# Patient Record
Sex: FEMALE | Race: WHITE | Employment: OTHER | URBAN - METROPOLITAN AREA
[De-identification: names, ages, dates, MRNs, and addresses within clinical notes are randomized per-mention and may not be internally consistent; named-entity substitution may affect disease eponyms.]

---

## 2023-10-11 ENCOUNTER — APPOINTMENT (OUTPATIENT)
Dept: CT IMAGING | Age: 73
DRG: 022 | End: 2023-10-11
Payer: MEDICARE

## 2023-10-11 ENCOUNTER — ANESTHESIA EVENT (OUTPATIENT)
Dept: INTERVENTIONAL RADIOLOGY/VASCULAR | Age: 73
End: 2023-10-11
Payer: MEDICARE

## 2023-10-11 ENCOUNTER — HOSPITAL ENCOUNTER (EMERGENCY)
Age: 73
Discharge: ANOTHER HOSPITAL | DRG: 022 | End: 2023-10-11
Attending: EMERGENCY MEDICINE
Payer: MEDICARE

## 2023-10-11 ENCOUNTER — APPOINTMENT (OUTPATIENT)
Dept: GENERAL RADIOLOGY | Age: 73
DRG: 022 | End: 2023-10-11
Payer: MEDICARE

## 2023-10-11 ENCOUNTER — APPOINTMENT (OUTPATIENT)
Dept: INTERVENTIONAL RADIOLOGY/VASCULAR | Age: 73
DRG: 022 | End: 2023-10-11
Payer: MEDICARE

## 2023-10-11 ENCOUNTER — HOSPITAL ENCOUNTER (INPATIENT)
Age: 73
LOS: 15 days | Discharge: OTHER INSTITUTION | DRG: 022 | End: 2023-10-26
Attending: EMERGENCY MEDICINE | Admitting: STUDENT IN AN ORGANIZED HEALTH CARE EDUCATION/TRAINING PROGRAM
Payer: MEDICARE

## 2023-10-11 ENCOUNTER — ANESTHESIA (OUTPATIENT)
Dept: INTERVENTIONAL RADIOLOGY/VASCULAR | Age: 73
End: 2023-10-11
Payer: MEDICARE

## 2023-10-11 VITALS
DIASTOLIC BLOOD PRESSURE: 81 MMHG | OXYGEN SATURATION: 97 % | HEIGHT: 65 IN | RESPIRATION RATE: 18 BRPM | WEIGHT: 180 LBS | HEART RATE: 80 BPM | SYSTOLIC BLOOD PRESSURE: 120 MMHG | BODY MASS INDEX: 29.99 KG/M2 | TEMPERATURE: 97.8 F

## 2023-10-11 DIAGNOSIS — I60.4 SUBARACHNOID HEMORRHAGE FROM BASILAR ARTERY ANEURYSM (HCC): ICD-10-CM

## 2023-10-11 DIAGNOSIS — I60.9 SUBARACHNOID BLEED (HCC): Primary | ICD-10-CM

## 2023-10-11 DIAGNOSIS — I60.9 SAH (SUBARACHNOID HEMORRHAGE) (HCC): ICD-10-CM

## 2023-10-11 DIAGNOSIS — J96.00 ACUTE RESPIRATORY FAILURE, UNSPECIFIED WHETHER WITH HYPOXIA OR HYPERCAPNIA (HCC): Primary | ICD-10-CM

## 2023-10-11 DIAGNOSIS — I60.9 SUBARACHNOID HEMORRHAGE (HCC): ICD-10-CM

## 2023-10-11 LAB
ALBUMIN SERPL-MCNC: 3.8 G/DL (ref 3.5–5.2)
ALP SERPL-CCNC: 94 U/L (ref 35–104)
ALT SERPL-CCNC: 16 U/L (ref 5–33)
AMMONIA PLAS-SCNC: 26 UMOL/L (ref 11–51)
AMPHET UR QL SCN: NEGATIVE
AMPHET UR QL SCN: NEGATIVE
ANION GAP SERPL CALCULATED.3IONS-SCNC: 15 MMOL/L (ref 9–17)
APAP SERPL-MCNC: <10 UG/ML (ref 10–30)
AST SERPL-CCNC: 24 U/L
BARBITURATES UR QL SCN: NEGATIVE
BARBITURATES UR QL SCN: NEGATIVE
BASOPHILS # BLD: 0.15 K/UL (ref 0–0.2)
BASOPHILS NFR BLD: 1 % (ref 0–2)
BENZODIAZ UR QL: NEGATIVE
BENZODIAZ UR QL: POSITIVE
BILIRUB SERPL-MCNC: 0.4 MG/DL (ref 0.3–1.2)
BILIRUB UR QL STRIP: NEGATIVE
BUN BLD-MCNC: 8 MG/DL (ref 8–26)
BUN SERPL-MCNC: 9 MG/DL (ref 8–23)
BUN/CREAT SERPL: 15 (ref 9–20)
CA-I BLD-SCNC: 1.14 MMOL/L (ref 1.15–1.33)
CALCIUM SERPL-MCNC: 9.1 MG/DL (ref 8.6–10.4)
CANNABINOIDS UR QL SCN: NEGATIVE
CANNABINOIDS UR QL SCN: NEGATIVE
CASTS #/AREA URNS LPF: ABNORMAL /LPF (ref 0–2)
CASTS #/AREA URNS LPF: ABNORMAL /LPF (ref 0–2)
CHLORIDE BLD-SCNC: 101 MMOL/L (ref 98–107)
CHLORIDE SERPL-SCNC: 96 MMOL/L (ref 98–107)
CHP ED QC CHECK: YES
CLARITY UR: CLEAR
CO2 BLD CALC-SCNC: 29 MMOL/L (ref 22–30)
CO2 BLD CALC-SCNC: 30 MMOL/L (ref 22–30)
CO2 SERPL-SCNC: 27 MMOL/L (ref 20–31)
COCAINE UR QL SCN: NEGATIVE
COCAINE UR QL SCN: NEGATIVE
COLOR UR: YELLOW
CREAT SERPL-MCNC: 0.6 MG/DL (ref 0.5–0.9)
D DIMER PPP FEU-MCNC: 1.77 UG/ML FEU (ref 0–0.59)
EGFR, POC: ABNORMAL ML/MIN/1.73M2
EOSINOPHIL # BLD: 0.16 K/UL (ref 0–0.44)
EOSINOPHILS RELATIVE PERCENT: 1 % (ref 1–4)
EPI CELLS #/AREA URNS HPF: ABNORMAL /HPF (ref 0–5)
ERYTHROCYTE [DISTWIDTH] IN BLOOD BY AUTOMATED COUNT: 13.4 % (ref 11.8–14.4)
ETHANOL PERCENT: <0.01 %
ETHANOLAMINE SERPL-MCNC: <10 MG/DL
FENTANYL UR QL: NEGATIVE
FENTANYL UR QL: POSITIVE
FIO2: 100
FLUAV RNA RESP QL NAA+PROBE: NOT DETECTED
FLUBV RNA RESP QL NAA+PROBE: NOT DETECTED
GFR SERPL CREATININE-BSD FRML MDRD: >60 ML/MIN/1.73M2
GLUCOSE BLD-MCNC: 155 MG/DL (ref 74–100)
GLUCOSE BLD-MCNC: 236 MG/DL
GLUCOSE BLD-MCNC: 236 MG/DL (ref 65–105)
GLUCOSE SERPL-MCNC: 240 MG/DL (ref 70–99)
GLUCOSE UR STRIP-MCNC: NEGATIVE MG/DL
HCO3 VENOUS: 29.9 MMOL/L (ref 22–29)
HCT VFR BLD AUTO: 47.9 % (ref 36.3–47.1)
HCT VFR BLD AUTO: 50 % (ref 36–46)
HGB BLD-MCNC: 15.5 G/DL (ref 11.9–15.1)
HGB UR QL STRIP.AUTO: NEGATIVE
IMM GRANULOCYTES # BLD AUTO: 0.14 K/UL (ref 0–0.3)
IMM GRANULOCYTES NFR BLD: 1 %
INR PPP: 1
KETONES UR STRIP-MCNC: NEGATIVE MG/DL
LACTATE BLDV-SCNC: 4.4 MMOL/L (ref 0.5–1.9)
LACTATE BLDV-SCNC: 4.6 MMOL/L (ref 0.5–1.9)
LACTIC ACID, WHOLE BLOOD: 1.9 MMOL/L (ref 0.7–2.1)
LEUKOCYTE ESTERASE UR QL STRIP: NEGATIVE
LYMPHOCYTES NFR BLD: 3.19 K/UL (ref 1.1–3.7)
LYMPHOCYTES RELATIVE PERCENT: 18 % (ref 24–43)
MAGNESIUM SERPL-MCNC: 1.7 MG/DL (ref 1.6–2.6)
MCH RBC QN AUTO: 30.9 PG (ref 25.2–33.5)
MCHC RBC AUTO-ENTMCNC: 32.4 G/DL (ref 28.4–34.8)
MCV RBC AUTO: 95.6 FL (ref 82.6–102.9)
METHADONE UR QL: NEGATIVE
METHADONE UR QL: NEGATIVE
MONOCYTES NFR BLD: 0.76 K/UL (ref 0.1–1.2)
MONOCYTES NFR BLD: 4 % (ref 3–12)
MUCOUS THREADS URNS QL MICRO: ABNORMAL
NEUTROPHILS NFR BLD: 75 % (ref 36–65)
NEUTS SEG NFR BLD: 13.69 K/UL (ref 1.5–8.1)
NITRITE UR QL STRIP: NEGATIVE
NRBC BLD-RTO: 0 PER 100 WBC
O2 DELIVERY DEVICE: ABNORMAL
O2 SAT, VEN: 62.9 % (ref 60–85)
OPIATES UR QL SCN: NEGATIVE
OPIATES UR QL SCN: NEGATIVE
OXYCODONE UR QL SCN: NEGATIVE
OXYCODONE UR QL SCN: NEGATIVE
PARTIAL THROMBOPLASTIN TIME: 26.9 SEC (ref 23.9–33.8)
PCO2, VEN: 50.7 MM HG (ref 41–51)
PCP UR QL SCN: NEGATIVE
PCP UR QL SCN: NEGATIVE
PH UR STRIP: 7.5 [PH] (ref 5–8)
PH VENOUS: 7.38 (ref 7.32–7.43)
PLATELET # BLD AUTO: 244 K/UL (ref 138–453)
PMV BLD AUTO: 11.7 FL (ref 8.1–13.5)
PO2, VEN: 33.9 MM HG (ref 30–50)
POC ANION GAP: 11 MMOL/L (ref 7–16)
POC CREATININE: <0.3 MG/DL (ref 0.51–1.19)
POC HCO3: 29 MMOL/L (ref 21–28)
POC HEMOGLOBIN (CALC): 17 G/DL (ref 12–16)
POC LACTIC ACID: 3 MMOL/L (ref 0.56–1.39)
POC O2 SATURATION: 100 % (ref 94–98)
POC PCO2: 48.2 MM HG (ref 35–48)
POC PH: 7.39 (ref 7.35–7.45)
POC PO2: 404.5 MM HG (ref 83–108)
POSITIVE BASE EXCESS, ART: 2.8 MMOL/L (ref 0–3)
POSITIVE BASE EXCESS, VEN: 3.4 MMOL/L (ref 0–3)
POTASSIUM BLD-SCNC: 3.2 MMOL/L (ref 3.5–4.5)
POTASSIUM SERPL-SCNC: 3.1 MMOL/L (ref 3.7–5.3)
PROT SERPL-MCNC: 7.3 G/DL (ref 6.4–8.3)
PROT UR STRIP-MCNC: ABNORMAL MG/DL
PROTHROMBIN TIME: 13.3 SEC (ref 11.5–14.2)
RBC # BLD AUTO: 5.01 M/UL (ref 3.95–5.11)
RBC #/AREA URNS HPF: ABNORMAL /HPF (ref 0–2)
SALICYLATES SERPL-MCNC: <1 MG/DL (ref 3–10)
SAMPLE SITE: ABNORMAL
SARS-COV-2 RNA RESP QL NAA+PROBE: NOT DETECTED
SODIUM BLD-SCNC: 141 MMOL/L (ref 138–146)
SODIUM SERPL-SCNC: 138 MMOL/L (ref 135–144)
SOURCE: NORMAL
SP GR UR STRIP: 1.04 (ref 1–1.03)
SPECIMEN DESCRIPTION: NORMAL
TEST INFORMATION: ABNORMAL
TEST INFORMATION: NORMAL
TROPONIN I SERPL HS-MCNC: 20 NG/L (ref 0–14)
TROPONIN I SERPL HS-MCNC: 54 NG/L (ref 0–14)
TSH SERPL DL<=0.05 MIU/L-ACNC: 1.5 UIU/ML (ref 0.3–5)
UROBILINOGEN UR STRIP-ACNC: NORMAL EU/DL (ref 0–1)
WBC #/AREA URNS HPF: ABNORMAL /HPF (ref 0–5)
WBC OTHER # BLD: 18.1 K/UL (ref 3.5–11.3)

## 2023-10-11 PROCEDURE — 83605 ASSAY OF LACTIC ACID: CPT

## 2023-10-11 PROCEDURE — 71045 X-RAY EXAM CHEST 1 VIEW: CPT

## 2023-10-11 PROCEDURE — 82947 ASSAY GLUCOSE BLOOD QUANT: CPT

## 2023-10-11 PROCEDURE — 2700000000 HC OXYGEN THERAPY PER DAY

## 2023-10-11 PROCEDURE — 36415 COLL VENOUS BLD VENIPUNCTURE: CPT

## 2023-10-11 PROCEDURE — G0480 DRUG TEST DEF 1-7 CLASSES: HCPCS

## 2023-10-11 PROCEDURE — 82803 BLOOD GASES ANY COMBINATION: CPT

## 2023-10-11 PROCEDURE — 5A1935Z RESPIRATORY VENTILATION, LESS THAN 24 CONSECUTIVE HOURS: ICD-10-PCS | Performed by: STUDENT IN AN ORGANIZED HEALTH CARE EDUCATION/TRAINING PROGRAM

## 2023-10-11 PROCEDURE — 6360000002 HC RX W HCPCS: Performed by: EMERGENCY MEDICINE

## 2023-10-11 PROCEDURE — 96366 THER/PROPH/DIAG IV INF ADDON: CPT

## 2023-10-11 PROCEDURE — 36224 PLACE CATH CAROTD ART: CPT

## 2023-10-11 PROCEDURE — 84484 ASSAY OF TROPONIN QUANT: CPT

## 2023-10-11 PROCEDURE — 36226 PLACE CATH VERTEBRAL ART: CPT

## 2023-10-11 PROCEDURE — 94761 N-INVAS EAR/PLS OXIMETRY MLT: CPT

## 2023-10-11 PROCEDURE — 85379 FIBRIN DEGRADATION QUANT: CPT

## 2023-10-11 PROCEDURE — 70450 CT HEAD/BRAIN W/O DYE: CPT

## 2023-10-11 PROCEDURE — 84520 ASSAY OF UREA NITROGEN: CPT

## 2023-10-11 PROCEDURE — B31R1ZZ FLUOROSCOPY OF INTRACRANIAL ARTERIES USING LOW OSMOLAR CONTRAST: ICD-10-PCS | Performed by: PSYCHIATRY & NEUROLOGY

## 2023-10-11 PROCEDURE — 96365 THER/PROPH/DIAG IV INF INIT: CPT

## 2023-10-11 PROCEDURE — 2000000000 HC ICU R&B

## 2023-10-11 PROCEDURE — 2500000003 HC RX 250 WO HCPCS: Performed by: EMERGENCY MEDICINE

## 2023-10-11 PROCEDURE — A4216 STERILE WATER/SALINE, 10 ML: HCPCS | Performed by: EMERGENCY MEDICINE

## 2023-10-11 PROCEDURE — 4A03X5D MEASUREMENT OF ARTERIAL FLOW, INTRACRANIAL, EXTERNAL APPROACH: ICD-10-PCS | Performed by: RADIOLOGY

## 2023-10-11 PROCEDURE — 82140 ASSAY OF AMMONIA: CPT

## 2023-10-11 PROCEDURE — 75710 ARTERY X-RAYS ARM/LEG: CPT

## 2023-10-11 PROCEDURE — 85730 THROMBOPLASTIN TIME PARTIAL: CPT

## 2023-10-11 PROCEDURE — 80307 DRUG TEST PRSMV CHEM ANLYZR: CPT

## 2023-10-11 PROCEDURE — 99223 1ST HOSP IP/OBS HIGH 75: CPT | Performed by: NEUROLOGICAL SURGERY

## 2023-10-11 PROCEDURE — 93005 ELECTROCARDIOGRAM TRACING: CPT | Performed by: NURSE PRACTITIONER

## 2023-10-11 PROCEDURE — 70498 CT ANGIOGRAPHY NECK: CPT

## 2023-10-11 PROCEDURE — 87086 URINE CULTURE/COLONY COUNT: CPT

## 2023-10-11 PROCEDURE — 82374 ASSAY BLOOD CARBON DIOXIDE: CPT

## 2023-10-11 PROCEDURE — 85014 HEMATOCRIT: CPT

## 2023-10-11 PROCEDURE — 6360000004 HC RX CONTRAST MEDICATION: Performed by: STUDENT IN AN ORGANIZED HEALTH CARE EDUCATION/TRAINING PROGRAM

## 2023-10-11 PROCEDURE — 6360000004 HC RX CONTRAST MEDICATION: Performed by: NURSE PRACTITIONER

## 2023-10-11 PROCEDURE — 82330 ASSAY OF CALCIUM: CPT

## 2023-10-11 PROCEDURE — 80051 ELECTROLYTE PANEL: CPT

## 2023-10-11 PROCEDURE — 2580000003 HC RX 258

## 2023-10-11 PROCEDURE — 6360000002 HC RX W HCPCS: Performed by: NURSE PRACTITIONER

## 2023-10-11 PROCEDURE — 99285 EMERGENCY DEPT VISIT HI MDM: CPT

## 2023-10-11 PROCEDURE — 93005 ELECTROCARDIOGRAM TRACING: CPT | Performed by: EMERGENCY MEDICINE

## 2023-10-11 PROCEDURE — 61624 TCAT PERM OCCLS/EMBOLJ CNS: CPT

## 2023-10-11 PROCEDURE — 99223 1ST HOSP IP/OBS HIGH 75: CPT | Performed by: PSYCHIATRY & NEUROLOGY

## 2023-10-11 PROCEDURE — 80053 COMPREHEN METABOLIC PANEL: CPT

## 2023-10-11 PROCEDURE — 3700000001 HC ADD 15 MINUTES (ANESTHESIA)

## 2023-10-11 PROCEDURE — 6360000002 HC RX W HCPCS

## 2023-10-11 PROCEDURE — 75894 X-RAYS TRANSCATH THERAPY: CPT

## 2023-10-11 PROCEDURE — 82565 ASSAY OF CREATININE: CPT

## 2023-10-11 PROCEDURE — 94002 VENT MGMT INPAT INIT DAY: CPT

## 2023-10-11 PROCEDURE — B31F1ZZ FLUOROSCOPY OF LEFT VERTEBRAL ARTERY USING LOW OSMOLAR CONTRAST: ICD-10-PCS | Performed by: PSYCHIATRY & NEUROLOGY

## 2023-10-11 PROCEDURE — 3700000000 HC ANESTHESIA ATTENDED CARE

## 2023-10-11 PROCEDURE — 84443 ASSAY THYROID STIM HORMONE: CPT

## 2023-10-11 PROCEDURE — 80143 DRUG ASSAY ACETAMINOPHEN: CPT

## 2023-10-11 PROCEDURE — 80179 DRUG ASSAY SALICYLATE: CPT

## 2023-10-11 PROCEDURE — 75898 FOLLOW-UP ANGIOGRAPHY: CPT

## 2023-10-11 PROCEDURE — 2500000003 HC RX 250 WO HCPCS: Performed by: NURSE PRACTITIONER

## 2023-10-11 PROCEDURE — 2580000003 HC RX 258: Performed by: EMERGENCY MEDICINE

## 2023-10-11 PROCEDURE — 83735 ASSAY OF MAGNESIUM: CPT

## 2023-10-11 PROCEDURE — 81001 URINALYSIS AUTO W/SCOPE: CPT

## 2023-10-11 PROCEDURE — 87636 SARSCOV2 & INF A&B AMP PRB: CPT

## 2023-10-11 PROCEDURE — 2580000003 HC RX 258: Performed by: PSYCHIATRY & NEUROLOGY

## 2023-10-11 PROCEDURE — 96375 TX/PRO/DX INJ NEW DRUG ADDON: CPT

## 2023-10-11 PROCEDURE — C1889 IMPLANT/INSERT DEVICE, NOC: HCPCS

## 2023-10-11 PROCEDURE — 87040 BLOOD CULTURE FOR BACTERIA: CPT

## 2023-10-11 PROCEDURE — 2500000003 HC RX 250 WO HCPCS

## 2023-10-11 PROCEDURE — 96374 THER/PROPH/DIAG INJ IV PUSH: CPT

## 2023-10-11 PROCEDURE — 74018 RADEX ABDOMEN 1 VIEW: CPT

## 2023-10-11 PROCEDURE — 85025 COMPLETE CBC W/AUTO DIFF WBC: CPT

## 2023-10-11 PROCEDURE — 03VG3HZ RESTRICTION OF INTRACRANIAL ARTERY WITH INTRALUMINAL DEVICE, FLOW DIVERTER, PERCUTANEOUS APPROACH: ICD-10-PCS | Performed by: PSYCHIATRY & NEUROLOGY

## 2023-10-11 PROCEDURE — 85610 PROTHROMBIN TIME: CPT

## 2023-10-11 RX ORDER — SODIUM CHLORIDE, SODIUM LACTATE, POTASSIUM CHLORIDE, CALCIUM CHLORIDE 600; 310; 30; 20 MG/100ML; MG/100ML; MG/100ML; MG/100ML
INJECTION, SOLUTION INTRAVENOUS CONTINUOUS PRN
Status: DISCONTINUED | OUTPATIENT
Start: 2023-10-11 | End: 2023-10-11 | Stop reason: SDUPTHER

## 2023-10-11 RX ORDER — FENTANYL CITRATE 50 UG/ML
INJECTION, SOLUTION INTRAMUSCULAR; INTRAVENOUS
Status: COMPLETED
Start: 2023-10-11 | End: 2023-10-11

## 2023-10-11 RX ORDER — IODIXANOL 270 MG/ML
100 INJECTION, SOLUTION INTRAVASCULAR
Status: COMPLETED | OUTPATIENT
Start: 2023-10-11 | End: 2023-10-11

## 2023-10-11 RX ORDER — MIDAZOLAM HYDROCHLORIDE 1 MG/ML
2 INJECTION INTRAMUSCULAR; INTRAVENOUS ONCE
Status: COMPLETED | OUTPATIENT
Start: 2023-10-11 | End: 2023-10-11

## 2023-10-11 RX ORDER — SODIUM CHLORIDE 9 MG/ML
INJECTION, SOLUTION INTRAVENOUS CONTINUOUS
Status: DISCONTINUED | OUTPATIENT
Start: 2023-10-11 | End: 2023-10-24

## 2023-10-11 RX ORDER — MAGNESIUM SULFATE IN WATER 40 MG/ML
2000 INJECTION, SOLUTION INTRAVENOUS ONCE
Status: COMPLETED | OUTPATIENT
Start: 2023-10-11 | End: 2023-10-11

## 2023-10-11 RX ORDER — LABETALOL HYDROCHLORIDE 5 MG/ML
20 INJECTION, SOLUTION INTRAVENOUS EVERY 4 HOURS
Status: DISCONTINUED | OUTPATIENT
Start: 2023-10-11 | End: 2023-10-12

## 2023-10-11 RX ORDER — ONDANSETRON 2 MG/ML
4 INJECTION INTRAMUSCULAR; INTRAVENOUS ONCE
Status: COMPLETED | OUTPATIENT
Start: 2023-10-11 | End: 2023-10-11

## 2023-10-11 RX ORDER — LEVETIRACETAM 10 MG/ML
1000 INJECTION INTRAVASCULAR ONCE
Status: COMPLETED | OUTPATIENT
Start: 2023-10-11 | End: 2023-10-11

## 2023-10-11 RX ORDER — SODIUM CHLORIDE 9 MG/ML
INJECTION, SOLUTION INTRAVENOUS PRN
Status: DISCONTINUED | OUTPATIENT
Start: 2023-10-11 | End: 2023-10-26 | Stop reason: HOSPADM

## 2023-10-11 RX ORDER — PHENYLEPHRINE HCL IN 0.9% NACL 1 MG/10 ML
SYRINGE (ML) INTRAVENOUS PRN
Status: DISCONTINUED | OUTPATIENT
Start: 2023-10-11 | End: 2023-10-11 | Stop reason: SDUPTHER

## 2023-10-11 RX ORDER — ONDANSETRON 4 MG/1
4 TABLET, ORALLY DISINTEGRATING ORAL EVERY 8 HOURS PRN
Status: DISCONTINUED | OUTPATIENT
Start: 2023-10-11 | End: 2023-10-12 | Stop reason: SDUPTHER

## 2023-10-11 RX ORDER — SODIUM CHLORIDE 0.9 % (FLUSH) 0.9 %
5-40 SYRINGE (ML) INJECTION PRN
Status: DISCONTINUED | OUTPATIENT
Start: 2023-10-11 | End: 2023-10-16

## 2023-10-11 RX ORDER — PROPOFOL 10 MG/ML
5-50 INJECTION, EMULSION INTRAVENOUS CONTINUOUS
Status: DISCONTINUED | OUTPATIENT
Start: 2023-10-11 | End: 2023-10-11 | Stop reason: HOSPADM

## 2023-10-11 RX ORDER — FENTANYL CITRATE 50 UG/ML
50 INJECTION, SOLUTION INTRAMUSCULAR; INTRAVENOUS ONCE
Status: COMPLETED | OUTPATIENT
Start: 2023-10-12 | End: 2023-10-11

## 2023-10-11 RX ORDER — PROPOFOL 10 MG/ML
5-50 INJECTION, EMULSION INTRAVENOUS CONTINUOUS
Status: DISCONTINUED | OUTPATIENT
Start: 2023-10-11 | End: 2023-10-12

## 2023-10-11 RX ORDER — ROCURONIUM BROMIDE 10 MG/ML
INJECTION, SOLUTION INTRAVENOUS PRN
Status: DISCONTINUED | OUTPATIENT
Start: 2023-10-11 | End: 2023-10-11 | Stop reason: SDUPTHER

## 2023-10-11 RX ORDER — ACETAMINOPHEN 325 MG/1
650 TABLET ORAL EVERY 4 HOURS PRN
Status: DISCONTINUED | OUTPATIENT
Start: 2023-10-11 | End: 2023-10-12

## 2023-10-11 RX ORDER — FAMOTIDINE 10 MG/ML
20 INJECTION, SOLUTION INTRAVENOUS 2 TIMES DAILY
Status: DISCONTINUED | OUTPATIENT
Start: 2023-10-11 | End: 2023-10-12

## 2023-10-11 RX ORDER — SODIUM CHLORIDE 0.9 % (FLUSH) 0.9 %
5-40 SYRINGE (ML) INJECTION EVERY 12 HOURS SCHEDULED
Status: DISCONTINUED | OUTPATIENT
Start: 2023-10-11 | End: 2023-10-26

## 2023-10-11 RX ORDER — ONDANSETRON 2 MG/ML
4 INJECTION INTRAMUSCULAR; INTRAVENOUS EVERY 6 HOURS PRN
Status: DISCONTINUED | OUTPATIENT
Start: 2023-10-11 | End: 2023-10-12 | Stop reason: SDUPTHER

## 2023-10-11 RX ORDER — HYDRALAZINE HYDROCHLORIDE 20 MG/ML
10 INJECTION INTRAMUSCULAR; INTRAVENOUS EVERY 4 HOURS PRN
Status: DISCONTINUED | OUTPATIENT
Start: 2023-10-11 | End: 2023-10-14

## 2023-10-11 RX ORDER — POTASSIUM CHLORIDE 7.45 MG/ML
10 INJECTION INTRAVENOUS
Status: DISPENSED | OUTPATIENT
Start: 2023-10-11 | End: 2023-10-11

## 2023-10-11 RX ORDER — 0.9 % SODIUM CHLORIDE 0.9 %
1000 INTRAVENOUS SOLUTION INTRAVENOUS ONCE
Status: COMPLETED | OUTPATIENT
Start: 2023-10-11 | End: 2023-10-11

## 2023-10-11 RX ADMIN — Medication 100 MCG: at 18:58

## 2023-10-11 RX ADMIN — ROCURONIUM BROMIDE 30 MG: 10 INJECTION, SOLUTION INTRAVENOUS at 18:40

## 2023-10-11 RX ADMIN — ONDANSETRON 4 MG: 2 INJECTION INTRAMUSCULAR; INTRAVENOUS at 11:56

## 2023-10-11 RX ADMIN — PROPOFOL 50 MCG/KG/MIN: 10 INJECTION, EMULSION INTRAVENOUS at 20:24

## 2023-10-11 RX ADMIN — ROCURONIUM BROMIDE 20 MG: 10 INJECTION, SOLUTION INTRAVENOUS at 19:25

## 2023-10-11 RX ADMIN — IODIXANOL 82 ML: 270 INJECTION, SOLUTION INTRAVASCULAR at 20:01

## 2023-10-11 RX ADMIN — ROCURONIUM BROMIDE 50 MG: 10 INJECTION, SOLUTION INTRAVENOUS at 17:50

## 2023-10-11 RX ADMIN — Medication 2 G: at 18:17

## 2023-10-11 RX ADMIN — LABETALOL HYDROCHLORIDE 20 MG: 5 INJECTION, SOLUTION INTRAVENOUS at 21:03

## 2023-10-11 RX ADMIN — MIDAZOLAM 2 MG: 1 INJECTION INTRAMUSCULAR; INTRAVENOUS at 11:11

## 2023-10-11 RX ADMIN — HYDRALAZINE HYDROCHLORIDE 10 MG: 20 INJECTION, SOLUTION INTRAMUSCULAR; INTRAVENOUS at 22:48

## 2023-10-11 RX ADMIN — LEVETIRACETAM 1000 MG: 10 INJECTION, SOLUTION INTRAVENOUS at 14:01

## 2023-10-11 RX ADMIN — PROPOFOL 10 MCG/KG/MIN: 10 INJECTION, EMULSION INTRAVENOUS at 11:20

## 2023-10-11 RX ADMIN — FENTANYL CITRATE 50 MCG: 50 INJECTION, SOLUTION INTRAMUSCULAR; INTRAVENOUS at 23:49

## 2023-10-11 RX ADMIN — PROPOFOL 40 MG: 10 INJECTION, EMULSION INTRAVENOUS at 19:15

## 2023-10-11 RX ADMIN — FAMOTIDINE 20 MG: 10 INJECTION, SOLUTION INTRAVENOUS at 21:03

## 2023-10-11 RX ADMIN — SODIUM CHLORIDE 1000 ML: 9 INJECTION, SOLUTION INTRAVENOUS at 11:56

## 2023-10-11 RX ADMIN — POTASSIUM CHLORIDE 10 MEQ: 7.46 INJECTION, SOLUTION INTRAVENOUS at 16:55

## 2023-10-11 RX ADMIN — PROPOFOL 30 MG: 10 INJECTION, EMULSION INTRAVENOUS at 19:40

## 2023-10-11 RX ADMIN — FENTANYL CITRATE 50 MCG: 50 INJECTION INTRAMUSCULAR; INTRAVENOUS at 23:49

## 2023-10-11 RX ADMIN — PROPOFOL 30 MCG/KG/MIN: 10 INJECTION, EMULSION INTRAVENOUS at 13:45

## 2023-10-11 RX ADMIN — FAMOTIDINE 20 MG: 10 INJECTION, SOLUTION INTRAVENOUS at 12:43

## 2023-10-11 RX ADMIN — SODIUM CHLORIDE, PRESERVATIVE FREE 10 ML: 5 INJECTION INTRAVENOUS at 20:27

## 2023-10-11 RX ADMIN — LEVETIRACETAM 1000 MG: 10 INJECTION, SOLUTION INTRAVENOUS at 16:28

## 2023-10-11 RX ADMIN — SODIUM CHLORIDE, POTASSIUM CHLORIDE, SODIUM LACTATE AND CALCIUM CHLORIDE: 600; 310; 30; 20 INJECTION, SOLUTION INTRAVENOUS at 17:47

## 2023-10-11 RX ADMIN — MAGNESIUM SULFATE HEPTAHYDRATE 2000 MG: 40 INJECTION, SOLUTION INTRAVENOUS at 16:58

## 2023-10-11 RX ADMIN — IOPAMIDOL 90 ML: 755 INJECTION, SOLUTION INTRAVENOUS at 15:51

## 2023-10-11 ASSESSMENT — PULMONARY FUNCTION TESTS
PIF_VALUE: 26
PIF_VALUE: 23
PIF_VALUE: 21
PIF_VALUE: 24

## 2023-10-11 NOTE — H&P
Neuro ICU History & Physical    Patient Name: Sheri Knutson  Patient : 1950  Room/Bed:   Code Status: Full  Allergies: No Known Allergies    CHIEF COMPLAINT     Thunderclap headache    HPI    History Obtained From: Family - spouse, EMR    The patient is a 67 y.o. female with history of HTN, HLD, and smoking presented as a transfer from [facility] for Diffuse SAH. Patient was at home this morning when around 0930 she developed acute onset severe headache. EMS was called, patient was walking to the cot when she became unresponsive. She was intubated and taken to [facility] where CT head showed diffuse SAH. She was loaded with 1g Keppra and started on Propofol for sedation. On arrival to [facility] with propofol held, patient opens eyes to verbal stimulation. Pupils are 4mm reactive bilaterally. Cough intact. Moving all extremities spontaneously, localizing bilateral upper extremities. Wiggles toes bilaterally to command. CTA head/neck showed basilar tip aneurysm. Given additional 1g Keppra. SBP goal < 120. Irregular rhythm noted on monitor, follow up EKG and troponin. Endovascular and neurosurgery consulted. Patient's  does report she takes Aspirin 81mg daily for general wellness. For [facility] Hunt&Gates: 5, Modified Viramontes: IV  ICH score: 0    Admitted to ICU From: ED   Reason for ICU Admission: ruptured aneurysm       PATIENT HISTORY   Past Medical History:    No past medical history on file. Past Surgical History:    No past surgical history on file.     Social History:   Social History     Socioeconomic History    Marital status:      Spouse name: Not on file    Number of children: Not on file    Years of education: Not on file    Highest education level: Not on file   Occupational History    Not on file   Tobacco Use    Smoking status: Not on file    Smokeless tobacco: Not on file   Substance and Sexual Activity    Alcohol use: Not on file    Drug use: Not on file

## 2023-10-11 NOTE — ED NOTES
Writer took pt to CT via stretcher.      Erin Brizuela RN  10/11/23 9589 patient oriented to unit and procedure

## 2023-10-11 NOTE — BRIEF OP NOTE
[facility]    NEUROENDOVASCULAR SERVICE: POST-OP NOTE: 10/11/2023    Pt Name: Melissa Barrera  MRN: [id number]  YOB: 1950  Date of Procedure: 10/11/2023  Primary Care Physician: No primary care provider on file. Referring Marisol Willis MD      Pre-Procedural Diagnosis:Basilar tip aneurysm  Post-Procedural Diagnosis:as above      Procedure Performed: Cerebral angiogram with WEB device embolization of aneuryms    Surgeon:   Merary Britton MD    Fellow:  Mervat Lowery MD and Christal Hodgkins, MD     Assisting Tech:  Doe Query    PRE-PROCEDURAL EXAM:  Prestroke baseline mRS MODIFIED MOHIT SCORE: 0 - No symptoms at all. Neurological exam performed and unchanged from initial H&P or consult  MODIFIED MOHIT SCORE: 4 - Moderate severe disability:  unable to walk or attend to own bodily needs without assistance. Anesthesia: General Anesthesia  Complications: none    Intra-Operative EXAM:  Neurological exam performed and unchanged from initial H&P or consult    EBL: < less than 100       Cc            Specimens: Were not Obtained  Contrast:     Visipaque 270 low osmolar 82 Cc             Fluoro: 24.4 min    Findings:  Please see dictated Radiology note for further details  There is a bilobed anteriorly facing ruptured basilar tip aneurysm with moderate stenosis at the tip of the basilar artery. The basilar tip aneurysm measures neck 3.13mm, width 4.7mm, height 4.83mm and breath 4.71mm. The above was treated with 7Fr short radial sheath, 7Fr RIST, 5Fr Berenstein, VIA 17, Synchro Support, WEB 5x3mm, post deployment, the aneurysm is obliterated, achieving Chris 1, WEB Occlusion Score A.                 Chris score: class I    POST-PROCEDURAL EXAM :   Stable neurological Exam  Neurological exam performed and unchanged from initial H&P or consult    Closure:  left VascBand 17cc of air        POST-PROCEDURAL MONITORING : see orders  Disposition: Neuro

## 2023-10-11 NOTE — ED NOTES
Pt intubated at scene prior to arrival at [address] [place] [address]. Non-violent restraints in place.      Rosangela Peres RN  10/11/23 7356

## 2023-10-11 NOTE — PROGRESS NOTES
[address]     Emergency/Trauma Note    PATIENT NAME: Darren Berrios    Shift date: 10/11/2023   Shift day: Wednesday   Shift # 1    Room # 13/13   Name: Darren Berrios            Age: 67 y.o. Gender: female          Christianity: None, confirmed by family  Place of Mandaeism:     Trauma/Incident type: Stroke Alert  Admit Date & Time: 10/11/2023  2:52 PM      ADVANCE DIRECTIVES IN CHART? No    NAME OF DECISION MAKER:     RELATIONSHIP OF DECISION MAKER TO PATIENT:     PATIENT/EVENT DESCRIPTION:  Darren Berrios is a 67 y.o. female who was transferred from [address] [place] [address] and paged as a stroke alert. Pt to be admitted to 13/13. SPIRITUAL ASSESSMENT-INTERVENTION-OUTCOME:  Pt's , son, daughter and sister were present. They appeared concerned but calm.  escorted family to pt's room and provided presence as neuro resident spoke to family. Family was receptive to support and expressed appreciation. PATIENT BELONGINGS:  No belongings noted    ANY BELONGINGS OF SIGNIFICANT VALUE NOTED:      REGISTRATION STAFF NOTIFIED? No      WHAT IS YOUR SPIRITUAL CARE PLAN FOR THIS PATIENT?:   Chaplains will remain available to offer spiritual and emotional support as needed.      Electronically signed by Rayray Wong on 10/11/2023 at 3:58 PM.  [address] [place] [address]  [phone]

## 2023-10-11 NOTE — ED PROVIDER NOTES
[facility] ED  Emergency Department Encounter  Emergency Medicine Resident     Pt Name:Dahlia Clarke  MRN: [id number]  [address] [facility] [place] 1950  Date of evaluation: 10/11/23  PCP:  No primary care provider on file. Note Started: 3:06 PM EDT      CHIEF COMPLAINT       Chief Complaint   Patient presents with    Altered Mental Status       HISTORY OF PRESENT ILLNESS  (Location/Symptom, Timing/Onset, Context/Setting, Quality, Duration, Modifying Factors, Severity.)      Sarah Butler is a 67 y.o. female who presents as a transfer from [facility]. Patient was found to have a spontaneous subarachnoid hemorrhage with diffuse subarachnoid hemorrhage noted. Patient has a history of hypertension, unclear if the patient is compliant on medications. Not on any anticoagulation, no fall or trauma. On EMS arrival, patient initially had nausea vomiting and headache. Sudden onset of headache this morning. Patient then became unresponsive and was intubated prior to arrival at [facility]. Patient received 1 g of Keppra at scene and and was placed on propofol for sedation. Per EMS report, patient was moving on exam at [facility]. PAST MEDICAL / SURGICAL / SOCIAL / FAMILY HISTORY     HTN     has no past surgical history on file.       Social History     Socioeconomic History    Marital status:      Spouse name: Not on file    Number of children: Not on file    Years of education: Not on file    Highest education level: Not on file   Occupational History    Not on file   Tobacco Use    Smoking status: Not on file    Smokeless tobacco: Not on file   Substance and Sexual Activity    Alcohol use: Not on file    Drug use: Not on file    Sexual activity: Not on file   Other Topics Concern    Not on file   Social History Narrative    Not on file     Social Determinants of Health     Financial Resource Strain: Not on file   Food Insecurity: Not on file   Transportation Needs: Not on file   Physical

## 2023-10-11 NOTE — ED NOTES
Stroke alert called. Pt has known subarachnoid hemorrhage via scans from [facility] Neuro aware of pt.       Zoila Reza RN  10/11/23 4426

## 2023-10-11 NOTE — CONSULTS
Department of Neurosurgery                                              Consult Note    Reason for Consult:  [facility]    Neurosurgeon:   [] Dr. Rebecca Boyce  [] Dr. Jessica Hamm  [] Dr. Anny Nolasco  [] Dr. Noman Wong      History Obtained From:  [facility] record    CHIEF COMPLAINT:         Chief Complaint   Patient presents with    Altered Mental Status       HISTORY OF PRESENT ILLNESS:       The patient is a 67 y.o. female who developed acute severe headache around 930 AM. Patient had EMS called and went unresponsive prompting intubation on field. Patient was intiially taken to [facility] for eval and transferred here for further care. CTH demonstrated diffuse SAH. Bolused with keppra 1000 mg while at [address] [place] [address]. Limited history given from patient secondary to intubation and sedation. PAST MEDICAL HISTORY :       Past Medical History:    No past medical history on file. Past Surgical History:    No past surgical history on file. Social History:   Social History     Socioeconomic History    Marital status:      Spouse name: Not on file    Number of children: Not on file    Years of education: Not on file    Highest education level: Not on file   Occupational History    Not on file   Tobacco Use    Smoking status: Not on file    Smokeless tobacco: Not on file   Substance and Sexual Activity    Alcohol use: Not on file    Drug use: Not on file    Sexual activity: Not on file   Other Topics Concern    Not on file   Social History Narrative    Not on file     Social Determinants of Health     Financial Resource Strain: Not on file   Food Insecurity: Not on file   Transportation Needs: Not on file   Physical Activity: Not on file   Stress: Not on file   Social Connections: Not on file   Intimate Partner Violence: Not on file   Housing Stability: Not on file       Family History:   No family history on file. Allergies:  Patient has no known allergies.     Home Medications:  Prior to Admission medications and anticoagulants    - We recommend SBP < 140   - Determine the lower limit of SBP clinically based on mentation    Additional recommendations may follow    Please contact neurosurgery with any changes in patients neurologic status. Thank you for your consult.        Ashley Henderson DO  10/11/2023  3:50 PM

## 2023-10-11 NOTE — ED NOTES
72F with spontaneous SAH  EMS called for headache then found unresponsive  Intubated  Given [facility] aware  Stroke Alert    Accepted by Annamarie Mack, RN  10/11/23 2115

## 2023-10-11 NOTE — ED TRIAGE NOTES
Pt to ED via [address] [place] [address] Rd, stretcher to rm 24, bed per draw. Pt unresponsive and intubated. Per EMS pt responsive on arrival and able to walk to cot. Pt began vomiting this morning. Pt while on cot stated she had an extreme posterior headache and passed out. Pt intubated in squad. 7 ET, 16g L AC, palpable pulses. Dr @ bedside. EKG, monitor, labs taken and sent.

## 2023-10-11 NOTE — ED TRIAGE NOTES
Pt presents to the ED via EMS from [address] [place] [address] for sudden loss of consciousness. EMS reports pt called out at 0930 for headache and nausea and vomiting . EMS reports upon arrival pt went unresponsive and they intubated in the field then took pt to [address] [place] [address]. Ems reports giving 1000ML NS, 100mcg fentanyl, 1g Keppra, and propofol at 30mcg/kg. Pt placed in non-violent restraints. Upon arrival pt VSS. Pt placed on monitor. ED staff at bedside.

## 2023-10-11 NOTE — PROGRESS NOTES
The transport originated from 15. Pt. was transported to CT and back. Assisting with the transport was RN,RRT. Appropriate devices were applied to monitor the patient's condition during transport. Patient transported  via 100% O2 via ventilator. Patient tolerated well.         Ba Solares RCP  4:17 PM

## 2023-10-11 NOTE — CONSULTS
Value Date/Time    WBC 18.1 10/11/2023 11:43 AM    RBC 5.01 10/11/2023 11:43 AM    HGB 15.5 10/11/2023 11:43 AM    HCT 47.9 10/11/2023 11:43 AM     10/11/2023 11:43 AM    MCV 95.6 10/11/2023 11:43 AM    MCH 30.9 10/11/2023 11:43 AM    MCHC 32.4 10/11/2023 11:43 AM    RDW 13.4 10/11/2023 11:43 AM    LYMPHOPCT 18 10/11/2023 11:43 AM    MONOPCT 4 10/11/2023 11:43 AM    BASOPCT 1 10/11/2023 11:43 AM    MONOSABS 0.76 10/11/2023 11:43 AM    LYMPHSABS 3.19 10/11/2023 11:43 AM    EOSABS 0.16 10/11/2023 11:43 AM    BASOSABS 0.15 10/11/2023 11:43 AM     BMP:    Lab Results   Component Value Date/Time     10/11/2023 11:43 AM    K 3.1 10/11/2023 11:43 AM    CL 96 10/11/2023 11:43 AM    CO2 27 10/11/2023 11:43 AM    BUN 9 10/11/2023 11:43 AM    LABALBU 3.8 10/11/2023 11:43 AM    CREATININE <0.3 10/11/2023 02:59 PM    CREATININE 0.6 10/11/2023 11:43 AM    CALCIUM 9.1 10/11/2023 11:43 AM    LABGLOM >60 10/11/2023 11:43 AM    GLUCOSE 236 10/11/2023 11:45 AM     No results found for: \"LABA1C\"  No results found for: \"LDLCALC\", \"LDLCHOLESTEROL\", \"LDLDIRECT\"    Radiology Review:    XR CHEST PORTABLE    (Results Pending)   CTA HEAD NECK W CONTRAST    (Results Pending)   CT HEAD WO CONTRAST    (Results Pending)       Assessment:       Ming Brody is a 67 y.o. female with a history of HTN, HLD, who presents with acute onset severe headache. Was taken to [facility] where she was found to have diffuse SAH. Patient was intubated by EMS on route due to becoming unresponsive. Bower and Gates grade 4, modified Szymanski grade 4. Neuro critical care admission with neurosurgery and neuro endovascular team evaluation. SAH      Last Known Well (date and time)   9:30 AM     Candidate for IV Tenecteplase therapy    Yes []  Risks including 6% of sich/death, benefits of potential improved thrombolysis, and alternatives to IV thrombolytics discussed with patient and/or family.     No   [x] due to the following exclusion

## 2023-10-12 ENCOUNTER — APPOINTMENT (OUTPATIENT)
Dept: GENERAL RADIOLOGY | Age: 73
DRG: 022 | End: 2023-10-12
Payer: MEDICARE

## 2023-10-12 ENCOUNTER — APPOINTMENT (OUTPATIENT)
Dept: CT IMAGING | Age: 73
DRG: 022 | End: 2023-10-12
Payer: MEDICARE

## 2023-10-12 PROBLEM — I61.1 NONTRAUMATIC CORTICAL HEMORRHAGE OF LEFT CEREBRAL HEMISPHERE (HCC): Status: ACTIVE | Noted: 2023-10-12

## 2023-10-12 PROBLEM — R40.2430 GLASGOW COMA SCALE TOTAL SCORE 3-8 (HCC): Status: ACTIVE | Noted: 2023-10-12

## 2023-10-12 PROBLEM — G93.6 CEREBRAL EDEMA (HCC): Status: ACTIVE | Noted: 2023-10-12

## 2023-10-12 PROBLEM — G91.1 OBSTRUCTIVE HYDROCEPHALUS (HCC): Status: ACTIVE | Noted: 2023-10-12

## 2023-10-12 LAB
ANION GAP SERPL CALCULATED.3IONS-SCNC: 13 MMOL/L (ref 9–17)
APPEARANCE CSF: ABNORMAL
BUN SERPL-MCNC: 11 MG/DL (ref 8–23)
CALCIUM SERPL-MCNC: 8.1 MG/DL (ref 8.6–10.4)
CHLORIDE SERPL-SCNC: 100 MMOL/L (ref 98–107)
CHOLEST SERPL-MCNC: 130 MG/DL
CHOLESTEROL/HDL RATIO: 2.8
CO2 SERPL-SCNC: 24 MMOL/L (ref 20–31)
CREAT SERPL-MCNC: 0.6 MG/DL (ref 0.5–0.9)
EKG ATRIAL RATE: 80 BPM
EKG P AXIS: 33 DEGREES
EKG P-R INTERVAL: 200 MS
EKG Q-T INTERVAL: 440 MS
EKG QRS DURATION: 96 MS
EKG QTC CALCULATION (BAZETT): 507 MS
EKG R AXIS: -6 DEGREES
EKG T AXIS: 71 DEGREES
EKG VENTRICULAR RATE: 80 BPM
ERYTHROCYTE [DISTWIDTH] IN BLOOD BY AUTOMATED COUNT: 13.6 % (ref 11.8–14.4)
EST. AVERAGE GLUCOSE BLD GHB EST-MCNC: 143 MG/DL
FIO2: 50
GFR SERPL CREATININE-BSD FRML MDRD: >60 ML/MIN/1.73M2
GLUCOSE CSF-MCNC: 105 MG/DL (ref 40–70)
GLUCOSE SERPL-MCNC: 133 MG/DL (ref 70–99)
HBA1C MFR BLD: 6.6 % (ref 4–6)
HCT VFR BLD AUTO: 38.5 % (ref 36.3–47.1)
HDLC SERPL-MCNC: 46 MG/DL
HGB BLD-MCNC: 12.8 G/DL (ref 11.9–15.1)
LACTIC ACID, WHOLE BLOOD: 3.1 MMOL/L (ref 0.7–2.1)
LACTIC ACID, WHOLE BLOOD: 3.2 MMOL/L (ref 0.7–2.1)
LDLC SERPL CALC-MCNC: 49 MG/DL (ref 0–130)
LYMPHOCYTES NFR CSF: 17 %
MAGNESIUM SERPL-MCNC: 2 MG/DL (ref 1.6–2.6)
MCH RBC QN AUTO: 31.1 PG (ref 25.2–33.5)
MCHC RBC AUTO-ENTMCNC: 33.2 G/DL (ref 28.4–34.8)
MCV RBC AUTO: 93.4 FL (ref 82.6–102.9)
MICROORGANISM SPEC CULT: NO GROWTH
MICROORGANISM SPEC CULT: NO GROWTH
NEUTROPHILS NFR CSF: 78 %
NRBC BLD-RTO: 0 PER 100 WBC
NUC CELL # FLD MANUAL: 67 CELLS/UL
O2 DELIVERY DEVICE: NORMAL
PLATELET # BLD AUTO: 233 K/UL (ref 138–453)
PMV BLD AUTO: 11.8 FL (ref 8.1–13.5)
POC HCO3: 25.5 MMOL/L (ref 21–28)
POC O2 SATURATION: 97.3 % (ref 94–98)
POC PCO2: 40.3 MM HG (ref 35–48)
POC PH: 7.41 (ref 7.35–7.45)
POC PO2: 92.9 MM HG (ref 83–108)
POSITIVE BASE EXCESS, ART: 0.8 MMOL/L (ref 0–3)
POTASSIUM SERPL-SCNC: 3.2 MMOL/L (ref 3.7–5.3)
PROT CSF-MCNC: 156.2 MG/DL (ref 15–45)
RBC # BLD AUTO: 4.12 M/UL (ref 3.95–5.11)
RBC # FLD MANUAL: ABNORMAL CELLS/UL
REASON FOR REJECTION: NORMAL
SAMPLE SITE: NORMAL
SODIUM SERPL-SCNC: 137 MMOL/L (ref 135–144)
SPECIMEN DESCRIPTION: NORMAL
SPECIMEN DESCRIPTION: NORMAL
SPECIMEN SOURCE: NORMAL
SPECIMEN VOL CSF: ABNORMAL ML
TRIGL SERPL-MCNC: 176 MG/DL
TROPONIN I SERPL HS-MCNC: 42 NG/L (ref 0–14)
TROPONIN I SERPL HS-MCNC: 42 NG/L (ref 0–14)
TUBE # CSF: ABNORMAL
UNIDENT CELLS NFR FLD: NORMAL %
WBC OTHER # BLD: 18.7 K/UL (ref 3.5–11.3)
XANTHOCHROMIA CSF QL: ABNORMAL
ZZ NTE CLEAN UP: ORDERED TEST: NORMAL

## 2023-10-12 PROCEDURE — 83036 HEMOGLOBIN GLYCOSYLATED A1C: CPT

## 2023-10-12 PROCEDURE — 83735 ASSAY OF MAGNESIUM: CPT

## 2023-10-12 PROCEDURE — 6360000002 HC RX W HCPCS: Performed by: NURSE PRACTITIONER

## 2023-10-12 PROCEDURE — 6360000002 HC RX W HCPCS

## 2023-10-12 PROCEDURE — 71045 X-RAY EXAM CHEST 1 VIEW: CPT

## 2023-10-12 PROCEDURE — 87205 SMEAR GRAM STAIN: CPT

## 2023-10-12 PROCEDURE — 94003 VENT MGMT INPAT SUBQ DAY: CPT

## 2023-10-12 PROCEDURE — 80048 BASIC METABOLIC PNL TOTAL CA: CPT

## 2023-10-12 PROCEDURE — 82803 BLOOD GASES ANY COMBINATION: CPT

## 2023-10-12 PROCEDURE — 2700000000 HC OXYGEN THERAPY PER DAY

## 2023-10-12 PROCEDURE — 2500000003 HC RX 250 WO HCPCS: Performed by: NURSE PRACTITIONER

## 2023-10-12 PROCEDURE — 85027 COMPLETE CBC AUTOMATED: CPT

## 2023-10-12 PROCEDURE — 6370000000 HC RX 637 (ALT 250 FOR IP)

## 2023-10-12 PROCEDURE — 6370000000 HC RX 637 (ALT 250 FOR IP): Performed by: STUDENT IN AN ORGANIZED HEALTH CARE EDUCATION/TRAINING PROGRAM

## 2023-10-12 PROCEDURE — 84484 ASSAY OF TROPONIN QUANT: CPT

## 2023-10-12 PROCEDURE — 009630Z DRAINAGE OF CEREBRAL VENTRICLE WITH DRAINAGE DEVICE, PERCUTANEOUS APPROACH: ICD-10-PCS | Performed by: NEUROLOGICAL SURGERY

## 2023-10-12 PROCEDURE — 94761 N-INVAS EAR/PLS OXIMETRY MLT: CPT

## 2023-10-12 PROCEDURE — 89051 BODY FLUID CELL COUNT: CPT

## 2023-10-12 PROCEDURE — 37799 UNLISTED PX VASCULAR SURGERY: CPT

## 2023-10-12 PROCEDURE — 99233 SBSQ HOSP IP/OBS HIGH 50: CPT | Performed by: PSYCHIATRY & NEUROLOGY

## 2023-10-12 PROCEDURE — 82945 GLUCOSE OTHER FLUID: CPT

## 2023-10-12 PROCEDURE — 6370000000 HC RX 637 (ALT 250 FOR IP): Performed by: NURSE PRACTITIONER

## 2023-10-12 PROCEDURE — 84157 ASSAY OF PROTEIN OTHER: CPT

## 2023-10-12 PROCEDURE — 80061 LIPID PANEL: CPT

## 2023-10-12 PROCEDURE — 36415 COLL VENOUS BLD VENIPUNCTURE: CPT

## 2023-10-12 PROCEDURE — 2000000000 HC ICU R&B

## 2023-10-12 PROCEDURE — 70450 CT HEAD/BRAIN W/O DYE: CPT

## 2023-10-12 PROCEDURE — 61107 TDH PNXR IMPLT VENTR CATH: CPT | Performed by: NEUROLOGICAL SURGERY

## 2023-10-12 PROCEDURE — 83605 ASSAY OF LACTIC ACID: CPT

## 2023-10-12 PROCEDURE — 2580000003 HC RX 258: Performed by: NURSE PRACTITIONER

## 2023-10-12 PROCEDURE — 6360000002 HC RX W HCPCS: Performed by: EMERGENCY MEDICINE

## 2023-10-12 PROCEDURE — 87070 CULTURE OTHR SPECIMN AEROBIC: CPT

## 2023-10-12 RX ORDER — LEVETIRACETAM 500 MG/5ML
500 INJECTION, SOLUTION, CONCENTRATE INTRAVENOUS EVERY 12 HOURS
Status: DISCONTINUED | OUTPATIENT
Start: 2023-10-12 | End: 2023-10-16

## 2023-10-12 RX ORDER — LABETALOL HYDROCHLORIDE 5 MG/ML
10 INJECTION, SOLUTION INTRAVENOUS EVERY 4 HOURS PRN
Status: DISCONTINUED | OUTPATIENT
Start: 2023-10-12 | End: 2023-10-14

## 2023-10-12 RX ORDER — ASPIRIN 81 MG/1
81 TABLET, CHEWABLE ORAL ONCE
Status: COMPLETED | OUTPATIENT
Start: 2023-10-12 | End: 2023-10-12

## 2023-10-12 RX ORDER — ATORVASTATIN CALCIUM 80 MG/1
80 TABLET, FILM COATED ORAL NIGHTLY
Status: DISCONTINUED | OUTPATIENT
Start: 2023-10-12 | End: 2023-10-12

## 2023-10-12 RX ORDER — ONDANSETRON 4 MG/1
4 TABLET, ORALLY DISINTEGRATING ORAL EVERY 8 HOURS PRN
Status: DISCONTINUED | OUTPATIENT
Start: 2023-10-12 | End: 2023-10-24

## 2023-10-12 RX ORDER — POTASSIUM CHLORIDE 7.45 MG/ML
10 INJECTION INTRAVENOUS
Status: COMPLETED | OUTPATIENT
Start: 2023-10-12 | End: 2023-10-12

## 2023-10-12 RX ORDER — LEVETIRACETAM 5 MG/ML
500 INJECTION INTRAVASCULAR EVERY 12 HOURS
Status: DISCONTINUED | OUTPATIENT
Start: 2023-10-12 | End: 2023-10-12

## 2023-10-12 RX ORDER — ENOXAPARIN SODIUM 100 MG/ML
40 INJECTION SUBCUTANEOUS DAILY
Status: DISCONTINUED | OUTPATIENT
Start: 2023-10-13 | End: 2023-10-25

## 2023-10-12 RX ORDER — MAGNESIUM SULFATE 1 G/100ML
1000 INJECTION INTRAVENOUS PRN
Status: DISCONTINUED | OUTPATIENT
Start: 2023-10-12 | End: 2023-10-26 | Stop reason: HOSPADM

## 2023-10-12 RX ORDER — ONDANSETRON 2 MG/ML
4 INJECTION INTRAMUSCULAR; INTRAVENOUS EVERY 6 HOURS PRN
Status: DISCONTINUED | OUTPATIENT
Start: 2023-10-12 | End: 2023-10-24

## 2023-10-12 RX ORDER — NIMODIPINE 30 MG/1
60 CAPSULE, LIQUID FILLED ORAL
Status: DISCONTINUED | OUTPATIENT
Start: 2023-10-12 | End: 2023-10-12

## 2023-10-12 RX ORDER — ATORVASTATIN CALCIUM 80 MG/1
80 TABLET, FILM COATED ORAL NIGHTLY
Status: DISCONTINUED | OUTPATIENT
Start: 2023-10-12 | End: 2023-10-14

## 2023-10-12 RX ORDER — SODIUM CHLORIDE 9 MG/ML
INJECTION, SOLUTION INTRAVENOUS PRN
Status: DISCONTINUED | OUTPATIENT
Start: 2023-10-12 | End: 2023-10-16

## 2023-10-12 RX ORDER — ACETAMINOPHEN 325 MG/1
650 TABLET ORAL EVERY 4 HOURS PRN
Status: DISCONTINUED | OUTPATIENT
Start: 2023-10-12 | End: 2023-10-26 | Stop reason: HOSPADM

## 2023-10-12 RX ORDER — SODIUM CHLORIDE 0.9 % (FLUSH) 0.9 %
5-40 SYRINGE (ML) INJECTION PRN
Status: DISCONTINUED | OUTPATIENT
Start: 2023-10-12 | End: 2023-10-26 | Stop reason: HOSPADM

## 2023-10-12 RX ORDER — LANSOPRAZOLE
30 KIT
Status: DISCONTINUED | OUTPATIENT
Start: 2023-10-13 | End: 2023-10-12

## 2023-10-12 RX ORDER — ESCITALOPRAM OXALATE 10 MG/1
10 TABLET ORAL NIGHTLY
Status: DISCONTINUED | OUTPATIENT
Start: 2023-10-12 | End: 2023-10-14

## 2023-10-12 RX ORDER — LANSOPRAZOLE 30 MG/1
30 TABLET, ORALLY DISINTEGRATING, DELAYED RELEASE ORAL
Status: DISCONTINUED | OUTPATIENT
Start: 2023-10-13 | End: 2023-10-14

## 2023-10-12 RX ORDER — SODIUM CHLORIDE 0.9 % (FLUSH) 0.9 %
5-40 SYRINGE (ML) INJECTION EVERY 12 HOURS SCHEDULED
Status: DISCONTINUED | OUTPATIENT
Start: 2023-10-12 | End: 2023-10-26 | Stop reason: HOSPADM

## 2023-10-12 RX ADMIN — PROPOFOL 30 MCG/KG/MIN: 10 INJECTION, EMULSION INTRAVENOUS at 02:49

## 2023-10-12 RX ADMIN — Medication 60 MG: at 20:01

## 2023-10-12 RX ADMIN — LEVETIRACETAM 500 MG: 100 INJECTION, SOLUTION INTRAVENOUS at 16:06

## 2023-10-12 RX ADMIN — Medication 60 MG: at 16:08

## 2023-10-12 RX ADMIN — Medication 60 MG: at 08:19

## 2023-10-12 RX ADMIN — ASPIRIN 81 MG: 81 TABLET, CHEWABLE ORAL at 05:13

## 2023-10-12 RX ADMIN — SODIUM CHLORIDE, PRESERVATIVE FREE 10 ML: 5 INJECTION INTRAVENOUS at 11:33

## 2023-10-12 RX ADMIN — ESCITALOPRAM OXALATE 10 MG: 10 TABLET ORAL at 20:00

## 2023-10-12 RX ADMIN — Medication 10 MEQ: at 10:44

## 2023-10-12 RX ADMIN — PIPERACILLIN AND TAZOBACTAM 3375 MG: 3; .375 INJECTION, POWDER, LYOPHILIZED, FOR SOLUTION INTRAVENOUS at 02:02

## 2023-10-12 RX ADMIN — ACETAMINOPHEN 650 MG: 325 TABLET ORAL at 16:56

## 2023-10-12 RX ADMIN — LEVETIRACETAM 500 MG: 5 INJECTION, SOLUTION INTRAVENOUS at 04:42

## 2023-10-12 RX ADMIN — ATORVASTATIN CALCIUM 80 MG: 80 TABLET, FILM COATED ORAL at 20:00

## 2023-10-12 RX ADMIN — Medication 10 MEQ: at 11:50

## 2023-10-12 RX ADMIN — LABETALOL HYDROCHLORIDE 10 MG: 5 INJECTION, SOLUTION INTRAVENOUS at 10:07

## 2023-10-12 RX ADMIN — SODIUM CHLORIDE 3000 MG: 900 INJECTION INTRAVENOUS at 10:22

## 2023-10-12 RX ADMIN — POTASSIUM BICARBONATE 40 MEQ: 782 TABLET, EFFERVESCENT ORAL at 08:19

## 2023-10-12 RX ADMIN — SODIUM CHLORIDE 3000 MG: 900 INJECTION INTRAVENOUS at 13:59

## 2023-10-12 RX ADMIN — Medication 2000 MG: at 00:17

## 2023-10-12 RX ADMIN — SODIUM CHLORIDE, PRESERVATIVE FREE 10 ML: 5 INJECTION INTRAVENOUS at 20:10

## 2023-10-12 RX ADMIN — ACETAMINOPHEN 650 MG: 325 TABLET ORAL at 20:00

## 2023-10-12 RX ADMIN — FAMOTIDINE 20 MG: 10 INJECTION, SOLUTION INTRAVENOUS at 10:11

## 2023-10-12 RX ADMIN — LABETALOL HYDROCHLORIDE 10 MG: 5 INJECTION, SOLUTION INTRAVENOUS at 13:55

## 2023-10-12 RX ADMIN — Medication 60 MG: at 11:33

## 2023-10-12 RX ADMIN — SODIUM CHLORIDE 3000 MG: 900 INJECTION INTRAVENOUS at 20:06

## 2023-10-12 ASSESSMENT — PAIN DESCRIPTION - LOCATION
LOCATION: HEAD

## 2023-10-12 ASSESSMENT — PAIN DESCRIPTION - DESCRIPTORS: DESCRIPTORS: ACHING;DISCOMFORT

## 2023-10-12 ASSESSMENT — PULMONARY FUNCTION TESTS
PIF_VALUE: 15
PIF_VALUE: 17
PIF_VALUE: 16
PIF_VALUE: 17

## 2023-10-12 ASSESSMENT — PAIN SCALES - GENERAL
PAINLEVEL_OUTOF10: 10
PAINLEVEL_OUTOF10: 10

## 2023-10-12 NOTE — PROGRESS NOTES
Comprehensive Nutrition Assessment    Type and Reason for Visit:  Initial, Consult    Nutrition Recommendations/Plan:   Initiate Osmolite 1.2 @ 70ml/hr for a total of 2016kcals, 93g pro, 1377ml fluid  Water flush 80ml q 4 hrs  Monitor TF tolerance and adjust as necessary     Malnutrition Assessment:  Malnutrition Status:  Insufficient data (10/12/23 1527)    Context:  Acute Illness     Findings of the 6 clinical characteristics of malnutrition:  Energy Intake:  Unable to assess  Weight Loss:  Unable to assess     Body Fat Loss:  Unable to assess     Muscle Mass Loss:  Unable to assess    Fluid Accumulation:  Unable to assess     Strength:       Nutrition Assessment:    Consulted for TF order and management. Pt has no PMH, wounds, or weight hx. Pt extubated 10/12. TF ready to be initiated. Nutrition Related Findings:    Meds/labs reviewed Wound Type: None       Current Nutrition Intake & Therapies:    Average Meal Intake: NPO  Average Supplements Intake: NPO  Diet NPO    Anthropometric Measures:  Height: 5' 5\" (165.1 cm)  Ideal Body Weight (IBW): 125 lbs (57 kg)       Current Body Weight: 180 lb (81.6 kg),   IBW. Weight Source: Stated  Current BMI (kg/m2): 30        Weight Adjustment For: No Adjustment                 BMI Categories: Overweight (BMI 25.0-29. 9)    Estimated Daily Nutrient Needs:  Energy Requirements Based On: Kcal/kg     Energy (kcal/day): 2000-2100kcals/day  Weight Used for Protein Requirements: Ideal  Protein (g/day): 150g/day  Method Used for Fluid Requirements: 1 ml/kcal  Fluid (ml/day): 2000ml/day    Nutrition Diagnosis:   Increased nutrient needs related to acute injury/trauma as evidenced by nutrition support - enteral nutrition    Nutrition Interventions:   Food and/or Nutrient Delivery: Continue NPO, Start Tube Feeding  Nutrition Education/Counseling: No recommendation at this time  Coordination of Nutrition Care: Continue to monitor while inpatient       Goals:  Previous Goal Met:

## 2023-10-12 NOTE — PROCEDURES
Neurosurgery Procedure note: Emergency External Ventricular drain    Surgeon: Wilfrid Wesley DO    Assistant: none    Brief history and indications: This is a 27-year-old who presented with [facility] with increasing hydrocephalus with decreasd mental status. Drowsy, inconsistent follow commands off sedation. A consent was obtained from family. I explained to the family the indications, the expected outcomes, the alternatives which include medical management. The risk includes  bleeding, pain, infection, catheter malfunction, brain damage, brain bleed, need for surgery, anesthesia and medical complications, death. All their questions were answered and they asked me to proceed with the procedure. Details:   Pre-procedure antibiotic was given. The right-sided head was shaved, scrubbed with alcohol, and prepped with Betadine. This area was sterilely draped. A timeout was called and agreed by the team.  1% lidocaine with epinephrine was infiltrated along the Kocher's point, and a 1 inch parasagittal incision was made with a 15 blade down to the pericranium. A subperiosteal dissection was made and a Heiss self-retaining retractor was placed. Then a manual twist drill was used to perforate the skull at the Kocher's point. The dura was incised with 11 blade. And an antibiotic impregnated ventricular catheter was used to cannulate the right frontal horn on first pass at the depth of 7 cm at bone. Blood CSF was obtained squirting out of the catheter. The catheter was then tunneled 5 cm away after the skin and connected to a collection chamber. The wound was closed with running 3-0 nylon. Exit catheter was purse-stringed to the scalp and secured with multiple staples. Wound was dressed and the patient tolerated the procedure well.      Open pressure: high >[id number]

## 2023-10-12 NOTE — PROGRESS NOTES
Occupational [address]  Occupational Therapy Not Seen Note    DATE: 10/12/2023    NAME: Jose Boone  MRN: [id number]   : 1950      Patient not seen this date for Occupational Therapy due to: Other: Patient currently intubated/sedated.      Next Scheduled Treatment: Check 10/14    Electronically signed by Marisel Gibbons OT/S on 10/12/2023 at 8:05 AM

## 2023-10-12 NOTE — ANESTHESIA POSTPROCEDURE EVALUATION
Department of Anesthesiology  Postprocedure Note    Patient: Dianna Jean  MRN: [id number]  YOB: 1950  Date of evaluation: 10/12/2023      Procedure Summary     Date: 10/11/23 Room / Location: [address] [place] [address] Procedures    Anesthesia Start: 1747 Anesthesia Stop: 2010    Procedure: IR ANGIOGRAM CAROTID CEREBRAL BILATERAL Diagnosis: (sah)    Scheduled Providers:  Responsible Provider: Ankita Fallon MD    Anesthesia Type: general ASA Status: 3 - Emergent          Anesthesia Type: No value filed.     Htao Phase I:      Thao Phase II:        Anesthesia Post Evaluation    Patient location during evaluation: ICU  Patient participation: complete - patient cannot participate  Level of consciousness: sedated and ventilated  Airway patency: patent  Nausea & Vomiting: no nausea and no vomiting  Complications: no  Cardiovascular status: blood pressure returned to baseline  Respiratory status: intubated and ventilator  Hydration status: stable  Pain management: adequate

## 2023-10-12 NOTE — SEDATION DOCUMENTATION
Transferred over to bed, transport monitor applied  Left radial TR band intact, no bruising, hematoma or bleeding noted, hand is warm with good capillary refill

## 2023-10-12 NOTE — CARE COORDINATION
.Case Management Assessment  Initial Evaluation    Date/Time of Evaluation: 10/12/2023 1:03 PM  Assessment Completed by: Andrew Moore RN    If patient is discharged prior to next notation, then this note serves as note for discharge by case management. Patient Name: Gilberto Campos                   YOB: 1950  Diagnosis: Subarachnoid hemorrhage ([address]) [I60.9]  Subarachnoid bleed [facility]) [I60.9]                   Date / Time: 10/11/2023  2:52 PM    Patient Admission Status: Inpatient   Readmission Risk (Low < 19, Mod (19-27), High > 27): No data recorded  Current PCP: No primary care provider on file. PCP verified by CM? (P) Yes    Chart Reviewed: Yes      History Provided by: (P) Other (see comment) (sister in law)  Patient Orientation: (P) Unable to Assess    Patient Cognition: (P) Other (see comment) (nonresponsive)    Hospitalization in the last 30 days (Readmission):  No    If yes, Readmission Assessment in CM Navigator will be completed.     Advance Directives:      Code Status: Full Code   Patient's Primary Decision Maker is: (P) Named in Scanned ACP Document    Primary Decision Maker: felipa rick - Spouse - [phone]    Discharge Planning:    Patient lives with: (P) Spouse/Significant Other Type of Home: (P) Acute Rehab, Skilled Nursing Facility  Primary Care Giver: (P) Self  Patient Support Systems include: (P) Spouse/Significant Other, Family Members, /   Current Financial resources: (P) Medicare  Current community resources:    Current services prior to admission: (P) None            Current DME:              Type of Home Care services:  (P) None    ADLS  Prior functional level: (P) Independent in ADLs/IADLs  Current functional level: (P) Assistance with the following:, Mobility, Shopping, Bathing, Dressing, Toileting, Feeding, Cooking, Housework    PT AM-PAC:   /24  OT AM-PAC:   /24    Family can provide assistance at DC: (P) Yes  Would you like Case

## 2023-10-12 NOTE — PLAN OF CARE
Problem: Safety - Medical Restraint  Goal: Remains free of injury from restraints (Restraint for Interference with Medical Device)  Description: INTERVENTIONS:  1. Determine that other, less restrictive measures have been tried or would not be effective before applying the restraint  2. Evaluate the patient's condition at the time of restraint application  3. Inform patient/family regarding the reason for restraint  4.  Q2H: Monitor safety, psychosocial status, comfort, nutrition and hydration  Outcome: Progressing  Flowsheets (Taken 10/12/2023 0800)  Remains free of injury from restraints (restraint for interference with medical device): Every 2 hours: Monitor safety, psychosocial status, comfort, nutrition and hydration     Problem: Discharge Planning  Goal: Discharge to home or other facility with appropriate resources  Outcome: Progressing     Problem: Respiratory - Adult  Goal: Achieves optimal ventilation and oxygenation  Outcome: Progressing     Problem: Pain  Goal: Verbalizes/displays adequate comfort level or baseline comfort level  Outcome: Progressing     Problem: Safety - Adult  Goal: Free from fall injury  Outcome: Progressing     Problem: Nutrition Deficit:  Goal: Optimize nutritional status  Outcome: Progressing

## 2023-10-12 NOTE — PROGRESS NOTES
Order obtained for extubation. SpO2 of 99 on 30% FiO2. Patient extubated and placed on 2 liters/min via nasal cannula. Post extubation SpO2 is 94% with HR  89 bpm and RR 24 breaths/min. Patient had strong cough that was non-productive. Extubation Well tolerated by patient. Fabby Joel RCP   11:06 AM

## 2023-10-12 NOTE — PROGRESS NOTES
Daily Progress Note  Neuro Critical Care    Patient Name: Ayah Wilcox  Patient : 1950  Room/Bed: 0127/0127-01  Code Status: Full  Allergies: No Known Allergies    CHIEF COMPLAINT:      Thunderclap headache     INTERVAL HISTORY    Initial Presentation (Admitted 10/11/23): The patient is a 67 y.o. female with history of HTN, HLD, and smoking presented as a transfer from [facility] for Diffuse SAH. Patient was at home this morning when around 0930 she developed acute onset severe headache. EMS was called, patient was walking to the cot when she became unresponsive. She was intubated and taken to [facility] where CT head showed diffuse SAH. She was loaded with 1g Keppra and started on Propofol for sedation. On arrival to [facility] with propofol held, patient opens eyes to verbal stimulation. Pupils are 4mm reactive bilaterally. Cough intact. Moving all extremities spontaneously, localizing bilateral upper extremities. Wiggles toes bilaterally to command. CTA head/neck showed basilar tip aneurysm. Given additional 1g Keppra. SBP goal < 120. Irregular rhythm noted on monitor, follow up EKG and troponin. Endovascular and neurosurgery consulted. Patient's  does report she takes Aspirin 81mg daily for general wellness. 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 EVD was placed overnight and set at 10 cm H20. CT head showed stable SAH and well positioned EVD. Aspirin 81mg daily started after EVD placement. On exam this morning, patient is awake, opening eyes spontaneously, showing thumbs up/two fingers, and wiggling toes. Tolerated PS/CPAP and extubated to nasal cannula. Will plan to get a baseline TCD tomorrow. Unasyn for aspiration pneumonia.      CURRENT MEDICATIONS:  SCHEDULED MEDICATIONS:   sodium chloride flush  5-40 mL IntraVENous 2 times per day    atorvastatin  80 mg

## 2023-10-12 NOTE — FLOWSHEET NOTE
Arrive in neuro ICU, updates given to ICU RN    Post Procedure Transfer from IR Table  [x] Tubes and Lines intact     Post Procedure Neuro-Checks/NIHSS/Vitals  [x] Completed post procedure  [x] Completed bedside handoff  [x] Frequency ordered  [x] Verbal communication of frequency      Post Procedure Puncture Site Checks  [x] Completed post procedure  [x] Completed bedside handoff  [x] Frequency ordered  [x] Verbal communication of frequency    Post Procedure Pulse  Checks  [x] Completed post procedure  [x] Completed bedside handoff  [x] Frequency ordered  [x] Verbal communication of frequency    Order Set  [x] Post Neuro-Endo Procedure  [] Stroke  [] t-PA     B/P control  [x] Verbal Communication   [x] Order in Care Path    Medication Review  [x] Given during procedure  [x] Current drips/meds/fluids    [x] Physician Notified of All changes in Assessment    Family  [x] Location Post Procedure Dr. Silviano Mccollum updated family then they were escorted to ICU waiting room with RN

## 2023-10-12 NOTE — PROGRESS NOTES
Endovascular Neurosurgery Progress Note    SUBJECTIVE:     Extubated today, doing well, many family members at bedside. Review of Systems:  CONSTITUTIONAL:  negative for fevers, chills, fatigue and malaise    EYES:  negative for double vision, blurred vision and photophobia     HEENT:  negative for tinnitus, epistaxis and sore throat    RESPIRATORY:  negative for cough, shortness of breath, wheezing    CARDIOVASCULAR:  negative for chest pain, palpitations, syncope, edema    GASTROINTESTINAL:  negative for nausea, vomiting    GENITOURINARY:  negative for incontinence    MUSCULOSKELETAL:  negative for neck or back pain    NEUROLOGICAL:  Negative for weakness and tingling  negative for headaches and dizziness    PSYCHIATRIC:  negative for anxiety      Review of systems otherwise negative. OBJECTIVE:     Vitals:    10/12/23 1000   BP: 124/64   Pulse: 92   Resp: 21   Temp:    SpO2:         General:  Gen: normal habitus, NAD  HEENT: NCAT, mucosa moist  Cvs: RRR, S1 S2 normal  Resp: symmetric unlabored breathing  Abd: s/nd/nt  Ext: no edema  Skin: no lesions seen, warm and dry    Neuro:  Gen: awake and alert, oriented x3. Lang/speech: no aphasia or dysarthria. Follows commands. CN: PERRL, EOMI, VFF, V1-3 intact, face symmetric, hearing intact, shoulder shrug symmetric, tongue midline  Motor: grossly 5/5 UE and LE b/l  Sense: LT intact in all 4 ext. Coord: FTN and HTS intact b/l  DTR: deferred  Gait: deferred    NIH Stroke Scale:   1a  Level of consciousness: 0 - alert; keenly responsive   1b. LOC questions:  0 - answers both questions correctly   1c. LOC commands: 0 - performs both tasks correctly   2. Best Gaze: 0 - normal   3. Visual: 0 - no visual loss   4. Facial Palsy: 0 - normal symmetric movement   5a. Motor left arm: 0 - no drift, limb holds 90 (or 45) degrees for full 10 seconds   5b. Motor right arm: 0 - no drift, limb holds 90 (or 45) degrees for full 10 seconds   6a.  Motor left le - no

## 2023-10-13 ENCOUNTER — APPOINTMENT (OUTPATIENT)
Dept: VASCULAR LAB | Age: 73
DRG: 022 | End: 2023-10-13
Payer: MEDICARE

## 2023-10-13 PROBLEM — I60.4 SUBARACHNOID HEMORRHAGE FROM BASILAR ARTERY ANEURYSM (HCC): Status: ACTIVE | Noted: 2023-10-13

## 2023-10-13 PROBLEM — I48.91 ATRIAL FIBRILLATION (HCC): Status: ACTIVE | Noted: 2023-10-13

## 2023-10-13 LAB
ANION GAP SERPL CALCULATED.3IONS-SCNC: 10 MMOL/L (ref 9–17)
BUN SERPL-MCNC: 11 MG/DL (ref 8–23)
CALCIUM SERPL-MCNC: 8 MG/DL (ref 8.6–10.4)
CHLORIDE SERPL-SCNC: 104 MMOL/L (ref 98–107)
CO2 SERPL-SCNC: 23 MMOL/L (ref 20–31)
CREAT SERPL-MCNC: 0.4 MG/DL (ref 0.5–0.9)
EKG ATRIAL RATE: 107 BPM
EKG P-R INTERVAL: 208 MS
EKG Q-T INTERVAL: 344 MS
EKG QRS DURATION: 80 MS
EKG QTC CALCULATION (BAZETT): 446 MS
EKG R AXIS: -10 DEGREES
EKG T AXIS: 40 DEGREES
EKG VENTRICULAR RATE: 101 BPM
ERYTHROCYTE [DISTWIDTH] IN BLOOD BY AUTOMATED COUNT: 13.9 % (ref 11.8–14.4)
GFR SERPL CREATININE-BSD FRML MDRD: >60 ML/MIN/1.73M2
GLUCOSE SERPL-MCNC: 155 MG/DL (ref 70–99)
HCT VFR BLD AUTO: 34.3 % (ref 36.3–47.1)
HGB BLD-MCNC: 10.8 G/DL (ref 11.9–15.1)
MAGNESIUM SERPL-MCNC: 2.3 MG/DL (ref 1.6–2.6)
MCH RBC QN AUTO: 31.2 PG (ref 25.2–33.5)
MCHC RBC AUTO-ENTMCNC: 31.5 G/DL (ref 28.4–34.8)
MCV RBC AUTO: 99.1 FL (ref 82.6–102.9)
NRBC BLD-RTO: 0 PER 100 WBC
PLATELET # BLD AUTO: 204 K/UL (ref 138–453)
PMV BLD AUTO: 11.9 FL (ref 8.1–13.5)
POTASSIUM SERPL-SCNC: 3.4 MMOL/L (ref 3.7–5.3)
RBC # BLD AUTO: 3.46 M/UL (ref 3.95–5.11)
SODIUM SERPL-SCNC: 137 MMOL/L (ref 135–144)
WBC OTHER # BLD: 15.4 K/UL (ref 3.5–11.3)

## 2023-10-13 PROCEDURE — 6360000002 HC RX W HCPCS

## 2023-10-13 PROCEDURE — 6370000000 HC RX 637 (ALT 250 FOR IP): Performed by: NURSE PRACTITIONER

## 2023-10-13 PROCEDURE — 6360000002 HC RX W HCPCS: Performed by: NURSE PRACTITIONER

## 2023-10-13 PROCEDURE — 6370000000 HC RX 637 (ALT 250 FOR IP)

## 2023-10-13 PROCEDURE — 99233 SBSQ HOSP IP/OBS HIGH 50: CPT | Performed by: PSYCHIATRY & NEUROLOGY

## 2023-10-13 PROCEDURE — 83735 ASSAY OF MAGNESIUM: CPT

## 2023-10-13 PROCEDURE — 97163 PT EVAL HIGH COMPLEX 45 MIN: CPT

## 2023-10-13 PROCEDURE — 36415 COLL VENOUS BLD VENIPUNCTURE: CPT

## 2023-10-13 PROCEDURE — 2000000000 HC ICU R&B

## 2023-10-13 PROCEDURE — 94640 AIRWAY INHALATION TREATMENT: CPT

## 2023-10-13 PROCEDURE — 97167 OT EVAL HIGH COMPLEX 60 MIN: CPT

## 2023-10-13 PROCEDURE — 6370000000 HC RX 637 (ALT 250 FOR IP): Performed by: STUDENT IN AN ORGANIZED HEALTH CARE EDUCATION/TRAINING PROGRAM

## 2023-10-13 PROCEDURE — 2580000003 HC RX 258: Performed by: PSYCHIATRY & NEUROLOGY

## 2023-10-13 PROCEDURE — 80048 BASIC METABOLIC PNL TOTAL CA: CPT

## 2023-10-13 PROCEDURE — 93886 INTRACRANIAL COMPLETE STUDY: CPT

## 2023-10-13 PROCEDURE — 2580000003 HC RX 258: Performed by: NURSE PRACTITIONER

## 2023-10-13 PROCEDURE — 97535 SELF CARE MNGMENT TRAINING: CPT

## 2023-10-13 PROCEDURE — 93886 INTRACRANIAL COMPLETE STUDY: CPT | Performed by: PSYCHIATRY & NEUROLOGY

## 2023-10-13 PROCEDURE — 2700000000 HC OXYGEN THERAPY PER DAY

## 2023-10-13 PROCEDURE — 97530 THERAPEUTIC ACTIVITIES: CPT

## 2023-10-13 PROCEDURE — 85027 COMPLETE CBC AUTOMATED: CPT

## 2023-10-13 PROCEDURE — 94761 N-INVAS EAR/PLS OXIMETRY MLT: CPT

## 2023-10-13 RX ORDER — ALBUTEROL SULFATE 2.5 MG/3ML
2.5 SOLUTION RESPIRATORY (INHALATION) EVERY 6 HOURS PRN
Status: DISCONTINUED | OUTPATIENT
Start: 2023-10-13 | End: 2023-10-26 | Stop reason: HOSPADM

## 2023-10-13 RX ADMIN — LABETALOL HYDROCHLORIDE 10 MG: 5 INJECTION, SOLUTION INTRAVENOUS at 08:34

## 2023-10-13 RX ADMIN — ACETAMINOPHEN 650 MG: 325 TABLET ORAL at 02:18

## 2023-10-13 RX ADMIN — Medication 60 MG: at 08:20

## 2023-10-13 RX ADMIN — SODIUM CHLORIDE: 9 INJECTION, SOLUTION INTRAVENOUS at 23:56

## 2023-10-13 RX ADMIN — Medication 60 MG: at 21:05

## 2023-10-13 RX ADMIN — Medication 60 MG: at 16:18

## 2023-10-13 RX ADMIN — LEVETIRACETAM 500 MG: 100 INJECTION, SOLUTION INTRAVENOUS at 16:20

## 2023-10-13 RX ADMIN — SODIUM CHLORIDE, PRESERVATIVE FREE 10 ML: 5 INJECTION INTRAVENOUS at 21:01

## 2023-10-13 RX ADMIN — SODIUM CHLORIDE, PRESERVATIVE FREE 10 ML: 5 INJECTION INTRAVENOUS at 08:20

## 2023-10-13 RX ADMIN — ACETAMINOPHEN 650 MG: 325 TABLET ORAL at 14:57

## 2023-10-13 RX ADMIN — ACETAMINOPHEN 650 MG: 325 TABLET ORAL at 08:19

## 2023-10-13 RX ADMIN — ATORVASTATIN CALCIUM 80 MG: 80 TABLET, FILM COATED ORAL at 21:07

## 2023-10-13 RX ADMIN — ONDANSETRON 4 MG: 2 INJECTION INTRAMUSCULAR; INTRAVENOUS at 11:23

## 2023-10-13 RX ADMIN — LEVETIRACETAM 500 MG: 100 INJECTION, SOLUTION INTRAVENOUS at 04:18

## 2023-10-13 RX ADMIN — ALBUTEROL SULFATE 2.5 MG: 2.5 SOLUTION RESPIRATORY (INHALATION) at 16:35

## 2023-10-13 RX ADMIN — Medication 60 MG: at 23:51

## 2023-10-13 RX ADMIN — Medication 60 MG: at 12:39

## 2023-10-13 RX ADMIN — SODIUM CHLORIDE 3000 MG: 900 INJECTION INTRAVENOUS at 21:05

## 2023-10-13 RX ADMIN — LANSOPRAZOLE 30 MG: 30 TABLET, ORALLY DISINTEGRATING, DELAYED RELEASE ORAL at 08:20

## 2023-10-13 RX ADMIN — ACETAMINOPHEN 650 MG: 325 TABLET ORAL at 21:06

## 2023-10-13 RX ADMIN — ENOXAPARIN SODIUM 40 MG: 100 INJECTION SUBCUTANEOUS at 08:19

## 2023-10-13 RX ADMIN — SODIUM CHLORIDE 3000 MG: 900 INJECTION INTRAVENOUS at 15:00

## 2023-10-13 RX ADMIN — SODIUM CHLORIDE 3000 MG: 900 INJECTION INTRAVENOUS at 02:19

## 2023-10-13 RX ADMIN — SODIUM CHLORIDE, PRESERVATIVE FREE 10 ML: 5 INJECTION INTRAVENOUS at 21:00

## 2023-10-13 RX ADMIN — Medication 60 MG: at 04:14

## 2023-10-13 RX ADMIN — SODIUM CHLORIDE 3000 MG: 900 INJECTION INTRAVENOUS at 08:22

## 2023-10-13 RX ADMIN — POTASSIUM BICARBONATE 40 MEQ: 782 TABLET, EFFERVESCENT ORAL at 08:19

## 2023-10-13 RX ADMIN — ESCITALOPRAM OXALATE 10 MG: 10 TABLET ORAL at 21:07

## 2023-10-13 RX ADMIN — Medication 60 MG: at 00:50

## 2023-10-13 ASSESSMENT — PAIN DESCRIPTION - DESCRIPTORS
DESCRIPTORS: ACHING;DISCOMFORT
DESCRIPTORS: ACHING;DISCOMFORT
DESCRIPTORS: ACHING
DESCRIPTORS: ACHING

## 2023-10-13 ASSESSMENT — PAIN SCALES - GENERAL
PAINLEVEL_OUTOF10: 4
PAINLEVEL_OUTOF10: 4
PAINLEVEL_OUTOF10: 6
PAINLEVEL_OUTOF10: 4
PAINLEVEL_OUTOF10: 8
PAINLEVEL_OUTOF10: 5
PAINLEVEL_OUTOF10: 8

## 2023-10-13 ASSESSMENT — PAIN DESCRIPTION - LOCATION
LOCATION: HEAD
LOCATION: HEAD
LOCATION: HAND
LOCATION: HEAD

## 2023-10-13 ASSESSMENT — PAIN DESCRIPTION - ORIENTATION
ORIENTATION: ANTERIOR

## 2023-10-13 NOTE — PLAN OF CARE
Problem: Safety - Medical Restraint  Goal: Remains free of injury from restraints (Restraint for Interference with Medical Device)  Description: INTERVENTIONS:  1. Determine that other, less restrictive measures have been tried or would not be effective before applying the restraint  2. Evaluate the patient's condition at the time of restraint application  3. Inform patient/family regarding the reason for restraint  4.  Q2H: Monitor safety, psychosocial status, comfort, nutrition and hydration  10/13/2023 0841 by Andressa Fuller RN  Outcome: Completed

## 2023-10-13 NOTE — PLAN OF CARE
Problem: Safety - Medical Restraint  Goal: Remains free of injury from restraints (Restraint for Interference with Medical Device)  Description: INTERVENTIONS:  1. Determine that other, less restrictive measures have been tried or would not be effective before applying the restraint  2. Evaluate the patient's condition at the time of restraint application  3. Inform patient/family regarding the reason for restraint  4.  Q2H: Monitor safety, psychosocial status, comfort, nutrition and hydration  Outcome: Completed     Problem: Discharge Planning  Goal: Discharge to home or other facility with appropriate resources  Outcome: Progressing     Problem: Respiratory - Adult  Goal: Achieves optimal ventilation and oxygenation  10/13/2023 1936 by Archie Barajas RN  Outcome: Progressing     Problem: Pain  Goal: Verbalizes/displays adequate comfort level or baseline comfort level  Outcome: Progressing     Problem: Safety - Adult  Goal: Free from fall injury  Outcome: Progressing     Problem: Nutrition Deficit:  Goal: Optimize nutritional status  Outcome: Progressing

## 2023-10-13 NOTE — PLAN OF CARE
Problem: Safety - Medical Restraint  Goal: Remains free of injury from restraints (Restraint for Interference with Medical Device)  Description: INTERVENTIONS:  1. Determine that other, less restrictive measures have been tried or would not be effective before applying the restraint  2. Evaluate the patient's condition at the time of restraint application  3. Inform patient/family regarding the reason for restraint  4.  Q2H: Monitor safety, psychosocial status, comfort, nutrition and hydration  10/13/2023 0431 by Cassandria Riedel, RN  Outcome: Progressing  10/12/2023 1857 by Jhoan Sutherland RN  Outcome: Progressing  Flowsheets (Taken 10/12/2023 0800)  Remains free of injury from restraints (restraint for interference with medical device): Every 2 hours: Monitor safety, psychosocial status, comfort, nutrition and hydration     Problem: Discharge Planning  Goal: Discharge to home or other facility with appropriate resources  10/13/2023 0431 by Cassandria Riedel, RN  Outcome: Progressing  10/12/2023 1857 by Jhoan Sutherland RN  Outcome: Progressing     Problem: Respiratory - Adult  Goal: Achieves optimal ventilation and oxygenation  10/13/2023 0431 by Cassandria Riedel, RN  Outcome: Progressing  10/12/2023 1857 by Jhoan Sutherland RN  Outcome: Progressing     Problem: Pain  Goal: Verbalizes/displays adequate comfort level or baseline comfort level  10/13/2023 0431 by Cassandria Riedel, RN  Outcome: Progressing  10/12/2023 1857 by Jhoan Sutherland RN  Outcome: Progressing     Problem: Safety - Adult  Goal: Free from fall injury  10/13/2023 0431 by Cassandria Riedel, RN  Outcome: Progressing  10/12/2023 1857 by Jhoan Sutherland RN  Outcome: Progressing     Problem: Nutrition Deficit:  Goal: Optimize nutritional status  10/13/2023 0431 by Cassandria Riedel, RN  Outcome: Progressing  10/12/2023 1857 by Jhoan Sutherland RN  Outcome: Progressing

## 2023-10-13 NOTE — PROGRESS NOTES
Nutrition Note    Spoke with nurse who reports Pt is now on Regular diet and is no longer on TF. Will discontinue order.     Electronically signed by Marlon Campbell RD on 10/13/23 at 12:34 PM EDT    Contact: [phone]

## 2023-10-13 NOTE — CARE COORDINATION
PHQ-9  Met with pt his a.m to assess for support /needs, pt was alert and oriented. Pt lives with her . Has 1 son, 1 dtr and 1 grandchild. Pt also states she and her  both have other family in the area that are very supportive. Pt denies having any SI or recent feelings of depression. Patient Health Questionnaire-9 (PHQ-9)    Over the last 2 weeks, how often have you been bothered by any of the following problems? 1. Little Interest or pleasure in doing things? [x] Not at all  [] Several Days  [] More than half the day  []  Nearly every day    2. Feeling down, depressed or hopeless? [x] Not at all  [] Several Days  [] More than half the day  []  Nearly every day    3. Trouble falling or staying asleep, or sleeping too much? [x] Not at all  [] Several Days  [] More than half the day  []  Nearly every day    4. Feeling tired or having little energy? [x] Not at all  [] Several Days  [] More than half the day  []  Nearly every day    5. Poor apettite or overeating? [x] Not at all  [] Several Days  [] More than half the day  []  Nearly every day    6. Feeling bad about yourself-or that you are a failure or have let yourself or your family down? [x] Not at all  [] Several Days  [] More than half the day  []  Nearly every day    7. Trouble concentrating on things, such as reading the newspaper or watching television? [x] Not at all  [] Several Days  [] More than half the day  []  Nearly every day    8. Moving or speaking so slowly that other people could have noticed? Or the opposite-being so fidgety or restless that you have been moving around a lot more than usual?   [x] Not at all  [] Several Days  [] More than half the day  []  Nearly every day    9. Thoughts that you would be better off dead or of hurting yourself in some way?    [x] Not at all  [] Several Days  [] More than half the day  []  Nearly every day    Total Score: 0    If you checked off any problems, how difficult have these problems made it for you to do your work, take care of things at home, or get along with other people?    [x] Not difficult at all  [] Somewhat Difficult  [] Very Difficult  []  Extremely Difficult

## 2023-10-13 NOTE — PROGRESS NOTES
Physical Therapy  Facility/Department: [address] NEURO ICU  Physical Therapy Initial Assessment    Name: Rosa Maria Quan  : 1950  MRN: [id number]  Date of Service: 10/13/2023  Chief Complaint   Patient presents with    Altered Mental Status       Discharge Recommendations: Further therapy recommended at discharge. The patient should be able to tolerate at least 3 hours of therapy per day over 5 days or 15 hours over 7 days. This patient may benefit from a Physical Medicine and Rehab consult. PT Equipment Recommendations  Equipment Needed: No      Patient Diagnosis(es): The primary encounter diagnosis was Subarachnoid bleed ([address]). A diagnosis of Subarachnoid hemorrhage (HCC) was also pertinent to this visit. Past Medical History:  has no past medical history on file. Past Surgical History:  has no past surgical history on file. Assessment   Body Structures, Functions, Activity Limitations Requiring Skilled Therapeutic Intervention: Decreased functional mobility ; Decreased safe awareness;Decreased fine motor control;Decreased coordination; Increased pain;Decreased posture;Decreased balance;Decreased strength  Assessment: Pt ambulated 5 ft Mayra from bed to chair. Pt CGA with sit to stand transfer and SBA for bed mobility. Pt is currently unable to safely return to prior living arrangmenets due to pt's poor endurance and ambulation deficits. Pt would benefit from continued intense skilled PT to address deficits in ambulation, endurance, and balance. Therapy Prognosis: Good  Decision Making: High Complexity  Requires PT Follow-Up: Yes  Activity Tolerance  Activity Tolerance: Patient limited by endurance; Patient limited by pain; Patient limited by fatigue     Plan   Physcial Therapy Plan  General Plan: 6-7 times per week  Current Treatment Recommendations: Strengthening, ROM, Balance training, Functional mobility training, Endurance training, Therapeutic activities, Gait training, Stair training, Safety

## 2023-10-13 NOTE — PROGRESS NOTES
Daily Progress Note  Neuro Critical Care    Patient Name: Darren Berrios  Patient : 1950  Room/Bed: 0127/0127-01  Code Status: Full  Allergies: No Known Allergies    CHIEF COMPLAINT:      Thunderclap headache     INTERVAL HISTORY    Initial Presentation (Admitted 10/11/23): The patient is a 67 y.o. female with history of HTN, HLD, and smoking presented as a transfer from [facility] for Diffuse SAH. Patient was at home this morning when around 0930 she developed acute onset severe headache. EMS was called, patient was walking to the cot when she became unresponsive. She was intubated and taken to [facility] where CT head showed diffuse SAH. She was loaded with 1g Keppra and started on Propofol for sedation. On arrival to [facility] with propofol held, patient opens eyes to verbal stimulation. Pupils are 4mm reactive bilaterally. Cough intact. Moving all extremities spontaneously, localizing bilateral upper extremities. Wiggles toes bilaterally to command. CTA head/neck showed basilar tip aneurysm. Given additional 1g Keppra. SBP goal < 120. Irregular rhythm noted on monitor, follow up EKG and troponin. Endovascular and neurosurgery consulted. Patient's  does report she takes Aspirin 81mg daily for general wellness. 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 EVD was placed overnight and set at 10 cm H20. CT head showed stable SAH and well positioned EVD. Aspirin 81mg daily started after EVD placement. 10/12: On exam, patient is awake, opening eyes spontaneously, showing thumbs up/two fingers, and wiggling toes. Extubated. Unasyn for aspiration pneumonia. NG in place. Last 24h: No acute events overnight. Saturating well on 3LNC post extubation.      CURRENT MEDICATIONS:  SCHEDULED MEDICATIONS:   sodium chloride flush  5-40 mL IntraVENous 2 times per day    ampicillin-sulbactam

## 2023-10-13 NOTE — PROGRESS NOTES
Occupational Therapy  Facility/Department: [address] NEURO ICU  Occupational Therapy Initial Assessment      Name: Manpreet Schwarz  : 1950  MRN: [id number]  Date of Service: 10/13/2023    Chief Complaint   Patient presents with    Altered Mental Status     Discharge Recommendations:  Further therapy recommended at discharge. The patient should be able to tolerate at least 3 hours of therapy per day over 5 days or 15 hours over 7 days. This patient may benefit from a Physical Medicine and Rehab consult. 24 hour supervision or assist, Patient would benefit from continued therapy after discharge. OT Equipment Recommendations  Other: AE // DME recommendations for DC are TBD. Patient Diagnosis(es): The primary encounter diagnosis was Subarachnoid bleed ([address] A diagnosis of Subarachnoid hemorrhage (HCC) was also pertinent to this visit. Past Medical History:  has no past medical history on file. Past Surgical History:  has no past surgical history on file. Assessment   Performance deficits / Impairments: Decreased functional mobility ; Decreased endurance;Decreased coordination;Decreased posture;Decreased ADL status; Decreased balance;Decreased strength;Decreased cognition;Decreased safe awareness  Assessment: Pt currently requires increased assistance for all ADLs, ADL Transfers. She is demonsrating decreased overall activity tolerance, generalized weakness and impaired balance, which currently requires Min Assist progressing to CGA (with hand-held assist for all Sit<>Stand, Standing Balance, and Transfers & Short distance Mobility (4-5 steps). Currently, Pt requires Max Cues progressing to Mod Cues for task initiation & sequencing, safety & accurate with tasks, redirection to task d/t high impulsivity and poor overall cognition.   Secondary to these deficits, it would be unsafe for Pt to return home to prior (independent) living setting; it is highly recommended that she have 24 Hour Supervision

## 2023-10-14 ENCOUNTER — APPOINTMENT (OUTPATIENT)
Age: 73
DRG: 022 | End: 2023-10-14
Payer: MEDICARE

## 2023-10-14 LAB
ANION GAP SERPL CALCULATED.3IONS-SCNC: 8 MMOL/L (ref 9–17)
BUN SERPL-MCNC: 12 MG/DL (ref 8–23)
CALCIUM SERPL-MCNC: 8.1 MG/DL (ref 8.6–10.4)
CHLORIDE SERPL-SCNC: 104 MMOL/L (ref 98–107)
CO2 SERPL-SCNC: 29 MMOL/L (ref 20–31)
CREAT SERPL-MCNC: 0.4 MG/DL (ref 0.5–0.9)
ECHO AO ROOT DIAM: 3.3 CM
ECHO AO ROOT INDEX: 1.75 CM/M2
ECHO AV AREA PEAK VELOCITY: 2.6 CM2
ECHO AV AREA VTI: 2.8 CM2
ECHO AV AREA/BSA PEAK VELOCITY: 1.4 CM2/M2
ECHO AV AREA/BSA VTI: 1.5 CM2/M2
ECHO AV MEAN GRADIENT: 5 MMHG
ECHO AV MEAN VELOCITY: 1 M/S
ECHO AV PEAK GRADIENT: 11 MMHG
ECHO AV PEAK VELOCITY: 1.7 M/S
ECHO AV VELOCITY RATIO: 0.71
ECHO AV VTI: 31.3 CM
ECHO BSA: 1.93 M2
ECHO EST RA PRESSURE: 8 MMHG
ECHO LA AREA 2C: 24.1 CM2
ECHO LA AREA 4C: 21.7 CM2
ECHO LA DIAMETER INDEX: 2.65 CM/M2
ECHO LA DIAMETER: 5 CM
ECHO LA MAJOR AXIS: 6.1 CM
ECHO LA MINOR AXIS: 7.1 CM
ECHO LA TO AORTIC ROOT RATIO: 1.52
ECHO LA VOL 2C: 68 ML (ref 22–52)
ECHO LA VOL 4C: 62 ML (ref 22–52)
ECHO LA VOL BP: 70 ML (ref 22–52)
ECHO LA VOL/BSA BIPLANE: 37 ML/M2 (ref 16–34)
ECHO LA VOLUME INDEX A2C: 36 ML/M2 (ref 16–34)
ECHO LA VOLUME INDEX A4C: 33 ML/M2 (ref 16–34)
ECHO LV EDV A2C: 60 ML
ECHO LV EDV A4C: 69 ML
ECHO LV EDV INDEX A4C: 37 ML/M2
ECHO LV EDV NDEX A2C: 32 ML/M2
ECHO LV EJECTION FRACTION A2C: 48 %
ECHO LV EJECTION FRACTION A4C: 63 %
ECHO LV EJECTION FRACTION BIPLANE: 55 % (ref 55–100)
ECHO LV ESV A2C: 31 ML
ECHO LV ESV A4C: 26 ML
ECHO LV ESV INDEX A2C: 16 ML/M2
ECHO LV ESV INDEX A4C: 14 ML/M2
ECHO LV FRACTIONAL SHORTENING: 29 % (ref 28–44)
ECHO LV INTERNAL DIMENSION DIASTOLE INDEX: 3.33 CM/M2
ECHO LV INTERNAL DIMENSION DIASTOLIC: 6.3 CM (ref 3.9–5.3)
ECHO LV INTERNAL DIMENSION SYSTOLIC INDEX: 2.38 CM/M2
ECHO LV INTERNAL DIMENSION SYSTOLIC: 4.5 CM
ECHO LV IVSD: 1.2 CM (ref 0.6–0.9)
ECHO LV MASS 2D: 359.5 G (ref 67–162)
ECHO LV MASS INDEX 2D: 190.2 G/M2 (ref 43–95)
ECHO LV POSTERIOR WALL DIASTOLIC: 1.3 CM (ref 0.6–0.9)
ECHO LV RELATIVE WALL THICKNESS RATIO: 0.41
ECHO LVOT AREA: 3.8 CM2
ECHO LVOT AV VTI INDEX: 0.74
ECHO LVOT DIAM: 2.2 CM
ECHO LVOT MEAN GRADIENT: 2 MMHG
ECHO LVOT PEAK GRADIENT: 5 MMHG
ECHO LVOT PEAK VELOCITY: 1.2 M/S
ECHO LVOT STROKE VOLUME INDEX: 46.8 ML/M2
ECHO LVOT SV: 88.5 ML
ECHO LVOT VTI: 23.3 CM
ECHO MV E DECELERATION TIME (DT): 195 MS
ECHO MV E VELOCITY: 1.24 M/S
ECHO PV MAX VELOCITY: 1.1 M/S
ECHO PV PEAK GRADIENT: 4 MMHG
ECHO RA AREA 4C: 21.5 CM2
ECHO RIGHT VENTRICULAR SYSTOLIC PRESSURE (RVSP): 36 MMHG
ECHO RV BASAL DIMENSION: 3.6 CM
ECHO RV FREE WALL PEAK S': 13 CM/S
ECHO RV TAPSE: 1.7 CM (ref 1.7–?)
ECHO TV REGURGITANT MAX VELOCITY: 2.65 M/S
ECHO TV REGURGITANT PEAK GRADIENT: 28 MMHG
ERYTHROCYTE [DISTWIDTH] IN BLOOD BY AUTOMATED COUNT: 13.9 % (ref 11.8–14.4)
GFR SERPL CREATININE-BSD FRML MDRD: >60 ML/MIN/1.73M2
GLUCOSE SERPL-MCNC: 131 MG/DL (ref 70–99)
HCT VFR BLD AUTO: 35.1 % (ref 36.3–47.1)
HGB BLD-MCNC: 11.5 G/DL (ref 11.9–15.1)
MAGNESIUM SERPL-MCNC: 2.2 MG/DL (ref 1.6–2.6)
MCH RBC QN AUTO: 31.3 PG (ref 25.2–33.5)
MCHC RBC AUTO-ENTMCNC: 32.8 G/DL (ref 28.4–34.8)
MCV RBC AUTO: 95.4 FL (ref 82.6–102.9)
NRBC BLD-RTO: 0 PER 100 WBC
PLATELET # BLD AUTO: 155 K/UL (ref 138–453)
PMV BLD AUTO: 11.7 FL (ref 8.1–13.5)
POTASSIUM SERPL-SCNC: 3.7 MMOL/L (ref 3.7–5.3)
RBC # BLD AUTO: 3.68 M/UL (ref 3.95–5.11)
SODIUM SERPL-SCNC: 141 MMOL/L (ref 135–144)
TROPONIN I SERPL HS-MCNC: 27 NG/L (ref 0–14)
WBC OTHER # BLD: 15.3 K/UL (ref 3.5–11.3)

## 2023-10-14 PROCEDURE — 83735 ASSAY OF MAGNESIUM: CPT

## 2023-10-14 PROCEDURE — 93005 ELECTROCARDIOGRAM TRACING: CPT | Performed by: STUDENT IN AN ORGANIZED HEALTH CARE EDUCATION/TRAINING PROGRAM

## 2023-10-14 PROCEDURE — 6370000000 HC RX 637 (ALT 250 FOR IP): Performed by: NURSE PRACTITIONER

## 2023-10-14 PROCEDURE — 85027 COMPLETE CBC AUTOMATED: CPT

## 2023-10-14 PROCEDURE — 2000000000 HC ICU R&B

## 2023-10-14 PROCEDURE — 2580000003 HC RX 258: Performed by: PSYCHIATRY & NEUROLOGY

## 2023-10-14 PROCEDURE — 93306 TTE W/DOPPLER COMPLETE: CPT

## 2023-10-14 PROCEDURE — C1751 CATH, INF, PER/CENT/MIDLINE: HCPCS

## 2023-10-14 PROCEDURE — 2580000003 HC RX 258: Performed by: NURSE PRACTITIONER

## 2023-10-14 PROCEDURE — 93306 TTE W/DOPPLER COMPLETE: CPT | Performed by: INTERNAL MEDICINE

## 2023-10-14 PROCEDURE — 99233 SBSQ HOSP IP/OBS HIGH 50: CPT | Performed by: PSYCHIATRY & NEUROLOGY

## 2023-10-14 PROCEDURE — 36415 COLL VENOUS BLD VENIPUNCTURE: CPT

## 2023-10-14 PROCEDURE — 05HY33Z INSERTION OF INFUSION DEVICE INTO UPPER VEIN, PERCUTANEOUS APPROACH: ICD-10-PCS | Performed by: STUDENT IN AN ORGANIZED HEALTH CARE EDUCATION/TRAINING PROGRAM

## 2023-10-14 PROCEDURE — 6360000002 HC RX W HCPCS: Performed by: NURSE PRACTITIONER

## 2023-10-14 PROCEDURE — 93005 ELECTROCARDIOGRAM TRACING: CPT | Performed by: NURSE PRACTITIONER

## 2023-10-14 PROCEDURE — 76937 US GUIDE VASCULAR ACCESS: CPT

## 2023-10-14 PROCEDURE — 80048 BASIC METABOLIC PNL TOTAL CA: CPT

## 2023-10-14 PROCEDURE — 84484 ASSAY OF TROPONIN QUANT: CPT

## 2023-10-14 RX ORDER — SODIUM CHLORIDE 9 MG/ML
25 INJECTION, SOLUTION INTRAVENOUS PRN
Status: DISCONTINUED | OUTPATIENT
Start: 2023-10-14 | End: 2023-10-16

## 2023-10-14 RX ORDER — POTASSIUM CHLORIDE 20 MEQ/1
20 TABLET, EXTENDED RELEASE ORAL ONCE
Status: COMPLETED | OUTPATIENT
Start: 2023-10-14 | End: 2023-10-14

## 2023-10-14 RX ORDER — LIDOCAINE HYDROCHLORIDE 10 MG/ML
5 INJECTION, SOLUTION INFILTRATION; PERINEURAL ONCE
Status: DISCONTINUED | OUTPATIENT
Start: 2023-10-14 | End: 2023-10-16

## 2023-10-14 RX ORDER — HYDRALAZINE HYDROCHLORIDE 20 MG/ML
10 INJECTION INTRAMUSCULAR; INTRAVENOUS
Status: DISCONTINUED | OUTPATIENT
Start: 2023-10-14 | End: 2023-10-26 | Stop reason: HOSPADM

## 2023-10-14 RX ORDER — ATORVASTATIN CALCIUM 80 MG/1
80 TABLET, FILM COATED ORAL NIGHTLY
Status: DISCONTINUED | OUTPATIENT
Start: 2023-10-14 | End: 2023-10-14

## 2023-10-14 RX ORDER — MAGNESIUM SULFATE IN WATER 40 MG/ML
2000 INJECTION, SOLUTION INTRAVENOUS ONCE
Status: DISCONTINUED | OUTPATIENT
Start: 2023-10-14 | End: 2023-10-14

## 2023-10-14 RX ORDER — ATORVASTATIN CALCIUM 10 MG/1
10 TABLET, FILM COATED ORAL NIGHTLY
Status: DISCONTINUED | OUTPATIENT
Start: 2023-10-14 | End: 2023-10-26 | Stop reason: HOSPADM

## 2023-10-14 RX ORDER — GABAPENTIN 300 MG/1
300 CAPSULE ORAL ONCE
Status: COMPLETED | OUTPATIENT
Start: 2023-10-14 | End: 2023-10-14

## 2023-10-14 RX ORDER — 0.9 % SODIUM CHLORIDE 0.9 %
250 INTRAVENOUS SOLUTION INTRAVENOUS ONCE
Status: DISCONTINUED | OUTPATIENT
Start: 2023-10-14 | End: 2023-10-14

## 2023-10-14 RX ORDER — ESCITALOPRAM OXALATE 10 MG/1
10 TABLET ORAL NIGHTLY
Status: DISCONTINUED | OUTPATIENT
Start: 2023-10-14 | End: 2023-10-26 | Stop reason: HOSPADM

## 2023-10-14 RX ORDER — SODIUM CHLORIDE 0.9 % (FLUSH) 0.9 %
5-40 SYRINGE (ML) INJECTION EVERY 12 HOURS SCHEDULED
Status: DISCONTINUED | OUTPATIENT
Start: 2023-10-14 | End: 2023-10-26

## 2023-10-14 RX ORDER — LABETALOL HYDROCHLORIDE 5 MG/ML
10 INJECTION, SOLUTION INTRAVENOUS
Status: DISCONTINUED | OUTPATIENT
Start: 2023-10-14 | End: 2023-10-26 | Stop reason: HOSPADM

## 2023-10-14 RX ORDER — GABAPENTIN 100 MG/1
200 CAPSULE ORAL NIGHTLY
Status: DISCONTINUED | OUTPATIENT
Start: 2023-10-14 | End: 2023-10-14

## 2023-10-14 RX ORDER — SODIUM CHLORIDE 0.9 % (FLUSH) 0.9 %
5-40 SYRINGE (ML) INJECTION PRN
Status: DISCONTINUED | OUTPATIENT
Start: 2023-10-14 | End: 2023-10-16

## 2023-10-14 RX ORDER — ASPIRIN 81 MG/1
81 TABLET, CHEWABLE ORAL DAILY
Status: DISCONTINUED | OUTPATIENT
Start: 2023-10-14 | End: 2023-10-25

## 2023-10-14 RX ORDER — PANTOPRAZOLE SODIUM 40 MG/1
40 TABLET, DELAYED RELEASE ORAL
Status: DISCONTINUED | OUTPATIENT
Start: 2023-10-15 | End: 2023-10-26 | Stop reason: HOSPADM

## 2023-10-14 RX ORDER — GABAPENTIN 300 MG/1
300 CAPSULE ORAL NIGHTLY
Status: DISCONTINUED | OUTPATIENT
Start: 2023-10-15 | End: 2023-10-15

## 2023-10-14 RX ADMIN — SODIUM CHLORIDE 3000 MG: 900 INJECTION INTRAVENOUS at 19:55

## 2023-10-14 RX ADMIN — ONDANSETRON 4 MG: 4 TABLET, ORALLY DISINTEGRATING ORAL at 21:38

## 2023-10-14 RX ADMIN — LEVETIRACETAM 500 MG: 100 INJECTION, SOLUTION INTRAVENOUS at 04:54

## 2023-10-14 RX ADMIN — SODIUM CHLORIDE 3000 MG: 900 INJECTION INTRAVENOUS at 15:23

## 2023-10-14 RX ADMIN — SODIUM CHLORIDE, PRESERVATIVE FREE 10 ML: 5 INJECTION INTRAVENOUS at 20:16

## 2023-10-14 RX ADMIN — SODIUM CHLORIDE, PRESERVATIVE FREE 10 ML: 5 INJECTION INTRAVENOUS at 20:14

## 2023-10-14 RX ADMIN — SODIUM CHLORIDE 3000 MG: 900 INJECTION INTRAVENOUS at 08:52

## 2023-10-14 RX ADMIN — Medication 60 MG: at 12:02

## 2023-10-14 RX ADMIN — SODIUM CHLORIDE, PRESERVATIVE FREE 10 ML: 5 INJECTION INTRAVENOUS at 08:53

## 2023-10-14 RX ADMIN — ASPIRIN 81 MG 81 MG: 81 TABLET ORAL at 15:19

## 2023-10-14 RX ADMIN — Medication 60 MG: at 20:02

## 2023-10-14 RX ADMIN — ATORVASTATIN CALCIUM 10 MG: 10 TABLET, FILM COATED ORAL at 19:59

## 2023-10-14 RX ADMIN — ENOXAPARIN SODIUM 40 MG: 100 INJECTION SUBCUTANEOUS at 08:48

## 2023-10-14 RX ADMIN — LEVETIRACETAM 500 MG: 100 INJECTION, SOLUTION INTRAVENOUS at 16:48

## 2023-10-14 RX ADMIN — ACETAMINOPHEN 650 MG: 325 TABLET ORAL at 12:01

## 2023-10-14 RX ADMIN — ACETAMINOPHEN 650 MG: 325 TABLET ORAL at 01:00

## 2023-10-14 RX ADMIN — Medication 60 MG: at 08:46

## 2023-10-14 RX ADMIN — ESCITALOPRAM 10 MG: 10 TABLET, FILM COATED ORAL at 19:59

## 2023-10-14 RX ADMIN — SODIUM CHLORIDE, PRESERVATIVE FREE 10 ML: 5 INJECTION INTRAVENOUS at 20:17

## 2023-10-14 RX ADMIN — Medication 60 MG: at 03:58

## 2023-10-14 RX ADMIN — POTASSIUM CHLORIDE 20 MEQ: 1500 TABLET, EXTENDED RELEASE ORAL at 15:19

## 2023-10-14 RX ADMIN — ACETAMINOPHEN 650 MG: 325 TABLET ORAL at 07:23

## 2023-10-14 RX ADMIN — Medication 60 MG: at 16:48

## 2023-10-14 RX ADMIN — SODIUM CHLORIDE 3000 MG: 900 INJECTION INTRAVENOUS at 02:06

## 2023-10-14 RX ADMIN — GABAPENTIN 300 MG: 300 CAPSULE ORAL at 12:02

## 2023-10-14 RX ADMIN — ACETAMINOPHEN 650 MG: 325 TABLET ORAL at 19:59

## 2023-10-14 RX ADMIN — ONDANSETRON 4 MG: 4 TABLET, ORALLY DISINTEGRATING ORAL at 11:20

## 2023-10-14 ASSESSMENT — PAIN SCALES - GENERAL
PAINLEVEL_OUTOF10: 8
PAINLEVEL_OUTOF10: 6
PAINLEVEL_OUTOF10: 7
PAINLEVEL_OUTOF10: 6
PAINLEVEL_OUTOF10: 6
PAINLEVEL_OUTOF10: 7
PAINLEVEL_OUTOF10: 8
PAINLEVEL_OUTOF10: 6

## 2023-10-14 ASSESSMENT — PAIN DESCRIPTION - LOCATION
LOCATION: HEAD;NECK
LOCATION: HEAD
LOCATION: HEAD;NECK
LOCATION: HEAD
LOCATION: HEAD

## 2023-10-14 ASSESSMENT — PAIN DESCRIPTION - DESCRIPTORS
DESCRIPTORS: ACHING

## 2023-10-14 ASSESSMENT — PAIN DESCRIPTION - ORIENTATION
ORIENTATION: ANTERIOR

## 2023-10-14 NOTE — CONSULTS
Extended Dwell peripheral catheter insertion note:    Reason for placement: difficult access, several IV therapy medications ordered   Device inserted -  20g , 10 cm extended dwell Catheter  Ultrasound preassessment done. Target vessel is  left  basilic vein. Vessel depth = 2 cm. Vein measures . 55 cm. CVR is 3.9 %. (Preffered area-based CVR is less than 20%). Placed using ANTT fashion, US prepared with sterile probe cover. Visualization of needle entry into vessel, 1 attempt using AST technique. Total 10 cm inserted, 0cm external.   Lot # = [id number]  Expires = 08/31/2024  Easy aspiration of dark non-pulsating blood. Flushes easily with 10ml of 0.9 NS. Catheter secured with adhesive securement device. Catheter securement adhesive   Dressing placed - [facility]. Injection cap and swab cap applied. No bleeding or hematoma noted. Estimated blood loss = 0ml. Device should have blood return, if no blood return assess for infiltration and/or call VAT for consult. Instructions given on insertion and care. RN aware no lab draws without a physician order, line is power injectable, keep clamped when not in use, pulsatile 10ml NS flush every 8 hours when not in use or after intermittent use with medication. Line ready for use.

## 2023-10-14 NOTE — PROGRESS NOTES
Endovascular Neurosurgery Progress Note    SUBJECTIVE:     C/o of headache, ate breakfast    No acute event    Review of Systems:  CONSTITUTIONAL:  negative for fevers, chills, fatigue and malaise    EYES:  negative for double vision, blurred vision and photophobia     HEENT:  negative for tinnitus, epistaxis and sore throat    RESPIRATORY:  negative for cough, shortness of breath, wheezing    CARDIOVASCULAR:  negative for chest pain, palpitations, syncope, edema    GASTROINTESTINAL:  negative for nausea, vomiting    GENITOURINARY:  negative for incontinence    MUSCULOSKELETAL:  negative for neck or back pain    NEUROLOGICAL:  Negative for weakness and tingling  negative for headaches and dizziness    PSYCHIATRIC:  negative for anxiety      Review of systems otherwise negative. OBJECTIVE:     Vitals:    10/14/23 1016   BP: 118/86   Pulse:    Resp:    Temp:    SpO2:       General:  Gen: normal habitus, NAD  HEENT: NCAT, mucosa moist  Cvs: RRR, S1 S2 normal  Resp: symmetric unlabored breathing  Abd: s/nd/nt  Ext: no edema; R radial puncture site has some mild bruising, no tenderness, and appropriate capillary refill  Skin: no lesions seen, warm and dry    Neuro:  Gen: awake and alert, oriented x3. Lang/speech: no aphasia or dysarthria. Follows commands. CN: PERRL, EOMI, VFF, V1-3 intact, face symmetric, hearing intact, shoulder shrug symmetric, tongue midline, uvula midline with symmetric palate raise  Motor: grossly 5/5 UE and LE b/l  Sense: LT intact in all 4 ext. Coord: FTN and HTS intact b/l  DTR: 2+ in all 4 ext. Gait: deferred    NIH Stroke Scale:   1a  Level of consciousness: 0 - alert; keenly responsive   1b. LOC questions:  0 - answers both questions correctly   1c. LOC commands: 0 - performs both tasks correctly   2. Best Gaze: 0 - normal   3. Visual: 0 - no visual loss   4. Facial Palsy: 0 - normal symmetric movement   5a.  Motor left arm: 0 - no drift, limb holds 90 (or 45) degrees for full

## 2023-10-14 NOTE — PROGRESS NOTES
[facility]    Neurosurgery Service  Resident Daily Progress Note   10/14/2023 1:06 PM     Subjective    No acute events overnight. No new complaints. EVD open at [id number], ICP 15 overnight     Objective    Vitals:    10/14/23 1000 10/14/23 1016 10/14/23 1100 10/14/23 1200   BP: (!) 140/74 118/86 135/82 (!) 154/84   Pulse: 80  76 70   Resp: (!) 35  21 27   Temp:       TempSrc:       SpO2: 92%  95% 91%   Weight:  180 lb (81.6 kg)     Height:  5' 5\" (1.651 m)         Physical Exam:      AOx3   CNII-XII intact   PERRL, EOMI   Motor   L deltoid 5/5; R deltoid 5/5  L biceps 5/5; R biceps 5/5  L triceps 5/5; R triceps 5/5  L wrist extension 5/5; R wrist extension 5/5  L intrinsics 5/5; R intrinsics 5/5      L iliopsoas 5/5 , R iliopsoas 5/5  L quadriceps 5/5; R quadriceps 5/5  L Dorsiflexion 5/5; R dorsiflexion 5/5  L Plantarflexion 5/5; R plantarflexion 5/5  L EHL 5/5; R EHL 5/5     Sensation intact      Drain output 84ml/12hr  EVD site c/d/i      Labs:  Lab Results   Component Value Date    WBC 15.3 (H) 10/14/2023    HGB 11.5 (L) 10/14/2023    HCT 35.1 (L) 10/14/2023     10/14/2023    CHOL 130 10/12/2023    TRIG 176 (H) 10/12/2023    HDL 46 10/12/2023    ALT 16 10/11/2023    AST 24 10/11/2023     10/14/2023    K 3.7 10/14/2023     10/14/2023    CREATININE 0.4 (L) 10/14/2023    BUN 12 10/14/2023    CO2 29 10/14/2023    TSH 1.50 10/11/2023    INR 1.0 10/11/2023    LABA1C 6.6 (H) 10/12/2023       Radiology (last 24 hours): No new studies    ASSESSMENT & PLAN:    Hussain Courtney is a 75 y.o. female who presents with with aneurysmal subarachnoid hemorrhage, hydrocephalus   10/12 EVD placement      - continue EVD open at 10 mmHg, monitor ICP and output  - continue supportive care per [facility]         Please contact neurosurgery with any changes in patient's neurologic status.       Oneil Noonan MD  PGY-3 Neurology resident   Neurosurgery/Neuro Critical Care Team  Pager

## 2023-10-14 NOTE — CARE COORDINATION
Transitional Planning  Spoke with patient, dtr in law and spouse at bedside. Patient declining need for ARU and HHC. States that she has supportive family and they are able to assist.  If therapy is needed spouse will take her to outpatient.

## 2023-10-14 NOTE — PROGRESS NOTES
Daily Progress Note  Neuro Critical Care    Patient Name: Vish Bernard  Patient : 1950  Room/Bed: 0127/0127-01  Code Status: Full  Allergies: No Known Allergies    CHIEF COMPLAINT:      Thunderclap headache     INTERVAL HISTORY    Initial Presentation (Admitted 10/11/23): The patient is a 67 y.o. female with history of HTN, HLD, and smoking presented as a transfer from [facility] for Diffuse SAH. Patient was at home this morning when around 0930 she developed acute onset severe headache. EMS was called, patient was walking to the cot when she became unresponsive. She was intubated and taken to [facility] where CT head showed diffuse SAH. She was loaded with 1g Keppra and started on Propofol for sedation. On arrival to [facility] with propofol held, patient opens eyes to verbal stimulation. Pupils are 4mm reactive bilaterally. Cough intact. Moving all extremities spontaneously, localizing bilateral upper extremities. Wiggles toes bilaterally to command. CTA head/neck showed basilar tip aneurysm. Given additional 1g Keppra. SBP goal < 120. Irregular rhythm noted on monitor, follow up EKG and troponin. Endovascular and neurosurgery consulted. Of note, patient's  does report she takes Aspirin 81mg daily for general wellness. Bower&Gates: 5, Modified Viramontes: IV    Taken urgently to cerebral angiogram showing the basilar tip aneurysm treated with WEB device. EVD was placed overnight and set at 10 cmH20. CT head showed stable SAH and well positioned EVD. Aspirin 81mg daily started after EVD placement. Hospital Course:   10/12: Opens eyes, following commands. Extubated without difficulty. Unasyn started for aspiration pneumonia. 10/13: Started on Regular diet. Baseline TCD normal.  ?New onset atrial fibrillation. Interval Events:  EVD set at 10mmHg this morning, ICP 4-12, 255cc out/24h. EVD transiently increased to 15mmHg per Neurosurgery.   ICP never significantly elevated but UA 10/11 unremarkable  - Blood cultures 10/11 negative to date  - Flu/COVID 10/11 negative  - CSF culture 10/12 negative to date   - Some concern for developing RLL infiltrate on CXR   - Continue Unasyn for aspiration pneumonia through 10/19  - Daily CBC     HEME:   Mild anemia, likely iatrogenic   - H&H 11.5/35.1  - Platelets 321  - Daily CBC     ENDOCRINE:  Type 2 diabetes   - Hemoglobin A1c 6.6  - Continue to monitor blood glucose, goal <180  - Not on any diabetic medications PTA according to PCP note 05/2023  - Glucose well controlled; consider insulin sliding scale if concern for hyperglycemia  - TSH 1.50     OTHER:  - Continue home Lexapro for depression  - PT/OT/ST   - Consult PM&R    PROPHYLAXIS:  Stress ulcer: PPI as above      DVT PROPHYLAXIS:  - SCD sleeves - Thigh High   - Lovenox 40mg QD    DISPOSITION:  [x] To remain ICU for EVD management, close neurological monitoring. We will continue to follow along. For any changes in exam or patient status please contact Neuro Critical Care.       VIKTORIA Llamas - [place]  Neuro Critical Care  Pager [phone]  10/14/2023     8:27 AM

## 2023-10-15 ENCOUNTER — APPOINTMENT (OUTPATIENT)
Dept: CT IMAGING | Age: 73
DRG: 022 | End: 2023-10-15
Payer: MEDICARE

## 2023-10-15 ENCOUNTER — APPOINTMENT (OUTPATIENT)
Dept: GENERAL RADIOLOGY | Age: 73
DRG: 022 | End: 2023-10-15
Payer: MEDICARE

## 2023-10-15 LAB
ANION GAP SERPL CALCULATED.3IONS-SCNC: 9 MMOL/L (ref 9–17)
BASOPHILS # BLD: 0.06 K/UL (ref 0–0.2)
BASOPHILS NFR BLD: 0 % (ref 0–2)
BILIRUB UR QL STRIP: NEGATIVE
BUN SERPL-MCNC: 8 MG/DL (ref 8–23)
CALCIUM SERPL-MCNC: 7.9 MG/DL (ref 8.6–10.4)
CASTS #/AREA URNS LPF: ABNORMAL /LPF (ref 0–8)
CHLORIDE SERPL-SCNC: 100 MMOL/L (ref 98–107)
CLARITY UR: CLEAR
CO2 SERPL-SCNC: 30 MMOL/L (ref 20–31)
COLOR UR: YELLOW
CREAT SERPL-MCNC: 0.3 MG/DL (ref 0.5–0.9)
EOSINOPHIL # BLD: 0.03 K/UL (ref 0–0.44)
EOSINOPHILS RELATIVE PERCENT: 0 % (ref 1–4)
EPI CELLS #/AREA URNS HPF: ABNORMAL /HPF (ref 0–5)
ERYTHROCYTE [DISTWIDTH] IN BLOOD BY AUTOMATED COUNT: 13.2 % (ref 11.8–14.4)
GFR SERPL CREATININE-BSD FRML MDRD: >60 ML/MIN/1.73M2
GLUCOSE SERPL-MCNC: 123 MG/DL (ref 70–99)
GLUCOSE UR STRIP-MCNC: NEGATIVE MG/DL
HCT VFR BLD AUTO: 36.3 % (ref 36.3–47.1)
HGB BLD-MCNC: 11.7 G/DL (ref 11.9–15.1)
HGB UR QL STRIP.AUTO: ABNORMAL
IMM GRANULOCYTES # BLD AUTO: 0.15 K/UL (ref 0–0.3)
IMM GRANULOCYTES NFR BLD: 1 %
KETONES UR STRIP-MCNC: ABNORMAL MG/DL
LEUKOCYTE ESTERASE UR QL STRIP: NEGATIVE
LYMPHOCYTES NFR BLD: 2.06 K/UL (ref 1.1–3.7)
LYMPHOCYTES RELATIVE PERCENT: 11 % (ref 24–43)
MAGNESIUM SERPL-MCNC: 2.1 MG/DL (ref 1.6–2.6)
MCH RBC QN AUTO: 31 PG (ref 25.2–33.5)
MCHC RBC AUTO-ENTMCNC: 32.2 G/DL (ref 28.4–34.8)
MCV RBC AUTO: 96 FL (ref 82.6–102.9)
MICROORGANISM SPEC CULT: ABNORMAL
MICROORGANISM SPEC CULT: NORMAL
MICROORGANISM SPEC CULT: NORMAL
MICROORGANISM/AGENT SPEC: ABNORMAL
MICROORGANISM/AGENT SPEC: ABNORMAL
MONOCYTES NFR BLD: 1.2 K/UL (ref 0.1–1.2)
MONOCYTES NFR BLD: 6 % (ref 3–12)
NEUTROPHILS NFR BLD: 82 % (ref 36–65)
NEUTS SEG NFR BLD: 15.68 K/UL (ref 1.5–8.1)
NITRITE UR QL STRIP: NEGATIVE
NRBC BLD-RTO: 0 PER 100 WBC
PH UR STRIP: 7 [PH] (ref 5–8)
PLATELET # BLD AUTO: 192 K/UL (ref 138–453)
PMV BLD AUTO: 11.4 FL (ref 8.1–13.5)
POTASSIUM SERPL-SCNC: 3.1 MMOL/L (ref 3.7–5.3)
PROCALCITONIN SERPL-MCNC: 0.04 NG/ML
PROT UR STRIP-MCNC: NEGATIVE MG/DL
RBC # BLD AUTO: 3.78 M/UL (ref 3.95–5.11)
RBC #/AREA URNS HPF: ABNORMAL /HPF (ref 0–4)
SERVICE CMNT-IMP: NORMAL
SERVICE CMNT-IMP: NORMAL
SODIUM SERPL-SCNC: 139 MMOL/L (ref 135–144)
SP GR UR STRIP: 1.01 (ref 1–1.03)
SPECIMEN DESCRIPTION: ABNORMAL
SPECIMEN DESCRIPTION: NORMAL
SPECIMEN DESCRIPTION: NORMAL
UROBILINOGEN UR STRIP-ACNC: NORMAL EU/DL (ref 0–1)
WBC #/AREA URNS HPF: ABNORMAL /HPF (ref 0–5)
WBC OTHER # BLD: 19.2 K/UL (ref 3.5–11.3)

## 2023-10-15 PROCEDURE — 2500000003 HC RX 250 WO HCPCS: Performed by: NURSE PRACTITIONER

## 2023-10-15 PROCEDURE — 2580000003 HC RX 258: Performed by: NURSE PRACTITIONER

## 2023-10-15 PROCEDURE — 6370000000 HC RX 637 (ALT 250 FOR IP): Performed by: NURSE PRACTITIONER

## 2023-10-15 PROCEDURE — 83735 ASSAY OF MAGNESIUM: CPT

## 2023-10-15 PROCEDURE — 99233 SBSQ HOSP IP/OBS HIGH 50: CPT | Performed by: PSYCHIATRY & NEUROLOGY

## 2023-10-15 PROCEDURE — 94640 AIRWAY INHALATION TREATMENT: CPT

## 2023-10-15 PROCEDURE — 97530 THERAPEUTIC ACTIVITIES: CPT

## 2023-10-15 PROCEDURE — 2700000000 HC OXYGEN THERAPY PER DAY

## 2023-10-15 PROCEDURE — 6360000002 HC RX W HCPCS: Performed by: NURSE PRACTITIONER

## 2023-10-15 PROCEDURE — 2000000000 HC ICU R&B

## 2023-10-15 PROCEDURE — 70450 CT HEAD/BRAIN W/O DYE: CPT

## 2023-10-15 PROCEDURE — 2580000003 HC RX 258: Performed by: STUDENT IN AN ORGANIZED HEALTH CARE EDUCATION/TRAINING PROGRAM

## 2023-10-15 PROCEDURE — 2580000003 HC RX 258: Performed by: PSYCHIATRY & NEUROLOGY

## 2023-10-15 PROCEDURE — 36415 COLL VENOUS BLD VENIPUNCTURE: CPT

## 2023-10-15 PROCEDURE — 6360000002 HC RX W HCPCS

## 2023-10-15 PROCEDURE — 81001 URINALYSIS AUTO W/SCOPE: CPT

## 2023-10-15 PROCEDURE — 84145 PROCALCITONIN (PCT): CPT

## 2023-10-15 PROCEDURE — 80048 BASIC METABOLIC PNL TOTAL CA: CPT

## 2023-10-15 PROCEDURE — 2500000003 HC RX 250 WO HCPCS

## 2023-10-15 PROCEDURE — 71045 X-RAY EXAM CHEST 1 VIEW: CPT

## 2023-10-15 PROCEDURE — 97116 GAIT TRAINING THERAPY: CPT

## 2023-10-15 PROCEDURE — 6360000002 HC RX W HCPCS: Performed by: STUDENT IN AN ORGANIZED HEALTH CARE EDUCATION/TRAINING PROGRAM

## 2023-10-15 PROCEDURE — 94761 N-INVAS EAR/PLS OXIMETRY MLT: CPT

## 2023-10-15 PROCEDURE — 85025 COMPLETE CBC W/AUTO DIFF WBC: CPT

## 2023-10-15 PROCEDURE — 97535 SELF CARE MNGMENT TRAINING: CPT

## 2023-10-15 PROCEDURE — 99223 1ST HOSP IP/OBS HIGH 75: CPT | Performed by: INTERNAL MEDICINE

## 2023-10-15 RX ORDER — LIDOCAINE HYDROCHLORIDE 10 MG/ML
5 INJECTION, SOLUTION INFILTRATION; PERINEURAL ONCE
Status: DISCONTINUED | OUTPATIENT
Start: 2023-10-15 | End: 2023-10-24

## 2023-10-15 RX ORDER — SODIUM CHLORIDE 0.9 % (FLUSH) 0.9 %
5-40 SYRINGE (ML) INJECTION EVERY 12 HOURS SCHEDULED
Status: DISCONTINUED | OUTPATIENT
Start: 2023-10-15 | End: 2023-10-26

## 2023-10-15 RX ORDER — 0.9 % SODIUM CHLORIDE 0.9 %
500 INTRAVENOUS SOLUTION INTRAVENOUS ONCE
Status: DISCONTINUED | OUTPATIENT
Start: 2023-10-15 | End: 2023-10-15

## 2023-10-15 RX ORDER — METOPROLOL TARTRATE 5 MG/5ML
2.5 INJECTION INTRAVENOUS ONCE
Status: COMPLETED | OUTPATIENT
Start: 2023-10-15 | End: 2023-10-15

## 2023-10-15 RX ORDER — IPRATROPIUM BROMIDE AND ALBUTEROL SULFATE 2.5; .5 MG/3ML; MG/3ML
1 SOLUTION RESPIRATORY (INHALATION)
Status: DISCONTINUED | OUTPATIENT
Start: 2023-10-15 | End: 2023-10-18

## 2023-10-15 RX ORDER — SODIUM CHLORIDE 9 MG/ML
25 INJECTION, SOLUTION INTRAVENOUS PRN
Status: DISCONTINUED | OUTPATIENT
Start: 2023-10-15 | End: 2023-10-26 | Stop reason: HOSPADM

## 2023-10-15 RX ORDER — KETOROLAC TROMETHAMINE 15 MG/ML
15 INJECTION, SOLUTION INTRAMUSCULAR; INTRAVENOUS EVERY 6 HOURS PRN
Status: DISPENSED | OUTPATIENT
Start: 2023-10-15 | End: 2023-10-17

## 2023-10-15 RX ORDER — POTASSIUM CHLORIDE 20 MEQ/1
40 TABLET, EXTENDED RELEASE ORAL 2 TIMES DAILY WITH MEALS
Status: COMPLETED | OUTPATIENT
Start: 2023-10-15 | End: 2023-10-15

## 2023-10-15 RX ORDER — GABAPENTIN 300 MG/1
300 CAPSULE ORAL 2 TIMES DAILY
Status: DISCONTINUED | OUTPATIENT
Start: 2023-10-15 | End: 2023-10-20

## 2023-10-15 RX ORDER — KETOROLAC TROMETHAMINE 30 MG/ML
15 INJECTION, SOLUTION INTRAMUSCULAR; INTRAVENOUS ONCE
Status: COMPLETED | OUTPATIENT
Start: 2023-10-15 | End: 2023-10-15

## 2023-10-15 RX ORDER — SODIUM CHLORIDE 0.9 % (FLUSH) 0.9 %
5-40 SYRINGE (ML) INJECTION PRN
Status: DISCONTINUED | OUTPATIENT
Start: 2023-10-15 | End: 2023-10-16

## 2023-10-15 RX ADMIN — ESCITALOPRAM 10 MG: 10 TABLET, FILM COATED ORAL at 20:12

## 2023-10-15 RX ADMIN — IPRATROPIUM BROMIDE AND ALBUTEROL SULFATE 1 DOSE: 2.5; .5 SOLUTION RESPIRATORY (INHALATION) at 22:01

## 2023-10-15 RX ADMIN — ACETAMINOPHEN 650 MG: 325 TABLET ORAL at 00:09

## 2023-10-15 RX ADMIN — GABAPENTIN 300 MG: 300 CAPSULE ORAL at 11:33

## 2023-10-15 RX ADMIN — ACETAMINOPHEN 650 MG: 325 TABLET ORAL at 18:38

## 2023-10-15 RX ADMIN — Medication 60 MG: at 16:08

## 2023-10-15 RX ADMIN — SODIUM CHLORIDE, PRESERVATIVE FREE 10 ML: 5 INJECTION INTRAVENOUS at 20:16

## 2023-10-15 RX ADMIN — SODIUM CHLORIDE 3000 MG: 900 INJECTION INTRAVENOUS at 01:51

## 2023-10-15 RX ADMIN — SODIUM CHLORIDE, PRESERVATIVE FREE 10 ML: 5 INJECTION INTRAVENOUS at 20:17

## 2023-10-15 RX ADMIN — PANTOPRAZOLE SODIUM 40 MG: 40 TABLET, DELAYED RELEASE ORAL at 06:28

## 2023-10-15 RX ADMIN — ENOXAPARIN SODIUM 40 MG: 100 INJECTION SUBCUTANEOUS at 08:21

## 2023-10-15 RX ADMIN — AMIODARONE HYDROCHLORIDE 150 MG: 50 INJECTION, SOLUTION INTRAVENOUS at 11:46

## 2023-10-15 RX ADMIN — Medication 60 MG: at 00:08

## 2023-10-15 RX ADMIN — POTASSIUM CHLORIDE 40 MEQ: 1500 TABLET, EXTENDED RELEASE ORAL at 16:08

## 2023-10-15 RX ADMIN — SODIUM CHLORIDE 3000 MG: 900 INJECTION INTRAVENOUS at 14:04

## 2023-10-15 RX ADMIN — Medication 60 MG: at 20:14

## 2023-10-15 RX ADMIN — Medication 60 MG: at 11:33

## 2023-10-15 RX ADMIN — POTASSIUM CHLORIDE 40 MEQ: 1500 TABLET, EXTENDED RELEASE ORAL at 08:21

## 2023-10-15 RX ADMIN — Medication 60 MG: at 03:40

## 2023-10-15 RX ADMIN — ACETAMINOPHEN 650 MG: 325 TABLET ORAL at 13:57

## 2023-10-15 RX ADMIN — KETOROLAC TROMETHAMINE 15 MG: 15 INJECTION, SOLUTION INTRAMUSCULAR; INTRAVENOUS at 13:58

## 2023-10-15 RX ADMIN — IPRATROPIUM BROMIDE AND ALBUTEROL SULFATE 1 DOSE: 2.5; .5 SOLUTION RESPIRATORY (INHALATION) at 15:40

## 2023-10-15 RX ADMIN — Medication 60 MG: at 08:20

## 2023-10-15 RX ADMIN — ACETAMINOPHEN 650 MG: 325 TABLET ORAL at 04:41

## 2023-10-15 RX ADMIN — GABAPENTIN 300 MG: 300 CAPSULE ORAL at 20:12

## 2023-10-15 RX ADMIN — LEVETIRACETAM 500 MG: 100 INJECTION, SOLUTION INTRAVENOUS at 16:08

## 2023-10-15 RX ADMIN — KETOROLAC TROMETHAMINE 15 MG: 15 INJECTION, SOLUTION INTRAMUSCULAR; INTRAVENOUS at 20:08

## 2023-10-15 RX ADMIN — ACETAMINOPHEN 650 MG: 325 TABLET ORAL at 08:41

## 2023-10-15 RX ADMIN — ONDANSETRON 4 MG: 2 INJECTION INTRAMUSCULAR; INTRAVENOUS at 08:31

## 2023-10-15 RX ADMIN — ACETAMINOPHEN 650 MG: 325 TABLET ORAL at 23:57

## 2023-10-15 RX ADMIN — SODIUM CHLORIDE 3000 MG: 900 INJECTION INTRAVENOUS at 08:41

## 2023-10-15 RX ADMIN — METOPROLOL TARTRATE 2.5 MG: 5 INJECTION INTRAVENOUS at 17:27

## 2023-10-15 RX ADMIN — ASPIRIN 81 MG 81 MG: 81 TABLET ORAL at 08:21

## 2023-10-15 RX ADMIN — SODIUM CHLORIDE, PRESERVATIVE FREE 10 ML: 5 INJECTION INTRAVENOUS at 08:28

## 2023-10-15 RX ADMIN — Medication 60 MG: at 23:57

## 2023-10-15 RX ADMIN — AMIODARONE HYDROCHLORIDE 1 MG/MIN: 50 INJECTION, SOLUTION INTRAVENOUS at 12:00

## 2023-10-15 RX ADMIN — METOPROLOL TARTRATE 2.5 MG: 5 INJECTION INTRAVENOUS at 01:45

## 2023-10-15 RX ADMIN — ATORVASTATIN CALCIUM 10 MG: 10 TABLET, FILM COATED ORAL at 20:12

## 2023-10-15 RX ADMIN — SODIUM CHLORIDE 3000 MG: 900 INJECTION INTRAVENOUS at 20:08

## 2023-10-15 RX ADMIN — SODIUM CHLORIDE: 9 INJECTION, SOLUTION INTRAVENOUS at 04:49

## 2023-10-15 RX ADMIN — AMIODARONE HYDROCHLORIDE 1 MG/MIN: 50 INJECTION, SOLUTION INTRAVENOUS at 20:05

## 2023-10-15 RX ADMIN — KETOROLAC TROMETHAMINE 15 MG: 30 INJECTION INTRAMUSCULAR; INTRAVENOUS at 03:36

## 2023-10-15 RX ADMIN — LEVETIRACETAM 500 MG: 100 INJECTION, SOLUTION INTRAVENOUS at 04:34

## 2023-10-15 ASSESSMENT — PAIN SCALES - GENERAL
PAINLEVEL_OUTOF10: 9
PAINLEVEL_OUTOF10: 7
PAINLEVEL_OUTOF10: 6
PAINLEVEL_OUTOF10: 9
PAINLEVEL_OUTOF10: 9
PAINLEVEL_OUTOF10: 7
PAINLEVEL_OUTOF10: 8
PAINLEVEL_OUTOF10: 7

## 2023-10-15 NOTE — PROGRESS NOTES
Daily Progress Note  Neuro Critical Care    Patient Name: Aleyda Bhatti  Patient : 1950  Room/Bed: 0127/0127-01  Code Status: Full  Allergies: No Known Allergies    CHIEF COMPLAINT:      Thunderclap headache     INTERVAL HISTORY    Initial Presentation (Admitted 10/11/23): The patient is a 67 y.o. female with history of HTN, HLD, and smoking presented as a transfer from [facility] for Diffuse SAH. Patient was at home this morning when around 0930 she developed acute onset severe headache. EMS was called, patient was walking to the cot when she became unresponsive. She was intubated and taken to [facility] where CT head showed diffuse SAH. She was loaded with 1g Keppra and started on Propofol for sedation. On arrival to [address] [place] [address] Road with propofol held, patient opens eyes to verbal stimulation. Pupils are 4mm reactive bilaterally. Cough intact. Moving all extremities spontaneously, localizing bilateral upper extremities. Wiggles toes bilaterally to command. CTA head/neck showed basilar tip aneurysm. Given additional 1g Keppra. SBP goal < 120. Irregular rhythm noted on monitor, follow up EKG and troponin. Endovascular and neurosurgery consulted. Of note, patient's  does report she takes Aspirin 81mg daily for general wellness. Bower&Gates: 5, Modified Viramontes: IV    Taken urgently to cerebral angiogram showing the basilar tip aneurysm treated with WEB device. EVD was placed overnight and set at 10 cmH20. CT head showed stable SAH and well positioned EVD. Aspirin 81mg daily started after EVD placement. Hospital Course:   10/12: Opens eyes, following commands. Extubated without difficulty. Unasyn started for aspiration pneumonia. 10/13: Started on Regular diet. Baseline TCD normal.  ?New onset atrial fibrillation. 10/14: EVD set at 10mmHg this morning, ICP 4-12, 255cc out/24h. EVD transiently increased to 15mmHg per Neurosurgery. ICP never significantly elevated but increased to ~16.

## 2023-10-15 NOTE — PROGRESS NOTES
Physical Therapy  Facility/Department: [address] NEURO ICU  Physical Therapy Treatment Note    Name: Radha Diaz  : 1950  MRN: [id number]  Date of Service: 10/15/2023    Discharge Recommendations:  Further therapy recommended at discharge. The patient should be able to tolerate at least 3 hours of therapy per day over 5 days or 15 hours over 7 days. This patient may benefit from a Physical Medicine and Rehab consult. PT Equipment Recommendations  Equipment Needed: No      Patient Diagnosis(es): The primary encounter diagnosis was Subarachnoid bleed ([address]). 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 Past Medical History:  has no past medical history on file. Past Surgical History:  has no past surgical history on file. Assessment   Body Structures, Functions, Activity Limitations Requiring Skilled Therapeutic Intervention: Decreased functional mobility ; Decreased safe awareness;Decreased fine motor control;Decreased coordination; Increased pain;Decreased posture;Decreased balance;Decreased strength  Assessment: Pt completed bed mobility with SBA, ambulated 5 ft CGA x2 (HHA).   Pt would require 24 hour assistance with mobility at this time, would benefit from continued intense skilled PT to progress toward prior independent level of function  Therapy Prognosis: Good  Requires PT Follow-Up: Yes  Activity Tolerance  Activity Tolerance: Patient limited by endurance;Treatment limited secondary to medical complications  Activity Tolerance Comments: nausea     Plan   Physcial Therapy Plan  General Plan: 6-7 times per week  Current Treatment Recommendations: Strengthening, ROM, Balance training, Functional mobility training, Endurance training, Therapeutic activities, Gait training, Stair training, Safety education & training  Safety Devices  Type of Devices: Nurse notified, Gait belt, Call light within reach, Patient at risk for falls, Left in bed, Bed alarm in place  Restraints  Restraints Initially in Place: No     Restrictions  Restrictions/Precautions  Restrictions/Precautions: Seizure, General Precautions  Required Braces or Orthoses?: No  Position Activity Restriction  Other position/activity restrictions: Activity as tolerated. SBP <160. Subjective   Pain: pt reports HA pain, does not rate. Pt positioned in bed for comfort after mobility  General  Chart Reviewed: Yes  Patient assessed for rehabilitation services?: Yes  Response To Previous Treatment: Patient with no complaints from previous session. Family / Caregiver Present: No  Follows Commands: Within Functional Limits  General Comment  Comments: Pt left in bed with call light within reach, alarm activated  Subjective  Subjective: Pt and RN agreeable to PT. Pt seated EOB upon arrival with CASH present in room. Pt with c/o nausea, also c/o HA pain. Pleasant and coopertive with treatment despite feeling unwell. Cognition   Orientation  Overall Orientation Status: Within Functional Limits  Orientation Level: Oriented X4  Cognition  Overall Cognitive Status: WFL  Cognition Comment: no instances of impulsivity noted this date     Objective   Pulse: 92  Heart Rate Source: Monitor  SpO2: 97 %  O2 Device: Nasal cannula     Observation/Palpation  Posture: Good  Observation: EVD      Bed mobility  Supine to Sit: Stand by assistance  Sit to Supine: Stand by assistance  Scooting: Stand by assistance  Bed Mobility Comments: HOB elevated ~30 degrees.   Transfers  Sit to Stand: Contact guard assistance  Stand to Sit: Contact guard assistance  Comment: Transfers assessed with no AD, pt reaching for therapist's hand for balance once standing  Ambulation  Surface: Level tile  Device: Hand-Held Assist  Assistance: Contact guard assistance;2 Person assistance  Quality of Gait: no LOB, grossly steady with HHA  Gait Deviations: Slow Callie;Decreased step length;Decreased

## 2023-10-15 NOTE — PROGRESS NOTES
Patient's heart rate is irregular and converting from atrial fib to sinus tach ranging 's. Patient denies symptoms at this time.  RN notified [phone] Vu Cristobal NP.

## 2023-10-15 NOTE — PROGRESS NOTES
Endovascular Neurosurgery Progress Note    SUBJECTIVE:     C/o of headache  No acute event, found to be in afib RVR, on amio    Review of Systems:  CONSTITUTIONAL:  negative for fevers, chills, fatigue and malaise    EYES:  negative for double vision, blurred vision and photophobia     HEENT:  negative for tinnitus, epistaxis and sore throat    RESPIRATORY:  negative for cough, shortness of breath, wheezing    CARDIOVASCULAR:  negative for chest pain, palpitations, syncope, edema    GASTROINTESTINAL:  negative for nausea, vomiting    GENITOURINARY:  negative for incontinence    MUSCULOSKELETAL:  negative for neck or back pain    NEUROLOGICAL:  Negative for weakness and tingling  negative for headaches and dizziness    PSYCHIATRIC:  negative for anxiety      Review of systems otherwise negative. OBJECTIVE:     Vitals:    10/15/23 1000   BP: (!) 141/101   Pulse: 92   Resp: 18   Temp:    SpO2: 97%      General:  Gen: normal habitus, NAD  HEENT: NCAT, mucosa moist  Cvs: RRR, S1 S2 normal  Resp: symmetric unlabored breathing  Abd: s/nd/nt  Ext: no edema; R radial puncture site has some mild bruising, no tenderness, and appropriate capillary refill  Skin: no lesions seen, warm and dry    Neuro:  Gen: awake and alert, oriented x3. Lang/speech: no aphasia or dysarthria. Follows commands. CN: PERRL, EOMI, VFF, V1-3 intact, face symmetric, hearing intact, shoulder shrug symmetric, tongue midline, uvula midline with symmetric palate raise  Motor: grossly 5/5 UE and LE b/l  Sense: LT intact in all 4 ext. Coord: FTN and HTS intact b/l  DTR: 2+ in all 4 ext. Gait: deferred    NIH Stroke Scale:   1a  Level of consciousness: 0 - alert; keenly responsive   1b. LOC questions:  0 - answers both questions correctly   1c. LOC commands: 0 - performs both tasks correctly   2. Best Gaze: 0 - normal   3. Visual: 0 - no visual loss   4. Facial Palsy: 0 - normal symmetric movement   5a.  Motor left arm: 0 - no drift, limb holds

## 2023-10-15 NOTE — PLAN OF CARE
Problem: Pain  Goal: Verbalizes/displays adequate comfort level or baseline comfort level  10/14/2023 2252 by Surjit Lock RN  Outcome: Progressing     Problem: Safety - Adult  Goal: Free from fall injury  10/14/2023 2252 by Surjit Lock RN  Outcome: Progressing     Problem: Skin/Tissue Integrity  Goal: Absence of new skin breakdown  Description: 1. Monitor for areas of redness and/or skin breakdown  2. Assess vascular access sites hourly  3. Every 4-6 hours minimum:  Change oxygen saturation probe site  4. Every 4-6 hours:  If on nasal continuous positive airway pressure, respiratory therapy assess nares and determine need for appliance change or resting period.   10/14/2023 2252 by Surjit Lock RN  Outcome: Progressing

## 2023-10-15 NOTE — PROGRESS NOTES
Occupational Therapy  Facility/Department: [address] NEURO ICU  Occupational Therapy Daily Treatment Note    Name: Elana Hawkins  : 1950  MRN: [id number]  Date of Service: 10/15/2023    Discharge Recommendations:  24 hour supervision or assist, Patient would benefit from continued therapy after discharge          Patient Diagnosis(es): The primary encounter diagnosis was Subarachnoid bleed ([address] 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 Past Medical History:  has no past medical history on file. Past Surgical History:  has no past surgical history on file. Assessment   Performance deficits / Impairments: Decreased functional mobility ; Decreased endurance;Decreased coordination;Decreased posture;Decreased ADL status; Decreased balance;Decreased strength;Decreased cognition;Decreased safe awareness  Assessment: Pt engaged in bed mobility, sitting balance, transfers, functional mobility and ADLs this date. Pt limited in activity tolerance d/t increasing nausea and heachache throughout session. Pt sat EOB to complete grooming, bathing and dressing tasks. Pt engaged in functional mobility around bed before requesting seated rest break d/t nausea. 2 person assist used for sit-->stand transfer and functional mobility d/t IV pole and drain pole mgmt. Pt would benefit from continued OT to address above deficits.   Prognosis: Good  Activity Tolerance  Activity Tolerance: Patient limited by fatigue;Patient limited by pain        Plan   Occupational Therapy Plan  Times Per Week: 5-6x/week  (NEURO)  Current Treatment Recommendations: Strengthening, Balance training, Functional mobility training, Endurance training, Neuromuscular re-education, Equipment evaluation, education, & procurement, Patient/Caregiver education & training, Safety education & training, Self-Care / ADL, Coordination training Restrictions  Restrictions/Precautions  Restrictions/Precautions: Seizure, General Precautions  Required Braces or Orthoses?: No  Position Activity Restriction  Other position/activity restrictions: Activity as tolerated. SBP <160. Subjective   General  Patient assessed for rehabilitation services?: Yes  Response to previous treatment: Patient with no complaints from previous session  Family / Caregiver Present: Yes ( and other family members present at beginning of session then stepped out)  Diagnosis: Per EMR:  Patient is a 67 y.o. female with history of HTN, HLD, and smoking who presents from [facility] ED with [facility]. Patient was at home this morning around 0930 when she developed acute onset severe headache with nausea/vomiting. Upon EMS arrival, patient was walking to the cot when she collapsed and was unresponsive. Intubated and taken to [facility]. CT head showed diffuse SAH. Given 1g Keppra and transported to [facility]. Tanesha naidas. CTA showed basilar tip aneurysm, taken emergently to cerebral angiogram for web device. General Comment  Comments: RN ok'd pt for OT this morning. Pt supine in bed upon arrival. Pt reported nausea and headache but did not rate. Stated Tylenol given for headache earlier this morning. Pt agreeable to session. RN aware. Pt repositioned in bed for comfort w/ all needs met. pt reports HA pain, does not rate. Pt positioned in bed for comfort after mobility      Objective   SpO2: 97 %  O2 Device: Nasal cannula     Safety Devices  Type of Devices: Nurse notified;Gait belt;Call light within reach; Patient at risk for falls; Left in bed;Bed alarm in place  Restraints  Restraints Initially in Place: No  Balance  Sitting: Without support (Pt sat unsupported EOB SBA to complete static/dynamic sitting tasks. Pt used B hands on EOB for support. Pt demo'd good postural control throughout. Pt sat ~15 mins total.)  Standing: With support (Pt stood from EOB CGA w/o AD.  2 person assist required for 2

## 2023-10-15 NOTE — PROGRESS NOTES
Patient's right AC IV found to be infiltrated with Amiodarone running. RN notified [facility]  NP. New orders received for PICC placement 10/16/23 due to no IV team here 10/15/23. RN removed IV. RN asked house supervisor for an additional IV ultrasound placed today due to Amiodarone not being compatible with other medications.

## 2023-10-15 NOTE — PLAN OF CARE
Problem: Safety - Adult  Goal: Free from fall injury  Outcome: Progressing     Problem: Nutrition Deficit:  Goal: Optimize nutritional status  Outcome: Progressing     Problem: Skin/Tissue Integrity  Goal: Absence of new skin breakdown  Description: 1. Monitor for areas of redness and/or skin breakdown  2. Assess vascular access sites hourly  3. Every 4-6 hours minimum:  Change oxygen saturation probe site  4. Every 4-6 hours:  If on nasal continuous positive airway pressure, respiratory therapy assess nares and determine need for appliance change or resting period.   Outcome: Progressing

## 2023-10-16 ENCOUNTER — APPOINTMENT (OUTPATIENT)
Dept: GENERAL RADIOLOGY | Age: 73
DRG: 022 | End: 2023-10-16
Payer: MEDICARE

## 2023-10-16 ENCOUNTER — APPOINTMENT (OUTPATIENT)
Dept: VASCULAR LAB | Age: 73
DRG: 022 | End: 2023-10-16
Payer: MEDICARE

## 2023-10-16 LAB
ANION GAP SERPL CALCULATED.3IONS-SCNC: 10 MMOL/L (ref 9–17)
BASOPHILS # BLD: 0.06 K/UL (ref 0–0.2)
BASOPHILS NFR BLD: 0 % (ref 0–2)
BUN SERPL-MCNC: 7 MG/DL (ref 8–23)
CALCIUM SERPL-MCNC: 8.1 MG/DL (ref 8.6–10.4)
CHLORIDE SERPL-SCNC: 95 MMOL/L (ref 98–107)
CO2 SERPL-SCNC: 31 MMOL/L (ref 20–31)
CREAT SERPL-MCNC: 0.3 MG/DL (ref 0.5–0.9)
ECHO BSA: 1.93 M2
EKG ATRIAL RATE: 100 BPM
EKG ATRIAL RATE: 63 BPM
EKG ATRIAL RATE: 84 BPM
EKG P AXIS: -13 DEGREES
EKG P AXIS: 77 DEGREES
EKG P-R INTERVAL: 166 MS
EKG P-R INTERVAL: 170 MS
EKG Q-T INTERVAL: 412 MS
EKG Q-T INTERVAL: 418 MS
EKG Q-T INTERVAL: 452 MS
EKG QRS DURATION: 88 MS
EKG QRS DURATION: 90 MS
EKG QRS DURATION: 92 MS
EKG QTC CALCULATION (BAZETT): 462 MS
EKG QTC CALCULATION (BAZETT): 469 MS
EKG QTC CALCULATION (BAZETT): 493 MS
EKG R AXIS: 23 DEGREES
EKG R AXIS: 27 DEGREES
EKG R AXIS: 30 DEGREES
EKG T AXIS: 31 DEGREES
EKG T AXIS: 45 DEGREES
EKG T AXIS: 78 DEGREES
EKG VENTRICULAR RATE: 63 BPM
EKG VENTRICULAR RATE: 78 BPM
EKG VENTRICULAR RATE: 84 BPM
EOSINOPHIL # BLD: 0.1 K/UL (ref 0–0.44)
EOSINOPHILS RELATIVE PERCENT: 1 % (ref 1–4)
ERYTHROCYTE [DISTWIDTH] IN BLOOD BY AUTOMATED COUNT: 12.8 % (ref 11.8–14.4)
GFR SERPL CREATININE-BSD FRML MDRD: >60 ML/MIN/1.73M2
GLUCOSE SERPL-MCNC: 130 MG/DL (ref 70–99)
HCT VFR BLD AUTO: 35.3 % (ref 36.3–47.1)
HGB BLD-MCNC: 11.6 G/DL (ref 11.9–15.1)
IMM GRANULOCYTES # BLD AUTO: 0.1 K/UL (ref 0–0.3)
IMM GRANULOCYTES NFR BLD: 1 %
LYMPHOCYTES NFR BLD: 1.98 K/UL (ref 1.1–3.7)
LYMPHOCYTES RELATIVE PERCENT: 13 % (ref 24–43)
MAGNESIUM SERPL-MCNC: 2.1 MG/DL (ref 1.6–2.6)
MCH RBC QN AUTO: 31.3 PG (ref 25.2–33.5)
MCHC RBC AUTO-ENTMCNC: 32.9 G/DL (ref 28.4–34.8)
MCV RBC AUTO: 95.1 FL (ref 82.6–102.9)
MICROORGANISM SPEC CULT: NORMAL
MICROORGANISM SPEC CULT: NORMAL
MONOCYTES NFR BLD: 0.99 K/UL (ref 0.1–1.2)
MONOCYTES NFR BLD: 6 % (ref 3–12)
NEUTROPHILS NFR BLD: 79 % (ref 36–65)
NEUTS SEG NFR BLD: 12.59 K/UL (ref 1.5–8.1)
NRBC BLD-RTO: 0 PER 100 WBC
PLATELET # BLD AUTO: 219 K/UL (ref 138–453)
PMV BLD AUTO: 12.2 FL (ref 8.1–13.5)
POTASSIUM SERPL-SCNC: 3.4 MMOL/L (ref 3.7–5.3)
RBC # BLD AUTO: 3.71 M/UL (ref 3.95–5.11)
SERVICE CMNT-IMP: NORMAL
SERVICE CMNT-IMP: NORMAL
SODIUM SERPL-SCNC: 136 MMOL/L (ref 135–144)
SPECIMEN DESCRIPTION: NORMAL
SPECIMEN DESCRIPTION: NORMAL
VAS BASILAR MEAN VEL MANUAL: 75.1 CM/S
VAS LEFT DIST MCA MEAN VEL MANUAL: 68.1 CM/S
VAS LEFT DISTAL ICA MEAN VEL MANUAL: 33.5 CM/S
VAS LEFT LINDEGAARD RATIO MANUAL: 2.03
VAS LEFT MID ACA MEAN VEL MANUAL: 77.8 CM/S
VAS LEFT MID MCA MEAN VEL MANUAL: 60.4 CM/S
VAS LEFT PCA MEAN VEL MANUAL: 45.8 CM/S
VAS LEFT PROX ACA MEAN VEL MANUAL: 72.8 CM/S
VAS LEFT PROX MCA MEAN VEL MANUAL: 67 CM/S
VAS LEFT SIPHON MEAN VEL MANUAL: 24.3 CM/S
VAS LEFT TERMINAL ICA MEAN VEL MANUAL: 38.1 CM/S
VAS LEFT VERTEBRAL MEAN VEL MANUAL: 36.2 CM/S
VAS RIGHT DIST MCA MEAN VEL MANUAL: 58.9 CM/S
VAS RIGHT DISTAL ICA MEAN VEL MANUAL: 36.6 CM/S
VAS RIGHT LINDEGAARD RATIO MANUAL: 2
VAS RIGHT MID ACA MEAN VEL MANUAL: 60.1 CM/S
VAS RIGHT MID MCA MEAN VEL MANUAL: 60.4 CM/S
VAS RIGHT PCA MEAN VEL MANUAL: 27.3 CM/S
VAS RIGHT PROX ACA MEAN VEL MANUAL: 54.7 CM/S
VAS RIGHT PROX MCA MEAN VEL MANUAL: 73.9 CM/S
VAS RIGHT SIPHON MEAN VEL MANUAL: 15.8 CM/S
VAS RIGHT TERMINAL ICA MEAN VEL MANUAL: 41.2 CM/S
VAS RIGHT VERTEBRAL MEAN VEL MANUAL: 46.6 CM/S
WBC OTHER # BLD: 15.8 K/UL (ref 3.5–11.3)

## 2023-10-16 PROCEDURE — 6370000000 HC RX 637 (ALT 250 FOR IP): Performed by: NURSE PRACTITIONER

## 2023-10-16 PROCEDURE — 71045 X-RAY EXAM CHEST 1 VIEW: CPT

## 2023-10-16 PROCEDURE — 99232 SBSQ HOSP IP/OBS MODERATE 35: CPT | Performed by: PSYCHIATRY & NEUROLOGY

## 2023-10-16 PROCEDURE — 2580000003 HC RX 258: Performed by: NURSE PRACTITIONER

## 2023-10-16 PROCEDURE — 94640 AIRWAY INHALATION TREATMENT: CPT

## 2023-10-16 PROCEDURE — 2700000000 HC OXYGEN THERAPY PER DAY

## 2023-10-16 PROCEDURE — 80048 BASIC METABOLIC PNL TOTAL CA: CPT

## 2023-10-16 PROCEDURE — 99233 SBSQ HOSP IP/OBS HIGH 50: CPT | Performed by: SURGERY

## 2023-10-16 PROCEDURE — APPSS15 APP SPLIT SHARED TIME 0-15 MINUTES: Performed by: NURSE PRACTITIONER

## 2023-10-16 PROCEDURE — 6360000002 HC RX W HCPCS: Performed by: STUDENT IN AN ORGANIZED HEALTH CARE EDUCATION/TRAINING PROGRAM

## 2023-10-16 PROCEDURE — 2000000000 HC ICU R&B

## 2023-10-16 PROCEDURE — 6370000000 HC RX 637 (ALT 250 FOR IP): Performed by: SURGERY

## 2023-10-16 PROCEDURE — 2580000003 HC RX 258: Performed by: STUDENT IN AN ORGANIZED HEALTH CARE EDUCATION/TRAINING PROGRAM

## 2023-10-16 PROCEDURE — 99222 1ST HOSP IP/OBS MODERATE 55: CPT | Performed by: PHYSICAL MEDICINE & REHABILITATION

## 2023-10-16 PROCEDURE — 97110 THERAPEUTIC EXERCISES: CPT

## 2023-10-16 PROCEDURE — 6370000000 HC RX 637 (ALT 250 FOR IP)

## 2023-10-16 PROCEDURE — 94761 N-INVAS EAR/PLS OXIMETRY MLT: CPT

## 2023-10-16 PROCEDURE — 97530 THERAPEUTIC ACTIVITIES: CPT

## 2023-10-16 PROCEDURE — 6360000002 HC RX W HCPCS: Performed by: NURSE PRACTITIONER

## 2023-10-16 PROCEDURE — 93886 INTRACRANIAL COMPLETE STUDY: CPT

## 2023-10-16 PROCEDURE — 36415 COLL VENOUS BLD VENIPUNCTURE: CPT

## 2023-10-16 PROCEDURE — 93886 INTRACRANIAL COMPLETE STUDY: CPT | Performed by: PSYCHIATRY & NEUROLOGY

## 2023-10-16 PROCEDURE — 99291 CRITICAL CARE FIRST HOUR: CPT | Performed by: PSYCHIATRY & NEUROLOGY

## 2023-10-16 PROCEDURE — 83735 ASSAY OF MAGNESIUM: CPT

## 2023-10-16 PROCEDURE — 85025 COMPLETE CBC W/AUTO DIFF WBC: CPT

## 2023-10-16 RX ORDER — LEVETIRACETAM 500 MG/1
500 TABLET ORAL 2 TIMES DAILY
Status: COMPLETED | OUTPATIENT
Start: 2023-10-16 | End: 2023-10-18

## 2023-10-16 RX ORDER — METOPROLOL TARTRATE 5 MG/5ML
2.5 INJECTION INTRAVENOUS EVERY 6 HOURS PRN
Status: DISCONTINUED | OUTPATIENT
Start: 2023-10-16 | End: 2023-10-17

## 2023-10-16 RX ORDER — AMIODARONE HYDROCHLORIDE 200 MG/1
200 TABLET ORAL DAILY
Status: DISCONTINUED | OUTPATIENT
Start: 2023-10-16 | End: 2023-10-19

## 2023-10-16 RX ADMIN — ENOXAPARIN SODIUM 40 MG: 100 INJECTION SUBCUTANEOUS at 07:57

## 2023-10-16 RX ADMIN — SODIUM CHLORIDE, PRESERVATIVE FREE 10 ML: 5 INJECTION INTRAVENOUS at 20:07

## 2023-10-16 RX ADMIN — KETOROLAC TROMETHAMINE 15 MG: 15 INJECTION, SOLUTION INTRAMUSCULAR; INTRAVENOUS at 01:56

## 2023-10-16 RX ADMIN — POTASSIUM BICARBONATE 40 MEQ: 782 TABLET, EFFERVESCENT ORAL at 08:52

## 2023-10-16 RX ADMIN — HYDRALAZINE HYDROCHLORIDE 10 MG: 20 INJECTION, SOLUTION INTRAMUSCULAR; INTRAVENOUS at 23:05

## 2023-10-16 RX ADMIN — ACETAMINOPHEN 650 MG: 325 TABLET ORAL at 10:43

## 2023-10-16 RX ADMIN — ASPIRIN 81 MG 81 MG: 81 TABLET ORAL at 07:57

## 2023-10-16 RX ADMIN — Medication 60 MG: at 12:03

## 2023-10-16 RX ADMIN — IPRATROPIUM BROMIDE AND ALBUTEROL SULFATE 1 DOSE: 2.5; .5 SOLUTION RESPIRATORY (INHALATION) at 07:44

## 2023-10-16 RX ADMIN — AMIODARONE HYDROCHLORIDE 200 MG: 200 TABLET ORAL at 16:48

## 2023-10-16 RX ADMIN — ACETAMINOPHEN 650 MG: 325 TABLET ORAL at 04:48

## 2023-10-16 RX ADMIN — ESCITALOPRAM 10 MG: 10 TABLET, FILM COATED ORAL at 20:08

## 2023-10-16 RX ADMIN — LEVETIRACETAM 500 MG: 500 TABLET, FILM COATED ORAL at 17:41

## 2023-10-16 RX ADMIN — LEVETIRACETAM 500 MG: 100 INJECTION, SOLUTION INTRAVENOUS at 04:36

## 2023-10-16 RX ADMIN — KETOROLAC TROMETHAMINE 15 MG: 15 INJECTION, SOLUTION INTRAMUSCULAR; INTRAVENOUS at 14:07

## 2023-10-16 RX ADMIN — SODIUM CHLORIDE 3000 MG: 900 INJECTION INTRAVENOUS at 15:18

## 2023-10-16 RX ADMIN — IPRATROPIUM BROMIDE AND ALBUTEROL SULFATE 1 DOSE: 2.5; .5 SOLUTION RESPIRATORY (INHALATION) at 19:43

## 2023-10-16 RX ADMIN — IPRATROPIUM BROMIDE AND ALBUTEROL SULFATE 1 DOSE: 2.5; .5 SOLUTION RESPIRATORY (INHALATION) at 15:24

## 2023-10-16 RX ADMIN — KETOROLAC TROMETHAMINE 15 MG: 15 INJECTION, SOLUTION INTRAMUSCULAR; INTRAVENOUS at 20:07

## 2023-10-16 RX ADMIN — ONDANSETRON 4 MG: 2 INJECTION INTRAMUSCULAR; INTRAVENOUS at 08:02

## 2023-10-16 RX ADMIN — ATORVASTATIN CALCIUM 10 MG: 10 TABLET, FILM COATED ORAL at 20:08

## 2023-10-16 RX ADMIN — METOPROLOL TARTRATE 25 MG: 25 TABLET, FILM COATED ORAL at 20:08

## 2023-10-16 RX ADMIN — GABAPENTIN 300 MG: 300 CAPSULE ORAL at 20:08

## 2023-10-16 RX ADMIN — Medication 60 MG: at 04:48

## 2023-10-16 RX ADMIN — SODIUM CHLORIDE 3000 MG: 900 INJECTION INTRAVENOUS at 01:59

## 2023-10-16 RX ADMIN — Medication 60 MG: at 20:08

## 2023-10-16 RX ADMIN — PANTOPRAZOLE SODIUM 40 MG: 40 TABLET, DELAYED RELEASE ORAL at 06:07

## 2023-10-16 RX ADMIN — SODIUM CHLORIDE 3000 MG: 900 INJECTION INTRAVENOUS at 20:14

## 2023-10-16 RX ADMIN — Medication 60 MG: at 07:57

## 2023-10-16 RX ADMIN — SODIUM CHLORIDE 3000 MG: 900 INJECTION INTRAVENOUS at 08:53

## 2023-10-16 RX ADMIN — AMIODARONE HYDROCHLORIDE 1 MG/MIN: 50 INJECTION, SOLUTION INTRAVENOUS at 05:00

## 2023-10-16 RX ADMIN — Medication 60 MG: at 16:48

## 2023-10-16 RX ADMIN — KETOROLAC TROMETHAMINE 15 MG: 15 INJECTION, SOLUTION INTRAMUSCULAR; INTRAVENOUS at 07:57

## 2023-10-16 RX ADMIN — ACETAMINOPHEN 650 MG: 325 TABLET ORAL at 16:48

## 2023-10-16 RX ADMIN — GABAPENTIN 300 MG: 300 CAPSULE ORAL at 07:57

## 2023-10-16 ASSESSMENT — PAIN SCALES - GENERAL: PAINLEVEL_OUTOF10: 8

## 2023-10-16 ASSESSMENT — PAIN DESCRIPTION - LOCATION: LOCATION: HEAD

## 2023-10-16 NOTE — PROGRESS NOTES
worsening obstructive hydrocephalus  - EVD set at 10mmHg, ICP 8-16, 267cc out/24h  - Repeat CT head 10/15 stable  - Neurosurgery following; appreciate recs      CARDIOVASCULAR:  Essential HTN  - Goal SBP<160  - PRN Hydralazine/Labetalol  - Hold home Norvasc 5mg QD and Losartan-HCTZ 100-25mg QD  New onset paroxysmal atrial fibrillation ? provoked in setting of afib  Asymptomatic bradyarrhythmia (HR 40-60's); resolved   Mild troponin elevation likely secondary to [facility]  - EKG 10/14 afib with PVC, nonspecific T wave abnormality  - Continue Amiodarone infusion per cardiology recs   - Consider beta blocker down prior to discharge   - Troponin peaked at 54, decreased to 27   - Not a candidate for anticoagulation at this time due to [facility] secondary to ruptured aneurysm, EVD in place   - Neuro Endovascular recommends Holter monitor at discharge to monitor for recurrence of afib prior to initiation of AC  - Echo EF 55-60%  - Continue telemetry  Mixed hyperlipidemia  - LDL 49, Triglycerides 176; decrease back to home Lipitor 10mg QD      PULMONARY:  Intubated 10/11 in field for low GCS, airway protection  Tobacco abuse (smokes 1 PPD for many years)  Likely undiagnosed COPD  Possible aspiration pneumonia   - Extubated 10/12  - Required oxygen via simple mask overnight, borderline hypoxic on 3L nasal cannula  - Repeat CXR 10/15 showing progressed bilateral basilar and perihilar opacities  - follow up repeat CXR this am   - Possible HANNAH component; recommend outpatient sleep study, CPAP overnight   - Wean O2 as tolerated    - Schedule Duonebs for 48h due to audible wheezing   - Continue on Unasyn through 10/19  - Incentive spirometry      RENAL/FLUID/ELECTROLYTE:  Normal renal functioning  - BUN 7/ Creatinine 0.3  - Strict I&Os; 2516/2170  - Keep ward in place for strict I&O (hourly output)  - Maintain euvolemia  - Continue [email or web address]   - Goal Mag> 2.0, K >4.0  - Give 40meq Kcl for hypokalemia (K 3.4)  - Replace electrolytes PRN  - Daily BMP    GI/NUTRITION:  - Passed nursing bedside swallow 10/13  NUTRITION:   Regular diet   - Poor intake today due to headache, nausea  - Start high calorie nutritional supplements   - Continue Zofran PRN  - Last BM 10/13  - Bowel regimen: Glycolax PRN  - GI prophylaxis: Protonix (sub for home Omeprazole)     ID:  Aspiration pneumonia   - Afebrile, Tmax 37.6  - leukocytosis, WBC 15.8 (18.7--15.4--15.3--19.2- 15.8)   - 10/11 UA, Flu& COVID negative, blood cultures negative   - 10/12 CSF culture negative   - Repeat UA 10/15 negative  - Repeat CXR 10/15 progressed bilateral basilar and perihilar opacities  - Procal 0.04  - Continue on Unasyn for aspiration pneumonia through 10/19  - Daily CBC     HEME:   Mild anemia, likely iatrogenic   - H&H 11.6/35.3, stable   - Platelets 558  - Daily CBC     ENDOCRINE:  Type 2 diabetes   - Hemoglobin A1c 6.6  - Continue to monitor blood glucose, goal <180  - Not on any diabetic medications PTA according to PCP note 05/2023  - Glucose well controlled; consider insulin sliding scale if concern for hyperglycemia  - TSH 1.50     OTHER:  - Continue home Lexapro for depression  - PT/OT/ST   - PM&R consulted     PROPHYLAXIS:  Stress ulcer: PPI as above      DVT PROPHYLAXIS:  - SCD sleeves - Thigh High   - Lovenox 40mg QD    DISPOSITION:  [x] To remain ICU for EVD management, close neurological monitoring. We will continue to follow along. For any changes in exam or patient status please contact Neuro Critical Care.       VIKTORIA Osei - [facility]  Neuro Critical Care  Pager [phone]  10/16/2023     7:13 AM

## 2023-10-16 NOTE — PLAN OF CARE
Problem: Discharge Planning  Goal: Discharge to home or other facility with appropriate resources  Outcome: Progressing     Problem: Respiratory - Adult  Goal: Achieves optimal ventilation and oxygenation  Outcome: Progressing     Problem: Pain  Goal: Verbalizes/displays adequate comfort level or baseline comfort level  Outcome: Progressing     Problem: Safety - Adult  Goal: Free from fall injury  Outcome: Progressing     Problem: Nutrition Deficit:  Goal: Optimize nutritional status  10/16/2023 1624 by Mary Ann Freed RN  Outcome: Progressing  10/16/2023 1222 by Anabela Bills RD  Outcome: Not Progressing  Flowsheets (Taken 10/16/2023 1159)  Nutrient intake appropriate for improving, restoring, or maintaining nutritional needs:   Monitor oral intake, labs, and treatment plans   Assess nutritional status and recommend course of action     Problem: Skin/Tissue Integrity  Goal: Absence of new skin breakdown  Description: 1. Monitor for areas of redness and/or skin breakdown  2. Assess vascular access sites hourly  3. Every 4-6 hours minimum:  Change oxygen saturation probe site  4. Every 4-6 hours:  If on nasal continuous positive airway pressure, respiratory therapy assess nares and determine need for appliance change or resting period.   Outcome: Progressing     Problem: ABCDS Injury Assessment  Goal: Absence of physical injury  Outcome: Progressing     Problem: Nutrition Deficit:  Goal: Optimize nutritional status  10/16/2023 1624 by Mary Ann Freed RN  Outcome: Progressing  10/16/2023 1222 by Anabela Bills RD  Outcome: Not Progressing  Flowsheets (Taken 10/16/2023 1159)  Nutrient intake appropriate for improving, restoring, or maintaining nutritional needs:   Monitor oral intake, labs, and treatment plans   Assess nutritional status and recommend course of action

## 2023-10-16 NOTE — PROGRESS NOTES
Physical Therapy  Facility/Department: [address] NEURO ICU  Physical Therapy Daily Treatment Note    Name: Jayson Jon  : 1950  MRN: [id number]  Date of Service: 10/16/2023    Discharge Recommendations:  Patient would benefit from continued therapy after discharge Further therapy recommended at discharge. The patient should be able to tolerate at least 3 hours of therapy per day over 5 days or 15 hours over 7 days. This patient may benefit from a Physical Medicine and Rehab consult. PT Equipment Recommendations  Equipment Needed: No      Patient Diagnosis(es): The primary encounter diagnosis was Subarachnoid bleed ([address]). 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 Past Medical History:  has no past medical history on file. Past Surgical History:  has no past surgical history on file. Assessment   Assessment: Pt completed bed mobility with Mayra x1 progressing to SBA; sit<>stand CGA, HHA. Ambulated 5ft CGA (HHA) at side of bed, limited d/t severe HA, nausea, and EVD. Pt would require 24 hour assistance with mobility at this time, would benefit from continued intense skilled PT to progress toward prior independent level of function  Therapy Prognosis: Good  Activity Tolerance  Activity Tolerance: Patient limited by fatigue; Other (comment) (Pt limited by HA pain and nausea)  Activity Tolerance Comments: HA and nausea     Plan   Physcial Therapy Plan  General Plan: 6-7 times per week  Current Treatment Recommendations: Strengthening, ROM, Balance training, Functional mobility training, Endurance training, Therapeutic activities, Gait training, Stair training, Safety education & training  Safety Devices  Type of Devices: Nurse notified, Gait belt, Call light within reach, Patient at risk for falls, Left in bed, Bed alarm in place (Family in room)  Restraints  Restraints Initially in Place: No

## 2023-10-16 NOTE — CARE COORDINATION
CM spoke with patient at bedside to discuss transitional planning. IPR options discussed and hospital list provided. Patient states that she would prefer to return home with her  at discharge but she is willing to review list and consider IPR. Await patient's choice.

## 2023-10-16 NOTE — PROGRESS NOTES
Comprehensive Nutrition Assessment    Type and Reason for Visit:  Reassess    Nutrition Recommendations/Plan:   D/C ONS d/t refusal.  Encourage meal intake. Monitor and follow. Malnutrition Assessment:  Malnutrition Status: At risk for malnutrition (Comment) (poor appetite) (10/16/23 1217)    Context:  Acute Illness     Findings of the 6 clinical characteristics of malnutrition:  Energy Intake:  Mild decrease in energy intake (Comment)  Weight Loss:  Unable to assess     Body Fat Loss:  Unable to assess     Muscle Mass Loss:  Unable to assess    Fluid Accumulation:  No significant fluid accumulation     Strength:  Not Performed    Nutrition Assessment:    Reassess. Pt no longer on TF. Currently on regular diet and ONS TID. Refuses ONS; \"hates\" them and does not want another type of supplement. Appetite and intakes are poor. Pt reports eating grapes for breakfast. Family is very involved, supplying some snack foods and \"whatever she asks for. \" Not currently meeting nutrition needs. IV fluids running at 75ml/hr. Nutrition Related Findings:    Meds/labs reviewed. K 3.4. Wound Type: Surgical Incision       Current Nutrition Intake & Therapies:    Average Meal Intake: 1-25%  Average Supplements Intake: Refusing to take  ADULT DIET; Regular  ADULT ORAL NUTRITION SUPPLEMENT; Breakfast, Lunch, Dinner; Standard High Calorie/High Protein Oral Supplement    Anthropometric Measures:  Height: 5' 5\" (165.1 cm)  Ideal Body Weight (IBW): 125 lbs (57 kg)       Current Body Weight: 180 lb (81.6 kg),   IBW. Weight Source: Stated  Current BMI (kg/m2): 30        Weight Adjustment For: No Adjustment                 BMI Categories: Overweight (BMI 25.0-29. 9)    Estimated Daily Nutrient Needs:  Energy Requirements Based On: Kcal/kg  Weight Used for Energy Requirements: Current  Energy (kcal/day): 7174-7572  Weight Used for Protein Requirements: Current  Protein (g/day):   Method Used for Fluid Requirements: 1 ml/kcal  Fluid (ml/day): 2000ml/day    Nutrition Diagnosis:   Inadequate protein-energy intake related to increase demand for energy/nutrients as evidenced by intake 0-25%    Nutrition Interventions:   Food and/or Nutrient Delivery: Discontinue Oral Nutrition Supplement  Nutrition Education/Counseling: No recommendation at this time  Coordination of Nutrition Care: Continue to monitor while inpatient  Plan of Care discussed with: Patient and Family    Goals:  Previous Goal Met: No Progress toward Goal(s)  Goals: Meet at least 75% of estimated needs, prior to discharge       Nutrition Monitoring and Evaluation:   Behavioral-Environmental Outcomes: None Identified  Food/Nutrient Intake Outcomes: Food and Nutrient Intake  Physical Signs/Symptoms Outcomes: Biochemical Data, GI Status, Fluid Status or Edema, Skin, Weight    Discharge Planning:     Too soon to determine     Harper Espinoza RD  Contact: [phone]

## 2023-10-16 NOTE — PLAN OF CARE
Problem: Safety - Adult  Goal: Free from fall injury  10/15/2023 2153 by Lyndsey Akers RN  Outcome: Progressing     Problem: Skin/Tissue Integrity  Goal: Absence of new skin breakdown  Description: 1. Monitor for areas of redness and/or skin breakdown  2. Assess vascular access sites hourly  3. Every 4-6 hours minimum:  Change oxygen saturation probe site  4. Every 4-6 hours:  If on nasal continuous positive airway pressure, respiratory therapy assess nares and determine need for appliance change or resting period.   10/15/2023 2153 by Lyndsey Akers RN  Outcome: Progressing     Problem: ABCDS Injury Assessment  Goal: Absence of physical injury  Outcome: Progressing

## 2023-10-16 NOTE — PROGRESS NOTES
Occupational [address]  Occupational Therapy Not Seen Note    DATE: 10/16/2023    NAME: Randall Rider  MRN: [id number]   : 1950      Patient not seen this date for Occupational Therapy due to:        SHREYA attempted treatment this afternoon with patient and family declining session due to fatigue from morning PT, headache and just receiving medication despite encouragement provided for session.     Electronically signed by NEGRO Leong on 10/16/2023 at 2:13 PM

## 2023-10-17 ENCOUNTER — APPOINTMENT (OUTPATIENT)
Dept: VASCULAR LAB | Age: 73
DRG: 022 | End: 2023-10-17
Payer: MEDICARE

## 2023-10-17 LAB
ANION GAP SERPL CALCULATED.3IONS-SCNC: 10 MMOL/L (ref 9–17)
ANION GAP SERPL CALCULATED.3IONS-SCNC: 13 MMOL/L (ref 9–17)
APPEARANCE CSF: ABNORMAL
BASOPHILS # BLD: 0.06 K/UL (ref 0–0.2)
BASOPHILS NFR BLD: 0 % (ref 0–2)
BUN SERPL-MCNC: 11 MG/DL (ref 8–23)
BUN SERPL-MCNC: 6 MG/DL (ref 8–23)
CALCIUM SERPL-MCNC: 7.9 MG/DL (ref 8.6–10.4)
CALCIUM SERPL-MCNC: 8.2 MG/DL (ref 8.6–10.4)
CHLORIDE SERPL-SCNC: 88 MMOL/L (ref 98–107)
CHLORIDE SERPL-SCNC: 91 MMOL/L (ref 98–107)
CO2 SERPL-SCNC: 30 MMOL/L (ref 20–31)
CO2 SERPL-SCNC: 31 MMOL/L (ref 20–31)
CREAT SERPL-MCNC: 0.3 MG/DL (ref 0.5–0.9)
CREAT SERPL-MCNC: 0.5 MG/DL (ref 0.5–0.9)
ECHO BSA: 1.93 M2
ECHO BSA: 1.93 M2
EOSINOPHIL # BLD: 0.03 K/UL (ref 0–0.44)
EOSINOPHILS RELATIVE PERCENT: 0 % (ref 1–4)
ERYTHROCYTE [DISTWIDTH] IN BLOOD BY AUTOMATED COUNT: 12.9 % (ref 11.8–14.4)
GFR SERPL CREATININE-BSD FRML MDRD: >60 ML/MIN/1.73M2
GFR SERPL CREATININE-BSD FRML MDRD: >60 ML/MIN/1.73M2
GLUCOSE CSF-MCNC: 106 MG/DL (ref 40–70)
GLUCOSE SERPL-MCNC: 157 MG/DL (ref 70–99)
GLUCOSE SERPL-MCNC: 247 MG/DL (ref 70–99)
HCT VFR BLD AUTO: 36.8 % (ref 36.3–47.1)
HGB BLD-MCNC: 12.2 G/DL (ref 11.9–15.1)
IMM GRANULOCYTES # BLD AUTO: 0.13 K/UL (ref 0–0.3)
IMM GRANULOCYTES NFR BLD: 1 %
LYMPHOCYTES NFR BLD: 1.24 K/UL (ref 1.1–3.7)
LYMPHOCYTES RELATIVE PERCENT: 7 % (ref 24–43)
MAGNESIUM SERPL-MCNC: 2 MG/DL (ref 1.6–2.6)
MCH RBC QN AUTO: 31.1 PG (ref 25.2–33.5)
MCHC RBC AUTO-ENTMCNC: 33.2 G/DL (ref 28.4–34.8)
MCV RBC AUTO: 93.9 FL (ref 82.6–102.9)
MONOCYTES NFR BLD: 0.96 K/UL (ref 0.1–1.2)
MONOCYTES NFR BLD: 5 % (ref 3–12)
NEUTROPHILS NFR BLD: 87 % (ref 36–65)
NEUTS SEG NFR BLD: 15.23 K/UL (ref 1.5–8.1)
NRBC BLD-RTO: 0 PER 100 WBC
NUC CELL # FLD MANUAL: 448 CELLS/UL
PLATELET # BLD AUTO: 250 K/UL (ref 138–453)
PMV BLD AUTO: 11.9 FL (ref 8.1–13.5)
POTASSIUM SERPL-SCNC: 2.9 MMOL/L (ref 3.7–5.3)
POTASSIUM SERPL-SCNC: 3.2 MMOL/L (ref 3.7–5.3)
PROT CSF-MCNC: 78.8 MG/DL (ref 15–45)
RBC # BLD AUTO: 3.92 M/UL (ref 3.95–5.11)
RBC # FLD MANUAL: ABNORMAL CELLS/UL
SODIUM SERPL-SCNC: 131 MMOL/L (ref 135–144)
SODIUM SERPL-SCNC: 132 MMOL/L (ref 135–144)
SPECIMEN VOL CSF: 4 ML
TUBE # CSF: 3
VAS BASILAR MEAN VEL MANUAL: 34.6 CM/S
VAS BASILAR MEAN VEL MANUAL: 57.8 CM/S
VAS LEFT ACA MEAN VEL MANUAL: 50 CM/S
VAS LEFT DIST MCA MEAN VEL MANUAL: 71.2 CM/S
VAS LEFT DIST MCA MEAN VEL MANUAL: 74.7 CM/S
VAS LEFT DISTAL ICA MEAN VEL MANUAL: 28.1 CM/S
VAS LEFT DISTAL ICA MEAN VEL MANUAL: 33.5 CM/S
VAS LEFT LINDEGAARD RATIO MANUAL: 2.53
VAS LEFT LINDEGAARD RATIO MANUAL: 2.75
VAS LEFT MCA MEAN VEL MANUAL: 85.9 CM/S
VAS LEFT MID ACA MEAN VEL MANUAL: 38.5 CM/S
VAS LEFT MID ACA MEAN VEL MANUAL: 63.5 CM/S
VAS LEFT MID MCA MEAN VEL MANUAL: 67.4 CM/S
VAS LEFT MID MCA MEAN VEL MANUAL: 83.5 CM/S
VAS LEFT PCA MEAN VEL MANUAL: 18.5 CM/S
VAS LEFT PCA MEAN VEL MANUAL: 38.9 CM/S
VAS LEFT PROX ACA MEAN VEL MANUAL: 45 CM/S
VAS LEFT PROX ACA MEAN VEL MANUAL: 90.9 CM/S
VAS LEFT PROX MCA MEAN VEL MANUAL: 82.4 CM/S
VAS LEFT PROX MCA MEAN VEL MANUAL: 92 CM/S
VAS LEFT SIPHON MEAN VEL MANUAL: 22.7 CM/S
VAS LEFT SIPHON MEAN VEL MANUAL: 26.9 CM/S
VAS LEFT TERMINAL ICA MEAN VEL MANUAL: 37.3 CM/S
VAS LEFT TERMINAL ICA MEAN VEL MANUAL: 52 CM/S
VAS LEFT VERTEBRAL MEAN VEL MANUAL: 27.3 CM/S
VAS LEFT VERTEBRAL MEAN VEL MANUAL: 33.5 CM/S
VAS RIGHT ACA MEAN VEL MANUAL: 54.7 CM/S
VAS RIGHT DIST MCA MEAN VEL MANUAL: 76.2 CM/S
VAS RIGHT DIST MCA MEAN VEL MANUAL: 77 CM/S
VAS RIGHT DISTAL ICA MEAN VEL MANUAL: 30 CM/S
VAS RIGHT DISTAL ICA MEAN VEL MANUAL: 32.3 CM/S
VAS RIGHT LINDEGAARD RATIO MANUAL: 2.5
VAS RIGHT LINDEGAARD RATIO MANUAL: 2.5
VAS RIGHT MCA MEAN VEL MANUAL: 77.8 CM/S
VAS RIGHT MID ACA MEAN VEL MANUAL: 47.4 CM/S
VAS RIGHT MID ACA MEAN VEL MANUAL: 63.5 CM/S
VAS RIGHT MID MCA MEAN VEL MANUAL: 73.5 CM/S
VAS RIGHT MID MCA MEAN VEL MANUAL: 79.7 CM/S
VAS RIGHT PCA MEAN VEL MANUAL: 33.9 CM/S
VAS RIGHT PCA MEAN VEL MANUAL: 38.1 CM/S
VAS RIGHT PROX ACA MEAN VEL MANUAL: 55.4 CM/S
VAS RIGHT PROX ACA MEAN VEL MANUAL: 74.7 CM/S
VAS RIGHT PROX MCA MEAN VEL MANUAL: 69.3 CM/S
VAS RIGHT PROX MCA MEAN VEL MANUAL: 77.8 CM/S
VAS RIGHT SIPHON MEAN VEL MANUAL: 21.6 CM/S
VAS RIGHT SIPHON MEAN VEL MANUAL: 25 CM/S
VAS RIGHT TERMINAL ICA MEAN VEL MANUAL: 44.3 CM/S
VAS RIGHT TERMINAL ICA MEAN VEL MANUAL: 65.1 CM/S
VAS RIGHT VERTEBRAL MEAN VEL MANUAL: 36.6 CM/S
VAS RIGHT VERTEBRAL MEAN VEL MANUAL: 60.8 CM/S
WBC OTHER # BLD: 17.7 K/UL (ref 3.5–11.3)
XANTHOCHROMIA CSF QL: PRESENT

## 2023-10-17 PROCEDURE — 80048 BASIC METABOLIC PNL TOTAL CA: CPT

## 2023-10-17 PROCEDURE — 2580000003 HC RX 258: Performed by: NURSE PRACTITIONER

## 2023-10-17 PROCEDURE — 99291 CRITICAL CARE FIRST HOUR: CPT | Performed by: PSYCHIATRY & NEUROLOGY

## 2023-10-17 PROCEDURE — APPSS15 APP SPLIT SHARED TIME 0-15 MINUTES: Performed by: NURSE PRACTITIONER

## 2023-10-17 PROCEDURE — 93886 INTRACRANIAL COMPLETE STUDY: CPT | Performed by: PSYCHIATRY & NEUROLOGY

## 2023-10-17 PROCEDURE — 6360000002 HC RX W HCPCS

## 2023-10-17 PROCEDURE — 94761 N-INVAS EAR/PLS OXIMETRY MLT: CPT

## 2023-10-17 PROCEDURE — 2000000000 HC ICU R&B

## 2023-10-17 PROCEDURE — 99232 SBSQ HOSP IP/OBS MODERATE 35: CPT | Performed by: PSYCHIATRY & NEUROLOGY

## 2023-10-17 PROCEDURE — 83735 ASSAY OF MAGNESIUM: CPT

## 2023-10-17 PROCEDURE — 2700000000 HC OXYGEN THERAPY PER DAY

## 2023-10-17 PROCEDURE — 36415 COLL VENOUS BLD VENIPUNCTURE: CPT

## 2023-10-17 PROCEDURE — 6370000000 HC RX 637 (ALT 250 FOR IP): Performed by: SURGERY

## 2023-10-17 PROCEDURE — 85025 COMPLETE CBC W/AUTO DIFF WBC: CPT

## 2023-10-17 PROCEDURE — 89051 BODY FLUID CELL COUNT: CPT

## 2023-10-17 PROCEDURE — 6370000000 HC RX 637 (ALT 250 FOR IP)

## 2023-10-17 PROCEDURE — 6360000002 HC RX W HCPCS: Performed by: NURSE PRACTITIONER

## 2023-10-17 PROCEDURE — 84157 ASSAY OF PROTEIN OTHER: CPT

## 2023-10-17 PROCEDURE — 6370000000 HC RX 637 (ALT 250 FOR IP): Performed by: NURSE PRACTITIONER

## 2023-10-17 PROCEDURE — 82945 GLUCOSE OTHER FLUID: CPT

## 2023-10-17 PROCEDURE — 93886 INTRACRANIAL COMPLETE STUDY: CPT

## 2023-10-17 PROCEDURE — 87070 CULTURE OTHR SPECIMN AEROBIC: CPT

## 2023-10-17 PROCEDURE — 99233 SBSQ HOSP IP/OBS HIGH 50: CPT | Performed by: SURGERY

## 2023-10-17 PROCEDURE — 94640 AIRWAY INHALATION TREATMENT: CPT

## 2023-10-17 PROCEDURE — 2500000003 HC RX 250 WO HCPCS: Performed by: SURGERY

## 2023-10-17 PROCEDURE — 93005 ELECTROCARDIOGRAM TRACING: CPT | Performed by: STUDENT IN AN ORGANIZED HEALTH CARE EDUCATION/TRAINING PROGRAM

## 2023-10-17 PROCEDURE — 87205 SMEAR GRAM STAIN: CPT

## 2023-10-17 RX ORDER — POTASSIUM CHLORIDE 20 MEQ/1
40 TABLET, EXTENDED RELEASE ORAL ONCE
Status: COMPLETED | OUTPATIENT
Start: 2023-10-17 | End: 2023-10-17

## 2023-10-17 RX ORDER — POTASSIUM CHLORIDE 7.45 MG/ML
10 INJECTION INTRAVENOUS
Status: COMPLETED | OUTPATIENT
Start: 2023-10-17 | End: 2023-10-17

## 2023-10-17 RX ORDER — MORPHINE SULFATE 2 MG/ML
2 INJECTION, SOLUTION INTRAMUSCULAR; INTRAVENOUS ONCE
Status: COMPLETED | OUTPATIENT
Start: 2023-10-17 | End: 2023-10-17

## 2023-10-17 RX ORDER — LANOLIN ALCOHOL/MO/W.PET/CERES
3 CREAM (GRAM) TOPICAL NIGHTLY PRN
Status: DISCONTINUED | OUTPATIENT
Start: 2023-10-17 | End: 2023-10-19

## 2023-10-17 RX ORDER — MAGNESIUM SULFATE IN WATER 40 MG/ML
2000 INJECTION, SOLUTION INTRAVENOUS ONCE
Status: COMPLETED | OUTPATIENT
Start: 2023-10-17 | End: 2023-10-17

## 2023-10-17 RX ORDER — DILTIAZEM HYDROCHLORIDE 120 MG/1
120 CAPSULE, COATED, EXTENDED RELEASE ORAL DAILY
Status: DISCONTINUED | OUTPATIENT
Start: 2023-10-17 | End: 2023-10-26 | Stop reason: HOSPADM

## 2023-10-17 RX ORDER — METOPROLOL TARTRATE 5 MG/5ML
5 INJECTION INTRAVENOUS EVERY 6 HOURS PRN
Status: DISCONTINUED | OUTPATIENT
Start: 2023-10-17 | End: 2023-10-26 | Stop reason: HOSPADM

## 2023-10-17 RX ORDER — PROCHLORPERAZINE EDISYLATE 5 MG/ML
10 INJECTION INTRAMUSCULAR; INTRAVENOUS ONCE
Status: COMPLETED | OUTPATIENT
Start: 2023-10-17 | End: 2023-10-17

## 2023-10-17 RX ORDER — DEXAMETHASONE SODIUM PHOSPHATE 4 MG/ML
8 INJECTION, SOLUTION INTRA-ARTICULAR; INTRALESIONAL; INTRAMUSCULAR; INTRAVENOUS; SOFT TISSUE EVERY 8 HOURS
Status: COMPLETED | OUTPATIENT
Start: 2023-10-17 | End: 2023-10-20

## 2023-10-17 RX ADMIN — SODIUM CHLORIDE, PRESERVATIVE FREE 10 ML: 5 INJECTION INTRAVENOUS at 20:09

## 2023-10-17 RX ADMIN — Medication 10 MEQ: at 08:06

## 2023-10-17 RX ADMIN — Medication 10 MEQ: at 09:11

## 2023-10-17 RX ADMIN — SODIUM CHLORIDE 3000 MG: 900 INJECTION INTRAVENOUS at 03:18

## 2023-10-17 RX ADMIN — SODIUM CHLORIDE, PRESERVATIVE FREE 10 ML: 5 INJECTION INTRAVENOUS at 20:49

## 2023-10-17 RX ADMIN — Medication 60 MG: at 20:08

## 2023-10-17 RX ADMIN — ESCITALOPRAM 10 MG: 10 TABLET, FILM COATED ORAL at 20:07

## 2023-10-17 RX ADMIN — KETOROLAC TROMETHAMINE 15 MG: 15 INJECTION, SOLUTION INTRAMUSCULAR; INTRAVENOUS at 08:46

## 2023-10-17 RX ADMIN — POTASSIUM CHLORIDE 40 MEQ: 1500 TABLET, EXTENDED RELEASE ORAL at 18:14

## 2023-10-17 RX ADMIN — ONDANSETRON 4 MG: 2 INJECTION INTRAMUSCULAR; INTRAVENOUS at 00:10

## 2023-10-17 RX ADMIN — ACETAMINOPHEN 650 MG: 325 TABLET ORAL at 20:08

## 2023-10-17 RX ADMIN — Medication 60 MG: at 16:31

## 2023-10-17 RX ADMIN — ACETAMINOPHEN 650 MG: 325 TABLET ORAL at 08:13

## 2023-10-17 RX ADMIN — METOPROLOL TARTRATE 37.5 MG: 25 TABLET, FILM COATED ORAL at 20:08

## 2023-10-17 RX ADMIN — DEXAMETHASONE SODIUM PHOSPHATE 8 MG: 4 INJECTION INTRA-ARTICULAR; INTRALESIONAL; INTRAMUSCULAR; INTRAVENOUS; SOFT TISSUE at 11:10

## 2023-10-17 RX ADMIN — LABETALOL HYDROCHLORIDE 10 MG: 5 INJECTION, SOLUTION INTRAVENOUS at 03:07

## 2023-10-17 RX ADMIN — METOPROLOL TARTRATE 5 MG: 5 INJECTION INTRAVENOUS at 09:07

## 2023-10-17 RX ADMIN — GABAPENTIN 300 MG: 300 CAPSULE ORAL at 08:08

## 2023-10-17 RX ADMIN — GABAPENTIN 300 MG: 300 CAPSULE ORAL at 20:08

## 2023-10-17 RX ADMIN — SODIUM CHLORIDE 3000 MG: 900 INJECTION INTRAVENOUS at 20:30

## 2023-10-17 RX ADMIN — IPRATROPIUM BROMIDE AND ALBUTEROL SULFATE 1 DOSE: 2.5; .5 SOLUTION RESPIRATORY (INHALATION) at 19:39

## 2023-10-17 RX ADMIN — IPRATROPIUM BROMIDE AND ALBUTEROL SULFATE 1 DOSE: 2.5; .5 SOLUTION RESPIRATORY (INHALATION) at 07:53

## 2023-10-17 RX ADMIN — Medication 60 MG: at 00:01

## 2023-10-17 RX ADMIN — Medication 60 MG: at 23:48

## 2023-10-17 RX ADMIN — Medication 60 MG: at 12:16

## 2023-10-17 RX ADMIN — MAGNESIUM SULFATE HEPTAHYDRATE 2000 MG: 40 INJECTION, SOLUTION INTRAVENOUS at 12:17

## 2023-10-17 RX ADMIN — Medication 10 MEQ: at 13:32

## 2023-10-17 RX ADMIN — MORPHINE SULFATE 2 MG: 2 INJECTION, SOLUTION INTRAMUSCULAR; INTRAVENOUS at 21:35

## 2023-10-17 RX ADMIN — METOPROLOL TARTRATE 2.5 MG: 5 INJECTION INTRAVENOUS at 01:28

## 2023-10-17 RX ADMIN — HYDRALAZINE HYDROCHLORIDE 10 MG: 20 INJECTION, SOLUTION INTRAMUSCULAR; INTRAVENOUS at 05:04

## 2023-10-17 RX ADMIN — ATORVASTATIN CALCIUM 10 MG: 10 TABLET, FILM COATED ORAL at 20:08

## 2023-10-17 RX ADMIN — DILTIAZEM HYDROCHLORIDE 120 MG: 120 CAPSULE, COATED, EXTENDED RELEASE ORAL at 10:03

## 2023-10-17 RX ADMIN — IPRATROPIUM BROMIDE AND ALBUTEROL SULFATE 1 DOSE: 2.5; .5 SOLUTION RESPIRATORY (INHALATION) at 17:01

## 2023-10-17 RX ADMIN — SODIUM CHLORIDE: 9 INJECTION, SOLUTION INTRAVENOUS at 10:13

## 2023-10-17 RX ADMIN — ONDANSETRON 4 MG: 2 INJECTION INTRAMUSCULAR; INTRAVENOUS at 06:42

## 2023-10-17 RX ADMIN — ASPIRIN 81 MG 81 MG: 81 TABLET ORAL at 08:08

## 2023-10-17 RX ADMIN — SODIUM CHLORIDE 3000 MG: 900 INJECTION INTRAVENOUS at 14:48

## 2023-10-17 RX ADMIN — Medication 3 MG: at 23:48

## 2023-10-17 RX ADMIN — ENOXAPARIN SODIUM 40 MG: 100 INJECTION SUBCUTANEOUS at 08:09

## 2023-10-17 RX ADMIN — DEXAMETHASONE SODIUM PHOSPHATE 8 MG: 4 INJECTION INTRA-ARTICULAR; INTRALESIONAL; INTRAMUSCULAR; INTRAVENOUS; SOFT TISSUE at 18:15

## 2023-10-17 RX ADMIN — ACETAMINOPHEN 650 MG: 325 TABLET ORAL at 14:51

## 2023-10-17 RX ADMIN — PROCHLORPERAZINE EDISYLATE 10 MG: 5 INJECTION INTRAMUSCULAR; INTRAVENOUS at 10:01

## 2023-10-17 RX ADMIN — LEVETIRACETAM 500 MG: 500 TABLET, FILM COATED ORAL at 20:08

## 2023-10-17 RX ADMIN — LEVETIRACETAM 500 MG: 500 TABLET, FILM COATED ORAL at 08:13

## 2023-10-17 RX ADMIN — Medication 60 MG: at 03:17

## 2023-10-17 RX ADMIN — SODIUM CHLORIDE 3000 MG: 900 INJECTION INTRAVENOUS at 08:18

## 2023-10-17 RX ADMIN — Medication 10 MEQ: at 12:16

## 2023-10-17 RX ADMIN — ONDANSETRON 4 MG: 2 INJECTION INTRAMUSCULAR; INTRAVENOUS at 20:47

## 2023-10-17 RX ADMIN — SODIUM CHLORIDE, PRESERVATIVE FREE 10 ML: 5 INJECTION INTRAVENOUS at 08:37

## 2023-10-17 RX ADMIN — Medication 60 MG: at 08:08

## 2023-10-17 RX ADMIN — ONDANSETRON 4 MG: 2 INJECTION INTRAMUSCULAR; INTRAVENOUS at 14:43

## 2023-10-17 RX ADMIN — METOPROLOL TARTRATE 25 MG: 25 TABLET, FILM COATED ORAL at 08:08

## 2023-10-17 RX ADMIN — Medication 3 MG: at 03:17

## 2023-10-17 RX ADMIN — MORPHINE SULFATE 2 MG: 2 INJECTION, SOLUTION INTRAMUSCULAR; INTRAVENOUS at 00:12

## 2023-10-17 RX ADMIN — AMIODARONE HYDROCHLORIDE 200 MG: 200 TABLET ORAL at 08:09

## 2023-10-17 RX ADMIN — LABETALOL HYDROCHLORIDE 10 MG: 5 INJECTION, SOLUTION INTRAVENOUS at 08:14

## 2023-10-17 RX ADMIN — PANTOPRAZOLE SODIUM 40 MG: 40 TABLET, DELAYED RELEASE ORAL at 08:09

## 2023-10-17 RX ADMIN — METOPROLOL TARTRATE 5 MG: 5 INJECTION INTRAVENOUS at 17:38

## 2023-10-17 RX ADMIN — METOPROLOL TARTRATE 2.5 MG: 5 INJECTION INTRAVENOUS at 07:26

## 2023-10-17 ASSESSMENT — PAIN DESCRIPTION - LOCATION
LOCATION: HEAD
LOCATION: HEAD

## 2023-10-17 ASSESSMENT — PAIN SCALES - GENERAL
PAINLEVEL_OUTOF10: 5
PAINLEVEL_OUTOF10: 9
PAINLEVEL_OUTOF10: 7
PAINLEVEL_OUTOF10: 8

## 2023-10-17 ASSESSMENT — PAIN DESCRIPTION - DESCRIPTORS: DESCRIPTORS: ACHING

## 2023-10-17 ASSESSMENT — PAIN - FUNCTIONAL ASSESSMENT: PAIN_FUNCTIONAL_ASSESSMENT: ACTIVITIES ARE NOT PREVENTED

## 2023-10-17 ASSESSMENT — PAIN DESCRIPTION - ORIENTATION: ORIENTATION: MID

## 2023-10-17 NOTE — CASE COMMUNICATION
Transition Planning/Quality flow rounds. EVD 15 mmhg, pt still having headache and nausea. 1120 CM in to obtain ARU choices from family. Family does not have ARU choice yet. All ARU questions answered.

## 2023-10-17 NOTE — PROGRESS NOTES
Physical Therapy        Physical Therapy Cancel Note      DATE: 10/17/2023    NAME: Aleyda Bhatti  MRN: [id number]   : 1950      Patient not seen this date for Physical Therapy due to:    Surgery/Procedure: Transcrainal doppler. Will attempt to check back later, time allowing.       Electronically signed by Jessica Rosario PTA on 10/17/2023 at 11:48 AM

## 2023-10-17 NOTE — PROGRESS NOTES
Physical Therapy        Physical Therapy Cancel Note      DATE: 10/17/2023    NAME: Niru Mohr  MRN: [id number]   : 1950      Patient not seen this date for Physical Therapy due to:    Patient Declined: Per RN, pt ambulated bed<>toilet, stood to wash face, was \"exhausted\" by time writer arrived for check back. Will attempt to see tomorrow.       Electronically signed by Lloyd Mak PTA on 10/17/2023 at 4:34 PM  \

## 2023-10-17 NOTE — PLAN OF CARE
Problem: Respiratory - Adult  Goal: Achieves optimal ventilation and oxygenation  10/17/2023 1214 by Thiago Yepez, RCJD  Outcome: Progressing  Flowsheets (Taken 10/11/2023 2015 by Frederick Arriaza RN)  Achieves optimal ventilation and oxygenation:   Assess for changes in respiratory status   Assess for changes in mentation and behavior   Position to facilitate oxygenation and minimize respiratory effort

## 2023-10-17 NOTE — PLAN OF CARE
Problem: Safety - Adult  Goal: Free from fall injury  10/17/2023 1807 by Sadie Horner RN  Outcome: Progressing     Problem: Nutrition Deficit:  Goal: Optimize nutritional status  10/17/2023 1807 by Sadie Horner RN  Outcome: Progressing     Problem: Skin/Tissue Integrity  Goal: Absence of new skin breakdown  Description: 1. Monitor for areas of redness and/or skin breakdown  2. Assess vascular access sites hourly  3. Every 4-6 hours minimum:  Change oxygen saturation probe site  4. Every 4-6 hours:  If on nasal continuous positive airway pressure, respiratory therapy assess nares and determine need for appliance change or resting period.   Outcome: Progressing     Problem: ABCDS Injury Assessment  Goal: Absence of physical injury  Outcome: Progressing

## 2023-10-17 NOTE — PROGRESS NOTES
[facility]  Progress Note                   Date:   10/17/2023  Patient name: Harvey Eisenmenger  Date of admission:  10/11/2023  2:52 PM  MRN:   [id number]  YOB: 1950  PCP: No primary care provider on file. Reason for Admission: Subarachnoid hemorrhage ([address]) [I60.9]  Subarachnoid bleed ([address]) [I60.9]    Subjective:       Clinical Changes /Abnormalities:Patient seen and examined. Denies chest pain or shortness of breath. Tele/vitals/labs reviewed .  Still Complain of headache HR afib between  this am       Review of Systems    Medications:   Scheduled Meds:   potassium bicarb-citric acid  40 mEq Oral Once    dilTIAZem  120 mg Oral Daily    metoprolol tartrate  37.5 mg Oral BID    levETIRAcetam  500 mg Oral BID    amiodarone  200 mg Oral Daily    gabapentin  300 mg Oral BID    ipratropium 0.5 mg-albuterol 2.5 mg  1 Dose Inhalation Q4H WA RT    lidocaine 1 % injection  5 mL IntraDERmal Once    sodium chloride flush  5-40 mL IntraVENous 2 times per day    sodium chloride flush  5-40 mL IntraVENous 2 times per day    escitalopram  10 mg Oral Nightly    niMODipine  60 mg Oral 6 times per day    aspirin  81 mg Oral Daily    pantoprazole  40 mg Oral QAM AC    atorvastatin  10 mg Oral Nightly    sodium chloride flush  5-40 mL IntraVENous 2 times per day    ampicillin-sulbactam  3,000 mg IntraVENous Q6H    enoxaparin  40 mg SubCUTAneous Daily    sodium chloride flush  5-40 mL IntraVENous 2 times per day     Continuous Infusions:   sodium chloride      sodium chloride 75 mL/hr at 10/16/23 0503    sodium chloride       CBC:   Recent Labs     10/15/23  0348 10/16/23  0331 10/17/23  0516   WBC 19.2* 15.8* 17.7*   HGB 11.7* 11.6* 12.2    219 250       BMP:    Recent Labs     10/15/23  0348 10/16/23  0331 10/17/23  0516    136 132*   K 3.1* 3.4* 2.9*    95* 91*   CO2 30 31 31   BUN 8 7* 6*   CREATININE 0.3* 0.3* 0.3*   GLUCOSE 123* 130* 157*       Hepatic:No results for Normocephalic,   Neck:no JVD, trachea midline, no adenopathy  Lungs: Clear to auscultation  Heart: Regular rate and rhythm, s1/s2 auscultated, no murmurs  Abdomen: soft, non-tender, bowel sounds active  Extremities: no edema  Neurologic: not done        Assessment / Acute Cardiac Problems:   New onset atrial flutter, [id number]-[id number] score 4  Subarachnoid hemorrhage with intraventricular extension  Obstructive hydrocephalus  Hypertension  Type II diabetes mellitus  Hyperlipidemia  Tobacco abuse    Patient Active Problem List:     Subarachnoid bleed (HCC)     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  Agressive  replacement of  potassium     Electronically signed by VIKTORIA Lala NP on 10/17/2023 at 9:19 AM  [facility].  [phone]

## 2023-10-17 NOTE — PROGRESS NOTES
[place] [id number] [place] [address]  PROGRESS NOTE    Shift date: 10/17/2023   Shift day: Tuesday   Shift # 1    Room # 0127/0127-01   Name: Aleyda Bhatti                Yarsani: None   Place of Yarsani:     Referral: Routine Visit    Admit Date & Time: 10/11/2023  2:52 PM    Assessment:  Aleyda Bhatti is a 75 y.o. female. Upon entering the room writer observes pt resting on her side, pt's sister present. Sister engaged in conversation and then patient rolled over to talk. Both appeared to be coping well. Intervention:  Writer introduced self and title as  Writer offered space for pt and sister  to express feelings, needs, and concerns and provided a ministry presence. Outcome:  Pt and sister were receptive to visit and expressed appreciation for support. Plan:  Chaplains will remain available to offer spiritual and emotional support as needed. Electronically signed by Miguel Weems on 10/17/2023 at [address] [place] [phone]      10/17/23 1011   Encounter Summary   Encounter Overview/Reason  Follow-up   Service Provided For: Patient; Family   Referral/Consult From: Maryann   Last Encounter  10/17/23   Complexity of Encounter Moderate   Begin Time 0940   End Time  0950   Total Time Calculated 10 min   Crisis   Type Follow up   Assessment/Intervention/Outcome   Assessment Coping   Intervention Active listening;Explored/Affirmed feelings, thoughts, concerns   Outcome Expressed feelings, needs, and concerns;Expressed Gratitude;Receptive

## 2023-10-17 NOTE — PROGRESS NOTES
QD    DISPOSITION:  [x] To remain ICU for EVD management, close neurological monitoring. We will continue to follow along. For any changes in exam or patient status please contact Neuro Critical Care.       VIKTORIA Garcia - CNP  Neuro Critical Care  Pager [phone]  10/17/2023     6:56 AM

## 2023-10-17 NOTE — PLAN OF CARE
Problem: Discharge Planning  Goal: Discharge to home or other facility with appropriate resources  10/17/2023 0410 by Baljinder Keller RN  Outcome: Progressing     Problem: Respiratory - Adult  Goal: Achieves optimal ventilation and oxygenation  10/17/2023 0410 by Baljinder Keller RN  Outcome: Progressing     Problem: Pain  Goal: Verbalizes/displays adequate comfort level or baseline comfort level  10/17/2023 0410 by Baljinder Keller RN  Outcome: Progressing     Problem: Safety - Adult  Goal: Free from fall injury  10/17/2023 0410 by Baljinder Keller RN  Outcome: Progressing     Problem: Nutrition Deficit:  Goal: Optimize nutritional status  10/17/2023 0410 by Baljinder Keller RN  Outcome: Progressing

## 2023-10-18 ENCOUNTER — APPOINTMENT (OUTPATIENT)
Dept: VASCULAR LAB | Age: 73
DRG: 022 | End: 2023-10-18
Payer: MEDICARE

## 2023-10-18 LAB
ANION GAP SERPL CALCULATED.3IONS-SCNC: 8 MMOL/L (ref 9–16)
BASOPHILS # BLD: <0.03 K/UL (ref 0–0.2)
BASOPHILS NFR BLD: 0 % (ref 0–2)
BUN SERPL-MCNC: 11 MG/DL (ref 8–23)
CALCIUM SERPL-MCNC: 8.2 MG/DL (ref 8.6–10.4)
CHLORIDE SERPL-SCNC: 93 MMOL/L (ref 98–107)
CO2 SERPL-SCNC: 32 MMOL/L (ref 20–31)
CREAT SERPL-MCNC: 0.5 MG/DL (ref 0.5–0.9)
EKG ATRIAL RATE: 144 BPM
EKG P-R INTERVAL: 132 MS
EKG Q-T INTERVAL: 262 MS
EKG QRS DURATION: 124 MS
EKG QTC CALCULATION (BAZETT): 405 MS
EKG R AXIS: -13 DEGREES
EKG T AXIS: 180 DEGREES
EKG VENTRICULAR RATE: 144 BPM
EOSINOPHIL # BLD: <0.03 K/UL (ref 0–0.44)
EOSINOPHILS RELATIVE PERCENT: 0 % (ref 1–4)
ERYTHROCYTE [DISTWIDTH] IN BLOOD BY AUTOMATED COUNT: 13.1 % (ref 11.8–14.4)
GFR SERPL CREATININE-BSD FRML MDRD: >60 ML/MIN/1.73M2
GLUCOSE SERPL-MCNC: 161 MG/DL (ref 74–99)
HCT VFR BLD AUTO: 36.6 % (ref 36.3–47.1)
HGB BLD-MCNC: 11.9 G/DL (ref 11.9–15.1)
IMM GRANULOCYTES # BLD AUTO: 0.17 K/UL (ref 0–0.3)
IMM GRANULOCYTES NFR BLD: 1 %
LYMPHOCYTES NFR BLD: 1.22 K/UL (ref 1.1–3.7)
LYMPHOCYTES NFR CSF: 10 %
LYMPHOCYTES RELATIVE PERCENT: 7 % (ref 24–43)
MAGNESIUM SERPL-MCNC: 2.5 MG/DL (ref 1.6–2.4)
MCH RBC QN AUTO: 30.5 PG (ref 25.2–33.5)
MCHC RBC AUTO-ENTMCNC: 32.5 G/DL (ref 28.4–34.8)
MCV RBC AUTO: 93.8 FL (ref 82.6–102.9)
MONOCYTES NFR BLD: 0.73 K/UL (ref 0.1–1.2)
MONOCYTES NFR BLD: 4 % (ref 3–12)
NEUTROPHILS NFR BLD: 88 % (ref 36–65)
NEUTROPHILS NFR CSF: 70 %
NEUTS SEG NFR BLD: 16.15 K/UL (ref 1.5–8.1)
NRBC BLD-RTO: 0 PER 100 WBC
OSMOLALITY UR: 519 MOSM/KG (ref 80–1300)
PLATELET # BLD AUTO: 273 K/UL (ref 138–453)
PMV BLD AUTO: 11.7 FL (ref 8.1–13.5)
POTASSIUM SERPL-SCNC: 3.8 MMOL/L (ref 3.7–5.3)
RBC # BLD AUTO: 3.9 M/UL (ref 3.95–5.11)
SODIUM SERPL-SCNC: 133 MMOL/L (ref 136–145)
SODIUM UR-SCNC: 119 MMOL/L
UNIDENT CELLS NFR FLD: NORMAL %
WBC OTHER # BLD: 18.3 K/UL (ref 3.5–11.3)

## 2023-10-18 PROCEDURE — 83935 ASSAY OF URINE OSMOLALITY: CPT

## 2023-10-18 PROCEDURE — 6360000002 HC RX W HCPCS

## 2023-10-18 PROCEDURE — 2500000003 HC RX 250 WO HCPCS: Performed by: SURGERY

## 2023-10-18 PROCEDURE — 84300 ASSAY OF URINE SODIUM: CPT

## 2023-10-18 PROCEDURE — 85025 COMPLETE CBC W/AUTO DIFF WBC: CPT

## 2023-10-18 PROCEDURE — 6370000000 HC RX 637 (ALT 250 FOR IP): Performed by: NURSE PRACTITIONER

## 2023-10-18 PROCEDURE — 6370000000 HC RX 637 (ALT 250 FOR IP)

## 2023-10-18 PROCEDURE — 2580000003 HC RX 258: Performed by: PSYCHIATRY & NEUROLOGY

## 2023-10-18 PROCEDURE — 99233 SBSQ HOSP IP/OBS HIGH 50: CPT | Performed by: NURSE PRACTITIONER

## 2023-10-18 PROCEDURE — 99232 SBSQ HOSP IP/OBS MODERATE 35: CPT | Performed by: PSYCHIATRY & NEUROLOGY

## 2023-10-18 PROCEDURE — 97116 GAIT TRAINING THERAPY: CPT

## 2023-10-18 PROCEDURE — 93886 INTRACRANIAL COMPLETE STUDY: CPT

## 2023-10-18 PROCEDURE — 36415 COLL VENOUS BLD VENIPUNCTURE: CPT

## 2023-10-18 PROCEDURE — 93010 ELECTROCARDIOGRAM REPORT: CPT | Performed by: INTERNAL MEDICINE

## 2023-10-18 PROCEDURE — 80048 BASIC METABOLIC PNL TOTAL CA: CPT

## 2023-10-18 PROCEDURE — 6360000002 HC RX W HCPCS: Performed by: NURSE PRACTITIONER

## 2023-10-18 PROCEDURE — 99291 CRITICAL CARE FIRST HOUR: CPT | Performed by: PSYCHIATRY & NEUROLOGY

## 2023-10-18 PROCEDURE — 97110 THERAPEUTIC EXERCISES: CPT

## 2023-10-18 PROCEDURE — 93886 INTRACRANIAL COMPLETE STUDY: CPT | Performed by: PSYCHIATRY & NEUROLOGY

## 2023-10-18 PROCEDURE — APPSS30 APP SPLIT SHARED TIME 16-30 MINUTES: Performed by: PHYSICIAN ASSISTANT

## 2023-10-18 PROCEDURE — 94640 AIRWAY INHALATION TREATMENT: CPT

## 2023-10-18 PROCEDURE — 97535 SELF CARE MNGMENT TRAINING: CPT

## 2023-10-18 PROCEDURE — 2580000003 HC RX 258: Performed by: NURSE PRACTITIONER

## 2023-10-18 PROCEDURE — 94761 N-INVAS EAR/PLS OXIMETRY MLT: CPT

## 2023-10-18 PROCEDURE — 97530 THERAPEUTIC ACTIVITIES: CPT

## 2023-10-18 PROCEDURE — 6370000000 HC RX 637 (ALT 250 FOR IP): Performed by: SURGERY

## 2023-10-18 PROCEDURE — 2000000000 HC ICU R&B

## 2023-10-18 PROCEDURE — 83735 ASSAY OF MAGNESIUM: CPT

## 2023-10-18 RX ORDER — POTASSIUM CHLORIDE 20 MEQ/1
20 TABLET, EXTENDED RELEASE ORAL ONCE
Status: COMPLETED | OUTPATIENT
Start: 2023-10-18 | End: 2023-10-18

## 2023-10-18 RX ORDER — MORPHINE SULFATE 2 MG/ML
2 INJECTION, SOLUTION INTRAMUSCULAR; INTRAVENOUS ONCE
Status: COMPLETED | OUTPATIENT
Start: 2023-10-18 | End: 2023-10-18

## 2023-10-18 RX ORDER — DIPHENHYDRAMINE HCL 25 MG
25 TABLET ORAL ONCE
Status: COMPLETED | OUTPATIENT
Start: 2023-10-18 | End: 2023-10-18

## 2023-10-18 RX ORDER — METOPROLOL TARTRATE 50 MG/1
50 TABLET, FILM COATED ORAL 2 TIMES DAILY
Status: DISCONTINUED | OUTPATIENT
Start: 2023-10-18 | End: 2023-10-26 | Stop reason: HOSPADM

## 2023-10-18 RX ORDER — IPRATROPIUM BROMIDE AND ALBUTEROL SULFATE 2.5; .5 MG/3ML; MG/3ML
1 SOLUTION RESPIRATORY (INHALATION) 2 TIMES DAILY
Status: DISCONTINUED | OUTPATIENT
Start: 2023-10-18 | End: 2023-10-19

## 2023-10-18 RX ADMIN — SODIUM CHLORIDE, PRESERVATIVE FREE 10 ML: 5 INJECTION INTRAVENOUS at 20:17

## 2023-10-18 RX ADMIN — Medication 3 MG: at 20:16

## 2023-10-18 RX ADMIN — Medication 60 MG: at 20:16

## 2023-10-18 RX ADMIN — PANTOPRAZOLE SODIUM 40 MG: 40 TABLET, DELAYED RELEASE ORAL at 05:15

## 2023-10-18 RX ADMIN — ACETAMINOPHEN 650 MG: 325 TABLET ORAL at 23:57

## 2023-10-18 RX ADMIN — AMIODARONE HYDROCHLORIDE 200 MG: 200 TABLET ORAL at 08:26

## 2023-10-18 RX ADMIN — ASPIRIN 81 MG 81 MG: 81 TABLET ORAL at 08:25

## 2023-10-18 RX ADMIN — IPRATROPIUM BROMIDE AND ALBUTEROL SULFATE 1 DOSE: 2.5; .5 SOLUTION RESPIRATORY (INHALATION) at 18:11

## 2023-10-18 RX ADMIN — IPRATROPIUM BROMIDE AND ALBUTEROL SULFATE 1 DOSE: 2.5; .5 SOLUTION RESPIRATORY (INHALATION) at 12:05

## 2023-10-18 RX ADMIN — Medication 60 MG: at 17:02

## 2023-10-18 RX ADMIN — SODIUM CHLORIDE, PRESERVATIVE FREE 10 ML: 5 INJECTION INTRAVENOUS at 20:20

## 2023-10-18 RX ADMIN — DEXAMETHASONE SODIUM PHOSPHATE 8 MG: 4 INJECTION INTRA-ARTICULAR; INTRALESIONAL; INTRAMUSCULAR; INTRAVENOUS; SOFT TISSUE at 11:14

## 2023-10-18 RX ADMIN — METOPROLOL TARTRATE 50 MG: 50 TABLET, FILM COATED ORAL at 20:16

## 2023-10-18 RX ADMIN — METOPROLOL TARTRATE 5 MG: 5 INJECTION INTRAVENOUS at 01:53

## 2023-10-18 RX ADMIN — ACETAMINOPHEN 650 MG: 325 TABLET ORAL at 12:01

## 2023-10-18 RX ADMIN — DILTIAZEM HYDROCHLORIDE 120 MG: 120 CAPSULE, COATED, EXTENDED RELEASE ORAL at 08:25

## 2023-10-18 RX ADMIN — SODIUM CHLORIDE, PRESERVATIVE FREE 10 ML: 5 INJECTION INTRAVENOUS at 08:42

## 2023-10-18 RX ADMIN — ENOXAPARIN SODIUM 40 MG: 100 INJECTION SUBCUTANEOUS at 08:25

## 2023-10-18 RX ADMIN — METOPROLOL TARTRATE 5 MG: 5 INJECTION INTRAVENOUS at 23:57

## 2023-10-18 RX ADMIN — GABAPENTIN 300 MG: 300 CAPSULE ORAL at 20:17

## 2023-10-18 RX ADMIN — MORPHINE SULFATE 2 MG: 2 INJECTION, SOLUTION INTRAMUSCULAR; INTRAVENOUS at 05:13

## 2023-10-18 RX ADMIN — SODIUM CHLORIDE 3000 MG: 900 INJECTION INTRAVENOUS at 20:26

## 2023-10-18 RX ADMIN — Medication 60 MG: at 23:57

## 2023-10-18 RX ADMIN — LEVETIRACETAM 500 MG: 500 TABLET, FILM COATED ORAL at 08:26

## 2023-10-18 RX ADMIN — Medication 60 MG: at 03:29

## 2023-10-18 RX ADMIN — POTASSIUM CHLORIDE 20 MEQ: 1500 TABLET, EXTENDED RELEASE ORAL at 08:26

## 2023-10-18 RX ADMIN — SODIUM CHLORIDE 3000 MG: 900 INJECTION INTRAVENOUS at 14:51

## 2023-10-18 RX ADMIN — METOPROLOL TARTRATE 37.5 MG: 25 TABLET, FILM COATED ORAL at 08:26

## 2023-10-18 RX ADMIN — Medication 60 MG: at 08:25

## 2023-10-18 RX ADMIN — SODIUM CHLORIDE 3000 MG: 900 INJECTION INTRAVENOUS at 02:12

## 2023-10-18 RX ADMIN — DEXAMETHASONE SODIUM PHOSPHATE 8 MG: 4 INJECTION INTRA-ARTICULAR; INTRALESIONAL; INTRAMUSCULAR; INTRAVENOUS; SOFT TISSUE at 03:28

## 2023-10-18 RX ADMIN — Medication 60 MG: at 11:29

## 2023-10-18 RX ADMIN — SODIUM CHLORIDE, PRESERVATIVE FREE 10 ML: 5 INJECTION INTRAVENOUS at 20:19

## 2023-10-18 RX ADMIN — LEVETIRACETAM 500 MG: 500 TABLET, FILM COATED ORAL at 20:17

## 2023-10-18 RX ADMIN — IPRATROPIUM BROMIDE AND ALBUTEROL SULFATE 1 DOSE: 2.5; .5 SOLUTION RESPIRATORY (INHALATION) at 07:51

## 2023-10-18 RX ADMIN — ACETAMINOPHEN 650 MG: 325 TABLET ORAL at 03:28

## 2023-10-18 RX ADMIN — DIPHENHYDRAMINE HYDROCHLORIDE 25 MG: 25 TABLET ORAL at 22:19

## 2023-10-18 RX ADMIN — ESCITALOPRAM 10 MG: 10 TABLET, FILM COATED ORAL at 20:16

## 2023-10-18 RX ADMIN — GABAPENTIN 300 MG: 300 CAPSULE ORAL at 08:26

## 2023-10-18 RX ADMIN — ATORVASTATIN CALCIUM 10 MG: 10 TABLET, FILM COATED ORAL at 20:16

## 2023-10-18 RX ADMIN — DEXAMETHASONE SODIUM PHOSPHATE 8 MG: 4 INJECTION INTRA-ARTICULAR; INTRALESIONAL; INTRAMUSCULAR; INTRAVENOUS; SOFT TISSUE at 20:16

## 2023-10-18 RX ADMIN — METOPROLOL TARTRATE 5 MG: 5 INJECTION INTRAVENOUS at 17:10

## 2023-10-18 RX ADMIN — SODIUM CHLORIDE 3000 MG: 900 INJECTION INTRAVENOUS at 08:11

## 2023-10-18 RX ADMIN — ACETAMINOPHEN 650 MG: 325 TABLET ORAL at 17:15

## 2023-10-18 ASSESSMENT — PAIN DESCRIPTION - DESCRIPTORS
DESCRIPTORS: ACHING
DESCRIPTORS: ACHING;DISCOMFORT
DESCRIPTORS: ACHING
DESCRIPTORS: THROBBING

## 2023-10-18 ASSESSMENT — PAIN SCALES - GENERAL
PAINLEVEL_OUTOF10: 6
PAINLEVEL_OUTOF10: 10
PAINLEVEL_OUTOF10: 8
PAINLEVEL_OUTOF10: 6
PAINLEVEL_OUTOF10: 6

## 2023-10-18 ASSESSMENT — PAIN DESCRIPTION - ORIENTATION
ORIENTATION: ANTERIOR
ORIENTATION: MID

## 2023-10-18 ASSESSMENT — PAIN DESCRIPTION - LOCATION
LOCATION: HEAD

## 2023-10-18 ASSESSMENT — PAIN - FUNCTIONAL ASSESSMENT: PAIN_FUNCTIONAL_ASSESSMENT: ACTIVITIES ARE NOT PREVENTED

## 2023-10-18 NOTE — PROGRESS NOTES
Physical Therapy  Facility/Department: [address] NEURO ICU  Daily Treatment Note    Name: Cisco Mejia  : 1950  MRN: [id number]  Date of Service: 10/18/2023    Discharge Recommendations:  Patient would benefit from continued therapy after discharge Further therapy recommended at discharge. The patient should be able to tolerate at least 3 hours of therapy per day over 5 days or 15 hours over 7 days. This patient may benefit from a Physical Medicine and Rehab consult. PT Equipment Recommendations  Equipment Needed: No (continue to assess)      Patient Diagnosis(es): The primary encounter diagnosis was Subarachnoid bleed ([address]). 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 Past Medical History:  has a past medical history of DM II (diabetes mellitus, type II), controlled ([address]) and HTN (hypertension). Past Surgical History:  has a past surgical history that includes Thyroid surgery; Breast reduction surgery (Bilateral); Hysterectomy; hernia repair; and Bladder surgery. Assessment   Body Structures, Functions, Activity Limitations Requiring Skilled Therapeutic Intervention: Decreased functional mobility ; Decreased safe awareness;Decreased fine motor control;Decreased coordination; Increased pain;Decreased posture;Decreased balance;Decreased strength  Assessment: Pt ambulated 20 ft total (5 ft x 2, 10 ft x 1) holding onto IV pole with CGA/Mayra. Pt required sitting rest periods d/t fatigue, decreased endurance, dizziness and increasing HR.  Pt would require 24 hour assistance with mobility at this time, would benefit from continued intense skilled PT to progress toward prior independent level of function  Therapy Prognosis: Good  Activity Tolerance  Activity Tolerance: Patient limited by fatigue;Patient limited by endurance;Treatment limited secondary to medical complications  Activity Tolerance Comments: intact  Safety Judgement: Decreased awareness of need for assistance  Problem Solving: Assistance required to generate solutions;Assistance required to implement solutions;Assistance required to correct errors made;Assistance required to identify errors made  Initiation: Requires cues for some  Sequencing: Requires cues for some  Cognition Comment: Pt unable to keep eyes open during session when sitting d/t fatigue, but kept them open during ambulation. Pt stating, \"I am just tired. \"     Objective   Bed mobility  Supine to Sit: Stand by assistance  Sit to Supine: Stand by assistance  Scooting: Stand by assistance  Bed Mobility Comments: HOB elevated ~35 degrees. Pt maintained sitting at EOB ~8 minutes with SBA with eyes closed most of the time. Monitoring vitals throughout with HR increasing from 90s to 140s intermittently; RN notified. Transfers  Sit to Stand: Contact guard assistance  Stand to Sit: Contact guard assistance  Comment: Assessed without AD x2 from EOB and x2 from recliner. Verbal cues to keep eyes open during mobility with good understanding. Pt having some dizziness with static standing with CGA to maintain and HR increasing from 90s [id number] intermittently; RN aware. Ambulation  Surface: Level tile  Device:  (holding onto IV pole)  Other Apparatus: O2 (4L NC)  Assistance: Contact guard assistance;Minimal assistance  Quality of Gait: Unsteady with 1 LOB to the R side requiring Mayra to recover  Gait Deviations: Slow Callie;Decreased step length;Decreased step height; Increased GRABIEL; Deviated path  Distance: 5 ft, seated rest period, 10 ft, seated rest period, 5 ft  Comments: Pt ambulated from EOB->recliner->recliner->EOB with seated rest periods to recover from dizziness and fatigue. Pt had difficulty keeping eyes open this afternoon.      Balance  Posture: Good  Sitting - Static: Fair;+  Sitting - Dynamic: Fair;+  Standing - Static: Fair  Standing - Dynamic: Fair  Comments: Assessed standing

## 2023-10-18 NOTE — PLAN OF CARE
Problem: Discharge Planning  Goal: Discharge to home or other facility with appropriate resources  Outcome: Progressing     Problem: Respiratory - Adult  Goal: Achieves optimal ventilation and oxygenation  Outcome: Progressing     Problem: Pain  Goal: Verbalizes/displays adequate comfort level or baseline comfort level  Outcome: Progressing     Problem: Safety - Adult  Goal: Free from fall injury  Outcome: Progressing     Problem: Nutrition Deficit:  Goal: Optimize nutritional status  Outcome: Progressing     Problem: Skin/Tissue Integrity  Goal: Absence of new skin breakdown  Description: 1. Monitor for areas of redness and/or skin breakdown  2. Assess vascular access sites hourly  3. Every 4-6 hours minimum:  Change oxygen saturation probe site  4. Every 4-6 hours:  If on nasal continuous positive airway pressure, respiratory therapy assess nares and determine need for appliance change or resting period.   Outcome: Progressing

## 2023-10-18 NOTE — PROGRESS NOTES
[facility]  Progress Note                   Date:   10/18/2023  Patient name: Casey Reddy  Date of admission:  10/11/2023  2:52 PM  MRN:   [id number]  YOB: 1950  PCP: No primary care provider on file. Reason for Admission: Subarachnoid hemorrhage ([address]) [I60.9]  Subarachnoid bleed ([address]) [I60.9]    Subjective:       Clinical Changes /Abnormalities: No acute CV issues/concerns overnight. Tele/vitals/labs reviewed . Remains Aflutter on tele with modest rate control, has tachycardia at times but does improve spontaneously.  Other consult notes reviewed       Review of Systems    Medications:   Scheduled Meds:   dilTIAZem  120 mg Oral Daily    metoprolol tartrate  37.5 mg Oral BID    dexamethasone  8 mg IntraVENous Q8H    levETIRAcetam  500 mg Oral BID    amiodarone  200 mg Oral Daily    gabapentin  300 mg Oral BID    ipratropium 0.5 mg-albuterol 2.5 mg  1 Dose Inhalation Q4H WA RT    lidocaine 1 % injection  5 mL IntraDERmal Once    sodium chloride flush  5-40 mL IntraVENous 2 times per day    sodium chloride flush  5-40 mL IntraVENous 2 times per day    escitalopram  10 mg Oral Nightly    niMODipine  60 mg Oral 6 times per day    aspirin  81 mg Oral Daily    pantoprazole  40 mg Oral QAM AC    atorvastatin  10 mg Oral Nightly    sodium chloride flush  5-40 mL IntraVENous 2 times per day    ampicillin-sulbactam  3,000 mg IntraVENous Q6H    enoxaparin  40 mg SubCUTAneous Daily    sodium chloride flush  5-40 mL IntraVENous 2 times per day     Continuous Infusions:   sodium chloride      sodium chloride 75 mL/hr at 10/18/23 0657    sodium chloride       CBC:   Recent Labs     10/16/23  0331 10/17/23  0516 10/18/23  0336   WBC 15.8* 17.7* 18.3*   HGB 11.6* 12.2 11.9    250 273       BMP:    Recent Labs     10/17/23  0516 10/17/23  1544 10/18/23  0336   * 131* 133*   K 2.9* 3.2* 3.8   CL 91* 88* 93*   CO2 31 30 32*   BUN 6* 11 11   CREATININE 0.3* 0.5 0.5   GLUCOSE 157* and cooperative with exam  HEENT: Head: Normocephalic,   Neck:no JVD, trachea midline, no adenopathy  Lungs: Clear to auscultation  Heart: Irregular rate and rhythm, s1/s2 auscultated, no murmurs, Afllutter   Abdomen: soft, non-tender, bowel sounds active  Extremities: no edema  Neurologic: not done        Assessment / Acute Cardiac Problems:   New onset atrial flutter, [id number] score 4  Subarachnoid hemorrhage with intraventricular extension  Obstructive hydrocephalus  Hypertension  Type II diabetes mellitus  Hyperlipidemia  Tobacco abuse    Patient Active Problem List:     Subarachnoid bleed (HCC)     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)     Cerebral edema (HCC)     Troy coma scale total score 3-8 (HCC)     Obstructive hydrocephalus (HCC)     Atrial fibrillation (HCC)     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 Plan of Treatment:   Remains Aflutter with intermittent RVR on tele. Continue PO Amio, Cardizem, & BB. Given SA bleed will continue to defer [facility] to neurology. Currently on DVT proph Lovenox & ASA. Recommend NOAC if/when able. Continue statin   Keep K>4, Mg>2 .    Echo stable as above    Electronically signed by VIKTORIA Jones CNP on 10/18/2023 at 1:29 PM  [facility].  [phone]

## 2023-10-18 NOTE — PROGRESS NOTES
Patient's toilet backed up, causing overflow. Maintenance called  Patient and belongings transferred over to room 130.  at bedside.

## 2023-10-18 NOTE — PROGRESS NOTES
Endovascular Neurosurgery Progress Note    SUBJECTIVE:       No acute event overnight. Review of Systems:  CONSTITUTIONAL:  negative for fevers, chills, fatigue and malaise    EYES:  negative for double vision, blurred vision and photophobia     HEENT:  negative for tinnitus, epistaxis and sore throat    RESPIRATORY:  negative for cough, shortness of breath, wheezing    CARDIOVASCULAR:  negative for chest pain, palpitations, syncope, edema    GASTROINTESTINAL:  negative for nausea, vomiting    GENITOURINARY:  negative for incontinence    MUSCULOSKELETAL:  negative for neck or back pain    NEUROLOGICAL:  Negative for weakness and tingling  negative for headaches and dizziness    PSYCHIATRIC:  negative for anxiety      Review of systems otherwise negative. OBJECTIVE:     Vitals:    10/18/23 0513   BP:    Pulse:    Resp:    Temp:    SpO2: 98%      General:  Gen: normal habitus, NAD  HEENT: NCAT, mucosa moist  Cvs: RRR, S1 S2 normal  Resp: symmetric unlabored breathing  Abd: s/nd/nt  Ext: no edema; R radial puncture site has some mild bruising, no tenderness, and appropriate capillary refill  Skin: no lesions seen, warm and dry    Neuro:  Gen: awake and alert, oriented x3. Lang/speech: no aphasia or dysarthria. Follows commands. CN: PERRL, EOMI, VFF, V1-3 intact, face symmetric, hearing intact, shoulder shrug symmetric, tongue midline, uvula midline with symmetric palate raise  Motor: grossly 5/5 UE and LE b/l  Sense: LT intact in all 4 ext. Coord: FTN and HTS intact b/l  DTR: 2+ in all 4 ext. Gait: deferred    NIH Stroke Scale:   1a  Level of consciousness: 0 - alert; keenly responsive   1b. LOC questions:  0 - answers both questions correctly   1c. LOC commands: 0 - performs both tasks correctly   2. Best Gaze: 0 - normal   3. Visual: 0 - no visual loss   4. Facial Palsy: 0 - normal symmetric movement   5a. Motor left arm: 0 - no drift, limb holds 90 (or 45) degrees for full 10 seconds   5b.   Motor RVR, if neurosurgery is also ok with it given the EVD, ok to anticoagulate from endovascular standpoint, consider using heparin gtt. - if anticoagulant of any type is started, please d/c aspirin.    - f/u with Dr. Ottoniel Muñoz in the clinic in 2-4 weeks and f/u with Dr Chris Souza in 3 months. Case discussed with Dr. Chris Souza attending.       Wesley Stephenson MD    Stroke, [id number] [place] [address] [facility]  [address] [place] [address]  Electronically signed 10/18/2023 at 6:23 AM

## 2023-10-18 NOTE — PROGRESS NOTES
Occupational Therapy  Facility/Department: [address] NEURO ICU  Occupational Therapy Daily Treatment Note      Name: Yulia Gibson  : 1950  MRN: [id number]  Date of Service: 10/18/2023    Discharge Recommendations:  Patient would benefit from continued therapy after discharge          Patient Diagnosis(es): The primary encounter diagnosis was Subarachnoid bleed ([address]). 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) were also pertinent to this visit. Past Medical History:  has a past medical history of DM II (diabetes mellitus, type II), controlled ([address]) and HTN (hypertension). Past Surgical History:  has a past surgical history that includes Thyroid surgery; Breast reduction surgery (Bilateral); Hysterectomy; hernia repair; and Bladder surgery. Assessment   Performance deficits / Impairments: Decreased functional mobility ; Decreased endurance;Decreased coordination;Decreased posture;Decreased ADL status; Decreased balance;Decreased strength;Decreased cognition;Decreased safe awareness  Assessment: Pt engaged in bed mobility, sitting balance, transfers, functional mobility and ADLs this date. Pt limited in activity tolerance d/t increasing nausea and heachache throughout session. Pt sat in recliner to complete grooming and dressing tasks. Pt engaged in functional mobility from EOB to recliner w/ RW CGA. Pt would benefit from continued OT to address above deficits.   Prognosis: Good  Activity Tolerance  Activity Tolerance: Patient limited by fatigue (Limited by nausea)        Plan   Occupational Therapy Plan  Times Per Week: 5-6x/week  (NEURO)  Current Treatment Recommendations: Strengthening, Balance training, Functional mobility training, Endurance training, Neuromuscular re-education, Equipment evaluation, education, & procurement, Patient/Caregiver education & training, Safety education & training, Self-Care / ADL, Coordination training Provided: Role of Therapy; ADL Adaptive Strategies;Transfer Training;Energy Conservation;Equipment  Education Provided Comments: Role of OT, transfer training, ADLs, safety awareness, pursed lip breathing tech, EC tech  Education Method: Demonstration;Verbal  Barriers to Learning: None  Education Outcome: Verbalized understanding;Continued education needed             AM-PAC Score  AM-PAC Inpatient Daily Activity Raw Score: 17 (10/18/23 1153)  AM-PAC Inpatient ADL T-Scale Score : 37.26 (10/18/23 1153)  ADL Inpatient CMS 0-100% Score: 50.11 (10/18/23 1153)  ADL Inpatient CMS G-Code Modifier : CK (10/18/23 1153)        Goals  Short Term Goals  Time Frame for Short Term Goals: Within 14 tx sessions, Pt will -  Short Term Goal 1: Participate in UB ADLs with Mod I with Min (10%) Cues for task initiation & sequencing. Short Term Goal 2: Demo SBA and Fair Integration of AE // DME // Adapted techniques during LB ADLs & Toileting. Short Term Goal 3: Participate in ADL, Bathroom, & Functional ADL Transfers with Supervision Assist with Fair+ Balance. Short Term Goal 4: Maintain Dynamic Standing Tolerance (13-15 mins) while participating in Functional & Leisure tasks. Short Term Goal 5: Complete Functional Mobility, in-room and in-home negotiatiion, with SBA with Fair Integration of EC & Actitvity Pacing.        Therapy Time   Individual Concurrent Group Co-treatment   Time In 0908         Time Out 0278         Minutes 36         Timed Code Treatment Minutes: 5855 SHREYA Ross/L

## 2023-10-19 ENCOUNTER — APPOINTMENT (OUTPATIENT)
Dept: VASCULAR LAB | Age: 73
DRG: 022 | End: 2023-10-19
Payer: MEDICARE

## 2023-10-19 LAB
ANION GAP SERPL CALCULATED.3IONS-SCNC: 7 MMOL/L (ref 9–17)
BASOPHILS # BLD: 0.03 K/UL (ref 0–0.2)
BASOPHILS NFR BLD: 0 % (ref 0–2)
BUN SERPL-MCNC: 11 MG/DL (ref 8–23)
CALCIUM SERPL-MCNC: 8.1 MG/DL (ref 8.6–10.4)
CHLORIDE SERPL-SCNC: 94 MMOL/L (ref 98–107)
CO2 SERPL-SCNC: 32 MMOL/L (ref 20–31)
CREAT SERPL-MCNC: 0.4 MG/DL (ref 0.5–0.9)
EOSINOPHIL # BLD: <0.03 K/UL (ref 0–0.44)
EOSINOPHILS RELATIVE PERCENT: 0 % (ref 1–4)
ERYTHROCYTE [DISTWIDTH] IN BLOOD BY AUTOMATED COUNT: 13.2 % (ref 11.8–14.4)
GFR SERPL CREATININE-BSD FRML MDRD: >60 ML/MIN/1.73M2
GLUCOSE BLD-MCNC: 174 MG/DL (ref 65–105)
GLUCOSE BLD-MCNC: 183 MG/DL (ref 65–105)
GLUCOSE BLD-MCNC: 185 MG/DL (ref 65–105)
GLUCOSE BLD-MCNC: 197 MG/DL (ref 65–105)
GLUCOSE SERPL-MCNC: 193 MG/DL (ref 70–99)
HCT VFR BLD AUTO: 35.4 % (ref 36.3–47.1)
HGB BLD-MCNC: 11.8 G/DL (ref 11.9–15.1)
IMM GRANULOCYTES # BLD AUTO: 0.28 K/UL (ref 0–0.3)
IMM GRANULOCYTES NFR BLD: 1 %
LYMPHOCYTES NFR BLD: 1.07 K/UL (ref 1.1–3.7)
LYMPHOCYTES RELATIVE PERCENT: 5 % (ref 24–43)
MAGNESIUM SERPL-MCNC: 2 MG/DL (ref 1.6–2.6)
MCH RBC QN AUTO: 31.4 PG (ref 25.2–33.5)
MCHC RBC AUTO-ENTMCNC: 33.3 G/DL (ref 28.4–34.8)
MCV RBC AUTO: 94.1 FL (ref 82.6–102.9)
MONOCYTES NFR BLD: 0.75 K/UL (ref 0.1–1.2)
MONOCYTES NFR BLD: 4 % (ref 3–12)
NEUTROPHILS NFR BLD: 90 % (ref 36–65)
NEUTS SEG NFR BLD: 18.1 K/UL (ref 1.5–8.1)
NRBC BLD-RTO: 0 PER 100 WBC
PLATELET # BLD AUTO: 314 K/UL (ref 138–453)
PMV BLD AUTO: 11.3 FL (ref 8.1–13.5)
POTASSIUM SERPL-SCNC: 4.2 MMOL/L (ref 3.7–5.3)
RBC # BLD AUTO: 3.76 M/UL (ref 3.95–5.11)
SODIUM SERPL-SCNC: 133 MMOL/L (ref 135–144)
WBC OTHER # BLD: 20.2 K/UL (ref 3.5–11.3)

## 2023-10-19 PROCEDURE — 6360000002 HC RX W HCPCS

## 2023-10-19 PROCEDURE — 2500000003 HC RX 250 WO HCPCS: Performed by: SURGERY

## 2023-10-19 PROCEDURE — 2000000000 HC ICU R&B

## 2023-10-19 PROCEDURE — 97116 GAIT TRAINING THERAPY: CPT

## 2023-10-19 PROCEDURE — 6360000002 HC RX W HCPCS: Performed by: NURSE PRACTITIONER

## 2023-10-19 PROCEDURE — 2580000003 HC RX 258: Performed by: PSYCHIATRY & NEUROLOGY

## 2023-10-19 PROCEDURE — 99232 SBSQ HOSP IP/OBS MODERATE 35: CPT | Performed by: PHYSICAL MEDICINE & REHABILITATION

## 2023-10-19 PROCEDURE — 6370000000 HC RX 637 (ALT 250 FOR IP): Performed by: NURSE PRACTITIONER

## 2023-10-19 PROCEDURE — 99233 SBSQ HOSP IP/OBS HIGH 50: CPT | Performed by: NURSE PRACTITIONER

## 2023-10-19 PROCEDURE — 93886 INTRACRANIAL COMPLETE STUDY: CPT | Performed by: PSYCHIATRY & NEUROLOGY

## 2023-10-19 PROCEDURE — 6370000000 HC RX 637 (ALT 250 FOR IP): Performed by: SURGERY

## 2023-10-19 PROCEDURE — APPSS30 APP SPLIT SHARED TIME 16-30 MINUTES: Performed by: PHYSICIAN ASSISTANT

## 2023-10-19 PROCEDURE — 85025 COMPLETE CBC W/AUTO DIFF WBC: CPT

## 2023-10-19 PROCEDURE — 99232 SBSQ HOSP IP/OBS MODERATE 35: CPT | Performed by: PSYCHIATRY & NEUROLOGY

## 2023-10-19 PROCEDURE — 97530 THERAPEUTIC ACTIVITIES: CPT

## 2023-10-19 PROCEDURE — 2580000003 HC RX 258: Performed by: NURSE PRACTITIONER

## 2023-10-19 PROCEDURE — 99233 SBSQ HOSP IP/OBS HIGH 50: CPT | Performed by: PSYCHIATRY & NEUROLOGY

## 2023-10-19 PROCEDURE — 93886 INTRACRANIAL COMPLETE STUDY: CPT

## 2023-10-19 PROCEDURE — 83735 ASSAY OF MAGNESIUM: CPT

## 2023-10-19 PROCEDURE — 36415 COLL VENOUS BLD VENIPUNCTURE: CPT

## 2023-10-19 PROCEDURE — 80048 BASIC METABOLIC PNL TOTAL CA: CPT

## 2023-10-19 PROCEDURE — 82947 ASSAY GLUCOSE BLOOD QUANT: CPT

## 2023-10-19 RX ORDER — INSULIN LISPRO 100 [IU]/ML
0-4 INJECTION, SOLUTION INTRAVENOUS; SUBCUTANEOUS NIGHTLY
Status: DISCONTINUED | OUTPATIENT
Start: 2023-10-19 | End: 2023-10-25

## 2023-10-19 RX ORDER — MORPHINE SULFATE 2 MG/ML
2 INJECTION, SOLUTION INTRAMUSCULAR; INTRAVENOUS ONCE
Status: COMPLETED | OUTPATIENT
Start: 2023-10-19 | End: 2023-10-19

## 2023-10-19 RX ORDER — IPRATROPIUM BROMIDE AND ALBUTEROL SULFATE 2.5; .5 MG/3ML; MG/3ML
1 SOLUTION RESPIRATORY (INHALATION) EVERY 4 HOURS PRN
Status: DISCONTINUED | OUTPATIENT
Start: 2023-10-19 | End: 2023-10-26 | Stop reason: HOSPADM

## 2023-10-19 RX ORDER — DEXTROSE MONOHYDRATE 100 MG/ML
INJECTION, SOLUTION INTRAVENOUS CONTINUOUS PRN
Status: DISCONTINUED | OUTPATIENT
Start: 2023-10-19 | End: 2023-10-26 | Stop reason: HOSPADM

## 2023-10-19 RX ORDER — AMIODARONE HYDROCHLORIDE 200 MG/1
200 TABLET ORAL 2 TIMES DAILY
Status: DISCONTINUED | OUTPATIENT
Start: 2023-10-19 | End: 2023-10-26 | Stop reason: HOSPADM

## 2023-10-19 RX ORDER — LANOLIN ALCOHOL/MO/W.PET/CERES
6 CREAM (GRAM) TOPICAL NIGHTLY PRN
Status: DISCONTINUED | OUTPATIENT
Start: 2023-10-19 | End: 2023-10-26 | Stop reason: HOSPADM

## 2023-10-19 RX ORDER — INSULIN LISPRO 100 [IU]/ML
0-4 INJECTION, SOLUTION INTRAVENOUS; SUBCUTANEOUS
Status: DISCONTINUED | OUTPATIENT
Start: 2023-10-19 | End: 2023-10-25

## 2023-10-19 RX ORDER — MORPHINE SULFATE 2 MG/ML
INJECTION, SOLUTION INTRAMUSCULAR; INTRAVENOUS
Status: COMPLETED
Start: 2023-10-19 | End: 2023-10-19

## 2023-10-19 RX ADMIN — Medication 6 MG: at 20:55

## 2023-10-19 RX ADMIN — MORPHINE SULFATE 2 MG: 2 INJECTION, SOLUTION INTRAMUSCULAR; INTRAVENOUS at 17:09

## 2023-10-19 RX ADMIN — ONDANSETRON 4 MG: 2 INJECTION INTRAMUSCULAR; INTRAVENOUS at 13:25

## 2023-10-19 RX ADMIN — SODIUM CHLORIDE, PRESERVATIVE FREE 10 ML: 5 INJECTION INTRAVENOUS at 20:27

## 2023-10-19 RX ADMIN — DEXAMETHASONE SODIUM PHOSPHATE 8 MG: 4 INJECTION INTRA-ARTICULAR; INTRALESIONAL; INTRAMUSCULAR; INTRAVENOUS; SOFT TISSUE at 10:52

## 2023-10-19 RX ADMIN — GABAPENTIN 300 MG: 300 CAPSULE ORAL at 07:44

## 2023-10-19 RX ADMIN — SODIUM CHLORIDE, PRESERVATIVE FREE 10 ML: 5 INJECTION INTRAVENOUS at 10:21

## 2023-10-19 RX ADMIN — DILTIAZEM HYDROCHLORIDE 120 MG: 120 CAPSULE, COATED, EXTENDED RELEASE ORAL at 07:44

## 2023-10-19 RX ADMIN — Medication 60 MG: at 12:08

## 2023-10-19 RX ADMIN — ACETAMINOPHEN 650 MG: 325 TABLET ORAL at 17:04

## 2023-10-19 RX ADMIN — METOPROLOL TARTRATE 50 MG: 50 TABLET, FILM COATED ORAL at 07:44

## 2023-10-19 RX ADMIN — METOPROLOL TARTRATE 5 MG: 5 INJECTION INTRAVENOUS at 17:15

## 2023-10-19 RX ADMIN — AMIODARONE HYDROCHLORIDE 200 MG: 200 TABLET ORAL at 07:44

## 2023-10-19 RX ADMIN — Medication 60 MG: at 20:55

## 2023-10-19 RX ADMIN — SODIUM CHLORIDE, PRESERVATIVE FREE 10 ML: 5 INJECTION INTRAVENOUS at 10:22

## 2023-10-19 RX ADMIN — ESCITALOPRAM 10 MG: 10 TABLET, FILM COATED ORAL at 20:29

## 2023-10-19 RX ADMIN — Medication 60 MG: at 03:30

## 2023-10-19 RX ADMIN — SODIUM CHLORIDE, PRESERVATIVE FREE 10 ML: 5 INJECTION INTRAVENOUS at 20:57

## 2023-10-19 RX ADMIN — GABAPENTIN 300 MG: 300 CAPSULE ORAL at 20:28

## 2023-10-19 RX ADMIN — SODIUM CHLORIDE: 9 INJECTION, SOLUTION INTRAVENOUS at 13:24

## 2023-10-19 RX ADMIN — Medication 60 MG: at 07:44

## 2023-10-19 RX ADMIN — PANTOPRAZOLE SODIUM 40 MG: 40 TABLET, DELAYED RELEASE ORAL at 06:03

## 2023-10-19 RX ADMIN — METOPROLOL TARTRATE 5 MG: 5 INJECTION INTRAVENOUS at 06:03

## 2023-10-19 RX ADMIN — METOPROLOL TARTRATE 50 MG: 50 TABLET, FILM COATED ORAL at 20:28

## 2023-10-19 RX ADMIN — ONDANSETRON 4 MG: 2 INJECTION INTRAMUSCULAR; INTRAVENOUS at 20:45

## 2023-10-19 RX ADMIN — SODIUM CHLORIDE, PRESERVATIVE FREE 10 ML: 5 INJECTION INTRAVENOUS at 20:58

## 2023-10-19 RX ADMIN — ACETAMINOPHEN 650 MG: 325 TABLET ORAL at 13:21

## 2023-10-19 RX ADMIN — ATORVASTATIN CALCIUM 10 MG: 10 TABLET, FILM COATED ORAL at 20:34

## 2023-10-19 RX ADMIN — SODIUM CHLORIDE 3000 MG: 900 INJECTION INTRAVENOUS at 03:38

## 2023-10-19 RX ADMIN — MORPHINE SULFATE 2 MG: 2 INJECTION, SOLUTION INTRAMUSCULAR; INTRAVENOUS at 20:55

## 2023-10-19 RX ADMIN — ENOXAPARIN SODIUM 40 MG: 100 INJECTION SUBCUTANEOUS at 07:45

## 2023-10-19 RX ADMIN — DEXAMETHASONE SODIUM PHOSPHATE 8 MG: 4 INJECTION INTRA-ARTICULAR; INTRALESIONAL; INTRAMUSCULAR; INTRAVENOUS; SOFT TISSUE at 18:40

## 2023-10-19 RX ADMIN — DEXAMETHASONE SODIUM PHOSPHATE 8 MG: 4 INJECTION INTRA-ARTICULAR; INTRALESIONAL; INTRAMUSCULAR; INTRAVENOUS; SOFT TISSUE at 03:30

## 2023-10-19 RX ADMIN — ASPIRIN 81 MG 81 MG: 81 TABLET ORAL at 07:44

## 2023-10-19 RX ADMIN — ACETAMINOPHEN 650 MG: 325 TABLET ORAL at 07:45

## 2023-10-19 RX ADMIN — AMIODARONE HYDROCHLORIDE 200 MG: 200 TABLET ORAL at 20:28

## 2023-10-19 RX ADMIN — Medication 60 MG: at 16:08

## 2023-10-19 ASSESSMENT — PAIN SCALES - GENERAL
PAINLEVEL_OUTOF10: 9
PAINLEVEL_OUTOF10: 8
PAINLEVEL_OUTOF10: 6
PAINLEVEL_OUTOF10: 8
PAINLEVEL_OUTOF10: 8
PAINLEVEL_OUTOF10: 4

## 2023-10-19 ASSESSMENT — PAIN DESCRIPTION - ORIENTATION: ORIENTATION: MID;ANTERIOR

## 2023-10-19 ASSESSMENT — PAIN DESCRIPTION - FREQUENCY: FREQUENCY: CONTINUOUS

## 2023-10-19 ASSESSMENT — PAIN DESCRIPTION - PAIN TYPE: TYPE: ACUTE PAIN

## 2023-10-19 ASSESSMENT — PAIN DESCRIPTION - LOCATION: LOCATION: HEAD

## 2023-10-19 ASSESSMENT — PAIN DESCRIPTION - ONSET: ONSET: AWAKENED FROM SLEEP

## 2023-10-19 ASSESSMENT — PAIN - FUNCTIONAL ASSESSMENT: PAIN_FUNCTIONAL_ASSESSMENT: PREVENTS OR INTERFERES SOME ACTIVE ACTIVITIES AND ADLS

## 2023-10-19 NOTE — PROGRESS NOTES
Patient's EVD clamped at 0900. Pt found to be more drowsy today and complaining of a 9/10 headache and nausea unrelieved with prn medications. RN notified neurosurgery NP and neuro critical team. RN was given a verbal order to continue to monitor symptoms per neurosurgery. RN notified neurosurgery NP that patient's ICP's have been ranging 20-30's but not sustaining and constantly changing. Decision was made to open drain 20 mmhg per neurosurgery.

## 2023-10-19 NOTE — PLAN OF CARE
Problem: Discharge Planning  Goal: Discharge to home or other facility with appropriate resources  10/19/2023 0139 by Jazmine Richards RN  Outcome: Progressing  10/18/2023 1847 by Jose Bullard RN  Outcome: Progressing     Problem: Respiratory - Adult  Goal: Achieves optimal ventilation and oxygenation  10/19/2023 0139 by Jazmine Richards RN  Outcome: Progressing  10/18/2023 1847 by Jose Bullard RN  Outcome: Progressing     Problem: Safety - Adult  Goal: Free from fall injury  10/19/2023 0139 by Jazmine Richards RN  Outcome: Progressing  10/18/2023 1847 by Jose Bullard RN  Outcome: Progressing     Problem: Nutrition Deficit:  Goal: Optimize nutritional status  10/19/2023 0139 by Jazmine Richards RN  Outcome: Progressing  10/18/2023 1847 by Jose Bullard RN  Outcome: Progressing     Problem: Skin/Tissue Integrity  Goal: Absence of new skin breakdown  Description: 1. Monitor for areas of redness and/or skin breakdown  2. Assess vascular access sites hourly  3. Every 4-6 hours minimum:  Change oxygen saturation probe site  4. Every 4-6 hours:  If on nasal continuous positive airway pressure, respiratory therapy assess nares and determine need for appliance change or resting period.   10/19/2023 0139 by Jazmine Richards RN  Outcome: Progressing  10/18/2023 1847 by Jose Bullard RN  Outcome: Progressing     Problem: ABCDS Injury Assessment  Goal: Absence of physical injury  Outcome: Progressing     Problem: Chronic Conditions and Co-morbidities  Goal: Patient's chronic conditions and co-morbidity symptoms are monitored and maintained or improved  Outcome: Progressing     Problem: Pain  Goal: Verbalizes/displays adequate comfort level or baseline comfort level  10/19/2023 0139 by Jazmine Richards RN  Outcome: Not Progressing  10/18/2023 1847 by Jose Bullard RN  Outcome: Progressing

## 2023-10-19 NOTE — PLAN OF CARE
Problem: Pain  Goal: Verbalizes/displays adequate comfort level or baseline comfort level  Outcome: Progressing     Problem: Safety - Adult  Goal: Free from fall injury  Outcome: Progressing     Problem: Nutrition Deficit:  Goal: Optimize nutritional status  Outcome: Progressing  Flowsheets (Taken 10/19/2023 0977 by Jeffy Dunbar RD)  Nutrient intake appropriate for improving, restoring, or maintaining nutritional needs:   Assess nutritional status and recommend course of action   Monitor oral intake, labs, and treatment plans   Recommend appropriate diets, oral nutritional supplements, and vitamin/mineral supplements     Problem: Skin/Tissue Integrity  Goal: Absence of new skin breakdown  Description: 1. Monitor for areas of redness and/or skin breakdown  2. Assess vascular access sites hourly  3. Every 4-6 hours minimum:  Change oxygen saturation probe site  4. Every 4-6 hours:  If on nasal continuous positive airway pressure, respiratory therapy assess nares and determine need for appliance change or resting period.   Outcome: Progressing     Problem: ABCDS Injury Assessment  Goal: Absence of physical injury  Outcome: Progressing

## 2023-10-19 NOTE — PROGRESS NOTES
Neurosurgery MARÍA/Resident    Daily Progress Note   Chief Complaint   Patient presents with    Altered Mental Status     10/19/2023  8:05 AM    Chart reviewed. No acute events overnight. Continues to complain of headache. EVD open at [facility], ICP 4-13 overnight. Vitals:    10/19/23 0500 10/19/23 0511 10/19/23 0600 10/19/23 0700   BP:  (!) 157/101 (!) 164/111 (!) 162/124   Pulse: (!) 108 (!) 121 (!) 149 (!) 127   Resp: 19 22 18 16   Temp:       TempSrc:       SpO2: 94%  94% 96%   Weight:       Height:             PE: AOx3   CNII-XII intact   PERRL, EOMI   Motor   L deltoid 5/5; R deltoid 5/5  L biceps 5/5; R biceps 5/5  L triceps 5/5; R triceps 5/5  L wrist extension 5/5; R wrist extension 5/5  L intrinsics 5/5; R intrinsics 5/5      L iliopsoas 5/5 , R iliopsoas 5/5  L quadriceps 5/5; R quadriceps 5/5  L Dorsiflexion 5/5; R dorsiflexion 5/5  L Plantarflexion 5/5; R plantarflexion 5/5  L EHL 5/5; R EHL 5/5    Sensation intact     Drain output 56ml/12hr  Incision c/d/i      Lab Results   Component Value Date    WBC 20.2 (H) 10/19/2023    HGB 11.8 (L) 10/19/2023    HCT 35.4 (L) 10/19/2023     10/19/2023    CHOL 130 10/12/2023    TRIG 176 (H) 10/12/2023    HDL 46 10/12/2023    ALT 16 10/11/2023    AST 24 10/11/2023     (L) 10/19/2023    K 4.2 10/19/2023    CL 94 (L) 10/19/2023    CREATININE 0.4 (L) 10/19/2023    BUN 11 10/19/2023    CO2 32 (H) 10/19/2023    TSH 1.50 10/11/2023    INR 1.0 10/11/2023    LABA1C 6.6 (H) 10/12/2023         A/P  75 y.o. female who presents with aneurysmal subarachnoid hemorrhage, hydrocephalus   10/11 EARL basilar tip aneurysm with WEB device   10/12 EVD placement      - clamp EVD at 20mmhg monitor ICP continuously and notify NSG if sustained >20              - will need to hold all ac/ap prior to EVD removal          Please contact neurosurgery with any changes in patients neurologic status.        Crystal Ford PA-C  10/19/23  8:05 AM

## 2023-10-19 NOTE — PROGRESS NOTES
Physical Therapy  Facility/Department: [address] NEURO ICU  Physical Therapy Daily Treatment Note    Name: Patsy Cooks  : 1950  MRN: [id number]  Date of Service: 10/19/2023    Discharge Recommendations:  Patient would benefit from continued therapy after discharge Further therapy recommended at discharge. The patient should be able to tolerate at least 3 hours of therapy per day over 5 days or 15 hours over 7 days. This patient may benefit from a Physical Medicine and Rehab consult. PT Equipment Recommendations  Equipment Needed: No (continue to assess)      Patient Diagnosis(es): The primary encounter diagnosis was Subarachnoid bleed ([address] 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 Past Medical History:  has a past medical history of DM II (diabetes mellitus, type II), controlled ([address]) and HTN (hypertension). Past Surgical History:  has a past surgical history that includes Thyroid surgery; Breast reduction surgery (Bilateral); Hysterectomy; hernia repair; and Bladder surgery. Assessment   Assessment: Pt ambulated 30ft x2 CGA, holding onto IV pole. Performed sit<>stand x3 with CGA and no AD. Severe headache limiting quantity and quality of therapeutic interventions. Pt would require 24-hr assistance with mobility and is not safe to return to prior living arrangements at this time, would benefit from continued, intense skilled therapeutic interventions to progress toward independence with safety in all mobility.   Therapy Prognosis: Good  Activity Tolerance  Activity Tolerance: Patient limited by fatigue;Patient limited by endurance     Plan   Physcial Therapy Plan  General Plan: 6-7 times per week  Current Treatment Recommendations: Strengthening, ROM, Balance training, Functional mobility training, Endurance training, Therapeutic activities, Gait training, Stair training, Safety education & assistance  Bed Mobility Comments: HOB elevated ~20deg. Pt performed all bed mobilty with bedrail assist and SBA. Transfers  Sit to Stand: Contact guard assistance  Stand to Sit: Contact guard assistance  Comment: Upon mobilizing to EOB, pt requested to use commode. Maintained sitting at EOB, commode, and bedside chair with SBA and eyes closed ~75% of the time. Pt maintained eyes open during ambulatory activities. Assessed without AD x2 from EOB and x2 from recliner. Verbal cues to keep eyes open during mobility with good understanding. Pt denies any lightheadedness/dizziness while upright, c/o only of increasing headache. Ambulation  Surface: Level tile  Device: No Device (IV pole)  Other Apparatus: O2 (4L O2 NC)  Assistance: Contact guard assistance  Quality of Gait: Pt ambulated with slow, cautious, and antalgic gait, no LOB  Gait Deviations: Slow Callie;Decreased step length;Decreased step height; Increased GRABIEL; Deviated path  Distance: 30ft x2 with 1 static standing rest break ~2 minutes in RW, CGA d/t headache only  Comments: Pt ambulated from commode>nurses' station>bedside chair>bed with rest breaks at each location without LOB and minimal cues for technique. More Ambulation?: No  Stairs/Curb  Stairs?: No     Balance  Posture: Good  Sitting - Static: Fair;+  Sitting - Dynamic: Fair;+  Standing - Static: Fair  Standing - Dynamic: Fair;-  Comments: Sitting balance assessed at EOB and in bedside chair; standing balance assessed at EOB without AD. Exercise Treatment: Seated LE exercise program: Long Arc Quads, hip abduction/adduction, heel/toe raises, and marches at [facility].  Reps: x 10 ea      OutComes Score  AM-PAC Score  AM-PAC Inpatient Mobility Raw Score : 19 (10/19/23 1520)  AM-PAC Inpatient T-Scale Score : 45.44 (10/19/23 1520)  Mobility Inpatient CMS 0-100% Score: 41.77 (10/19/23 1520)  Mobility Inpatient CMS G-Code Modifier : CK (10/19/23 1520)    Goals  Short Term Goals  Time Frame for Short Term

## 2023-10-19 NOTE — CARE COORDINATION
Transitional Planning  Spoke with patient and spouse, plan is ARU, leaning toward [facility], but would like to review with other members of family. List provided. PM&R following. Post Acute Facility/Agency List     Provided spouse with the following list, the list includes the overall star ratings obtained from CMS per the Medicare Web site (www.Medicare.gov):     [] [facility]  [x] Acute Inpatient Rehabilitation Facilities  [] Skilled Nursing Facilities  [] Home Care    Provided verbal instructions on how to utilize the QR Code to obtain additional detailed star ratings from www. Medicare. gov     offered to print and provide the detailed list:    []Accepted   [x]Declined

## 2023-10-19 NOTE — PROGRESS NOTES
[facility]  Progress Note                   Date:   10/19/2023  Patient name: Brianna Stephen  Date of admission:  10/11/2023  2:52 PM  MRN:   [id number]  YOB: 1950  PCP: No primary care provider on file. Reason for Admission: Subarachnoid hemorrhage ([address]) [I60.9]  Subarachnoid bleed ([address]) [I60.9]    Subjective:       Clinical Changes /Abnormalities: No acute CV issues/concerns overnight. Tele/vitals/labs reviewed . HR stable currently. Family in room. RVR overnight      Medications:   Scheduled Meds:   insulin lispro  0-4 Units SubCUTAneous TID WC    insulin lispro  0-4 Units SubCUTAneous Nightly    metoprolol tartrate  50 mg Oral BID    dilTIAZem  120 mg Oral Daily    dexamethasone  8 mg IntraVENous Q8H    amiodarone  200 mg Oral Daily    gabapentin  300 mg Oral BID    lidocaine 1 % injection  5 mL IntraDERmal Once    sodium chloride flush  5-40 mL IntraVENous 2 times per day    sodium chloride flush  5-40 mL IntraVENous 2 times per day    escitalopram  10 mg Oral Nightly    niMODipine  60 mg Oral 6 times per day    aspirin  81 mg Oral Daily    pantoprazole  40 mg Oral QAM AC    atorvastatin  10 mg Oral Nightly    sodium chloride flush  5-40 mL IntraVENous 2 times per day    enoxaparin  40 mg SubCUTAneous Daily    sodium chloride flush  5-40 mL IntraVENous 2 times per day     Continuous Infusions:   dextrose      sodium chloride      sodium chloride 75 mL/hr at 10/19/23 0454    sodium chloride       CBC:   Recent Labs     10/17/23  0516 10/18/23  0336 10/19/23  0221   WBC 17.7* 18.3* 20.2*   HGB 12.2 11.9 11.8*    273 314       BMP:    Recent Labs     10/17/23  1544 10/18/23  0336 10/19/23  0221   * 133* 133*   K 3.2* 3.8 4.2   CL 88* 93* 94*   CO2 30 32* 32*   BUN 11 11 11   CREATININE 0.5 0.5 0.4*   GLUCOSE 247* 161* 193*       Hepatic:No results for input(s): \"AST\", \"ALT\", \"ALB\", \"BILITOT\", \"ALKPHOS\" in the last 72 hours.   Troponin:   No results for input(s):

## 2023-10-19 NOTE — PROGRESS NOTES
Daily Progress Note  Neuro Critical Care    Patient Name: Jose Boone  Patient : 1950  Room/Bed: 0130/0130-01  Code Status: Full  Allergies: No Known Allergies    CHIEF COMPLAINT:     Headache, neck pain, nausea     INTERVAL HISTORY    Initial Presentation (Admitted 10/11/23): The patient is a 67 y.o. female with history of HTN, HLD, and smoking presented as a transfer from [facility] for Diffuse SAH. Patient was at home this morning when around 0930 she developed acute onset severe headache. EMS was called, patient was walking to the cot when she became unresponsive. She was intubated and taken to [facility] where CT head showed diffuse SAH. She was loaded with 1g Keppra and started on Propofol for sedation. On arrival to [address] [place] [address] with propofol held, patient opens eyes to verbal stimulation. Pupils are 4mm reactive bilaterally. Cough intact. Moving all extremities spontaneously, localizing bilateral upper extremities. Wiggles toes bilaterally to command. CTA head/neck showed basilar tip aneurysm. Given additional 1g Keppra. SBP goal < 120. Irregular rhythm noted on monitor, follow up EKG and troponin. Endovascular and neurosurgery consulted. Of note, patient's  does report she takes Aspirin 81mg daily for general wellness. Bower&Gates: 5, Modified Viramontes: IV    Taken urgently to cerebral angiogram showing the basilar tip aneurysm treated with WEB device. EVD was placed overnight and set at 10 cmH20. CT head showed stable SAH and well positioned EVD. Aspirin 81mg daily started after EVD placement. Hospital Course:   10/12: Opens eyes, following commands. Extubated without difficulty. Unasyn started for aspiration pneumonia. 10/13: Started on Regular diet. Baseline TCD normal.  ?New onset atrial fibrillation. 10/14: EVD set at 10mmHg this morning, ICP 4-12, 255cc out/24h. EVD transiently increased to 15mmHg per Neurosurgery.   ICP never significantly elevated but increased to

## 2023-10-19 NOTE — PROGRESS NOTES
Nutrition Assessment     Type and Reason for Visit: Reassess    Nutrition Recommendations/Plan:   Continue Regular diet     Malnutrition Assessment:  Malnutrition Status: At risk for malnutrition (Comment) (poor appetite)    Nutrition Assessment:  Pt admitted for [facility], now on Regular diet with frozen ONS TID with meals. Spoke with sister as Pt was sleeping, who reports Pt continues to eat poorly, only bites of most meals. However, sister believes Pt is improving and eating a little more. Pt continues to have outside food brought in, and nursing notes show 26-75% of meals consumed, still below estimated needs. No new weight available. Pt previously had refused ONS as she dislikes it. If poor oral intake continues and she continues to refuse or not consume ONS, Pt would benefit from nutrition support. Estimated Daily Nutrient Needs:  Energy (kcal):  7215-3149 Weight Used for Energy Requirements: Current     Protein (g):   Weight Used for Protein Requirements: Current        Fluid (ml/day):  2000ml/day Method Used for Fluid Requirements: 1 ml/kcal    Nutrition Related Findings:   Meds/Labs reviewed. Na 133 Wound Type: Surgical Incision    Current Nutrition Therapies:    ADULT DIET;  Regular  ADULT ORAL NUTRITION SUPPLEMENT; Breakfast, Lunch, Dinner; Frozen Oral Supplement    Anthropometric Measures:  Height: 165.1 cm (5' 5\")  Current Body Wt: 81.6 kg (180 lb)   BMI: 30    Nutrition Diagnosis:   Inadequate protein-energy intake related to increase demand for energy/nutrients as evidenced by intake 26-50%, intake 51-75%    Nutrition Interventions:   Food and/or Nutrient Delivery: Continue Current Diet, Continue Oral Nutrition Supplement  Nutrition Education/Counseling: No recommendation at this time  Coordination of Nutrition Care: Continue to monitor while inpatient  Plan of Care discussed with: Family    Goals:  Previous Goal Met: No Progress toward Goal(s)  Goals: Meet at least 75% of estimated

## 2023-10-20 ENCOUNTER — APPOINTMENT (OUTPATIENT)
Dept: VASCULAR LAB | Age: 73
DRG: 022 | End: 2023-10-20
Payer: MEDICARE

## 2023-10-20 LAB
ANION GAP SERPL CALCULATED.3IONS-SCNC: 10 MMOL/L (ref 9–17)
ANION GAP SERPL CALCULATED.3IONS-SCNC: 11 MMOL/L (ref 9–17)
BASOPHILS # BLD: 0.03 K/UL (ref 0–0.2)
BASOPHILS NFR BLD: 0 % (ref 0–2)
BUN SERPL-MCNC: 11 MG/DL (ref 8–23)
BUN SERPL-MCNC: 11 MG/DL (ref 8–23)
CALCIUM SERPL-MCNC: 8.2 MG/DL (ref 8.6–10.4)
CALCIUM SERPL-MCNC: 8.6 MG/DL (ref 8.6–10.4)
CHLORIDE SERPL-SCNC: 90 MMOL/L (ref 98–107)
CHLORIDE SERPL-SCNC: 90 MMOL/L (ref 98–107)
CO2 SERPL-SCNC: 27 MMOL/L (ref 20–31)
CO2 SERPL-SCNC: 29 MMOL/L (ref 20–31)
CREAT SERPL-MCNC: 0.3 MG/DL (ref 0.5–0.9)
CREAT SERPL-MCNC: 0.4 MG/DL (ref 0.5–0.9)
EKG ATRIAL RATE: 147 BPM
EKG P-R INTERVAL: 40 MS
EKG Q-T INTERVAL: 290 MS
EKG QRS DURATION: 108 MS
EKG QTC CALCULATION (BAZETT): 449 MS
EKG R AXIS: 9 DEGREES
EKG T AXIS: 90 DEGREES
EKG VENTRICULAR RATE: 144 BPM
EOSINOPHIL # BLD: <0.03 K/UL (ref 0–0.44)
EOSINOPHILS RELATIVE PERCENT: 0 % (ref 1–4)
ERYTHROCYTE [DISTWIDTH] IN BLOOD BY AUTOMATED COUNT: 13.3 % (ref 11.8–14.4)
GFR SERPL CREATININE-BSD FRML MDRD: >60 ML/MIN/1.73M2
GFR SERPL CREATININE-BSD FRML MDRD: >60 ML/MIN/1.73M2
GLUCOSE BLD-MCNC: 171 MG/DL (ref 65–105)
GLUCOSE BLD-MCNC: 172 MG/DL (ref 65–105)
GLUCOSE BLD-MCNC: 191 MG/DL (ref 65–105)
GLUCOSE BLD-MCNC: 241 MG/DL (ref 65–105)
GLUCOSE SERPL-MCNC: 178 MG/DL (ref 70–99)
GLUCOSE SERPL-MCNC: 193 MG/DL (ref 70–99)
HCT VFR BLD AUTO: 36.7 % (ref 36.3–47.1)
HGB BLD-MCNC: 12.6 G/DL (ref 11.9–15.1)
IMM GRANULOCYTES # BLD AUTO: 0.31 K/UL (ref 0–0.3)
IMM GRANULOCYTES NFR BLD: 2 %
LYMPHOCYTES NFR BLD: 1.11 K/UL (ref 1.1–3.7)
LYMPHOCYTES RELATIVE PERCENT: 6 % (ref 24–43)
MAGNESIUM SERPL-MCNC: 2.1 MG/DL (ref 1.6–2.6)
MCH RBC QN AUTO: 32.1 PG (ref 25.2–33.5)
MCHC RBC AUTO-ENTMCNC: 34.3 G/DL (ref 28.4–34.8)
MCV RBC AUTO: 93.6 FL (ref 82.6–102.9)
MICROORGANISM SPEC CULT: ABNORMAL
MICROORGANISM/AGENT SPEC: ABNORMAL
MONOCYTES NFR BLD: 0.91 K/UL (ref 0.1–1.2)
MONOCYTES NFR BLD: 5 % (ref 3–12)
NEUTROPHILS NFR BLD: 87 % (ref 36–65)
NEUTS SEG NFR BLD: 15.46 K/UL (ref 1.5–8.1)
NRBC BLD-RTO: 0 PER 100 WBC
PLATELET # BLD AUTO: 438 K/UL (ref 138–453)
PMV BLD AUTO: 11.2 FL (ref 8.1–13.5)
POTASSIUM SERPL-SCNC: 3.8 MMOL/L (ref 3.7–5.3)
POTASSIUM SERPL-SCNC: 4 MMOL/L (ref 3.7–5.3)
RBC # BLD AUTO: 3.92 M/UL (ref 3.95–5.11)
SERVICE CMNT-IMP: ABNORMAL
SODIUM SERPL-SCNC: 128 MMOL/L (ref 135–144)
SODIUM SERPL-SCNC: 129 MMOL/L (ref 135–144)
SPECIMEN DESCRIPTION: ABNORMAL
TROPONIN I SERPL HS-MCNC: 14 NG/L (ref 0–14)
WBC OTHER # BLD: 17.8 K/UL (ref 3.5–11.3)

## 2023-10-20 PROCEDURE — 2700000000 HC OXYGEN THERAPY PER DAY

## 2023-10-20 PROCEDURE — 97116 GAIT TRAINING THERAPY: CPT

## 2023-10-20 PROCEDURE — 83930 ASSAY OF BLOOD OSMOLALITY: CPT

## 2023-10-20 PROCEDURE — 6360000002 HC RX W HCPCS

## 2023-10-20 PROCEDURE — 6370000000 HC RX 637 (ALT 250 FOR IP): Performed by: NURSE PRACTITIONER

## 2023-10-20 PROCEDURE — 99233 SBSQ HOSP IP/OBS HIGH 50: CPT | Performed by: NURSE PRACTITIONER

## 2023-10-20 PROCEDURE — 2580000003 HC RX 258: Performed by: NURSE PRACTITIONER

## 2023-10-20 PROCEDURE — 93886 INTRACRANIAL COMPLETE STUDY: CPT | Performed by: PSYCHIATRY & NEUROLOGY

## 2023-10-20 PROCEDURE — 85025 COMPLETE CBC W/AUTO DIFF WBC: CPT

## 2023-10-20 PROCEDURE — APPSS30 APP SPLIT SHARED TIME 16-30 MINUTES: Performed by: PHYSICIAN ASSISTANT

## 2023-10-20 PROCEDURE — 2580000003 HC RX 258: Performed by: PSYCHIATRY & NEUROLOGY

## 2023-10-20 PROCEDURE — 93886 INTRACRANIAL COMPLETE STUDY: CPT

## 2023-10-20 PROCEDURE — 93010 ELECTROCARDIOGRAM REPORT: CPT | Performed by: INTERNAL MEDICINE

## 2023-10-20 PROCEDURE — 2000000000 HC ICU R&B

## 2023-10-20 PROCEDURE — 84484 ASSAY OF TROPONIN QUANT: CPT

## 2023-10-20 PROCEDURE — 6370000000 HC RX 637 (ALT 250 FOR IP): Performed by: SURGERY

## 2023-10-20 PROCEDURE — 83735 ASSAY OF MAGNESIUM: CPT

## 2023-10-20 PROCEDURE — 93005 ELECTROCARDIOGRAM TRACING: CPT

## 2023-10-20 PROCEDURE — 99233 SBSQ HOSP IP/OBS HIGH 50: CPT | Performed by: PSYCHIATRY & NEUROLOGY

## 2023-10-20 PROCEDURE — 36415 COLL VENOUS BLD VENIPUNCTURE: CPT

## 2023-10-20 PROCEDURE — 6360000002 HC RX W HCPCS: Performed by: NURSE PRACTITIONER

## 2023-10-20 PROCEDURE — 6370000000 HC RX 637 (ALT 250 FOR IP): Performed by: PSYCHIATRY & NEUROLOGY

## 2023-10-20 PROCEDURE — 99232 SBSQ HOSP IP/OBS MODERATE 35: CPT | Performed by: PSYCHIATRY & NEUROLOGY

## 2023-10-20 PROCEDURE — 94761 N-INVAS EAR/PLS OXIMETRY MLT: CPT

## 2023-10-20 PROCEDURE — 80048 BASIC METABOLIC PNL TOTAL CA: CPT

## 2023-10-20 PROCEDURE — 82947 ASSAY GLUCOSE BLOOD QUANT: CPT

## 2023-10-20 PROCEDURE — 97530 THERAPEUTIC ACTIVITIES: CPT

## 2023-10-20 RX ORDER — GABAPENTIN 300 MG/1
300 CAPSULE ORAL 3 TIMES DAILY
Status: DISCONTINUED | OUTPATIENT
Start: 2023-10-20 | End: 2023-10-26 | Stop reason: HOSPADM

## 2023-10-20 RX ORDER — PROMETHAZINE HYDROCHLORIDE 25 MG/ML
6.25 INJECTION, SOLUTION INTRAMUSCULAR; INTRAVENOUS ONCE
Status: COMPLETED | OUTPATIENT
Start: 2023-10-20 | End: 2023-10-20

## 2023-10-20 RX ORDER — MORPHINE SULFATE 2 MG/ML
2 INJECTION, SOLUTION INTRAMUSCULAR; INTRAVENOUS ONCE
Status: COMPLETED | OUTPATIENT
Start: 2023-10-20 | End: 2023-10-20

## 2023-10-20 RX ADMIN — INSULIN LISPRO 1 UNITS: 100 INJECTION, SOLUTION INTRAVENOUS; SUBCUTANEOUS at 12:05

## 2023-10-20 RX ADMIN — GABAPENTIN 300 MG: 300 CAPSULE ORAL at 20:30

## 2023-10-20 RX ADMIN — DEXAMETHASONE SODIUM PHOSPHATE 8 MG: 4 INJECTION INTRA-ARTICULAR; INTRALESIONAL; INTRAMUSCULAR; INTRAVENOUS; SOFT TISSUE at 03:06

## 2023-10-20 RX ADMIN — ATORVASTATIN CALCIUM 10 MG: 10 TABLET, FILM COATED ORAL at 20:30

## 2023-10-20 RX ADMIN — ACETAMINOPHEN 650 MG: 325 TABLET ORAL at 15:49

## 2023-10-20 RX ADMIN — ACETAMINOPHEN 650 MG: 325 TABLET ORAL at 08:32

## 2023-10-20 RX ADMIN — ASPIRIN 81 MG 81 MG: 81 TABLET ORAL at 08:31

## 2023-10-20 RX ADMIN — METOPROLOL TARTRATE 50 MG: 50 TABLET, FILM COATED ORAL at 08:32

## 2023-10-20 RX ADMIN — Medication 6 MG: at 21:25

## 2023-10-20 RX ADMIN — DILTIAZEM HYDROCHLORIDE 120 MG: 120 CAPSULE, COATED, EXTENDED RELEASE ORAL at 08:31

## 2023-10-20 RX ADMIN — PROMETHAZINE HYDROCHLORIDE 6.25 MG: 25 INJECTION INTRAMUSCULAR; INTRAVENOUS at 18:26

## 2023-10-20 RX ADMIN — SODIUM CHLORIDE, PRESERVATIVE FREE 10 ML: 5 INJECTION INTRAVENOUS at 08:32

## 2023-10-20 RX ADMIN — SODIUM CHLORIDE, PRESERVATIVE FREE 10 ML: 5 INJECTION INTRAVENOUS at 20:37

## 2023-10-20 RX ADMIN — PANTOPRAZOLE SODIUM 40 MG: 40 TABLET, DELAYED RELEASE ORAL at 08:32

## 2023-10-20 RX ADMIN — GABAPENTIN 300 MG: 300 CAPSULE ORAL at 15:49

## 2023-10-20 RX ADMIN — Medication 60 MG: at 00:27

## 2023-10-20 RX ADMIN — ACETAMINOPHEN 650 MG: 325 TABLET ORAL at 20:40

## 2023-10-20 RX ADMIN — AMIODARONE HYDROCHLORIDE 200 MG: 200 TABLET ORAL at 20:30

## 2023-10-20 RX ADMIN — ESCITALOPRAM 10 MG: 10 TABLET, FILM COATED ORAL at 20:40

## 2023-10-20 RX ADMIN — ACETAMINOPHEN 650 MG: 325 TABLET ORAL at 12:05

## 2023-10-20 RX ADMIN — Medication 60 MG: at 15:49

## 2023-10-20 RX ADMIN — SODIUM CHLORIDE, PRESERVATIVE FREE 10 ML: 5 INJECTION INTRAVENOUS at 08:39

## 2023-10-20 RX ADMIN — Medication 60 MG: at 12:05

## 2023-10-20 RX ADMIN — Medication 60 MG: at 23:41

## 2023-10-20 RX ADMIN — Medication 60 MG: at 08:31

## 2023-10-20 RX ADMIN — ENOXAPARIN SODIUM 40 MG: 100 INJECTION SUBCUTANEOUS at 08:32

## 2023-10-20 RX ADMIN — MORPHINE SULFATE 2 MG: 2 INJECTION, SOLUTION INTRAMUSCULAR; INTRAVENOUS at 21:26

## 2023-10-20 RX ADMIN — ONDANSETRON 4 MG: 2 INJECTION INTRAMUSCULAR; INTRAVENOUS at 13:15

## 2023-10-20 RX ADMIN — GABAPENTIN 300 MG: 300 CAPSULE ORAL at 08:32

## 2023-10-20 RX ADMIN — AMIODARONE HYDROCHLORIDE 200 MG: 200 TABLET ORAL at 08:32

## 2023-10-20 RX ADMIN — Medication 60 MG: at 20:01

## 2023-10-20 RX ADMIN — METOPROLOL TARTRATE 50 MG: 50 TABLET, FILM COATED ORAL at 20:30

## 2023-10-20 RX ADMIN — SODIUM CHLORIDE, PRESERVATIVE FREE 10 ML: 5 INJECTION INTRAVENOUS at 20:38

## 2023-10-20 RX ADMIN — Medication 60 MG: at 04:24

## 2023-10-20 ASSESSMENT — PAIN DESCRIPTION - LOCATION
LOCATION: HEAD

## 2023-10-20 ASSESSMENT — PAIN - FUNCTIONAL ASSESSMENT
PAIN_FUNCTIONAL_ASSESSMENT: ACTIVITIES ARE NOT PREVENTED
PAIN_FUNCTIONAL_ASSESSMENT: ACTIVITIES ARE NOT PREVENTED

## 2023-10-20 ASSESSMENT — PAIN DESCRIPTION - DESCRIPTORS
DESCRIPTORS: THROBBING
DESCRIPTORS: THROBBING

## 2023-10-20 ASSESSMENT — PAIN SCALES - GENERAL
PAINLEVEL_OUTOF10: 7
PAINLEVEL_OUTOF10: 10
PAINLEVEL_OUTOF10: 7
PAINLEVEL_OUTOF10: 7

## 2023-10-20 ASSESSMENT — PAIN DESCRIPTION - ORIENTATION
ORIENTATION: ANTERIOR
ORIENTATION: MID;ANTERIOR

## 2023-10-20 NOTE — PLAN OF CARE
Problem: Discharge Planning  Goal: Discharge to home or other facility with appropriate resources  10/20/2023 1732 by Ary Sanchez RN  Outcome: Progressing  10/20/2023 1731 by Ary Sanchez RN  Outcome: Progressing  10/20/2023 0436 by Jessica Reyes RN  Outcome: Progressing  Flowsheets (Taken 10/19/2023 2000)  Discharge to home or other facility with appropriate resources: Arrange for needed discharge resources and transportation as appropriate     Problem: Respiratory - Adult  Goal: Achieves optimal ventilation and oxygenation  10/20/2023 1732 by Ary Sanchez RN  Outcome: Progressing  10/20/2023 1731 by Ary Sanchez RN  Outcome: Progressing  10/20/2023 0436 by Jessica Reyes RN  Outcome: Progressing  Flowsheets (Taken 10/19/2023 2000)  Achieves optimal ventilation and oxygenation: Assess for changes in respiratory status     Problem: Pain  Goal: Verbalizes/displays adequate comfort level or baseline comfort level  10/20/2023 1732 by Ary Sanchez RN  Outcome: Progressing  10/20/2023 1731 by Ary Sanchez RN  Outcome: Progressing  10/20/2023 0436 by Jessica Reyes RN  Outcome: Progressing  Flowsheets (Taken 10/19/2023 2125)  Verbalizes/displays adequate comfort level or baseline comfort level: Administer analgesics based on type and severity of pain and evaluate response     Problem: Safety - Adult  Goal: Free from fall injury  10/20/2023 1732 by Ary Sanchez RN  Outcome: Progressing  10/20/2023 1731 by Ary Sanchez RN  Outcome: Progressing  10/20/2023 0436 by Jessica Reyes RN  Outcome: Progressing  Flowsheets (Taken 10/19/2023 2000)  Free From Fall Injury: Based on caregiver fall risk screen, instruct family/caregiver to ask for assistance with transferring infant if caregiver noted to have fall risk factors     Problem: Nutrition Deficit:  Goal: Optimize nutritional status  10/20/2023 1732 by Ary Sanchez RN  Outcome: Progressing  10/20/2023 1731

## 2023-10-20 NOTE — CARE COORDINATION
Transitional Planning  PM&R 10/19 note feels pt would benefit from ARU once medically stable after EVD removal and gtta d/c  Called pt spouse Jhon Slipper [phone] asked if he had made some decisions on ARU he made the following choices:  #1 [facility]  #2 [facility]  Referrals setn      15:15  Blanquita from [facility] rehab called they are unable to accept no beds and do not anticipate any in the near future.

## 2023-10-20 NOTE — PROGRESS NOTES
CREATININE 0.3 (L) 10/20/2023    BUN 11 10/20/2023    CO2 27 10/20/2023    TSH 1.50 10/11/2023    INR 1.0 10/11/2023    LABA1C 6.6 (H) 10/12/2023     24 HOUR INTAKE/OUTPUT:  Intake/Output Summary (Last 24 hours) at 10/20/2023 0655  Last data filed at 10/20/2023 0502  Gross per 24 hour   Intake 2582.87 ml   Output 2678.6 ml   Net -95.73 ml         IMAGING:   Vascular transcranial Doppler (TCD) complete         Vascular transcranial Doppler (TCD) complete         Vascular transcranial Doppler (TCD) complete   Final Result   1) No evidence of cerebral arterial vasospasm, occlusion or stenosis    2) Clinical correlation is recommended. Follow up studies as per the    primary team          XR CHEST PORTABLE   Final Result   Interstitial pulmonary edema suspected. Multifocal bilateral perihilar and bibasilar airspace disease appears   slightly improved. Vascular transcranial Doppler (TCD) complete   Final Result   1) No evidence of cerebral arterial vasospasm, occlusion or stenosis. Normal Complete TCD   2) Clinical correlation is recommended. Follow up studies as per the    primary team          CT HEAD WO CONTRAST   Final Result   1. Stable right frontal approach ventricular drainage catheter. Ventricles   are decreased in size from the prior study. 2.  Mildly improved but persistent bilateral acute subarachnoid hemorrhage,   centered in the basal cisterns. No new intracranial hemorrhage. 3.  No large acute territorial infarct. XR CHEST PORTABLE   Final Result   1. Interval removal of the endotracheal and enteric tubes. 2.  Progressed bilateral basilar and perihilar predominant heterogeneous   opacities. Differential considerations include atelectasis, mild pulmonary   edema and a developing infectious/inflammatory process. Continued attention   on follow-up recommended.          Vascular transcranial Doppler (TCD) complete   Final Result   1) Complete TCD with normal MCBFVs CARDIOVASCULAR:  Normal S1, S2, irregular rhythm, intermittently tachycardic, distal pulses intact    ABDOMEN:  Soft, no rigidity, normal bowel sounds    NEUROLOGIC:  Mental Status:  Alert and oriented x3. Following commands. GCS 15. Cranial Nerves:    II: Visual fields:  normal  III: Pupils:  equal, round, reactive to light  III,IV,VI: Extra Ocular Movements: intact  V: Facial sensation:  intact  VII: Facial strength: intact    Motor Exam:    Drift:  absent  Tone:  normal    Motor exam is symmetrical 5 out of 5 all extremities bilaterally    Sensory:    Touch:    Right Upper Extremity:  normal  Left Upper Extremity:  normal  Right Lower Extremity:  normal  Left Lower Extremity:  normal     DRAINS:  [x] EVD management per Neurosurgery. ASSESSMENT AND PLAN:        This patient is a 67 y.o. female with history of DM 2, HTN, HLD, tobacco abuse (smokes 1 PPD) and depression who presented to [facility] ED on 10/11/23 as a transfer from [address] [place] [address] for acute subarachnoid hemorrhage. Initially presented to [facility] ED via EMS after developing thunderclap headache with nausea/vomiting. Upon EMS arrival, patient was walking to the cot when she collapsed and was unresponsive. Intubated in the field and taken to [facility]. CT head showed diffuse SAH. Given 1g Keppra and transported to [facility] ED. CTA Head/Neck showed basilar tip aneurysm. Taken emergently to cerebral angiogram which showed the basilar tip aneurysm treated with WEB device. Admitted to the Neuro ICU post procedure. EVD placed by Neurosurgery 10/12 ~0030AM.  Found to have new onset atrial fibrillation, Cardiology consulted.      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

## 2023-10-20 NOTE — PROGRESS NOTES
Physical Therapy  Facility/Department: [address] NEURO ICU  Physical Therapy Daily Treatment Note    Name: Gilberto Campos  : 1950  MRN: [id number]  Date of Service: 10/20/2023    Discharge Recommendations:  Patient would benefit from continued therapy after discharge Further therapy recommended at discharge. The patient should be able to tolerate at least 3 hours of therapy per day over 5 days or 15 hours over 7 days. This patient may benefit from a Physical Medicine and Rehab consult. Patient Diagnosis(es): The primary encounter diagnosis was Subarachnoid bleed ([address]). 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) were also pertinent to this visit. Past Medical History:  has a past medical history of DM II (diabetes mellitus, type II), controlled ([address]) and HTN (hypertension). Past Surgical History:  has a past surgical history that includes Thyroid surgery; Breast reduction surgery (Bilateral); Hysterectomy; hernia repair; and Bladder surgery. Assessment   Body Structures, Functions, Activity Limitations Requiring Skilled Therapeutic Intervention: Decreased functional mobility ; Decreased safe awareness;Decreased fine motor control;Decreased coordination; Increased pain;Decreased posture;Decreased balance;Decreased strength  Assessment: Pt ambulated 75ft x2, using IV pole as AD and CGA, 1 static rest break. Sit<>stand x 4 trials. Pt would require 24-hr assistance with mobility and is not safe to return to prior living arrangements at this time, would benefit from continued, intense skilled therapeutic interventions to progress toward independence with safety in all mobility.   Therapy Prognosis: Good  Activity Tolerance  Activity Tolerance: Patient limited by fatigue;Patient limited by endurance     Plan   Physcial Therapy Plan  General Plan: 6-7 times per week  Current Treatment Recommendations: Strengthening, ROM, Balance training, Functional mobility training, Endurance training, Therapeutic activities, Gait training, Stair training, Safety education & training  Safety Devices  Type of Devices: Nurse notified, Gait belt, Call light within reach, Patient at risk for falls, Left in bed, Bed alarm in place  Restraints  Restraints Initially in Place: No     Restrictions  Restrictions/Precautions  Restrictions/Precautions: Seizure, General Precautions  Required Braces or Orthoses?: No  Position Activity Restriction  Other position/activity restrictions: Activity as tolerated. SBP <160. EVD. HOB elevated >30deg     Subjective   General  Patient assessed for rehabilitation services?: Yes  Response To Previous Treatment: Patient reporting fatigue but able to participate. Family / Caregiver Present: No  Follows Commands: Within Functional Limits  General Comment  Comments: NC O2 4L  Subjective  Subjective: Pt, RN agreeable to treatment this date. Pt in bed upon arrival, fatigued and reporting 7/10 headache pain upon arrival (same as it always is), but denies any exacerbation of pain by end of session; RN notified. Pt retired to bed, Macey Luis A declining to rest in chair, citing \"it hurts more. \" Pt polite and cooperative throughout encounter. Cognition   Orientation  Overall Orientation Status: Within Functional Limits  Orientation Level: Oriented X4  Cognition  Overall Cognitive Status: Exceptions  Following Commands:  Follows all commands without difficulty  Safety Judgement: Decreased awareness of need for assistance;Decreased awareness of need for safety  Problem Solving: Assistance required to generate solutions;Assistance required to implement solutions;Assistance required to correct errors made;Assistance required to identify errors made  Initiation: Requires cues for some  Sequencing: Requires cues for some     Objective   Bed mobility  Rolling to Left: Stand by assistance  Rolling to Right: Stand by assistance  Supine to Sit: Stand by

## 2023-10-20 NOTE — PROGRESS NOTES
Pt educated on importance of maintaining a ICP below 20. Pt educated on ways to decrease icp like neck in neutral position and HOB elevated. Pt refuses to keep hob elevated and to maintain a neutral neck position.

## 2023-10-20 NOTE — PROGRESS NOTES
[facility]  Progress Note                   Date:   10/20/2023  Patient name: Harvey Eisenmenger  Date of admission:  10/11/2023  2:52 PM  MRN:   [id number]  YOB: 1950  PCP: No primary care provider on file. Reason for Admission: Subarachnoid hemorrhage ([address]) [I60.9]  Subarachnoid bleed ([address]) [I60.9]    Subjective:       Clinical Changes /Abnormalities: No acute CV issues/concerns overnight. Tele/vitals/labs reviewed . HR stable overnight, but RVR currently. Pt sleeping. Medications:   Scheduled Meds:   insulin lispro  0-4 Units SubCUTAneous TID WC    insulin lispro  0-4 Units SubCUTAneous Nightly    amiodarone  200 mg Oral BID    metoprolol tartrate  50 mg Oral BID    dilTIAZem  120 mg Oral Daily    gabapentin  300 mg Oral BID    lidocaine 1 % injection  5 mL IntraDERmal Once    sodium chloride flush  5-40 mL IntraVENous 2 times per day    sodium chloride flush  5-40 mL IntraVENous 2 times per day    escitalopram  10 mg Oral Nightly    niMODipine  60 mg Oral 6 times per day    aspirin  81 mg Oral Daily    pantoprazole  40 mg Oral QAM AC    atorvastatin  10 mg Oral Nightly    sodium chloride flush  5-40 mL IntraVENous 2 times per day    enoxaparin  40 mg SubCUTAneous Daily    sodium chloride flush  5-40 mL IntraVENous 2 times per day     Continuous Infusions:   dextrose      sodium chloride      sodium chloride 75 mL/hr at 10/20/23 0723    sodium chloride       CBC:   Recent Labs     10/18/23  0336 10/19/23  0221 10/20/23  0515   WBC 18.3* 20.2* 17.8*   HGB 11.9 11.8* 12.6    314 438       BMP:    Recent Labs     10/18/23  0336 10/19/23  0221 10/20/23  0515   * 133* 128*   K 3.8 4.2 3.8   CL 93* 94* 90*   CO2 32* 32* 27   BUN 11 11 11   CREATININE 0.5 0.4* 0.3*   GLUCOSE 161* 193* 178*       Hepatic:No results for input(s): \"AST\", \"ALT\", \"ALB\", \"BILITOT\", \"ALKPHOS\" in the last 72 hours.   Troponin:   Recent Labs     10/20/23  0851   TROPHS 14       BNP: No

## 2023-10-20 NOTE — PLAN OF CARE
Problem: Discharge Planning  Goal: Discharge to home or other facility with appropriate resources  Outcome: Progressing  Flowsheets (Taken 10/19/2023 2000)  Discharge to home or other facility with appropriate resources: Arrange for needed discharge resources and transportation as appropriate     Problem: Respiratory - Adult  Goal: Achieves optimal ventilation and oxygenation  Outcome: Progressing  Flowsheets (Taken 10/19/2023 2000)  Achieves optimal ventilation and oxygenation: Assess for changes in respiratory status     Problem: Pain  Goal: Verbalizes/displays adequate comfort level or baseline comfort level  10/20/2023 0436 by Tessa Michaud RN  Outcome: Progressing  Flowsheets (Taken 10/19/2023 2125)  Verbalizes/displays adequate comfort level or baseline comfort level: Administer analgesics based on type and severity of pain and evaluate response  10/19/2023 1816 by Marianna Grace RN  Outcome: Progressing     Problem: Safety - Adult  Goal: Free from fall injury  10/20/2023 0436 by Tessa Michaud RN  Outcome: Progressing  Flowsheets (Taken 10/19/2023 2000)  Free From Fall Injury: Based on caregiver fall risk screen, instruct family/caregiver to ask for assistance with transferring infant if caregiver noted to have fall risk factors  10/19/2023 1816 by Marianna Grace RN  Outcome: Progressing     Problem: Nutrition Deficit:  Goal: Optimize nutritional status  10/20/2023 0436 by Tessa Michaud RN  Outcome: Progressing  10/19/2023 1816 by Marianna Grace RN  Outcome: Progressing  Flowsheets (Taken 10/19/2023 0928 by Mary Lou Solomon RD)  Nutrient intake appropriate for improving, restoring, or maintaining nutritional needs:   Assess nutritional status and recommend course of action   Monitor oral intake, labs, and treatment plans   Recommend appropriate diets, oral nutritional supplements, and vitamin/mineral supplements     Problem: Skin/Tissue Integrity  Goal: Absence of new skin

## 2023-10-21 ENCOUNTER — APPOINTMENT (OUTPATIENT)
Dept: VASCULAR LAB | Age: 73
DRG: 022 | End: 2023-10-21
Payer: MEDICARE

## 2023-10-21 LAB
ANION GAP SERPL CALCULATED.3IONS-SCNC: 10 MMOL/L (ref 9–17)
ANION GAP SERPL CALCULATED.3IONS-SCNC: 7 MMOL/L (ref 9–17)
BASOPHILS # BLD: 0 K/UL (ref 0–0.2)
BASOPHILS NFR BLD: 0 % (ref 0–2)
BUN SERPL-MCNC: 8 MG/DL (ref 8–23)
BUN SERPL-MCNC: 9 MG/DL (ref 8–23)
CALCIUM SERPL-MCNC: 7.9 MG/DL (ref 8.6–10.4)
CALCIUM SERPL-MCNC: 8 MG/DL (ref 8.6–10.4)
CHLORIDE SERPL-SCNC: 88 MMOL/L (ref 98–107)
CHLORIDE SERPL-SCNC: 89 MMOL/L (ref 98–107)
CO2 SERPL-SCNC: 28 MMOL/L (ref 20–31)
CO2 SERPL-SCNC: 30 MMOL/L (ref 20–31)
CREAT SERPL-MCNC: 0.3 MG/DL (ref 0.5–0.9)
CREAT SERPL-MCNC: 0.3 MG/DL (ref 0.5–0.9)
EOSINOPHIL # BLD: 0 K/UL (ref 0–0.4)
EOSINOPHILS RELATIVE PERCENT: 0 % (ref 1–4)
ERYTHROCYTE [DISTWIDTH] IN BLOOD BY AUTOMATED COUNT: 13.1 % (ref 11.8–14.4)
GFR SERPL CREATININE-BSD FRML MDRD: >60 ML/MIN/1.73M2
GFR SERPL CREATININE-BSD FRML MDRD: >60 ML/MIN/1.73M2
GLUCOSE BLD-MCNC: 149 MG/DL (ref 65–105)
GLUCOSE BLD-MCNC: 182 MG/DL (ref 65–105)
GLUCOSE BLD-MCNC: 186 MG/DL (ref 65–105)
GLUCOSE BLD-MCNC: 213 MG/DL (ref 65–105)
GLUCOSE SERPL-MCNC: 147 MG/DL (ref 70–99)
GLUCOSE SERPL-MCNC: 171 MG/DL (ref 70–99)
HCT VFR BLD AUTO: 41.3 % (ref 36.3–47.1)
HGB BLD-MCNC: 13.7 G/DL (ref 11.9–15.1)
IMM GRANULOCYTES # BLD AUTO: 0 K/UL (ref 0–0.3)
IMM GRANULOCYTES NFR BLD: 0 %
LYMPHOCYTES NFR BLD: 0.74 K/UL (ref 1–4.8)
LYMPHOCYTES RELATIVE PERCENT: 4 % (ref 24–44)
MAGNESIUM SERPL-MCNC: 1.9 MG/DL (ref 1.6–2.6)
MAGNESIUM SERPL-MCNC: 2 MG/DL (ref 1.6–2.6)
MCH RBC QN AUTO: 30.7 PG (ref 25.2–33.5)
MCHC RBC AUTO-ENTMCNC: 33.2 G/DL (ref 28.4–34.8)
MCV RBC AUTO: 92.6 FL (ref 82.6–102.9)
MONOCYTES NFR BLD: 1.29 K/UL (ref 0.1–0.8)
MONOCYTES NFR BLD: 7 % (ref 1–7)
MORPHOLOGY: NORMAL
NEUTROPHILS NFR BLD: 89 % (ref 36–66)
NEUTS SEG NFR BLD: 16.37 K/UL (ref 1.8–7.7)
NRBC BLD-RTO: 0 PER 100 WBC
OSMOLALITY SERPL: 285 MOSM/KG (ref 275–295)
OSMOLALITY UR: 448 MOSM/KG (ref 80–1300)
PLATELET # BLD AUTO: 358 K/UL (ref 138–453)
PMV BLD AUTO: 11.4 FL (ref 8.1–13.5)
POTASSIUM SERPL-SCNC: 3.2 MMOL/L (ref 3.7–5.3)
POTASSIUM SERPL-SCNC: 3.4 MMOL/L (ref 3.7–5.3)
RBC # BLD AUTO: 4.46 M/UL (ref 3.95–5.11)
SODIUM SERPL-SCNC: 125 MMOL/L (ref 135–144)
SODIUM SERPL-SCNC: 127 MMOL/L (ref 135–144)
SODIUM UR-SCNC: 87 MMOL/L
WBC OTHER # BLD: 18.4 K/UL (ref 3.5–11.3)

## 2023-10-21 PROCEDURE — 2500000003 HC RX 250 WO HCPCS: Performed by: SURGERY

## 2023-10-21 PROCEDURE — 2000000000 HC ICU R&B

## 2023-10-21 PROCEDURE — 2580000003 HC RX 258: Performed by: NURSE PRACTITIONER

## 2023-10-21 PROCEDURE — 93886 INTRACRANIAL COMPLETE STUDY: CPT | Performed by: PSYCHIATRY & NEUROLOGY

## 2023-10-21 PROCEDURE — 6370000000 HC RX 637 (ALT 250 FOR IP): Performed by: NURSE PRACTITIONER

## 2023-10-21 PROCEDURE — 6360000002 HC RX W HCPCS: Performed by: STUDENT IN AN ORGANIZED HEALTH CARE EDUCATION/TRAINING PROGRAM

## 2023-10-21 PROCEDURE — 99233 SBSQ HOSP IP/OBS HIGH 50: CPT | Performed by: PSYCHIATRY & NEUROLOGY

## 2023-10-21 PROCEDURE — 84300 ASSAY OF URINE SODIUM: CPT

## 2023-10-21 PROCEDURE — 2580000003 HC RX 258: Performed by: STUDENT IN AN ORGANIZED HEALTH CARE EDUCATION/TRAINING PROGRAM

## 2023-10-21 PROCEDURE — 36415 COLL VENOUS BLD VENIPUNCTURE: CPT

## 2023-10-21 PROCEDURE — 6370000000 HC RX 637 (ALT 250 FOR IP): Performed by: PSYCHIATRY & NEUROLOGY

## 2023-10-21 PROCEDURE — 99232 SBSQ HOSP IP/OBS MODERATE 35: CPT | Performed by: PSYCHIATRY & NEUROLOGY

## 2023-10-21 PROCEDURE — 6370000000 HC RX 637 (ALT 250 FOR IP): Performed by: STUDENT IN AN ORGANIZED HEALTH CARE EDUCATION/TRAINING PROGRAM

## 2023-10-21 PROCEDURE — 80048 BASIC METABOLIC PNL TOTAL CA: CPT

## 2023-10-21 PROCEDURE — 6360000002 HC RX W HCPCS: Performed by: NURSE PRACTITIONER

## 2023-10-21 PROCEDURE — 93886 INTRACRANIAL COMPLETE STUDY: CPT

## 2023-10-21 PROCEDURE — 82947 ASSAY GLUCOSE BLOOD QUANT: CPT

## 2023-10-21 PROCEDURE — 6370000000 HC RX 637 (ALT 250 FOR IP): Performed by: SURGERY

## 2023-10-21 PROCEDURE — 99233 SBSQ HOSP IP/OBS HIGH 50: CPT | Performed by: NURSE PRACTITIONER

## 2023-10-21 PROCEDURE — 83935 ASSAY OF URINE OSMOLALITY: CPT

## 2023-10-21 PROCEDURE — 85025 COMPLETE CBC W/AUTO DIFF WBC: CPT

## 2023-10-21 PROCEDURE — 83735 ASSAY OF MAGNESIUM: CPT

## 2023-10-21 RX ORDER — POTASSIUM CHLORIDE 7.45 MG/ML
10 INJECTION INTRAVENOUS
Status: COMPLETED | OUTPATIENT
Start: 2023-10-21 | End: 2023-10-21

## 2023-10-21 RX ORDER — FLUDROCORTISONE ACETATE 0.1 MG/1
0.1 TABLET ORAL 2 TIMES DAILY
Status: DISCONTINUED | OUTPATIENT
Start: 2023-10-21 | End: 2023-10-23

## 2023-10-21 RX ORDER — MAGNESIUM SULFATE 1 G/100ML
1000 INJECTION INTRAVENOUS ONCE
Status: COMPLETED | OUTPATIENT
Start: 2023-10-21 | End: 2023-10-21

## 2023-10-21 RX ADMIN — ASPIRIN 81 MG 81 MG: 81 TABLET ORAL at 08:06

## 2023-10-21 RX ADMIN — POTASSIUM CHLORIDE 10 MEQ: 10 INJECTION, SOLUTION INTRAVENOUS at 11:48

## 2023-10-21 RX ADMIN — FLUDROCORTISONE ACETATE 0.1 MG: 0.1 TABLET ORAL at 08:08

## 2023-10-21 RX ADMIN — SODIUM CHLORIDE, PRESERVATIVE FREE 10 ML: 5 INJECTION INTRAVENOUS at 08:38

## 2023-10-21 RX ADMIN — Medication 60 MG: at 04:19

## 2023-10-21 RX ADMIN — ONDANSETRON 4 MG: 2 INJECTION INTRAMUSCULAR; INTRAVENOUS at 08:06

## 2023-10-21 RX ADMIN — INSULIN LISPRO 1 UNITS: 100 INJECTION, SOLUTION INTRAVENOUS; SUBCUTANEOUS at 11:58

## 2023-10-21 RX ADMIN — SODIUM CHLORIDE, PRESERVATIVE FREE 10 ML: 5 INJECTION INTRAVENOUS at 21:17

## 2023-10-21 RX ADMIN — POTASSIUM BICARBONATE 40 MEQ: 782 TABLET, EFFERVESCENT ORAL at 17:38

## 2023-10-21 RX ADMIN — ESCITALOPRAM 10 MG: 10 TABLET, FILM COATED ORAL at 21:16

## 2023-10-21 RX ADMIN — MAGNESIUM SULFATE HEPTAHYDRATE 1000 MG: 1 INJECTION, SOLUTION INTRAVENOUS at 08:23

## 2023-10-21 RX ADMIN — POTASSIUM CHLORIDE 10 MEQ: 10 INJECTION, SOLUTION INTRAVENOUS at 06:54

## 2023-10-21 RX ADMIN — METOPROLOL TARTRATE 5 MG: 5 INJECTION INTRAVENOUS at 17:32

## 2023-10-21 RX ADMIN — GABAPENTIN 300 MG: 300 CAPSULE ORAL at 08:06

## 2023-10-21 RX ADMIN — METOPROLOL TARTRATE 5 MG: 5 INJECTION INTRAVENOUS at 04:31

## 2023-10-21 RX ADMIN — Medication 60 MG: at 11:49

## 2023-10-21 RX ADMIN — POTASSIUM CHLORIDE 10 MEQ: 10 INJECTION, SOLUTION INTRAVENOUS at 08:16

## 2023-10-21 RX ADMIN — Medication 60 MG: at 16:26

## 2023-10-21 RX ADMIN — Medication 60 MG: at 08:06

## 2023-10-21 RX ADMIN — ACETAMINOPHEN 650 MG: 325 TABLET ORAL at 14:14

## 2023-10-21 RX ADMIN — ENOXAPARIN SODIUM 40 MG: 100 INJECTION SUBCUTANEOUS at 08:06

## 2023-10-21 RX ADMIN — METOPROLOL TARTRATE 50 MG: 50 TABLET, FILM COATED ORAL at 08:06

## 2023-10-21 RX ADMIN — FLUDROCORTISONE ACETATE 0.1 MG: 0.1 TABLET ORAL at 21:16

## 2023-10-21 RX ADMIN — ACETAMINOPHEN 650 MG: 325 TABLET ORAL at 21:15

## 2023-10-21 RX ADMIN — ATORVASTATIN CALCIUM 10 MG: 10 TABLET, FILM COATED ORAL at 21:15

## 2023-10-21 RX ADMIN — AMIODARONE HYDROCHLORIDE 200 MG: 200 TABLET ORAL at 21:15

## 2023-10-21 RX ADMIN — SODIUM CHLORIDE 50 ML/HR: 234 INJECTION, SOLUTION INTRAVENOUS at 18:39

## 2023-10-21 RX ADMIN — METOPROLOL TARTRATE 50 MG: 50 TABLET, FILM COATED ORAL at 21:15

## 2023-10-21 RX ADMIN — PANTOPRAZOLE SODIUM 40 MG: 40 TABLET, DELAYED RELEASE ORAL at 06:44

## 2023-10-21 RX ADMIN — ACETAMINOPHEN 650 MG: 325 TABLET ORAL at 06:44

## 2023-10-21 RX ADMIN — Medication 60 MG: at 21:18

## 2023-10-21 RX ADMIN — GABAPENTIN 300 MG: 300 CAPSULE ORAL at 14:14

## 2023-10-21 RX ADMIN — POTASSIUM CHLORIDE 10 MEQ: 10 INJECTION, SOLUTION INTRAVENOUS at 10:34

## 2023-10-21 RX ADMIN — AMIODARONE HYDROCHLORIDE 200 MG: 200 TABLET ORAL at 08:06

## 2023-10-21 RX ADMIN — DILTIAZEM HYDROCHLORIDE 120 MG: 120 CAPSULE, COATED, EXTENDED RELEASE ORAL at 08:06

## 2023-10-21 RX ADMIN — GABAPENTIN 300 MG: 300 CAPSULE ORAL at 21:15

## 2023-10-21 RX ADMIN — POTASSIUM BICARBONATE 40 MEQ: 782 TABLET, EFFERVESCENT ORAL at 08:06

## 2023-10-21 ASSESSMENT — PAIN DESCRIPTION - DESCRIPTORS
DESCRIPTORS: THROBBING

## 2023-10-21 ASSESSMENT — PAIN DESCRIPTION - LOCATION
LOCATION: HEAD

## 2023-10-21 ASSESSMENT — PAIN DESCRIPTION - ORIENTATION
ORIENTATION: OTHER (COMMENT)
ORIENTATION: RIGHT;LEFT;ANTERIOR
ORIENTATION: MID;ANTERIOR

## 2023-10-21 ASSESSMENT — PAIN SCALES - GENERAL
PAINLEVEL_OUTOF10: 7
PAINLEVEL_OUTOF10: 7
PAINLEVEL_OUTOF10: 8
PAINLEVEL_OUTOF10: 4

## 2023-10-21 NOTE — PROGRESS NOTES
Daily Progress Note  Neuro Critical Care    Patient Name: Sarah Butler  Patient : 1950  Room/Bed: 0130/0130-01  Code Status: Full  Allergies: No Known Allergies    CHIEF COMPLAINT:     Headache, neck pain, nausea     INTERVAL HISTORY    Initial Presentation (Admitted 10/11/23): The patient is a 67 y.o. female with history of HTN, HLD, and smoking presented as a transfer from [facility] for Diffuse SAH. Patient was at home this morning when around 0930 she developed acute onset severe headache. EMS was called, patient was walking to the cot when she became unresponsive. She was intubated and taken to [facility] where CT head showed diffuse SAH. She was loaded with 1g Keppra and started on Propofol for sedation. On arrival to [facility] with propofol held, patient opens eyes to verbal stimulation. Pupils are 4mm reactive bilaterally. Cough intact. Moving all extremities spontaneously, localizing bilateral upper extremities. Wiggles toes bilaterally to command. CTA head/neck showed basilar tip aneurysm. Given additional 1g Keppra. SBP goal < 120. Irregular rhythm noted on monitor, follow up EKG and troponin. Endovascular and neurosurgery consulted. Of note, patient's  does report she takes Aspirin 81mg daily for general wellness. Bower&Gates: 5, Modified Viramontes: IV    Taken urgently to cerebral angiogram showing the basilar tip aneurysm treated with WEB device. EVD was placed overnight and set at 10 cmH20. CT head showed stable SAH and well positioned EVD. Aspirin 81mg daily started after EVD placement. Hospital Course:   10/12: Opens eyes, following commands. Extubated without difficulty. Unasyn started for aspiration pneumonia. 10/13: Started on Regular diet. Baseline TCD normal.  ?New onset atrial fibrillation. 10/14: EVD set at 10mmHg this morning, ICP 4-12, 255cc out/24h. EVD transiently increased to 15mmHg per Neurosurgery.   ICP never significantly elevated but increased to

## 2023-10-21 NOTE — PROGRESS NOTES
Endovascular Neurosurgery Progress Note    SUBJECTIVE:     NAEO. EVD weaning process. Review of Systems:  CONSTITUTIONAL:  negative for fevers, chills, fatigue and malaise    EYES:  negative for double vision, blurred vision and photophobia     HEENT:  negative for tinnitus, epistaxis and sore throat    RESPIRATORY:  negative for cough, shortness of breath, wheezing    CARDIOVASCULAR:  negative for chest pain, palpitations, syncope, edema    GASTROINTESTINAL:  negative for nausea, vomiting    GENITOURINARY:  negative for incontinence    MUSCULOSKELETAL:  negative for neck or back pain    NEUROLOGICAL:  Negative for weakness and tingling  negative for headaches and dizziness    PSYCHIATRIC:  negative for anxiety      Review of systems otherwise negative. OBJECTIVE:     Vitals:    10/21/23 0400   BP: (!) 135/108   Pulse: 99   Resp: 14   Temp:    SpO2: 97%      General:  Gen: normal habitus, NAD  HEENT: NCAT, mucosa moist  Cvs: RRR, S1 S2 normal  Resp: symmetric unlabored breathing  Abd: s/nd/nt  Ext: no edema; R radial puncture site has some mild bruising, no tenderness, and appropriate capillary refill  Skin: no lesions seen, warm and dry    Neuro:  Gen: awake and alert, oriented x3. Lang/speech: no aphasia or dysarthria. Follows commands. CN: PERRL, EOMI, VFF, V1-3 intact, face symmetric, hearing intact, shoulder shrug symmetric, tongue midline, uvula midline with symmetric palate raise  Motor: grossly 5/5 UE and LE b/l  Sense: LT intact in all 4 ext. Coord: FTN and HTS intact b/l  DTR: 2+ in all 4 ext. Gait: deferred    NIH Stroke Scale:   1a  Level of consciousness: 0 - alert; keenly responsive   1b. LOC questions:  0 - answers both questions correctly   1c. LOC commands: 0 - performs both tasks correctly   2. Best Gaze: 0 - normal   3. Visual: 0 - no visual loss   4. Facial Palsy: 0 - normal symmetric movement   5a.  Motor left arm: 0 - no drift, limb holds 90 (or 45) degrees for full 10 seconds

## 2023-10-21 NOTE — CARE COORDINATION
Transition Planning    Pt's spouse at bedside. Updated no beds at [facility]. Agreeable with [facility] at discharge. He has questions about the facility. Gave him the phone number for [facility] ans encouraged him to call and possible arrange a tour of facility. Pt's spouse grateful for information.

## 2023-10-21 NOTE — PLAN OF CARE
Problem: Discharge Planning  Goal: Discharge to home or other facility with appropriate resources  Outcome: Progressing     Problem: Respiratory - Adult  Goal: Achieves optimal ventilation and oxygenation  Outcome: Progressing     Problem: Pain  Goal: Verbalizes/displays adequate comfort level or baseline comfort level  Outcome: Progressing     Problem: Safety - Adult  Goal: Free from fall injury  Outcome: Progressing     Problem: Nutrition Deficit:  Goal: Optimize nutritional status  Outcome: Progressing     Problem: Skin/Tissue Integrity  Goal: Absence of new skin breakdown  Description: 1. Monitor for areas of redness and/or skin breakdown  2. Assess vascular access sites hourly  3. Every 4-6 hours minimum:  Change oxygen saturation probe site  4. Every 4-6 hours:  If on nasal continuous positive airway pressure, respiratory therapy assess nares and determine need for appliance change or resting period.   Outcome: Progressing     Problem: ABCDS Injury Assessment  Goal: Absence of physical injury  Outcome: Progressing     Problem: Chronic Conditions and Co-morbidities  Goal: Patient's chronic conditions and co-morbidity symptoms are monitored and maintained or improved  Outcome: Progressing

## 2023-10-21 NOTE — PLAN OF CARE
Problem: Discharge Planning  Goal: Discharge to home or other facility with appropriate resources  10/21/2023 0350 by Arlene Oneal RN  Outcome: Progressing  Flowsheets  Taken 10/21/2023 0000  Discharge to home or other facility with appropriate resources:   Identify barriers to discharge with patient and caregiver   Arrange for needed discharge resources and transportation as appropriate   Identify discharge learning needs (meds, wound care, etc)   Refer to discharge planning if patient needs post-hospital services based on physician order or complex needs related to functional status, cognitive ability or social support system  Taken 10/20/2023 2000  Discharge to home or other facility with appropriate resources:   Identify barriers to discharge with patient and caregiver   Arrange for needed discharge resources and transportation as appropriate   Identify discharge learning needs (meds, wound care, etc)   Refer to discharge planning if patient needs post-hospital services based on physician order or complex needs related to functional status, cognitive ability or social support system  10/20/2023 1732 by Karan Polo RN  Outcome: Progressing  10/20/2023 1731 by Karan Polo RN  Outcome: Progressing     Problem: Respiratory - Adult  Goal: Achieves optimal ventilation and oxygenation  10/21/2023 0350 by Arlene Oneal RN  Outcome: Progressing  Flowsheets  Taken 10/21/2023 0000  Achieves optimal ventilation and oxygenation:   Assess for changes in respiratory status   Assess for changes in mentation and behavior   Position to facilitate oxygenation and minimize respiratory effort   Oxygen supplementation based on oxygen saturation or arterial blood gases   Encourage broncho-pulmonary hygiene including cough, deep breathe, incentive spirometry   Assess and instruct to report shortness of breath or any respiratory difficulty   Respiratory therapy support as indicated  Taken 10/20/2023 2000  Achieves Shanice Clements RN  Outcome: Progressing  10/20/2023 1731 by Franklin Newberry RN  Outcome: Progressing     Problem: Skin/Tissue Integrity  Goal: Absence of new skin breakdown  Description: 1. Monitor for areas of redness and/or skin breakdown  2. Assess vascular access sites hourly  3. Every 4-6 hours minimum:  Change oxygen saturation probe site  4. Every 4-6 hours:  If on nasal continuous positive airway pressure, respiratory therapy assess nares and determine need for appliance change or resting period.   10/21/2023 0350 by Yanci Anna RN  Outcome: Progressing  10/20/2023 1732 by Franklin Newberry RN  Outcome: Progressing  10/20/2023 1731 by Franklin Newberry RN  Outcome: Progressing     Problem: ABCDS Injury Assessment  Goal: Absence of physical injury  10/21/2023 0350 by Yanci Anna RN  Outcome: Progressing  Flowsheets (Taken 10/20/2023 2000)  Absence of Physical Injury: Implement safety measures based on patient assessment  10/20/2023 1732 by Franklin Newberry RN  Outcome: Progressing  10/20/2023 1731 by Franklin Newberry RN  Outcome: Progressing     Problem: Chronic Conditions and Co-morbidities  Goal: Patient's chronic conditions and co-morbidity symptoms are monitored and maintained or improved  10/21/2023 0350 by Yanci Anna RN  Outcome: Progressing  Flowsheets  Taken 10/21/2023 0000  Care Plan - Patient's Chronic Conditions and Co-Morbidity Symptoms are Monitored and Maintained or Improved:   Monitor and assess patient's chronic conditions and comorbid symptoms for stability, deterioration, or improvement   Collaborate with multidisciplinary team to address chronic and comorbid conditions and prevent exacerbation or deterioration   Update acute care plan with appropriate goals if chronic or comorbid symptoms are exacerbated and prevent overall improvement and discharge  Taken 10/20/2023 [address] - Patient's Chronic Conditions and Co-Morbidity Symptoms are Monitored and Maintained or

## 2023-10-21 NOTE — PROGRESS NOTES
[facility]  Progress Note                   Date:   10/21/2023  Patient name: Joao Boswell  Date of admission:  10/11/2023  2:52 PM  MRN:   [id number]  YOB: 1950  PCP: No primary care provider on file. Reason for Admission: Subarachnoid hemorrhage ([address]) [I60.9]  Subarachnoid bleed ([address]) [I60.9]    Subjective:       Clinical Changes /Abnormalities: No acute CV issues/concerns overnight. Tele/vitals/labs reviewed . HR stable overnight, but continues to have episodes of RVR. While NSR pt is bradycardic in the 40-50's.         Medications:   Scheduled Meds:   potassium chloride  10 mEq IntraVENous Q1H    magnesium sulfate  1,000 mg IntraVENous Once    potassium bicarb-citric acid  40 mEq Oral Once    gabapentin  300 mg Oral TID    insulin lispro  0-4 Units SubCUTAneous TID WC    insulin lispro  0-4 Units SubCUTAneous Nightly    amiodarone  200 mg Oral BID    metoprolol tartrate  50 mg Oral BID    dilTIAZem  120 mg Oral Daily    lidocaine 1 % injection  5 mL IntraDERmal Once    sodium chloride flush  5-40 mL IntraVENous 2 times per day    sodium chloride flush  5-40 mL IntraVENous 2 times per day    escitalopram  10 mg Oral Nightly    niMODipine  60 mg Oral 6 times per day    aspirin  81 mg Oral Daily    pantoprazole  40 mg Oral QAM AC    atorvastatin  10 mg Oral Nightly    sodium chloride flush  5-40 mL IntraVENous 2 times per day    enoxaparin  40 mg SubCUTAneous Daily    sodium chloride flush  5-40 mL IntraVENous 2 times per day     Continuous Infusions:   dextrose      sodium chloride      sodium chloride 75 mL/hr at 10/21/23 0400    sodium chloride       CBC:   Recent Labs     10/19/23  0221 10/20/23  0515 10/21/23  0414   WBC 20.2* 17.8* 18.4*   HGB 11.8* 12.6 13.7    438 358       BMP:    Recent Labs     10/20/23  0515 10/20/23  1436 10/21/23  0414   * 129* 127*   K 3.8 4.0 3.2*   CL 90* 90* 89*   CO2 27 29 28   BUN 11 11 8   CREATININE 0.3* 0.4* 0.3*

## 2023-10-22 ENCOUNTER — APPOINTMENT (OUTPATIENT)
Dept: VASCULAR LAB | Age: 73
DRG: 022 | End: 2023-10-22
Payer: MEDICARE

## 2023-10-22 LAB
ANION GAP SERPL CALCULATED.3IONS-SCNC: 11 MMOL/L (ref 9–17)
BUN SERPL-MCNC: 7 MG/DL (ref 8–23)
CALCIUM SERPL-MCNC: 7.9 MG/DL (ref 8.6–10.4)
CHLORIDE SERPL-SCNC: 93 MMOL/L (ref 98–107)
CO2 SERPL-SCNC: 27 MMOL/L (ref 20–31)
CREAT SERPL-MCNC: 0.3 MG/DL (ref 0.5–0.9)
ERYTHROCYTE [DISTWIDTH] IN BLOOD BY AUTOMATED COUNT: 12.9 % (ref 11.8–14.4)
GFR SERPL CREATININE-BSD FRML MDRD: >60 ML/MIN/1.73M2
GLUCOSE BLD-MCNC: 114 MG/DL (ref 65–105)
GLUCOSE BLD-MCNC: 164 MG/DL (ref 65–105)
GLUCOSE BLD-MCNC: 218 MG/DL (ref 65–105)
GLUCOSE SERPL-MCNC: 152 MG/DL (ref 70–99)
HCT VFR BLD AUTO: 42.7 % (ref 36.3–47.1)
HGB BLD-MCNC: 14.6 G/DL (ref 11.9–15.1)
MAGNESIUM SERPL-MCNC: 2.1 MG/DL (ref 1.6–2.6)
MCH RBC QN AUTO: 31.1 PG (ref 25.2–33.5)
MCHC RBC AUTO-ENTMCNC: 34.2 G/DL (ref 28.4–34.8)
MCV RBC AUTO: 90.9 FL (ref 82.6–102.9)
NRBC BLD-RTO: 0 PER 100 WBC
PLATELET # BLD AUTO: 346 K/UL (ref 138–453)
PMV BLD AUTO: 11.5 FL (ref 8.1–13.5)
POTASSIUM SERPL-SCNC: 3.5 MMOL/L (ref 3.7–5.3)
RBC # BLD AUTO: 4.7 M/UL (ref 3.95–5.11)
SODIUM SERPL-SCNC: 128 MMOL/L (ref 135–144)
SODIUM SERPL-SCNC: 131 MMOL/L (ref 135–144)
SODIUM SERPL-SCNC: 132 MMOL/L (ref 135–144)
WBC OTHER # BLD: 18.9 K/UL (ref 3.5–11.3)

## 2023-10-22 PROCEDURE — 83735 ASSAY OF MAGNESIUM: CPT

## 2023-10-22 PROCEDURE — 6370000000 HC RX 637 (ALT 250 FOR IP): Performed by: NURSE PRACTITIONER

## 2023-10-22 PROCEDURE — 82947 ASSAY GLUCOSE BLOOD QUANT: CPT

## 2023-10-22 PROCEDURE — 2580000003 HC RX 258: Performed by: NURSE PRACTITIONER

## 2023-10-22 PROCEDURE — 2500000003 HC RX 250 WO HCPCS: Performed by: SURGERY

## 2023-10-22 PROCEDURE — 36415 COLL VENOUS BLD VENIPUNCTURE: CPT

## 2023-10-22 PROCEDURE — 2580000003 HC RX 258

## 2023-10-22 PROCEDURE — 99233 SBSQ HOSP IP/OBS HIGH 50: CPT | Performed by: PSYCHIATRY & NEUROLOGY

## 2023-10-22 PROCEDURE — 84295 ASSAY OF SERUM SODIUM: CPT

## 2023-10-22 PROCEDURE — 6370000000 HC RX 637 (ALT 250 FOR IP): Performed by: PSYCHIATRY & NEUROLOGY

## 2023-10-22 PROCEDURE — 6370000000 HC RX 637 (ALT 250 FOR IP): Performed by: STUDENT IN AN ORGANIZED HEALTH CARE EDUCATION/TRAINING PROGRAM

## 2023-10-22 PROCEDURE — 85027 COMPLETE CBC AUTOMATED: CPT

## 2023-10-22 PROCEDURE — 2000000000 HC ICU R&B

## 2023-10-22 PROCEDURE — 93886 INTRACRANIAL COMPLETE STUDY: CPT

## 2023-10-22 PROCEDURE — 6360000002 HC RX W HCPCS: Performed by: STUDENT IN AN ORGANIZED HEALTH CARE EDUCATION/TRAINING PROGRAM

## 2023-10-22 PROCEDURE — 6370000000 HC RX 637 (ALT 250 FOR IP): Performed by: SURGERY

## 2023-10-22 PROCEDURE — 99233 SBSQ HOSP IP/OBS HIGH 50: CPT | Performed by: NURSE PRACTITIONER

## 2023-10-22 PROCEDURE — 80048 BASIC METABOLIC PNL TOTAL CA: CPT

## 2023-10-22 PROCEDURE — 2580000003 HC RX 258: Performed by: STUDENT IN AN ORGANIZED HEALTH CARE EDUCATION/TRAINING PROGRAM

## 2023-10-22 PROCEDURE — 6360000002 HC RX W HCPCS: Performed by: NURSE PRACTITIONER

## 2023-10-22 PROCEDURE — 99232 SBSQ HOSP IP/OBS MODERATE 35: CPT | Performed by: PSYCHIATRY & NEUROLOGY

## 2023-10-22 PROCEDURE — 93886 INTRACRANIAL COMPLETE STUDY: CPT | Performed by: PSYCHIATRY & NEUROLOGY

## 2023-10-22 RX ORDER — FENTANYL CITRATE 50 UG/ML
INJECTION, SOLUTION INTRAMUSCULAR; INTRAVENOUS
Status: DISPENSED
Start: 2023-10-22 | End: 2023-10-23

## 2023-10-22 RX ORDER — POTASSIUM CHLORIDE 7.45 MG/ML
10 INJECTION INTRAVENOUS
Status: COMPLETED | OUTPATIENT
Start: 2023-10-22 | End: 2023-10-22

## 2023-10-22 RX ADMIN — ACETAMINOPHEN 650 MG: 325 TABLET ORAL at 03:33

## 2023-10-22 RX ADMIN — INSULIN LISPRO 1 UNITS: 100 INJECTION, SOLUTION INTRAVENOUS; SUBCUTANEOUS at 12:34

## 2023-10-22 RX ADMIN — ASPIRIN 81 MG 81 MG: 81 TABLET ORAL at 08:20

## 2023-10-22 RX ADMIN — SODIUM CHLORIDE 50 ML/HR: 234 INJECTION, SOLUTION INTRAVENOUS at 08:50

## 2023-10-22 RX ADMIN — FLUDROCORTISONE ACETATE 0.1 MG: 0.1 TABLET ORAL at 20:54

## 2023-10-22 RX ADMIN — PANTOPRAZOLE SODIUM 40 MG: 40 TABLET, DELAYED RELEASE ORAL at 08:20

## 2023-10-22 RX ADMIN — POTASSIUM CHLORIDE 10 MEQ: 10 INJECTION, SOLUTION INTRAVENOUS at 13:09

## 2023-10-22 RX ADMIN — METOPROLOL TARTRATE 50 MG: 50 TABLET, FILM COATED ORAL at 20:54

## 2023-10-22 RX ADMIN — Medication 60 MG: at 05:46

## 2023-10-22 RX ADMIN — DILTIAZEM HYDROCHLORIDE 120 MG: 120 CAPSULE, COATED, EXTENDED RELEASE ORAL at 08:20

## 2023-10-22 RX ADMIN — Medication 60 MG: at 08:20

## 2023-10-22 RX ADMIN — SODIUM CHLORIDE 50 ML/HR: 234 INJECTION, SOLUTION INTRAVENOUS at 04:45

## 2023-10-22 RX ADMIN — METOPROLOL TARTRATE 5 MG: 5 INJECTION INTRAVENOUS at 06:08

## 2023-10-22 RX ADMIN — GABAPENTIN 300 MG: 300 CAPSULE ORAL at 20:54

## 2023-10-22 RX ADMIN — ENOXAPARIN SODIUM 40 MG: 100 INJECTION SUBCUTANEOUS at 08:21

## 2023-10-22 RX ADMIN — SODIUM CHLORIDE, PRESERVATIVE FREE 10 ML: 5 INJECTION INTRAVENOUS at 20:57

## 2023-10-22 RX ADMIN — ACETAMINOPHEN 650 MG: 325 TABLET ORAL at 13:33

## 2023-10-22 RX ADMIN — ACETAMINOPHEN 650 MG: 325 TABLET ORAL at 20:55

## 2023-10-22 RX ADMIN — POTASSIUM BICARBONATE 40 MEQ: 782 TABLET, EFFERVESCENT ORAL at 08:20

## 2023-10-22 RX ADMIN — AMIODARONE HYDROCHLORIDE 200 MG: 200 TABLET ORAL at 08:20

## 2023-10-22 RX ADMIN — Medication 60 MG: at 20:55

## 2023-10-22 RX ADMIN — SODIUM CHLORIDE, PRESERVATIVE FREE 10 ML: 5 INJECTION INTRAVENOUS at 08:21

## 2023-10-22 RX ADMIN — ATORVASTATIN CALCIUM 10 MG: 10 TABLET, FILM COATED ORAL at 20:56

## 2023-10-22 RX ADMIN — POTASSIUM CHLORIDE 10 MEQ: 10 INJECTION, SOLUTION INTRAVENOUS at 08:35

## 2023-10-22 RX ADMIN — POTASSIUM CHLORIDE 10 MEQ: 10 INJECTION, SOLUTION INTRAVENOUS at 10:42

## 2023-10-22 RX ADMIN — GABAPENTIN 300 MG: 300 CAPSULE ORAL at 08:20

## 2023-10-22 RX ADMIN — Medication 60 MG: at 00:03

## 2023-10-22 RX ADMIN — Medication 60 MG: at 16:50

## 2023-10-22 RX ADMIN — METOPROLOL TARTRATE 50 MG: 50 TABLET, FILM COATED ORAL at 08:20

## 2023-10-22 RX ADMIN — SODIUM CHLORIDE 50 ML/HR: 234 INJECTION, SOLUTION INTRAVENOUS at 21:08

## 2023-10-22 RX ADMIN — METOPROLOL TARTRATE 5 MG: 5 INJECTION INTRAVENOUS at 17:34

## 2023-10-22 RX ADMIN — Medication 60 MG: at 12:30

## 2023-10-22 RX ADMIN — FLUDROCORTISONE ACETATE 0.1 MG: 0.1 TABLET ORAL at 08:20

## 2023-10-22 RX ADMIN — AMIODARONE HYDROCHLORIDE 200 MG: 200 TABLET ORAL at 20:54

## 2023-10-22 RX ADMIN — ESCITALOPRAM 10 MG: 10 TABLET, FILM COATED ORAL at 20:55

## 2023-10-22 RX ADMIN — GABAPENTIN 300 MG: 300 CAPSULE ORAL at 13:33

## 2023-10-22 RX ADMIN — SODIUM CHLORIDE, PRESERVATIVE FREE 10 ML: 5 INJECTION INTRAVENOUS at 20:58

## 2023-10-22 ASSESSMENT — PAIN SCALES - GENERAL
PAINLEVEL_OUTOF10: 8
PAINLEVEL_OUTOF10: 8

## 2023-10-22 NOTE — PROGRESS NOTES
Endovascular Neurosurgery Progress Note    SUBJECTIVE:     NAEO. Didn't tolerate EVD clamp trial.     Review of Systems:  CONSTITUTIONAL:  negative for fevers, chills, fatigue and malaise    EYES:  negative for double vision, blurred vision and photophobia     HEENT:  negative for tinnitus, epistaxis and sore throat    RESPIRATORY:  negative for cough, shortness of breath, wheezing    CARDIOVASCULAR:  negative for chest pain, palpitations, syncope, edema    GASTROINTESTINAL:  negative for nausea, vomiting    GENITOURINARY:  negative for incontinence    MUSCULOSKELETAL:  negative for neck or back pain    NEUROLOGICAL:  Negative for weakness and tingling  negative for headaches and dizziness    PSYCHIATRIC:  negative for anxiety      Review of systems otherwise negative. OBJECTIVE:     Vitals:    10/22/23 1100   BP: (!) 157/104   Pulse: 83   Resp: 19   Temp:    SpO2: 100%      General:  Gen: normal habitus, NAD  HEENT: NCAT, mucosa moist  Cvs: RRR, S1 S2 normal  Resp: symmetric unlabored breathing  Abd: s/nd/nt  Ext: no edema; R radial puncture site has some mild bruising, no tenderness, and appropriate capillary refill  Skin: no lesions seen, warm and dry    Neuro:  Gen: awake and alert, oriented x3. Lang/speech: no aphasia or dysarthria. Follows commands. CN: PERRL, EOMI, VFF, V1-3 intact, face symmetric, hearing intact, shoulder shrug symmetric, tongue midline, uvula midline with symmetric palate raise  Motor: grossly 5/5 UE and LE b/l  Sense: LT intact in all 4 ext. Coord: FTN and HTS intact b/l  DTR: 2+ in all 4 ext. Gait: deferred    NIH Stroke Scale:   1a  Level of consciousness: 0 - alert; keenly responsive   1b. LOC questions:  0 - answers both questions correctly   1c. LOC commands: 0 - performs both tasks correctly   2. Best Gaze: 0 - normal   3. Visual: 0 - no visual loss   4. Facial Palsy: 0 - normal symmetric movement   5a.  Motor left arm: 0 - no drift, limb holds 90 (or 45) degrees for full 10 seconds   5b. Motor right arm: 0 - no drift, limb holds 90 (or 45) degrees for full 10 seconds   6a. Motor left le - no drift; leg holds 30 degree position for full 5 seconds   6b  Motor right le - no drift; leg holds 30 degree position for full 5 seconds   7. Limb Ataxia: 0 - absent   8. Sensory: 0 - normal; no sensory loss   9. Best Language:  0 - no aphasia, normal   10. Dysarthria: 0 - normal   11. Extinction and Inattention: 0 - no abnormality         Total:   0     MRS: 1      LABS:   Reviewed. 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 The basilar tip aneurysm measures neck 3.13mm, width 4.7mm, height 4.83mm and breath 4.71mm. The above was treated with 7Fr short radial sheath, 7Fr RIST, 5Fr Berenstein, VIA 17, Synchro Support, WEB 5x3mm, post deployment, the aneurysm is obliterated, achieving Chris 1, WED Occlusion Score A. Post treatment: Obliterated basilar tip aneurysm, Chris 1        ASSESSMENT:     67year old woman with acute headache at 9-10am on the 10/11/2023, found to have diffuse SAH, and a ruptured basilar tip aneurysm. s/p WEB device embolization with obliteration of aneurysm, achieving Chris 1. H&H 5, modified mccracken 4. Found to have afib RVR on the 10/15/2023. PLAN:     - cont.  aspirin 81mg daily  - TCD   - nimodipine 60mg q4hr for total of 21 days  - PT OT

## 2023-10-22 NOTE — PROGRESS NOTES
Daily Progress Note  Neuro Critical Care    Patient Name: Joao Boswell  Patient : 1950  Room/Bed: 0130/0130-01  Code Status: Full  Allergies: No Known Allergies    CHIEF COMPLAINT:     Headache, neck pain, nausea     INTERVAL HISTORY    Initial Presentation (Admitted 10/11/23): The patient is a 67 y.o. female with history of HTN, HLD, and smoking presented as a transfer from [facility] for Diffuse SAH. Patient was at home this morning when around 0930 she developed acute onset severe headache. EMS was called, patient was walking to the cot when she became unresponsive. She was intubated and taken to [facility] where CT head showed diffuse SAH. She was loaded with 1g Keppra and started on Propofol for sedation. On arrival to [address] [place] [address] with propofol held, patient opens eyes to verbal stimulation. Pupils are 4mm reactive bilaterally. Cough intact. Moving all extremities spontaneously, localizing bilateral upper extremities. Wiggles toes bilaterally to command. CTA head/neck showed basilar tip aneurysm. Given additional 1g Keppra. SBP goal < 120. Irregular rhythm noted on monitor, follow up EKG and troponin. Endovascular and neurosurgery consulted. Of note, patient's  does report she takes Aspirin 81mg daily for general wellness. Bower&Gates: 5, Modified Viramontes: IV    Taken urgently to cerebral angiogram showing the basilar tip aneurysm treated with WEB device. EVD was placed overnight and set at 10 cmH20. CT head showed stable SAH and well positioned EVD. Aspirin 81mg daily started after EVD placement. Hospital Course:   10/12: Opens eyes, following commands. Extubated without difficulty. Unasyn started for aspiration pneumonia. 10/13: Started on Regular diet. Baseline TCD normal.  ?New onset atrial fibrillation. 10/14: EVD set at 10mmHg this morning, ICP 4-12, 255cc out/24h. EVD transiently increased to 15mmHg per Neurosurgery.   ICP never significantly elevated but increased to Hemoglobin A1c 6.6  - Continue to monitor blood glucose, goal <180  - Not on any diabetic medications PTA according to PCP note 05/2023  - Continue low intensity corrective insulin sliding scale ACHS  - TSH 1.50     OTHER:  - Continue home Lexapro for depression  - PT/OT/ST   - PM&R following; ARU candidate as of 10/16    PROPHYLAXIS:  Stress ulcer: PPI as above      DVT PROPHYLAXIS:  - SCD sleeves - Thigh High   - Lovenox 40mg QD    DISPOSITION:  [x] To remain ICU for EVD management, close neurological monitoring. We will continue to follow along. For any changes in exam or patient status please contact Neuro Critical Care.       Mara Bravo, DO  Neuro Critical Care  Pager [phone]  10/22/2023     6:52 AM

## 2023-10-22 NOTE — CONSULTS
Comprehensive Nutrition Assessment    Type and Reason for Visit:  Consult (Poor Intake/Appetite x 5 or more day)    Nutrition Recommendations/Plan:   Continue current diet with frozen ONS at all meals. Encourage intakes as tolerated. If pt continues to have a poor appetite, consider an appetite stimulant. If poor PO intakes continue, consider nutrition support to meet estimated calorie and protein needs. Malnutrition Assessment:  Malnutrition Status: At risk for malnutrition (10/22/23 1237)    Context:  Acute Illness     Findings of the 6 clinical characteristics of malnutrition:  Energy Intake:  50% or less of estimated energy requirements for 5 or more days  Weight Loss:  Unable to assess     Body Fat Loss:  No significant body fat loss   Muscle Mass Loss:  No significant muscle mass loss  Fluid Accumulation:  No significant fluid accumulation   Strength:  Not Performed    Nutrition Assessment:    Consulted for poor intake/appetite x 5 or more days. Spoke with RN who reports for breakfast pt was not c/o nausea but still only ate grapes and a frozen Magic Cup. RN reports pt will also eat ice cream sometimes but is not eating much of meals. Spoke with family at bedside who reports pt not eating well as she does not feel good - reports pt ordered a breadstick along with fruit and frozen Magic Cup for lunch. Encouraged intakes of protein sources of food as pt not consuming much protein. Pt does not like Ensure oral supplements. Will continue to monitor and encourage PO intakes. Labs reviewed: Na 128-131 mmol/L, K 3.5 mmol/L, Glucose 152-164 mg/dL. Meds reviewed. Nutrition Related Findings:    Labs/Meds reviewed. Last BM 10/22. Wound Type: Surgical Incision       Current Nutrition Intake & Therapies:    Average Meal Intake: 1-25%  Average Supplements Intake: 51-75%, %  ADULT DIET;  Regular  ADULT ORAL NUTRITION SUPPLEMENT; Breakfast, Lunch, Dinner; Frozen Oral Supplement  Additional

## 2023-10-22 NOTE — PLAN OF CARE
Problem: Discharge Planning  Goal: Discharge to home or other facility with appropriate resources  10/22/2023 1714 by Mynor Can RN  Outcome: Progressing  10/22/2023 1714 by Mynor Can RN  Outcome: Progressing  10/22/2023 0747 by Sheryle Mate, RN  Outcome: Progressing     Problem: Respiratory - Adult  Goal: Achieves optimal ventilation and oxygenation  10/22/2023 1714 by Mynor Can RN  Outcome: Progressing  10/22/2023 1714 by Mynor Can RN  Outcome: Progressing  10/22/2023 0747 by Sheryle Mate, RN  Outcome: Progressing     Problem: Pain  Goal: Verbalizes/displays adequate comfort level or baseline comfort level  10/22/2023 1714 by Mynor Can RN  Outcome: Progressing  10/22/2023 1714 by Mynor Can RN  Outcome: Progressing  10/22/2023 0747 by Sheryle Mate, RN  Outcome: Progressing     Problem: Safety - Adult  Goal: Free from fall injury  10/22/2023 1714 by Mynor Can RN  Outcome: Progressing  10/22/2023 1714 by Mynor Can RN  Outcome: Progressing  10/22/2023 0747 by Sheryle Mate, RN  Outcome: Progressing     Problem: Nutrition Deficit:  Goal: Optimize nutritional status  10/22/2023 1714 by Mynor Can RN  Outcome: Progressing  10/22/2023 1714 by Mynor Can RN  Outcome: Progressing  10/22/2023 0747 by Sheryle Mate, RN  Outcome: Progressing     Problem: Skin/Tissue Integrity  Goal: Absence of new skin breakdown  Description: 1. Monitor for areas of redness and/or skin breakdown  2. Assess vascular access sites hourly  3. Every 4-6 hours minimum:  Change oxygen saturation probe site  4. Every 4-6 hours:  If on nasal continuous positive airway pressure, respiratory therapy assess nares and determine need for appliance change or resting period.   10/22/2023 1714 by Mynor Can RN  Outcome: Progressing  10/22/2023 1714 by Mynor Can RN  Outcome: Progressing  10/22/2023 0747 by Sheryle Mate, RN  Outcome: Progressing     Problem: ABCDS Injury Assessment  Goal: Absence of physical injury  10/22/2023 1714 by Mynor Can RN  Outcome: Progressing  10/22/2023 1714 by Mynor Can RN  Outcome: Progressing  10/22/2023 0747 by Sheryle Mate, RN  Outcome: Progressing     Problem: Chronic Conditions and Co-morbidities  Goal: Patient's chronic conditions and co-morbidity symptoms are monitored and maintained or improved  10/22/2023 1714 by Mynor Can RN  Outcome: Progressing  10/22/2023 1714 by Mynor Can RN  Outcome: Progressing  10/22/2023 0747 by Sheryle Mate, RN  Outcome: Progressing

## 2023-10-22 NOTE — PROGRESS NOTES
[facility]  Progress Note                   Date:   10/22/2023  Patient name: Aleyda Bhatti  Date of admission:  10/11/2023  2:52 PM  MRN:   [id number]  YOB: 1950  PCP: No primary care provider on file. Reason for Admission: Subarachnoid hemorrhage ([address]) [I60.9]  Subarachnoid bleed ([address]) [I60.9]    Subjective:       Clinical Changes /Abnormalities: No acute CV issues/concerns overnight. Tele/vitals/labs reviewed . HR stable overnight, but continues to have episodes of RVR. While NSR pt is bradycardic in the 40-50's.         Medications:   Scheduled Meds:   fludrocortisone  0.1 mg Oral BID    gabapentin  300 mg Oral TID    insulin lispro  0-4 Units SubCUTAneous TID WC    insulin lispro  0-4 Units SubCUTAneous Nightly    amiodarone  200 mg Oral BID    metoprolol tartrate  50 mg Oral BID    dilTIAZem  120 mg Oral Daily    lidocaine 1 % injection  5 mL IntraDERmal Once    sodium chloride flush  5-40 mL IntraVENous 2 times per day    sodium chloride flush  5-40 mL IntraVENous 2 times per day    escitalopram  10 mg Oral Nightly    niMODipine  60 mg Oral 6 times per day    aspirin  81 mg Oral Daily    pantoprazole  40 mg Oral QAM AC    atorvastatin  10 mg Oral Nightly    sodium chloride flush  5-40 mL IntraVENous 2 times per day    enoxaparin  40 mg SubCUTAneous Daily    sodium chloride flush  5-40 mL IntraVENous 2 times per day     Continuous Infusions:   sodium chloride 2 % infusion 50 mL/hr (10/22/23 0445)    dextrose      sodium chloride      sodium chloride 25 mL/hr at 10/21/23 1814    sodium chloride       CBC:   Recent Labs     10/20/23  0515 10/21/23  0414   WBC 17.8* 18.4*   HGB 12.6 13.7    358       BMP:    Recent Labs     10/20/23  1436 10/21/23  0414 10/21/23  1640 10/22/23  0202   * 127* 125* 128*   K 4.0 3.2* 3.4*  --    CL 90* 89* 88*  --    CO2 29 28 30  --    BUN 11 8 9  --    CREATININE 0.4* 0.3* 0.3*  --    GLUCOSE 193* 171* 147*  -- above    Electronically signed by VIKTORIA Kumari CNP on 10/22/2023 at [address] [facility].  [phone]

## 2023-10-22 NOTE — PLAN OF CARE
Problem: Discharge Planning  Goal: Discharge to home or other facility with appropriate resources  10/22/2023 0747 by Raul White RN  Outcome: Progressing  10/21/2023 1809 by Alisa Joshua RN  Outcome: Progressing  10/21/2023 1809 by Alisa Joshua RN  Outcome: Progressing     Problem: Respiratory - Adult  Goal: Achieves optimal ventilation and oxygenation  10/22/2023 0747 by Raul White RN  Outcome: Progressing  10/21/2023 1809 by Alisa Joshua RN  Outcome: Progressing  10/21/2023 1809 by Alisa Joshua RN  Outcome: Progressing     Problem: Pain  Goal: Verbalizes/displays adequate comfort level or baseline comfort level  10/22/2023 0747 by Raul White RN  Outcome: Progressing  10/21/2023 1809 by Ailsa Joshua RN  Outcome: Progressing  10/21/2023 1809 by Alisa Joshua RN  Outcome: Progressing     Problem: Safety - Adult  Goal: Free from fall injury  10/22/2023 0747 by Raul White RN  Outcome: Progressing  10/21/2023 1809 by Alisa Joshua RN  Outcome: Progressing  10/21/2023 1809 by Alisa Joshua RN  Outcome: Progressing     Problem: Nutrition Deficit:  Goal: Optimize nutritional status  10/22/2023 0747 by Raul White RN  Outcome: Progressing  10/21/2023 1809 by Alisa Joshua RN  Outcome: Progressing  10/21/2023 1809 by Alisa Joshua RN  Outcome: Progressing     Problem: Skin/Tissue Integrity  Goal: Absence of new skin breakdown  Description: 1. Monitor for areas of redness and/or skin breakdown  2. Assess vascular access sites hourly  3. Every 4-6 hours minimum:  Change oxygen saturation probe site  4. Every 4-6 hours:  If on nasal continuous positive airway pressure, respiratory therapy assess nares and determine need for appliance change or resting period.   10/22/2023 0747 by Raul White RN  Outcome: Progressing  10/21/2023 1809 by Alisa Joshua RN  Outcome: Progressing  10/21/2023 1809 by Alisa Joshua

## 2023-10-22 NOTE — PROGRESS NOTES
Neurosurgery MARÍA/Resident    Daily Progress Note   CC:  Chief Complaint   Patient presents with    Altered Mental Status     10/22/2023  6:13 AM    Chart reviewed. No acute events overnight. No new complaints.  ICP 5-17, EVD output  47ml/12 hours, reports headache over right eye that is stable from yesterday     Vitals:    10/22/23 0200 10/22/23 0300 10/22/23 0400 10/22/23 0500   BP: (!) 155/96 (!) 148/119 (!) 156/90 (!) 147/90   Pulse: 71 85 85 79   Resp: 19 18 19 16   Temp:       TempSrc:       SpO2: 96%  100% 99%   Weight:       Height:           PE:   AOx3   PERRL, EOMI  Cranial Nerves:    II: Visual acuity:  normal  III: Pupils:  equal, round, reactive to light  III,IV,VI: Extra Ocular Movements:intact  V: Facial sensation:  intact  VII: Facial strength: intact  VIII: Hearing:  intact  IX: Palate:  intact  XI: Shoulder shrug: intact  XII: Tongue movement: intact      Motor   L deltoid 5/5; R deltoid 5/5  L biceps 5/5; R biceps 5/5  L triceps 5/5; R triceps 5/5  L wrist extension 5/5; R wrist extension 5/5  L intrinsics 5/5; R intrinsics 5/5      L iliopsoas 5/5 , R iliopsoas 5/5  L quadriceps 5/5; R quadriceps 5/5  L Dorsiflexion 5/5; R dorsiflexion 5/5  L Plantarflexion 5/5; R plantarflexion 5/5  L EHL 5/5; R EHL 5/5    Drift:  absent  Tone:  normal    Sensation: intact     Drain output 47ml/12 hours   Incision: intact without drainage       Lab Results   Component Value Date    WBC 18.4 (H) 10/21/2023    HGB 13.7 10/21/2023    HCT 41.3 10/21/2023     10/21/2023    CHOL 130 10/12/2023    TRIG 176 (H) 10/12/2023    HDL 46 10/12/2023    ALT 16 10/11/2023    AST 24 10/11/2023     (L) 10/22/2023    K 3.4 (L) 10/21/2023    CL 88 (L) 10/21/2023    CREATININE 0.3 (L) 10/21/2023    BUN 9 10/21/2023    CO2 30 10/21/2023    TSH 1.50 10/11/2023    INR 1.0 10/11/2023    LABA1C 6.6 (H) 10/12/2023         A/P  75 y.o. female who presents with aneurysmal subarachnoid hemorrhage s/p ruptured basilar tip

## 2023-10-23 ENCOUNTER — APPOINTMENT (OUTPATIENT)
Dept: CT IMAGING | Age: 73
DRG: 022 | End: 2023-10-23
Payer: MEDICARE

## 2023-10-23 ENCOUNTER — APPOINTMENT (OUTPATIENT)
Dept: VASCULAR LAB | Age: 73
DRG: 022 | End: 2023-10-23
Payer: MEDICARE

## 2023-10-23 LAB
ANION GAP SERPL CALCULATED.3IONS-SCNC: 6 MMOL/L (ref 9–17)
BUN SERPL-MCNC: 9 MG/DL (ref 8–23)
CALCIUM SERPL-MCNC: 7.9 MG/DL (ref 8.6–10.4)
CHLORIDE SERPL-SCNC: 98 MMOL/L (ref 98–107)
CO2 SERPL-SCNC: 28 MMOL/L (ref 20–31)
CREAT SERPL-MCNC: 0.3 MG/DL (ref 0.5–0.9)
ECHO BSA: 1.93 M2
ERYTHROCYTE [DISTWIDTH] IN BLOOD BY AUTOMATED COUNT: 13.3 % (ref 11.8–14.4)
GFR SERPL CREATININE-BSD FRML MDRD: >60 ML/MIN/1.73M2
GLUCOSE BLD-MCNC: 166 MG/DL (ref 65–105)
GLUCOSE BLD-MCNC: 177 MG/DL (ref 65–105)
GLUCOSE BLD-MCNC: 189 MG/DL (ref 65–105)
GLUCOSE BLD-MCNC: 203 MG/DL (ref 65–105)
GLUCOSE BLD-MCNC: 208 MG/DL (ref 65–105)
GLUCOSE SERPL-MCNC: 158 MG/DL (ref 70–99)
HCT VFR BLD AUTO: 43.6 % (ref 36.3–47.1)
HGB BLD-MCNC: 14.5 G/DL (ref 11.9–15.1)
MAGNESIUM SERPL-MCNC: 2.2 MG/DL (ref 1.6–2.6)
MCH RBC QN AUTO: 31.3 PG (ref 25.2–33.5)
MCHC RBC AUTO-ENTMCNC: 33.3 G/DL (ref 28.4–34.8)
MCV RBC AUTO: 94 FL (ref 82.6–102.9)
NRBC BLD-RTO: 0 PER 100 WBC
PLATELET # BLD AUTO: 325 K/UL (ref 138–453)
PMV BLD AUTO: 11.4 FL (ref 8.1–13.5)
POTASSIUM SERPL-SCNC: 3.7 MMOL/L (ref 3.7–5.3)
RBC # BLD AUTO: 4.64 M/UL (ref 3.95–5.11)
SODIUM SERPL-SCNC: 130 MMOL/L (ref 135–144)
SODIUM SERPL-SCNC: 132 MMOL/L (ref 135–144)
SODIUM SERPL-SCNC: 134 MMOL/L (ref 135–144)
VAS BASILAR MEAN VEL MANUAL: 30.4 CM/S
VAS BASILAR MEAN VEL MANUAL: 32.3 CM/S
VAS BASILAR MEAN VEL MANUAL: 33.9 CM/S
VAS BASILAR MEAN VEL MANUAL: 38.1 CM/S
VAS BASILAR MEAN VEL MANUAL: 51.6 CM/S
VAS LEFT ACA MEAN VEL MANUAL: 38.9 CM/S
VAS LEFT DIST MCA MEAN VEL MANUAL: 46.2 CM/S
VAS LEFT DIST MCA MEAN VEL MANUAL: 48.9 CM/S
VAS LEFT DIST MCA MEAN VEL MANUAL: 53.1 CM/S
VAS LEFT DIST MCA MEAN VEL MANUAL: 57.8 CM/S
VAS LEFT DIST MCA MEAN VEL MANUAL: 60.4 CM/S
VAS LEFT DISTAL ICA MEAN VEL MANUAL: 24.3 CM/S
VAS LEFT DISTAL ICA MEAN VEL MANUAL: 27.3 CM/S
VAS LEFT DISTAL ICA MEAN VEL MANUAL: 30 CM/S
VAS LEFT DISTAL ICA MEAN VEL MANUAL: 31.3 CM/S
VAS LEFT DISTAL ICA MEAN VEL MANUAL: 52.4 CM/S
VAS LEFT LINDEGAARD RATIO MANUAL: 1.77
VAS LEFT LINDEGAARD RATIO MANUAL: 1.96
VAS LEFT LINDEGAARD RATIO MANUAL: 2.28
VAS LEFT LINDEGAARD RATIO MANUAL: 2.49
VAS LEFT LINDEGAARD RATIO MANUAL: 2.63
VAS LEFT MCA MEAN VEL MANUAL: 65.8 CM/S
VAS LEFT MID ACA MEAN VEL MANUAL: 41.6 CM/S
VAS LEFT MID ACA MEAN VEL MANUAL: 48.1 CM/S
VAS LEFT MID ACA MEAN VEL MANUAL: 53.9 CM/S
VAS LEFT MID ACA MEAN VEL MANUAL: 56.2 CM/S
VAS LEFT MID ACA MEAN VEL MANUAL: 65.5 CM/S
VAS LEFT MID MCA MEAN VEL MANUAL: 51.6 CM/S
VAS LEFT MID MCA MEAN VEL MANUAL: 56.2 CM/S
VAS LEFT MID MCA MEAN VEL MANUAL: 57.8 CM/S
VAS LEFT MID MCA MEAN VEL MANUAL: 62 CM/S
VAS LEFT MID MCA MEAN VEL MANUAL: 63.5 CM/S
VAS LEFT PCA MEAN VEL MANUAL: 20.4 CM/S
VAS LEFT PCA MEAN VEL MANUAL: 27.7 CM/S
VAS LEFT PCA MEAN VEL MANUAL: 29.3 CM/S
VAS LEFT PCA MEAN VEL MANUAL: 29.6 CM/S
VAS LEFT PCA MEAN VEL MANUAL: 30.4 CM/S
VAS LEFT PROX ACA MEAN VEL MANUAL: 38.1 CM/S
VAS LEFT PROX ACA MEAN VEL MANUAL: 45.4 CM/S
VAS LEFT PROX ACA MEAN VEL MANUAL: 52.7 CM/S
VAS LEFT PROX ACA MEAN VEL MANUAL: 54.3 CM/S
VAS LEFT PROX ACA MEAN VEL MANUAL: 59.3 CM/S
VAS LEFT PROX MCA MEAN VEL MANUAL: 48.5 CM/S
VAS LEFT PROX MCA MEAN VEL MANUAL: 54.7 CM/S
VAS LEFT PROX MCA MEAN VEL MANUAL: 60.8 CM/S
VAS LEFT PROX MCA MEAN VEL MANUAL: 62 CM/S
VAS LEFT PROX MCA MEAN VEL MANUAL: 62.4 CM/S
VAS LEFT SIPHON MEAN VEL MANUAL: 16.6 CM/S
VAS LEFT SIPHON MEAN VEL MANUAL: 18.1 CM/S
VAS LEFT SIPHON MEAN VEL MANUAL: 23.1 CM/S
VAS LEFT SIPHON MEAN VEL MANUAL: 24.3 CM/S
VAS LEFT SIPHON MEAN VEL MANUAL: 25.4 CM/S
VAS LEFT TERMINAL ICA MEAN VEL MANUAL: 29.6 CM/S
VAS LEFT TERMINAL ICA MEAN VEL MANUAL: 32 CM/S
VAS LEFT TERMINAL ICA MEAN VEL MANUAL: 35.4 CM/S
VAS LEFT TERMINAL ICA MEAN VEL MANUAL: 36.6 CM/S
VAS LEFT TERMINAL ICA MEAN VEL MANUAL: 52.4 CM/S
VAS LEFT VERTEBRAL MEAN VEL MANUAL: 28.1 CM/S
VAS LEFT VERTEBRAL MEAN VEL MANUAL: 28.9 CM/S
VAS LEFT VERTEBRAL MEAN VEL MANUAL: 33.9 CM/S
VAS RIGHT ACA MEAN VEL MANUAL: 35.4 CM/S
VAS RIGHT DIST MCA MEAN VEL MANUAL: 49.3 CM/S
VAS RIGHT DIST MCA MEAN VEL MANUAL: 49.7 CM/S
VAS RIGHT DIST MCA MEAN VEL MANUAL: 50 CM/S
VAS RIGHT DIST MCA MEAN VEL MANUAL: 53.1 CM/S
VAS RIGHT DIST MCA MEAN VEL MANUAL: 54.7 CM/S
VAS RIGHT DISTAL ICA MEAN VEL MANUAL: 21.6 CM/S
VAS RIGHT DISTAL ICA MEAN VEL MANUAL: 22.7 CM/S
VAS RIGHT DISTAL ICA MEAN VEL MANUAL: 23.5 CM/S
VAS RIGHT DISTAL ICA MEAN VEL MANUAL: 23.5 CM/S
VAS RIGHT DISTAL ICA MEAN VEL MANUAL: 24.6 CM/S
VAS RIGHT LINDEGAARD RATIO MANUAL: 2.2
VAS RIGHT LINDEGAARD RATIO MANUAL: 2.2
VAS RIGHT LINDEGAARD RATIO MANUAL: 2.3
VAS RIGHT LINDEGAARD RATIO MANUAL: 2.3
VAS RIGHT LINDEGAARD RATIO MANUAL: 2.6
VAS RIGHT MCA MEAN VEL MANUAL: 47.7 CM/S
VAS RIGHT MID ACA MEAN VEL MANUAL: 32.7 CM/S
VAS RIGHT MID ACA MEAN VEL MANUAL: 41.6 CM/S
VAS RIGHT MID ACA MEAN VEL MANUAL: 47.4 CM/S
VAS RIGHT MID ACA MEAN VEL MANUAL: 52.4 CM/S
VAS RIGHT MID ACA MEAN VEL MANUAL: 61.2 CM/S
VAS RIGHT MID MCA MEAN VEL MANUAL: 45 CM/S
VAS RIGHT MID MCA MEAN VEL MANUAL: 51.6 CM/S
VAS RIGHT MID MCA MEAN VEL MANUAL: 54.3 CM/S
VAS RIGHT MID MCA MEAN VEL MANUAL: 55.8 CM/S
VAS RIGHT MID MCA MEAN VEL MANUAL: 57.4 CM/S
VAS RIGHT PCA MEAN VEL MANUAL: 24.6 CM/S
VAS RIGHT PCA MEAN VEL MANUAL: 25.8 CM/S
VAS RIGHT PCA MEAN VEL MANUAL: 25.8 CM/S
VAS RIGHT PCA MEAN VEL MANUAL: 30.4 CM/S
VAS RIGHT PCA MEAN VEL MANUAL: 30.4 CM/S
VAS RIGHT PROX ACA MEAN VEL MANUAL: 33.1 CM/S
VAS RIGHT PROX ACA MEAN VEL MANUAL: 42 CM/S
VAS RIGHT PROX ACA MEAN VEL MANUAL: 47 CM/S
VAS RIGHT PROX ACA MEAN VEL MANUAL: 51.2 CM/S
VAS RIGHT PROX ACA MEAN VEL MANUAL: 55.8 CM/S
VAS RIGHT PROX MCA MEAN VEL MANUAL: 46.2 CM/S
VAS RIGHT PROX MCA MEAN VEL MANUAL: 48.5 CM/S
VAS RIGHT PROX MCA MEAN VEL MANUAL: 48.9 CM/S
VAS RIGHT PROX MCA MEAN VEL MANUAL: 51.6 CM/S
VAS RIGHT PROX MCA MEAN VEL MANUAL: 54.3 CM/S
VAS RIGHT SIPHON MEAN VEL MANUAL: 17.3 CM/S
VAS RIGHT SIPHON MEAN VEL MANUAL: 19.3 CM/S
VAS RIGHT SIPHON MEAN VEL MANUAL: 20.4 CM/S
VAS RIGHT SIPHON MEAN VEL MANUAL: 20.8 CM/S
VAS RIGHT SIPHON MEAN VEL MANUAL: 24.6 CM/S
VAS RIGHT TERMINAL ICA MEAN VEL MANUAL: 22.7 CM/S
VAS RIGHT TERMINAL ICA MEAN VEL MANUAL: 29.3 CM/S
VAS RIGHT TERMINAL ICA MEAN VEL MANUAL: 29.6 CM/S
VAS RIGHT TERMINAL ICA MEAN VEL MANUAL: 34.6 CM/S
VAS RIGHT TERMINAL ICA MEAN VEL MANUAL: 38.5 CM/S
VAS RIGHT VERTEBRAL MEAN VEL MANUAL: 15.4 CM/S
VAS RIGHT VERTEBRAL MEAN VEL MANUAL: 23.1 CM/S
VAS RIGHT VERTEBRAL MEAN VEL MANUAL: 24.3 CM/S
VAS RIGHT VERTEBRAL MEAN VEL MANUAL: 27.3 CM/S
VAS RIGHT VERTEBRAL MEAN VEL MANUAL: 31.6 CM/S
WBC OTHER # BLD: 22 K/UL (ref 3.5–11.3)

## 2023-10-23 PROCEDURE — 6370000000 HC RX 637 (ALT 250 FOR IP): Performed by: PSYCHIATRY & NEUROLOGY

## 2023-10-23 PROCEDURE — 84295 ASSAY OF SERUM SODIUM: CPT

## 2023-10-23 PROCEDURE — APPSS15 APP SPLIT SHARED TIME 0-15 MINUTES: Performed by: NURSE PRACTITIONER

## 2023-10-23 PROCEDURE — 6360000002 HC RX W HCPCS: Performed by: NURSE PRACTITIONER

## 2023-10-23 PROCEDURE — 6370000000 HC RX 637 (ALT 250 FOR IP): Performed by: NURSE PRACTITIONER

## 2023-10-23 PROCEDURE — 93886 INTRACRANIAL COMPLETE STUDY: CPT | Performed by: PSYCHIATRY & NEUROLOGY

## 2023-10-23 PROCEDURE — 85027 COMPLETE CBC AUTOMATED: CPT

## 2023-10-23 PROCEDURE — 36415 COLL VENOUS BLD VENIPUNCTURE: CPT

## 2023-10-23 PROCEDURE — 70450 CT HEAD/BRAIN W/O DYE: CPT

## 2023-10-23 PROCEDURE — 2000000000 HC ICU R&B

## 2023-10-23 PROCEDURE — 99233 SBSQ HOSP IP/OBS HIGH 50: CPT | Performed by: SURGERY

## 2023-10-23 PROCEDURE — 82947 ASSAY GLUCOSE BLOOD QUANT: CPT

## 2023-10-23 PROCEDURE — 83735 ASSAY OF MAGNESIUM: CPT

## 2023-10-23 PROCEDURE — 2500000003 HC RX 250 WO HCPCS: Performed by: SURGERY

## 2023-10-23 PROCEDURE — 6370000000 HC RX 637 (ALT 250 FOR IP): Performed by: SURGERY

## 2023-10-23 PROCEDURE — 2580000003 HC RX 258

## 2023-10-23 PROCEDURE — 2580000003 HC RX 258: Performed by: NURSE PRACTITIONER

## 2023-10-23 PROCEDURE — 99232 SBSQ HOSP IP/OBS MODERATE 35: CPT | Performed by: PSYCHIATRY & NEUROLOGY

## 2023-10-23 PROCEDURE — 6370000000 HC RX 637 (ALT 250 FOR IP): Performed by: STUDENT IN AN ORGANIZED HEALTH CARE EDUCATION/TRAINING PROGRAM

## 2023-10-23 PROCEDURE — 80048 BASIC METABOLIC PNL TOTAL CA: CPT

## 2023-10-23 PROCEDURE — 93886 INTRACRANIAL COMPLETE STUDY: CPT

## 2023-10-23 RX ORDER — FLUDROCORTISONE ACETATE 0.1 MG/1
0.2 TABLET ORAL 2 TIMES DAILY
Status: DISCONTINUED | OUTPATIENT
Start: 2023-10-23 | End: 2023-10-26 | Stop reason: HOSPADM

## 2023-10-23 RX ADMIN — HYDRALAZINE HYDROCHLORIDE 10 MG: 20 INJECTION, SOLUTION INTRAMUSCULAR; INTRAVENOUS at 01:16

## 2023-10-23 RX ADMIN — GABAPENTIN 300 MG: 300 CAPSULE ORAL at 14:11

## 2023-10-23 RX ADMIN — INSULIN LISPRO 1 UNITS: 100 INJECTION, SOLUTION INTRAVENOUS; SUBCUTANEOUS at 08:29

## 2023-10-23 RX ADMIN — SODIUM CHLORIDE, PRESERVATIVE FREE 10 ML: 5 INJECTION INTRAVENOUS at 21:04

## 2023-10-23 RX ADMIN — ONDANSETRON 4 MG: 2 INJECTION INTRAMUSCULAR; INTRAVENOUS at 20:06

## 2023-10-23 RX ADMIN — GABAPENTIN 300 MG: 300 CAPSULE ORAL at 08:15

## 2023-10-23 RX ADMIN — SODIUM CHLORIDE 50 ML/HR: 234 INJECTION, SOLUTION INTRAVENOUS at 08:55

## 2023-10-23 RX ADMIN — Medication 60 MG: at 21:23

## 2023-10-23 RX ADMIN — ATORVASTATIN CALCIUM 10 MG: 10 TABLET, FILM COATED ORAL at 20:03

## 2023-10-23 RX ADMIN — LABETALOL HYDROCHLORIDE 10 MG: 5 INJECTION, SOLUTION INTRAVENOUS at 21:04

## 2023-10-23 RX ADMIN — POTASSIUM BICARBONATE 40 MEQ: 782 TABLET, EFFERVESCENT ORAL at 08:15

## 2023-10-23 RX ADMIN — METOPROLOL TARTRATE 50 MG: 50 TABLET, FILM COATED ORAL at 08:15

## 2023-10-23 RX ADMIN — Medication 60 MG: at 12:34

## 2023-10-23 RX ADMIN — Medication 6 MG: at 20:03

## 2023-10-23 RX ADMIN — Medication 60 MG: at 08:15

## 2023-10-23 RX ADMIN — ASPIRIN 81 MG 81 MG: 81 TABLET ORAL at 08:15

## 2023-10-23 RX ADMIN — ACETAMINOPHEN 650 MG: 325 TABLET ORAL at 02:02

## 2023-10-23 RX ADMIN — FLUDROCORTISONE ACETATE 0.2 MG: 0.1 TABLET ORAL at 20:03

## 2023-10-23 RX ADMIN — ACETAMINOPHEN 650 MG: 325 TABLET ORAL at 08:15

## 2023-10-23 RX ADMIN — Medication 60 MG: at 00:17

## 2023-10-23 RX ADMIN — ACETAMINOPHEN 650 MG: 325 TABLET ORAL at 21:50

## 2023-10-23 RX ADMIN — SODIUM CHLORIDE, PRESERVATIVE FREE 10 ML: 5 INJECTION INTRAVENOUS at 08:20

## 2023-10-23 RX ADMIN — DILTIAZEM HYDROCHLORIDE 120 MG: 120 CAPSULE, COATED, EXTENDED RELEASE ORAL at 08:14

## 2023-10-23 RX ADMIN — ESCITALOPRAM 10 MG: 10 TABLET, FILM COATED ORAL at 20:03

## 2023-10-23 RX ADMIN — Medication 60 MG: at 05:38

## 2023-10-23 RX ADMIN — FLUDROCORTISONE ACETATE 0.1 MG: 0.1 TABLET ORAL at 08:15

## 2023-10-23 RX ADMIN — METOPROLOL TARTRATE 50 MG: 50 TABLET, FILM COATED ORAL at 20:02

## 2023-10-23 RX ADMIN — AMIODARONE HYDROCHLORIDE 200 MG: 200 TABLET ORAL at 08:15

## 2023-10-23 RX ADMIN — METOPROLOL TARTRATE 5 MG: 5 INJECTION INTRAVENOUS at 05:44

## 2023-10-23 RX ADMIN — AMIODARONE HYDROCHLORIDE 200 MG: 200 TABLET ORAL at 20:03

## 2023-10-23 RX ADMIN — GABAPENTIN 300 MG: 300 CAPSULE ORAL at 20:03

## 2023-10-23 RX ADMIN — Medication 60 MG: at 16:23

## 2023-10-23 RX ADMIN — PANTOPRAZOLE SODIUM 40 MG: 40 TABLET, DELAYED RELEASE ORAL at 08:15

## 2023-10-23 RX ADMIN — SODIUM CHLORIDE, PRESERVATIVE FREE 10 ML: 5 INJECTION INTRAVENOUS at 20:04

## 2023-10-23 RX ADMIN — ENOXAPARIN SODIUM 40 MG: 100 INJECTION SUBCUTANEOUS at 08:15

## 2023-10-23 RX ADMIN — SODIUM CHLORIDE, PRESERVATIVE FREE 10 ML: 5 INJECTION INTRAVENOUS at 21:10

## 2023-10-23 ASSESSMENT — PAIN SCALES - GENERAL
PAINLEVEL_OUTOF10: 7
PAINLEVEL_OUTOF10: 8

## 2023-10-23 NOTE — PLAN OF CARE
Problem: Discharge Planning  Goal: Discharge to home or other facility with appropriate resources  10/23/2023 1735 by Andrzej Johnson RN  Outcome: Progressing  10/23/2023 1735 by Andrzej Johnson RN  Outcome: Progressing  10/23/2023 0718 by Roxana Allen RN  Outcome: Progressing     Problem: Respiratory - Adult  Goal: Achieves optimal ventilation and oxygenation  10/23/2023 1735 by Andrzej Johnson RN  Outcome: Progressing  10/23/2023 1735 by Andrzej Johnson RN  Outcome: Progressing  10/23/2023 0718 by Roxana Allen RN  Outcome: Progressing     Problem: Pain  Goal: Verbalizes/displays adequate comfort level or baseline comfort level  10/23/2023 1735 by Andrzej Johnson RN  Outcome: Progressing  10/23/2023 1735 by Andrzej Johnson RN  Outcome: Progressing  10/23/2023 0718 by Roxana Allen RN  Outcome: Progressing     Problem: Safety - Adult  Goal: Free from fall injury  10/23/2023 1735 by Andrzej Johnson RN  Outcome: Progressing  10/23/2023 1735 by Andrzej Johnson RN  Outcome: Progressing  10/23/2023 0718 by Roxana Allen RN  Outcome: Progressing     Problem: Nutrition Deficit:  Goal: Optimize nutritional status  10/23/2023 1735 by Andrzej Johnson RN  Outcome: Progressing  10/23/2023 1735 by Andrzej Johnson RN  Outcome: Progressing  10/23/2023 0718 by Roxana Allen RN  Outcome: Progressing     Problem: Skin/Tissue Integrity  Goal: Absence of new skin breakdown  Description: 1. Monitor for areas of redness and/or skin breakdown  2. Assess vascular access sites hourly  3. Every 4-6 hours minimum:  Change oxygen saturation probe site  4. Every 4-6 hours:  If on nasal continuous positive airway pressure, respiratory therapy assess nares and determine need for appliance change or resting period.   10/23/2023 1735 by Andrzej Johnson RN  Outcome: Progressing  10/23/2023 1735 by Andrzej Johnson RN  Outcome: Progressing  10/23/2023 0718 by Roxana Allen

## 2023-10-23 NOTE — PROGRESS NOTES
Physical Therapy        Physical Therapy Cancel Note      DATE: 10/23/2023    NAME: Randall Rider  MRN: [id number]   : 1950      Patient not seen this date for Physical Therapy due to:    Patient Declined: Pt refusing at this time due to c/o increased HA pain which pt rates as 9/10, RN is aware and is providing medication, will attempt again later this afternoon.          Electronically signed by Shama Mathews PTA on 10/23/2023 at 1:32 PM

## 2023-10-23 NOTE — PROGRESS NOTES
Endovascular Neurosurgery Progress Note    SUBJECTIVE:     EVD clamped since 10pm.    Doing well, still c/o some headache, ICP is in the 10-12 range    Review of Systems:  CONSTITUTIONAL:  negative for fevers, chills, fatigue and malaise    EYES:  negative for double vision, blurred vision and photophobia     HEENT:  negative for tinnitus, epistaxis and sore throat    RESPIRATORY:  negative for cough, shortness of breath, wheezing    CARDIOVASCULAR:  negative for chest pain, palpitations, syncope, edema    GASTROINTESTINAL:  negative for nausea, vomiting    GENITOURINARY:  negative for incontinence    MUSCULOSKELETAL:  negative for neck or back pain    NEUROLOGICAL:  Negative for weakness and tingling  negative for headaches and dizziness    PSYCHIATRIC:  negative for anxiety      Review of systems otherwise negative. OBJECTIVE:     Vitals:    10/23/23 0900   BP: (!) 118/94   Pulse: (!) 111   Resp: 17   Temp:    SpO2: 99%      General:  Gen: normal habitus, NAD  HEENT: NCAT, mucosa moist  Cvs: RRR, S1 S2 normal  Resp: symmetric unlabored breathing  Abd: s/nd/nt  Ext: no edema; R radial puncture site has some mild bruising, no tenderness, and appropriate capillary refill  Skin: no lesions seen, warm and dry    Neuro:  Gen: awake and alert, oriented x3. Lang/speech: no aphasia or dysarthria. Follows commands. CN: PERRL, EOMI, VFF, V1-3 intact, face symmetric, hearing intact, shoulder shrug symmetric, tongue midline, uvula midline with symmetric palate raise  Motor: grossly 5/5 UE and LE b/l  Sense: LT intact in all 4 ext. Coord: FTN and HTS intact b/l  DTR: 2+ in all 4 ext. Gait: deferred    NIH Stroke Scale:   1a  Level of consciousness: 0 - alert; keenly responsive   1b. LOC questions:  0 - answers both questions correctly   1c. LOC commands: 0 - performs both tasks correctly   2. Best Gaze: 0 - normal   3. Visual: 0 - no visual loss   4. Facial Palsy: 0 - normal symmetric movement   5a.  Motor left 60mg q4hr for total of 21 days  - PT OT rehab      - given afib RVR, if neurosurgery is also ok with it given the EVD, ok to anticoagulate from endovascular standpoint, consider using heparin gtt. - if anticoagulant of any type is started, please d/c aspirin.    - f/u with Dr. Soo Villafuerte in the clinic in 2-4 weeks and f/u with Dr Lisa Wang in 3 months. Case discussed with Dr. Lisa Wang attending.       Ericka Canales MD PGY 8    Stroke, [id number] [facility]  [address] [place] [address]  Electronically signed 10/23/2023 at 9:29 AM

## 2023-10-23 NOTE — PROGRESS NOTES
Neurosurgery MARÍA/Resident    Daily Progress Note   CC:  Chief Complaint   Patient presents with    Altered Mental Status     10/23/2023  7:58 AM    Chart reviewed. No acute events overnight. No new complaints. EVD clamped since yesterday, getting up to bedside commode, tachycardic with movement, reports headache is stable compared to yesterday , ICP 8-19    Vitals:    10/23/23 0400 10/23/23 0500 10/23/23 0600 10/23/23 0700   BP: (!) 153/56 (!) 163/78 111/69 (!) 142/76   Pulse: 73 95 93 100   Resp: 11 18 17 21   Temp:       TempSrc:       SpO2: 96% 100% 96% 97%   Weight:       Height:           PE:   Awake alert oriented  Ambulating to bathroom  Following all commands  Moving extremities equally   E4 V5 M6    Drain output: EVD clamped no drainage from site   Incision; CDI       Lab Results   Component Value Date    WBC 22.0 (H) 10/23/2023    HGB 14.5 10/23/2023    HCT 43.6 10/23/2023     10/23/2023    CHOL 130 10/12/2023    TRIG 176 (H) 10/12/2023    HDL 46 10/12/2023    ALT 16 10/11/2023    AST 24 10/11/2023     (L) 10/23/2023    K 3.7 10/23/2023    CL 98 10/23/2023    CREATININE 0.3 (L) 10/23/2023    BUN 9 10/23/2023    CO2 28 10/23/2023    TSH 1.50 10/11/2023    INR 1.0 10/11/2023    LABA1C 6.6 (H) 10/12/2023       A/P  75 y.o. female who presents with aneurysmal subarachnoid hemorrhage s/p ruptured basilar tip aneurysm, hydrocephalus and EVD placement     10/11 WEB device  10/12 EVD placement      Continue EVD clamped at this time, monitor ICP hourly  and record      Please contact neurosurgery with any changes in patients neurologic status.        Jeffrey Cisse CNP  10/23/23  7:58 AM

## 2023-10-23 NOTE — CARE COORDINATION
Transitional planning;    EVD Clamped, plan is CT head today along with Daily labs, goal is [facility] at discharge, will continue to follow for needs

## 2023-10-23 NOTE — PROGRESS NOTES
pain and intermittent nausea. Continues on scheduled Neurontin and Decadron. EVD set at 15mmHg, ICP 6-18, 199cc out/24h. EVD raised to 20mmHg. TCD with no evidence of vasospasm. 10/19: Received two dose of PRN Lopressor 5mg IV for HR sustaining in 130-140's. Unable to tolerate EVD clamp trail, increased headaches and elevated ICP. EVD remains at 20 mmHg. 10/20: EVD remains open at 20 mmHg. Morphine given overnight for headache  10/21: Florinef 0.1 mg bid and 2% sodium chloride infusion started for hyponatremia  10/22: sodium improving 125 --> 128    Overnight Events:  No acute events overnight. EVD pressures rising greater than 20 mmHg after EVD was clamped yesterday. Patient continues to have mild/moderate headache which is unchanged. No vasospasm per TCD criteria. Sodium improving, 132 this morning. Increase Florinef dose.  Goal sodium 135    CURRENT MEDICATIONS:  SCHEDULED MEDICATIONS:   fludrocortisone  0.2 mg Oral BID    gabapentin  300 mg Oral TID    insulin lispro  0-4 Units SubCUTAneous TID WC    insulin lispro  0-4 Units SubCUTAneous Nightly    amiodarone  200 mg Oral BID    metoprolol tartrate  50 mg Oral BID    dilTIAZem  120 mg Oral Daily    lidocaine 1 % injection  5 mL IntraDERmal Once    sodium chloride flush  5-40 mL IntraVENous 2 times per day    sodium chloride flush  5-40 mL IntraVENous 2 times per day    escitalopram  10 mg Oral Nightly    niMODipine  60 mg Oral 6 times per day    aspirin  81 mg Oral Daily    pantoprazole  40 mg Oral QAM AC    atorvastatin  10 mg Oral Nightly    sodium chloride flush  5-40 mL IntraVENous 2 times per day    enoxaparin  40 mg SubCUTAneous Daily    sodium chloride flush  5-40 mL IntraVENous 2 times per day     CONTINUOUS INFUSIONS:   dextrose      sodium chloride      sodium chloride 25 mL/hr at 10/22/23 1904    sodium chloride       PRN MEDICATIONS:   ipratropium 0.5 mg-albuterol 2.5 mg, glucose, dextrose bolus **OR** dextrose bolus, glucagon (rDNA), Continue to monitor blood glucose, goal <180  - Not on any diabetic medications PTA according to PCP note 05/2023  - Continue low intensity corrective insulin sliding scale ACHS  - TSH 1.50     OTHER:  - Continue home Lexapro for depression  - PT/OT/ST   - PM&R following; ARU candidate as of 10/16    PROPHYLAXIS:  Stress ulcer: PPI as above      DVT PROPHYLAXIS:  - SCD sleeves - Thigh High   - Lovenox 40mg QD    DISPOSITION:  [x] To remain ICU for EVD management, close neurological monitoring. We will continue to follow along. For any changes in exam or patient status please contact Neuro Critical Care.       Thelda Siemens, DO  Neuro Critical Care  Pager [phone]  10/23/2023     1:30 PM

## 2023-10-24 LAB
ANION GAP SERPL CALCULATED.3IONS-SCNC: 11 MMOL/L (ref 9–17)
APPEARANCE CSF: ABNORMAL
BACTERIA URNS QL MICRO: ABNORMAL
BILIRUB UR QL STRIP: NEGATIVE
BUN SERPL-MCNC: 10 MG/DL (ref 8–23)
CALCIUM SERPL-MCNC: 7.9 MG/DL (ref 8.6–10.4)
CASTS #/AREA URNS LPF: ABNORMAL /LPF (ref 0–8)
CHLORIDE SERPL-SCNC: 92 MMOL/L (ref 98–107)
CLARITY UR: CLEAR
CO2 SERPL-SCNC: 27 MMOL/L (ref 20–31)
COLOR UR: YELLOW
CREAT SERPL-MCNC: 0.4 MG/DL (ref 0.5–0.9)
ECHO BSA: 1.93 M2
EPI CELLS #/AREA URNS HPF: ABNORMAL /HPF (ref 0–5)
ERYTHROCYTE [DISTWIDTH] IN BLOOD BY AUTOMATED COUNT: 13.6 % (ref 11.8–14.4)
GFR SERPL CREATININE-BSD FRML MDRD: >60 ML/MIN/1.73M2
GLUCOSE BLD-MCNC: 157 MG/DL (ref 65–105)
GLUCOSE BLD-MCNC: 181 MG/DL (ref 65–105)
GLUCOSE BLD-MCNC: 256 MG/DL (ref 65–105)
GLUCOSE BLD-MCNC: 272 MG/DL (ref 65–105)
GLUCOSE CSF-MCNC: 85 MG/DL (ref 40–70)
GLUCOSE SERPL-MCNC: 225 MG/DL (ref 70–99)
GLUCOSE UR STRIP-MCNC: ABNORMAL MG/DL
HCT VFR BLD AUTO: 40.6 % (ref 36.3–47.1)
HGB BLD-MCNC: 13.4 G/DL (ref 11.9–15.1)
HGB UR QL STRIP.AUTO: NEGATIVE
KETONES UR STRIP-MCNC: NEGATIVE MG/DL
LEUKOCYTE ESTERASE UR QL STRIP: NEGATIVE
MAGNESIUM SERPL-MCNC: 2 MG/DL (ref 1.6–2.6)
MCH RBC QN AUTO: 31.1 PG (ref 25.2–33.5)
MCHC RBC AUTO-ENTMCNC: 33 G/DL (ref 28.4–34.8)
MCV RBC AUTO: 94.2 FL (ref 82.6–102.9)
NITRITE UR QL STRIP: NEGATIVE
NRBC BLD-RTO: 0 PER 100 WBC
NUC CELL # FLD MANUAL: 10 CELLS/UL
PH UR STRIP: 7 [PH] (ref 5–8)
PLATELET # BLD AUTO: 362 K/UL (ref 138–453)
PMV BLD AUTO: 11.1 FL (ref 8.1–13.5)
POTASSIUM SERPL-SCNC: 3 MMOL/L (ref 3.7–5.3)
PROCALCITONIN SERPL-MCNC: 0.05 NG/ML
PROT CSF-MCNC: 57.1 MG/DL (ref 15–45)
PROT UR STRIP-MCNC: ABNORMAL MG/DL
RBC # BLD AUTO: 4.31 M/UL (ref 3.95–5.11)
RBC # FLD MANUAL: 80 CELLS/UL
RBC #/AREA URNS HPF: ABNORMAL /HPF (ref 0–4)
SODIUM SERPL-SCNC: 130 MMOL/L (ref 135–144)
SP GR UR STRIP: 1.02 (ref 1–1.03)
SPECIMEN VOL CSF: ABNORMAL ML
TUBE # CSF: ABNORMAL
UROBILINOGEN UR STRIP-ACNC: NORMAL EU/DL (ref 0–1)
VAS BASILAR MEAN VEL MANUAL: 40.4 CM/S
VAS LEFT ACA MEAN VEL MANUAL: 44.3 CM/S
VAS LEFT DIST MCA MEAN VEL MANUAL: 52.4 CM/S
VAS LEFT DISTAL ICA MEAN VEL MANUAL: 22.7 CM/S
VAS LEFT LINDEGAARD RATIO MANUAL: 2.61
VAS LEFT MCA MEAN VEL MANUAL: 58.9 CM/S
VAS LEFT MID ACA MEAN VEL MANUAL: 45.4 CM/S
VAS LEFT MID MCA MEAN VEL MANUAL: 59.3 CM/S
VAS LEFT PCA MEAN VEL MANUAL: 22.7 CM/S
VAS LEFT PROX ACA MEAN VEL MANUAL: 47.7 CM/S
VAS LEFT PROX MCA MEAN VEL MANUAL: 56.6 CM/S
VAS LEFT SIPHON MEAN VEL MANUAL: 23.1 CM/S
VAS LEFT TERMINAL ICA MEAN VEL MANUAL: 36.6 CM/S
VAS LEFT VERTEBRAL MEAN VEL MANUAL: 27.3 CM/S
VAS RIGHT ACA MEAN VEL MANUAL: 27.7 CM/S
VAS RIGHT DIST MCA MEAN VEL MANUAL: 41.2 CM/S
VAS RIGHT DISTAL ICA MEAN VEL MANUAL: 26.6 CM/S
VAS RIGHT LINDEGAARD RATIO MANUAL: 1.8
VAS RIGHT MCA MEAN VEL MANUAL: 45.4 CM/S
VAS RIGHT MID ACA MEAN VEL MANUAL: 30 CM/S
VAS RIGHT MID MCA MEAN VEL MANUAL: 45.4 CM/S
VAS RIGHT PCA MEAN VEL MANUAL: 23.5 CM/S
VAS RIGHT PROX ACA MEAN VEL MANUAL: 28.5 CM/S
VAS RIGHT PROX MCA MEAN VEL MANUAL: 43.9 CM/S
VAS RIGHT SIPHON MEAN VEL MANUAL: 21.2 CM/S
VAS RIGHT TERMINAL ICA MEAN VEL MANUAL: 43.9 CM/S
VAS RIGHT VERTEBRAL MEAN VEL MANUAL: 21.2 CM/S
WBC #/AREA URNS HPF: ABNORMAL /HPF (ref 0–5)
WBC OTHER # BLD: 25.4 K/UL (ref 3.5–11.3)
XANTHOCHROMIA CSF QL: PRESENT

## 2023-10-24 PROCEDURE — 82947 ASSAY GLUCOSE BLOOD QUANT: CPT

## 2023-10-24 PROCEDURE — 84157 ASSAY OF PROTEIN OTHER: CPT

## 2023-10-24 PROCEDURE — 6370000000 HC RX 637 (ALT 250 FOR IP): Performed by: NURSE PRACTITIONER

## 2023-10-24 PROCEDURE — 83735 ASSAY OF MAGNESIUM: CPT

## 2023-10-24 PROCEDURE — 2580000003 HC RX 258: Performed by: NURSE PRACTITIONER

## 2023-10-24 PROCEDURE — 93005 ELECTROCARDIOGRAM TRACING: CPT | Performed by: STUDENT IN AN ORGANIZED HEALTH CARE EDUCATION/TRAINING PROGRAM

## 2023-10-24 PROCEDURE — 36415 COLL VENOUS BLD VENIPUNCTURE: CPT

## 2023-10-24 PROCEDURE — 87205 SMEAR GRAM STAIN: CPT

## 2023-10-24 PROCEDURE — 2000000000 HC ICU R&B

## 2023-10-24 PROCEDURE — 82945 GLUCOSE OTHER FLUID: CPT

## 2023-10-24 PROCEDURE — 97535 SELF CARE MNGMENT TRAINING: CPT

## 2023-10-24 PROCEDURE — APPSS15 APP SPLIT SHARED TIME 0-15 MINUTES: Performed by: NURSE PRACTITIONER

## 2023-10-24 PROCEDURE — 6370000000 HC RX 637 (ALT 250 FOR IP): Performed by: SURGERY

## 2023-10-24 PROCEDURE — 6370000000 HC RX 637 (ALT 250 FOR IP): Performed by: PSYCHIATRY & NEUROLOGY

## 2023-10-24 PROCEDURE — 81001 URINALYSIS AUTO W/SCOPE: CPT

## 2023-10-24 PROCEDURE — 87070 CULTURE OTHR SPECIMN AEROBIC: CPT

## 2023-10-24 PROCEDURE — 85027 COMPLETE CBC AUTOMATED: CPT

## 2023-10-24 PROCEDURE — 89051 BODY FLUID CELL COUNT: CPT

## 2023-10-24 PROCEDURE — 84145 PROCALCITONIN (PCT): CPT

## 2023-10-24 PROCEDURE — 00P6X0Z REMOVAL OF DRAINAGE DEVICE FROM CEREBRAL VENTRICLE, EXTERNAL APPROACH: ICD-10-PCS | Performed by: STUDENT IN AN ORGANIZED HEALTH CARE EDUCATION/TRAINING PROGRAM

## 2023-10-24 PROCEDURE — 80048 BASIC METABOLIC PNL TOTAL CA: CPT

## 2023-10-24 PROCEDURE — 6360000002 HC RX W HCPCS: Performed by: NURSE PRACTITIONER

## 2023-10-24 PROCEDURE — 2580000003 HC RX 258: Performed by: PSYCHIATRY & NEUROLOGY

## 2023-10-24 RX ORDER — SODIUM CHLORIDE 1 G/1
2 TABLET ORAL
Status: DISCONTINUED | OUTPATIENT
Start: 2023-10-24 | End: 2023-10-26 | Stop reason: HOSPADM

## 2023-10-24 RX ORDER — CALCIUM CARBONATE 500 MG/1
500 TABLET, CHEWABLE ORAL 3 TIMES DAILY PRN
Status: DISCONTINUED | OUTPATIENT
Start: 2023-10-24 | End: 2023-10-26 | Stop reason: HOSPADM

## 2023-10-24 RX ORDER — POTASSIUM CHLORIDE 20 MEQ/1
40 TABLET, EXTENDED RELEASE ORAL EVERY 4 HOURS
Status: COMPLETED | OUTPATIENT
Start: 2023-10-24 | End: 2023-10-24

## 2023-10-24 RX ORDER — KETOROLAC TROMETHAMINE 15 MG/ML
15 INJECTION, SOLUTION INTRAMUSCULAR; INTRAVENOUS ONCE
Status: COMPLETED | OUTPATIENT
Start: 2023-10-24 | End: 2023-10-24

## 2023-10-24 RX ADMIN — SODIUM CHLORIDE, PRESERVATIVE FREE 10 ML: 5 INJECTION INTRAVENOUS at 20:53

## 2023-10-24 RX ADMIN — ACETAMINOPHEN 650 MG: 325 TABLET ORAL at 20:54

## 2023-10-24 RX ADMIN — ESCITALOPRAM 10 MG: 10 TABLET, FILM COATED ORAL at 20:52

## 2023-10-24 RX ADMIN — KETOROLAC TROMETHAMINE 15 MG: 15 INJECTION, SOLUTION INTRAMUSCULAR; INTRAVENOUS at 09:37

## 2023-10-24 RX ADMIN — Medication 60 MG: at 09:39

## 2023-10-24 RX ADMIN — POTASSIUM CHLORIDE 40 MEQ: 1500 TABLET, EXTENDED RELEASE ORAL at 18:42

## 2023-10-24 RX ADMIN — FLUDROCORTISONE ACETATE 0.2 MG: 0.1 TABLET ORAL at 20:51

## 2023-10-24 RX ADMIN — INSULIN LISPRO 2 UNITS: 100 INJECTION, SOLUTION INTRAVENOUS; SUBCUTANEOUS at 17:43

## 2023-10-24 RX ADMIN — GABAPENTIN 300 MG: 300 CAPSULE ORAL at 20:52

## 2023-10-24 RX ADMIN — Medication 2 G: at 15:04

## 2023-10-24 RX ADMIN — GABAPENTIN 300 MG: 300 CAPSULE ORAL at 14:21

## 2023-10-24 RX ADMIN — AMIODARONE HYDROCHLORIDE 200 MG: 200 TABLET ORAL at 20:52

## 2023-10-24 RX ADMIN — PANTOPRAZOLE SODIUM 40 MG: 40 TABLET, DELAYED RELEASE ORAL at 07:48

## 2023-10-24 RX ADMIN — ATORVASTATIN CALCIUM 10 MG: 10 TABLET, FILM COATED ORAL at 20:52

## 2023-10-24 RX ADMIN — SODIUM CHLORIDE, PRESERVATIVE FREE 10 ML: 5 INJECTION INTRAVENOUS at 13:45

## 2023-10-24 RX ADMIN — METOPROLOL TARTRATE 50 MG: 50 TABLET, FILM COATED ORAL at 07:48

## 2023-10-24 RX ADMIN — DILTIAZEM HYDROCHLORIDE 120 MG: 120 CAPSULE, COATED, EXTENDED RELEASE ORAL at 07:47

## 2023-10-24 RX ADMIN — AMIODARONE HYDROCHLORIDE 200 MG: 200 TABLET ORAL at 07:48

## 2023-10-24 RX ADMIN — ACETAMINOPHEN 650 MG: 325 TABLET ORAL at 14:21

## 2023-10-24 RX ADMIN — ACETAMINOPHEN 650 MG: 325 TABLET ORAL at 07:48

## 2023-10-24 RX ADMIN — ANTACID TABLETS 500 MG: 500 TABLET, CHEWABLE ORAL at 15:57

## 2023-10-24 RX ADMIN — GABAPENTIN 300 MG: 300 CAPSULE ORAL at 07:47

## 2023-10-24 RX ADMIN — METOPROLOL TARTRATE 50 MG: 50 TABLET, FILM COATED ORAL at 21:04

## 2023-10-24 RX ADMIN — Medication 60 MG: at 17:43

## 2023-10-24 RX ADMIN — Medication 60 MG: at 02:06

## 2023-10-24 RX ADMIN — Medication 6 MG: at 20:54

## 2023-10-24 RX ADMIN — POTASSIUM CHLORIDE 40 MEQ: 1500 TABLET, EXTENDED RELEASE ORAL at 15:04

## 2023-10-24 RX ADMIN — INSULIN LISPRO 2 UNITS: 100 INJECTION, SOLUTION INTRAVENOUS; SUBCUTANEOUS at 12:03

## 2023-10-24 RX ADMIN — Medication 60 MG: at 06:08

## 2023-10-24 RX ADMIN — Medication 60 MG: at 20:52

## 2023-10-24 RX ADMIN — FLUDROCORTISONE ACETATE 0.2 MG: 0.1 TABLET ORAL at 07:47

## 2023-10-24 ASSESSMENT — PAIN SCALES - GENERAL
PAINLEVEL_OUTOF10: 6
PAINLEVEL_OUTOF10: 0
PAINLEVEL_OUTOF10: 6
PAINLEVEL_OUTOF10: 8
PAINLEVEL_OUTOF10: 6
PAINLEVEL_OUTOF10: 3
PAINLEVEL_OUTOF10: 8

## 2023-10-24 ASSESSMENT — PAIN DESCRIPTION - DESCRIPTORS: DESCRIPTORS: PATIENT UNABLE TO DESCRIBE

## 2023-10-24 ASSESSMENT — PAIN DESCRIPTION - LOCATION: LOCATION: HEAD

## 2023-10-24 NOTE — PLAN OF CARE
Output by Drain (mL) 10/22/23 0701 - 10/22/23 1900 10/22/23 1901 - 10/23/23 0700 10/23/23 0701 - 10/23/23 1900 10/23/23 1901 - 10/24/23 0700 10/24/23 0701 - 10/24/23 1534   Ventriculostomy Right Parietal region 5.3 0 0 0        Drain Removal Note    []Subdural Drain   []Subgaleal Drain  [x]Ventriculostomy Drain    Drain removed from suction, prepped with betadine and sterile towels placed to create sterile field. Drain suture cut and drain removed. 2 staples placed over drain hole with hemostasis. No complications, patient tolerated procedure well.     --  Adali Thomas CNP  3:34 PM EDT

## 2023-10-24 NOTE — PLAN OF CARE
Problem: Pain  Goal: Verbalizes/displays adequate comfort level or baseline comfort level  Outcome: Progressing     Problem: Safety - Adult  Goal: Free from fall injury  Outcome: Progressing     Problem: Nutrition Deficit:  Goal: Optimize nutritional status  Outcome: Progressing     Problem: Skin/Tissue Integrity  Goal: Absence of new skin breakdown  Description: 1. Monitor for areas of redness and/or skin breakdown  2. Assess vascular access sites hourly  3. Every 4-6 hours minimum:  Change oxygen saturation probe site  4. Every 4-6 hours:  If on nasal continuous positive airway pressure, respiratory therapy assess nares and determine need for appliance change or resting period.   Outcome: Progressing     Problem: ABCDS Injury Assessment  Goal: Absence of physical injury  Outcome: Progressing

## 2023-10-24 NOTE — PROGRESS NOTES
RN found that patient is more confused/restless trying to get out of bed. Patient requiring soft bilateral wrist restraints. Patient's right arm and leg are more weak. RN notified neuro critical resident. Resident at bedside to assess. RN gave report to [employer]. All questions answered at this time.

## 2023-10-24 NOTE — PROGRESS NOTES
Daily Progress Note  Neuro Critical Care    Patient Name: Jayson Jon  Patient : 1950  Room/Bed: 0130/0130-01  Code Status: Full  Allergies: No Known Allergies    CHIEF COMPLAINT:     Headache     INTERVAL HISTORY    Initial Presentation (Admitted 10/11/23): The patient is a 67 y.o. female with history of HTN, HLD, and smoking presented as a transfer from [address] [place] [address] for Diffuse SAH. Patient was at home this morning when around 0930 she developed acute onset severe headache. EMS was called, patient was walking to the cot when she became unresponsive. She was intubated and taken to [address] [place] [address] where CT head showed diffuse SAH. She was loaded with 1g Keppra and started on Propofol for sedation. On arrival to [address] [place] [address] with propofol held, patient opens eyes to verbal stimulation. Pupils are 4mm reactive bilaterally. Cough intact. Moving all extremities spontaneously, localizing bilateral upper extremities. Wiggles toes bilaterally to command. CTA head/neck showed basilar tip aneurysm. Given additional 1g Keppra. SBP goal < 120. Irregular rhythm noted on monitor, follow up EKG and troponin. Endovascular and neurosurgery consulted. Of note, patient's  does report she takes Aspirin 81mg daily for general wellness. Bower&Gates: 5, Modified Viramontes: IV    Taken urgently to cerebral angiogram showing the basilar tip aneurysm treated with WEB device. EVD was placed overnight and set at 10 cmH20. CT head showed stable SAH and well positioned EVD. Aspirin 81mg daily started after EVD placement. Hospital Course:   10/12: Opens eyes, following commands. Extubated without difficulty. Unasyn started for aspiration pneumonia. 10/13: Started on Regular diet. Baseline TCD normal.  ?New onset atrial fibrillation. 10/14: EVD set at 10mmHg this morning, ICP 4-12, 255cc out/24h. EVD transiently increased to 15mmHg per Neurosurgery. ICP never significantly elevated but increased to ~16.   Decision made QD post WEB device  - Completed 7 day course of empiric Keppra 500mg BID  - Nimodipine 60mg Q4h x21 days  - Last TCD 10/23 with no evidence of vasospasm, stop daily TCDs  - Headache management;  Tylenol 650mg Q4h PRN, Neurontin 300mg BID  - Goal SBP<160  Obstructive hydrocephalus  - EVD placed 10/12 ~0030AM for worsening obstructive hydrocephalus  - EVD clamped 10/22  - Repeat CT Head 10/23 showed mild increase in ventricular size  - Clinical exam remains stable, plan to remove EVD 10/24     CARDIOVASCULAR:  Essential HTN  - Goal SBP<160  - PRN Hydralazine/Labetalol  - Home Norvasc 5mg QD and Losartan-HCTZ 100-25mg QD on hold  New onset paroxysmal atrial fibrillation   Asymptomatic bradyarrhythmia (HR 40-60's); resolved   Mild troponin elevation likely secondary to [facility]   - Continue on Amiodarone 200mg BID, Lopressor 50mg BID and Cardizem 120mg QD  - Consider starting AC post EVD removal if OK with Neurosurgery   - Troponin peaked at 54  - Echo EF 55-60%  - Cardiology following; appreciated recs  - Continue telemetry  Mixed hyperlipidemia  - Continue home Lipitor 10mg QD      PULMONARY:  Intubated 10/11 in field for low GCS, airway protection  Tobacco abuse (smokes 1 PPD for many years)  Likely undiagnosed COPD  Possible aspiration pneumonia   - Extubated 10/12  - Possible HANNAH component; recommend outpatient sleep study, CPAP overnight as tolerates   - Maintaining O2 sats on 2L nasal canula   - Unable to wean O2 today, desaturated to high 80's  - Duonebs PRN  - Incentive spirometry   - Mobilize patient as tolerates     RENAL/FLUID/ELECTROLYTE:  Normal renal functioning  Mild hyponatremia likely secondary to cerebral salt wasting  - BUN 10/ Creatinine 0.4  - Monitor I&O; 1409/950  - No maintenance IVF, tolerating PO  - Florinef 0.2mg BID  - Start salt tabs 2g TID   - Give total 80meq Kcl for hypokalemia (K3.0)  - Monitor for persistent hypokalemia in setting of Florinef   - Goal Mag> 2.0, K >4.0   - Replace

## 2023-10-24 NOTE — PROGRESS NOTES
[facility]  Progress Note                   Date:   10/24/2023  Patient name: Hussain Courtney  Date of admission:  10/11/2023  2:52 PM  MRN:   [id number]  YOB: 1950  PCP: No primary care provider on file. Reason for Admission: Subarachnoid hemorrhage ([address]) [I60.9]  Subarachnoid bleed ([address]) [I60.9]    Subjective:       Clinical Changes /Abnormalities: No acute CV issues/concerns overnight. Tele/vitals/labs reviewed . SR      Medications:   Scheduled Meds:   fludrocortisone  0.2 mg Oral BID    gabapentin  300 mg Oral TID    insulin lispro  0-4 Units SubCUTAneous TID WC    insulin lispro  0-4 Units SubCUTAneous Nightly    amiodarone  200 mg Oral BID    metoprolol tartrate  50 mg Oral BID    dilTIAZem  120 mg Oral Daily    sodium chloride flush  5-40 mL IntraVENous 2 times per day    sodium chloride flush  5-40 mL IntraVENous 2 times per day    escitalopram  10 mg Oral Nightly    niMODipine  60 mg Oral 6 times per day    aspirin  81 mg Oral Daily    pantoprazole  40 mg Oral QAM AC    atorvastatin  10 mg Oral Nightly    sodium chloride flush  5-40 mL IntraVENous 2 times per day    enoxaparin  40 mg SubCUTAneous Daily    sodium chloride flush  5-40 mL IntraVENous 2 times per day     Continuous Infusions:   dextrose      sodium chloride      sodium chloride       CBC:   Recent Labs     10/22/23  0707 10/23/23  0202 10/24/23  0631   WBC 18.9* 22.0* 25.4*   HGB 14.6 14.5 13.4    325 362       BMP:    Recent Labs     10/22/23  0707 10/22/23  1849 10/23/23  0202 10/23/23  0924 10/23/23  1756 10/24/23  0631   *   < > 132* 130* 134* 130*   K 3.5*  --  3.7  --   --  3.0*   CL 93*  --  98  --   --  92*   CO2 27  --  28  --   --  27   BUN 7*  --  9  --   --  10   CREATININE 0.3*  --  0.3*  --   --  0.4*   GLUCOSE 152*  --  158*  --   --  225*    < > = values in this interval not displayed.        Hepatic:No results for input(s): \"AST\", \"ALT\", \"ALB\", \"BILITOT\", \"ALKPHOS\" in the

## 2023-10-24 NOTE — PROGRESS NOTES
Occupational Therapy  Facility/Department: [address] NEURO ICU  Occupational Therapy Daily Treatment Note      Name: Jayson Jon  : 1950  MRN: [id number]  Date of Service: 10/24/2023    Discharge Recommendations:Further therapy recommended at discharge. The patient should be able to tolerate at least 3 hours of therapy per day over 5 days or 15 hours over 7 days. This patient may benefit from a Physical Medicine and Rehab consult. Patient would benefit from continued therapy after discharge  OT Equipment Recommendations  Other: AE // DME recommendations for DC are TBD. Patient Diagnosis(es): The primary encounter diagnosis was Subarachnoid bleed ([address]). 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 Past Medical History:  has a past medical history of DM II (diabetes mellitus, type II), controlled ([address]) and HTN (hypertension). Past Surgical History:  has a past surgical history that includes Thyroid surgery; Breast reduction surgery (Bilateral); Hysterectomy; hernia repair; and Bladder surgery. Assessment   Performance deficits / Impairments: Decreased functional mobility ; Decreased endurance;Decreased coordination;Decreased posture;Decreased ADL status; Decreased balance;Decreased strength;Decreased cognition;Decreased safe awareness  Assessment: Pt would benefit from continued acute and post acute care OT to address above listed deficits. Prognosis: Good  Activity Tolerance  Activity Tolerance: Patient limited by fatigue;Patient limited by pain; Patient Tolerated treatment well        Plan   Occupational Therapy Plan  Times Per Week: 5-6x/week  (NEURO)  Current Treatment Recommendations: Strengthening, Balance training, Functional mobility training, Endurance training, Neuromuscular re-education, Equipment evaluation, education, & procurement, Patient/Caregiver education & training, Safety time  Interventions: Safety awareness training;Verbal cues  Sit to Stand: Contact-guard assistance; Additional time; Adaptive equipment  Stand to Sit: Contact-guard assistance; Additional time; Adaptive equipment  Bed to Chair: Contact-guard assistance; Adaptive equipment; Additional time  Overall Level of Assistance: Additional time;Contact-guard assistance; Adaptive equipment  Interventions: Safety awareness training;Verbal cues        ADL  Feeding: Independent; Increased time to complete  Grooming: Increased time to complete;Verbal cueing;Setup;Stand by assistance;Maximum assistance (SBA-wash face, brush teeth, Max A-wash/dry/brush hair)  UE Bathing: Stand by assistance;Verbal cueing; Increased time to complete;Setup (assist to wash back)  LE Bathing: Stand by assistance;Setup;Verbal cueing; Increased time to complete (seated to wash BLE)  UE Dressing: Stand by assistance;Setup;Verbal cueing; Increased time to complete (able to don pull over shirt)  LE Dressing: Verbal cueing; Increased time to complete;Setup;Stand by assistance (able to don footies)  Toileting: Contact guard assistance; Increased time to complete; Adaptive equipment;Setup (able to complete julius care in standing)  Additional Comments: Pt up in chair upon arrival. Setup at tray table for self care (see above for LOF). STS for transfers and func mob from chair to EOB w/CGA. Pt retired to supine in bed w/SBA. Pt needed increased time and assist d/t fatigue, pain, rest breaks and overall weakness. Cognition  Overall Cognitive Status: Exceptions  Arousal/Alertness: Appropriate responses to stimuli  Following Commands:  Follows all commands without difficulty  Attention Span: Appears intact  Memory: Appears intact  Safety Judgement: Decreased awareness of need for assistance;Decreased awareness of need for safety  Problem Solving: Assistance required to generate solutions;Assistance required to implement solutions;Assistance required to correct errors

## 2023-10-24 NOTE — PROGRESS NOTES
- nimodipine 60mg q4hr for total of 21 days  - PT OT rehab      - given afib RVR, if neurosurgery is also ok with it given the EVD, ok to anticoagulate from endovascular standpoint, consider using heparin gtt. - if anticoagulant of any type is started, please d/c aspirin.    - f/u with Dr. Ramonita Carrel in the clinic in 2-4 weeks and f/u with Dr Placido Barney in 3 months. Case discussed with Dr. Placido Barney attending.       Russell Hinton MD PGY 8    Stroke, [id number] [facility]  [address] [place] [address]  Electronically signed 10/24/2023 at 7:10 AM

## 2023-10-25 LAB
ANION GAP SERPL CALCULATED.3IONS-SCNC: 8 MMOL/L (ref 9–17)
BASOPHILS # BLD: 0.05 K/UL (ref 0–0.2)
BASOPHILS NFR BLD: 0 % (ref 0–2)
BUN SERPL-MCNC: 9 MG/DL (ref 8–23)
CALCIUM SERPL-MCNC: 8.1 MG/DL (ref 8.6–10.4)
CHLORIDE SERPL-SCNC: 93 MMOL/L (ref 98–107)
CO2 SERPL-SCNC: 31 MMOL/L (ref 20–31)
CREAT SERPL-MCNC: 0.4 MG/DL (ref 0.5–0.9)
EKG ATRIAL RATE: 66 BPM
EKG P AXIS: -44 DEGREES
EKG P-R INTERVAL: 160 MS
EKG Q-T INTERVAL: 500 MS
EKG QRS DURATION: 92 MS
EKG QTC CALCULATION (BAZETT): 524 MS
EKG R AXIS: -27 DEGREES
EKG T AXIS: 18 DEGREES
EKG VENTRICULAR RATE: 66 BPM
EOSINOPHIL # BLD: 0.56 K/UL (ref 0–0.44)
EOSINOPHILS RELATIVE PERCENT: 3 % (ref 1–4)
ERYTHROCYTE [DISTWIDTH] IN BLOOD BY AUTOMATED COUNT: 13.8 % (ref 11.8–14.4)
GFR SERPL CREATININE-BSD FRML MDRD: >60 ML/MIN/1.73M2
GLUCOSE BLD-MCNC: 165 MG/DL (ref 65–105)
GLUCOSE BLD-MCNC: 190 MG/DL (ref 65–105)
GLUCOSE BLD-MCNC: 214 MG/DL (ref 65–105)
GLUCOSE BLD-MCNC: 222 MG/DL (ref 65–105)
GLUCOSE SERPL-MCNC: 130 MG/DL (ref 70–99)
HCT VFR BLD AUTO: 39.5 % (ref 36.3–47.1)
HGB BLD-MCNC: 13 G/DL (ref 11.9–15.1)
IMM GRANULOCYTES # BLD AUTO: 0.11 K/UL (ref 0–0.3)
IMM GRANULOCYTES NFR BLD: 1 %
LYMPHOCYTES NFR BLD: 1.85 K/UL (ref 1.1–3.7)
LYMPHOCYTES RELATIVE PERCENT: 10 % (ref 24–43)
MAGNESIUM SERPL-MCNC: 1.9 MG/DL (ref 1.6–2.6)
MCH RBC QN AUTO: 31 PG (ref 25.2–33.5)
MCHC RBC AUTO-ENTMCNC: 32.9 G/DL (ref 28.4–34.8)
MCV RBC AUTO: 94.3 FL (ref 82.6–102.9)
MONOCYTES NFR BLD: 1.04 K/UL (ref 0.1–1.2)
MONOCYTES NFR BLD: 5 % (ref 3–12)
NEUTROPHILS NFR BLD: 81 % (ref 36–65)
NEUTS SEG NFR BLD: 15.79 K/UL (ref 1.5–8.1)
NRBC BLD-RTO: 0 PER 100 WBC
PLATELET # BLD AUTO: 340 K/UL (ref 138–453)
PMV BLD AUTO: 11.6 FL (ref 8.1–13.5)
POTASSIUM SERPL-SCNC: 3.9 MMOL/L (ref 3.7–5.3)
RBC # BLD AUTO: 4.19 M/UL (ref 3.95–5.11)
SODIUM SERPL-SCNC: 132 MMOL/L (ref 135–144)
UNIDENT CELLS NFR FLD: NORMAL %
WBC OTHER # BLD: 19.4 K/UL (ref 3.5–11.3)

## 2023-10-25 PROCEDURE — 6370000000 HC RX 637 (ALT 250 FOR IP): Performed by: NURSE PRACTITIONER

## 2023-10-25 PROCEDURE — 6370000000 HC RX 637 (ALT 250 FOR IP): Performed by: PSYCHIATRY & NEUROLOGY

## 2023-10-25 PROCEDURE — 2060000000 HC ICU INTERMEDIATE R&B

## 2023-10-25 PROCEDURE — 85025 COMPLETE CBC W/AUTO DIFF WBC: CPT

## 2023-10-25 PROCEDURE — 93010 ELECTROCARDIOGRAM REPORT: CPT | Performed by: INTERNAL MEDICINE

## 2023-10-25 PROCEDURE — 83735 ASSAY OF MAGNESIUM: CPT

## 2023-10-25 PROCEDURE — 36415 COLL VENOUS BLD VENIPUNCTURE: CPT

## 2023-10-25 PROCEDURE — 82947 ASSAY GLUCOSE BLOOD QUANT: CPT

## 2023-10-25 PROCEDURE — 2500000003 HC RX 250 WO HCPCS: Performed by: SURGERY

## 2023-10-25 PROCEDURE — 6360000002 HC RX W HCPCS: Performed by: STUDENT IN AN ORGANIZED HEALTH CARE EDUCATION/TRAINING PROGRAM

## 2023-10-25 PROCEDURE — 2580000003 HC RX 258: Performed by: NURSE PRACTITIONER

## 2023-10-25 PROCEDURE — 6370000000 HC RX 637 (ALT 250 FOR IP): Performed by: SURGERY

## 2023-10-25 PROCEDURE — 6360000002 HC RX W HCPCS: Performed by: NURSE PRACTITIONER

## 2023-10-25 PROCEDURE — 80048 BASIC METABOLIC PNL TOTAL CA: CPT

## 2023-10-25 PROCEDURE — 6370000000 HC RX 637 (ALT 250 FOR IP): Performed by: STUDENT IN AN ORGANIZED HEALTH CARE EDUCATION/TRAINING PROGRAM

## 2023-10-25 PROCEDURE — APPSS30 APP SPLIT SHARED TIME 16-30 MINUTES: Performed by: PHYSICIAN ASSISTANT

## 2023-10-25 RX ORDER — INSULIN LISPRO 100 [IU]/ML
0-4 INJECTION, SOLUTION INTRAVENOUS; SUBCUTANEOUS NIGHTLY
Status: DISCONTINUED | OUTPATIENT
Start: 2023-10-25 | End: 2023-10-26 | Stop reason: HOSPADM

## 2023-10-25 RX ORDER — INSULIN LISPRO 100 [IU]/ML
0-8 INJECTION, SOLUTION INTRAVENOUS; SUBCUTANEOUS
Status: DISCONTINUED | OUTPATIENT
Start: 2023-10-25 | End: 2023-10-26 | Stop reason: HOSPADM

## 2023-10-25 RX ORDER — MORPHINE SULFATE 4 MG/ML
4 INJECTION, SOLUTION INTRAMUSCULAR; INTRAVENOUS ONCE
Status: COMPLETED | OUTPATIENT
Start: 2023-10-25 | End: 2023-10-25

## 2023-10-25 RX ADMIN — INSULIN LISPRO 1 UNITS: 100 INJECTION, SOLUTION INTRAVENOUS; SUBCUTANEOUS at 09:58

## 2023-10-25 RX ADMIN — Medication 60 MG: at 09:11

## 2023-10-25 RX ADMIN — FLUDROCORTISONE ACETATE 0.2 MG: 0.1 TABLET ORAL at 09:10

## 2023-10-25 RX ADMIN — Medication 6 MG: at 21:58

## 2023-10-25 RX ADMIN — SODIUM CHLORIDE, PRESERVATIVE FREE 10 ML: 5 INJECTION INTRAVENOUS at 21:58

## 2023-10-25 RX ADMIN — METOPROLOL TARTRATE 50 MG: 50 TABLET, FILM COATED ORAL at 09:08

## 2023-10-25 RX ADMIN — DILTIAZEM HYDROCHLORIDE 120 MG: 120 CAPSULE, COATED, EXTENDED RELEASE ORAL at 09:10

## 2023-10-25 RX ADMIN — GABAPENTIN 300 MG: 300 CAPSULE ORAL at 21:53

## 2023-10-25 RX ADMIN — AMIODARONE HYDROCHLORIDE 200 MG: 200 TABLET ORAL at 21:52

## 2023-10-25 RX ADMIN — Medication 60 MG: at 12:13

## 2023-10-25 RX ADMIN — ATORVASTATIN CALCIUM 10 MG: 10 TABLET, FILM COATED ORAL at 21:52

## 2023-10-25 RX ADMIN — Medication 2 G: at 12:18

## 2023-10-25 RX ADMIN — Medication 60 MG: at 20:32

## 2023-10-25 RX ADMIN — METOPROLOL TARTRATE 5 MG: 5 INJECTION INTRAVENOUS at 09:12

## 2023-10-25 RX ADMIN — Medication 2 G: at 09:08

## 2023-10-25 RX ADMIN — ACETAMINOPHEN 650 MG: 325 TABLET ORAL at 13:30

## 2023-10-25 RX ADMIN — AMIODARONE HYDROCHLORIDE 200 MG: 200 TABLET ORAL at 09:08

## 2023-10-25 RX ADMIN — ESCITALOPRAM 10 MG: 10 TABLET, FILM COATED ORAL at 21:52

## 2023-10-25 RX ADMIN — Medication 60 MG: at 04:43

## 2023-10-25 RX ADMIN — ASPIRIN 81 MG 81 MG: 81 TABLET ORAL at 09:08

## 2023-10-25 RX ADMIN — ENOXAPARIN SODIUM 40 MG: 100 INJECTION SUBCUTANEOUS at 09:09

## 2023-10-25 RX ADMIN — Medication 60 MG: at 00:45

## 2023-10-25 RX ADMIN — FLUDROCORTISONE ACETATE 0.2 MG: 0.1 TABLET ORAL at 21:53

## 2023-10-25 RX ADMIN — GABAPENTIN 300 MG: 300 CAPSULE ORAL at 13:30

## 2023-10-25 RX ADMIN — METOPROLOL TARTRATE 50 MG: 50 TABLET, FILM COATED ORAL at 21:53

## 2023-10-25 RX ADMIN — SODIUM CHLORIDE, PRESERVATIVE FREE 10 ML: 5 INJECTION INTRAVENOUS at 09:00

## 2023-10-25 RX ADMIN — Medication 2 G: at 15:51

## 2023-10-25 RX ADMIN — Medication 60 MG: at 15:52

## 2023-10-25 RX ADMIN — GABAPENTIN 300 MG: 300 CAPSULE ORAL at 09:08

## 2023-10-25 RX ADMIN — ACETAMINOPHEN 650 MG: 325 TABLET ORAL at 09:21

## 2023-10-25 RX ADMIN — PANTOPRAZOLE SODIUM 40 MG: 40 TABLET, DELAYED RELEASE ORAL at 06:39

## 2023-10-25 RX ADMIN — SODIUM CHLORIDE, PRESERVATIVE FREE 10 ML: 5 INJECTION INTRAVENOUS at 12:49

## 2023-10-25 RX ADMIN — APIXABAN 5 MG: 5 TABLET, FILM COATED ORAL at 21:52

## 2023-10-25 RX ADMIN — MORPHINE SULFATE 4 MG: 4 INJECTION INTRAVENOUS at 22:02

## 2023-10-25 ASSESSMENT — PAIN SCALES - GENERAL
PAINLEVEL_OUTOF10: 5
PAINLEVEL_OUTOF10: 4
PAINLEVEL_OUTOF10: 5
PAINLEVEL_OUTOF10: 8

## 2023-10-25 ASSESSMENT — PAIN DESCRIPTION - LOCATION
LOCATION: HEAD
LOCATION: HEAD

## 2023-10-25 NOTE — PLAN OF CARE
Problem: Discharge Planning  Goal: Discharge to home or other facility with appropriate resources  Outcome: Progressing     Problem: Respiratory - Adult  Goal: Achieves optimal ventilation and oxygenation  Outcome: Progressing     Problem: Pain  Goal: Verbalizes/displays adequate comfort level or baseline comfort level  10/24/2023 2155 by Anya White RN  Outcome: Progressing  10/24/2023 1954 by Paulo Amaya RN  Outcome: Progressing     Problem: Safety - Adult  Goal: Free from fall injury  10/24/2023 2155 by Anya hWite RN  Outcome: Progressing  10/24/2023 1954 by Paulo Amaya RN  Outcome: Progressing     Problem: Nutrition Deficit:  Goal: Optimize nutritional status  10/24/2023 2155 by Anya White RN  Outcome: Progressing  10/24/2023 1954 by Paulo Amaya RN  Outcome: Progressing     Problem: Skin/Tissue Integrity  Goal: Absence of new skin breakdown  Description: 1. Monitor for areas of redness and/or skin breakdown  2. Assess vascular access sites hourly  3. Every 4-6 hours minimum:  Change oxygen saturation probe site  4. Every 4-6 hours:  If on nasal continuous positive airway pressure, respiratory therapy assess nares and determine need for appliance change or resting period.   10/24/2023 2155 by Anya White RN  Outcome: Progressing  10/24/2023 1954 by Paulo Amaya RN  Outcome: Progressing     Problem: ABCDS Injury Assessment  Goal: Absence of physical injury  10/24/2023 2155 by Anya White RN  Outcome: Progressing  10/24/2023 1954 by Paulo Amaya RN  Outcome: Progressing     Problem: Chronic Conditions and Co-morbidities  Goal: Patient's chronic conditions and co-morbidity symptoms are monitored and maintained or improved  Outcome: Progressing

## 2023-10-25 NOTE — PROGRESS NOTES
Physical Therapy        Physical Therapy Cancel Note      DATE: 10/25/2023    NAME: Dianna Jean  MRN: [id number]   : 1950      Patient not seen this date for Physical Therapy due to:    Patient Declined: Pt c/o increased HA pain which she rates as /10, will check back tomorrow.       Electronically signed by Earley Babinski, PTA on 10/25/2023 at 4:19 PM

## 2023-10-25 NOTE — PROGRESS NOTES
basilar tip aneurysm. Taken emergently to cerebral angiogram, which showed the basilar tip aneurysm treated with WEB device embolization, 10/11/2023. Admitted to the Neuro ICU post procedure. EVD placed by Neurosurgery 10/12 ~0030AM.  Found to have new onset atrial fibrillation, Cardiology consulted. Florinef and 2% started for worsening hyponatremia, which is improving sodium. Cleared for anticoagulation by neurosurgery. Started on Eliquis. Awaiting placement to ARU. 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  -On Eliquis 5 mg twice daily.  - Aspirin discontinued  - Completed 7 day course of empiric Keppra 500mg BID  - Nimodipine 60mg Q4h x21 days  - Last TCD 10/23 with no evidence of vasospasm, stop daily TCDs  - Headache management; Tylenol 650mg Q4h PRN, Neurontin 300mg BID  - Goal SBP<160    Obstructive hydrocephalus  - EVD placed 10/12 ~0030AM for worsening obstructive hydrocephalus  - EVD clamped 10/22  - Repeat CT Head 10/23 showed mild increase in ventricular size  - EVD removed by neurosurgery on 10/24     CARDIOVASCULAR:  Essential HTN  - Goal SBP<160  - PRN Hydralazine/Labetalol  - Home Norvasc 5mg QD and Losartan-HCTZ 100-25mg QD on hold  New onset paroxysmal atrial fibrillation   Asymptomatic bradyarrhythmia (HR 40-60's); resolved   Mild troponin elevation likely secondary to [place]   - Continue on Amiodarone 200mg BID, Lopressor 50mg BID and Cardizem 120mg QD  - Started on AC.  Cleared by NSG.  - Troponin peaked at 47  - Echo EF 55-60%  - Cardiology following; Recommend AC if cleared  - Continue telemetry  Mixed hyperlipidemia  - Continue home Lipitor 10mg QD      PULMONARY:  Intubated 10/11 in field for low GCS, airway protection  Tobacco abuse (smokes 1 PPD for many years)  Likely undiagnosed COPD  Possible aspiration pneumonia   - bed at [facility]. We will continue to follow along. For any changes in exam or patient status please contact Neuro Critical Care.       Pearl Wilcox MD  Neuro Critical Care  Pager [phone]  10/25/2023     2:13 PM

## 2023-10-25 NOTE — PROGRESS NOTES
RN transferred patient to  with all belongings and hooked up to telemetry. RN gave report to [facility] RN and all questions answered at this time.

## 2023-10-25 NOTE — CARE COORDINATION
ISRA[place] called and spoke with Doreen Lynn from [facility] and she states that they will not have bed available to admit until tomorrow.

## 2023-10-26 VITALS
RESPIRATION RATE: 26 BRPM | DIASTOLIC BLOOD PRESSURE: 57 MMHG | BODY MASS INDEX: 29.99 KG/M2 | OXYGEN SATURATION: 93 % | SYSTOLIC BLOOD PRESSURE: 118 MMHG | HEIGHT: 65 IN | TEMPERATURE: 98.6 F | HEART RATE: 65 BPM | WEIGHT: 180 LBS

## 2023-10-26 PROBLEM — G93.6 CEREBRAL EDEMA (HCC): Status: RESOLVED | Noted: 2023-10-12 | Resolved: 2023-10-26

## 2023-10-26 PROBLEM — R40.2430 GLASGOW COMA SCALE TOTAL SCORE 3-8 (HCC): Status: RESOLVED | Noted: 2023-10-12 | Resolved: 2023-10-26

## 2023-10-26 PROBLEM — I61.1 NONTRAUMATIC CORTICAL HEMORRHAGE OF LEFT CEREBRAL HEMISPHERE (HCC): Status: RESOLVED | Noted: 2023-10-12 | Resolved: 2023-10-26

## 2023-10-26 PROBLEM — G91.1 OBSTRUCTIVE HYDROCEPHALUS (HCC): Status: RESOLVED | Noted: 2023-10-12 | Resolved: 2023-10-26

## 2023-10-26 PROBLEM — I60.9 SUBARACHNOID BLEED (HCC): Status: RESOLVED | Noted: 2023-10-11 | Resolved: 2023-10-26

## 2023-10-26 LAB
ANION GAP SERPL CALCULATED.3IONS-SCNC: 5 MMOL/L (ref 9–17)
BASOPHILS # BLD: 0.06 K/UL (ref 0–0.2)
BASOPHILS NFR BLD: 0 % (ref 0–2)
BUN SERPL-MCNC: 8 MG/DL (ref 8–23)
CA-I BLD-SCNC: 1.05 MMOL/L (ref 1.13–1.33)
CALCIUM SERPL-MCNC: 7.9 MG/DL (ref 8.6–10.4)
CHLORIDE SERPL-SCNC: 94 MMOL/L (ref 98–107)
CO2 SERPL-SCNC: 32 MMOL/L (ref 20–31)
CREAT SERPL-MCNC: 0.3 MG/DL (ref 0.5–0.9)
EOSINOPHIL # BLD: 0.43 K/UL (ref 0–0.44)
EOSINOPHILS RELATIVE PERCENT: 3 % (ref 1–4)
ERYTHROCYTE [DISTWIDTH] IN BLOOD BY AUTOMATED COUNT: 13.8 % (ref 11.8–14.4)
GFR SERPL CREATININE-BSD FRML MDRD: >60 ML/MIN/1.73M2
GLUCOSE BLD-MCNC: 125 MG/DL (ref 65–105)
GLUCOSE BLD-MCNC: 149 MG/DL (ref 65–105)
GLUCOSE BLD-MCNC: 325 MG/DL (ref 65–105)
GLUCOSE SERPL-MCNC: 125 MG/DL (ref 70–99)
HCT VFR BLD AUTO: 38.3 % (ref 36.3–47.1)
HGB BLD-MCNC: 12.5 G/DL (ref 11.9–15.1)
IMM GRANULOCYTES # BLD AUTO: 0.11 K/UL (ref 0–0.3)
IMM GRANULOCYTES NFR BLD: 1 %
LYMPHOCYTES NFR BLD: 2.65 K/UL (ref 1.1–3.7)
LYMPHOCYTES RELATIVE PERCENT: 16 % (ref 24–43)
MAGNESIUM SERPL-MCNC: 1.8 MG/DL (ref 1.6–2.6)
MCH RBC QN AUTO: 31.2 PG (ref 25.2–33.5)
MCHC RBC AUTO-ENTMCNC: 32.6 G/DL (ref 28.4–34.8)
MCV RBC AUTO: 95.5 FL (ref 82.6–102.9)
MONOCYTES NFR BLD: 1.19 K/UL (ref 0.1–1.2)
MONOCYTES NFR BLD: 7 % (ref 3–12)
NEUTROPHILS NFR BLD: 73 % (ref 36–65)
NEUTS SEG NFR BLD: 12.58 K/UL (ref 1.5–8.1)
NRBC BLD-RTO: 0 PER 100 WBC
PLATELET # BLD AUTO: 337 K/UL (ref 138–453)
PMV BLD AUTO: 10.8 FL (ref 8.1–13.5)
POTASSIUM SERPL-SCNC: 3.4 MMOL/L (ref 3.7–5.3)
RBC # BLD AUTO: 4.01 M/UL (ref 3.95–5.11)
SODIUM SERPL-SCNC: 131 MMOL/L (ref 135–144)
WBC OTHER # BLD: 17 K/UL (ref 3.5–11.3)

## 2023-10-26 PROCEDURE — 6370000000 HC RX 637 (ALT 250 FOR IP): Performed by: SURGERY

## 2023-10-26 PROCEDURE — 94761 N-INVAS EAR/PLS OXIMETRY MLT: CPT

## 2023-10-26 PROCEDURE — 6370000000 HC RX 637 (ALT 250 FOR IP): Performed by: NURSE PRACTITIONER

## 2023-10-26 PROCEDURE — 2580000003 HC RX 258: Performed by: NURSE PRACTITIONER

## 2023-10-26 PROCEDURE — 93005 ELECTROCARDIOGRAM TRACING: CPT | Performed by: PSYCHIATRY & NEUROLOGY

## 2023-10-26 PROCEDURE — 6360000002 HC RX W HCPCS: Performed by: NURSE PRACTITIONER

## 2023-10-26 PROCEDURE — 6370000000 HC RX 637 (ALT 250 FOR IP): Performed by: PSYCHIATRY & NEUROLOGY

## 2023-10-26 PROCEDURE — 83735 ASSAY OF MAGNESIUM: CPT

## 2023-10-26 PROCEDURE — 82947 ASSAY GLUCOSE BLOOD QUANT: CPT

## 2023-10-26 PROCEDURE — 36415 COLL VENOUS BLD VENIPUNCTURE: CPT

## 2023-10-26 PROCEDURE — 82330 ASSAY OF CALCIUM: CPT

## 2023-10-26 PROCEDURE — 85025 COMPLETE CBC W/AUTO DIFF WBC: CPT

## 2023-10-26 PROCEDURE — 80048 BASIC METABOLIC PNL TOTAL CA: CPT

## 2023-10-26 PROCEDURE — 6370000000 HC RX 637 (ALT 250 FOR IP): Performed by: STUDENT IN AN ORGANIZED HEALTH CARE EDUCATION/TRAINING PROGRAM

## 2023-10-26 RX ORDER — DILTIAZEM HYDROCHLORIDE 120 MG/1
120 CAPSULE, COATED, EXTENDED RELEASE ORAL DAILY
Qty: 30 CAPSULE | Refills: 3
Start: 2023-10-27

## 2023-10-26 RX ORDER — PANTOPRAZOLE SODIUM 40 MG/1
40 TABLET, DELAYED RELEASE ORAL
Qty: 30 TABLET | Refills: 3
Start: 2023-10-27

## 2023-10-26 RX ORDER — POTASSIUM CHLORIDE 20 MEQ/1
40 TABLET, EXTENDED RELEASE ORAL ONCE
Status: DISCONTINUED | OUTPATIENT
Start: 2023-10-26 | End: 2023-10-26

## 2023-10-26 RX ORDER — NIMODIPINE 30 MG/1
60 CAPSULE, LIQUID FILLED ORAL DAILY
Qty: 14 CAPSULE | Refills: 0
Start: 2023-10-26 | End: 2023-11-02

## 2023-10-26 RX ORDER — GABAPENTIN 300 MG/1
300 CAPSULE ORAL 3 TIMES DAILY
Qty: 90 CAPSULE | Refills: 3
Start: 2023-10-26 | End: 2024-02-23

## 2023-10-26 RX ORDER — POTASSIUM CHLORIDE 20 MEQ/1
20 TABLET, EXTENDED RELEASE ORAL DAILY
Qty: 60 TABLET | Refills: 0
Start: 2023-10-26

## 2023-10-26 RX ORDER — ATORVASTATIN CALCIUM 10 MG/1
10 TABLET, FILM COATED ORAL NIGHTLY
Qty: 30 TABLET | Refills: 3
Start: 2023-10-26

## 2023-10-26 RX ORDER — AMIODARONE HYDROCHLORIDE 200 MG/1
200 TABLET ORAL 2 TIMES DAILY
Qty: 60 TABLET | Refills: 1
Start: 2023-10-26

## 2023-10-26 RX ORDER — CALCIUM CARBONATE 500 MG/1
500 TABLET, CHEWABLE ORAL 3 TIMES DAILY PRN
Qty: 30 TABLET | Refills: 0
Start: 2023-10-26 | End: 2023-11-25

## 2023-10-26 RX ORDER — POTASSIUM CHLORIDE 20 MEQ/1
20 TABLET, EXTENDED RELEASE ORAL ONCE
Status: DISCONTINUED | OUTPATIENT
Start: 2023-10-26 | End: 2023-10-26 | Stop reason: HOSPADM

## 2023-10-26 RX ORDER — SODIUM CHLORIDE 1 G/1
2 TABLET ORAL
Qty: 90 TABLET | Refills: 3
Start: 2023-10-26

## 2023-10-26 RX ORDER — MAGNESIUM SULFATE IN WATER 40 MG/ML
2000 INJECTION, SOLUTION INTRAVENOUS ONCE
Status: COMPLETED | OUTPATIENT
Start: 2023-10-26 | End: 2023-10-26

## 2023-10-26 RX ORDER — FLUDROCORTISONE ACETATE 0.1 MG/1
0.2 TABLET ORAL 2 TIMES DAILY
Qty: 120 TABLET | Refills: 0
Start: 2023-10-26 | End: 2023-11-25

## 2023-10-26 RX ORDER — METOPROLOL TARTRATE 50 MG/1
50 TABLET, FILM COATED ORAL 2 TIMES DAILY
Qty: 60 TABLET | Refills: 3
Start: 2023-10-26

## 2023-10-26 RX ORDER — ESCITALOPRAM OXALATE 10 MG/1
10 TABLET ORAL NIGHTLY
Qty: 30 TABLET | Refills: 3
Start: 2023-10-26

## 2023-10-26 RX ADMIN — SODIUM CHLORIDE, PRESERVATIVE FREE 10 ML: 5 INJECTION INTRAVENOUS at 08:58

## 2023-10-26 RX ADMIN — APIXABAN 5 MG: 5 TABLET, FILM COATED ORAL at 08:58

## 2023-10-26 RX ADMIN — MAGNESIUM SULFATE HEPTAHYDRATE 2000 MG: 40 INJECTION, SOLUTION INTRAVENOUS at 10:15

## 2023-10-26 RX ADMIN — Medication 60 MG: at 05:38

## 2023-10-26 RX ADMIN — PANTOPRAZOLE SODIUM 40 MG: 40 TABLET, DELAYED RELEASE ORAL at 08:57

## 2023-10-26 RX ADMIN — Medication 2 G: at 08:57

## 2023-10-26 RX ADMIN — Medication 2 G: at 11:52

## 2023-10-26 RX ADMIN — ACETAMINOPHEN 650 MG: 325 TABLET ORAL at 14:15

## 2023-10-26 RX ADMIN — GABAPENTIN 300 MG: 300 CAPSULE ORAL at 08:57

## 2023-10-26 RX ADMIN — AMIODARONE HYDROCHLORIDE 200 MG: 200 TABLET ORAL at 08:58

## 2023-10-26 RX ADMIN — GABAPENTIN 300 MG: 300 CAPSULE ORAL at 14:14

## 2023-10-26 RX ADMIN — METOPROLOL TARTRATE 50 MG: 50 TABLET, FILM COATED ORAL at 08:58

## 2023-10-26 RX ADMIN — DILTIAZEM HYDROCHLORIDE 120 MG: 120 CAPSULE, COATED, EXTENDED RELEASE ORAL at 08:57

## 2023-10-26 RX ADMIN — Medication 60 MG: at 00:35

## 2023-10-26 RX ADMIN — FLUDROCORTISONE ACETATE 0.2 MG: 0.1 TABLET ORAL at 08:57

## 2023-10-26 RX ADMIN — SODIUM CHLORIDE 25 ML: 9 INJECTION, SOLUTION INTRAVENOUS at 10:14

## 2023-10-26 RX ADMIN — POTASSIUM BICARBONATE 40 MEQ: 782 TABLET, EFFERVESCENT ORAL at 10:01

## 2023-10-26 RX ADMIN — INSULIN LISPRO 6 UNITS: 100 INJECTION, SOLUTION INTRAVENOUS; SUBCUTANEOUS at 11:56

## 2023-10-26 RX ADMIN — Medication 60 MG: at 11:52

## 2023-10-26 RX ADMIN — Medication 60 MG: at 08:56

## 2023-10-26 ASSESSMENT — PAIN DESCRIPTION - ORIENTATION: ORIENTATION: RIGHT

## 2023-10-26 NOTE — PLAN OF CARE
Problem: Respiratory - Adult  Goal: Achieves optimal ventilation and oxygenation  10/26/2023 0930 by Mary Beth Gomes RCP  Outcome: Progressing

## 2023-10-26 NOTE — PROGRESS NOTES
Report called to [facility]. All belongings sent with patient. All questions answered.  Summer Lang called at patient's request to notify of patient transfer.

## 2023-10-26 NOTE — PROGRESS NOTES
Nutrition Assessment     Type and Reason for Visit: Reassess    Nutrition Recommendations/Plan:   Send clear liquid ONS and fortified pudding with meals (in addition to frozen ONS)  Consider appetite stimulant if medically appropriate       Nutrition Assessment:  Pt remains on Regular diet with frozen ONS TID with meals. Pt was sleeping but family was in room and report oral intake remains poor, though is improving. Family reports Pt ate an entire pancake and some kenny for breakfast this morning, which is improved from previous visits. However, this remains below estimated needs. This is Day #15 of admission, and oral intake has been poor and below estimated needs this entire admission. Family reitterates that Pt dislikes most ONS, including Ensure, though does like the eat the Magic Cup at times, just does not like the vanilla flavor. Will modify order to reflect this. Family also agrees to try Ensure Clear and fortified pudding for increased kcal and protein intake. Family is also hopeful that oral intake will now improve significantly as Pt is no longer having N/V or headaches, which they feel was very much negatively impacting her appetite. If oral intake does not improve significantly, however, she may benefit fron an appetite stimulant. No new weight available. Estimated Daily Nutrient Needs:  Energy (kcal):  2073-8817 kcals/day Weight Used for Energy Requirements: Current     Protein (g):   Weight Used for Protein Requirements: Current        Fluid (ml/day):  2000ml/day Method Used for Fluid Requirements: 1 ml/kcal    Nutrition Related Findings:   Meds/Labs reviewed. Na 131, K+ 3.4 Wound Type: Surgical Incision    Current Nutrition Therapies:    ADULT DIET;  Regular  ADULT ORAL NUTRITION SUPPLEMENT; Breakfast, Lunch, Dinner; Frozen Oral Supplement    Anthropometric Measures:  Height: 165.1 cm (5' 5\")  Current Body Wt: 81.6 kg (179 lb 14.3 oz)   BMI: 29.9    Nutrition Diagnosis: Inadequate protein-energy intake related to increase demand for energy/nutrients as evidenced by intake 0-25% (need for oral supplements)    Nutrition Interventions:   Food and/or Nutrient Delivery: Continue Current Diet, Modify Oral Nutrition Supplement  Nutrition Education/Counseling: No recommendation at this time  Coordination of Nutrition Care: Continue to monitor while inpatient  Plan of Care discussed with: Family    Goals:  Previous Goal Met: Progressing toward Goal(s)  Goals: PO intake 50% or greater, prior to discharge       Nutrition Monitoring and Evaluation:   Behavioral-Environmental Outcomes: None Identified  Food/Nutrient Intake Outcomes: Food and Nutrient Intake, Supplement Intake  Physical Signs/Symptoms Outcomes: Biochemical Data, GI Status, Nausea or Vomiting, Fluid Status or Edema, Skin, Weight    Discharge Planning:     Too soon to determine     Mariela Garcialy, [address] [facility]  Contact: [phone]

## 2023-10-26 NOTE — DISCHARGE SUMMARY
THE HEAD AND NECK WITH CONTRAST 10/11/2023 3:42 pm: TECHNIQUE: CTA of the head and neck was performed with the administration of intravenous contrast. Multiplanar reformatted images are provided for review. MIP images are provided for review. Stenosis of the internal carotid arteries measured using NASCET criteria. Automated exposure control, iterative reconstruction, and/or weight based adjustment of the mA/kV was utilized to reduce the radiation dose to as low as reasonably achievable. This scan was analyzed using Viz. ai contact LVO. Identification of suspected findings is not for diagnostic use beyond notification. Viz LVO is limited to analysis of imaging data and should not be used in-lieu of full patient evaluation or relied upon to make or confirm diagnosis. COMPARISON: None. HISTORY: ORDERING SYSTEM PROVIDED HISTORY: [facility] TECHNOLOGIST PROVIDED HISTORY: [facility] Decision Support Exception - unselect if not a suspected or confirmed emergency medical condition->Emergency Medical Condition (MA) Reason for Exam: SAH FINDINGS: CTA NECK: AORTIC ARCH/ARCH VESSELS: No dissection or arterial injury. No significant stenosis of the brachiocephalic or subclavian arteries. CAROTID ARTERIES: There is calcification of the right carotid bulb and proximal right internal carotid artery without significant stenosis. There is calcification of the left carotid bulb and proximal left internal carotid artery with approximately 50% stenosis of the proximal left internal carotid artery by NASCET criteria VERTEBRAL ARTERIES: No dissection, arterial injury, or significant stenosis. SOFT TISSUES: There is atelectasis. No cervical or superior mediastinal lymphadenopathy. The larynx and pharynx are unremarkable. No acute abnormality of the salivary and thyroid glands. BONES: No acute osseous abnormality.  CTA HEAD: ANTERIOR CIRCULATION: No significant stenosis of the intracranial internal carotid, anterior cerebral, or middle cerebral capsule  Commonly known as: NEURONTIN  Take 1 capsule by mouth 3 times daily for 120 days. metoprolol tartrate 50 MG tablet  Commonly known as: LOPRESSOR  Take 1 tablet by mouth 2 times daily     niMODipine 30 MG capsule  Commonly known as: NIMOTOP  Take 2 capsules by mouth daily for 7 days     pantoprazole 40 MG tablet  Commonly known as: PROTONIX  Take 1 tablet by mouth every morning (before breakfast)  Start taking on: October 27, 2023     potassium chloride 20 MEQ extended release tablet  Commonly known as: KLOR-CON M  Take 1 tablet by mouth daily     sodium chloride 1 g tablet  Take 2 tablets by mouth 3 times daily (with meals)               Where to Get Your Medications        Information about where to get these medications is not yet available    Ask your nurse or doctor about these medications  amiodarone 200 MG tablet  apixaban 5 MG Tabs tablet  atorvastatin 10 MG tablet  calcium carbonate 500 MG chewable tablet  dilTIAZem 120 MG extended release capsule  escitalopram 10 MG tablet  fludrocortisone 0.1 MG tablet  gabapentin 300 MG capsule  metoprolol tartrate 50 MG tablet  niMODipine 30 MG capsule  pantoprazole 40 MG tablet  potassium chloride 20 MEQ extended release tablet  sodium chloride 1 g tablet       Diet: ADULT DIET;  Regular  ADULT ORAL NUTRITION SUPPLEMENT; Breakfast; Clear Liquid Oral Supplement  ADULT ORAL NUTRITION SUPPLEMENT; Lunch, Dinner; Fortified Pudding Oral Supplement  ADULT ORAL NUTRITION SUPPLEMENT; Breakfast, Lunch, Dinner; Frozen Oral Supplement diet as tolerated  Activity: As tolerated  Follow-up:  PCP as needed; endovascular in 3-4 weeks; neurosurgery in 4 weeks  Time Spent for discharge: > 30 minutes    VIKTORIA Contreras CNP  Neuro Critical Care  10/26/2023, 4:57 PM

## 2023-10-26 NOTE — CARE COORDINATION
Transitional Planning  Jonatan from [facility], [phone]. States there was supposed to be a dc, although no dc's on calendar. They have a 9 am meeting and will return call with bed availability. 1130 am Call from [facility] at [facility], states that facility continues to assess bed situation. Notified [facility], dc more likely tomorrow due to lab results and treatments. She will keep CM updated. 1200 pm Notified per Karen Santoro RN, neuro NP rounded and patient is okay for dc. Called Gaby from [facility], notified patient is ready for dc. She states that her Manager is meeting with [facility] Vipin Pinzon regarding bed availability and will return call. 1245 pm Call from [facility], [facility], bed is available, requests transportation arranged for after 3 pm.  Update given to Stephanie Souza Dr, patient , spouse and Eladia Flannery NP. [facility], requests to have paper work faxed. Transportation paperwork faxed. 140 pm [facility],  [facility] is 530 pm with [facility]. Called [facility] from [facility] update given, report number [phone] fax [phone]. No room number assigned to patient. Update given to Stephanie Souza Dr, patient and friend at bedside. 329 pm LANDY faxed to number provided.        Discharge [address] Case Management Department  Written by: Frank Gayle RN    Patient Name: Elana Hawkins  Attending Provider: Ulysses Conteh MD  Admit Date: 10/11/2023  2:52 PM  MRN: [id number]  Account: [de-identified]                     : 1950  Discharge Date:       Disposition: Jennifer Reed RN

## 2023-10-26 NOTE — PROGRESS NOTES
anticoagulate from endovascular standpoint if NSGY is ok with it  - if anticoagulant of any type is started, please d/c aspirin.    - f/u with Dr. Juan Ramon Marsh in the clinic in 2-4 weeks and f/u with Dr Jeffy Tran in 3 months. Case discussed with Dr. Jeffy Tran attending.       Tony Galan MD PGY 8    Stroke, [address] [facility]  [facility]  Electronically signed 10/26/2023 at 9:09 AM

## 2023-10-27 ENCOUNTER — HOSPITAL ENCOUNTER (OUTPATIENT)
Age: 73
Setting detail: SPECIMEN
Discharge: HOME | End: 2023-10-27

## 2023-10-27 LAB
ALBUMIN SERPL-MCNC: 3.3 G/DL (ref 3.5–5.2)
ALP SERPL-CCNC: 60 U/L (ref 35–104)
ALT SERPL-CCNC: 17 U/L (ref 5–33)
ANION GAP SERPL CALCULATED.3IONS-SCNC: 7 MMOL/L (ref 9–17)
AST SERPL-CCNC: 11 U/L
BASOPHILS # BLD: 0.07 K/UL (ref 0–0.2)
BASOPHILS NFR BLD: 1 % (ref 0–2)
BILIRUB SERPL-MCNC: 0.4 MG/DL (ref 0.3–1.2)
BUN SERPL-MCNC: 12 MG/DL (ref 8–23)
BUN/CREAT SERPL: 24 (ref 9–20)
CALCIUM SERPL-MCNC: 8.3 MG/DL (ref 8.6–10.4)
CHLORIDE SERPL-SCNC: 95 MMOL/L (ref 98–107)
CO2 SERPL-SCNC: 33 MMOL/L (ref 20–31)
CREAT SERPL-MCNC: 0.5 MG/DL (ref 0.5–0.9)
EKG ATRIAL RATE: 56 BPM
EKG P AXIS: -54 DEGREES
EKG P-R INTERVAL: 160 MS
EKG Q-T INTERVAL: 492 MS
EKG QRS DURATION: 88 MS
EKG QTC CALCULATION (BAZETT): 474 MS
EKG R AXIS: -26 DEGREES
EKG T AXIS: 14 DEGREES
EKG VENTRICULAR RATE: 56 BPM
EOSINOPHIL # BLD: 0.23 K/UL (ref 0–0.44)
EOSINOPHILS RELATIVE PERCENT: 2 % (ref 1–4)
ERYTHROCYTE [DISTWIDTH] IN BLOOD BY AUTOMATED COUNT: 13.8 % (ref 11.8–14.4)
GFR SERPL CREATININE-BSD FRML MDRD: >60 ML/MIN/1.73M2
GLUCOSE SERPL-MCNC: 136 MG/DL (ref 70–99)
HCT VFR BLD AUTO: 40.2 % (ref 36.3–47.1)
HGB BLD-MCNC: 13.5 G/DL (ref 11.9–15.1)
IMM GRANULOCYTES # BLD AUTO: 0.11 K/UL (ref 0–0.3)
IMM GRANULOCYTES NFR BLD: 1 %
LYMPHOCYTES # BLD: 17 % (ref 24–43)
LYMPHOCYTES NFR BLD: 2.61 K/UL (ref 1.1–3.7)
MAGNESIUM SERPL-MCNC: 2.1 MG/DL (ref 1.6–2.6)
MCH RBC QN AUTO: 31.3 PG (ref 25.2–33.5)
MCHC RBC AUTO-ENTMCNC: 33.6 G/DL (ref 28.4–34.8)
MCV RBC AUTO: 93.1 FL (ref 82.6–102.9)
MICROORGANISM SPEC CULT: ABNORMAL
MICROORGANISM/AGENT SPEC: ABNORMAL
MONOCYTES NFR BLD: 1.17 K/UL (ref 0.1–1.2)
MONOCYTES NFR BLD: 8 % (ref 3–12)
NEUTROPHILS NFR BLD: 71 % (ref 36–65)
NEUTS SEG NFR BLD: 10.85 K/UL (ref 1.5–8.1)
NRBC BLD-RTO: 0 PER 100 WBC
PHOSPHATE SERPL-MCNC: 2.7 MG/DL (ref 2.6–4.5)
PLATELET # BLD AUTO: 373 K/UL (ref 138–453)
PMV BLD AUTO: 11 FL (ref 8.1–13.5)
POTASSIUM SERPL-SCNC: 3.1 MMOL/L (ref 3.7–5.3)
PROT SERPL-MCNC: 5.8 G/DL (ref 6.4–8.3)
RBC # BLD AUTO: 4.32 M/UL (ref 3.95–5.11)
SODIUM SERPL-SCNC: 135 MMOL/L (ref 135–144)
SPECIMEN DESCRIPTION: ABNORMAL
WBC OTHER # BLD: 15 K/UL (ref 3.5–11.3)

## 2023-10-27 PROCEDURE — 80053 COMPREHEN METABOLIC PANEL: CPT

## 2023-10-27 PROCEDURE — 36415 COLL VENOUS BLD VENIPUNCTURE: CPT

## 2023-10-27 PROCEDURE — P9603 ONE-WAY ALLOW PRORATED MILES: HCPCS

## 2023-10-27 PROCEDURE — 84100 ASSAY OF PHOSPHORUS: CPT

## 2023-10-27 PROCEDURE — 93010 ELECTROCARDIOGRAM REPORT: CPT | Performed by: INTERNAL MEDICINE

## 2023-10-27 PROCEDURE — 85025 COMPLETE CBC W/AUTO DIFF WBC: CPT

## 2023-10-27 PROCEDURE — 83735 ASSAY OF MAGNESIUM: CPT

## 2023-10-28 ENCOUNTER — HOSPITAL ENCOUNTER (OUTPATIENT)
Age: 73
Setting detail: SPECIMEN
Discharge: HOME | End: 2023-10-28

## 2023-10-28 LAB — POTASSIUM SERPL-SCNC: 3.4 MMOL/L (ref 3.7–5.3)

## 2023-10-28 PROCEDURE — 84132 ASSAY OF SERUM POTASSIUM: CPT

## 2023-10-28 PROCEDURE — P9603 ONE-WAY ALLOW PRORATED MILES: HCPCS

## 2023-10-28 PROCEDURE — 36415 COLL VENOUS BLD VENIPUNCTURE: CPT

## 2023-10-29 ENCOUNTER — APPOINTMENT (OUTPATIENT)
Dept: CT IMAGING | Age: 73
End: 2023-10-29
Payer: MEDICARE

## 2023-10-29 ENCOUNTER — HOSPITAL ENCOUNTER (EMERGENCY)
Age: 73
Discharge: HOME | End: 2023-10-29
Attending: EMERGENCY MEDICINE
Payer: MEDICARE

## 2023-10-29 VITALS
HEIGHT: 67 IN | BODY MASS INDEX: 28.25 KG/M2 | HEART RATE: 89 BPM | WEIGHT: 180 LBS | RESPIRATION RATE: 16 BRPM | SYSTOLIC BLOOD PRESSURE: 165 MMHG | TEMPERATURE: 99.1 F | DIASTOLIC BLOOD PRESSURE: 86 MMHG | OXYGEN SATURATION: 97 %
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
## 2023-10-29 DIAGNOSIS — S00.83XA CONTUSION OF FACE, INITIAL ENCOUNTER: ICD-10-CM

## 2023-10-29 PROCEDURE — 70486 CT MAXILLOFACIAL W/O DYE: CPT

## 2023-10-29 PROCEDURE — 6370000000 HC RX 637 (ALT 250 FOR IP): Performed by: EMERGENCY MEDICINE

## 2023-10-29 PROCEDURE — 99284 EMERGENCY DEPT VISIT MOD MDM: CPT

## 2023-10-29 PROCEDURE — 70450 CT HEAD/BRAIN W/O DYE: CPT

## 2023-10-29 PROCEDURE — 72125 CT NECK SPINE W/O DYE: CPT

## 2023-10-29 RX ORDER — IBUPROFEN 800 MG/1
800 TABLET ORAL ONCE
Status: COMPLETED | OUTPATIENT
Start: 2023-10-29 | End: 2023-10-29

## 2023-10-29 RX ADMIN — IBUPROFEN 800 MG: 800 TABLET ORAL at 20:52

## 2023-10-29 ASSESSMENT — PAIN DESCRIPTION - PAIN TYPE: TYPE: ACUTE PAIN

## 2023-10-29 ASSESSMENT — PAIN SCALES - GENERAL
PAINLEVEL_OUTOF10: 7
PAINLEVEL_OUTOF10: 7

## 2023-10-29 ASSESSMENT — PAIN DESCRIPTION - LOCATION: LOCATION: HEAD

## 2023-10-29 ASSESSMENT — PAIN - FUNCTIONAL ASSESSMENT: PAIN_FUNCTIONAL_ASSESSMENT: 0-10

## 2023-10-30 NOTE — ED NOTES
Pt presents to ED via EMS from [facility] c/o fall that happened at 1430 today in her bathroom. Per EMS pt was in the BR and got caught in her pants. Fell onto tile onto face. Reports frontal headache, rates 7/10. No LOC. Denies pain in any other location. Staying at [facility] d/t Hx brain aneurysm.       Refugio Reid RN  86/81/45 2037

## 2023-10-30 NOTE — ED PROVIDER NOTES
EMERGENCY DEPARTMENT ENCOUNTER    Pt Name: Harvey Eisenmenger  MRN: [id number]  [address] [facility] [address] 1950  Date of evaluation: 10/29/23  CHIEF COMPLAINT       Chief Complaint   Patient presents with    Fall     From [facility], fell from standing, tripped on pants     HISTORY OF PRESENT ILLNESS   The history is provided by the patient and medical records. Patient is a 39-year-old female who presents [facility] the ED [facility] from standing position. Injury occurred approximately 230 this afternoon while she was walking back from the bathroom. She tripped, fell forwards striking her face on the ground. No LOC. Later in the evening,  was concerned and requested evaluation at the hospital.  Patient most concerned about her teeth which upon evaluation appear normal.  Patient is status post brain aneurysm, approximately 18 days ago on 10/11/2023. REVIEW OF SYSTEMS     Review of Systems  All other systems reviewed and are negative.     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     SURGICAL HISTORY       Past Surgical History:   Procedure Laterality Date    BLADDER SURGERY      BREAST REDUCTION SURGERY Bilateral     HERNIA REPAIR      Umbilical    HYSTERECTOMY (CERVIX STATUS UNKNOWN)      THYROID SURGERY      Lumpectomy     CURRENT MEDICATIONS       Previous Medications    AMIODARONE (CORDARONE) 200 MG TABLET    Take 1 tablet by mouth 2 times daily    APIXABAN (ELIQUIS) 5 MG TABS TABLET    Take 1 tablet by mouth 2 times daily    ATORVASTATIN (LIPITOR) 10 MG TABLET    Take 1 tablet by mouth nightly    CALCIUM CARBONATE (TUMS) 500 MG CHEWABLE TABLET    Take 1 tablet by mouth 3 times daily as needed for Heartburn    DILTIAZEM

## 2023-10-31 ENCOUNTER — HOSPITAL ENCOUNTER (OUTPATIENT)
Age: 73
Setting detail: SPECIMEN
Discharge: HOME | End: 2023-10-31

## 2023-10-31 LAB
ALBUMIN SERPL-MCNC: 3.4 G/DL (ref 3.5–5.2)
ALP SERPL-CCNC: 60 U/L (ref 35–104)
ALT SERPL-CCNC: 14 U/L (ref 5–33)
ANION GAP SERPL CALCULATED.3IONS-SCNC: 11 MMOL/L (ref 9–17)
AST SERPL-CCNC: 11 U/L
BASOPHILS # BLD: 0.12 K/UL (ref 0–0.2)
BASOPHILS NFR BLD: 1 % (ref 0–2)
BILIRUB SERPL-MCNC: 0.4 MG/DL (ref 0.3–1.2)
BUN SERPL-MCNC: 9 MG/DL (ref 8–23)
BUN/CREAT SERPL: 18 (ref 9–20)
CALCIUM SERPL-MCNC: 8.5 MG/DL (ref 8.6–10.4)
CHLORIDE SERPL-SCNC: 100 MMOL/L (ref 98–107)
CO2 SERPL-SCNC: 29 MMOL/L (ref 20–31)
CREAT SERPL-MCNC: 0.5 MG/DL (ref 0.5–0.9)
EKG ATRIAL RATE: 144 BPM
EKG ATRIAL RATE: 147 BPM
EKG P-R INTERVAL: 132 MS
EKG P-R INTERVAL: 40 MS
EKG Q-T INTERVAL: 262 MS
EKG Q-T INTERVAL: 290 MS
EKG QRS DURATION: 108 MS
EKG QRS DURATION: 124 MS
EKG QTC CALCULATION (BAZETT): 405 MS
EKG QTC CALCULATION (BAZETT): 449 MS
EKG R AXIS: -13 DEGREES
EKG R AXIS: 9 DEGREES
EKG T AXIS: 180 DEGREES
EKG T AXIS: 90 DEGREES
EKG VENTRICULAR RATE: 144 BPM
EKG VENTRICULAR RATE: 144 BPM
EOSINOPHIL # BLD: 0.28 K/UL (ref 0–0.44)
EOSINOPHILS RELATIVE PERCENT: 2 % (ref 1–4)
ERYTHROCYTE [DISTWIDTH] IN BLOOD BY AUTOMATED COUNT: 14.6 % (ref 11.8–14.4)
GFR SERPL CREATININE-BSD FRML MDRD: >60 ML/MIN/1.73M2
GLUCOSE SERPL-MCNC: 123 MG/DL (ref 70–99)
HCT VFR BLD AUTO: 37 % (ref 36.3–47.1)
HGB BLD-MCNC: 12 G/DL (ref 11.9–15.1)
IMM GRANULOCYTES # BLD AUTO: 0.05 K/UL (ref 0–0.3)
IMM GRANULOCYTES NFR BLD: 0 %
LYMPHOCYTES # BLD: 16 % (ref 24–43)
LYMPHOCYTES NFR BLD: 2.54 K/UL (ref 1.1–3.7)
MCH RBC QN AUTO: 31.3 PG (ref 25.2–33.5)
MCHC RBC AUTO-ENTMCNC: 32.4 G/DL (ref 28.4–34.8)
MCV RBC AUTO: 96.4 FL (ref 82.6–102.9)
MONOCYTES NFR BLD: 0.77 K/UL (ref 0.1–1.2)
MONOCYTES NFR BLD: 5 % (ref 3–12)
NEUTROPHILS NFR BLD: 76 % (ref 36–65)
NEUTS SEG NFR BLD: 12.45 K/UL (ref 1.5–8.1)
NRBC BLD-RTO: 0 PER 100 WBC
PLATELET # BLD AUTO: 328 K/UL (ref 138–453)
PMV BLD AUTO: 11 FL (ref 8.1–13.5)
POTASSIUM SERPL-SCNC: 3.2 MMOL/L (ref 3.7–5.3)
PROT SERPL-MCNC: 5.8 G/DL (ref 6.4–8.3)
RBC # BLD AUTO: 3.84 M/UL (ref 3.95–5.11)
RBC # BLD: ABNORMAL 10*6/UL
SODIUM SERPL-SCNC: 140 MMOL/L (ref 135–144)
WBC OTHER # BLD: 16.2 K/UL (ref 3.5–11.3)

## 2023-10-31 PROCEDURE — 80053 COMPREHEN METABOLIC PANEL: CPT

## 2023-10-31 PROCEDURE — 36415 COLL VENOUS BLD VENIPUNCTURE: CPT

## 2023-10-31 PROCEDURE — 85025 COMPLETE CBC W/AUTO DIFF WBC: CPT

## 2023-10-31 PROCEDURE — P9603 ONE-WAY ALLOW PRORATED MILES: HCPCS

## 2023-11-01 LAB
EKG ATRIAL RATE: 100 BPM
EKG ATRIAL RATE: 84 BPM
EKG P AXIS: 77 DEGREES
EKG P-R INTERVAL: 170 MS
EKG Q-T INTERVAL: 412 MS
EKG Q-T INTERVAL: 418 MS
EKG QRS DURATION: 90 MS
EKG QRS DURATION: 92 MS
EKG QTC CALCULATION (BAZETT): 469 MS
EKG QTC CALCULATION (BAZETT): 493 MS
EKG R AXIS: 27 DEGREES
EKG R AXIS: 30 DEGREES
EKG T AXIS: 45 DEGREES
EKG T AXIS: 78 DEGREES
EKG VENTRICULAR RATE: 78 BPM
EKG VENTRICULAR RATE: 84 BPM

## 2023-11-03 ENCOUNTER — HOSPITAL ENCOUNTER (OUTPATIENT)
Age: 73
Setting detail: SPECIMEN
Discharge: HOME | End: 2023-11-03

## 2023-11-03 LAB
ALBUMIN SERPL-MCNC: 3.4 G/DL (ref 3.5–5.2)
ALP SERPL-CCNC: 60 U/L (ref 35–104)
ALT SERPL-CCNC: 12 U/L (ref 5–33)
ANION GAP SERPL CALCULATED.3IONS-SCNC: 9 MMOL/L (ref 9–17)
AST SERPL-CCNC: 11 U/L
BASOPHILS # BLD: 0.1 K/UL (ref 0–0.2)
BASOPHILS NFR BLD: 1 % (ref 0–2)
BILIRUB SERPL-MCNC: 0.2 MG/DL (ref 0.3–1.2)
BUN SERPL-MCNC: 12 MG/DL (ref 8–23)
BUN/CREAT SERPL: 17 (ref 9–20)
CALCIUM SERPL-MCNC: 8.8 MG/DL (ref 8.6–10.4)
CHLORIDE SERPL-SCNC: 104 MMOL/L (ref 98–107)
CO2 SERPL-SCNC: 30 MMOL/L (ref 20–31)
CREAT SERPL-MCNC: 0.7 MG/DL (ref 0.5–0.9)
EOSINOPHIL # BLD: 0.37 K/UL (ref 0–0.44)
EOSINOPHILS RELATIVE PERCENT: 4 % (ref 1–4)
ERYTHROCYTE [DISTWIDTH] IN BLOOD BY AUTOMATED COUNT: 14.7 % (ref 11.8–14.4)
GFR SERPL CREATININE-BSD FRML MDRD: >60 ML/MIN/1.73M2
GLUCOSE SERPL-MCNC: 109 MG/DL (ref 70–99)
HCT VFR BLD AUTO: 38.2 % (ref 36.3–47.1)
HGB BLD-MCNC: 12.3 G/DL (ref 11.9–15.1)
IMM GRANULOCYTES # BLD AUTO: 0.02 K/UL (ref 0–0.3)
IMM GRANULOCYTES NFR BLD: 0 %
LYMPHOCYTES # BLD: 28 % (ref 24–43)
LYMPHOCYTES NFR BLD: 2.53 K/UL (ref 1.1–3.7)
MCH RBC QN AUTO: 31.4 PG (ref 25.2–33.5)
MCHC RBC AUTO-ENTMCNC: 32.2 G/DL (ref 28.4–34.8)
MCV RBC AUTO: 97.4 FL (ref 82.6–102.9)
MONOCYTES NFR BLD: 0.59 K/UL (ref 0.1–1.2)
MONOCYTES NFR BLD: 6 % (ref 3–12)
NEUTROPHILS NFR BLD: 61 % (ref 36–65)
NEUTS SEG NFR BLD: 5.57 K/UL (ref 1.5–8.1)
NRBC BLD-RTO: 0 PER 100 WBC
PLATELET # BLD AUTO: 307 K/UL (ref 138–453)
PMV BLD AUTO: 10.8 FL (ref 8.1–13.5)
POTASSIUM SERPL-SCNC: 3.5 MMOL/L (ref 3.7–5.3)
PROT SERPL-MCNC: 5.6 G/DL (ref 6.4–8.3)
RBC # BLD AUTO: 3.92 M/UL (ref 3.95–5.11)
RBC # BLD: ABNORMAL 10*6/UL
SODIUM SERPL-SCNC: 143 MMOL/L (ref 135–144)
WBC OTHER # BLD: 9.2 K/UL (ref 3.5–11.3)

## 2023-11-03 PROCEDURE — 80053 COMPREHEN METABOLIC PANEL: CPT

## 2023-11-03 PROCEDURE — 85025 COMPLETE CBC W/AUTO DIFF WBC: CPT

## 2023-11-03 PROCEDURE — 36415 COLL VENOUS BLD VENIPUNCTURE: CPT

## 2023-11-03 PROCEDURE — P9603 ONE-WAY ALLOW PRORATED MILES: HCPCS

## 2023-11-07 ENCOUNTER — HOSPITAL ENCOUNTER (OUTPATIENT)
Age: 73
Setting detail: SPECIMEN
Discharge: HOME | End: 2023-11-07

## 2023-11-24 ENCOUNTER — OFFICE VISIT (OUTPATIENT)
Dept: NEUROLOGY | Age: 73
End: 2023-11-24
Payer: MEDICARE

## 2023-11-24 VITALS
HEART RATE: 53 BPM | SYSTOLIC BLOOD PRESSURE: 174 MMHG | BODY MASS INDEX: 28.19 KG/M2 | WEIGHT: 180 LBS | DIASTOLIC BLOOD PRESSURE: 89 MMHG | RESPIRATION RATE: 18 BRPM | TEMPERATURE: 97.1 F | OXYGEN SATURATION: 95 %

## 2023-11-24 DIAGNOSIS — Z95.828 MRI-SAFE ENDOVASCULAR ANEURYSM COIL PRESENT: ICD-10-CM

## 2023-11-24 DIAGNOSIS — I48.0 PAROXYSMAL ATRIAL FIBRILLATION (HCC): ICD-10-CM

## 2023-11-24 DIAGNOSIS — I72.5 BASILAR ARTERY ANEURYSM (HCC): Primary | ICD-10-CM

## 2023-11-24 PROCEDURE — 1111F DSCHRG MED/CURRENT MED MERGE: CPT | Performed by: PSYCHIATRY & NEUROLOGY

## 2023-11-24 PROCEDURE — 4004F PT TOBACCO SCREEN RCVD TLK: CPT | Performed by: PSYCHIATRY & NEUROLOGY

## 2023-11-24 PROCEDURE — 1090F PRES/ABSN URINE INCON ASSESS: CPT | Performed by: PSYCHIATRY & NEUROLOGY

## 2023-11-24 PROCEDURE — 3017F COLORECTAL CA SCREEN DOC REV: CPT | Performed by: PSYCHIATRY & NEUROLOGY

## 2023-11-24 PROCEDURE — G8428 CUR MEDS NOT DOCUMENT: HCPCS | Performed by: PSYCHIATRY & NEUROLOGY

## 2023-11-24 PROCEDURE — G8400 PT W/DXA NO RESULTS DOC: HCPCS | Performed by: PSYCHIATRY & NEUROLOGY

## 2023-11-24 PROCEDURE — 1123F ACP DISCUSS/DSCN MKR DOCD: CPT | Performed by: PSYCHIATRY & NEUROLOGY

## 2023-11-24 PROCEDURE — G8484 FLU IMMUNIZE NO ADMIN: HCPCS | Performed by: PSYCHIATRY & NEUROLOGY

## 2023-11-24 PROCEDURE — 99215 OFFICE O/P EST HI 40 MIN: CPT | Performed by: PSYCHIATRY & NEUROLOGY

## 2023-11-24 PROCEDURE — G8419 CALC BMI OUT NRM PARAM NOF/U: HCPCS | Performed by: PSYCHIATRY & NEUROLOGY

## 2023-11-24 RX ORDER — LOSARTAN POTASSIUM AND HYDROCHLOROTHIAZIDE 25; 100 MG/1; MG/1
1 TABLET ORAL DAILY
Qty: 30 TABLET | Refills: 1 | Status: SHIPPED | OUTPATIENT
Start: 2023-11-24

## 2023-11-24 NOTE — PATIENT INSTRUCTIONS
Continue eliquis for afib    MRA head w contrast in 2 months    Resume Hyzaar today and talk to PCP about blood pressure    See Dr Bala Roman in 2-3 months

## 2023-11-27 ENCOUNTER — OFFICE VISIT (OUTPATIENT)
Dept: NEUROSURGERY | Age: 73
End: 2023-11-27
Payer: MEDICARE

## 2023-11-27 VITALS
HEART RATE: 60 BPM | DIASTOLIC BLOOD PRESSURE: 84 MMHG | OXYGEN SATURATION: 92 % | SYSTOLIC BLOOD PRESSURE: 171 MMHG | TEMPERATURE: 97 F | HEIGHT: 67 IN | BODY MASS INDEX: 28.09 KG/M2 | WEIGHT: 179 LBS

## 2023-11-27 DIAGNOSIS — I60.8 ANEURYSMAL SUBARACHNOID HEMORRHAGE (HCC): Primary | ICD-10-CM

## 2023-11-27 DIAGNOSIS — G91.1 OBSTRUCTIVE HYDROCEPHALUS (HCC): ICD-10-CM

## 2023-11-27 PROBLEM — I72.5 BASILAR ARTERY ANEURYSM (HCC): Status: ACTIVE | Noted: 2023-11-27

## 2023-11-27 PROBLEM — Z95.828 MRI-SAFE ENDOVASCULAR ANEURYSM COIL PRESENT: Status: ACTIVE | Noted: 2023-11-27

## 2023-11-27 PROCEDURE — 4004F PT TOBACCO SCREEN RCVD TLK: CPT | Performed by: NURSE PRACTITIONER

## 2023-11-27 PROCEDURE — 1090F PRES/ABSN URINE INCON ASSESS: CPT | Performed by: NURSE PRACTITIONER

## 2023-11-27 PROCEDURE — G8419 CALC BMI OUT NRM PARAM NOF/U: HCPCS | Performed by: NURSE PRACTITIONER

## 2023-11-27 PROCEDURE — G8427 DOCREV CUR MEDS BY ELIG CLIN: HCPCS | Performed by: NURSE PRACTITIONER

## 2023-11-27 PROCEDURE — 1123F ACP DISCUSS/DSCN MKR DOCD: CPT | Performed by: NURSE PRACTITIONER

## 2023-11-27 PROCEDURE — G8400 PT W/DXA NO RESULTS DOC: HCPCS | Performed by: NURSE PRACTITIONER

## 2023-11-27 PROCEDURE — 3017F COLORECTAL CA SCREEN DOC REV: CPT | Performed by: NURSE PRACTITIONER

## 2023-11-27 PROCEDURE — 99214 OFFICE O/P EST MOD 30 MIN: CPT | Performed by: NURSE PRACTITIONER

## 2023-11-27 PROCEDURE — G8484 FLU IMMUNIZE NO ADMIN: HCPCS | Performed by: NURSE PRACTITIONER

## 2023-11-27 NOTE — PROGRESS NOTES
[address]  [address]  [facility] [address] [place] [address], [address] [place] [address] Road [address]  Dept: [phone]    Patient:  Rosa Maria Quan  YOB: 1950  Date: 11/27/23    The patient is a 75 y.o. female who presents today for consult of the following problems:     Chief Complaint   Patient presents with    Follow-up         HPI:     Rosa Maria Quan is a 75 y.o. female who presents for follow up of EVD placement for obstructive hydrocephalus following aneurysmal subarachnoid hemorrhage. Patient has been doing well since hospital discharge. Still completing physical therapy, generalized weakness that is gradually improving. Denies any focal deficits. Denies any numbness, tingling or weakness. Has been struggling with headaches, bifrontal in nature. Gradually improving as well. Did have significant issues with her vision initially, this has been significantly improving as well. Has had outpatient follow-up with eye specialist.  Has continued to have some difficulty with elevated blood pressure readings, was restarted on her losartan hydrochlorothiazide by endovascular on Friday. BP remains slightly elevated, though improved from Friday. Has upcoming follow-up with cardiologist as well.     History:     Past Medical History:   Diagnosis Date    DM II (diabetes mellitus, type II), controlled ([address])     HTN (hypertension)      Past Surgical History:   Procedure Laterality Date    BLADDER SURGERY      BREAST REDUCTION SURGERY Bilateral     HERNIA REPAIR      Umbilical    HYSTERECTOMY (CERVIX STATUS UNKNOWN)      THYROID SURGERY      Lumpectomy     Family History   Problem Relation Age of Onset    Stroke Mother     Hypertension Mother     Stroke Father     Hypertension Father      Current Outpatient Medications on File Prior to Visit   Medication Sig Dispense Refill    losartan-hydroCHLOROthiazide (HYZAAR) 100-25 MG per

## 2024-01-18 ENCOUNTER — HOSPITAL ENCOUNTER (OUTPATIENT)
Dept: MRI IMAGING | Age: 74
Discharge: HOME | End: 2024-01-20
Attending: PSYCHIATRY & NEUROLOGY
Payer: MEDICARE

## 2024-01-18 DIAGNOSIS — I72.5 BASILAR ARTERY ANEURYSM (HCC): ICD-10-CM

## 2024-01-18 LAB
CREAT SERPL-MCNC: 0.8 MG/DL (ref 0.5–0.9)
GFR SERPL CREATININE-BSD FRML MDRD: >60 ML/MIN/1.73M2

## 2024-01-18 PROCEDURE — 70545 MR ANGIOGRAPHY HEAD W/DYE: CPT

## 2024-01-18 PROCEDURE — 6360000004 HC RX CONTRAST MEDICATION: Performed by: PSYCHIATRY & NEUROLOGY

## 2024-01-18 PROCEDURE — A9579 GAD-BASE MR CONTRAST NOS,1ML: HCPCS | Performed by: PSYCHIATRY & NEUROLOGY

## 2024-01-18 PROCEDURE — 82565 ASSAY OF CREATININE: CPT

## 2024-01-18 PROCEDURE — 36415 COLL VENOUS BLD VENIPUNCTURE: CPT

## 2024-01-18 RX ADMIN — GADOTERIDOL 15 ML: 279.3 INJECTION, SOLUTION INTRAVENOUS at 10:06

## 2024-01-29 ENCOUNTER — OFFICE VISIT (OUTPATIENT)
Dept: NEUROLOGY | Age: 74
End: 2024-01-29
Payer: MEDICARE

## 2024-01-29 VITALS
HEART RATE: 62 BPM | WEIGHT: 176 LBS | OXYGEN SATURATION: 94 % | HEIGHT: 67 IN | SYSTOLIC BLOOD PRESSURE: 158 MMHG | BODY MASS INDEX: 27.62 KG/M2 | DIASTOLIC BLOOD PRESSURE: 94 MMHG

## 2024-01-29 DIAGNOSIS — I72.5 BASILAR ARTERY ANEURYSM (HCC): Primary | ICD-10-CM

## 2024-01-29 DIAGNOSIS — Z95.828 MRI-SAFE ENDOVASCULAR ANEURYSM COIL PRESENT: ICD-10-CM

## 2024-01-29 DIAGNOSIS — I48.0 PAROXYSMAL ATRIAL FIBRILLATION (HCC): ICD-10-CM

## 2024-01-29 PROCEDURE — G8484 FLU IMMUNIZE NO ADMIN: HCPCS | Performed by: PSYCHIATRY & NEUROLOGY

## 2024-01-29 PROCEDURE — G8419 CALC BMI OUT NRM PARAM NOF/U: HCPCS | Performed by: PSYCHIATRY & NEUROLOGY

## 2024-01-29 PROCEDURE — 1090F PRES/ABSN URINE INCON ASSESS: CPT | Performed by: PSYCHIATRY & NEUROLOGY

## 2024-01-29 PROCEDURE — 3017F COLORECTAL CA SCREEN DOC REV: CPT | Performed by: PSYCHIATRY & NEUROLOGY

## 2024-01-29 PROCEDURE — 1123F ACP DISCUSS/DSCN MKR DOCD: CPT | Performed by: PSYCHIATRY & NEUROLOGY

## 2024-01-29 PROCEDURE — 4004F PT TOBACCO SCREEN RCVD TLK: CPT | Performed by: PSYCHIATRY & NEUROLOGY

## 2024-01-29 PROCEDURE — G8427 DOCREV CUR MEDS BY ELIG CLIN: HCPCS | Performed by: PSYCHIATRY & NEUROLOGY

## 2024-01-29 PROCEDURE — 99215 OFFICE O/P EST HI 40 MIN: CPT | Performed by: PSYCHIATRY & NEUROLOGY

## 2024-01-29 PROCEDURE — G8400 PT W/DXA NO RESULTS DOC: HCPCS | Performed by: PSYCHIATRY & NEUROLOGY

## 2024-01-29 RX ORDER — HYDROCHLOROTHIAZIDE 25 MG/1
25 TABLET ORAL DAILY
COMMUNITY

## 2024-01-29 NOTE — PROGRESS NOTES
[facility]   Stroke & Neurointerventional Clinic Note    [facility]   [address]   [place] [address]   P: [phone]   F: [phone]  Endovascular Neurosurgery Consult    Pt Name: Dahlia Zhang  MRN: [id number]  YOB: 1950  Date of evaluation: 1/29/2024  Primary Care Physician: Thania Rahman MD    Reason for evaluation: SAH and Basilar tip rupture more so on the right P1    SUBJECTIVE:   History of Chief Complaint:    Dahlia Zhang is a 73 y.o. 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     s/p WEB device embolization with obliteration of aneurysm, achieving Chris 1 on 10/11/2023.       Original H&H 5 and modified mccracken 4.    Found to have afib RVR on the 10/15/2023.    Doing well mRS 1  Memory issues, typical in SAH     Allergies  is allergic to codeine, norco [hydrocodone-acetaminophen], and percocet [oxycodone-acetaminophen].  Medications  Prior to Admission medications    Medication Sig Start Date End Date Taking? Authorizing Provider   hydroCHLOROthiazide (HYDRODIURIL) 25 MG tablet Take 1 tablet by mouth daily   Yes Provider, MD Glenis   losartan (COZAAR) 25 MG tablet Take 1 tablet by mouth daily   Yes Provider, MD Glenis   apixaban (ELIQUIS) 5 MG TABS tablet Take 1 tablet by mouth 2 times daily 10/26/23  Yes Lucy Simmons APRN - CNP   gabapentin (NEURONTIN) 300 MG capsule Take 1 capsule by mouth 3 times daily for 120 days. 10/26/23 2/23/24 Yes Lucy Simmons APRN - CNP   escitalopram (LEXAPRO) 10 MG tablet Take 1 tablet by mouth nightly 10/26/23  Yes Lucy Simmons APRN - CNP   atorvastatin (LIPITOR) 10 MG tablet Take 1 tablet by mouth nightly 10/26/23  Yes Lucy Simmons APRN - CNP   metoprolol tartrate (LOPRESSOR) 50 MG tablet Take 1 tablet by mouth 2 times daily 10/26/23  Yes Troy

## 2024-04-04 ENCOUNTER — HOSPITAL ENCOUNTER (OUTPATIENT)
Dept: INTERVENTIONAL RADIOLOGY/VASCULAR | Age: 74
Discharge: HOME | End: 2024-04-06
Payer: MEDICARE

## 2024-04-04 VITALS
SYSTOLIC BLOOD PRESSURE: 151 MMHG | HEART RATE: 60 BPM | TEMPERATURE: 98.3 F | RESPIRATION RATE: 19 BRPM | BODY MASS INDEX: 28.49 KG/M2 | DIASTOLIC BLOOD PRESSURE: 72 MMHG | WEIGHT: 188 LBS | OXYGEN SATURATION: 97 % | HEIGHT: 68 IN

## 2024-04-04 DIAGNOSIS — I72.5 BASILAR ARTERY ANEURYSM (HCC): ICD-10-CM

## 2024-04-04 LAB
BUN BLD-MCNC: 9 MG/DL (ref 8–26)
CHLORIDE BLD-SCNC: 101 MMOL/L (ref 98–107)
EGFR, POC: >90 ML/MIN/1.73M2
GLUCOSE BLD-MCNC: 146 MG/DL (ref 74–100)
HCT VFR BLD AUTO: 49 % (ref 36–46)
POC CREATININE: 0.7 MG/DL (ref 0.51–1.19)
POC HEMOGLOBIN (CALC): 16.7 G/DL (ref 12–16)
POTASSIUM BLD-SCNC: 4.1 MMOL/L (ref 3.5–4.5)
SODIUM BLD-SCNC: 142 MMOL/L (ref 138–146)

## 2024-04-04 PROCEDURE — C1894 INTRO/SHEATH, NON-LASER: HCPCS

## 2024-04-04 PROCEDURE — 84132 ASSAY OF SERUM POTASSIUM: CPT

## 2024-04-04 PROCEDURE — 99152 MOD SED SAME PHYS/QHP 5/>YRS: CPT

## 2024-04-04 PROCEDURE — 2580000003 HC RX 258: Performed by: PSYCHIATRY & NEUROLOGY

## 2024-04-04 PROCEDURE — 84520 ASSAY OF UREA NITROGEN: CPT

## 2024-04-04 PROCEDURE — 82435 ASSAY OF BLOOD CHLORIDE: CPT

## 2024-04-04 PROCEDURE — 82947 ASSAY GLUCOSE BLOOD QUANT: CPT

## 2024-04-04 PROCEDURE — 99153 MOD SED SAME PHYS/QHP EA: CPT

## 2024-04-04 PROCEDURE — 84295 ASSAY OF SERUM SODIUM: CPT

## 2024-04-04 PROCEDURE — 6360000002 HC RX W HCPCS: Performed by: PSYCHIATRY & NEUROLOGY

## 2024-04-04 PROCEDURE — 82565 ASSAY OF CREATININE: CPT

## 2024-04-04 PROCEDURE — 36226 PLACE CATH VERTEBRAL ART: CPT

## 2024-04-04 PROCEDURE — 85014 HEMATOCRIT: CPT

## 2024-04-04 PROCEDURE — 6360000004 HC RX CONTRAST MEDICATION: Performed by: PSYCHIATRY & NEUROLOGY

## 2024-04-04 RX ORDER — HYDRALAZINE HYDROCHLORIDE 25 MG/1
50 TABLET, FILM COATED ORAL 2 TIMES DAILY
COMMUNITY

## 2024-04-04 RX ORDER — FENTANYL CITRATE 50 UG/ML
INJECTION, SOLUTION INTRAMUSCULAR; INTRAVENOUS PRN
Status: DISCONTINUED | OUTPATIENT
Start: 2024-04-04 | End: 2024-04-07 | Stop reason: HOSPADM

## 2024-04-04 RX ORDER — ONDANSETRON 2 MG/ML
4 INJECTION INTRAMUSCULAR; INTRAVENOUS EVERY 6 HOURS PRN
Status: DISCONTINUED | OUTPATIENT
Start: 2024-04-04 | End: 2024-04-07 | Stop reason: HOSPADM

## 2024-04-04 RX ORDER — ONDANSETRON 4 MG/1
4 TABLET, ORALLY DISINTEGRATING ORAL EVERY 8 HOURS PRN
Status: DISCONTINUED | OUTPATIENT
Start: 2024-04-04 | End: 2024-04-07 | Stop reason: HOSPADM

## 2024-04-04 RX ORDER — LOSARTAN POTASSIUM AND HYDROCHLOROTHIAZIDE 25; 100 MG/1; MG/1
1 TABLET ORAL DAILY
COMMUNITY

## 2024-04-04 RX ORDER — ACETAMINOPHEN 325 MG/1
650 TABLET ORAL EVERY 4 HOURS PRN
Status: DISCONTINUED | OUTPATIENT
Start: 2024-04-04 | End: 2024-04-07 | Stop reason: HOSPADM

## 2024-04-04 RX ORDER — IODIXANOL 270 MG/ML
100 INJECTION, SOLUTION INTRAVASCULAR
Status: COMPLETED | OUTPATIENT
Start: 2024-04-04 | End: 2024-04-04

## 2024-04-04 RX ORDER — SODIUM CHLORIDE 0.9 % (FLUSH) 0.9 %
5-40 SYRINGE (ML) INJECTION EVERY 12 HOURS SCHEDULED
Status: DISCONTINUED | OUTPATIENT
Start: 2024-04-04 | End: 2024-04-07 | Stop reason: HOSPADM

## 2024-04-04 RX ORDER — HEPARIN SODIUM 5000 [USP'U]/ML
INJECTION, SOLUTION INTRAVENOUS; SUBCUTANEOUS PRN
Status: DISCONTINUED | OUTPATIENT
Start: 2024-04-04 | End: 2024-04-07 | Stop reason: HOSPADM

## 2024-04-04 RX ORDER — MIDAZOLAM HYDROCHLORIDE 2 MG/2ML
INJECTION, SOLUTION INTRAMUSCULAR; INTRAVENOUS PRN
Status: DISCONTINUED | OUTPATIENT
Start: 2024-04-04 | End: 2024-04-07 | Stop reason: HOSPADM

## 2024-04-04 RX ORDER — SODIUM CHLORIDE 0.9 % (FLUSH) 0.9 %
5-40 SYRINGE (ML) INJECTION PRN
Status: DISCONTINUED | OUTPATIENT
Start: 2024-04-04 | End: 2024-04-07 | Stop reason: HOSPADM

## 2024-04-04 RX ORDER — SODIUM CHLORIDE 9 MG/ML
INJECTION, SOLUTION INTRAVENOUS CONTINUOUS
Status: DISCONTINUED | OUTPATIENT
Start: 2024-04-04 | End: 2024-04-07 | Stop reason: HOSPADM

## 2024-04-04 RX ORDER — SODIUM CHLORIDE 9 MG/ML
INJECTION, SOLUTION INTRAVENOUS PRN
Status: DISCONTINUED | OUTPATIENT
Start: 2024-04-04 | End: 2024-04-07 | Stop reason: HOSPADM

## 2024-04-04 RX ADMIN — SODIUM CHLORIDE: 9 INJECTION, SOLUTION INTRAVENOUS at 09:11

## 2024-04-04 RX ADMIN — MIDAZOLAM HYDROCHLORIDE 1 MG: 2 INJECTION, SOLUTION INTRAMUSCULAR; INTRAVENOUS at 10:08

## 2024-04-04 RX ADMIN — CEFAZOLIN 2000 MG: 1 INJECTION, POWDER, FOR SOLUTION INTRAMUSCULAR; INTRAVENOUS at 09:44

## 2024-04-04 RX ADMIN — HEPARIN SODIUM 2 ML: 5000 INJECTION, SOLUTION INTRAVENOUS; SUBCUTANEOUS at 10:37

## 2024-04-04 RX ADMIN — IODIXANOL 36 ML: 270 INJECTION, SOLUTION INTRAVASCULAR at 11:11

## 2024-04-04 RX ADMIN — FENTANYL CITRATE 100 MCG: 50 INJECTION, SOLUTION INTRAMUSCULAR; INTRAVENOUS at 10:08

## 2024-04-04 ASSESSMENT — PAIN SCALES - GENERAL: PAINLEVEL_OUTOF10: 0

## 2024-04-04 NOTE — PROGRESS NOTES
Patient admitted, consent signed and questions answered. Patient ready for procedure. Call light to reach with side rails up 2 of 2.   at bedside with patient.  History and physical completed.

## 2024-04-04 NOTE — PROGRESS NOTES
Remaining air removed from SAFEGUARD and removed by Jabari RN at 1330.  Site remains clean soft and dry.  HOB elevated and taking fluids well.  Neuro intact.  Ambulates in halls / restroom without difficulties.  Wound care instructions reviewed with patient and . Left wrist with band aid clean soft and dry.

## 2024-04-04 NOTE — OR NURSING
Hemostasis maintained. Safe guard to right groin. Band aid to left wrist. Neuro assessment unchanged and pulses palpable. Groins soft. Neuro assessment is unchanged. Report called and patient back to AR

## 2024-04-04 NOTE — H&P
[facility]   Stroke & Neurointerventional Clinic Note    [facility]   [address]   [place] [address]   P: [phone]   F: [phone]  Endovascular Neurosurgery H&P    Pt Name: Dahlia Zhang  MRN: [id number]  YOB: 1950  Date of evaluation: 4/4/2024  Primary Care Physician: Thania Rahman MD    Reason for evaluation: SAH and Basilar tip rupture more so on the right P1    SUBJECTIVE:   History of Chief Complaint:    Dahlia Zhang is a 73 y.o. 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     s/p WEB device embolization with obliteration of aneurysm, achieving Chris 1 on 10/11/2023.       Original H&H 5 and modified mccracken 4.    Found to have afib RVR on the 10/15/2023.    Doing well mRS 1  Memory issues, typical in SAH     Interval history:   She is at baseline . will proceed with diagnostic cerebral angiogram under IV moderate sedation    Allergies  is allergic to codeine, norco [hydrocodone-acetaminophen], and percocet [oxycodone-acetaminophen].  Medications  Prior to Admission medications    Medication Sig Start Date End Date Taking? Authorizing Provider   hydroCHLOROthiazide (HYDRODIURIL) 25 MG tablet Take 1 tablet by mouth daily    Glenis Lewis MD   fludrocortisone (FLORINEF) 0.1 MG tablet Take 2 tablets by mouth 2 times daily Patient taking 2 tablets bid. Of .01mg 11/29/23   Glenis Lewis MD   losartan (COZAAR) 25 MG tablet Take 1 tablet by mouth daily    Glenis Lewis MD   apixaban (ELIQUIS) 5 MG TABS tablet Take 1 tablet by mouth 2 times daily 10/26/23   Lucy Simmons APRN - CNP   gabapentin (NEURONTIN) 300 MG capsule Take 1 capsule by mouth 3 times daily for 120 days. 10/26/23 2/23/24  Lucy Simmons APRN - CNP   escitalopram (LEXAPRO) 10 MG tablet Take 1 tablet by mouth nightly 10/26/23

## 2024-04-04 NOTE — BRIEF OP NOTE
[facility]    NEUROENDOVASCULAR SERVICE: POST-OP NOTE: 4/4/2024    Pt Name: Dahlia Zhang  MRN: [id number]  YOB: 1950  Date of Procedure: 4/4/2024  Primary Care Physician: Thania Rahman MD  Referring Physician: Thania Rahman MD      Pre-Procedural Diagnosis:Basilar artery tip aneurysm s/p WEB device embolization  Post-Procedural Diagnosis: as above      Procedure Performed:Diagnostic Cerebral Angiogram    Surgeon:   Sujey Carl MD    Fellow:  Onur Ruano MD and Da Maurice MD     Assisting Tech:  Ron Vera    PRE-PROCEDURAL EXAM:  Prestroke baseline mRS MODIFIED MOHIT SCORE: 0 - No symptoms at all.  Neurological exam performed and unchanged from initial H&P or consult  MODIFIED MOHIT SCORE: 0 - No symptoms at all.      Anesthesia: IV Moderate Sedation  An Immediate re-assessment was completed prior to sedation, and it is determined to be safe to proceed.  Complications: none    Intra-Operative EXAM:  Neurological exam performed and unchanged from initial H&P or consult    EBL: < Minimal      Cc            Specimens: Were not Obtained  Contrast:     Visipaque 270 low osmolar 36 Cc             Fluoro: 10.4 min    Findings:  Please see dictated Radiology note for further details  Previously treated basilar artery tip aneurysm with WEB intrasaccular device is obliterated with a small remnant neck, achieving Chris score II and WEB OS C.  Bulbous basilar artery trunk.        Chris score: class II    POST-PROCEDURAL EXAM :   Stable neurological Exam  Neurological exam performed and unchanged from initial H&P or consult    Closure:  right Vascade 5   F        POST-PROCEDURAL MONITORING : see orders  Disposition: Recovery room      Recommendations:  Back to Recovery room  Do not bend right leg for 3 hours.  Groin checks per protocol.  Peripheral pulse checks per protocol.  SBP goal 100-140  On eliquis for afib, resume eliquis tomorrow.  Follow up

## 2024-04-04 NOTE — PROGRESS NOTES
Received in PCC room 2 from IR. Reviewed restrictions with patient and family. Remains flat. Groin soft and dry with Safeguard in place.

## 2024-04-04 NOTE — PROGRESS NOTES
All discharge instructions reviewed with  and patient, questions answered.  Patient discharged per wheelchair with  and belongings.

## 2024-04-24 ENCOUNTER — TELEPHONE (OUTPATIENT)
Dept: NEUROLOGY | Age: 74
End: 2024-04-24

## 2024-04-24 NOTE — TELEPHONE ENCOUNTER
Pt called and wanting to the plan of care since she did her angiogram on 04/04/2024. Pt wanting to know how to move forward. Pt also wanting to know if its ok for her to fly to [facility] on 05/12/2024. Please advise

## 2024-05-21 ENCOUNTER — OFFICE VISIT (OUTPATIENT)
Dept: NEUROLOGY | Age: 74
End: 2024-05-21
Payer: MEDICARE

## 2024-05-21 VITALS
SYSTOLIC BLOOD PRESSURE: 152 MMHG | HEART RATE: 75 BPM | DIASTOLIC BLOOD PRESSURE: 84 MMHG | WEIGHT: 188 LBS | BODY MASS INDEX: 28.49 KG/M2 | HEIGHT: 68 IN

## 2024-05-21 DIAGNOSIS — I72.5 BASILAR ARTERY ANEURYSM (HCC): Primary | ICD-10-CM

## 2024-05-21 DIAGNOSIS — I48.0 PAROXYSMAL ATRIAL FIBRILLATION (HCC): ICD-10-CM

## 2024-05-21 DIAGNOSIS — Z95.828 MRI-SAFE ENDOVASCULAR ANEURYSM COIL PRESENT: ICD-10-CM

## 2024-05-21 DIAGNOSIS — I60.4 SUBARACHNOID HEMORRHAGE FROM BASILAR ARTERY ANEURYSM (HCC): ICD-10-CM

## 2024-05-21 PROCEDURE — 99215 OFFICE O/P EST HI 40 MIN: CPT | Performed by: PSYCHIATRY & NEUROLOGY

## 2024-05-21 PROCEDURE — 4004F PT TOBACCO SCREEN RCVD TLK: CPT | Performed by: PSYCHIATRY & NEUROLOGY

## 2024-05-21 PROCEDURE — G8400 PT W/DXA NO RESULTS DOC: HCPCS | Performed by: PSYCHIATRY & NEUROLOGY

## 2024-05-21 PROCEDURE — G8427 DOCREV CUR MEDS BY ELIG CLIN: HCPCS | Performed by: PSYCHIATRY & NEUROLOGY

## 2024-05-21 PROCEDURE — G8419 CALC BMI OUT NRM PARAM NOF/U: HCPCS | Performed by: PSYCHIATRY & NEUROLOGY

## 2024-05-21 PROCEDURE — 1090F PRES/ABSN URINE INCON ASSESS: CPT | Performed by: PSYCHIATRY & NEUROLOGY

## 2024-05-21 PROCEDURE — 3017F COLORECTAL CA SCREEN DOC REV: CPT | Performed by: PSYCHIATRY & NEUROLOGY

## 2024-05-21 PROCEDURE — 1123F ACP DISCUSS/DSCN MKR DOCD: CPT | Performed by: PSYCHIATRY & NEUROLOGY

## 2024-05-21 RX ORDER — ALBUTEROL SULFATE 90 UG/1
1 AEROSOL, METERED RESPIRATORY (INHALATION) EVERY 6 HOURS PRN
COMMUNITY
Start: 2024-02-16

## 2024-05-21 RX ORDER — CLOPIDOGREL BISULFATE 75 MG/1
75 TABLET ORAL DAILY
Qty: 37 TABLET | Refills: 0 | Status: SHIPPED | OUTPATIENT
Start: 2024-05-21

## 2024-05-21 NOTE — PROGRESS NOTES
is a bilobed anteriorly facing ruptured basilar tip aneurysm with moderate stenosis at the tip of the basilar artery.  The basilar tip aneurysm measures neck 3.13mm, width 4.7mm, height 4.83mm and breath 4.71mm.  The above was treated with 7Fr short radial sheath, 7Fr RIST, 5Fr Berenstein, VIA 17, Synchro Support, WEB 5x3mm, post deployment, the aneurysm is obliterated, achieving Chris 1, WED Occlusion Score A.       Post treatment: Obliterated basilar tip aneurysm, Chris 1            April 4, 2024 follow up angiogram       IMPRESSIONS:   Dahlia Zhang is a 73 y.o. 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     s/p WEB device embolization with obliteration of aneurysm, achieving Chris 1 on 10/11/2023.     Original H&H 5 and modified mccracken 4.    Found to have afib RVR on the 10/15/2023.    Doing well mRS 1  Memory issues, typical in SAH     FU angiogram on April 4, 2024 showed residual neck     does not have any pertinent problems on file.  PLANS:   FU angiogram shows small residual 1-2 mm neck  Also she has abnormal basilar trunk / fusiform dilation   Start ASA and plavix 7 days before an doff eliquis then for one month and back to eliquis   LVIS stent     - BP is high, patient used to take Hyzaar, will resume it and patient needs to talk to PCP about her high BP    Consultation Visit Time:  40 minutes  Patient given educational materials - see patient instructions.  Discussed use, benefit, and side effects of prescribed medications.  Personally reviewed imaging with patients and all questions answered.  Pt voiced understanding.  Patient agreed with treatment plan. Follow up as directed below. Greater than 50% of the time was for counseling and providing answer to the patient question.      The findings and the plan discussed with the patient and all her questions were answered.    Thank you very much for your

## 2024-06-10 ENCOUNTER — HOSPITAL ENCOUNTER (EMERGENCY)
Age: 74
Discharge: HOME | End: 2024-06-10
Attending: EMERGENCY MEDICINE
Payer: MEDICARE

## 2024-06-10 VITALS
RESPIRATION RATE: 18 BRPM | BODY MASS INDEX: 27.37 KG/M2 | HEART RATE: 67 BPM | SYSTOLIC BLOOD PRESSURE: 140 MMHG | DIASTOLIC BLOOD PRESSURE: 72 MMHG | WEIGHT: 180 LBS | OXYGEN SATURATION: 96 % | TEMPERATURE: 98.8 F

## 2024-06-10 VITALS
TEMPERATURE: 97.4 F | SYSTOLIC BLOOD PRESSURE: 117 MMHG | HEART RATE: 83 BPM | OXYGEN SATURATION: 94 % | RESPIRATION RATE: 18 BRPM | DIASTOLIC BLOOD PRESSURE: 79 MMHG

## 2024-06-10 DIAGNOSIS — R04.0 EPISTAXIS: Primary | ICD-10-CM

## 2024-06-10 LAB
BASOPHILS # BLD: 0.08 K/UL (ref 0–0.2)
BASOPHILS NFR BLD: 1 % (ref 0–2)
EOSINOPHIL # BLD: 0.09 K/UL (ref 0–0.44)
EOSINOPHILS RELATIVE PERCENT: 1 % (ref 1–4)
ERYTHROCYTE [DISTWIDTH] IN BLOOD BY AUTOMATED COUNT: 13.2 % (ref 11.8–14.4)
HCT VFR BLD AUTO: 43.4 % (ref 36.3–47.1)
HGB BLD-MCNC: 14.1 G/DL (ref 11.9–15.1)
IMM GRANULOCYTES # BLD AUTO: 0.04 K/UL (ref 0–0.3)
IMM GRANULOCYTES NFR BLD: 0 %
INR PPP: 1.2
LYMPHOCYTES NFR BLD: 2.35 K/UL (ref 1.1–3.7)
LYMPHOCYTES RELATIVE PERCENT: 21 % (ref 24–43)
MCH RBC QN AUTO: 30.4 PG (ref 25.2–33.5)
MCHC RBC AUTO-ENTMCNC: 32.5 G/DL (ref 28.4–34.8)
MCV RBC AUTO: 93.5 FL (ref 82.6–102.9)
MONOCYTES NFR BLD: 0.71 K/UL (ref 0.1–1.2)
MONOCYTES NFR BLD: 6 % (ref 3–12)
NEUTROPHILS NFR BLD: 71 % (ref 36–65)
NEUTS SEG NFR BLD: 8 K/UL (ref 1.5–8.1)
NRBC BLD-RTO: 0 PER 100 WBC
PARTIAL THROMBOPLASTIN TIME: 33.1 SEC (ref 23.9–33.8)
PLATELET # BLD AUTO: 256 K/UL (ref 138–453)
PMV BLD AUTO: 11.1 FL (ref 8.1–13.5)
PROTHROMBIN TIME: 15.2 SEC (ref 11.5–14.2)
RBC # BLD AUTO: 4.64 M/UL (ref 3.95–5.11)
WBC OTHER # BLD: 11.3 K/UL (ref 3.5–11.3)

## 2024-06-10 PROCEDURE — 85730 THROMBOPLASTIN TIME PARTIAL: CPT

## 2024-06-10 PROCEDURE — 85610 PROTHROMBIN TIME: CPT

## 2024-06-10 PROCEDURE — 85025 COMPLETE CBC W/AUTO DIFF WBC: CPT

## 2024-06-10 PROCEDURE — 99283 EMERGENCY DEPT VISIT LOW MDM: CPT

## 2024-06-10 PROCEDURE — 6370000000 HC RX 637 (ALT 250 FOR IP): Performed by: STUDENT IN AN ORGANIZED HEALTH CARE EDUCATION/TRAINING PROGRAM

## 2024-06-10 PROCEDURE — 2500000003 HC RX 250 WO HCPCS: Performed by: EMERGENCY MEDICINE

## 2024-06-10 PROCEDURE — 6370000000 HC RX 637 (ALT 250 FOR IP): Performed by: EMERGENCY MEDICINE

## 2024-06-10 PROCEDURE — 30903 CONTROL OF NOSEBLEED: CPT

## 2024-06-10 RX ORDER — ACETAMINOPHEN 500 MG
1000 TABLET ORAL ONCE
Status: COMPLETED | OUTPATIENT
Start: 2024-06-10 | End: 2024-06-10

## 2024-06-10 RX ORDER — ONDANSETRON 4 MG/1
4 TABLET, ORALLY DISINTEGRATING ORAL ONCE
Status: COMPLETED | OUTPATIENT
Start: 2024-06-10 | End: 2024-06-10

## 2024-06-10 RX ORDER — TRAMADOL HYDROCHLORIDE 50 MG/1
50 TABLET ORAL EVERY 4 HOURS PRN
Qty: 18 TABLET | Refills: 0 | Status: SHIPPED | OUTPATIENT
Start: 2024-06-10 | End: 2024-06-13

## 2024-06-10 RX ORDER — AMOXICILLIN 500 MG/1
500 CAPSULE ORAL 3 TIMES DAILY
Qty: 21 CAPSULE | Refills: 0 | Status: SHIPPED | OUTPATIENT
Start: 2024-06-10 | End: 2024-06-17

## 2024-06-10 RX ORDER — ONDANSETRON 4 MG/1
4 TABLET, ORALLY DISINTEGRATING ORAL 3 TIMES DAILY PRN
Qty: 21 TABLET | Refills: 0 | Status: SHIPPED | OUTPATIENT
Start: 2024-06-10

## 2024-06-10 RX ORDER — TRAMADOL HYDROCHLORIDE 50 MG/1
50 TABLET ORAL ONCE
Status: COMPLETED | OUTPATIENT
Start: 2024-06-10 | End: 2024-06-10

## 2024-06-10 RX ORDER — LIDOCAINE HYDROCHLORIDE AND EPINEPHRINE 10; 10 MG/ML; UG/ML
20 INJECTION, SOLUTION INFILTRATION; PERINEURAL ONCE
Status: COMPLETED | OUTPATIENT
Start: 2024-06-10 | End: 2024-06-10

## 2024-06-10 RX ORDER — TRANEXAMIC ACID 100 MG/ML
100 INJECTION, SOLUTION INTRAVENOUS ONCE
Status: COMPLETED | OUTPATIENT
Start: 2024-06-10 | End: 2024-06-10

## 2024-06-10 RX ADMIN — PHENYLEPHRINE HYDROCHLORIDE 1 SPRAY: 0.5 SPRAY NASAL at 11:38

## 2024-06-10 RX ADMIN — LIDOCAINE HYDROCHLORIDE,EPINEPHRINE BITARTRATE 20 ML: 10; .01 INJECTION, SOLUTION INFILTRATION; PERINEURAL at 12:05

## 2024-06-10 RX ADMIN — TRAMADOL HYDROCHLORIDE 50 MG: 50 TABLET, COATED ORAL at 19:10

## 2024-06-10 RX ADMIN — ONDANSETRON 4 MG: 4 TABLET, ORALLY DISINTEGRATING ORAL at 19:10

## 2024-06-10 RX ADMIN — TRANEXAMIC ACID 100 MG: 100 INJECTION, SOLUTION INTRAVENOUS at 12:06

## 2024-06-10 RX ADMIN — ACETAMINOPHEN 1000 MG: 500 TABLET ORAL at 12:49

## 2024-06-10 ASSESSMENT — ENCOUNTER SYMPTOMS
VOMITING: 0
SHORTNESS OF BREATH: 0
DIARRHEA: 0
SHORTNESS OF BREATH: 0
COLOR CHANGE: 0
SORE THROAT: 0
FACIAL SWELLING: 0
NAUSEA: 0
EYE DISCHARGE: 0
COUGH: 0
EYE REDNESS: 0
RHINORRHEA: 0

## 2024-06-10 ASSESSMENT — PAIN - FUNCTIONAL ASSESSMENT: PAIN_FUNCTIONAL_ASSESSMENT: NONE - DENIES PAIN

## 2024-06-10 NOTE — ED PROVIDER NOTES
EMERGENCY DEPARTMENT ENCOUNTER    Pt Name: Dahlia Zhang  MRN: [id number]  Birthdate 1950  Date of evaluation: 6/10/24  CHIEF COMPLAINT       Chief Complaint   Patient presents with    Epistaxis     Onset 2 hrs, taking eliquis, right nares      HISTORY OF PRESENT ILLNESS   This is a 73-year-old female who presents with complaints of a nosebleed.  Patient developed some right-sided nasal bleeding this morning, and came in for further evaluation.  She does take blood thinners for history of A-fib.  Patient describes her symptoms as severe.           REVIEW OF SYSTEMS     Review of Systems   Constitutional:  Negative for chills and fever.   HENT:  Positive for nosebleeds. Negative for rhinorrhea and sore throat.    Eyes:  Negative for discharge, redness and visual disturbance.   Respiratory:  Negative for cough and shortness of breath.    Cardiovascular:  Negative for chest pain, palpitations and leg swelling.   Gastrointestinal:  Negative for diarrhea, nausea and vomiting.   Musculoskeletal:  Negative for arthralgias, myalgias and neck pain.   Skin:  Negative for color change and rash.   Neurological:  Negative for seizures, weakness and headaches.   Psychiatric/Behavioral:  Negative for hallucinations, self-injury and suicidal ideas.      PASTMEDICAL HISTORY     Past Medical History:   Diagnosis Date    Aneurysm ([facility]) 10/11/2023    10/11/2023, found to have diffuse SAH, and a ruptured basilar tip aneurysm.    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

## 2024-06-10 NOTE — ED PROVIDER NOTES
I performed a history and physical examination of the patient and discussed management with the resident. I reviewed the resident’s note and agree with the documented findings and plan of care. Any areas of disagreement are noted on the chart. I was personally present for the key portions of any procedures. I have documented in the chart those procedures where I was not present during the key portions. Unless noted in my documentation, I agree with the chief complaint, past medical history, past surgical history, allergies, medications, social and family history as documented. Documentation of the HPI, Physical Exam and Medical Decision Making performed by medical students or scribes is based on my personal performance of the HPI, PE and MDM.   For Phys Assistant/ Nurse Practitioner cases/documentation I have personally evaluated this patient and have completed at least one if not all key elements of the E/M (history, physical exam, and MDM). I find the patient's history and physical exam are consistent with the NP/PA documentation. I agree with the care provided, treatment rendered, disposition and followup plan.   Additional findings are as noted.    Franki Ragland MD  Attending Emergency  Physician        This patient presented to the emerged part with a history of having sustained a spontaneous epistaxis on the right earlier today.  She was evaluated at [facility] and emergency department or an inflatable balloon was placed to achieve hemostasis.  Patient is anticoagulated due to chronic atrial fibrillation.  She presents here today because she was advised to come back to the hospital if she had persistent bleeding and apparently she has had some very minimal persistent bleeding.  She denies any chest pain or difficulty breathing.  She is complaining some discomfort related in the past but this is not worse than initially noted.  Patient also has an angiographic procedure upcoming in which an intracerebral 
Conjunctiva/sclera: Conjunctivae normal.      Pupils: Pupils are equal, round, and reactive to light.   Cardiovascular:      Rate and Rhythm: Normal rate and regular rhythm.      Pulses: Normal pulses.      Heart sounds: Normal heart sounds.   Pulmonary:      Effort: Pulmonary effort is normal. No respiratory distress.   Abdominal:      General: Abdomen is flat. There is no distension.      Palpations: Abdomen is soft.      Tenderness: There is no abdominal tenderness. There is no guarding or rebound.   Musculoskeletal:         General: No swelling.      Cervical back: No tenderness.   Skin:     General: Skin is warm.      Capillary Refill: Capillary refill takes less than 2 seconds.   Neurological:      Mental Status: She is alert and oriented to person, place, and time.   Psychiatric:         Mood and Affect: Mood normal.           DDX/DIAGNOSTIC RESULTS / EMERGENCY DEPARTMENT COURSE / MDM     Medical Decision Making  This is a 73-year-old female with history of atrial fibrillation on Eliquis presenting with epistaxis.  Was seen at [facility].  Did discuss that she has been unable to follow-up with ENT that she was referred to as they cannot get her into her clinic.  The bleeding has improved since this morning and appears very mild now.  Will provide pain medications and recommend continuing amoxicillin.  Will discuss other ENT follow-up.    Risk  Prescription drug management.      EMERGENCY DEPARTMENT COURSE:    ED Course as of 06/10/24 2305   Mon Jay 10, 2024   1924 Patient here for a nosebleed that started yesterday and was seen at [facility].  Told to return to the ER for any bleeding, is on Eliquis.  Reports bleeding has improved since earlier this morning however wanted to get checked out.  Is having right-sided pain associated with the balloon.  Has not taken her pain medications that were prescribed to her.  Will give here reassess [ML]      ED Course User Index  [ML] Janeth Andrews, DO       FINAL

## 2024-06-10 NOTE — ED NOTES
Nose bleed started at 8am. Pt was seen at [facility] they tried packing multiple time and she kept bleeding through.   Brain aneurysm in October with stent placement soon.   Pt is currently taking eliqus.   Pt currently has rhino rocket in but is still bleeding though.   Pt is A+Ox4

## 2024-06-10 NOTE — ED NOTES
Report received from Rita AGUILAR. All questions addressed. Patient resting comfortably on stretcher at this time, no distress noted. Family at bedside, call light within reach.

## 2024-06-10 NOTE — DISCHARGE INSTRUCTIONS
Thank you for coming to [facility]'s emergency department!    You were seen today for epistaxis. Follow up with ENT.  If you have any worsening of symptoms or any other concerns, please return to the emergency department.  We are always available!

## 2024-06-17 RX ORDER — ASPIRIN 81 MG/1
81 TABLET ORAL NIGHTLY
COMMUNITY

## 2024-06-19 ENCOUNTER — ANESTHESIA EVENT (OUTPATIENT)
Dept: INTERVENTIONAL RADIOLOGY/VASCULAR | Age: 74
DRG: 024 | End: 2024-06-19
Payer: MEDICARE

## 2024-06-19 ENCOUNTER — ANESTHESIA (OUTPATIENT)
Dept: INTERVENTIONAL RADIOLOGY/VASCULAR | Age: 74
DRG: 024 | End: 2024-06-19
Payer: MEDICARE

## 2024-06-19 ENCOUNTER — HOSPITAL ENCOUNTER (INPATIENT)
Dept: INTERVENTIONAL RADIOLOGY/VASCULAR | Age: 74
LOS: 1 days | Discharge: HOME | DRG: 024 | End: 2024-06-20
Attending: STUDENT IN AN ORGANIZED HEALTH CARE EDUCATION/TRAINING PROGRAM | Admitting: PSYCHIATRY & NEUROLOGY
Payer: MEDICARE

## 2024-06-19 DIAGNOSIS — I72.5 BASILAR ARTERY ANEURYSM (HCC): ICD-10-CM

## 2024-06-19 PROBLEM — I67.1 ANEURYSM, CEREBRAL: Status: ACTIVE | Noted: 2024-06-19

## 2024-06-19 LAB
ABO + RH BLD: NORMAL
ACT BLD: 136 SEC (ref 79–149)
ACT BLD: 249 SEC (ref 79–149)
ACT BLD: 283 SEC (ref 79–149)
ARM BAND NUMBER: NORMAL
BLOOD BANK SAMPLE EXPIRATION: NORMAL
BLOOD GROUP ANTIBODIES SERPL: NEGATIVE
BUN BLD-MCNC: 9 MG/DL (ref 8–26)
CA-I BLD-SCNC: 1.17 MMOL/L (ref 1.15–1.33)
CHLORIDE BLD-SCNC: 99 MMOL/L (ref 98–107)
CLOSURE TME COLL+ADP BLD: 86 SEC (ref 67–112)
CO2 BLD CALC-SCNC: 27 MMOL/L (ref 22–30)
COLLAGEN EPINEPHRINE TIME: 151 SEC (ref 85–172)
EGFR, POC: >90 ML/MIN/1.73M2
EKG ATRIAL RATE: 59 BPM
EKG P AXIS: -21 DEGREES
EKG P-R INTERVAL: 180 MS
EKG Q-T INTERVAL: 492 MS
EKG QRS DURATION: 84 MS
EKG QTC CALCULATION (BAZETT): 487 MS
EKG R AXIS: -3 DEGREES
EKG T AXIS: 49 DEGREES
EKG VENTRICULAR RATE: 59 BPM
GLUCOSE BLD-MCNC: 118 MG/DL (ref 65–105)
GLUCOSE BLD-MCNC: 149 MG/DL (ref 74–100)
HCO3 VENOUS: 26.9 MMOL/L (ref 22–29)
HCT VFR BLD AUTO: 43 % (ref 36–46)
O2 SAT, VEN: 65 % (ref 60–85)
PCO2 VENOUS: 43.2 MM HG (ref 41–51)
PH VENOUS: 7.4 (ref 7.32–7.43)
PLATELET FUNCTION INTERP: NORMAL
PO2 VENOUS: 33.8 MM HG (ref 30–50)
POC ANION GAP: 11 MMOL/L (ref 7–16)
POC CREATININE: 0.7 MG/DL (ref 0.51–1.19)
POC HEMOGLOBIN (CALC): 14.6 G/DL (ref 12–16)
POC LACTIC ACID: 2.1 MMOL/L (ref 0.56–1.39)
POSITIVE BASE EXCESS, VEN: 1.8 MMOL/L (ref 0–3)
POTASSIUM BLD-SCNC: 4 MMOL/L (ref 3.5–4.5)
SODIUM BLD-SCNC: 136 MMOL/L (ref 138–146)

## 2024-06-19 PROCEDURE — 2580000003 HC RX 258: Performed by: ANESTHESIOLOGY

## 2024-06-19 PROCEDURE — 4A133B1 MONITORING OF ARTERIAL PRESSURE, PERIPHERAL, PERCUTANEOUS APPROACH: ICD-10-PCS | Performed by: STUDENT IN AN ORGANIZED HEALTH CARE EDUCATION/TRAINING PROGRAM

## 2024-06-19 PROCEDURE — 36620 INSERTION CATHETER ARTERY: CPT

## 2024-06-19 PROCEDURE — 6360000002 HC RX W HCPCS

## 2024-06-19 PROCEDURE — 84520 ASSAY OF UREA NITROGEN: CPT

## 2024-06-19 PROCEDURE — 82803 BLOOD GASES ANY COMBINATION: CPT

## 2024-06-19 PROCEDURE — 75898 FOLLOW-UP ANGIOGRAPHY: CPT

## 2024-06-19 PROCEDURE — 82565 ASSAY OF CREATININE: CPT

## 2024-06-19 PROCEDURE — 85576 BLOOD PLATELET AGGREGATION: CPT

## 2024-06-19 PROCEDURE — 03LG3DZ OCCLUSION OF INTRACRANIAL ARTERY WITH INTRALUMINAL DEVICE, PERCUTANEOUS APPROACH: ICD-10-PCS | Performed by: PSYCHIATRY & NEUROLOGY

## 2024-06-19 PROCEDURE — 86901 BLOOD TYPING SEROLOGIC RH(D): CPT

## 2024-06-19 PROCEDURE — 6370000000 HC RX 637 (ALT 250 FOR IP): Performed by: PSYCHIATRY & NEUROLOGY

## 2024-06-19 PROCEDURE — C1894 INTRO/SHEATH, NON-LASER: HCPCS

## 2024-06-19 PROCEDURE — 6370000000 HC RX 637 (ALT 250 FOR IP)

## 2024-06-19 PROCEDURE — 2580000003 HC RX 258

## 2024-06-19 PROCEDURE — 36228 PLACE CATH INTRACRANIAL ART: CPT

## 2024-06-19 PROCEDURE — 85347 COAGULATION TIME ACTIVATED: CPT

## 2024-06-19 PROCEDURE — 85014 HEMATOCRIT: CPT

## 2024-06-19 PROCEDURE — 93005 ELECTROCARDIOGRAM TRACING: CPT

## 2024-06-19 PROCEDURE — 86850 RBC ANTIBODY SCREEN: CPT

## 2024-06-19 PROCEDURE — 6360000004 HC RX CONTRAST MEDICATION: Performed by: PSYCHIATRY & NEUROLOGY

## 2024-06-19 PROCEDURE — 3700000001 HC ADD 15 MINUTES (ANESTHESIA)

## 2024-06-19 PROCEDURE — 7100000000 HC PACU RECOVERY - FIRST 15 MIN

## 2024-06-19 PROCEDURE — 2000000000 HC ICU R&B

## 2024-06-19 PROCEDURE — 82330 ASSAY OF CALCIUM: CPT

## 2024-06-19 PROCEDURE — 99223 1ST HOSP IP/OBS HIGH 75: CPT | Performed by: PSYCHIATRY & NEUROLOGY

## 2024-06-19 PROCEDURE — 36226 PLACE CATH VERTEBRAL ART: CPT

## 2024-06-19 PROCEDURE — 3700000000 HC ANESTHESIA ATTENDED CARE

## 2024-06-19 PROCEDURE — 03HY32Z INSERTION OF MONITORING DEVICE INTO UPPER ARTERY, PERCUTANEOUS APPROACH: ICD-10-PCS | Performed by: STUDENT IN AN ORGANIZED HEALTH CARE EDUCATION/TRAINING PROGRAM

## 2024-06-19 PROCEDURE — 61624 TCAT PERM OCCLS/EMBOLJ CNS: CPT

## 2024-06-19 PROCEDURE — 75894 X-RAYS TRANSCATH THERAPY: CPT

## 2024-06-19 PROCEDURE — 82947 ASSAY GLUCOSE BLOOD QUANT: CPT

## 2024-06-19 PROCEDURE — 86900 BLOOD TYPING SEROLOGIC ABO: CPT

## 2024-06-19 PROCEDURE — 4A133J1 MONITORING OF ARTERIAL PULSE, PERIPHERAL, PERCUTANEOUS APPROACH: ICD-10-PCS | Performed by: STUDENT IN AN ORGANIZED HEALTH CARE EDUCATION/TRAINING PROGRAM

## 2024-06-19 PROCEDURE — 80051 ELECTROLYTE PANEL: CPT

## 2024-06-19 PROCEDURE — 2500000003 HC RX 250 WO HCPCS

## 2024-06-19 PROCEDURE — 83605 ASSAY OF LACTIC ACID: CPT

## 2024-06-19 PROCEDURE — 03VG3DZ RESTRICTION OF INTRACRANIAL ARTERY WITH INTRALUMINAL DEVICE, PERCUTANEOUS APPROACH: ICD-10-PCS | Performed by: PSYCHIATRY & NEUROLOGY

## 2024-06-19 PROCEDURE — 36224 PLACE CATH CAROTD ART: CPT

## 2024-06-19 PROCEDURE — 7100000001 HC PACU RECOVERY - ADDTL 15 MIN

## 2024-06-19 RX ORDER — POTASSIUM CHLORIDE 20 MEQ/1
20 TABLET, EXTENDED RELEASE ORAL DAILY
Status: DISCONTINUED | OUTPATIENT
Start: 2024-06-20 | End: 2024-06-20 | Stop reason: HOSPADM

## 2024-06-19 RX ORDER — NICARDIPINE HYDROCHLORIDE 0.1 MG/ML
2.5-15 INJECTION INTRAVENOUS CONTINUOUS
Status: DISCONTINUED | OUTPATIENT
Start: 2024-06-19 | End: 2024-06-20

## 2024-06-19 RX ORDER — SODIUM CHLORIDE 0.9 % (FLUSH) 0.9 %
5-40 SYRINGE (ML) INJECTION PRN
Status: DISCONTINUED | OUTPATIENT
Start: 2024-06-19 | End: 2024-06-20 | Stop reason: HOSPADM

## 2024-06-19 RX ORDER — SODIUM CHLORIDE 0.9 % (FLUSH) 0.9 %
5-40 SYRINGE (ML) INJECTION EVERY 12 HOURS SCHEDULED
Status: DISCONTINUED | OUTPATIENT
Start: 2024-06-19 | End: 2024-06-20 | Stop reason: HOSPADM

## 2024-06-19 RX ORDER — ONDANSETRON 4 MG/1
4 TABLET, ORALLY DISINTEGRATING ORAL EVERY 8 HOURS PRN
Status: DISCONTINUED | OUTPATIENT
Start: 2024-06-19 | End: 2024-06-20 | Stop reason: HOSPADM

## 2024-06-19 RX ORDER — LIDOCAINE HYDROCHLORIDE 10 MG/ML
INJECTION, SOLUTION EPIDURAL; INFILTRATION; INTRACAUDAL; PERINEURAL PRN
Status: DISCONTINUED | OUTPATIENT
Start: 2024-06-19 | End: 2024-06-19 | Stop reason: SDUPTHER

## 2024-06-19 RX ORDER — MIDAZOLAM HYDROCHLORIDE 2 MG/2ML
1 INJECTION, SOLUTION INTRAMUSCULAR; INTRAVENOUS EVERY 10 MIN PRN
Status: DISCONTINUED | OUTPATIENT
Start: 2024-06-19 | End: 2024-06-20

## 2024-06-19 RX ORDER — HEPARIN SODIUM 1000 [USP'U]/ML
INJECTION, SOLUTION INTRAVENOUS; SUBCUTANEOUS PRN
Status: DISCONTINUED | OUTPATIENT
Start: 2024-06-19 | End: 2024-06-19 | Stop reason: SDUPTHER

## 2024-06-19 RX ORDER — MAGNESIUM SULFATE 1 G/100ML
1000 INJECTION INTRAVENOUS
Status: ACTIVE | OUTPATIENT
Start: 2024-06-19 | End: 2024-06-19

## 2024-06-19 RX ORDER — SODIUM CHLORIDE 9 MG/ML
INJECTION, SOLUTION INTRAVENOUS PRN
Status: DISCONTINUED | OUTPATIENT
Start: 2024-06-19 | End: 2024-06-20 | Stop reason: HOSPADM

## 2024-06-19 RX ORDER — ATORVASTATIN CALCIUM 10 MG/1
10 TABLET, FILM COATED ORAL NIGHTLY
Status: DISCONTINUED | OUTPATIENT
Start: 2024-06-19 | End: 2024-06-20 | Stop reason: HOSPADM

## 2024-06-19 RX ORDER — LOSARTAN POTASSIUM AND HYDROCHLOROTHIAZIDE 25; 100 MG/1; MG/1
1 TABLET ORAL DAILY
Status: DISCONTINUED | OUTPATIENT
Start: 2024-06-19 | End: 2024-06-19

## 2024-06-19 RX ORDER — PANTOPRAZOLE SODIUM 40 MG/1
40 TABLET, DELAYED RELEASE ORAL
Status: DISCONTINUED | OUTPATIENT
Start: 2024-06-20 | End: 2024-06-20 | Stop reason: HOSPADM

## 2024-06-19 RX ORDER — IODIXANOL 270 MG/ML
42 INJECTION, SOLUTION INTRAVASCULAR
Status: COMPLETED | OUTPATIENT
Start: 2024-06-19 | End: 2024-06-19

## 2024-06-19 RX ORDER — LOSARTAN POTASSIUM 50 MG/1
100 TABLET ORAL DAILY
Status: DISCONTINUED | OUTPATIENT
Start: 2024-06-20 | End: 2024-06-20 | Stop reason: HOSPADM

## 2024-06-19 RX ORDER — HYDROCHLOROTHIAZIDE 25 MG/1
25 TABLET ORAL DAILY
Status: DISCONTINUED | OUTPATIENT
Start: 2024-06-20 | End: 2024-06-20 | Stop reason: HOSPADM

## 2024-06-19 RX ORDER — PROPOFOL 10 MG/ML
INJECTION, EMULSION INTRAVENOUS PRN
Status: DISCONTINUED | OUTPATIENT
Start: 2024-06-19 | End: 2024-06-19 | Stop reason: SDUPTHER

## 2024-06-19 RX ORDER — CEFAZOLIN SODIUM 1 G/3ML
INJECTION, POWDER, FOR SOLUTION INTRAMUSCULAR; INTRAVENOUS PRN
Status: DISCONTINUED | OUTPATIENT
Start: 2024-06-19 | End: 2024-06-19 | Stop reason: SDUPTHER

## 2024-06-19 RX ORDER — ACETAMINOPHEN 500 MG
500 TABLET ORAL EVERY 6 HOURS PRN
COMMUNITY

## 2024-06-19 RX ORDER — DILTIAZEM HYDROCHLORIDE 120 MG/1
120 CAPSULE, COATED, EXTENDED RELEASE ORAL DAILY
Status: DISCONTINUED | OUTPATIENT
Start: 2024-06-20 | End: 2024-06-20 | Stop reason: HOSPADM

## 2024-06-19 RX ORDER — FENTANYL CITRATE 50 UG/ML
25 INJECTION, SOLUTION INTRAMUSCULAR; INTRAVENOUS
Status: DISCONTINUED | OUTPATIENT
Start: 2024-06-19 | End: 2024-06-20 | Stop reason: HOSPADM

## 2024-06-19 RX ORDER — ACETAMINOPHEN 325 MG/1
650 TABLET ORAL EVERY 4 HOURS PRN
Status: DISCONTINUED | OUTPATIENT
Start: 2024-06-19 | End: 2024-06-20 | Stop reason: HOSPADM

## 2024-06-19 RX ORDER — ROCURONIUM BROMIDE 10 MG/ML
INJECTION, SOLUTION INTRAVENOUS PRN
Status: DISCONTINUED | OUTPATIENT
Start: 2024-06-19 | End: 2024-06-19 | Stop reason: SDUPTHER

## 2024-06-19 RX ORDER — DEXAMETHASONE SODIUM PHOSPHATE 10 MG/ML
INJECTION, SOLUTION INTRAMUSCULAR; INTRAVENOUS PRN
Status: DISCONTINUED | OUTPATIENT
Start: 2024-06-19 | End: 2024-06-19 | Stop reason: SDUPTHER

## 2024-06-19 RX ORDER — ESCITALOPRAM OXALATE 10 MG/1
10 TABLET ORAL NIGHTLY
Status: DISCONTINUED | OUTPATIENT
Start: 2024-06-19 | End: 2024-06-20 | Stop reason: HOSPADM

## 2024-06-19 RX ORDER — LABETALOL HYDROCHLORIDE 5 MG/ML
10 INJECTION, SOLUTION INTRAVENOUS EVERY 4 HOURS PRN
Status: DISCONTINUED | OUTPATIENT
Start: 2024-06-19 | End: 2024-06-20 | Stop reason: HOSPADM

## 2024-06-19 RX ORDER — ONDANSETRON 2 MG/ML
INJECTION INTRAMUSCULAR; INTRAVENOUS PRN
Status: DISCONTINUED | OUTPATIENT
Start: 2024-06-19 | End: 2024-06-19 | Stop reason: SDUPTHER

## 2024-06-19 RX ORDER — ONDANSETRON 2 MG/ML
4 INJECTION INTRAMUSCULAR; INTRAVENOUS EVERY 6 HOURS PRN
Status: DISCONTINUED | OUTPATIENT
Start: 2024-06-19 | End: 2024-06-20 | Stop reason: HOSPADM

## 2024-06-19 RX ORDER — SODIUM CHLORIDE, SODIUM LACTATE, POTASSIUM CHLORIDE, CALCIUM CHLORIDE 600; 310; 30; 20 MG/100ML; MG/100ML; MG/100ML; MG/100ML
INJECTION, SOLUTION INTRAVENOUS CONTINUOUS PRN
Status: DISCONTINUED | OUTPATIENT
Start: 2024-06-19 | End: 2024-06-19 | Stop reason: SDUPTHER

## 2024-06-19 RX ORDER — PROTAMINE SULFATE 10 MG/ML
INJECTION, SOLUTION INTRAVENOUS PRN
Status: DISCONTINUED | OUTPATIENT
Start: 2024-06-19 | End: 2024-06-19 | Stop reason: SDUPTHER

## 2024-06-19 RX ORDER — SODIUM CHLORIDE, SODIUM LACTATE, POTASSIUM CHLORIDE, CALCIUM CHLORIDE 600; 310; 30; 20 MG/100ML; MG/100ML; MG/100ML; MG/100ML
INJECTION, SOLUTION INTRAVENOUS CONTINUOUS
Status: DISCONTINUED | OUTPATIENT
Start: 2024-06-19 | End: 2024-06-19

## 2024-06-19 RX ADMIN — PROTAMINE SULFATE 30 MG: 10 INJECTION, SOLUTION INTRAVENOUS at 14:50

## 2024-06-19 RX ADMIN — HEPARIN SODIUM 5600 UNITS: 1000 INJECTION, SOLUTION INTRAVENOUS; SUBCUTANEOUS at 13:50

## 2024-06-19 RX ADMIN — LIDOCAINE HYDROCHLORIDE 50 MG: 10 INJECTION, SOLUTION EPIDURAL; INFILTRATION; INTRACAUDAL; PERINEURAL at 12:54

## 2024-06-19 RX ADMIN — ESCITALOPRAM OXALATE 10 MG: 10 TABLET ORAL at 19:44

## 2024-06-19 RX ADMIN — SODIUM CHLORIDE, POTASSIUM CHLORIDE, SODIUM LACTATE AND CALCIUM CHLORIDE: 600; 310; 30; 20 INJECTION, SOLUTION INTRAVENOUS at 10:45

## 2024-06-19 RX ADMIN — PROPOFOL 150 MG: 10 INJECTION, EMULSION INTRAVENOUS at 12:54

## 2024-06-19 RX ADMIN — SODIUM CHLORIDE, POTASSIUM CHLORIDE, SODIUM LACTATE AND CALCIUM CHLORIDE: 600; 310; 30; 20 INJECTION, SOLUTION INTRAVENOUS at 12:39

## 2024-06-19 RX ADMIN — HEPARIN SODIUM 500 UNITS: 1000 INJECTION, SOLUTION INTRAVENOUS; SUBCUTANEOUS at 14:12

## 2024-06-19 RX ADMIN — PHENYLEPHRINE HYDROCHLORIDE 40 MCG/MIN: 10 INJECTION INTRAVENOUS at 13:50

## 2024-06-19 RX ADMIN — ROCURONIUM BROMIDE 20 MG: 10 INJECTION, SOLUTION INTRAVENOUS at 13:36

## 2024-06-19 RX ADMIN — IODIXANOL 42 ML: 270 INJECTION, SOLUTION INTRAVASCULAR at 15:15

## 2024-06-19 RX ADMIN — ACETAMINOPHEN 650 MG: 325 TABLET ORAL at 16:02

## 2024-06-19 RX ADMIN — ACETAMINOPHEN 650 MG: 325 TABLET ORAL at 19:11

## 2024-06-19 RX ADMIN — LABETALOL HYDROCHLORIDE 10 MG: 5 INJECTION, SOLUTION INTRAVENOUS at 17:35

## 2024-06-19 RX ADMIN — CEFAZOLIN 2 G: 1 INJECTION, POWDER, FOR SOLUTION INTRAMUSCULAR; INTRAVENOUS at 13:45

## 2024-06-19 RX ADMIN — DEXAMETHASONE SODIUM PHOSPHATE 4 MG: 10 INJECTION INTRAMUSCULAR; INTRAVENOUS at 13:15

## 2024-06-19 RX ADMIN — SUGAMMADEX 200 MG: 100 INJECTION, SOLUTION INTRAVENOUS at 15:01

## 2024-06-19 RX ADMIN — ONDANSETRON 4 MG: 2 INJECTION INTRAMUSCULAR; INTRAVENOUS at 14:49

## 2024-06-19 RX ADMIN — METOPROLOL TARTRATE 50 MG: 25 TABLET, FILM COATED ORAL at 19:43

## 2024-06-19 RX ADMIN — ROCURONIUM BROMIDE 20 MG: 10 INJECTION, SOLUTION INTRAVENOUS at 14:12

## 2024-06-19 RX ADMIN — ATORVASTATIN CALCIUM 10 MG: 10 TABLET, FILM COATED ORAL at 19:43

## 2024-06-19 RX ADMIN — ROCURONIUM BROMIDE 50 MG: 10 INJECTION, SOLUTION INTRAVENOUS at 12:55

## 2024-06-19 RX ADMIN — SODIUM CHLORIDE, PRESERVATIVE FREE 10 ML: 5 INJECTION INTRAVENOUS at 19:44

## 2024-06-19 ASSESSMENT — PAIN DESCRIPTION - FREQUENCY
FREQUENCY: CONTINUOUS

## 2024-06-19 ASSESSMENT — PAIN DESCRIPTION - LOCATION
LOCATION: HEAD
LOCATION: EYE
LOCATION: HEAD
LOCATION: GROIN
LOCATION: HEAD

## 2024-06-19 ASSESSMENT — PAIN DESCRIPTION - DESCRIPTORS
DESCRIPTORS: ACHING;TENDER
DESCRIPTORS: SHARP
DESCRIPTORS: DISCOMFORT
DESCRIPTORS: DULL
DESCRIPTORS: DULL
DESCRIPTORS: ACHING

## 2024-06-19 ASSESSMENT — PAIN SCALES - GENERAL
PAINLEVEL_OUTOF10: 6
PAINLEVEL_OUTOF10: 6
PAINLEVEL_OUTOF10: 0
PAINLEVEL_OUTOF10: 3
PAINLEVEL_OUTOF10: 4
PAINLEVEL_OUTOF10: 0
PAINLEVEL_OUTOF10: 6
PAINLEVEL_OUTOF10: 3
PAINLEVEL_OUTOF10: 2
PAINLEVEL_OUTOF10: 4

## 2024-06-19 ASSESSMENT — PAIN DESCRIPTION - ORIENTATION
ORIENTATION: RIGHT;POSTERIOR
ORIENTATION: OTHER (COMMENT)
ORIENTATION: RIGHT
ORIENTATION: OTHER (COMMENT)
ORIENTATION: MID

## 2024-06-19 ASSESSMENT — PAIN - FUNCTIONAL ASSESSMENT
PAIN_FUNCTIONAL_ASSESSMENT: ACTIVITIES ARE NOT PREVENTED
PAIN_FUNCTIONAL_ASSESSMENT: NONE - DENIES PAIN

## 2024-06-19 NOTE — ANESTHESIA PRE PROCEDURE
Department of Anesthesiology  Preprocedure Note       Name:  Dahlia Zhang   Age:  73 y.o.  :  1950                                          MRN:  [id number]         Date:  2024      Surgeon: * No surgeons listed *    Procedure: IR ANGIOGRAM CAROTID CEREBRAL BILATERAL     Medications prior to admission:   Prior to Admission medications    Medication Sig Start Date End Date Taking? Authorizing Provider   acetaminophen (TYLENOL) 500 MG tablet Take 1 tablet by mouth every 6 hours as needed for Pain   Yes Glenis Lewis MD   aspirin 81 MG EC tablet Take 1 tablet by mouth at bedtime   Yes Glenis Lewis MD   albuterol sulfate HFA (PROVENTIL;VENTOLIN;PROAIR) 108 (90 Base) MCG/ACT inhaler Inhale 1 puff into the lungs every 6 hours as needed  Patient not taking: Reported on 2024   Glenis Lewis MD   tiotropium-olodaterol (STIOLTO RESPIMAT) 2.5-2.5 MCG/ACT AERS Inhale 2 puffs into the lungs daily 24   Glenis Lewis MD   clopidogrel (PLAVIX) 75 MG tablet Take 1 tablet by mouth daily  Patient taking differently: Take 1 tablet by mouth at bedtime 24   Sujey Carl MD   losartan-hydroCHLOROthiazide (HYZAAR) 100-25 MG per tablet Take 1 tablet by mouth daily    Glenis Lewis MD   hydrALAZINE (APRESOLINE) 25 MG tablet Take 2 tablets by mouth in the morning and at bedtime    Glenis Lewis MD   apixaban (ELIQUIS) 5 MG TABS tablet Take 1 tablet by mouth 2 times daily 10/26/23   Lucy Simmons APRN - CNP   escitalopram (LEXAPRO) 10 MG tablet Take 1 tablet by mouth nightly 10/26/23   Lucy Simmons APRN - CNP   atorvastatin (LIPITOR) 10 MG tablet Take 1 tablet by mouth nightly 10/26/23   Lucy Simmons APRN - CNP   metoprolol tartrate (LOPRESSOR) 50 MG tablet Take 1 tablet by mouth 2 times daily 10/26/23   Lucy Simmons APRN - CNP   dilTIAZem (CARDIZEM CD) 120 MG extended release capsule Take 1 capsule by mouth daily 10/27/23

## 2024-06-19 NOTE — PROGRESS NOTES
Patient transferred into room 126 in no distress with stable vitals. Neuro, pulse, groin check completed.

## 2024-06-19 NOTE — ANESTHESIA PROCEDURE NOTES
Arterial Line:    An arterial line was placed using ultrasound guidance, in the OR for the following indication(s): continuous blood pressure monitoring and blood sampling needed.    A 20 gauge (size), 1 and 3/4 inch (length), Arrow (type) catheter was placed, Seldinger technique used, into the left radial artery, secured by Tegaderm.  Anesthesia type: General    Events:  patient tolerated procedure well with no complications.6/19/2024 1:05 PM6/19/2024 1:12 PM  Anesthesiologist: Jacqueline Collazo MD  Performed: Anesthesiologist

## 2024-06-19 NOTE — BRIEF OP NOTE
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  Neurological exam performed and unchanged from initial H&P or consult  MODIFIED MOHIT SCORE: 0 - No symptoms at all.      Anesthesia: General Anesthesia  An Immediate re-assessment was completed prior to sedation, and it is determined to be safe to proceed.  Complications: none    Intra-Operative EXAM:  Neurological exam performed and unchanged from initial H&P or consult    EBL: < Minimal      Cc            Specimens: Were not Obtained  Contrast:     Visipaque 270 low osmolar 42 Cc             Fluoro: 21.2 min    Findings:  Please see dictated Radiology note for further details  Previously treated basilar artery tip aneurysm with WEB intrasaccular device is obliterated with a small remnant neck, achieving Chris score II and WEB OS C. Bulbous basilar artery trunk.   The above was treated with 6Fr 55cm, Neuron 070, Headway 21, Synchro support, LVIS 4.5x23mm, the stent was deployed from the right P1 to the mid basilar artery crossing the residual aneurysmal neck, post treatment, there is still some residual filling into the neck, achieving Chris 2. The stent is well apposed and widely patent.    Chris score: class II        POST-PROCEDURAL EXAM :   Stable neurological Exam  Neurological exam performed and unchanged from initial

## 2024-06-19 NOTE — H&P
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  The patient was at home, when she developed sudden onset severe headache.  EMS was called, patient was walking to the cot when she became unresponsive.  She was intubated and taken to [facility] where CT head showed diffuse SAH.  She was loaded 1 g Keppra.  CTA showed basilar tip aneurysm.  The patient was taking ASA daily at the time of these events.     She underwent Web device embolization with obliteration of aneurysm, achieving Sina 1 on 10/11/2023.  The original H&H 5, modified Szymanski 4.  CT head showed obstructive hydrocephalus. EVD was placed as well.  During her admission, she was noted to have memory issues which are typical and SAH.  She did not tolerate narcotics during admission, as she had vomiting.  TCD's showed no evidence of vasospasm.  She had hyponatremia and was started on Florinef.   She was also found to have A-fib RVR on 10/15/2023.  Cardiology was consulted; recommended amiodarone, Lopressor, Cardizem.     She was  discharged on Eliquis for A-fib, no ASA or Plavix.  She was also discharged on amiodarone, diltiazem Lopressor.  The patient presented to the ED 10/29/2023 with a fall from standing position.  Per notes that she tripped and fell, striking her face on the ground, no LOC.  This was 18 days after her brain aneurysm.    MRA 2024 showed no significant stenosis or  evidence of occlusion, prior basilar tip aneurysm repair.    Follow-up angiogram on 4/4 showed residual 1-2 mm neck.  Also found to have abnormal basilar trunk/fusiform dilation.  She was started on aspirin and Plavix 7 days before, and off Eliquis    The patient underwent DSA with stent embolization 6/19/2024.  Stable neurological exam unchanged from preop.  There is still some residual filling into neck posttreatment.  Plan to continue aspirin, Plavix and hold Eliquis until she sees the endovascular team outpatient.    Original H&H 5, modified Szymanski 4.      Admitted to ICU From: Endovascular suite  Reason for ICU Admission: Postop monitoring       PATIENT HISTORY   Past Medical History:        Diagnosis Date    Aneurysm (HCC) 10/11/2023    10/11/2023, found to have diffuse SAH, and a ruptured basilar tip aneurysm.    Anticoagulated     ELIQUIS    Atrial fibrillation (HCC)     COPD (chronic obstructive pulmonary disease) (HCC)     COVID-19     3-4 times    COVID-19 vaccine administered     DM II (diabetes mellitus, type II), controlled ([facility])     HTN (hypertension)     Hyperlipidemia     On home O2     NIGHTLY 3 LITERS    Sleep apnea     in hosp/to have sleep study    Under care of team     PCP dr callahan    Under care of team     cardiology [facility] last appt 12/23    Under care of team     ENT/referred,epistaxis 6/10/24 ER visit       Past Surgical History:        Procedure Laterality Date    BLADDER SURGERY      BREAST REDUCTION SURGERY Bilateral     CEREBRAL ANGIOGRAM  04/04/2024    DIAGNOSTIC / DR PENA    CEREBRAL ANGIOGRAM  10/2023    s/p WEB device embolization with obliteration of aneurysm, achieving Chris 1 on 10/11/2023.    CEREBRAL ANGIOGRAM Bilateral 06/19/2024    ANGIOGRAM CAROTID CEREBRAL BILATERAL    HERNIA REPAIR      Umbilical    HYSTERECTOMY (CERVIX STATUS UNKNOWN)      THYROID SURGERY      Lumpectomy       Social History:   Social History     Socioeconomic History    Marital status:       reviewed with:    [x] Katelynn Marie MD  [] Terry Deluna MD  [] Kristopher Ruano MD  --[] there are no new interval images to review.     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  She underwent Web device embolization with obliteration of aneurysm, achieving Sina 1 on 10/11/2023. She was also found to have A-fib RVR on 10/15/2023. She was  discharged on Eliquis for A-fib, no ASA or Plavix.  Amiodarone, diltiazem Lopressor for A. Fib. Follow-up angiogram on 4/4 showed residual 1-2 mm neck.  Also found to have abnormal basilar trunk/fusiform dilation.  She was started on aspirin and Plavix 7 days before, and off Eliquis. She underwent DSA with stent embolization 6/19/2024.  Stable neurological exam unchanged from preop.  There is still some residual filling into neck posttreatment.      PLAN/MEDICAL DECISION MAKING:      NEUROLOGIC:  History of SAH with ruptured basilar tip aneurysm  S/p web device embolization 10/11/2023  Found to have residual neck, s/p DSA with stent embolization 6/19/2024  -Continue aspirin, Plavix and hold Eliquis until she sees the endovascular team outpatient  -SBP goal 100-140   - Neuro checks per protocol    CARDIOVASCULAR:  Atrial fibrillation  Hypertension  Hyperlipidemia   -SBP goal 100-140   -On Diltiazem, lopressor at home   -Resumed home lopressor and Diltiazem  -Cardene gtt PRN   -PRN labetalol for SBP >140  -Continue home Lipitor   - Continue telemetry    PULMONARY:  -Saturating well on room air     RENAL/FLUID/ELECTROLYTE:  - BUN 9/ Creatinine 0.7  - IVF continue IV LR @ 100   - Replace electrolytes PRN  - Daily BMP    GI/NUTRITION:  NUTRITION:  ADULT DIET; Regular  - Bowel regimen: Zofran   -Home Protonix resumed     ID:  - Tmax 97.2  -Daily CBC, BMP  - Continue to monitor for fevers  -

## 2024-06-19 NOTE — H&P
Endovascular Neurosurgery History and Physical      Reason for evaluation: SAH and Basilar tip rupture more so on the right P1     SUBJECTIVE:   History of Chief Complaint:    Dahlia Zhang is a 73 y.o. 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      s/p WEB device embolization with obliteration of aneurysm, achieving Chris 1 on 10/11/2023.      Original H&H 5 and modified mccracken 4.     Found to have afib RVR on the 10/15/2023.     Doing well mRS 1  Memory issues, typical in SAH      FU angiogram on April 4, 2024 showed residual neck        Allergies  is allergic to codeine, norco [hydrocodone-acetaminophen], and percocet [oxycodone-acetaminophen].  Medications  Prior to Admission medications    Medication Sig Start Date End Date Taking? Authorizing Provider   aspirin 81 MG EC tablet Take 1 tablet by mouth at bedtime   Yes Glenis Lewis MD   albuterol sulfate HFA (PROVENTIL;VENTOLIN;PROAIR) 108 (90 Base) MCG/ACT inhaler Inhale 1 puff into the lungs every 6 hours as needed 2/16/24   Glenis Lewis MD   tiotropium-olodaterol (STIOLTO RESPIMAT) 2.5-2.5 MCG/ACT AERS Inhale 2 puffs into the lungs daily 2/16/24   Glenis Lewis MD   clopidogrel (PLAVIX) 75 MG tablet Take 1 tablet by mouth daily  Patient taking differently: Take 1 tablet by mouth at bedtime 5/21/24   Sujey Carl MD   losartan-hydroCHLOROthiazide (HYZAAR) 100-25 MG per tablet Take 1 tablet by mouth daily    Glenis Lewis MD   hydrALAZINE (APRESOLINE) 25 MG tablet Take 2 tablets by mouth in the morning and at bedtime    Glenis Lewis MD   apixaban (ELIQUIS) 5 MG TABS tablet Take 1 tablet by mouth 2 times daily 10/26/23   Lucy Simmons APRN - CNP   escitalopram (LEXAPRO) 10 MG tablet Take 1 tablet by mouth nightly 10/26/23   Lucy Simmons APRN - CNP   atorvastatin (LIPITOR) 10 MG tablet Take 1 tablet by mouth  [facility]  Electronically signed 6/19/2024 at 8:54 AM

## 2024-06-19 NOTE — PLAN OF CARE
Problem: Chronic Conditions and Co-morbidities  Goal: Patient's chronic conditions and co-morbidity symptoms are monitored and maintained or improved  Outcome: Progressing     Problem: Pain  Goal: Verbalizes/displays adequate comfort level or baseline comfort level  Outcome: Progressing     Problem: Discharge Planning  Goal: Discharge to home or other facility with appropriate resources  Outcome: Progressing     Problem: Safety - Adult  Goal: Free from fall injury  Outcome: Progressing

## 2024-06-19 NOTE — PROGRESS NOTES
Show:  [x]Written[]Templated[]Copied    Added by:  [x]Janiya Brennan RN    []Hover for details  Both PIV's infiltrated. New one started in left AC. RN removed 10 cc of air out of pressure device at 1630. Site CDI and can feel femoral pulse.

## 2024-06-19 NOTE — FLOWSHEET NOTE
Both PIV's infiltrated. New one started in left AC. RN removed 10 cc of air out of pressure device at 1630. Site CDI and can feel femoral pulse.

## 2024-06-19 NOTE — SEDATION DOCUMENTATION
Pt arrives to neuro IR alert and oriented. NIH 0. Pt c/o headache 4/10 on pain scale. Pedal pulses marked and palpable. Pt transferred self to IR table and applied to monitors. Pt intubated per anesthesia. Attempting to place right radial arterial line d/t LUE no radial pulse, which is baseline per pt. Continue to closely monitor

## 2024-06-19 NOTE — SEDATION DOCUMENTATION
pt transported to recovery with CRNA and NANCY. vss. groin site clean dry and intact. pedal pulses palpable. Continue to closely monitor    Post Procedure Transfer from IR Table  [x] Tubes and Lines intact     Post Procedure Neuro-Checks/NIHSS/Vitals  [x] Completed post procedure  [x] Completed bedside handoff  [x] Frequency ordered  [x] Verbal communication of frequency      Post Procedure Puncture Site Checks  [x] Completed post procedure  [x] Completed bedside handoff  [x] Frequency ordered  [x] Verbal communication of frequency    Post Procedure Pulse  Checks  [x] Completed post procedure  [x] Completed bedside handoff  [x] Frequency ordered  [x] Verbal communication of frequency    Order Set  [x] Post Neuro-Endo Procedure  [] Stroke  [] t-PA     B/P control  [x] Verbal Communication   [x] Order in Care Path    Medication Review  [x] Given during procedure  [x] Current drips/meds/fluids    [x] Physician Notified of All changes in Assessment    Family  [x] Location Post Procedure; Dr Ruano and Dr Valentin update family, then escorted to recovery waiting area.

## 2024-06-20 VITALS
DIASTOLIC BLOOD PRESSURE: 63 MMHG | OXYGEN SATURATION: 93 % | SYSTOLIC BLOOD PRESSURE: 129 MMHG | TEMPERATURE: 98.7 F | BODY MASS INDEX: 27.28 KG/M2 | WEIGHT: 180 LBS | HEIGHT: 68 IN | HEART RATE: 62 BPM | RESPIRATION RATE: 18 BRPM

## 2024-06-20 LAB
ANION GAP SERPL CALCULATED.3IONS-SCNC: 9 MMOL/L (ref 9–16)
BUN SERPL-MCNC: 10 MG/DL (ref 8–23)
CALCIUM SERPL-MCNC: 8.5 MG/DL (ref 8.6–10.4)
CHLORIDE SERPL-SCNC: 98 MMOL/L (ref 98–107)
CO2 SERPL-SCNC: 26 MMOL/L (ref 20–31)
CREAT SERPL-MCNC: 0.6 MG/DL (ref 0.5–0.9)
EKG ATRIAL RATE: 59 BPM
EKG P AXIS: -21 DEGREES
EKG P-R INTERVAL: 180 MS
EKG Q-T INTERVAL: 492 MS
EKG QRS DURATION: 84 MS
EKG QTC CALCULATION (BAZETT): 487 MS
EKG R AXIS: -3 DEGREES
EKG T AXIS: 49 DEGREES
EKG VENTRICULAR RATE: 59 BPM
ERYTHROCYTE [DISTWIDTH] IN BLOOD BY AUTOMATED COUNT: 13 % (ref 11.8–14.4)
GFR, ESTIMATED: >90 ML/MIN/1.73M2
GLUCOSE SERPL-MCNC: 160 MG/DL (ref 74–99)
HCT VFR BLD AUTO: 34.4 % (ref 36.3–47.1)
HGB BLD-MCNC: 11.3 G/DL (ref 11.9–15.1)
MCH RBC QN AUTO: 30 PG (ref 25.2–33.5)
MCHC RBC AUTO-ENTMCNC: 32.8 G/DL (ref 28.4–34.8)
MCV RBC AUTO: 91.2 FL (ref 82.6–102.9)
NRBC BLD-RTO: 0 PER 100 WBC
PLATELET # BLD AUTO: 264 K/UL (ref 138–453)
PMV BLD AUTO: 11.1 FL (ref 8.1–13.5)
POTASSIUM SERPL-SCNC: 4 MMOL/L (ref 3.7–5.3)
RBC # BLD AUTO: 3.77 M/UL (ref 3.95–5.11)
SODIUM SERPL-SCNC: 133 MMOL/L (ref 136–145)
WBC OTHER # BLD: 15.3 K/UL (ref 3.5–11.3)

## 2024-06-20 PROCEDURE — 6370000000 HC RX 637 (ALT 250 FOR IP)

## 2024-06-20 PROCEDURE — 2580000003 HC RX 258: Performed by: ANESTHESIOLOGY

## 2024-06-20 PROCEDURE — 99233 SBSQ HOSP IP/OBS HIGH 50: CPT | Performed by: PSYCHIATRY & NEUROLOGY

## 2024-06-20 PROCEDURE — 97161 PT EVAL LOW COMPLEX 20 MIN: CPT

## 2024-06-20 PROCEDURE — 94761 N-INVAS EAR/PLS OXIMETRY MLT: CPT

## 2024-06-20 PROCEDURE — 97116 GAIT TRAINING THERAPY: CPT

## 2024-06-20 PROCEDURE — 80048 BASIC METABOLIC PNL TOTAL CA: CPT

## 2024-06-20 PROCEDURE — 85027 COMPLETE CBC AUTOMATED: CPT

## 2024-06-20 PROCEDURE — 99232 SBSQ HOSP IP/OBS MODERATE 35: CPT | Performed by: STUDENT IN AN ORGANIZED HEALTH CARE EDUCATION/TRAINING PROGRAM

## 2024-06-20 PROCEDURE — 2580000003 HC RX 258: Performed by: PSYCHIATRY & NEUROLOGY

## 2024-06-20 PROCEDURE — 6370000000 HC RX 637 (ALT 250 FOR IP): Performed by: PSYCHIATRY & NEUROLOGY

## 2024-06-20 RX ADMIN — DILTIAZEM HYDROCHLORIDE 120 MG: 120 CAPSULE, COATED, EXTENDED RELEASE ORAL at 08:26

## 2024-06-20 RX ADMIN — PANTOPRAZOLE SODIUM 40 MG: 40 TABLET, DELAYED RELEASE ORAL at 06:06

## 2024-06-20 RX ADMIN — METOPROLOL TARTRATE 50 MG: 25 TABLET, FILM COATED ORAL at 08:27

## 2024-06-20 RX ADMIN — SODIUM CHLORIDE, PRESERVATIVE FREE 10 ML: 5 INJECTION INTRAVENOUS at 08:27

## 2024-06-20 RX ADMIN — POTASSIUM CHLORIDE 20 MEQ: 1500 TABLET, EXTENDED RELEASE ORAL at 08:26

## 2024-06-20 RX ADMIN — HYDROCHLOROTHIAZIDE 25 MG: 25 TABLET ORAL at 08:27

## 2024-06-20 RX ADMIN — ACETAMINOPHEN 650 MG: 325 TABLET ORAL at 06:06

## 2024-06-20 RX ADMIN — SODIUM CHLORIDE, PRESERVATIVE FREE 10 ML: 5 INJECTION INTRAVENOUS at 08:28

## 2024-06-20 RX ADMIN — LOSARTAN POTASSIUM 100 MG: 50 TABLET, FILM COATED ORAL at 08:27

## 2024-06-20 ASSESSMENT — PAIN DESCRIPTION - LOCATION: LOCATION: HEAD

## 2024-06-20 ASSESSMENT — PAIN DESCRIPTION - DESCRIPTORS: DESCRIPTORS: ACHING

## 2024-06-20 ASSESSMENT — PAIN SCALES - GENERAL
PAINLEVEL_OUTOF10: 1
PAINLEVEL_OUTOF10: 3

## 2024-06-20 ASSESSMENT — PAIN - FUNCTIONAL ASSESSMENT: PAIN_FUNCTIONAL_ASSESSMENT: ACTIVITIES ARE NOT PREVENTED

## 2024-06-20 ASSESSMENT — PAIN DESCRIPTION - ORIENTATION: ORIENTATION: MID

## 2024-06-20 NOTE — ANESTHESIA POSTPROCEDURE EVALUATION
Department of Anesthesiology  Postprocedure Note    Patient: Dahlia Zhang  MRN: [id number]  YOB: 1950  Date of evaluation: 6/20/2024    Procedure Summary       Date: 06/19/24 Room / Location: [facility] Special Procedures    Anesthesia Start: 1239 Anesthesia Stop: 1525    Procedure: IR ANGIOGRAM CAROTID CEREBRAL BILATERAL Diagnosis:       Basilar artery aneurysm (HCC)      Aneurysm, cerebral    Scheduled Providers: Jane Newton APRN - CRNA Responsible Provider: Myron Montano MD    Anesthesia Type: general ASA Status: 3            Anesthesia Type: No value filed.    Thao Phase I: Thao Score: 10    Thao Phase II:      Anesthesia Post Evaluation    Patient location during evaluation: ICU  Patient participation: complete - patient participated  Level of consciousness: sleepy but conscious  Airway patency: patent  Nausea & Vomiting: no nausea and no vomiting  Cardiovascular status: blood pressure returned to baseline  Respiratory status: acceptable  Hydration status: euvolemic  Pain management: adequate      No notable events documented.

## 2024-06-20 NOTE — PROGRESS NOTES
Physical Therapy  Facility/Department: [address] NEURO ICU  Physical Therapy Initial Assessment    Name: Dahlia Zhang  : 1950  MRN: [id number]  Date of Service: 2024  Per H&P: \"History of SAH with ruptured basilar tip aneurysm S/p web device embolization 10/11/2023 Found to have residual neck, s/p DSA with stent embolization 2024.\"    Discharge Recommendations:  No therapy recommended at discharge   PT Equipment Recommendations  Equipment Needed: No  Other: No AD used this eval.      Patient Diagnosis(es): The encounter diagnosis was Basilar artery aneurysm (HCC).  Past Medical History:  has a past medical history of Aneurysm (HCC), Anticoagulated, Atrial fibrillation (HCC), COPD (chronic obstructive pulmonary disease) (HCC), COVID-19, COVID-19 vaccine administered, DM II (diabetes mellitus, type II), controlled (HCC), HTN (hypertension), Hyperlipidemia, On home O2, Sleep apnea, Under care of team, Under care of team, and Under care of team.  Past Surgical History:  has a past surgical history that includes Thyroid surgery; Breast reduction surgery (Bilateral); Hysterectomy; hernia repair; Bladder surgery; Cerebral angiogram (2024); Cerebral angiogram (10/2023); and Cerebral angiogram (Bilateral, 2024).    Assessment   Assessment: Pt currently demonstrates no acute performance deficits and is completing all ADLs and functional mobility independently. Pt will be discharged from PT services as there are no goals identified and no needs to address. Pt reports no concerns with returning to prior living arrangements and completing all ADLs independently upon discharge. Please reorder PT if future needs arise.  Therapy Prognosis: Excellent  Decision Making: Low Complexity  No Skilled PT: Independent with functional mobility   Requires PT Follow-Up: No  Activity Tolerance  Activity Tolerance: Patient tolerated evaluation without incident     Plan   Safety Devices  Type of Devices: Call light  room?: None  How much help is needed climbing 3-5 steps with a railing?: None  AM-PAC Inpatient Mobility Raw Score : 24  AM-PAC Inpatient T-Scale Score : 61.14  Mobility Inpatient CMS 0-100% Score: 0  Mobility Inpatient CMS G-Code Modifier : CH       Goals  Patient Goals   Patient Goals : d/c pt, pt at baseline and indepndent with no further concerns at this time.       Education  Patient Education  Education Given To: Patient  Education Provided: Role of Therapy  Education Method: Verbal  Barriers to Learning: None  Education Outcome: Verbalized understanding      Therapy Time   Individual Concurrent Group Co-treatment   Time In 1400         Time Out 1420         Minutes 20         Timed Code Treatment Minutes: 8 Minutes       DAY GORDON   Evaluation/treatment performed by Student PT under the supervision of co-signing PT who agrees with all evaluation/treatment and documentation.

## 2024-06-20 NOTE — PROGRESS NOTES
Endovascular Neurosurgery Progress Note      Reason for evaluation: SAH and Basilar tip rupture more so on the right P1     SUBJECTIVE:   NAEO. S/p MOHAMUD stenting. Doing well. NORTON is improving     History of Chief Complaint:    Dahlia Zhang is a 73 y.o. 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      s/p WEB device embolization with obliteration of aneurysm, achieving Chris 1 on 10/11/2023.      Original H&H 5 and modified mccracken 4.     Found to have afib RVR on the 10/15/2023.     Doing well mRS 1  Memory issues, typical in SAH      FU angiogram on April 4, 2024 showed residual neck        Allergies  is allergic to codeine, norco [hydrocodone-acetaminophen], and percocet [oxycodone-acetaminophen].  Medications  Prior to Admission medications    Medication Sig Start Date End Date Taking? Authorizing Provider   acetaminophen (TYLENOL) 500 MG tablet Take 1 tablet by mouth every 6 hours as needed for Pain   Yes Glenis Lewis MD   aspirin 81 MG EC tablet Take 1 tablet by mouth at bedtime   Yes Glenis Lewis MD   albuterol sulfate HFA (PROVENTIL;VENTOLIN;PROAIR) 108 (90 Base) MCG/ACT inhaler Inhale 1 puff into the lungs every 6 hours as needed  Patient not taking: Reported on 6/19/2024 2/16/24   Glenis Lewis MD   tiotropium-olodaterol (STIOLTO RESPIMAT) 2.5-2.5 MCG/ACT AERS Inhale 2 puffs into the lungs daily 2/16/24   Glenis Lewis MD   clopidogrel (PLAVIX) 75 MG tablet Take 1 tablet by mouth daily  Patient taking differently: Take 1 tablet by mouth at bedtime 5/21/24   Sujey Carl MD   losartan-hydroCHLOROthiazide (HYZAAR) 100-25 MG per tablet Take 1 tablet by mouth daily    Glenis Lewis MD   hydrALAZINE (APRESOLINE) 25 MG tablet Take 2 tablets by mouth in the morning and at bedtime    Glenis Lewis MD   apixaban (ELIQUIS) 5 MG TABS tablet Take 1 tablet by mouth 2  times daily 10/26/23   Lucy Simmons APRN - CNP   escitalopram (LEXAPRO) 10 MG tablet Take 1 tablet by mouth nightly 10/26/23   Lucy Simmons APRN - CNP   atorvastatin (LIPITOR) 10 MG tablet Take 1 tablet by mouth nightly 10/26/23   Lucy Simmons APRN - CNP   metoprolol tartrate (LOPRESSOR) 50 MG tablet Take 1 tablet by mouth 2 times daily 10/26/23   Lucy Simmons APRN - CNP   dilTIAZem (CARDIZEM CD) 120 MG extended release capsule Take 1 capsule by mouth daily 10/27/23   Lucy Simmons APRN - CNP   potassium chloride (KLOR-CON M) 20 MEQ extended release tablet Take 1 tablet by mouth daily 10/26/23   Lucy Simmons APRN - CNP   pantoprazole (PROTONIX) 40 MG tablet Take 1 tablet by mouth every morning (before breakfast) 10/27/23   Lucy Simmons APRN - CNP    Scheduled Meds:   sodium chloride flush  5-40 mL IntraVENous 2 times per day    sodium chloride flush  5-40 mL IntraVENous 2 times per day    atorvastatin  10 mg Oral Nightly    pantoprazole  40 mg Oral QAM AC    escitalopram  10 mg Oral Nightly    dilTIAZem  120 mg Oral Daily    metoprolol tartrate  50 mg Oral BID    potassium chloride  20 mEq Oral Daily    losartan  100 mg Oral Daily    And    hydroCHLOROthiazide  25 mg Oral Daily     Continuous Infusions:   sodium chloride      sodium chloride      [Held by provider] niCARdipine       PRN Meds:.sodium chloride flush, sodium chloride, midazolam, sodium chloride flush, sodium chloride, acetaminophen, ondansetron **OR** ondansetron, fentanNYL, labetalol  Past Medical History   has a past medical history of Aneurysm (HCC), Anticoagulated, Atrial fibrillation (HCC), COPD (chronic obstructive pulmonary disease) (HCC), COVID-19, COVID-19 vaccine administered, DM II (diabetes mellitus, type II), controlled (HCC), HTN (hypertension), Hyperlipidemia, On home O2, Sleep apnea, Under care of team, Under care of team, and Under care of team.  Past Surgical History   has a past surgical

## 2024-06-20 NOTE — PROGRESS NOTES
Neuro ICU progress note    Patient Name: Dahlia Zhang  Patient : 1950  Room/Bed: 0123/0123-01  Code Status: Full code  Allergies:   Allergies   Allergen Reactions    Codeine     Norco [Hydrocodone-Acetaminophen] Nausea And Vomiting    Percocet [Oxycodone-Acetaminophen] Nausea And Vomiting       CHIEF COMPLAINT     Elective DSA with stent embolization    HPI    History Obtained From: EMR, patient     73 year old female with past medical history HTN, HLD, smoking who presented with acute headache 9-10 am on 10/11/23 and was found to have diffuse SAH with a ruptured basilar tip aneurysm.  The patient was at home, when she developed sudden onset severe headache.  EMS was called, patient was walking to the cot when she became unresponsive.  She was intubated and taken to [facility] where CT head showed diffuse SAH.  She was loaded 1 g Keppra.  CTA showed basilar tip aneurysm.  The patient was taking ASA daily at the time of these events.     She underwent Web device embolization with obliteration of aneurysm, achieving Sina 1 on 10/11/2023.  The original H&H 5, modified Szymanski 4.  CT head showed obstructive hydrocephalus. EVD was placed as well.  During her admission, she was noted to have memory issues which are typical and SAH.  She did not tolerate narcotics during admission, as she had vomiting.  TCD's showed no evidence of vasospasm.  She had hyponatremia and was started on Florinef.   She was also found to have A-fib RVR on 10/15/2023.  Cardiology was consulted; recommended amiodarone, Lopressor, Cardizem.     She was  discharged on Eliquis for A-fib, no ASA or Plavix.  She was also discharged on amiodarone, diltiazem Lopressor.  The patient presented to the ED 10/29/2023 with a fall from standing position.  Per notes that she tripped and fell, striking her face on the ground, no LOC.  This was 18 days after her brain aneurysm.    MRA 2024 showed no significant stenosis or evidence  mg, 10 mg, IntraVENous, Q4H PRN  atorvastatin (LIPITOR) tablet 10 mg, 10 mg, Oral, Nightly  pantoprazole (PROTONIX) tablet 40 mg, 40 mg, Oral, QAM AC  escitalopram (LEXAPRO) tablet 10 mg, 10 mg, Oral, Nightly  dilTIAZem (CARDIZEM CD) extended release capsule 120 mg, 120 mg, Oral, Daily  metoprolol tartrate (LOPRESSOR) tablet 50 mg, 50 mg, Oral, BID  potassium chloride (KLOR-CON M) extended release tablet 20 mEq, 20 mEq, Oral, Daily  losartan (COZAAR) tablet 100 mg, 100 mg, Oral, Daily **AND** hydroCHLOROthiazide (HYDRODIURIL) tablet 25 mg, 25 mg, Oral, Daily    REVIEW OF SYSTEMS     CONSTITUTIONAL: negative for fatigue and malaise   EYES: negative for double vision and photophobia    HEENT: negative for tinnitus and sore throat   RESPIRATORY: negative for cough, shortness of breath   CARDIOVASCULAR: negative for chest pain, palpitations, or syncope   GASTROINTESTINAL: negative for abdominal pain, nausea, vomiting, diarrhea, or constipation    GENITOURINARY: negative for incontinence or retention    MUSCULOSKELETAL: negative for neck or back pain, negative for extremity pain   NEUROLOGICAL: Negative for seizures, headaches, weakness, numbness, confusion, aphasia, dysarthria    PSYCHIATRIC: negative for agitation, hallucination, SI/HI   SKIN Negative for spontaneous contusions, rashes, or lesions      PHYSICAL EXAM:     /65   Pulse 67   Temp 98.3 °F (36.8 °C) (Oral)   Resp 19   Ht 1.727 m (5' 8\")   Wt 81.6 kg (180 lb)   SpO2 96%   BMI 27.37 kg/m²     PHYSICAL EXAM:  CONSTITUTIONAL: Well developed, well nourished, alert and oriented x 3, in no acute distress. GCS 15. Nontoxic. No dysarthria. No aphasia.   HEAD: normocephalic, atraumatic   EYES: PERRLA, EOMI.   ENT: moist mucous membranes   LUNGS: Equal air entry bilaterally   CARDIOVASCULAR: normal s1 / s2   ABDOMEN: Soft, no rigidity   NECK supple, symmetric, no midline tenderness to palpation   BACK without midline tenderness, step-offs or deformities

## 2024-06-20 NOTE — PLAN OF CARE
Problem: Chronic Conditions and Co-morbidities  Goal: Patient's chronic conditions and co-morbidity symptoms are monitored and maintained or improved  Outcome: Completed  Flowsheets (Taken 6/20/2024 0826)  Care Plan - Patient's Chronic Conditions and Co-Morbidity Symptoms are Monitored and Maintained or Improved:   Monitor and assess patient's chronic conditions and comorbid symptoms for stability, deterioration, or improvement   Collaborate with multidisciplinary team to address chronic and comorbid conditions and prevent exacerbation or deterioration   Update acute care plan with appropriate goals if chronic or comorbid symptoms are exacerbated and prevent overall improvement and discharge     Problem: Pain  Goal: Verbalizes/displays adequate comfort level or baseline comfort level  Outcome: Completed     Problem: Discharge Planning  Goal: Discharge to home or other facility with appropriate resources  Outcome: Completed  Flowsheets (Taken 6/20/2024 0826)  Discharge to home or other facility with appropriate resources:   Identify barriers to discharge with patient and caregiver   Arrange for needed discharge resources and transportation as appropriate   Identify discharge learning needs (meds, wound care, etc)   Refer to discharge planning if patient needs post-hospital services based on physician order or complex needs related to functional status, cognitive ability or social support system     Problem: Safety - Adult  Goal: Free from fall injury  Outcome: Completed

## 2024-06-24 RX ORDER — CLOPIDOGREL BISULFATE 75 MG/1
75 TABLET ORAL DAILY
Qty: 37 TABLET | Refills: 0 | Status: SHIPPED | OUTPATIENT
Start: 2024-06-24

## 2024-06-24 NOTE — TELEPHONE ENCOUNTER
E-scribe requesting refill for Plavix. Please review and e-scribe if applicable.     Last Visit Date: 05/21/2024    Next Visit Date:10/14/2024

## 2024-07-10 RX ORDER — LOSARTAN POTASSIUM AND HYDROCHLOROTHIAZIDE 25; 100 MG/1; MG/1
1 TABLET ORAL DAILY
Qty: 30 TABLET | Refills: 0 | OUTPATIENT
Start: 2024-07-10

## 2024-07-13 NOTE — DISCHARGE SUMMARY
Meseret was present for all procedural and imaging components of this case. Examination was reviewed and reported findings confirmed and evaluated by Dr. Carlos Carl.          DISCHARGE INSTRUCTIONS     Discharge Medications:        Medication List        CONTINUE taking these medications      acetaminophen 500 MG tablet  Commonly known as: TYLENOL     aspirin 81 MG EC tablet     atorvastatin 10 MG tablet  Commonly known as: LIPITOR  Take 1 tablet by mouth nightly     dilTIAZem 120 MG extended release capsule  Commonly known as: CARDIZEM CD  Take 1 capsule by mouth daily     escitalopram 10 MG tablet  Commonly known as: LEXAPRO  Take 1 tablet by mouth nightly     hydrALAZINE 25 MG tablet  Commonly known as: APRESOLINE     losartan-hydroCHLOROthiazide 100-25 MG per tablet  Commonly known as: HYZAAR     metoprolol tartrate 50 MG tablet  Commonly known as: LOPRESSOR  Take 1 tablet by mouth 2 times daily     pantoprazole 40 MG tablet  Commonly known as: PROTONIX  Take 1 tablet by mouth every morning (before breakfast)     potassium chloride 20 MEQ extended release tablet  Commonly known as: KLOR-CON M  Take 1 tablet by mouth daily            STOP taking these medications      apixaban 5 MG Tabs tablet  Commonly known as: ELIQUIS     clopidogrel 75 MG tablet  Commonly known as: Plavix            Diet: No diet orders on file diet as tolerated  Activity: as tolerated  Follow-up:  with [facility]  Time Spent for discharge: 30 minutes    Ken Gonzalez MD  Neuro Critical Care  7/13/2024, 4:20 PM

## 2024-07-19 ENCOUNTER — TELEPHONE (OUTPATIENT)
Dept: NEUROLOGY | Age: 74
End: 2024-07-19

## 2024-07-19 ENCOUNTER — OFFICE VISIT (OUTPATIENT)
Dept: NEUROLOGY | Age: 74
End: 2024-07-19
Payer: MEDICARE

## 2024-07-19 VITALS
HEIGHT: 68 IN | HEART RATE: 75 BPM | WEIGHT: 184.2 LBS | SYSTOLIC BLOOD PRESSURE: 122 MMHG | BODY MASS INDEX: 27.92 KG/M2 | DIASTOLIC BLOOD PRESSURE: 85 MMHG

## 2024-07-19 DIAGNOSIS — Z95.828 MRI-SAFE ENDOVASCULAR ANEURYSM COIL PRESENT: ICD-10-CM

## 2024-07-19 DIAGNOSIS — I72.5 BASILAR ARTERY ANEURYSM (HCC): Primary | ICD-10-CM

## 2024-07-19 DIAGNOSIS — I48.0 PAROXYSMAL ATRIAL FIBRILLATION (HCC): ICD-10-CM

## 2024-07-19 PROCEDURE — G8427 DOCREV CUR MEDS BY ELIG CLIN: HCPCS | Performed by: STUDENT IN AN ORGANIZED HEALTH CARE EDUCATION/TRAINING PROGRAM

## 2024-07-19 PROCEDURE — 3017F COLORECTAL CA SCREEN DOC REV: CPT | Performed by: STUDENT IN AN ORGANIZED HEALTH CARE EDUCATION/TRAINING PROGRAM

## 2024-07-19 PROCEDURE — 1111F DSCHRG MED/CURRENT MED MERGE: CPT | Performed by: STUDENT IN AN ORGANIZED HEALTH CARE EDUCATION/TRAINING PROGRAM

## 2024-07-19 PROCEDURE — 1090F PRES/ABSN URINE INCON ASSESS: CPT | Performed by: STUDENT IN AN ORGANIZED HEALTH CARE EDUCATION/TRAINING PROGRAM

## 2024-07-19 PROCEDURE — G8419 CALC BMI OUT NRM PARAM NOF/U: HCPCS | Performed by: STUDENT IN AN ORGANIZED HEALTH CARE EDUCATION/TRAINING PROGRAM

## 2024-07-19 PROCEDURE — 99215 OFFICE O/P EST HI 40 MIN: CPT | Performed by: STUDENT IN AN ORGANIZED HEALTH CARE EDUCATION/TRAINING PROGRAM

## 2024-07-19 PROCEDURE — 1123F ACP DISCUSS/DSCN MKR DOCD: CPT | Performed by: STUDENT IN AN ORGANIZED HEALTH CARE EDUCATION/TRAINING PROGRAM

## 2024-07-19 PROCEDURE — G8400 PT W/DXA NO RESULTS DOC: HCPCS | Performed by: STUDENT IN AN ORGANIZED HEALTH CARE EDUCATION/TRAINING PROGRAM

## 2024-07-19 PROCEDURE — 4004F PT TOBACCO SCREEN RCVD TLK: CPT | Performed by: STUDENT IN AN ORGANIZED HEALTH CARE EDUCATION/TRAINING PROGRAM

## 2024-07-19 NOTE — PROGRESS NOTES
[facility] [place]   Stroke & Neurointerventional Clinic Note    [facility]   [address]   [place] [address]   P: [phone]   F: [phone]  Endovascular Neurosurgery Clinic Note    Pt Name: Dahlia Zhang  MRN: [id number]  YOB: 1950  Date of evaluation: 7/19/2024  Primary Care Physician: Thania Rahman MD    Reason for evaluation: SAH and Basilar tip rupture more so on the right P1    SUBJECTIVE:       7/19 Clinic Visit  Pt states she is doing well, no complaints. Has been compliant with Aspirin and Plavix. No new neuro deficits.         History of Chief Complaint:    Dahlia Zhang is a 73 y.o. 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     s/p WEB device embolization with obliteration of aneurysm, achieving Chris 1 on 10/11/2023.     Original H&H 5 and modified mccracken 4.    Found to have afib RVR on the 10/15/2023.    Doing well mRS 1  Memory issues, typical in SAH     FU angiogram on April 4, 2024 showed residual neck     Allergies  is allergic to codeine, norco [hydrocodone-acetaminophen], and percocet [oxycodone-acetaminophen].  Medications  Prior to Admission medications    Medication Sig Start Date End Date Taking? Authorizing Provider   acetaminophen (TYLENOL) 500 MG tablet Take 1 tablet by mouth every 6 hours as needed for Pain   Yes Provider, MD Glenis   losartan-hydroCHLOROthiazide (HYZAAR) 100-25 MG per tablet Take 1 tablet by mouth daily   Yes Provider, MD Glenis   hydrALAZINE (APRESOLINE) 25 MG tablet Take 2 tablets by mouth in the morning and at bedtime 2 tablets in the morning and 1 tablet at night   Yes Provider, MD Glenis   escitalopram (LEXAPRO) 10 MG tablet Take 1 tablet by mouth nightly 10/26/23  Yes Lucy Simmons APRN - CNP   atorvastatin (LIPITOR) 10 MG tablet Take 1 tablet by mouth nightly

## 2024-07-19 NOTE — TELEPHONE ENCOUNTER
Pt cancel the follow up with you on Oct 14 and is keeping the 3 month follow up with Dr. Conte on 10/25/2024. Would like to know if this is ok or do we need to change the appt.

## 2024-08-08 ENCOUNTER — APPOINTMENT (OUTPATIENT)
Dept: MRI IMAGING | Age: 74
End: 2024-08-08
Payer: MEDICARE

## 2024-08-08 ENCOUNTER — APPOINTMENT (OUTPATIENT)
Dept: CT IMAGING | Age: 74
End: 2024-08-08
Payer: MEDICARE

## 2024-08-08 ENCOUNTER — HOSPITAL ENCOUNTER (INPATIENT)
Age: 74
LOS: 1 days | Discharge: HOME | End: 2024-08-09
Attending: EMERGENCY MEDICINE | Admitting: PSYCHIATRY & NEUROLOGY
Payer: MEDICARE

## 2024-08-08 DIAGNOSIS — I63.9 ISCHEMIC STROKE (HCC): Primary | ICD-10-CM

## 2024-08-08 DIAGNOSIS — I72.5 BASILAR ARTERY ANEURYSM (HCC): ICD-10-CM

## 2024-08-08 LAB
ALBUMIN SERPL-MCNC: 4 G/DL (ref 3.5–5.2)
ALBUMIN/GLOB SERPL: 1 {RATIO} (ref 1–2.5)
ALP SERPL-CCNC: 76 U/L (ref 35–104)
ALT SERPL-CCNC: 10 U/L (ref 10–35)
ANION GAP SERPL CALCULATED.3IONS-SCNC: 12 MMOL/L (ref 9–16)
AST SERPL-CCNC: 19 U/L (ref 10–35)
BASOPHILS # BLD: 0.1 K/UL (ref 0–0.2)
BASOPHILS NFR BLD: 1 % (ref 0–2)
BILIRUB SERPL-MCNC: 0.3 MG/DL (ref 0–1.2)
BUN SERPL-MCNC: 9 MG/DL (ref 8–23)
CALCIUM SERPL-MCNC: 8.9 MG/DL (ref 8.6–10.4)
CHLORIDE SERPL-SCNC: 98 MMOL/L (ref 98–107)
CO2 SERPL-SCNC: 24 MMOL/L (ref 20–31)
CREAT SERPL-MCNC: 0.8 MG/DL (ref 0.5–0.9)
EOSINOPHIL # BLD: 0.08 K/UL (ref 0–0.44)
EOSINOPHILS RELATIVE PERCENT: 1 % (ref 1–4)
ERYTHROCYTE [DISTWIDTH] IN BLOOD BY AUTOMATED COUNT: 13.4 % (ref 11.8–14.4)
ERYTHROCYTE [DISTWIDTH] IN BLOOD BY AUTOMATED COUNT: 13.5 % (ref 11.8–14.4)
GFR, ESTIMATED: 77 ML/MIN/1.73M2
GLUCOSE SERPL-MCNC: 180 MG/DL (ref 74–99)
HCT VFR BLD AUTO: 40 % (ref 36.3–47.1)
HCT VFR BLD AUTO: 41.2 % (ref 36.3–47.1)
HGB BLD-MCNC: 13.3 G/DL (ref 11.9–15.1)
HGB BLD-MCNC: 14 G/DL (ref 11.9–15.1)
IMM GRANULOCYTES # BLD AUTO: 0.09 K/UL (ref 0–0.3)
IMM GRANULOCYTES NFR BLD: 1 %
LYMPHOCYTES NFR BLD: 3.19 K/UL (ref 1.1–3.7)
LYMPHOCYTES RELATIVE PERCENT: 20 % (ref 24–43)
MCH RBC QN AUTO: 30 PG (ref 25.2–33.5)
MCH RBC QN AUTO: 30.4 PG (ref 25.2–33.5)
MCHC RBC AUTO-ENTMCNC: 33.3 G/DL (ref 28.4–34.8)
MCHC RBC AUTO-ENTMCNC: 34 G/DL (ref 28.4–34.8)
MCV RBC AUTO: 89.4 FL (ref 82.6–102.9)
MCV RBC AUTO: 90.1 FL (ref 82.6–102.9)
MONOCYTES NFR BLD: 0.86 K/UL (ref 0.1–1.2)
MONOCYTES NFR BLD: 6 % (ref 3–12)
NEUTROPHILS NFR BLD: 71 % (ref 36–65)
NEUTS SEG NFR BLD: 11.31 K/UL (ref 1.5–8.1)
NRBC BLD-RTO: 0 PER 100 WBC
NRBC BLD-RTO: 0 PER 100 WBC
PARTIAL THROMBOPLASTIN TIME: 31.1 SEC (ref 23–36.5)
PLATELET # BLD AUTO: 242 K/UL (ref 138–453)
PLATELET # BLD AUTO: 276 K/UL (ref 138–453)
PMV BLD AUTO: 10.5 FL (ref 8.1–13.5)
PMV BLD AUTO: 11.3 FL (ref 8.1–13.5)
POTASSIUM SERPL-SCNC: 3.9 MMOL/L (ref 3.7–5.3)
PROT SERPL-MCNC: 6.9 G/DL (ref 6.6–8.7)
RBC # BLD AUTO: 4.44 M/UL (ref 3.95–5.11)
RBC # BLD AUTO: 4.61 M/UL (ref 3.95–5.11)
SODIUM SERPL-SCNC: 134 MMOL/L (ref 136–145)
WBC OTHER # BLD: 10.5 K/UL (ref 3.5–11.3)
WBC OTHER # BLD: 15.6 K/UL (ref 3.5–11.3)

## 2024-08-08 PROCEDURE — 6360000002 HC RX W HCPCS

## 2024-08-08 PROCEDURE — 99285 EMERGENCY DEPT VISIT HI MDM: CPT

## 2024-08-08 PROCEDURE — 70544 MR ANGIOGRAPHY HEAD W/O DYE: CPT

## 2024-08-08 PROCEDURE — 85730 THROMBOPLASTIN TIME PARTIAL: CPT

## 2024-08-08 PROCEDURE — 6370000000 HC RX 637 (ALT 250 FOR IP)

## 2024-08-08 PROCEDURE — 6360000004 HC RX CONTRAST MEDICATION

## 2024-08-08 PROCEDURE — 70496 CT ANGIOGRAPHY HEAD: CPT

## 2024-08-08 PROCEDURE — 6360000002 HC RX W HCPCS: Performed by: EMERGENCY MEDICINE

## 2024-08-08 PROCEDURE — 99222 1ST HOSP IP/OBS MODERATE 55: CPT | Performed by: PSYCHIATRY & NEUROLOGY

## 2024-08-08 PROCEDURE — 2060000000 HC ICU INTERMEDIATE R&B

## 2024-08-08 PROCEDURE — 70450 CT HEAD/BRAIN W/O DYE: CPT

## 2024-08-08 PROCEDURE — 85027 COMPLETE CBC AUTOMATED: CPT

## 2024-08-08 PROCEDURE — 96374 THER/PROPH/DIAG INJ IV PUSH: CPT

## 2024-08-08 PROCEDURE — 36415 COLL VENOUS BLD VENIPUNCTURE: CPT

## 2024-08-08 PROCEDURE — 70551 MRI BRAIN STEM W/O DYE: CPT

## 2024-08-08 PROCEDURE — 85025 COMPLETE CBC W/AUTO DIFF WBC: CPT

## 2024-08-08 PROCEDURE — 80053 COMPREHEN METABOLIC PANEL: CPT

## 2024-08-08 PROCEDURE — 96375 TX/PRO/DX INJ NEW DRUG ADDON: CPT

## 2024-08-08 PROCEDURE — 93005 ELECTROCARDIOGRAM TRACING: CPT | Performed by: PSYCHIATRY & NEUROLOGY

## 2024-08-08 RX ORDER — ONDANSETRON 4 MG/1
4 TABLET, ORALLY DISINTEGRATING ORAL EVERY 8 HOURS PRN
Status: DISCONTINUED | OUTPATIENT
Start: 2024-08-08 | End: 2024-08-09 | Stop reason: HOSPADM

## 2024-08-08 RX ORDER — HYDROCHLOROTHIAZIDE 25 MG/1
25 TABLET ORAL DAILY
Status: DISCONTINUED | OUTPATIENT
Start: 2024-08-08 | End: 2024-08-09 | Stop reason: HOSPADM

## 2024-08-08 RX ORDER — METOPROLOL TARTRATE 50 MG/1
50 TABLET, FILM COATED ORAL 2 TIMES DAILY
Status: DISCONTINUED | OUTPATIENT
Start: 2024-08-08 | End: 2024-08-09 | Stop reason: HOSPADM

## 2024-08-08 RX ORDER — MORPHINE SULFATE 4 MG/ML
4 INJECTION, SOLUTION INTRAMUSCULAR; INTRAVENOUS ONCE
Status: COMPLETED | OUTPATIENT
Start: 2024-08-08 | End: 2024-08-08

## 2024-08-08 RX ORDER — ASPIRIN 81 MG/1
81 TABLET, CHEWABLE ORAL DAILY
Status: DISCONTINUED | OUTPATIENT
Start: 2024-08-08 | End: 2024-08-09 | Stop reason: HOSPADM

## 2024-08-08 RX ORDER — POLYETHYLENE GLYCOL 3350 17 G/17G
17 POWDER, FOR SOLUTION ORAL DAILY PRN
Status: DISCONTINUED | OUTPATIENT
Start: 2024-08-08 | End: 2024-08-09 | Stop reason: HOSPADM

## 2024-08-08 RX ORDER — DILTIAZEM HYDROCHLORIDE 120 MG/1
120 CAPSULE, COATED, EXTENDED RELEASE ORAL DAILY
Status: DISCONTINUED | OUTPATIENT
Start: 2024-08-08 | End: 2024-08-09 | Stop reason: HOSPADM

## 2024-08-08 RX ORDER — ONDANSETRON 2 MG/ML
4 INJECTION INTRAMUSCULAR; INTRAVENOUS EVERY 6 HOURS PRN
Status: DISCONTINUED | OUTPATIENT
Start: 2024-08-08 | End: 2024-08-09 | Stop reason: HOSPADM

## 2024-08-08 RX ORDER — HEPARIN SODIUM 10000 [USP'U]/100ML
5-30 INJECTION, SOLUTION INTRAVENOUS CONTINUOUS
Status: DISCONTINUED | OUTPATIENT
Start: 2024-08-08 | End: 2024-08-08

## 2024-08-08 RX ORDER — ESCITALOPRAM OXALATE 10 MG/1
10 TABLET ORAL NIGHTLY
Status: DISCONTINUED | OUTPATIENT
Start: 2024-08-08 | End: 2024-08-09 | Stop reason: HOSPADM

## 2024-08-08 RX ORDER — LOSARTAN POTASSIUM AND HYDROCHLOROTHIAZIDE 25; 100 MG/1; MG/1
1 TABLET ORAL DAILY
Status: DISCONTINUED | OUTPATIENT
Start: 2024-08-08 | End: 2024-08-08 | Stop reason: CLARIF

## 2024-08-08 RX ORDER — SODIUM CHLORIDE 0.9 % (FLUSH) 0.9 %
5-40 SYRINGE (ML) INJECTION PRN
Status: DISCONTINUED | OUTPATIENT
Start: 2024-08-08 | End: 2024-08-09 | Stop reason: HOSPADM

## 2024-08-08 RX ORDER — SODIUM CHLORIDE 9 MG/ML
INJECTION, SOLUTION INTRAVENOUS PRN
Status: DISCONTINUED | OUTPATIENT
Start: 2024-08-08 | End: 2024-08-09 | Stop reason: HOSPADM

## 2024-08-08 RX ORDER — LOSARTAN POTASSIUM 50 MG/1
100 TABLET ORAL DAILY
Status: DISCONTINUED | OUTPATIENT
Start: 2024-08-08 | End: 2024-08-09 | Stop reason: HOSPADM

## 2024-08-08 RX ORDER — ONDANSETRON 2 MG/ML
4 INJECTION INTRAMUSCULAR; INTRAVENOUS ONCE
Status: COMPLETED | OUTPATIENT
Start: 2024-08-08 | End: 2024-08-08

## 2024-08-08 RX ORDER — HYDRALAZINE HYDROCHLORIDE 50 MG/1
50 TABLET, FILM COATED ORAL 2 TIMES DAILY
Status: DISCONTINUED | OUTPATIENT
Start: 2024-08-08 | End: 2024-08-09 | Stop reason: HOSPADM

## 2024-08-08 RX ORDER — SODIUM CHLORIDE 0.9 % (FLUSH) 0.9 %
5-40 SYRINGE (ML) INJECTION EVERY 12 HOURS SCHEDULED
Status: DISCONTINUED | OUTPATIENT
Start: 2024-08-08 | End: 2024-08-09 | Stop reason: HOSPADM

## 2024-08-08 RX ORDER — PANTOPRAZOLE SODIUM 40 MG/1
40 TABLET, DELAYED RELEASE ORAL
Status: DISCONTINUED | OUTPATIENT
Start: 2024-08-09 | End: 2024-08-09 | Stop reason: HOSPADM

## 2024-08-08 RX ORDER — ATORVASTATIN CALCIUM 10 MG/1
10 TABLET, FILM COATED ORAL NIGHTLY
Status: DISCONTINUED | OUTPATIENT
Start: 2024-08-08 | End: 2024-08-09 | Stop reason: HOSPADM

## 2024-08-08 RX ORDER — LABETALOL HYDROCHLORIDE 5 MG/ML
10 INJECTION, SOLUTION INTRAVENOUS EVERY 10 MIN PRN
Status: DISCONTINUED | OUTPATIENT
Start: 2024-08-08 | End: 2024-08-09 | Stop reason: HOSPADM

## 2024-08-08 RX ADMIN — ONDANSETRON 4 MG: 2 INJECTION INTRAMUSCULAR; INTRAVENOUS at 19:06

## 2024-08-08 RX ADMIN — HYDROCHLOROTHIAZIDE 25 MG: 25 TABLET ORAL at 19:06

## 2024-08-08 RX ADMIN — MORPHINE SULFATE 4 MG: 4 INJECTION INTRAVENOUS at 14:08

## 2024-08-08 RX ADMIN — APIXABAN 5 MG: 5 TABLET, FILM COATED ORAL at 21:08

## 2024-08-08 RX ADMIN — ESCITALOPRAM OXALATE 10 MG: 10 TABLET ORAL at 21:09

## 2024-08-08 RX ADMIN — ATORVASTATIN CALCIUM 10 MG: 10 TABLET, FILM COATED ORAL at 21:09

## 2024-08-08 RX ADMIN — HYDRALAZINE HYDROCHLORIDE 50 MG: 50 TABLET ORAL at 21:08

## 2024-08-08 RX ADMIN — DILTIAZEM HYDROCHLORIDE 120 MG: 120 CAPSULE, COATED, EXTENDED RELEASE ORAL at 19:10

## 2024-08-08 RX ADMIN — ASPIRIN 81 MG: 81 TABLET, CHEWABLE ORAL at 21:09

## 2024-08-08 RX ADMIN — LOSARTAN POTASSIUM 100 MG: 50 TABLET, FILM COATED ORAL at 19:06

## 2024-08-08 RX ADMIN — ONDANSETRON 4 MG: 2 INJECTION INTRAMUSCULAR; INTRAVENOUS at 14:49

## 2024-08-08 RX ADMIN — METOPROLOL TARTRATE 50 MG: 50 TABLET, FILM COATED ORAL at 21:08

## 2024-08-08 RX ADMIN — HEPARIN SODIUM 11 UNITS/KG/HR: 10000 INJECTION, SOLUTION INTRAVENOUS at 19:09

## 2024-08-08 RX ADMIN — IOPAMIDOL 90 ML: 755 INJECTION, SOLUTION INTRAVENOUS at 14:16

## 2024-08-08 ASSESSMENT — PAIN - FUNCTIONAL ASSESSMENT
PAIN_FUNCTIONAL_ASSESSMENT: 0-10
PAIN_FUNCTIONAL_ASSESSMENT: ACTIVITIES ARE NOT PREVENTED
PAIN_FUNCTIONAL_ASSESSMENT: ACTIVITIES ARE NOT PREVENTED

## 2024-08-08 ASSESSMENT — PAIN DESCRIPTION - LOCATION
LOCATION: HEAD
LOCATION: HEAD

## 2024-08-08 ASSESSMENT — ENCOUNTER SYMPTOMS
NAUSEA: 0
BACK PAIN: 0
COUGH: 0
RHINORRHEA: 0
SORE THROAT: 0
ABDOMINAL DISTENTION: 0
ABDOMINAL PAIN: 0
CONSTIPATION: 0
PHOTOPHOBIA: 0
WHEEZING: 0
SHORTNESS OF BREATH: 0
COLOR CHANGE: 0
VOMITING: 0
CHOKING: 0
DIARRHEA: 0

## 2024-08-08 ASSESSMENT — PAIN SCALES - GENERAL
PAINLEVEL_OUTOF10: 8
PAINLEVEL_OUTOF10: 7

## 2024-08-08 ASSESSMENT — PAIN DESCRIPTION - DESCRIPTORS
DESCRIPTORS: THROBBING
DESCRIPTORS: SHARP

## 2024-08-08 ASSESSMENT — PAIN DESCRIPTION - ORIENTATION
ORIENTATION: LEFT;RIGHT
ORIENTATION: LEFT

## 2024-08-08 NOTE — CONSULTS
but able to walk without assistance   [] 4: Moderately severe disability; unable to walk and attend to bodily needs without assistance   [] 5:Severe disability; bedridden, incontinent and requiring constant nursing care and attention        LABS AND IMAGING:   CBC with Differential:    Lab Results   Component Value Date/Time    WBC 15.6 08/08/2024 01:01 PM    RBC 4.61 08/08/2024 01:01 PM    HGB 14.0 08/08/2024 01:01 PM    HCT 41.2 08/08/2024 01:01 PM     08/08/2024 01:01 PM    MCV 89.4 08/08/2024 01:01 PM    MCH 30.4 08/08/2024 01:01 PM    MCHC 34.0 08/08/2024 01:01 PM    RDW 13.5 08/08/2024 01:01 PM    LYMPHOPCT 20 08/08/2024 01:01 PM    MONOPCT 6 08/08/2024 01:01 PM    EOSPCT 1 08/08/2024 01:01 PM    BASOPCT 1 08/08/2024 01:01 PM    MONOSABS 0.86 08/08/2024 01:01 PM    LYMPHSABS 3.19 08/08/2024 01:01 PM    EOSABS 0.08 08/08/2024 01:01 PM    BASOSABS 0.10 08/08/2024 01:01 PM     BMP:    Lab Results   Component Value Date/Time     08/08/2024 01:01 PM    K 3.9 08/08/2024 01:01 PM    CL 98 08/08/2024 01:01 PM    CO2 24 08/08/2024 01:01 PM    BUN 9 08/08/2024 01:01 PM    CREATININE 0.8 08/08/2024 01:01 PM    CALCIUM 8.9 08/08/2024 01:01 PM    LABGLOM 77 08/08/2024 01:01 PM    LABGLOM >60 01/18/2024 09:47 AM    GLUCOSE 180 08/08/2024 01:01 PM     Hemoglobin A1C   Date Value Ref Range Status   10/12/2023 6.6 (H) 4.0 - 6.0 % Final     Lab Results   Component Value Date    LDL 49 10/12/2023       Radiology Review:    1.) CT brain without contrast: left occipital lobe hypodensity     2.) CTA Head/Neck: 60% stenosis of the proximal left internal carotid artery. Patent stent in the distal basilar artery extending into the right posterior cerebral artery with adjacent basilar tip aneurysm containing coils.    3.) Brain MRI W/O: Acute and subacute infarcts predominantly in the posterior circulation. 1 small subacute infarct in the right middle cerebral artery territory.     4.) MRA head: 1. Mildly ectatic basilar  per protocol  - We recommend -180  - Blood glucose goal less than 180  - Please avoid dextrose containing solutions     - Discussed with  Serafin Lundberg MD, PhD    Additional recommendations may follow    Please contact [facility] or telestroke with any changes in patients neurologic status.           Emmanuelle Villarreal MD   8/8/2024  3:45 PM      Plan Addendum by:  Berlin Khan MD  at 2:31 AM 8/9/2024

## 2024-08-08 NOTE — ED TRIAGE NOTES
Pt has been having headache for 2 weeks, intermittently and yesterday her vision was blurry.  Pt called her PCP today and she was sent here.  Pt has had a coil and a stent for previous aneurysms.  Pt is alert and oriented and complaining of pain behind her left eye.

## 2024-08-08 NOTE — CONSULTS
current alcohol use.   reports no history of drug use.  Family History  family history includes Heart Disease in her father; Hypertension in her father and mother; No Known Problems in her sister; Stroke in her father and mother.    Review of Systems:  CONSTITUTIONAL:  negative for fevers, chills, fatigue and malaise    EYES:  negative for double vision, blurred vision and photophobia     HEENT:  negative for tinnitus, epistaxis and sore throat    RESPIRATORY:  negative for cough, shortness of breath, wheezing    CARDIOVASCULAR:  negative for chest pain, palpitations, syncope, edema    GASTROINTESTINAL:  negative for nausea, vomiting    GENITOURINARY:  negative for incontinence    MUSCULOSKELETAL:  negative for neck or back pain    NEUROLOGICAL:  Negative for weakness and tingling  negative for headaches and dizziness    PSYCHIATRIC:  negative for anxiety      Review of systems otherwise negative.      OBJECTIVE:   Vitals: BP (!) 163/102   Pulse 64   Temp 97.8 °F (36.6 °C) (Oral)   Resp 17   Wt 84.1 kg (185 lb 6.5 oz)   SpO2 94%   BMI 28.19 kg/m²     Gen: No acute distress  MS: Awake, Oriented x3, No obvious aphasia  CN: Pupils reactive, +R visual field \"blurry vision\" binocular, no gaze deviation, no gross facial droop, tongue midline  Motor: Moves all extremities antigravity  Sens: Responds to LT in all extremities  Gait: Deferred      LABS:     Recent Labs     08/08/24  1301   WBC 15.6*   HGB 14.0   HCT 41.2        No results for input(s): \"ALKPHOS\", \"ALT\", \"AST\", \"BILITOT\", \"BILIDIR\", \"LABALBU\", \"AMYLASE\", \"LIPASE\" in the last 72 hours.    RADIOLOGY:   Images were personally reviewed including:      CTA 10/11/2023    Cerebral angiogram 10/11/2023:  There is a bilobed anteriorly facing ruptured basilar tip aneurysm with moderate stenosis at the tip of the basilar artery.  The basilar tip aneurysm measures neck 3.13mm, width 4.7mm, height 4.83mm and breath 4.71mm.  The above was treated with 7Fr short  team    Outpt plan:  - MRA in 2-3 months  - Rpt clinic visit in 3 months post-MRA  - Plan for DSA in 6 months post-stent (~12/2024), will schedule at next visit after MRA review      Discussed with Dr. Carl    Please arrange follow-up with Dr. Serafin Conte clinic in 3 months.    Serafin Conte MD, Pager [phone]  Electronically signed 08/08/24 at 1:25 PM  Stroke, Neurocritical Care & Neurointervention  [facility]  [facility]

## 2024-08-08 NOTE — ED NOTES
ED to inpatient nurses report      Chief Complaint:  No chief complaint on file.    Present to ED from: home    MOA:     LOC: alert and orientated to name, place, date  Mobility: Independent  Oxygen Baseline: room air    Current needs required: room air   Pending ED orders: none  Present condition: stable    Why did the patient come to the ED? Headache and vision changes  What is the plan? Admit for CVA   Any procedures or intervention occur? CT and MRI  Any safety concerns??    Mental Status:       Psych Assessment:   Psychosocial  Psychosocial (WDL): Within Defined Limits  Vital signs   Vitals:    08/08/24 1224 08/08/24 1252 08/08/24 1325 08/08/24 1525   BP: (!) 146/101 (!) 163/102 (!) 148/85 (!) 176/86   Pulse: 68 64 62 55   Resp: 12 17 15 15   Temp: 97.8 °F (36.6 °C)      TempSrc: Oral      SpO2: 97% 94% 93% 98%   Weight: 84.1 kg (185 lb 6.5 oz)           Vitals:  Patient Vitals for the past 24 hrs:   BP Temp Temp src Pulse Resp SpO2 Weight   08/08/24 1525 (!) 176/86 -- -- 55 15 98 % --   08/08/24 1325 (!) 148/85 -- -- 62 15 93 % --   08/08/24 1252 (!) 163/102 -- -- 64 17 94 % --   08/08/24 1224 (!) 146/101 97.8 °F (36.6 °C) Oral 68 12 97 % 84.1 kg (185 lb 6.5 oz)      Visit Vitals  BP (!) 176/86   Pulse 55   Temp 97.8 °F (36.6 °C) (Oral)   Resp 15   Wt 84.1 kg (185 lb 6.5 oz)   SpO2 98%   BMI 28.19 kg/m²        LDAs:   Peripheral IV 08/08/24 Right Forearm (Active)       Ambulatory Status:  Presents to emergency department  because of falls (Syncope, seizure, or loss of consciousness): No, Age > 70: No, Altered Mental Status, Intoxication with alcohol or substance confusion (Disorientation, impaired judgment, poor safety awaremess, or inability to follow instructions): No, Impaired Mobility: Ambulates or transfers with assistive devices or assistance; Unable to ambulate or transer.: No, Nursing Judgement: No    Diagnosis:  DISPOSITION Admitted 08/08/2024 05:01:35 PM   Final diagnoses:   None        Consults:  IP  10/2023    s/p WEB device embolization with obliteration of aneurysm, achieving Chris 1 on 10/11/2023.    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    Anticoagulated     ELIQUIS    Atrial fibrillation (HCC)     COPD (chronic obstructive pulmonary disease) (HCC)     COVID-19     3-4 times    COVID-19 vaccine administered     DM II (diabetes mellitus, type II), controlled (HCC)     HTN (hypertension)     Hyperlipidemia     On home O2     NIGHTLY 3 LITERS    Sleep apnea     in hosp/to have sleep study    Under care of team     PCP dr callahan    Under care of team     cardiology [facility] last appt 12/23    Under care of team     ENT/referred,epistaxis 6/10/24 ER visit       Labs:  Labs Reviewed   CBC WITH AUTO DIFFERENTIAL - Abnormal; Notable for the following components:       Result Value    WBC 15.6 (*)     Neutrophils % 71 (*)     Lymphocytes % 20 (*)     Immature Granulocytes % 1 (*)     Neutrophils Absolute 11.31 (*)     All other components within normal limits   COMPREHENSIVE METABOLIC PANEL - Abnormal; Notable for the following components:    Sodium 134 (*)     Glucose 180 (*)     All other components within normal limits   CBC   APTT   PROTIME-INR   ANTI-XA, UNFRACTIONATED HEPARIN   ANTI-XA, UNFRACTIONATED HEPARIN       Electronically signed by Sanjuana Pham RN on 8/8/2024 at 6:01 PM

## 2024-08-08 NOTE — H&P
[facility] Neurology   IN-PATIENT SERVICE   [facility]    Neurology Consult Note            Date:   8/8/2024  Patient name:  Dahlia Zhang  Date of admission:  8/8/2024 12:19 PM  MRN:   [id number]  Account:  [id number]  YOB: 1950  PCP:    Thania Rahman MD  Room:   [facility]  Code Status:    Full Code    Chief Complaint:     Visual disturbance  headache    History Obtained From:     patient, electronic medical record    History of Present Illness:     The patient is a 73 y.o. 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  Patient did also noticed having left lower extremity weakness which was transient while she was standing and needed support at that moment that has resolved spontaneously.  Her  noticed that she was having slurred speech intermittently throughout last week.  Patient states she has been compliant with her Eliquis, fluctuating blood pressure and states that she is on 3 blood pressure medication, maximum reading at home was about 170s.     Patient was evaluated by endovascular just before the event.  Patient was on aspirin and Plavix at some point where she received a stent on June 2024 that was transitioned back to Eliquis on 7/19, patient reported having epistaxis lasting for about an hour while on Eliquis before receiving the stent.  And was evaluated in the ED for that    MRI brain was obtained showing bilateral cerebellar DWI  needed for Pain    Glenis Lewis MD   aspirin 81 MG EC tablet Take 1 tablet by mouth at bedtime  Patient not taking: Reported on 7/19/2024    Glenis Lewis MD   losartan-hydroCHLOROthiazide (HYZAAR) 100-25 MG per tablet Take 1 tablet by mouth daily    Glenis Lewis MD   hydrALAZINE (APRESOLINE) 25 MG tablet Take 2 tablets by mouth in the morning and at bedtime 2 tablets in the morning and 1 tablet at night    Glenis Lewis MD   escitalopram (LEXAPRO) 10 MG tablet Take 1 tablet by mouth nightly 10/26/23   Lucy Simmons APRN - CNP   atorvastatin (LIPITOR) 10 MG tablet Take 1 tablet by mouth nightly 10/26/23   Lucy Simmons APRN - CNP   metoprolol tartrate (LOPRESSOR) 50 MG tablet Take 1 tablet by mouth 2 times daily 10/26/23   Lucy Simmons APRN - CNP   dilTIAZem (CARDIZEM CD) 120 MG extended release capsule Take 1 capsule by mouth daily 10/27/23   Lucy Simmons APRN - CNP   potassium chloride (KLOR-CON M) 20 MEQ extended release tablet Take 1 tablet by mouth daily 10/26/23   Lucy Simmons APRN - CNP   pantoprazole (PROTONIX) 40 MG tablet Take 1 tablet by mouth every morning (before breakfast) 10/27/23   uLcy Simmons APRN - CNP        Allergies:     Codeine, Norco [hydrocodone-acetaminophen], and Percocet [oxycodone-acetaminophen]    Social History:     Tobacco:    reports that she has been smoking cigarettes. She has never been exposed to tobacco smoke. She has never used smokeless tobacco.  Alcohol:      reports current alcohol use.  Drug Use:  reports no history of drug use.    Family History:     Family History   Problem Relation Age of Onset    Stroke Mother     Hypertension Mother     Heart Disease Father     Stroke Father     Hypertension Father     No Known Problems Sister        Review of Systems:     Review of Systems   Constitutional:  Negative for chills, fatigue and fever.   HENT:  Negative for rhinorrhea and sore throat.    Eyes:  Positive

## 2024-08-08 NOTE — ED PROVIDER NOTES
[facility] ED     Emergency Department     Faculty Attestation    I performed a history and physical examination of the patient and discussed management with the resident. I reviewed the resident’s note and agree with the documented findings and plan of care. Any areas of disagreement are noted on the chart. I was personally present for the key portions of any procedures. I have documented in the chart those procedures where I was not present during the key portions. I have reviewed the emergency nurses triage note. I agree with the chief complaint, past medical history, past surgical history, allergies, medications, social and family history as documented unless otherwise noted below. For Physician Assistant/ Nurse Practitioner cases/documentation I have personally evaluated this patient and have completed at least one if not all key elements of the E/M (history, physical exam, and MDM). Additional findings are as noted.    Note Started: 12:26 PM EDT    Patient with headache vision changes.  Extensive cerebrovascular history with coils aneurysms.  She is currently anticoagulated states has been compliant.  States she has had progressively worsening intermittent but now constant headache for the last 2 weeks.  However the last few days has developed vision changes including difficulty seeing the TV and difficulty reading her phone.  No vision loss with this no floaters.  No injury no trauma.  Pain is primarily left frontal but does radiate to the neck.  On exam nontoxic well-appearing chatting with her sister.  Normal pupils no periorbital edema.  Full extraocular movements no nystagmus.  Neck supple.  5 out of 5 strength all muscle groups upper and lower extremities cranial nerves intact throughout.  Strong pulses.  Will order CT CTA neuroendovascular consult possible admit    EKG interpretation: Sinus rhythm 66 normal intervals normal axis no acute ST changes.  There is T wave inversion

## 2024-08-08 NOTE — ED PROVIDER NOTES
[facility] ONC/MED SURG  Emergency Department Encounter  Emergency Medicine Resident     Pt Name:Dahlia Zhang  MRN: [id number]  Birthdate 1950  Date of evaluation: 8/8/24  PCP:  Thania Rahman MD  Note Started: 1:13 PM EDT      CHIEF COMPLAINT       No chief complaint on file.      HISTORY OF PRESENT ILLNESS  (Location/Symptom, Timing/Onset, Context/Setting, Quality, Duration, Modifying Factors, Severity.)      Dahlia Zhang is a 73 y.o. female with Afib RVR, history of subarachnoid hemorrhage from ruptured basilar tip aneurysm on 10/11/2023 with embolization performed, WEB device placed.  Patient also had a residual neck aneurysm on repeat angiogram and recently underwent DSA with stent placement on 6/19/24.  Currently on Eliquis.    Patient presents today with 2 weeks of headache, patient describes the headache as frontal and throbbing, she states she also feels pain and pressure behind her eyes.  Patient also reports approximately 3 days of vision changes.  States that reading and watching TV has become difficult for her because things are blurry.  Denies monocular vision loss, visual field loss.  Denies one-sided weakness, numbness, tingling.  Patient does report an episode of left lower extremity \"convulsion\" this morning.  She states she has never had anything like this.  She was still able to ambulate after the episode.    PAST MEDICAL / SURGICAL / SOCIAL / FAMILY HISTORY      has a past medical history of Aneurysm (HCC), Anticoagulated, Atrial fibrillation (HCC), COPD (chronic obstructive pulmonary disease) ([facility]), COVID-19, COVID-19 vaccine administered, DM II (diabetes mellitus, type II), controlled ([facility]), HTN (hypertension), Hyperlipidemia, On home O2, Sleep apnea, Under care of team, Under care of team, and Under care of team.       has a past surgical history that includes Thyroid surgery; Breast reduction surgery (Bilateral); Hysterectomy; hernia repair; Bladder surgery; Cerebral  occasionally words are mis-transcribed.)

## 2024-08-09 ENCOUNTER — APPOINTMENT (OUTPATIENT)
Age: 74
End: 2024-08-09
Payer: MEDICARE

## 2024-08-09 VITALS
DIASTOLIC BLOOD PRESSURE: 62 MMHG | BODY MASS INDEX: 28.19 KG/M2 | RESPIRATION RATE: 20 BRPM | OXYGEN SATURATION: 99 % | HEART RATE: 78 BPM | WEIGHT: 185.41 LBS | TEMPERATURE: 98.2 F | SYSTOLIC BLOOD PRESSURE: 122 MMHG

## 2024-08-09 PROBLEM — F17.200 CURRENT EVERY DAY SMOKER: Status: ACTIVE | Noted: 2024-08-09

## 2024-08-09 PROBLEM — E78.2 MIXED HYPERLIPIDEMIA: Status: ACTIVE | Noted: 2024-08-09

## 2024-08-09 PROBLEM — I10 HYPERTENSION: Status: ACTIVE | Noted: 2024-08-09

## 2024-08-09 LAB
ANION GAP SERPL CALCULATED.3IONS-SCNC: 10 MMOL/L (ref 9–16)
BUN SERPL-MCNC: 8 MG/DL (ref 8–23)
CALCIUM SERPL-MCNC: 8.7 MG/DL (ref 8.6–10.4)
CHLORIDE SERPL-SCNC: 96 MMOL/L (ref 98–107)
CO2 SERPL-SCNC: 28 MMOL/L (ref 20–31)
CREAT SERPL-MCNC: 0.8 MG/DL (ref 0.5–0.9)
ECHO AO ASC DIAM: 3.1 CM
ECHO AO ROOT DIAM: 3.1 CM
ECHO AV AREA PEAK VELOCITY: 2.6 CM2
ECHO AV AREA VTI: 2.7 CM2
ECHO AV MEAN GRADIENT: 4 MMHG
ECHO AV MEAN VELOCITY: 0.9 M/S
ECHO AV PEAK GRADIENT: 8 MMHG
ECHO AV PEAK VELOCITY: 1.4 M/S
ECHO AV VELOCITY RATIO: 0.93
ECHO AV VTI: 24.3 CM
ECHO IVC PROX: 1.6 CM
ECHO LA AREA 4C: 14.5 CM2
ECHO LA DIAMETER: 3.4 CM
ECHO LA MAJOR AXIS: 5.2 CM
ECHO LA TO AORTIC ROOT RATIO: 1.1
ECHO LA VOL MOD A4C: 32 ML (ref 22–52)
ECHO LV E' LATERAL VELOCITY: 5 CM/S
ECHO LV E' SEPTAL VELOCITY: 6 CM/S
ECHO LV EDV A4C: 52 ML
ECHO LV EJECTION FRACTION A4C: 56 %
ECHO LV ESV A4C: 23 ML
ECHO LV FRACTIONAL SHORTENING: 5 % (ref 28–44)
ECHO LV INTERNAL DIMENSION DIASTOLIC: 3.8 CM (ref 3.9–5.3)
ECHO LV INTERNAL DIMENSION SYSTOLIC: 3.6 CM
ECHO LV IVSD: 1.4 CM (ref 0.6–0.9)
ECHO LV MASS 2D: 163 G (ref 67–162)
ECHO LV POSTERIOR WALL DIASTOLIC: 1.1 CM (ref 0.6–0.9)
ECHO LV RELATIVE WALL THICKNESS RATIO: 0.58
ECHO LVOT AREA: 2.8 CM2
ECHO LVOT AV VTI INDEX: 0.95
ECHO LVOT DIAM: 1.9 CM
ECHO LVOT MEAN GRADIENT: 3 MMHG
ECHO LVOT PEAK GRADIENT: 7 MMHG
ECHO LVOT PEAK VELOCITY: 1.3 M/S
ECHO LVOT SV: 65.7 ML
ECHO LVOT VTI: 23.2 CM
ECHO MV A VELOCITY: 0.93 M/S
ECHO MV AREA VTI: 2.4 CM2
ECHO MV E DECELERATION TIME (DT): 204 MS
ECHO MV E VELOCITY: 0.62 M/S
ECHO MV E/A RATIO: 0.67
ECHO MV E/E' LATERAL: 12.4
ECHO MV E/E' RATIO (AVERAGED): 11.37
ECHO MV E/E' SEPTAL: 10.33
ECHO MV LVOT VTI INDEX: 1.18
ECHO MV MAX VELOCITY: 0.9 M/S
ECHO MV MEAN GRADIENT: 1 MMHG
ECHO MV MEAN VELOCITY: 0.6 M/S
ECHO MV PEAK GRADIENT: 3 MMHG
ECHO MV VTI: 27.3 CM
ECHO RV FREE WALL PEAK S': 9 CM/S
ECHO RV TAPSE: 2.2 CM (ref 1.7–?)
ECHO TV REGURGITANT MAX VELOCITY: 1 M/S
EKG ATRIAL RATE: 66 BPM
EKG P-R INTERVAL: 158 MS
EKG Q-T INTERVAL: 484 MS
EKG QRS DURATION: 84 MS
EKG QTC CALCULATION (BAZETT): 507 MS
EKG R AXIS: -7 DEGREES
EKG T AXIS: 28 DEGREES
EKG VENTRICULAR RATE: 66 BPM
GFR, ESTIMATED: 76 ML/MIN/1.73M2
GLUCOSE SERPL-MCNC: 171 MG/DL (ref 74–99)
POTASSIUM SERPL-SCNC: 3.8 MMOL/L (ref 3.7–5.3)
SODIUM SERPL-SCNC: 134 MMOL/L (ref 136–145)

## 2024-08-09 PROCEDURE — 93306 TTE W/DOPPLER COMPLETE: CPT | Performed by: INTERNAL MEDICINE

## 2024-08-09 PROCEDURE — 99232 SBSQ HOSP IP/OBS MODERATE 35: CPT | Performed by: PSYCHIATRY & NEUROLOGY

## 2024-08-09 PROCEDURE — 6370000000 HC RX 637 (ALT 250 FOR IP)

## 2024-08-09 PROCEDURE — 2580000003 HC RX 258

## 2024-08-09 PROCEDURE — 97530 THERAPEUTIC ACTIVITIES: CPT

## 2024-08-09 PROCEDURE — 97162 PT EVAL MOD COMPLEX 30 MIN: CPT

## 2024-08-09 PROCEDURE — 36415 COLL VENOUS BLD VENIPUNCTURE: CPT

## 2024-08-09 PROCEDURE — 80048 BASIC METABOLIC PNL TOTAL CA: CPT

## 2024-08-09 PROCEDURE — 93306 TTE W/DOPPLER COMPLETE: CPT

## 2024-08-09 PROCEDURE — 99222 1ST HOSP IP/OBS MODERATE 55: CPT

## 2024-08-09 PROCEDURE — 97166 OT EVAL MOD COMPLEX 45 MIN: CPT

## 2024-08-09 PROCEDURE — 97535 SELF CARE MNGMENT TRAINING: CPT

## 2024-08-09 RX ORDER — ACETAMINOPHEN 325 MG/1
650 TABLET ORAL EVERY 6 HOURS PRN
Status: DISCONTINUED | OUTPATIENT
Start: 2024-08-09 | End: 2024-08-09 | Stop reason: HOSPADM

## 2024-08-09 RX ORDER — ACETAMINOPHEN 325 MG/1
650 TABLET ORAL EVERY 4 HOURS PRN
Status: CANCELLED | OUTPATIENT
Start: 2024-08-09

## 2024-08-09 RX ORDER — ATORVASTATIN CALCIUM 10 MG/1
10 TABLET, FILM COATED ORAL NIGHTLY
Qty: 30 TABLET | Refills: 2 | Status: SHIPPED | OUTPATIENT
Start: 2024-08-09

## 2024-08-09 RX ORDER — ATORVASTATIN CALCIUM 40 MG/1
40 TABLET, FILM COATED ORAL NIGHTLY
Qty: 30 TABLET | Refills: 2 | Status: SHIPPED | OUTPATIENT
Start: 2024-08-09 | End: 2024-08-09

## 2024-08-09 RX ADMIN — DILTIAZEM HYDROCHLORIDE 120 MG: 120 CAPSULE, COATED, EXTENDED RELEASE ORAL at 08:14

## 2024-08-09 RX ADMIN — ASPIRIN 81 MG: 81 TABLET, CHEWABLE ORAL at 08:14

## 2024-08-09 RX ADMIN — ATORVASTATIN CALCIUM 10 MG: 10 TABLET, FILM COATED ORAL at 20:42

## 2024-08-09 RX ADMIN — APIXABAN 5 MG: 5 TABLET, FILM COATED ORAL at 08:14

## 2024-08-09 RX ADMIN — METOPROLOL TARTRATE 50 MG: 50 TABLET, FILM COATED ORAL at 08:14

## 2024-08-09 RX ADMIN — ONDANSETRON 4 MG: 4 TABLET, ORALLY DISINTEGRATING ORAL at 17:48

## 2024-08-09 RX ADMIN — HYDRALAZINE HYDROCHLORIDE 50 MG: 50 TABLET ORAL at 08:14

## 2024-08-09 RX ADMIN — METOPROLOL TARTRATE 50 MG: 50 TABLET, FILM COATED ORAL at 20:42

## 2024-08-09 RX ADMIN — SODIUM CHLORIDE, PRESERVATIVE FREE 10 ML: 5 INJECTION INTRAVENOUS at 08:16

## 2024-08-09 RX ADMIN — PANTOPRAZOLE SODIUM 40 MG: 40 TABLET, DELAYED RELEASE ORAL at 05:02

## 2024-08-09 RX ADMIN — ESCITALOPRAM OXALATE 10 MG: 10 TABLET ORAL at 20:42

## 2024-08-09 RX ADMIN — ACETAMINOPHEN 650 MG: 325 TABLET ORAL at 13:24

## 2024-08-09 RX ADMIN — APIXABAN 5 MG: 5 TABLET, FILM COATED ORAL at 20:41

## 2024-08-09 ASSESSMENT — PAIN SCALES - GENERAL
PAINLEVEL_OUTOF10: 5
PAINLEVEL_OUTOF10: 8

## 2024-08-09 ASSESSMENT — PAIN DESCRIPTION - ORIENTATION: ORIENTATION: LEFT;RIGHT

## 2024-08-09 ASSESSMENT — ENCOUNTER SYMPTOMS
SINUS PAIN: 0
RHINORRHEA: 0
ABDOMINAL PAIN: 0
NAUSEA: 0
SHORTNESS OF BREATH: 0
SINUS PRESSURE: 0
VOMITING: 0
COUGH: 0
DIARRHEA: 0

## 2024-08-09 ASSESSMENT — PAIN DESCRIPTION - DESCRIPTORS: DESCRIPTORS: SHARP

## 2024-08-09 ASSESSMENT — PAIN DESCRIPTION - LOCATION: LOCATION: HEAD

## 2024-08-09 ASSESSMENT — PAIN - FUNCTIONAL ASSESSMENT: PAIN_FUNCTIONAL_ASSESSMENT: PREVENTS OR INTERFERES SOME ACTIVE ACTIVITIES AND ADLS

## 2024-08-09 NOTE — PLAN OF CARE
Problem: Pain  Goal: Verbalizes/displays adequate comfort level or baseline comfort level  8/9/2024 0527 by Emilio Rush, RN  Outcome: Progressing  8/8/2024 1915 by Florecita Wadsworth, RN  Outcome: Progressing     Problem: Safety - Adult  Goal: Free from fall injury  Outcome: Progressing     Problem: ABCDS Injury Assessment  Goal: Absence of physical injury  Outcome: Progressing

## 2024-08-09 NOTE — PROGRESS NOTES
Occupational Therapy  Facility/Department: [address] ONC/MED SURG  Occupational Therapy Initial Assessment    Name: Dahlia Zhang  : 1950  MRN: [id number]  Date of Service: 2024    Discharge Recommendations:  Patient would benefit from continued therapy after discharge  OT Equipment Recommendations  Equipment Needed: No       Patient Diagnosis(es): The encounter diagnosis was Ischemic stroke (HCC).  Past Medical History:  has a past medical history of Aneurysm (HCC), Anticoagulated, Atrial fibrillation (HCC), COPD (chronic obstructive pulmonary disease) (HCC), COVID-19, COVID-19 vaccine administered, DM II (diabetes mellitus, type II), controlled (HCC), HTN (hypertension), Hyperlipidemia, On home O2, Sleep apnea, Under care of team, Under care of team, and Under care of team.  Past Surgical History:  has a past surgical history that includes Thyroid surgery; Breast reduction surgery (Bilateral); Hysterectomy; hernia repair; Bladder surgery; Cerebral angiogram (2024); Cerebral angiogram (10/2023); and Cerebral angiogram (Bilateral, 2024).           Assessment   Performance deficits / Impairments: Decreased functional mobility ;Decreased ADL status;Decreased strength;Decreased safe awareness;Decreased endurance;Decreased vision/visual deficit;Decreased high-level IADLs  Assessment: Pt completes bed mobility with Supervision with HOB flat this date. Pt sits EOB x22 minutes with Ind and no LOB while engaged in therapeutic interviewing, UE assessment and LB dressing. Pt completes transfer from EOB with VC for hand sequencing and completes functional mobility with CGA to/from bathroom with no true LOB. Pt completes toileting with CGA for transfer and clothing management; perihygiene completed seated with no difficulty. Pt stands sinkside and completes hand hygiene with no difficulty. See ADL comments for additional ADL details. Pt fatigues quickly with standing tasks demonstrating decreased

## 2024-08-09 NOTE — PROGRESS NOTES
aneurysm with moderate stenosis at the tip of the basilar artery.  The basilar tip aneurysm measures neck 3.13mm, width 4.7mm, height 4.83mm and breath 4.71mm.  The above was treated with 7Fr short radial sheath, 7Fr RIST, 5Fr Berenstein, VIA 17, Synchro Support, WEB 5x3mm, post deployment, the aneurysm is obliterated, achieving Chris 1, WED Occlusion Score A.       Post treatment: Obliterated basilar tip aneurysm, Chris 1            April 4, 2024 follow up angiogram         6/2024:   Previously treated basilar artery tip aneurysm with WEB intrasaccular device is obliterated with a small remnant neck, achieving Chris score II and WEB OS C. Bulbous basilar artery trunk.   The above was treated with 6Fr 55cm, Neuron 070, Headway 21, Synchro support, LVIS 4.5x23mm, the stent was deployed from the right P1 to the mid basilar artery crossing the residual aneurysmal neck, post treatment, there is still some residual filling into the neck, achieving Chris 2. The stent is well apposed and widely patent.      8/8 CTA: Patent stent  8/8 MRI: L occipital infarct and punctate posterior circulation infarcts      IMPRESSIONS:   Dahlia Zhang is a 73 y.o. 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     s/p WEB device embolization with obliteration of aneurysm, achieving Chris 1 on 10/11/2023.     Original H&H 5 and modified mccracken 4.    Found to have afib RVR on the 10/15/2023.    Doing well mRS 1  Memory issues, typical in SAH     FU angiogram on April 4, 2024 showed residual neck   Underwent LVIS stent placement on 6/2024 with Dr. Carl  Was started on Aspirin and Plavix post-stent, with plans to transition back to Eliquis in 30 days.      7/19 Clinic visit - Stop Aspirin and Plavix, restart Eliquis    8/8 ED c/s for 2wk of headache and blurry vision, continues on Eliquis, CTA showing patent stent, MRI showing L occipital

## 2024-08-09 NOTE — CARE COORDINATION
Met with pt to assess for support and complete PHQ-9 screen.  Pt's husb and sister present with pt permission.  Pt reports she lives with her husb in [place]. Stated she has one son and one dtr. Stated her son lives here but her dtr is in [place]. Stated she has one grchild. Pt stated her sister lives here as well. Pt reports family is supportive and help out as needed. Pt reports hx of depression. She denies any SI. Pt stated she is on meds, prescribed by her PCP, and feels they are helpful. Pt stated no prior counseling and she declined any counseling resources. Pt encouraged to keep her PCP updated on how she is feeling.  Patient Health Questionnaire-9 (PHQ-9)    Over the last 2 weeks, how often have you been bothered by any of the following problems?    1. Little Interest or pleasure in doing things?   [x] Not at all  [] Several Days  [] More than half the day  []  Nearly every day    2. Feeling down, depressed or hopeless?    [] Not at all  [x] Several Days  [] More than half the day  []  Nearly every day    3. Trouble falling or staying asleep, or sleeping too much?   [x] Not at all  [] Several Days  [] More than half the day  []  Nearly every day    4. Feeling tired or having little energy?   [x] Not at all  [] Several Days  [] More than half the day  []  Nearly every day    5. Poor apettite or overeating?   [x] Not at all  [] Several Days  [] More than half the day  []  Nearly every day    6. Feeling bad about yourself-or that you are a failure or have let yourself or your family down?   [x] Not at all  [] Several Days  [] More than half the day  []  Nearly every day    7. Trouble concentrating on things, such as reading the newspaper or watching television?   [x] Not at all  [] Several Days  [] More than half the day  []  Nearly every day    8. Moving or speaking so slowly that other people could have noticed? Or the opposite-being so fidgety or restless that you have been moving around a lot more than 
Yes  Other Identified Issues/Barriers to RETURNING to current housing: none  Potential Assistance needed at discharge: (P) N/A            Potential DME:    Patient expects to discharge to: (P) House  Plan for transportation at discharge: (P) Family    Financial    Payor: MEDICARE / Plan: MEDICARE PART A AND B / Product Type: *No Product type* /     Does insurance require precert for SNF: No    Potential assistance Purchasing Medications: (P) No  Meds-to-Beds request: Yes      [id number] - [place], [place] - [address] - P [phone] - F [phone]  [address] [address]  Phone: [phone] Fax: [phone]      Notes:    Factors facilitating achievement of predicted outcomes: Family support, Motivated, Cooperative, Pleasant, and Has needed Durable Medical Equipment at home    Barriers to discharge: Decreased motivation, Limited participation, Limited insight into deficits, Decreased endurance, Long standing deficits, and strong willed and non compliant w/ O2    Additional Case Management Notes: plan is to return home indepent w/  and he will transport; has home O2 w/ [facility] 2LPM, but needs to requalify so will daquan a overnight O2 eval prior to discharge     The Plan for Transition of Care is related to the following treatment goals of Ischemic stroke (HCC) [I63.9]    IF APPLICABLE: The Patient and/or patient representative Dahlia and her family were provided with a choice of provider and agrees with the discharge plan. Freedom of choice list with basic dialogue that supports the patient's individualized plan of care/goals and shares the quality data associated with the providers was provided to: (P) Patient   Patient Representative Name:       The Patient and/or Patient Representative Agree with the Discharge Plan? (P) Yes    Michelle Brooks RN/CM  Case Management Department  Ph: [phone]

## 2024-08-09 NOTE — PROGRESS NOTES
Pharmacy Medication Counseling Note    Apixaban counseling provided to Dahlia Pritinicole   Handouts provided to patient include: apixaban patient information    Counseling points included:  1. Indication for apixaban: Stroke prevention  2. Explanation of apixaban (blood thinner)  3. Dose and directions, including missed doses and timing of medication  4. Side effects: bleeding/bruising  5. Potential drug interactions   A. Cytochrome P450 3A4 inhibitors or inducers, [place]'s Wort, grapefuit juice  6. Signs and symptoms of VTE and stroke reviewed  7. Contact prescriber when:   A. Changes made to other medications   B. Signs or symptoms of VTE or stroke   C. Uncontrolled bleeding or unusual bruising    Answered all medication-related questions and patient verbalized understanding.     Patient reported previously taking apixaban, so indication, explanation, and side effects were the primary focus of this encounter.

## 2024-08-09 NOTE — CONSULTS
[facility]    Consult               Today's Date: 8/9/2024  Patient Name: Dahlia Zhang  Date of admission: 8/8/2024 12:19 PM  Patient's age: 73 y.o., 1950  Admission Dx: Ischemic stroke (HCC) [I63.9]    Requesting Physician: Gypsy Raya MD    Cardiac Evaluation Reason:  Concern for cardio embolic source. Possible DOAC failure.    History Obtained From: patient and chart review     History of Present Illness:    This patient 73 y.o. years old with past medical history given below.   A Fib  HTN  Subarachnoid hemorrhage due to ruptured basilar tip aneurysm status post embolization 10/23, status post embolization of residual aneurysm of the neck with LVIDS stent.  COPD on 3L O2 at home  DM 2  Hyperlipidimiea  On  Smoking history 50 years smoking over a pack a day. Still smoking    Presented with visual disturbance of 3 days duration.  2 weeks ago she started having moderate headache mostly in the retro-orbital, more pronounced on the left side nonradiating followed by 2 days of blurred vision.  Patient also noticed having transient left lower extremity weakness.   had noticed some s patient had a history of lurring of speech intermittently throughout the week.  Patient states she is compliant on Eliquis.    MRI brain showed acute and subacute infarcts predominantly in the posterior circulation, small subacute infarct in the right middle cerebral artery.    Vascular doppler done in 2023 shows. Normal complete TCD with no evidence of arterial vasospasm, occlusion or stenosis   2) Clinical correlation is recommended. Follow up studies as per the primary team     Patient this morning was undergoing echo. Family states that her symptoms are still persistant.Echo this morning shows hyperdynamic left ventricle with EF 60-65 %. On room air. On 2L of oxygen. Vision still blurry.  Le   Past Medical History:   has a past medical history of Aneurysm (HCC), Anticoagulated, Atrial fibrillation  ([facility]), COPD (chronic obstructive pulmonary disease) (HCC), COVID-19, COVID-19 vaccine administered, DM II (diabetes mellitus, type II), controlled ([facility]), HTN (hypertension), Hyperlipidemia, On home O2, Sleep apnea, Under care of team, Under care of team, and Under care of team.    Past Surgical History:   has a past surgical history that includes Thyroid surgery; Breast reduction surgery (Bilateral); Hysterectomy; hernia repair; Bladder surgery; Cerebral angiogram (04/04/2024); Cerebral angiogram (10/2023); and Cerebral angiogram (Bilateral, 06/19/2024).     Home Medications:    Prior to Admission medications    Medication Sig Start Date End Date Taking? Authorizing Provider   calcium carbonate (TUMS) 500 MG chewable tablet Take 1 tablet by mouth daily  Patient not taking: Reported on 7/19/2024    Glenis Lewis MD   clopidogrel (PLAVIX) 75 MG tablet TAKE ONE TABLET BY MOUTH ONE TIME DAILY  Patient not taking: Reported on 7/19/2024 6/24/24   Kristopher Ruano MD   acetaminophen (TYLENOL) 500 MG tablet Take 1 tablet by mouth every 6 hours as needed for Pain    Glenis Lewis MD   aspirin 81 MG EC tablet Take 1 tablet by mouth at bedtime  Patient not taking: Reported on 7/19/2024    Glenis Lewis MD   losartan-hydroCHLOROthiazide (HYZAAR) 100-25 MG per tablet Take 1 tablet by mouth daily    Glenis Lewis MD   hydrALAZINE (APRESOLINE) 25 MG tablet Take 2 tablets by mouth in the morning and at bedtime 2 tablets in the morning and 1 tablet at night    Glenis Lewis MD   escitalopram (LEXAPRO) 10 MG tablet Take 1 tablet by mouth nightly 10/26/23   Lucy Simmons APRN - CNP   atorvastatin (LIPITOR) 10 MG tablet Take 1 tablet by mouth nightly 10/26/23   Lucy Simmons APRN - CNP   metoprolol tartrate (LOPRESSOR) 50 MG tablet Take 1 tablet by mouth 2 times daily 10/26/23   Lucy Simmons APRN - CNP   dilTIAZem (CARDIZEM CD) 120 MG extended release capsule Take 1 capsule by

## 2024-08-09 NOTE — PROGRESS NOTES
Pt and  have questions regarding plan of care and imaging before going home this evening. RN PS Dr. Khan who is going to attempt to have someone from team see pt to address at some poin this evening. Pt aware of plan and agreeable.

## 2024-08-09 NOTE — PLAN OF CARE
Problem: Pain  Goal: Verbalizes/displays adequate comfort level or baseline comfort level  8/9/2024 1800 by Trina Hernandes RN  Outcome: Progressing  8/9/2024 0527 by Emilio Rush RN  Outcome: Progressing     Problem: Safety - Adult  Goal: Free from fall injury  8/9/2024 1800 by Trina Hernandes RN  Outcome: Progressing     Problem: ABCDS Injury Assessment  Goal: Absence of physical injury  8/9/2024 1800 by Trina Hernandes RN  Outcome: Progressing     Problem: Chronic Conditions and Co-morbidities  Goal: Patient's chronic conditions and co-morbidity symptoms are monitored and maintained or improved  Outcome: Progressing

## 2024-08-09 NOTE — PROGRESS NOTES
[facility]   IN-PATIENT SERVICE   [facility]    Progress Note             Date:   8/9/2024  Patient name:  Dahlia Zhang  Date of admission:  8/8/2024 12:19 PM  MRN:   [id number]  Account:  [id number]  YOB: 1950  PCP:    Thania Rahman MD  Room:   [address] [place] [address]  Code Status:    Full Code    Chief Complaint:     No chief complaint on file.      Interval hx:     -Patient seen and examined at the bedside.  -She remained afebrile and hemodynamically stable.  -Patient states that she is feeling much better than yesterday. She worked with PT today.  -Plan to add ASA due to bare metal stent in the proximal PCA in addition to continuation of Eliquis.   -TTE was unremarkable for any concerns for clot.  -Discussed the patient with the RN, no other acute issues noted otherwise.      Brief History of Present Illness:     The patient is a 73 y.o. 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  Patient did also noticed having left lower extremity weakness which was transient while she was standing and needed support at that moment that has resolved spontaneously.  Her  noticed that she was having slurred speech intermittently throughout last week.  Patient states she has been compliant with her Eliquis, fluctuating blood pressure and states that she is on 3 blood pressure medication, maximum reading at home was about 170s.     Patient was evaluated  use.    Family History:     Family History   Problem Relation Age of Onset    Stroke Mother     Hypertension Mother     Heart Disease Father     Stroke Father     Hypertension Father     No Known Problems Sister        Review of Systems:     Review of Systems   Constitutional:  Negative for fatigue and fever.   HENT:  Negative for rhinorrhea, sinus pressure and sinus pain.    Respiratory:  Negative for cough and shortness of breath.    Cardiovascular:  Negative for chest pain.   Gastrointestinal:  Negative for abdominal pain, diarrhea, nausea and vomiting.   Genitourinary:  Negative for dysuria, frequency and urgency.   Neurological:  Negative for syncope and headaches.   Psychiatric/Behavioral:  Negative for agitation and behavioral problems.        CONSTITUTIONAL: negative for fatigue and malaise   EYES: negative for double vision and photophobia    HEENT: negative for tinnitus and sore throat   RESPIRATORY: negative for cough, shortness of breath   CARDIOVASCULAR: negative for chest pain, palpitations   GASTROINTESTINAL: negative for nausea, vomiting   GENITOURINARY: negative for incontinence   MUSCULOSKELETAL: negative for neck or back pain   NEUROLOGICAL: negative for seizures   PSYCHIATRIC: negative for fatigue       Physical Exam:   BP (!) 130/102   Pulse 72   Temp 98.1 °F (36.7 °C) (Oral)   Resp 20   Wt 84.1 kg (185 lb 6.5 oz)   SpO2 92%   BMI 28.19 kg/m²   Temp (24hrs), Av °F (36.7 °C), Min:97.8 °F (36.6 °C), Max:98.1 °F (36.7 °C)    No results for input(s): \"POCGLU\" in the last 72 hours.  No intake or output data in the 24 hours ending 24 0816      Neurologic Exam     GENERAL  Appears comfortable and in no distress   HEENT  NC/ AT   HEART  S1 and S2 heard; palpation of pulses: radial pulse    NECK  Supple and no bruits heard   MENTAL STATUS:  Alert, oriented, intact memory, no confusion, normal speech, normal language, no hallucination or delusion   CRANIAL NERVES: II     -      Visual

## 2024-08-09 NOTE — PROGRESS NOTES
Physical Therapy  Facility/Department: [address] ONC/MED SURG  Physical Therapy Initial Assessment    Name: Dahlia Zhang  : 1950  MRN: [id number]  Date of Service: 2024    Chief Complaint: \"Patient presents today with 2 weeks of headache, patient describes the headache as frontal and throbbing, she states she also feels pain and pressure behind her eyes.  Patient also reports approximately 3 days of vision changes.  States that reading and watching TV has become difficult for her because things are blurry.  Denies monocular vision loss, visual field loss.  Denies one-sided weakness, numbness, tingling.  Patient does report an episode of left lower extremity \"convulsion\" this morning.  She states she has never had anything like this.  She was still able to ambulate after the episode.\" Taken from chart.        Discharge Recommendations: Further therapy recommended at discharge.The patient should be able to tolerate at least 3 hours of therapy per day over 5 days or 15 hours over 7 days.   This patient may benefit from a Physical Medicine and Rehab consult.        PT Equipment Recommendations  Equipment Needed: No  Other: Pt reports owning RW, writer recommending use to decrease fall risk upon D/C.      Patient Diagnosis(es): The encounter diagnosis was Ischemic stroke (HCC).  Past Medical History:  has a past medical history of Aneurysm (HCC), Anticoagulated, Atrial fibrillation (HCC), COPD (chronic obstructive pulmonary disease) (HCC), COVID-19, COVID-19 vaccine administered, DM II (diabetes mellitus, type II), controlled (HCC), HTN (hypertension), Hyperlipidemia, On home O2, Sleep apnea, Under care of team, Under care of team, and Under care of team.  Past Surgical History:  has a past surgical history that includes Thyroid surgery; Breast reduction surgery (Bilateral); Hysterectomy; hernia repair; Bladder surgery; Cerebral angiogram (2024); Cerebral angiogram (10/2023); and Cerebral angiogram

## 2024-08-09 NOTE — RT PROTOCOL NOTE
RT Nebulizer Bronchodilator Protocol Note    There is a bronchodilator order in the chart from a provider indicating to follow the RT Bronchodilator Protocol and there is an “Initiate RT Bronchodilator Protocol” order as well (see protocol at bottom of note).    CXR Findings:  No results found.    The findings from the last RT Protocol Assessment were as follows:  Smoking: Chronic pulmonary disease  Respiratory Pattern: Regular pattern and RR 12-20 bpm  Breath Sounds: Slightly diminished and/or crackles  Cough: Strong, spontaneous, non-productive  Indication for Bronchodilator Therapy:    Bronchodilator Assessment Score: 4    Aerosolized bronchodilator medication orders have been revised according to the RT Nebulizer Bronchodilator Protocol below.    Respiratory Therapist to perform RT Therapy Protocol Assessment initially then follow the protocol.  Repeat RT Therapy Protocol Assessment PRN for score 0-3 or on second treatment, BID, and PRN for scores above 3.    No Indications - adjust the frequency to every 6 hours PRN wheezing or bronchospasm, if no treatments needed after 48 hours then discontinue using Per Protocol order mode.     If indication present, adjust the RT bronchodilator orders based on the Bronchodilator Assessment Score as indicated below.  If a patient is on this medication at home then do not decrease Frequency below that used at home.    0-3 - enter or revise RT bronchodilator order(s) to equivalent RT Bronchodilator order with Frequency of every 4 hours PRN for wheezing or increased work of breathing using Per Protocol order mode.       4-6 - enter or revise RT Bronchodilator order(s) to two equivalent RT bronchodilator orders with one order with BID Frequency and one order with Frequency of every 4 hours PRN wheezing or increased work of breathing using Per Protocol order mode.         7-10 - enter or revise RT Bronchodilator order(s) to two equivalent RT bronchodilator orders with one order  with TID Frequency and one order with Frequency of every 4 hours PRN wheezing or increased work of breathing using Per Protocol order mode.       11-13 - enter or revise RT Bronchodilator order(s) to one equivalent RT bronchodilator order with QID Frequency and an Albuterol order with Frequency of every 4 hours PRN wheezing or increased work of breathing using Per Protocol order mode.      Greater than 13 - enter or revise RT Bronchodilator order(s) to one equivalent RT bronchodilator order with every 4 hours Frequency and an Albuterol order with Frequency of every 2 hours PRN wheezing or increased work of breathing using Per Protocol order mode.     RT to enter RT Home Evaluation for COPD & MDI Assessment order using Per Protocol order mode.    Electronically signed by Paloma Carson RCP on 8/8/2024 at 8:17 PM

## 2024-08-10 NOTE — PLAN OF CARE
Problem: Pain  Goal: Verbalizes/displays adequate comfort level or baseline comfort level  8/9/2024 2024 by Victor Manuel Holly RN  Outcome: Completed  8/9/2024 1800 by Trina Hernandes RN  Outcome: Progressing     Problem: Safety - Adult  Goal: Free from fall injury  8/9/2024 2024 by Victor Manuel Holly RN  Outcome: Completed  8/9/2024 1800 by Trina Hernandes RN  Outcome: Progressing     Problem: ABCDS Injury Assessment  Goal: Absence of physical injury  8/9/2024 2024 by Victor Manuel Holly RN  Outcome: Completed  8/9/2024 1800 by Trina Hernandes RN  Outcome: Progressing

## 2024-08-11 NOTE — FLOWSHEET NOTE
up apt. With the neurologist?   [x]   Yes  []   No  Provided number to [phone]    Do you now your risk factors for stroke?  [x]   Yes  []   No    Can you tell me the signs and symptoms of stroke?  [x]   Yes  []   No  BEFAST instructions provided    Do if you know what to do if you were having signs/symptoms of stroke?  [x]   Yes  []   No  Instructions to call 911 provided    Our goal is to provide the very best stroke care, on a scale of 1 to 10 with 10 as the very best who would you rank the care you received?        What can we do better?

## 2024-08-14 NOTE — PROGRESS NOTES
Physician Progress Note      PATIENT:               SANTO DAWSON  [facility] #:                  [id number]  :                       1950  ADMIT DATE:       2024 12:19 PM  DISCH DATE:        2024 9:19 PM  RESPONDING  PROVIDER #:        CHERIE PARIS          QUERY TEXT:    Patient admitted with ischemic CVA, noted to have  atrial fibrillation and is   maintained on Eliquis. If possible, please document if you are evaluating   and/or treating any of the following:?  ?  The medical record reflects the following:  Risk Factors: A fib    Clinical Indicators: Home medication Eliquis. H&P: Multiple embolic strokes   are likely secondary underlying atrial fibrillation.    Treatment: Continue home Eliquis. Add ASA 81 mg. Outpt Cardiology follow-up.    Thank-you,  Jannie Dominguez RN, CDS  [email or web address]  Options provided:  -- Secondary hypercoagulable state in a patient with atrial fibrillation  -- Other - I will add my own diagnosis  -- Disagree - Not applicable / Not valid  -- Disagree - Clinically unable to determine / Unknown  -- Refer to Clinical Documentation Reviewer    PROVIDER RESPONSE TEXT:    This patient has secondary hypercoagulable state in a patient with atrial   fibrillation.    Query created by: Jannie Dominguez on 2024 6:03 AM      Electronically signed by:  CHERIE PARIS 2024 7:19 AM

## 2024-08-20 PROBLEM — H53.9 VISUAL DISTURBANCE: Status: ACTIVE | Noted: 2024-08-20

## 2024-08-30 DIAGNOSIS — I72.5 BASILAR ARTERY ANEURYSM (HCC): Primary | ICD-10-CM

## 2024-10-18 ENCOUNTER — HOSPITAL ENCOUNTER (OUTPATIENT)
Dept: MRI IMAGING | Age: 74
Discharge: HOME | End: 2024-10-20
Payer: MEDICARE

## 2024-10-18 DIAGNOSIS — I72.5 BASILAR ARTERY ANEURYSM (HCC): ICD-10-CM

## 2024-10-18 LAB
CREAT SERPL-MCNC: 0.7 MG/DL (ref 0.5–0.9)
GFR, ESTIMATED: >90 ML/MIN/1.73M2

## 2024-10-18 PROCEDURE — 6360000004 HC RX CONTRAST MEDICATION: Performed by: STUDENT IN AN ORGANIZED HEALTH CARE EDUCATION/TRAINING PROGRAM

## 2024-10-18 PROCEDURE — 82565 ASSAY OF CREATININE: CPT

## 2024-10-18 PROCEDURE — 36415 COLL VENOUS BLD VENIPUNCTURE: CPT

## 2024-10-18 PROCEDURE — 70546 MR ANGIOGRAPH HEAD W/O&W/DYE: CPT

## 2024-10-18 PROCEDURE — A9579 GAD-BASE MR CONTRAST NOS,1ML: HCPCS | Performed by: STUDENT IN AN ORGANIZED HEALTH CARE EDUCATION/TRAINING PROGRAM

## 2024-10-18 RX ADMIN — GADOTERIDOL 17 ML: 279.3 INJECTION, SOLUTION INTRAVENOUS at 11:32

## 2024-10-25 ENCOUNTER — OFFICE VISIT (OUTPATIENT)
Dept: NEUROLOGY | Age: 74
End: 2024-10-25
Payer: MEDICARE

## 2024-10-25 VITALS
HEIGHT: 69 IN | SYSTOLIC BLOOD PRESSURE: 144 MMHG | HEART RATE: 66 BPM | BODY MASS INDEX: 27.4 KG/M2 | WEIGHT: 185 LBS | DIASTOLIC BLOOD PRESSURE: 82 MMHG

## 2024-10-25 DIAGNOSIS — I72.5 BASILAR ARTERY ANEURYSM (HCC): Primary | ICD-10-CM

## 2024-10-25 DIAGNOSIS — Z95.828 MRI-SAFE ENDOVASCULAR ANEURYSM COIL PRESENT: ICD-10-CM

## 2024-10-25 DIAGNOSIS — I48.0 PAROXYSMAL ATRIAL FIBRILLATION (HCC): ICD-10-CM

## 2024-10-25 PROCEDURE — G8400 PT W/DXA NO RESULTS DOC: HCPCS | Performed by: STUDENT IN AN ORGANIZED HEALTH CARE EDUCATION/TRAINING PROGRAM

## 2024-10-25 PROCEDURE — 1123F ACP DISCUSS/DSCN MKR DOCD: CPT | Performed by: STUDENT IN AN ORGANIZED HEALTH CARE EDUCATION/TRAINING PROGRAM

## 2024-10-25 PROCEDURE — G8419 CALC BMI OUT NRM PARAM NOF/U: HCPCS | Performed by: STUDENT IN AN ORGANIZED HEALTH CARE EDUCATION/TRAINING PROGRAM

## 2024-10-25 PROCEDURE — G8427 DOCREV CUR MEDS BY ELIG CLIN: HCPCS | Performed by: STUDENT IN AN ORGANIZED HEALTH CARE EDUCATION/TRAINING PROGRAM

## 2024-10-25 PROCEDURE — 1090F PRES/ABSN URINE INCON ASSESS: CPT | Performed by: STUDENT IN AN ORGANIZED HEALTH CARE EDUCATION/TRAINING PROGRAM

## 2024-10-25 PROCEDURE — 99215 OFFICE O/P EST HI 40 MIN: CPT | Performed by: STUDENT IN AN ORGANIZED HEALTH CARE EDUCATION/TRAINING PROGRAM

## 2024-10-25 PROCEDURE — 1159F MED LIST DOCD IN RCRD: CPT | Performed by: STUDENT IN AN ORGANIZED HEALTH CARE EDUCATION/TRAINING PROGRAM

## 2024-10-25 PROCEDURE — G8484 FLU IMMUNIZE NO ADMIN: HCPCS | Performed by: STUDENT IN AN ORGANIZED HEALTH CARE EDUCATION/TRAINING PROGRAM

## 2024-10-25 PROCEDURE — 3077F SYST BP >= 140 MM HG: CPT | Performed by: STUDENT IN AN ORGANIZED HEALTH CARE EDUCATION/TRAINING PROGRAM

## 2024-10-25 PROCEDURE — 3079F DIAST BP 80-89 MM HG: CPT | Performed by: STUDENT IN AN ORGANIZED HEALTH CARE EDUCATION/TRAINING PROGRAM

## 2024-10-25 PROCEDURE — 3017F COLORECTAL CA SCREEN DOC REV: CPT | Performed by: STUDENT IN AN ORGANIZED HEALTH CARE EDUCATION/TRAINING PROGRAM

## 2024-10-25 PROCEDURE — 4004F PT TOBACCO SCREEN RCVD TLK: CPT | Performed by: STUDENT IN AN ORGANIZED HEALTH CARE EDUCATION/TRAINING PROGRAM

## 2024-10-25 NOTE — PROGRESS NOTES
[facility]   Stroke & Neurointerventional Clinic Note    [facility]   [address]   [place] [address]   P: [phone]   F: [phone]    Endovascular Neurosurgery Clinic Note    Pt Name: Dahlia Zhang  MRN: [id number]  YOB: 1950  Date of evaluation: 10/25/2024  Primary Care Physician: Thanai Rahman MD    Reason for evaluation: SAH and Basilar tip rupture more so on the right P1    SUBJECTIVE:       Presents to clinic today. Recently seen in the clinic on 9/8/2024 for intermittent headache and blurry vision. Has no new symptoms or complaints. States she takes the Aspirin and Eliquis.        History of Chief Complaint:    Dahlia Zhang is a 74 y.o. 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     s/p WEB device embolization with obliteration of aneurysm, achieving Chris 1 on 10/11/2023.     Original H&H 5 and modified mccracken 4.    Found to have afib RVR on the 10/15/2023.    Doing well mRS 1  Memory issues, typical in SAH     FU angiogram on April 4, 2024 showed residual neck     Allergies  is allergic to codeine, norco [hydrocodone-acetaminophen], and percocet [oxycodone-acetaminophen].  Medications  Prior to Admission medications    Medication Sig Start Date End Date Taking? Authorizing Provider   apixaban (ELIQUIS) 5 MG TABS tablet Take 1 tablet by mouth 2 times daily 8/9/24   Brayan Blackwell MD   atorvastatin (LIPITOR) 10 MG tablet Take 1 tablet by mouth nightly 8/9/24   Brayan Blackwell MD   calcium carbonate (TUMS) 500 MG chewable tablet Take 1 tablet by mouth daily  Patient not taking: Reported on 7/19/2024    ProviderGlenis MD   acetaminophen (TYLENOL) 500 MG tablet Take 1 tablet by mouth every 6 hours as needed for Pain    Glenis Lewis MD   aspirin 81 MG EC tablet Take 1 tablet by mouth at bedtime

## 2024-12-11 ENCOUNTER — TELEPHONE (OUTPATIENT)
Dept: NEUROLOGY | Age: 74
End: 2024-12-11

## 2024-12-11 NOTE — TELEPHONE ENCOUNTER
Procedure was rescheduled for 10:00 am on 12/12/2024. I called patient and notified her of the time change. She will be NPO after midnight and arrive at 8:00 am. Patient vocalized understanding.

## 2024-12-12 ENCOUNTER — HOSPITAL ENCOUNTER (OUTPATIENT)
Dept: INTERVENTIONAL RADIOLOGY/VASCULAR | Age: 74
Discharge: HOME | End: 2024-12-14
Attending: PSYCHIATRY & NEUROLOGY
Payer: MEDICARE

## 2024-12-12 VITALS
TEMPERATURE: 97.9 F | BODY MASS INDEX: 29.82 KG/M2 | SYSTOLIC BLOOD PRESSURE: 139 MMHG | HEIGHT: 67 IN | WEIGHT: 190 LBS | DIASTOLIC BLOOD PRESSURE: 66 MMHG | HEART RATE: 71 BPM | OXYGEN SATURATION: 97 % | RESPIRATION RATE: 17 BRPM

## 2024-12-12 DIAGNOSIS — I72.5 BASILAR ARTERY ANEURYSM (HCC): ICD-10-CM

## 2024-12-12 LAB
BUN BLD-MCNC: 12 MG/DL (ref 8–26)
CHLORIDE BLD-SCNC: 101 MMOL/L (ref 98–107)
EGFR, POC: >90 ML/MIN/1.73M2
GLUCOSE BLD-MCNC: 148 MG/DL (ref 74–100)
HCT VFR BLD AUTO: 44 % (ref 36–46)
POC CREATININE: 0.7 MG/DL (ref 0.51–1.19)
POC HEMOGLOBIN (CALC): 15 G/DL (ref 12–16)
POTASSIUM BLD-SCNC: 4 MMOL/L (ref 3.5–4.5)
SODIUM BLD-SCNC: 141 MMOL/L (ref 138–146)

## 2024-12-12 PROCEDURE — 6360000004 HC RX CONTRAST MEDICATION: Performed by: STUDENT IN AN ORGANIZED HEALTH CARE EDUCATION/TRAINING PROGRAM

## 2024-12-12 PROCEDURE — 84132 ASSAY OF SERUM POTASSIUM: CPT

## 2024-12-12 PROCEDURE — 84520 ASSAY OF UREA NITROGEN: CPT

## 2024-12-12 PROCEDURE — C1769 GUIDE WIRE: HCPCS

## 2024-12-12 PROCEDURE — 85014 HEMATOCRIT: CPT

## 2024-12-12 PROCEDURE — 2580000003 HC RX 258: Performed by: PSYCHIATRY & NEUROLOGY

## 2024-12-12 PROCEDURE — 82565 ASSAY OF CREATININE: CPT

## 2024-12-12 PROCEDURE — 99152 MOD SED SAME PHYS/QHP 5/>YRS: CPT

## 2024-12-12 PROCEDURE — 36226 PLACE CATH VERTEBRAL ART: CPT

## 2024-12-12 PROCEDURE — 82947 ASSAY GLUCOSE BLOOD QUANT: CPT

## 2024-12-12 PROCEDURE — 6360000002 HC RX W HCPCS: Performed by: STUDENT IN AN ORGANIZED HEALTH CARE EDUCATION/TRAINING PROGRAM

## 2024-12-12 PROCEDURE — 84295 ASSAY OF SERUM SODIUM: CPT

## 2024-12-12 PROCEDURE — 99153 MOD SED SAME PHYS/QHP EA: CPT

## 2024-12-12 PROCEDURE — 82435 ASSAY OF BLOOD CHLORIDE: CPT

## 2024-12-12 RX ORDER — SODIUM CHLORIDE 0.9 % (FLUSH) 0.9 %
5-40 SYRINGE (ML) INJECTION EVERY 12 HOURS SCHEDULED
Status: DISCONTINUED | OUTPATIENT
Start: 2024-12-12 | End: 2024-12-15 | Stop reason: HOSPADM

## 2024-12-12 RX ORDER — SODIUM CHLORIDE 9 MG/ML
INJECTION, SOLUTION INTRAVENOUS CONTINUOUS
Status: DISCONTINUED | OUTPATIENT
Start: 2024-12-12 | End: 2024-12-15 | Stop reason: HOSPADM

## 2024-12-12 RX ORDER — ONDANSETRON 2 MG/ML
4 INJECTION INTRAMUSCULAR; INTRAVENOUS EVERY 6 HOURS PRN
Status: DISCONTINUED | OUTPATIENT
Start: 2024-12-12 | End: 2024-12-15 | Stop reason: HOSPADM

## 2024-12-12 RX ORDER — SODIUM CHLORIDE 0.9 % (FLUSH) 0.9 %
5-40 SYRINGE (ML) INJECTION PRN
Status: DISCONTINUED | OUTPATIENT
Start: 2024-12-12 | End: 2024-12-15 | Stop reason: HOSPADM

## 2024-12-12 RX ORDER — HEPARIN SODIUM 5000 [USP'U]/ML
INJECTION, SOLUTION INTRAVENOUS; SUBCUTANEOUS PRN
Status: COMPLETED | OUTPATIENT
Start: 2024-12-12 | End: 2024-12-12

## 2024-12-12 RX ORDER — IODIXANOL 270 MG/ML
100 INJECTION, SOLUTION INTRAVASCULAR
Status: COMPLETED | OUTPATIENT
Start: 2024-12-12 | End: 2024-12-12

## 2024-12-12 RX ORDER — FENTANYL CITRATE 50 UG/ML
INJECTION, SOLUTION INTRAMUSCULAR; INTRAVENOUS PRN
Status: COMPLETED | OUTPATIENT
Start: 2024-12-12 | End: 2024-12-12

## 2024-12-12 RX ORDER — MIDAZOLAM HYDROCHLORIDE 2 MG/2ML
INJECTION, SOLUTION INTRAMUSCULAR; INTRAVENOUS PRN
Status: COMPLETED | OUTPATIENT
Start: 2024-12-12 | End: 2024-12-12

## 2024-12-12 RX ORDER — ONDANSETRON 4 MG/1
4 TABLET, ORALLY DISINTEGRATING ORAL EVERY 8 HOURS PRN
Status: DISCONTINUED | OUTPATIENT
Start: 2024-12-12 | End: 2024-12-15 | Stop reason: HOSPADM

## 2024-12-12 RX ORDER — SODIUM CHLORIDE 9 MG/ML
INJECTION, SOLUTION INTRAVENOUS PRN
Status: DISCONTINUED | OUTPATIENT
Start: 2024-12-12 | End: 2024-12-15 | Stop reason: HOSPADM

## 2024-12-12 RX ADMIN — HEPARIN SODIUM 2 ML: 5000 INJECTION INTRAVENOUS; SUBCUTANEOUS at 10:52

## 2024-12-12 RX ADMIN — SODIUM CHLORIDE: 9 INJECTION, SOLUTION INTRAVENOUS at 08:38

## 2024-12-12 RX ADMIN — FENTANYL CITRATE 100 MCG: 50 INJECTION, SOLUTION INTRAMUSCULAR; INTRAVENOUS at 10:50

## 2024-12-12 RX ADMIN — IODIXANOL 65 ML: 270 INJECTION, SOLUTION INTRAVASCULAR at 12:02

## 2024-12-12 RX ADMIN — MIDAZOLAM HYDROCHLORIDE 1 MG: 1 INJECTION, SOLUTION INTRAMUSCULAR; INTRAVENOUS at 10:22

## 2024-12-12 ASSESSMENT — PAIN SCALES - GENERAL: PAINLEVEL_OUTOF10: 6

## 2024-12-12 ASSESSMENT — PAIN DESCRIPTION - LOCATION: LOCATION: GENERALIZED

## 2024-12-12 NOTE — H&P
tablet by mouth at bedtime   Yes ProviderGlenis MD   losartan-hydroCHLOROthiazide (HYZAAR) 100-25 MG per tablet Take 1 tablet by mouth daily   Yes Glenis Lewis MD   hydrALAZINE (APRESOLINE) 25 MG tablet Take 2 tablets by mouth 2 tablets in the morning and 1 tablet at night   Yes Glenis Lewis MD   escitalopram (LEXAPRO) 10 MG tablet Take 1 tablet by mouth nightly 10/26/23  Yes Lucy Simmons APRN - CNP   metoprolol tartrate (LOPRESSOR) 50 MG tablet Take 1 tablet by mouth 2 times daily 10/26/23  Yes Lucy Simmons APRN - CNP   dilTIAZem (CARDIZEM CD) 120 MG extended release capsule Take 1 capsule by mouth daily 10/27/23  Yes Lucy Simmons APRN - CNP   potassium chloride (KLOR-CON M) 20 MEQ extended release tablet Take 1 tablet by mouth daily 10/26/23  Yes Lucy Simmons APRN - CNP   pantoprazole (PROTONIX) 40 MG tablet Take 1 tablet by mouth every morning (before breakfast) 10/27/23  Yes Lucy Simmons APRN - CNP    Scheduled Meds:  Continuous Infusions:  PRN Meds:.  Past Medical History   has a past medical history of Aneurysm (HCC), Anticoagulated, Atrial fibrillation (HCC), COPD (chronic obstructive pulmonary disease) (HCC), COVID-19, COVID-19 vaccine administered, DM II (diabetes mellitus, type II), controlled (HCC), HTN (hypertension), Hyperlipidemia, On home O2, Sleep apnea, Under care of team, Under care of team, and Under care of team.  Past Surgical History   has a past surgical history that includes Thyroid surgery; Breast reduction surgery (Bilateral); Hysterectomy; hernia repair; Bladder surgery; Cerebral angiogram (04/04/2024); Cerebral angiogram (10/2023); Cerebral angiogram (Bilateral, 06/19/2024); and other surgical history (12/12/2024).  Social History   reports that she has been smoking cigarettes. She has a 12.5 pack-year smoking history. She has never been exposed to tobacco smoke. She has never used smokeless tobacco.   reports current alcohol use of

## 2024-12-12 NOTE — PROGRESS NOTES
Date of Service: 12/12/24    Procedure: Diagnostic cerebral angiogram     Patient arrived and wheeled in to the angio suite at: 1004  Monitoring and administration of sedation started at: 1004  Puncture obtained at: 1051  Vascular access was removed at: 1154  Manual pressure for minutes: 10 mins  Sedation ended at: 1210  Patient wheeled out of the angio suite at: 1210    Diagnosis: Ruptured basilar tip aneurysm s/p WEB 10/11/23 c/b residual neck s/p LVIS placement 6/2024    Procedures:  --Right ultrasound guided femoral artery access  --Intravenous moderate sedation  --Selective left vertebral artery (VA) cerebral angiogram   --Right common femoral artery (CFA) angiogram     Neurointerventionalist: Dr. Carlos Carl    [place]:  Serafin Conte MD, Da Stoll MD    Contrast: 37.7 cc of Visipaque-270.    Fluoroscopy time: 65 minutes    Access: Right common femoral artery.    Comparison: 6/19/24 cerebral angiogram    Consent:   After explaining the risks and benefits to the patient and the patient's family, including but not limited to stroke, coma, death, vessel injury, dissection, tear, occlusion, and X-ray dye allergic type reaction, a signed consent form was obtained.     Indication and Clinical History:   Dahlia Zhang is a 74 y.o. female with medical history of Afib RVR on Eliquis and Aspirin, Ruptured basilar tip aneurysm s/p WEB 10/11/23 c/b residual neck s/p LVIS placement 6/2024. Pt was referred for a follow-up diagnostic angiogram.      Anesthesia:   Local anesthesia with lidocaine. IV moderate sedation with Versed and Fentanyl. IV moderate sedation was supervised by the attending physician. The patient was independently monitored by a registered nurse assigned to the Department of Radiology using automated blood pressure, EKG and pulse oximetry. The detailed Conscious Record is permanently stored in the Hospital Information System.    Description and findings:   The patient's right groin was prepped

## 2024-12-12 NOTE — BRIEF OP NOTE
[facility]    NEUROENDOVASCULAR SERVICE: POST-OP NOTE: 12/12/2024    Pt Name: Dahlia Zhang  MRN: [id number]  YOB: 1950  Date of Procedure: 12/12/2024  Primary Care Physician: Thania Rahman MD        Pre-Procedural Diagnosis:Ruptured basilar tip aneurysm s/p WEB 10/11/23 c/b residual neck s/p LVIS placement 6/2024   Post-Procedural Diagnosis:same       Procedure Performed:Diagnostic Cerebral Angiogram    Surgeon:   Sujey Carl MD    Fellow:  Da Maurice MD and Serafin Conte MD PhD     Assisting Tech:  Shiela Suero    PRE-PROCEDURAL EXAM:  Neurological exam performed and unchanged from initial H&P or consult    Anesthesia: IV Moderate Sedation  An Immediate re-assessment was completed prior to sedation, and it is determined to be safe to proceed.  Complications: none    Intra-Operative EXAM:  Patient sedated with unchanged limited neurological exam    EBL: < Minimal      Cc            Specimens: Were not Obtained  Contrast:     Visipaque 270 low osmolar 37.7 Cc             Fluoro: 65 min    Findings:  Please see dictated Radiology note for further details  There is visualization of the previously treated ruptured basilar tip aneurysm s/p WEB 10/11/23 c/b residual neck s/p LVIS placement 6/2024. There is residual neck filling measuring 2.74mm height x 1.61mm width/neck. The stent is patent and well-apposed with no endoleak or stenosis.  There is a 360 vascular loop at the origin of the left vertebral artery requiring the catheter to be in the arch while navigating the wire to access and straighten the loop up to the V3 segment before tracking the catheter          Chris score: class II                         POST-PROCEDURAL EXAM :   Stable neurological Exam  Neurological exam performed and unchanged from initial H&P or consult    Closure:  right Vascade 5   F        POST-PROCEDURAL MONITORING : see orders  Disposition: Recovery

## 2024-12-12 NOTE — PROGRESS NOTES
Received post cerebral angiogram procedure to [facility] room 2. Assessment obtained. Restrictions reviewed with patient. Post procedure pathway initiated.  Right groin site soft , safeguard dry and intact.  No hematoma noted.  Family at side.  Patient without complaints. Head of bed flat with right leg straight.

## 2024-12-12 NOTE — PROGRESS NOTES
Ambulated to bathroom and in halls.  Gait steady.. Right groin puncture site and right pedal pulse assessment unchanged.    All discharge instructions reviewed, questions answered, paper signed and given copy. Patient discharged per wheelchair with  and belongings.

## 2024-12-12 NOTE — PROGRESS NOTES
Air removed from safeguard in  10 mL increments until all air removed. No bleeding or hematoma noted.  Pedal pulses palpable.  Bandaid applied

## 2024-12-12 NOTE — OR NURSING
Hemostasis noted. Safe guard used. Back on to stretcher. Groin soft. Pulses palpable. Neuro assessment unchanged. Report called and patient to [facility]

## 2024-12-12 NOTE — PROGRESS NOTES
Patient admitted, consent signed and questions answered. Patient ready for procedure. Call light to reach with side rails up 2 of 2. IR to clip groin hair   Terry at bedside with patient.  History and physical complete.

## 2024-12-18 ENCOUNTER — TELEPHONE (OUTPATIENT)
Dept: NEUROSURGERY | Age: 74
End: 2024-12-18

## 2024-12-18 NOTE — TELEPHONE ENCOUNTER
Relayed the information below. Patient is wondering if she should take 81 mg aspirin ad 75 mg Plavix or up he aspirin to 325 mg. Please advise at next visit.          Da Maurice MD Blasius, Shelbi  I will defer to her appointment with Dr Carl to answer all these questions. Thanks          Previous Messages       ----- Message -----  From: Ashleigh Yanez  Sent: 12/18/2024  10:02 AM EST  To: Da Maurice MD  Subject: clarification-patient/Ashleigh questions          [phone]  Patient would like to know the findings of her last procedure. She has questions as to what exactly you are doing next and would appreciate a caall. She has an appointment already scheduled with Dr Carl on 12/26/24 and wonders if she should keep this or wait until after the next procedure?  I am going to schedule embolization on 01-15-25.    I am wondering if you can clarify the blood thinner instructions. She is taking 81 mg aspirin and Eliquis.  Do you want her to discontinue the Eliquis 7 days prior or 2 days prior?  Are we prescribing Plavix as well? Please advise.    Thank you.  ----- Message -----  From: Da Maurice MD  Sent: 12/12/2024   5:00 PM EST  To: Ashleigh Celestin  Subject: Inpatient Notes                                  Could you schedule this patient for endovascular embolization under general anesthesia please.  Under the care of Dr. Carl.  We can skip the clinic.  In about a month.  She need to stop her Eliquis 7 days prior to the procedure and start Plavix 75 mg for 7 days prior to the procedure.  Continue with baby aspirin.

## 2024-12-26 ENCOUNTER — OFFICE VISIT (OUTPATIENT)
Dept: NEUROLOGY | Age: 74
End: 2024-12-26
Payer: MEDICARE

## 2024-12-26 ENCOUNTER — TELEPHONE (OUTPATIENT)
Dept: NEUROLOGY | Age: 74
End: 2024-12-26

## 2024-12-26 VITALS
WEIGHT: 190 LBS | HEIGHT: 67 IN | SYSTOLIC BLOOD PRESSURE: 123 MMHG | DIASTOLIC BLOOD PRESSURE: 76 MMHG | BODY MASS INDEX: 29.82 KG/M2 | HEART RATE: 65 BPM

## 2024-12-26 DIAGNOSIS — I72.5 BASILAR ARTERY ANEURYSM (HCC): Primary | ICD-10-CM

## 2024-12-26 DIAGNOSIS — I60.4 SUBARACHNOID HEMORRHAGE FROM BASILAR ARTERY ANEURYSM (HCC): ICD-10-CM

## 2024-12-26 DIAGNOSIS — Z95.828 MRI-SAFE ENDOVASCULAR ANEURYSM COIL PRESENT: ICD-10-CM

## 2024-12-26 DIAGNOSIS — I48.0 PAROXYSMAL ATRIAL FIBRILLATION (HCC): ICD-10-CM

## 2024-12-26 PROCEDURE — 3017F COLORECTAL CA SCREEN DOC REV: CPT | Performed by: PSYCHIATRY & NEUROLOGY

## 2024-12-26 PROCEDURE — 1123F ACP DISCUSS/DSCN MKR DOCD: CPT | Performed by: PSYCHIATRY & NEUROLOGY

## 2024-12-26 PROCEDURE — 3078F DIAST BP <80 MM HG: CPT | Performed by: PSYCHIATRY & NEUROLOGY

## 2024-12-26 PROCEDURE — 4004F PT TOBACCO SCREEN RCVD TLK: CPT | Performed by: PSYCHIATRY & NEUROLOGY

## 2024-12-26 PROCEDURE — 1090F PRES/ABSN URINE INCON ASSESS: CPT | Performed by: PSYCHIATRY & NEUROLOGY

## 2024-12-26 PROCEDURE — 99215 OFFICE O/P EST HI 40 MIN: CPT | Performed by: PSYCHIATRY & NEUROLOGY

## 2024-12-26 PROCEDURE — 3074F SYST BP LT 130 MM HG: CPT | Performed by: PSYCHIATRY & NEUROLOGY

## 2024-12-26 PROCEDURE — G8427 DOCREV CUR MEDS BY ELIG CLIN: HCPCS | Performed by: PSYCHIATRY & NEUROLOGY

## 2024-12-26 PROCEDURE — 1159F MED LIST DOCD IN RCRD: CPT | Performed by: PSYCHIATRY & NEUROLOGY

## 2024-12-26 PROCEDURE — G8484 FLU IMMUNIZE NO ADMIN: HCPCS | Performed by: PSYCHIATRY & NEUROLOGY

## 2024-12-26 PROCEDURE — G8400 PT W/DXA NO RESULTS DOC: HCPCS | Performed by: PSYCHIATRY & NEUROLOGY

## 2024-12-26 PROCEDURE — G8419 CALC BMI OUT NRM PARAM NOF/U: HCPCS | Performed by: PSYCHIATRY & NEUROLOGY

## 2024-12-26 RX ORDER — ALBUTEROL SULFATE 90 UG/1
1 INHALANT RESPIRATORY (INHALATION) EVERY 6 HOURS PRN
COMMUNITY
Start: 2024-11-15

## 2024-12-26 RX ORDER — CLOPIDOGREL BISULFATE 75 MG/1
75 TABLET ORAL DAILY
Qty: 30 TABLET | Refills: 3 | Status: SHIPPED | OUTPATIENT
Start: 2024-12-26

## 2024-12-26 NOTE — PROGRESS NOTES
[facility]   Stroke & Neurointerventional Clinic Note    [facility]   [address]   [place] [address]   P: [phone]   F: [phone]  Endovascular Neurosurgery Consult    Pt Name: Dahlia Zhang  MRN: [id number]  YOB: 1950  Date of evaluation: 12/26/2024  Primary Care Physician: Thania Rahman MD    Reason for evaluation: SAH and Basilar tip rupture more so on the right P1    SUBJECTIVE:   History of Chief Complaint:    Dahlia Zhang is a 74 y.o. 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     s/p WEB device embolization with obliteration of aneurysm, achieving Chris 1 on 10/11/2023.       Original H&H 5 and modified mccracken 4.    Found to have afib RVR on the 10/15/2023.    Doing well mRS 1  Memory issues, typical in SAH     FU angiogram on April 4, 2024 showed residual neck     Allergies  is allergic to codeine, norco [hydrocodone-acetaminophen], and percocet [oxycodone-acetaminophen].  Medications  Prior to Admission medications    Medication Sig Start Date End Date Taking? Authorizing Provider   albuterol sulfate HFA (PROVENTIL;VENTOLIN;PROAIR) 108 (90 Base) MCG/ACT inhaler Inhale 1 puff into the lungs every 6 hours as needed 11/15/24  Yes Glenis Lewis MD   metFORMIN (GLUCOPHAGE) 500 MG tablet Take 1 tablet by mouth 2 times daily (with meals)   Yes Glenis Lewis MD   apixaban (ELIQUIS) 5 MG TABS tablet Take 1 tablet by mouth 2 times daily 8/9/24  Yes Brayan Blackwell MD   atorvastatin (LIPITOR) 10 MG tablet Take 1 tablet by mouth nightly 8/9/24  Yes Brayan Blackwell MD   calcium carbonate (TUMS) 500 MG chewable tablet Take 1 tablet by mouth daily   Yes Glenis Lewis MD   acetaminophen (TYLENOL) 500 MG tablet Take 1 tablet by mouth every 6 hours as needed for Pain   Yes Glenis Lewis MD

## 2024-12-26 NOTE — TELEPHONE ENCOUNTER
Patient was inquiring about Metformin instructions. I asked Dr Carl if she should stop Metformin. He says two days after procedure. I called patient back and informed her of this. She vocalized understanding.

## 2025-01-13 ENCOUNTER — TELEPHONE (OUTPATIENT)
Dept: NEUROLOGY | Age: 75
End: 2025-01-13

## 2025-01-13 RX ORDER — HYDRALAZINE HYDROCHLORIDE 50 MG/1
50 TABLET, FILM COATED ORAL NIGHTLY
Status: ON HOLD | COMMUNITY

## 2025-01-13 RX ORDER — CALCIUM CARBONATE 500 MG/1
1 TABLET, CHEWABLE ORAL DAILY PRN
Status: ON HOLD | COMMUNITY

## 2025-01-13 RX ORDER — OMEPRAZOLE 40 MG/1
40 CAPSULE, DELAYED RELEASE ORAL DAILY
Status: ON HOLD | COMMUNITY

## 2025-01-13 RX ORDER — UMECLIDINIUM 62.5 UG/1
1 AEROSOL, POWDER ORAL DAILY
Status: ON HOLD | COMMUNITY

## 2025-01-13 RX ORDER — HYDRALAZINE HYDROCHLORIDE 50 MG/1
100 TABLET, FILM COATED ORAL DAILY
Status: ON HOLD | COMMUNITY

## 2025-01-13 NOTE — TELEPHONE ENCOUNTER
Pt can stop Metformin 2 days prior to procedure, with frequent glucose checks herself. And stop Eliquis 2 days prior to procedure. She should  already be on Aspirin and Plavix and continue those through the procedure.

## 2025-01-13 NOTE — TELEPHONE ENCOUNTER
Received a call from Carol/Pre-Op processing office.   #1 She is reviewing patient's chart, patient advised to stop Metformin 2 days after procedure, per Carol this is usually stopped 2 days prior-Please advise.    #2 Per order/instruction, patient is to stop Eliquis 2 days prior to procedure. Is she to start Plavix 7 days prior to procedure? Please advise.    Please call Carol in Pre-Op processing dept to clarify. [phone].

## 2025-01-14 ENCOUNTER — ANESTHESIA EVENT (OUTPATIENT)
Dept: INTERVENTIONAL RADIOLOGY/VASCULAR | Age: 75
End: 2025-01-14
Payer: MEDICARE

## 2025-01-15 ENCOUNTER — HOSPITAL ENCOUNTER (OUTPATIENT)
Dept: INTERVENTIONAL RADIOLOGY/VASCULAR | Age: 75
Setting detail: OBSERVATION
Discharge: HOME | End: 2025-01-16
Attending: STUDENT IN AN ORGANIZED HEALTH CARE EDUCATION/TRAINING PROGRAM | Admitting: PSYCHIATRY & NEUROLOGY
Payer: MEDICARE

## 2025-01-15 ENCOUNTER — ANESTHESIA (OUTPATIENT)
Dept: INTERVENTIONAL RADIOLOGY/VASCULAR | Age: 75
End: 2025-01-15
Payer: MEDICARE

## 2025-01-15 DIAGNOSIS — I72.5 BASILAR ARTERY ANEURYSM (HCC): ICD-10-CM

## 2025-01-15 PROBLEM — I65.1 BASILAR ARTERY EMBOLISM: Status: ACTIVE | Noted: 2025-01-15

## 2025-01-15 LAB
ABO + RH BLD: NORMAL
ACT BLD: 235 SEC (ref 79–149)
ACT BLD: 255 SEC (ref 79–149)
ACT BLD: 259 SEC (ref 79–149)
ARM BAND NUMBER: NORMAL
BLOOD BANK SAMPLE EXPIRATION: NORMAL
BLOOD GROUP ANTIBODIES SERPL: NEGATIVE
BUN BLD-MCNC: 10 MG/DL (ref 8–26)
CA-I BLD-SCNC: 1.25 MMOL/L (ref 1.15–1.33)
CHLORIDE BLD-SCNC: 99 MMOL/L (ref 98–107)
CLOSURE TME COLL+ADP BLD: 213 SEC (ref 67–112)
CO2 BLD CALC-SCNC: 31 MMOL/L (ref 22–30)
COLLAGEN EPINEPHRINE TIME: >300 SEC (ref 85–172)
EGFR, POC: >90 ML/MIN/1.73M2
GLUCOSE BLD-MCNC: 141 MG/DL (ref 74–100)
HCO3 VENOUS: 31.7 MMOL/L (ref 22–29)
HCT VFR BLD AUTO: 38 % (ref 36–46)
O2 SAT, VEN: 45.2 % (ref 60–85)
PCO2 VENOUS: 52.4 MM HG (ref 41–51)
PH VENOUS: 7.39 (ref 7.32–7.43)
PLATELET FUNCTION INTERP: ABNORMAL
PO2 VENOUS: 25.8 MM HG (ref 30–50)
POC ANION GAP: 12 MMOL/L (ref 7–16)
POC CREATININE: 0.7 MG/DL (ref 0.51–1.19)
POC HEMOGLOBIN (CALC): 12.8 G/DL (ref 12–16)
POC LACTIC ACID: 1.9 MMOL/L (ref 0.56–1.39)
POSITIVE BASE EXCESS, VEN: 5.4 MMOL/L (ref 0–3)
POTASSIUM BLD-SCNC: 4 MMOL/L (ref 3.5–4.5)
SODIUM BLD-SCNC: 141 MMOL/L (ref 138–146)

## 2025-01-15 PROCEDURE — 86901 BLOOD TYPING SEROLOGIC RH(D): CPT

## 2025-01-15 PROCEDURE — 2500000003 HC RX 250 WO HCPCS: Performed by: STUDENT IN AN ORGANIZED HEALTH CARE EDUCATION/TRAINING PROGRAM

## 2025-01-15 PROCEDURE — 6360000002 HC RX W HCPCS

## 2025-01-15 PROCEDURE — 82803 BLOOD GASES ANY COMBINATION: CPT

## 2025-01-15 PROCEDURE — 82565 ASSAY OF CREATININE: CPT

## 2025-01-15 PROCEDURE — 3700000001 HC ADD 15 MINUTES (ANESTHESIA)

## 2025-01-15 PROCEDURE — 96375 TX/PRO/DX INJ NEW DRUG ADDON: CPT

## 2025-01-15 PROCEDURE — 86850 RBC ANTIBODY SCREEN: CPT

## 2025-01-15 PROCEDURE — 61624 TCAT PERM OCCLS/EMBOLJ CNS: CPT

## 2025-01-15 PROCEDURE — 6360000002 HC RX W HCPCS: Performed by: NURSE ANESTHETIST, CERTIFIED REGISTERED

## 2025-01-15 PROCEDURE — 85576 BLOOD PLATELET AGGREGATION: CPT

## 2025-01-15 PROCEDURE — 85014 HEMATOCRIT: CPT

## 2025-01-15 PROCEDURE — 82330 ASSAY OF CALCIUM: CPT

## 2025-01-15 PROCEDURE — 6370000000 HC RX 637 (ALT 250 FOR IP): Performed by: PSYCHIATRY & NEUROLOGY

## 2025-01-15 PROCEDURE — 36226 PLACE CATH VERTEBRAL ART: CPT

## 2025-01-15 PROCEDURE — 85347 COAGULATION TIME ACTIVATED: CPT

## 2025-01-15 PROCEDURE — 2500000003 HC RX 250 WO HCPCS: Performed by: PSYCHIATRY & NEUROLOGY

## 2025-01-15 PROCEDURE — 82947 ASSAY GLUCOSE BLOOD QUANT: CPT

## 2025-01-15 PROCEDURE — 96374 THER/PROPH/DIAG INJ IV PUSH: CPT

## 2025-01-15 PROCEDURE — 75894 X-RAYS TRANSCATH THERAPY: CPT

## 2025-01-15 PROCEDURE — G0379 DIRECT REFER HOSPITAL OBSERV: HCPCS

## 2025-01-15 PROCEDURE — 2500000003 HC RX 250 WO HCPCS

## 2025-01-15 PROCEDURE — 75898 FOLLOW-UP ANGIOGRAPHY: CPT

## 2025-01-15 PROCEDURE — 7100000001 HC PACU RECOVERY - ADDTL 15 MIN

## 2025-01-15 PROCEDURE — 7100000000 HC PACU RECOVERY - FIRST 15 MIN

## 2025-01-15 PROCEDURE — 2500000003 HC RX 250 WO HCPCS: Performed by: NURSE ANESTHETIST, CERTIFIED REGISTERED

## 2025-01-15 PROCEDURE — 94640 AIRWAY INHALATION TREATMENT: CPT

## 2025-01-15 PROCEDURE — 6370000000 HC RX 637 (ALT 250 FOR IP): Performed by: ANESTHESIOLOGY

## 2025-01-15 PROCEDURE — 6370000000 HC RX 637 (ALT 250 FOR IP)

## 2025-01-15 PROCEDURE — G0378 HOSPITAL OBSERVATION PER HR: HCPCS

## 2025-01-15 PROCEDURE — 36228 PLACE CATH INTRACRANIAL ART: CPT

## 2025-01-15 PROCEDURE — 2580000003 HC RX 258: Performed by: NURSE ANESTHETIST, CERTIFIED REGISTERED

## 2025-01-15 PROCEDURE — 3700000000 HC ANESTHESIA ATTENDED CARE

## 2025-01-15 PROCEDURE — C1887 CATHETER, GUIDING: HCPCS

## 2025-01-15 PROCEDURE — 80051 ELECTROLYTE PANEL: CPT

## 2025-01-15 PROCEDURE — 2580000003 HC RX 258

## 2025-01-15 PROCEDURE — 2500000003 HC RX 250 WO HCPCS: Performed by: ANESTHESIOLOGY

## 2025-01-15 PROCEDURE — 6360000004 HC RX CONTRAST MEDICATION: Performed by: ANESTHESIOLOGY

## 2025-01-15 PROCEDURE — 84520 ASSAY OF UREA NITROGEN: CPT

## 2025-01-15 PROCEDURE — 83605 ASSAY OF LACTIC ACID: CPT

## 2025-01-15 PROCEDURE — 86900 BLOOD TYPING SEROLOGIC ABO: CPT

## 2025-01-15 RX ORDER — SODIUM CHLORIDE 9 MG/ML
INJECTION, SOLUTION INTRAVENOUS PRN
Status: DISCONTINUED | OUTPATIENT
Start: 2025-01-15 | End: 2025-01-16 | Stop reason: HOSPADM

## 2025-01-15 RX ORDER — NICARDIPINE HYDROCHLORIDE 0.1 MG/ML
5-15 INJECTION INTRAVENOUS CONTINUOUS
Status: DISCONTINUED | OUTPATIENT
Start: 2025-01-15 | End: 2025-01-16

## 2025-01-15 RX ORDER — LABETALOL HYDROCHLORIDE 5 MG/ML
10 INJECTION, SOLUTION INTRAVENOUS
Status: DISCONTINUED | OUTPATIENT
Start: 2025-01-15 | End: 2025-01-15

## 2025-01-15 RX ORDER — BALANCED SALT SOLUTION ENRICHED WITH BICARBONATE, DEXTROSE, AND GLUTATHIONE
KIT INTRAOCULAR
Status: DISCONTINUED | OUTPATIENT
Start: 2025-01-15 | End: 2025-01-16 | Stop reason: HOSPADM

## 2025-01-15 RX ORDER — HYDRALAZINE HYDROCHLORIDE 20 MG/ML
10 INJECTION INTRAMUSCULAR; INTRAVENOUS
Status: DISCONTINUED | OUTPATIENT
Start: 2025-01-15 | End: 2025-01-15

## 2025-01-15 RX ORDER — ROCURONIUM BROMIDE 10 MG/ML
INJECTION, SOLUTION INTRAVENOUS
Status: DISCONTINUED | OUTPATIENT
Start: 2025-01-15 | End: 2025-01-15 | Stop reason: SDUPTHER

## 2025-01-15 RX ORDER — ALBUTEROL SULFATE 90 UG/1
1 INHALANT RESPIRATORY (INHALATION) EVERY 6 HOURS PRN
Status: DISCONTINUED | OUTPATIENT
Start: 2025-01-15 | End: 2025-01-16 | Stop reason: HOSPADM

## 2025-01-15 RX ORDER — LIDOCAINE HYDROCHLORIDE 10 MG/ML
INJECTION, SOLUTION EPIDURAL; INFILTRATION; INTRACAUDAL; PERINEURAL
Status: DISCONTINUED | OUTPATIENT
Start: 2025-01-15 | End: 2025-01-15 | Stop reason: SDUPTHER

## 2025-01-15 RX ORDER — POTASSIUM CHLORIDE 7.45 MG/ML
10 INJECTION INTRAVENOUS PRN
Status: DISCONTINUED | OUTPATIENT
Start: 2025-01-15 | End: 2025-01-16 | Stop reason: HOSPADM

## 2025-01-15 RX ORDER — HYDRALAZINE HYDROCHLORIDE 20 MG/ML
INJECTION INTRAMUSCULAR; INTRAVENOUS
Status: DISCONTINUED | OUTPATIENT
Start: 2025-01-15 | End: 2025-01-15 | Stop reason: SDUPTHER

## 2025-01-15 RX ORDER — HYDRALAZINE HYDROCHLORIDE 20 MG/ML
10 INJECTION INTRAMUSCULAR; INTRAVENOUS
Status: DISCONTINUED | OUTPATIENT
Start: 2025-01-15 | End: 2025-01-16 | Stop reason: HOSPADM

## 2025-01-15 RX ORDER — HEPARIN SODIUM 1000 [USP'U]/ML
INJECTION, SOLUTION INTRAVENOUS; SUBCUTANEOUS
Status: DISCONTINUED | OUTPATIENT
Start: 2025-01-15 | End: 2025-01-15 | Stop reason: SDUPTHER

## 2025-01-15 RX ORDER — IODIXANOL 270 MG/ML
100 INJECTION, SOLUTION INTRAVASCULAR
Status: COMPLETED | OUTPATIENT
Start: 2025-01-15 | End: 2025-01-15

## 2025-01-15 RX ORDER — MAGNESIUM SULFATE IN WATER 40 MG/ML
2000 INJECTION, SOLUTION INTRAVENOUS PRN
Status: DISCONTINUED | OUTPATIENT
Start: 2025-01-15 | End: 2025-01-16 | Stop reason: HOSPADM

## 2025-01-15 RX ORDER — SODIUM CHLORIDE 0.9 % (FLUSH) 0.9 %
5-40 SYRINGE (ML) INJECTION EVERY 12 HOURS SCHEDULED
Status: DISCONTINUED | OUTPATIENT
Start: 2025-01-15 | End: 2025-01-16 | Stop reason: HOSPADM

## 2025-01-15 RX ORDER — SODIUM CHLORIDE 0.9 % (FLUSH) 0.9 %
5-40 SYRINGE (ML) INJECTION PRN
Status: DISCONTINUED | OUTPATIENT
Start: 2025-01-15 | End: 2025-01-16 | Stop reason: HOSPADM

## 2025-01-15 RX ORDER — POLYETHYLENE GLYCOL 3350 17 G/17G
17 POWDER, FOR SOLUTION ORAL DAILY PRN
Status: DISCONTINUED | OUTPATIENT
Start: 2025-01-15 | End: 2025-01-16 | Stop reason: HOSPADM

## 2025-01-15 RX ORDER — FENTANYL CITRATE 50 UG/ML
INJECTION, SOLUTION INTRAMUSCULAR; INTRAVENOUS
Status: DISCONTINUED | OUTPATIENT
Start: 2025-01-15 | End: 2025-01-15 | Stop reason: SDUPTHER

## 2025-01-15 RX ORDER — ONDANSETRON 2 MG/ML
4 INJECTION INTRAMUSCULAR; INTRAVENOUS EVERY 6 HOURS PRN
Status: DISCONTINUED | OUTPATIENT
Start: 2025-01-15 | End: 2025-01-15

## 2025-01-15 RX ORDER — ASPIRIN 81 MG/1
81 TABLET, CHEWABLE ORAL DAILY
Status: DISCONTINUED | OUTPATIENT
Start: 2025-01-15 | End: 2025-01-16 | Stop reason: HOSPADM

## 2025-01-15 RX ORDER — KETOROLAC TROMETHAMINE 5 MG/ML
1 SOLUTION OPHTHALMIC 4 TIMES DAILY
Status: DISCONTINUED | OUTPATIENT
Start: 2025-01-15 | End: 2025-01-16 | Stop reason: HOSPADM

## 2025-01-15 RX ORDER — PHENYLEPHRINE HCL IN 0.9% NACL 1 MG/10 ML
SYRINGE (ML) INTRAVENOUS
Status: DISCONTINUED | OUTPATIENT
Start: 2025-01-15 | End: 2025-01-15 | Stop reason: SDUPTHER

## 2025-01-15 RX ORDER — ACETAMINOPHEN 325 MG/1
650 TABLET ORAL EVERY 6 HOURS PRN
Status: DISCONTINUED | OUTPATIENT
Start: 2025-01-15 | End: 2025-01-16 | Stop reason: HOSPADM

## 2025-01-15 RX ORDER — NICARDIPINE HYDROCHLORIDE 0.1 MG/ML
5-15 INJECTION INTRAVENOUS CONTINUOUS
Status: DISCONTINUED | OUTPATIENT
Start: 2025-01-15 | End: 2025-01-15

## 2025-01-15 RX ORDER — MORPHINE SULFATE 2 MG/ML
2 INJECTION, SOLUTION INTRAMUSCULAR; INTRAVENOUS ONCE
Status: COMPLETED | OUTPATIENT
Start: 2025-01-15 | End: 2025-01-15

## 2025-01-15 RX ORDER — GLYCOPYRROLATE 0.2 MG/ML
INJECTION INTRAMUSCULAR; INTRAVENOUS
Status: DISCONTINUED | OUTPATIENT
Start: 2025-01-15 | End: 2025-01-15 | Stop reason: SDUPTHER

## 2025-01-15 RX ORDER — ONDANSETRON 4 MG/1
4 TABLET, ORALLY DISINTEGRATING ORAL EVERY 8 HOURS PRN
Status: DISCONTINUED | OUTPATIENT
Start: 2025-01-15 | End: 2025-01-16 | Stop reason: HOSPADM

## 2025-01-15 RX ORDER — ACETAMINOPHEN 650 MG/1
650 SUPPOSITORY RECTAL EVERY 6 HOURS PRN
Status: DISCONTINUED | OUTPATIENT
Start: 2025-01-15 | End: 2025-01-16 | Stop reason: HOSPADM

## 2025-01-15 RX ORDER — PROTAMINE SULFATE 10 MG/ML
INJECTION, SOLUTION INTRAVENOUS
Status: DISCONTINUED | OUTPATIENT
Start: 2025-01-15 | End: 2025-01-15 | Stop reason: SDUPTHER

## 2025-01-15 RX ORDER — PROPOFOL 10 MG/ML
INJECTION, EMULSION INTRAVENOUS
Status: DISCONTINUED | OUTPATIENT
Start: 2025-01-15 | End: 2025-01-15 | Stop reason: SDUPTHER

## 2025-01-15 RX ORDER — POTASSIUM CHLORIDE 1500 MG/1
40 TABLET, EXTENDED RELEASE ORAL PRN
Status: DISCONTINUED | OUTPATIENT
Start: 2025-01-15 | End: 2025-01-16 | Stop reason: HOSPADM

## 2025-01-15 RX ORDER — NALOXONE HYDROCHLORIDE 0.4 MG/ML
INJECTION, SOLUTION INTRAMUSCULAR; INTRAVENOUS; SUBCUTANEOUS PRN
Status: DISCONTINUED | OUTPATIENT
Start: 2025-01-15 | End: 2025-01-16 | Stop reason: HOSPADM

## 2025-01-15 RX ORDER — SODIUM CHLORIDE, SODIUM LACTATE, POTASSIUM CHLORIDE, CALCIUM CHLORIDE 600; 310; 30; 20 MG/100ML; MG/100ML; MG/100ML; MG/100ML
INJECTION, SOLUTION INTRAVENOUS
Status: DISCONTINUED | OUTPATIENT
Start: 2025-01-15 | End: 2025-01-15 | Stop reason: SDUPTHER

## 2025-01-15 RX ORDER — CLOPIDOGREL BISULFATE 75 MG/1
75 TABLET ORAL DAILY
Status: DISCONTINUED | OUTPATIENT
Start: 2025-01-15 | End: 2025-01-16 | Stop reason: HOSPADM

## 2025-01-15 RX ORDER — DEXAMETHASONE SODIUM PHOSPHATE 4 MG/ML
10 INJECTION, SOLUTION INTRA-ARTICULAR; INTRALESIONAL; INTRAMUSCULAR; INTRAVENOUS; SOFT TISSUE ONCE
Status: COMPLETED | OUTPATIENT
Start: 2025-01-15 | End: 2025-01-15

## 2025-01-15 RX ORDER — ONDANSETRON 2 MG/ML
4 INJECTION INTRAMUSCULAR; INTRAVENOUS EVERY 6 HOURS PRN
Status: DISCONTINUED | OUTPATIENT
Start: 2025-01-15 | End: 2025-01-16 | Stop reason: HOSPADM

## 2025-01-15 RX ORDER — FENTANYL CITRATE 50 UG/ML
25 INJECTION, SOLUTION INTRAMUSCULAR; INTRAVENOUS EVERY 5 MIN PRN
Status: DISCONTINUED | OUTPATIENT
Start: 2025-01-15 | End: 2025-01-15

## 2025-01-15 RX ORDER — ONDANSETRON 2 MG/ML
INJECTION INTRAMUSCULAR; INTRAVENOUS
Status: DISCONTINUED | OUTPATIENT
Start: 2025-01-15 | End: 2025-01-15 | Stop reason: SDUPTHER

## 2025-01-15 RX ORDER — ATORVASTATIN CALCIUM 10 MG/1
10 TABLET, FILM COATED ORAL NIGHTLY
Status: DISCONTINUED | OUTPATIENT
Start: 2025-01-15 | End: 2025-01-16 | Stop reason: HOSPADM

## 2025-01-15 RX ORDER — LABETALOL HYDROCHLORIDE 5 MG/ML
10 INJECTION, SOLUTION INTRAVENOUS
Status: DISCONTINUED | OUTPATIENT
Start: 2025-01-15 | End: 2025-01-16 | Stop reason: HOSPADM

## 2025-01-15 RX ORDER — IPRATROPIUM BROMIDE AND ALBUTEROL SULFATE 2.5; .5 MG/3ML; MG/3ML
1 SOLUTION RESPIRATORY (INHALATION)
Status: COMPLETED | OUTPATIENT
Start: 2025-01-15 | End: 2025-01-15

## 2025-01-15 RX ORDER — DEXAMETHASONE SODIUM PHOSPHATE 10 MG/ML
INJECTION INTRAMUSCULAR; INTRAVENOUS
Status: DISCONTINUED | OUTPATIENT
Start: 2025-01-15 | End: 2025-01-15 | Stop reason: SDUPTHER

## 2025-01-15 RX ORDER — ONDANSETRON 4 MG/1
4 TABLET, ORALLY DISINTEGRATING ORAL EVERY 8 HOURS PRN
Status: DISCONTINUED | OUTPATIENT
Start: 2025-01-15 | End: 2025-01-15

## 2025-01-15 RX ADMIN — ROCURONIUM BROMIDE 20 MG: 10 INJECTION, SOLUTION INTRAVENOUS at 11:14

## 2025-01-15 RX ADMIN — ROCURONIUM BROMIDE 10 MG: 10 INJECTION, SOLUTION INTRAVENOUS at 12:23

## 2025-01-15 RX ADMIN — ROCURONIUM BROMIDE 10 MG: 10 INJECTION, SOLUTION INTRAVENOUS at 12:52

## 2025-01-15 RX ADMIN — Medication 100 MCG: at 11:19

## 2025-01-15 RX ADMIN — IPRATROPIUM BROMIDE AND ALBUTEROL SULFATE 1 DOSE: .5; 2.5 SOLUTION RESPIRATORY (INHALATION) at 19:54

## 2025-01-15 RX ADMIN — ROCURONIUM BROMIDE 20 MG: 10 INJECTION, SOLUTION INTRAVENOUS at 11:57

## 2025-01-15 RX ADMIN — SODIUM CHLORIDE, PRESERVATIVE FREE 10 ML: 5 INJECTION INTRAVENOUS at 20:02

## 2025-01-15 RX ADMIN — Medication 100 MCG: at 10:31

## 2025-01-15 RX ADMIN — ROCURONIUM BROMIDE 50 MG: 10 INJECTION, SOLUTION INTRAVENOUS at 10:31

## 2025-01-15 RX ADMIN — SODIUM CHLORIDE, POTASSIUM CHLORIDE, SODIUM LACTATE AND CALCIUM CHLORIDE: 600; 310; 30; 20 INJECTION, SOLUTION INTRAVENOUS at 12:43

## 2025-01-15 RX ADMIN — IODIXANOL 100 ML: 270 INJECTION, SOLUTION INTRAVASCULAR at 13:51

## 2025-01-15 RX ADMIN — CLOPIDOGREL BISULFATE 75 MG: 75 TABLET ORAL at 10:04

## 2025-01-15 RX ADMIN — DEXAMETHASONE SODIUM PHOSPHATE 10 MG: 10 INJECTION INTRAMUSCULAR; INTRAVENOUS at 11:30

## 2025-01-15 RX ADMIN — MORPHINE SULFATE 2 MG: 2 INJECTION, SOLUTION INTRAMUSCULAR; INTRAVENOUS at 18:16

## 2025-01-15 RX ADMIN — ACETAMINOPHEN 650 MG: 325 TABLET ORAL at 17:51

## 2025-01-15 RX ADMIN — KETOROLAC TROMETHAMINE 1 DROP: 5 SOLUTION/ DROPS OPHTHALMIC at 20:01

## 2025-01-15 RX ADMIN — ASPIRIN 81 MG 81 MG: 81 TABLET ORAL at 10:04

## 2025-01-15 RX ADMIN — SODIUM CHLORIDE, POTASSIUM CHLORIDE, SODIUM LACTATE AND CALCIUM CHLORIDE: 600; 310; 30; 20 INJECTION, SOLUTION INTRAVENOUS at 10:08

## 2025-01-15 RX ADMIN — SODIUM CHLORIDE, SODIUM LACTATE, POTASSIUM CHLORIDE, CALCIUM CHLORIDE: 600; 310; 30; 20 INJECTION, SOLUTION INTRAVENOUS at 10:50

## 2025-01-15 RX ADMIN — DEXAMETHASONE SODIUM PHOSPHATE 10 MG: 4 INJECTION INTRA-ARTICULAR; INTRALESIONAL; INTRAMUSCULAR; INTRAVENOUS; SOFT TISSUE at 17:38

## 2025-01-15 RX ADMIN — HEPARIN SODIUM 6000 UNITS: 1000 INJECTION INTRAVENOUS; SUBCUTANEOUS at 11:49

## 2025-01-15 RX ADMIN — GLYCOPYRROLATE 0.2 MG: 0.2 INJECTION INTRAMUSCULAR; INTRAVENOUS at 13:23

## 2025-01-15 RX ADMIN — SUGAMMADEX 200 MG: 100 INJECTION, SOLUTION INTRAVENOUS at 14:05

## 2025-01-15 RX ADMIN — ROCURONIUM BROMIDE 10 MG: 10 INJECTION, SOLUTION INTRAVENOUS at 13:32

## 2025-01-15 RX ADMIN — Medication 50 MCG: at 12:06

## 2025-01-15 RX ADMIN — Medication 100 MCG: at 10:41

## 2025-01-15 RX ADMIN — BALANCED SALT SOLUTION 300 DROP: 6.4; .75; .48; .3; 3.9; 1.7 SOLUTION OPHTHALMIC at 20:47

## 2025-01-15 RX ADMIN — FENTANYL CITRATE 100 MCG: 50 INJECTION, SOLUTION INTRAMUSCULAR; INTRAVENOUS at 10:31

## 2025-01-15 RX ADMIN — GLYCOPYRROLATE 0.2 MG: 0.2 INJECTION INTRAMUSCULAR; INTRAVENOUS at 11:54

## 2025-01-15 RX ADMIN — Medication 50 MCG: at 12:02

## 2025-01-15 RX ADMIN — ONDANSETRON 4 MG: 2 INJECTION INTRAMUSCULAR; INTRAVENOUS at 13:40

## 2025-01-15 RX ADMIN — ATORVASTATIN CALCIUM 10 MG: 10 TABLET, FILM COATED ORAL at 20:01

## 2025-01-15 RX ADMIN — PROTAMINE SULFATE 30 MG: 10 INJECTION, SOLUTION INTRAVENOUS at 13:54

## 2025-01-15 RX ADMIN — SODIUM CHLORIDE, PRESERVATIVE FREE 10 ML: 5 INJECTION INTRAVENOUS at 16:28

## 2025-01-15 RX ADMIN — CEFAZOLIN 2000 MG: 1 INJECTION, POWDER, FOR SOLUTION INTRAMUSCULAR; INTRAVENOUS at 11:08

## 2025-01-15 RX ADMIN — HYDRALAZINE HYDROCHLORIDE 10 MG: 20 INJECTION, SOLUTION INTRAMUSCULAR; INTRAVENOUS at 14:05

## 2025-01-15 RX ADMIN — PHENYLEPHRINE HYDROCHLORIDE 50 MCG/MIN: 10 INJECTION INTRAVENOUS at 12:07

## 2025-01-15 RX ADMIN — SODIUM CHLORIDE, PRESERVATIVE FREE 10 ML: 5 INJECTION INTRAVENOUS at 20:01

## 2025-01-15 RX ADMIN — Medication 100 MCG: at 11:50

## 2025-01-15 RX ADMIN — PROPOFOL 50 MG: 10 INJECTION, EMULSION INTRAVENOUS at 11:02

## 2025-01-15 RX ADMIN — HEPARIN SODIUM 1000 UNITS: 1000 INJECTION INTRAVENOUS; SUBCUTANEOUS at 12:21

## 2025-01-15 RX ADMIN — PROPOFOL 150 MG: 10 INJECTION, EMULSION INTRAVENOUS at 10:31

## 2025-01-15 RX ADMIN — Medication 100 MCG: at 11:54

## 2025-01-15 RX ADMIN — Medication 100 MCG: at 11:25

## 2025-01-15 RX ADMIN — LIDOCAINE HYDROCHLORIDE 50 MG: 10 INJECTION, SOLUTION EPIDURAL; INFILTRATION; INTRACAUDAL; PERINEURAL at 10:31

## 2025-01-15 ASSESSMENT — PAIN SCALES - GENERAL: PAINLEVEL_OUTOF10: 0

## 2025-01-15 ASSESSMENT — PAIN - FUNCTIONAL ASSESSMENT: PAIN_FUNCTIONAL_ASSESSMENT: NONE - DENIES PAIN

## 2025-01-15 NOTE — SEDATION DOCUMENTATION
Left hand and radial puncture site examined.  SPO2 probe on Lt index finger.  Site appears clean and NO hematoma or bruising noted

## 2025-01-15 NOTE — H&P
Neuro ICU History & Physical    Patient Name: Dahlia Zhang  Patient : 1950  Room/Bed: 0126/0126-01  Code Status: Full  Allergies:   Allergies   Allergen Reactions    Lisinopril Angioedema    Codeine Nausea And Vomiting    Norco [Hydrocodone-Acetaminophen] Nausea And Vomiting    Percocet [Oxycodone-Acetaminophen] Nausea And Vomiting       CHIEF COMPLAINT     s/p Second LVIS embolization     HPI    History Obtained From: Patient, electronic medical record     The patient is a 74 y.o. female with a history of previous ruptured basilar tip aneurysm, hypertension, hyperlipidemia, type 2 diabetes mellitus, GERD who presents to the neuro ICU status post elective second L VIS embolization for previously ruptured basilar tip aneurysm.  Patient was admitted to the neuro ICU 10/2023 with diffuse subarachnoid hemorrhage at the time was found to have a ruptured basilar tip aneurysm patient was treated with web device at that time.  Patient was ultimately discharged and seen outpatient by endovascular with multiple repeat angiograms showing residual aneurysm and patient was scheduled for and underwent elective repeat LVIS embolization.  On arrival to the neuro ICU patient complains of new onset monocular right eye pressure/pain which she rates as severe 10 out of 10 and sharp in nature.  Patient reports that she does not have a history of migraine headaches or eye pain prior to admission and denies any relief from as needed pain medications.  On exam patient is otherwise intact she reports baseline visual deficits related to a recent stroke and 2024 and has otherwise no focal neurological deficits.     Admitted to ICU From: IR  Reason for ICU Admission: Elective LVIS embolization       PATIENT HISTORY   Past Medical History:        Diagnosis Date    Anticoagulated     ELIQUIS    Anxiety and depression     Atrial fibrillation (HCC) 10/15/2023    new onset while in Hosp    Cerebral aneurysm  done complete detailed one below):    1a.  Level of consciousness:  0 - alert; keenly responsive  1b.  Level of consciousness questions:  0 - answers both questions correctly  1c.  Level of consciousness questions:  0 - performs both tasks correctly  2.    Best Gaze:  0 - normal  3.    Visual:  0 - no visual loss  4.    Facial Palsy:  0 - normal symmetric movement  5a.  Motor left arm:  0 - no drift, limb holds 90 (or 45) degrees for full 10 seconds  5b.  Motor right arm:  0 - no drift, limb holds 90 (or 45) degrees for full 10 seconds  6a.  Motor left le - no drift; leg holds 30 degree position for full 5 seconds  6b.  Motor right le - no drift; leg holds 30 degree position for full 5 seconds  7.    Limb Ataxia:  0 - absent  8.    Sensory:  0 - normal; no sensory loss  9.    Best Language:  0 - no aphasia, normal  10.  Dysarthria:  0 - normal  11.  Extinction and Inattention:  0 - no abnormality  TOTAL: 0    LABS AND IMAGING:     RECENT LABS:  CBC with Differential:    Lab Results   Component Value Date/Time    WBC 10.5 2024 07:26 PM    RBC 4.44 2024 07:26 PM    HGB 13.3 2024 07:26 PM    HCT 40.0 2024 07:26 PM     2024 07:26 PM    MCV 90.1 2024 07:26 PM    MCH 30.0 2024 07:26 PM    MCHC 33.3 2024 07:26 PM    RDW 13.4 2024 07:26 PM    LYMPHOPCT 20 2024 01:01 PM    MONOPCT 6 2024 01:01 PM    EOSPCT 1 2024 01:01 PM    BASOPCT 1 2024 01:01 PM    MONOSABS 0.86 2024 01:01 PM    LYMPHSABS 3.19 2024 01:01 PM    EOSABS 0.08 2024 01:01 PM    BASOSABS 0.10 2024 01:01 PM     BMP:    Lab Results   Component Value Date/Time     2024 10:56 AM    K 3.8 2024 10:56 AM    CL 96 2024 10:56 AM    CO2 28 2024 10:56 AM    BUN 8 2024 10:56 AM    CREATININE 0.7 01/15/2025 10:04 AM    CREATININE 0.7 10/18/2024 11:11 AM    CALCIUM 8.7 2024 10:56 AM    LABGLOM >90 10/18/2024 11:11 AM

## 2025-01-15 NOTE — ANESTHESIA PRE PROCEDURE
Department of Anesthesiology  Preprocedure Note       Name:  Dahlia Zahng   Age:  74 y.o.  :  1950                                          MRN:  [id number]         Date:  2025      Surgeon: * Surgery not found *    Procedure:     Medications prior to admission:   Prior to Admission medications    Medication Sig Start Date End Date Taking? Authorizing Provider   calcium carbonate (TUMS) 500 MG chewable tablet Take 1 tablet by mouth daily as needed for Heartburn   Yes Glenis Lewis MD   apixaban (ELIQUIS) 5 MG TABS tablet Take 1 tablet by mouth 2 times daily 24  Yes Brayan Blackwell MD   hydrALAZINE (APRESOLINE) 50 MG tablet Take 2 tablets by mouth daily    Glenis Lewis MD   hydrALAZINE (APRESOLINE) 50 MG tablet Take 1 tablet by mouth at bedtime    Glenis Lewis MD   omeprazole (PRILOSEC) 40 MG delayed release capsule Take 1 capsule by mouth daily    Glenis Lewis MD   umeclidinium bromide (INCRUSE ELLIPTA) 62.5 MCG/ACT inhaler Inhale 1 puff into the lungs daily    Glenis Lewis MD   albuterol sulfate HFA (PROVENTIL;VENTOLIN;PROAIR) 108 (90 Base) MCG/ACT inhaler Inhale 1 puff into the lungs every 6 hours as needed 11/15/24   Glenis Lewis MD   clopidogrel (PLAVIX) 75 MG tablet Take 1 tablet by mouth daily  Patient taking differently: Take 1 tablet by mouth at bedtime Started 1/10/2025 12/26/24   Sujey Carl MD   metFORMIN (GLUCOPHAGE) 500 MG tablet Take 2 tablets by mouth Daily with supper    ProviderGlenis MD   atorvastatin (LIPITOR) 10 MG tablet Take 1 tablet by mouth nightly  Patient taking differently: Take 1 tablet by mouth daily 24   Brayan Blackwell MD   acetaminophen (TYLENOL) 500 MG tablet Take 1 tablet by mouth every 6 hours as needed for Pain    Glenis Lewis MD   aspirin 81 MG EC tablet Take 1 tablet by mouth nightly    Glenis Lewis MD   losartan-hydroCHLOROthiazide (HYZAAR) 100-25 MG per tablet Take 1

## 2025-01-15 NOTE — SEDATION DOCUMENTATION
Radial Access attempt abandoned:    TR band applied to Left radial puncture.  Filled with approx. 16 mL of air

## 2025-01-15 NOTE — ANESTHESIA POST-OP
Patient awake  in neuro ICU  C/o of pain  in RT eye - hurts with lid motion    Eye not pink  Slightly tearing    Likely  minor corneal irritation / abrasion      Rec :   Ketorolac eyedrops for 24 hours    BSS irrigation every  1-2 hours.    If no resolution after 24 hours would advise an ophthalmology consult.

## 2025-01-15 NOTE — SEDATION DOCUMENTATION
Closing Time:    Vascade deployed  All instrumentation removed  Manual pressure held at puncture site by Dr. Dg Ribeiro applied externally and inflated to MAP (100 )

## 2025-01-15 NOTE — PROGRESS NOTES
Pacu charting/assmt done under post procedure documentation.    Just prior to transport to unit, pt c/o pressure/soreness in right eye.  Sujit Chambers RN from  was contacted at [phone] and stated ok to transport pt to unit ( Room 126)  and pt  would be seen/evaluated in room.  Writer updated floor RN with above information.

## 2025-01-15 NOTE — BRIEF OP NOTE
[facility]    NEUROENDOVASCULAR SERVICE: POST-OP NOTE: 1/15/2025    Pt Name: Dahlia Zhang  MRN: [id number]  YOB: 1950  Date of Procedure: 1/15/2025  Primary Care Physician: Thania Rahman MD        Pre-Procedural Diagnosis:Ruptured basilar tip aneurysm s/p WEB 10/11/23 c/b residual neck s/p LVIS placement 6/2024   Post-Procedural Diagnosis:s/p Second LVIS embolization       Procedure Performed:cerebral angiogram with LVIS stent embolization    Surgeon:   Sujey Carl MD    Fellow:  Da Maurice MD and Serafin Conte MD PhD     Assisting Tech:  Cheryl Carrillo    PRE-PROCEDURAL EXAM:  Neurological exam performed and unchanged from initial H&P or consult      Anesthesia: General Anesthesia  An Immediate re-assessment was completed prior to sedation, and it is determined to be safe to proceed.  Complications: none    Intra-Operative EXAM:  Patient sedated with unchanged limited neurological exam    EBL: < Minimal      Cc            Specimens: Were not Obtained  Contrast:     Visipaque 270 low osmolar 100 Cc             Fluoro: 52.2 min    Findings:  Please see dictated Radiology note for further details  The basilar artery tip aneurysm, previously treated with a WEB intrasaccular device (10/11/2023) and a second-stage LVIS 4.5 x 23mm stent (6/2024), is obliterated but shows residual neck filling measuring 2.74mm in height and 1.61mm in width. The stent remains patent, well-apposed, and free of endoleak or stenosis.  The above treated with a 6Fr 55cm Neuron 070 guide catheter, Headway 21 microcatheter, Synchro support wire, and deployment of an LVIS 4.5 x 23mm stent spanning from the left P2 segment to the mid-basilar artery. The stent was telescoped through the previously deployed LVIS stent and crossed the residual aneurysmal neck. Stent constriction was managed with submaximal angioplasty using a Scepter XC balloon (4 x 11mm) and a Traxess wire.

## 2025-01-15 NOTE — SEDATION DOCUMENTATION
200 mcg nitroglycerin  2000 units Heparin  2.5 mg Verapamil    Mixed together in sterile fashion for injection of radial artery access per Dr. Conte

## 2025-01-15 NOTE — PROGRESS NOTES
[facility]   Stroke & Neurointerventional Clinic Note    [facility]   [address]   [place] [address]   P: [phone]   F: [phone]  Endovascular Neurosurgery H&P Note    Pt Name: Dahlia Zhang  MRN: [id number]  YOB: 1950  Date of evaluation: 1/15/2025  Primary Care Physician: Thania Rahman MD    Reason for evaluation: SAH and Basilar tip rupture more so on the right P1    SUBJECTIVE:     Presents for elective cerebral angiogram and stent placement for aneurysm embolization. Has no new complaints or focal neuro deficits. Has stopped her Eliquis 2 days ago. Continues on Aspirin and Plavix. States she is ready for procedure.          History of Chief Complaint:    Dahlia Zhang is a 74 y.o. 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     s/p WEB device embolization with obliteration of aneurysm, achieving Chris 1 on 10/11/2023.       Original H&H 5 and modified mccracken 4.    Found to have afib RVR on the 10/15/2023.    Doing well mRS 1  Memory issues, typical in SAH     FU angiogram on April 4, 2024 showed residual neck     Allergies  is allergic to lisinopril, codeine, norco [hydrocodone-acetaminophen], and percocet [oxycodone-acetaminophen].  Medications  Prior to Admission medications    Medication Sig Start Date End Date Taking? Authorizing Provider   hydrALAZINE (APRESOLINE) 50 MG tablet Take 2 tablets by mouth daily   Yes Glenis Lewis MD   omeprazole (PRILOSEC) 40 MG delayed release capsule Take 1 capsule by mouth daily   Yes Glenis Lewis MD   umeclidinium bromide (INCRUSE ELLIPTA) 62.5 MCG/ACT inhaler Inhale 1 puff into the lungs daily   Yes Glenis Lewis MD   calcium carbonate (TUMS) 500 MG chewable tablet Take 1 tablet by mouth daily as needed for Heartburn   Yes Glenis Lewis MD  basilar tip aneurysm, Chris 1            April 4, 2024 follow up angiogram       IMPRESSIONS:   Dahlia Zhang is a 74 y.o. 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     s/p WEB device embolization with obliteration of aneurysm, achieving Chris 1 on 10/11/2023.     Original H&H 5 and modified mccracken 4.    Found to have afib RVR on the 10/15/2023.    Doing well mRS 1  Memory issues, typical in SAH     FU angiogram on April 4, 2024 showed residual neck     FU angio with no change, and since it was ruptured, she would like the residual to be treated.    1/15/25 - presents for scheduled elective treatment of residual aneurysm    has Hypertension; Mixed hyperlipidemia; and Current every day smoker on their pertinent problem list.        PLANS:     -- -120  -- Continue Aspirin 81 daily  -- Continue Plavix 75 daily  -- Eliquis is currently on hold for procedure  -- Proceed with planned treatment of the residual aneurysm      Discussed with Dr. Carl    Please arrange follow-up with Dr. Serafin oCnte clinic in 4-8 weeks, and with Dr. Carl in 3-4 months.    Serafin Conte MD, Pager [phone]  Electronically signed 01/15/25 at 10:11 AM  Stroke, Neurocritical Care & Neurointervention  [facility]  [facility]

## 2025-01-15 NOTE — ANESTHESIA PRE PROCEDURE
Department of Anesthesiology  Preprocedure Note       Name:  Dahlia Zhang   Age:  74 y.o.  :  1950                                          MRN:  [id number]         Date:  1/15/2025      Surgeon: * No surgeons listed *    Procedure: * No procedures listed *    Medications prior to admission:   Prior to Admission medications    Medication Sig Start Date End Date Taking? Authorizing Provider   hydrALAZINE (APRESOLINE) 50 MG tablet Take 2 tablets by mouth daily   Yes Glenis Lewis MD   omeprazole (PRILOSEC) 40 MG delayed release capsule Take 1 capsule by mouth daily   Yes Provider, MD Glenis   umeclidinium bromide (INCRUSE ELLIPTA) 62.5 MCG/ACT inhaler Inhale 1 puff into the lungs daily   Yes ProviderGlenis MD   calcium carbonate (TUMS) 500 MG chewable tablet Take 1 tablet by mouth daily as needed for Heartburn   Yes ProviderGlenis MD   albuterol sulfate HFA (PROVENTIL;VENTOLIN;PROAIR) 108 (90 Base) MCG/ACT inhaler Inhale 1 puff into the lungs every 6 hours as needed 11/15/24  Yes ProviderGlenis MD   clopidogrel (PLAVIX) 75 MG tablet Take 1 tablet by mouth daily  Patient taking differently: Take 1 tablet by mouth at bedtime Started 1/10/2025 12/26/24  Yes Sujey Carl MD   metFORMIN (GLUCOPHAGE) 500 MG tablet Take 2 tablets by mouth Daily with supper   Yes ProviderGlenis MD   apixaban (ELIQUIS) 5 MG TABS tablet Take 1 tablet by mouth 2 times daily 24  Yes Brayan Blackwell MD   acetaminophen (TYLENOL) 500 MG tablet Take 1 tablet by mouth every 6 hours as needed for Pain   Yes Provider, MD Glenis   aspirin 81 MG EC tablet Take 1 tablet by mouth nightly   Yes ProviderGlenis MD   losartan-hydroCHLOROthiazide (HYZAAR) 100-25 MG per tablet Take 1 tablet by mouth nightly   Yes ProviderGlenis MD   metoprolol tartrate (LOPRESSOR) 50 MG tablet Take 1 tablet by mouth 2 times daily 10/26/23  Yes Lucy Simmons, APRN - CNP   dilTIAZem (CARDIZEM CD) 120

## 2025-01-15 NOTE — SEDATION DOCUMENTATION
PRE PROCEDURE ASSESSMENT:    Pre procedure assessment:    Pt awake/ alert/ oriented x 3.  Voice clear and appropriate.  Pt stated name/ clearly, same as ID band.  Pt denies discomfort, denies disabilities at this time.    Resp even/ non labored.  Skin pink, warm, dry, cap refill < 3 sec.    Face symmetric, pupils PERRLA @ 4 mm.  Pt denies ANY vision deficits, none noted.    Pt moves all extremities x 4.  Good pulse, motor, sensory x 4.    Pedal pulses bilaterally palpable moderate and marked.     Pt has slight bruising noted to outer orbital portion of Left eye.  Pt states she may have hit her eye during an exam day previous.

## 2025-01-16 VITALS
BODY MASS INDEX: 31.27 KG/M2 | WEIGHT: 199.2 LBS | TEMPERATURE: 97.8 F | RESPIRATION RATE: 27 BRPM | DIASTOLIC BLOOD PRESSURE: 79 MMHG | OXYGEN SATURATION: 97 % | SYSTOLIC BLOOD PRESSURE: 147 MMHG | HEIGHT: 67 IN | HEART RATE: 72 BPM

## 2025-01-16 LAB
ACT BLD: 133 SEC (ref 79–149)
ANION GAP SERPL CALCULATED.3IONS-SCNC: 8 MMOL/L (ref 9–16)
BACTERIA URNS QL MICRO: ABNORMAL
BASOPHILS # BLD: <0.03 K/UL (ref 0–0.2)
BASOPHILS NFR BLD: 0 % (ref 0–2)
BILIRUB UR QL STRIP: ABNORMAL
BUN SERPL-MCNC: 11 MG/DL (ref 8–23)
CALCIUM SERPL-MCNC: 8.4 MG/DL (ref 8.6–10.4)
CASTS #/AREA URNS LPF: ABNORMAL /LPF (ref 0–8)
CHLORIDE SERPL-SCNC: 101 MMOL/L (ref 98–107)
CLARITY UR: ABNORMAL
CO2 SERPL-SCNC: 25 MMOL/L (ref 20–31)
COLOR UR: ABNORMAL
CREAT SERPL-MCNC: 0.7 MG/DL (ref 0.6–0.9)
EOSINOPHIL # BLD: <0.03 K/UL (ref 0–0.44)
EOSINOPHILS RELATIVE PERCENT: 0 % (ref 1–4)
EPI CELLS #/AREA URNS HPF: ABNORMAL /HPF (ref 0–5)
ERYTHROCYTE [DISTWIDTH] IN BLOOD BY AUTOMATED COUNT: 14 % (ref 11.8–14.4)
GFR, ESTIMATED: >90 ML/MIN/1.73M2
GLUCOSE BLD-MCNC: 145 MG/DL (ref 65–105)
GLUCOSE SERPL-MCNC: 250 MG/DL (ref 74–99)
GLUCOSE UR STRIP-MCNC: NEGATIVE MG/DL
HCT VFR BLD AUTO: 31.5 % (ref 36.3–47.1)
HGB BLD-MCNC: 10.1 G/DL (ref 11.9–15.1)
HGB UR QL STRIP.AUTO: NEGATIVE
IMM GRANULOCYTES # BLD AUTO: 0.14 K/UL (ref 0–0.3)
IMM GRANULOCYTES NFR BLD: 1 %
KETONES UR STRIP-MCNC: ABNORMAL MG/DL
LEUKOCYTE ESTERASE UR QL STRIP: ABNORMAL
LYMPHOCYTES NFR BLD: 1.06 K/UL (ref 1.1–3.7)
LYMPHOCYTES RELATIVE PERCENT: 8 % (ref 24–43)
MCH RBC QN AUTO: 28.4 PG (ref 25.2–33.5)
MCHC RBC AUTO-ENTMCNC: 32.1 G/DL (ref 28.4–34.8)
MCV RBC AUTO: 88.5 FL (ref 82.6–102.9)
MONOCYTES NFR BLD: 0.36 K/UL (ref 0.1–1.2)
MONOCYTES NFR BLD: 3 % (ref 3–12)
MUCOUS THREADS URNS QL MICRO: ABNORMAL
NEUTROPHILS NFR BLD: 88 % (ref 36–65)
NEUTS SEG NFR BLD: 12.13 K/UL (ref 1.5–8.1)
NITRITE UR QL STRIP: POSITIVE
NRBC BLD-RTO: 0 PER 100 WBC
PH UR STRIP: 7 [PH] (ref 5–8)
PLATELET # BLD AUTO: 244 K/UL (ref 138–453)
PMV BLD AUTO: 11 FL (ref 8.1–13.5)
POTASSIUM SERPL-SCNC: 3.7 MMOL/L (ref 3.7–5.3)
PROT UR STRIP-MCNC: ABNORMAL MG/DL
RBC # BLD AUTO: 3.56 M/UL (ref 3.95–5.11)
RBC #/AREA URNS HPF: ABNORMAL /HPF (ref 0–4)
SODIUM SERPL-SCNC: 134 MMOL/L (ref 136–145)
SP GR UR STRIP: 1.03 (ref 1–1.03)
UROBILINOGEN UR STRIP-ACNC: NORMAL EU/DL (ref 0–1)
WBC #/AREA URNS HPF: ABNORMAL /HPF (ref 0–5)
WBC OTHER # BLD: 13.7 K/UL (ref 3.5–11.3)

## 2025-01-16 PROCEDURE — G0378 HOSPITAL OBSERVATION PER HR: HCPCS

## 2025-01-16 PROCEDURE — 6370000000 HC RX 637 (ALT 250 FOR IP): Performed by: NURSE PRACTITIONER

## 2025-01-16 PROCEDURE — 87186 SC STD MICRODIL/AGAR DIL: CPT

## 2025-01-16 PROCEDURE — 2500000003 HC RX 250 WO HCPCS: Performed by: STUDENT IN AN ORGANIZED HEALTH CARE EDUCATION/TRAINING PROGRAM

## 2025-01-16 PROCEDURE — 2500000003 HC RX 250 WO HCPCS: Performed by: ANESTHESIOLOGY

## 2025-01-16 PROCEDURE — 87184 SC STD DISK METHOD PER PLATE: CPT

## 2025-01-16 PROCEDURE — 99232 SBSQ HOSP IP/OBS MODERATE 35: CPT | Performed by: STUDENT IN AN ORGANIZED HEALTH CARE EDUCATION/TRAINING PROGRAM

## 2025-01-16 PROCEDURE — 80048 BASIC METABOLIC PNL TOTAL CA: CPT

## 2025-01-16 PROCEDURE — 6370000000 HC RX 637 (ALT 250 FOR IP): Performed by: PSYCHIATRY & NEUROLOGY

## 2025-01-16 PROCEDURE — 87086 URINE CULTURE/COLONY COUNT: CPT

## 2025-01-16 PROCEDURE — 81001 URINALYSIS AUTO W/SCOPE: CPT

## 2025-01-16 PROCEDURE — 82947 ASSAY GLUCOSE BLOOD QUANT: CPT

## 2025-01-16 PROCEDURE — 85025 COMPLETE CBC W/AUTO DIFF WBC: CPT

## 2025-01-16 PROCEDURE — 87088 URINE BACTERIA CULTURE: CPT

## 2025-01-16 RX ORDER — BALANCED SALT SOLUTION ENRICHED WITH BICARBONATE, DEXTROSE, AND GLUTATHIONE
1 KIT INTRAOCULAR
Qty: 1.7 ML | Refills: 0 | Status: SHIPPED | OUTPATIENT
Start: 2025-01-16 | End: 2025-01-18

## 2025-01-16 RX ORDER — HYDRALAZINE HYDROCHLORIDE 50 MG/1
100 TABLET, FILM COATED ORAL DAILY
Status: DISCONTINUED | OUTPATIENT
Start: 2025-01-16 | End: 2025-01-16 | Stop reason: HOSPADM

## 2025-01-16 RX ORDER — CEPHALEXIN 500 MG/1
500 CAPSULE ORAL EVERY 12 HOURS SCHEDULED
Status: DISCONTINUED | OUTPATIENT
Start: 2025-01-16 | End: 2025-01-16 | Stop reason: HOSPADM

## 2025-01-16 RX ORDER — INSULIN LISPRO 100 [IU]/ML
0-4 INJECTION, SOLUTION INTRAVENOUS; SUBCUTANEOUS
Status: DISCONTINUED | OUTPATIENT
Start: 2025-01-16 | End: 2025-01-16 | Stop reason: HOSPADM

## 2025-01-16 RX ORDER — LOSARTAN POTASSIUM AND HYDROCHLOROTHIAZIDE 25; 100 MG/1; MG/1
1 TABLET ORAL NIGHTLY
Status: DISCONTINUED | OUTPATIENT
Start: 2025-01-16 | End: 2025-01-16

## 2025-01-16 RX ORDER — GLUCAGON 1 MG/ML
1 KIT INJECTION PRN
Status: DISCONTINUED | OUTPATIENT
Start: 2025-01-16 | End: 2025-01-16 | Stop reason: HOSPADM

## 2025-01-16 RX ORDER — LOSARTAN POTASSIUM 50 MG/1
100 TABLET ORAL DAILY
Status: DISCONTINUED | OUTPATIENT
Start: 2025-01-16 | End: 2025-01-16 | Stop reason: HOSPADM

## 2025-01-16 RX ORDER — DEXTROSE MONOHYDRATE 100 MG/ML
INJECTION, SOLUTION INTRAVENOUS CONTINUOUS PRN
Status: DISCONTINUED | OUTPATIENT
Start: 2025-01-16 | End: 2025-01-16 | Stop reason: HOSPADM

## 2025-01-16 RX ORDER — KETOROLAC TROMETHAMINE 5 MG/ML
1 SOLUTION OPHTHALMIC 4 TIMES DAILY PRN
Qty: 5 ML | Refills: 1 | Status: SHIPPED | OUTPATIENT
Start: 2025-01-16 | End: 2025-01-18

## 2025-01-16 RX ORDER — METOPROLOL TARTRATE 25 MG/1
50 TABLET, FILM COATED ORAL 2 TIMES DAILY
Status: DISCONTINUED | OUTPATIENT
Start: 2025-01-16 | End: 2025-01-16 | Stop reason: HOSPADM

## 2025-01-16 RX ORDER — CEPHALEXIN 500 MG/1
500 CAPSULE ORAL EVERY 12 HOURS SCHEDULED
Qty: 10 CAPSULE | Refills: 0 | Status: SHIPPED | OUTPATIENT
Start: 2025-01-16 | End: 2025-01-21

## 2025-01-16 RX ORDER — HYDRALAZINE HYDROCHLORIDE 50 MG/1
50 TABLET, FILM COATED ORAL NIGHTLY
Status: DISCONTINUED | OUTPATIENT
Start: 2025-01-16 | End: 2025-01-16 | Stop reason: HOSPADM

## 2025-01-16 RX ORDER — HYDROCHLOROTHIAZIDE 25 MG/1
25 TABLET ORAL DAILY
Status: DISCONTINUED | OUTPATIENT
Start: 2025-01-16 | End: 2025-01-16 | Stop reason: HOSPADM

## 2025-01-16 RX ADMIN — BALANCED SALT SOLUTION 300 DROP: 6.4; .75; .48; .3; 3.9; 1.7 SOLUTION OPHTHALMIC at 12:50

## 2025-01-16 RX ADMIN — ACETAMINOPHEN 650 MG: 325 TABLET ORAL at 09:07

## 2025-01-16 RX ADMIN — ASPIRIN 81 MG 81 MG: 81 TABLET ORAL at 08:35

## 2025-01-16 RX ADMIN — HYDRALAZINE HYDROCHLORIDE 100 MG: 50 TABLET ORAL at 11:57

## 2025-01-16 RX ADMIN — HYDROCHLOROTHIAZIDE 25 MG: 25 TABLET ORAL at 09:07

## 2025-01-16 RX ADMIN — LOSARTAN POTASSIUM 100 MG: 50 TABLET, FILM COATED ORAL at 09:07

## 2025-01-16 RX ADMIN — BALANCED SALT SOLUTION 300 DROP: 6.4; .75; .48; .3; 3.9; 1.7 SOLUTION OPHTHALMIC at 09:53

## 2025-01-16 RX ADMIN — METOPROLOL TARTRATE 50 MG: 25 TABLET, FILM COATED ORAL at 09:07

## 2025-01-16 RX ADMIN — BALANCED SALT SOLUTION 300 DROP: 6.4; .75; .48; .3; 3.9; 1.7 SOLUTION OPHTHALMIC at 12:04

## 2025-01-16 RX ADMIN — SODIUM CHLORIDE, PRESERVATIVE FREE 10 ML: 5 INJECTION INTRAVENOUS at 08:36

## 2025-01-16 RX ADMIN — CLOPIDOGREL BISULFATE 75 MG: 75 TABLET ORAL at 08:35

## 2025-01-16 RX ADMIN — BALANCED SALT SOLUTION 300 DROP: 6.4; .75; .48; .3; 3.9; 1.7 SOLUTION OPHTHALMIC at 09:05

## 2025-01-16 RX ADMIN — KETOROLAC TROMETHAMINE 1 DROP: 5 SOLUTION/ DROPS OPHTHALMIC at 08:35

## 2025-01-16 RX ADMIN — BALANCED SALT SOLUTION 300 DROP: 6.4; .75; .48; .3; 3.9; 1.7 SOLUTION OPHTHALMIC at 08:10

## 2025-01-16 RX ADMIN — BALANCED SALT SOLUTION 300 DROP: 6.4; .75; .48; .3; 3.9; 1.7 SOLUTION OPHTHALMIC at 14:57

## 2025-01-16 RX ADMIN — KETOROLAC TROMETHAMINE 1 DROP: 5 SOLUTION/ DROPS OPHTHALMIC at 12:51

## 2025-01-16 RX ADMIN — SODIUM CHLORIDE, PRESERVATIVE FREE 10 ML: 5 INJECTION INTRAVENOUS at 08:11

## 2025-01-16 RX ADMIN — HYDRALAZINE HYDROCHLORIDE 50 MG: 50 TABLET ORAL at 11:55

## 2025-01-16 RX ADMIN — BALANCED SALT SOLUTION: 6.4; .75; .48; .3; 3.9; 1.7 SOLUTION OPHTHALMIC at 11:00

## 2025-01-16 NOTE — DISCHARGE SUMMARY
normal    Motor exam is symmetrical 5 out of 5 all extremities bilaterally    Sensory:    Touch:    Right Upper Extremity:  normal  Left Upper Extremity:  normal  Right Lower Extremity:  normal  Left Lower Extremity:  normal    Coordination/Dysmetria:  Finger to Nose:   Right:  normal  Left:  normal     Gait:  normal   NIH Stroke Scale Total (if not done complete detailed one below):    1a.  Level of consciousness:  0 - alert; keenly responsive  1b.  Level of consciousness questions:  0 - answers both questions correctly  1c.  Level of consciousness questions:  0 - performs both tasks correctly  2.    Best Gaze:  0 - normal  3.    Visual:  0 - no visual loss  4.    Facial Palsy:  0 - normal symmetric movement  5a.  Motor left arm:  0 - no drift, limb holds 90 (or 45) degrees for full 10 seconds  5b.  Motor right arm:  0 - no drift, limb holds 90 (or 45) degrees for full 10 seconds  6a.  Motor left le - no drift; leg holds 30 degree position for full 5 seconds  6b.  Motor right le - no drift; leg holds 30 degree position for full 5 seconds  7.    Limb Ataxia:  0 - absent  8.    Sensory:  0 - normal; no sensory loss  9.    Best Language:  0 - no aphasia, normal  10.  Dysarthria:  0 - normal  11.  Extinction and Inattention:  0 - no abnormality  TOTAL:       LABS/IMAGING     Recent Labs     01/15/25  1004 25  0307   WBC  --  13.7*   HGB  --  10.1*   HCT  --  31.5*   PLT  --  244   NA  --  134*   K  --  3.7   CL  --  101   CO2  --  25   BUN  --  11   CREATININE 0.7 0.7       IR ANGIOGRAM CAROTID CEREBRAL BILATERAL    Result Date: 1/15/2025  Date of Service: 1/15/2025 Patient arrived to the angio suite at: 10:19 AM Puncture obtained at: 11:30 AM Vascular access was removed at: 2:03 PM Manual pressure for minutes: 15 minutes Patient wheeled out of the angio suite at: 2:25 PM Diagnosis:    Basilar tip aneurysm Procedures: -- Right ultrasound guided femoral artery access -- Selective left vertebral artery

## 2025-01-16 NOTE — PROGRESS NOTES
[facility]   Stroke & Neurointerventional Clinic Note    [facility]   [address]   [place] [address]   P: [phone]   F: [phone]  Endovascular Neurosurgery Note    Pt Name: Dahlia Zhang  MRN: [id number]  YOB: 1950  Date of evaluation: 1/16/2025  Primary Care Physician: Thania Rahman MD    Reason for evaluation: SAH and Basilar tip rupture more so on the right P1    SUBJECTIVE:     S/p stent placement yesterday. No new complaints, no new focal neuro deficits. Continues on Aspirin and Plavix.      History of Chief Complaint:    Dahlia Zhang is a 74 y.o. 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     s/p WEB device embolization with obliteration of aneurysm, achieving Chris 1 on 10/11/2023.       Original H&H 5 and modified mccracken 4.    Found to have afib RVR on the 10/15/2023.    Doing well mRS 1  Memory issues, typical in SAH     FU angiogram on April 4, 2024 showed residual neck     Allergies  is allergic to lisinopril, codeine, norco [hydrocodone-acetaminophen], and percocet [oxycodone-acetaminophen].  Medications  Prior to Admission medications    Medication Sig Start Date End Date Taking? Authorizing Provider   hydrALAZINE (APRESOLINE) 50 MG tablet Take 2 tablets by mouth daily   Yes ProviderGlenis MD   omeprazole (PRILOSEC) 40 MG delayed release capsule Take 1 capsule by mouth daily   Yes ProviderGlenis MD   umeclidinium bromide (INCRUSE ELLIPTA) 62.5 MCG/ACT inhaler Inhale 1 puff into the lungs daily   Yes ProviderGlenis MD   calcium carbonate (TUMS) 500 MG chewable tablet Take 1 tablet by mouth daily as needed for Heartburn   Yes ProviderGlenis MD   albuterol sulfate HFA (PROVENTIL;VENTOLIN;PROAIR) 108 (90 Base) MCG/ACT inhaler Inhale 1 puff into the lungs every 6 hours as needed 11/15/24  negative for anxiety      Review of systems otherwise negative.      OBJECTIVE:   Vitals: BP (!) 124/54   Pulse 90   Temp 97.9 °F (36.6 °C) (Oral)   Resp 18   Ht 1.702 m (5' 7\")   Wt 90.4 kg (199 lb 3.2 oz)   SpO2 97%   BMI 31.20 kg/m²     Gen: No acute distress  MS: Awake, Oriented x3, No obvious aphasia  CN: Pupils reactive, no gaze deviation, no gross facial droop, tongue midline, no dysarthria  Motor: Moves all extremities antigravity  Sens: Responds to LT in all extremities  Gait: Deferred    NIH 0  MRS 0      LABS:     Recent Labs     01/15/25  1004 01/16/25  0307   WBC  --  13.7*   HGB  --  10.1*   HCT  --  31.5*   PLT  --  244   NA  --  134*   K  --  3.7   CL  --  101   CO2  --  25   BUN  --  11   CREATININE 0.7 0.7   CALCIUM  --  8.4*     No results for input(s): \"ALKPHOS\", \"ALT\", \"AST\", \"BILITOT\", \"BILIDIR\", \"LABALBU\", \"AMYLASE\", \"LIPASE\" in the last 72 hours.    RADIOLOGY:   Images were personally reviewed including:      CTA 10/11/2023    Cerebral angiogram 10/11/2023:  There is a bilobed anteriorly facing ruptured basilar tip aneurysm with moderate stenosis at the tip of the basilar artery.  The basilar tip aneurysm measures neck 3.13mm, width 4.7mm, height 4.83mm and breath 4.71mm.  The above was treated with 7Fr short radial sheath, 7Fr RIST, 5Fr Berenstein, VIA 17, Synchro Support, WEB 5x3mm, post deployment, the aneurysm is obliterated, achieving Chris 1, WED Occlusion Score A.       Post treatment: Obliterated basilar tip aneurysm, Chris 1            April 4, 2024 follow up angiogram         1/16/25 2nd LVIS placement             IMPRESSIONS:     Dahlia Zhang is a 74 y.o. 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     s/p WEB device embolization with obliteration of aneurysm, achieving Chris 1 on 10/11/2023.     Original H&H 5 and modified mccracken 4.    Found to have afib RVR on the

## 2025-01-16 NOTE — PLAN OF CARE
Problem: Chronic Conditions and Co-morbidities  Goal: Patient's chronic conditions and co-morbidity symptoms are monitored and maintained or improved  Outcome: Completed     Problem: Discharge Planning  Goal: Discharge to home or other facility with appropriate resources  Outcome: Completed     Problem: Safety - Adult  Goal: Free from fall injury  Outcome: Completed

## 2025-01-16 NOTE — DISCHARGE INSTRUCTIONS
You were admitted to the hospital after re-treatment of a basilar artery aneurysm residual neck using a stent.  Continue taking your Aspirin and Clopidogrel (Plavix) as prescribed.  Hold Eliquis until your follow-up with the neuroendovascular team in 2-4 weeks.  Do not stop taking your medications without speaking with your care team first.  Follow up in the Neuro Endovascular clinic in 2-4 weeks with Dr. Conte and in 3 months with Dr. Carl.  Avoid lifting more than 10 pounds for 7 days post procedure.  Hold your Metformin for 48 hours post op, OK to restart 1/18 morning time.      You had right eye pain postprocedure concerning for possible corneal abrasion.  This is felt to be less likely as the pain improved/resolved.  You can use balanced salt solution eye drops as needed to help flush the eye and ketorolac eye drops as needed for discomfort.  If the pain recurs, you should see your eye doctor.    While admitted, you had a urinalysis which was suggestive of a urinary tract infection.  You were started on Keflex 500mg twice daily for 5 days.  Please follow up with your primary care physician in the next 1-2 weeks.       Call 911 anytime you think you may need emergency care. For example, call if:    You passed out (lost consciousness).     You have severe trouble breathing.     You have sudden chest pain and shortness of breath, or you cough up blood.     You have symptoms of a stroke. These may include:  Sudden numbness, tingling, weakness, or loss of movement in your face, arm, or leg, especially on only one side of your body.  Sudden vision changes.  Sudden trouble speaking.  Sudden confusion or trouble understanding simple statements.  Sudden problems with walking or balance.  A sudden, severe headache that is different from past headaches.   Call your doctor now or seek immediate medical care if:    You are bleeding from the area where the catheter was put in your artery.     You have a fast-growing,

## 2025-01-16 NOTE — H&P
[facility]   Stroke & Neurointerventional Clinic Note    [facility]   [address]   [place] [address]   P: [phone]   F: [phone]  Endovascular Neurosurgery H&P Note    Pt Name: aDhlia Zhang  MRN: [id number]  YOB: 1950  Date of evaluation: 1/16/2025  Primary Care Physician: Thania Rahman MD    Reason for evaluation: SAH and Basilar tip rupture more so on the right P1    SUBJECTIVE:     Presents for elective cerebral angiogram and stent placement for aneurysm embolization. Has no new complaints or focal neuro deficits. Has stopped her Eliquis 2 days ago. Continues on Aspirin and Plavix. States she is ready for procedure.          History of Chief Complaint:    Dahlia Zhang is a 74 y.o. 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     s/p WEB device embolization with obliteration of aneurysm, achieving Chris 1 on 10/11/2023.       Original H&H 5 and modified mccracken 4.    Found to have afib RVR on the 10/15/2023.    Doing well mRS 1  Memory issues, typical in SAH     FU angiogram on April 4, 2024 showed residual neck     Allergies  is allergic to lisinopril, codeine, norco [hydrocodone-acetaminophen], and percocet [oxycodone-acetaminophen].  Medications  Prior to Admission medications    Medication Sig Start Date End Date Taking? Authorizing Provider   hydrALAZINE (APRESOLINE) 50 MG tablet Take 2 tablets by mouth daily   Yes Glenis Lewis MD   omeprazole (PRILOSEC) 40 MG delayed release capsule Take 1 capsule by mouth daily   Yes Glenis Lewis MD   umeclidinium bromide (INCRUSE ELLIPTA) 62.5 MCG/ACT inhaler Inhale 1 puff into the lungs daily   Yes Glenis Lewis MD   calcium carbonate (TUMS) 500 MG chewable tablet Take 1 tablet by mouth daily as needed for Heartburn   Yes Glenis Lewis MD  cerebral infarction (HCC), Colon polyps, COPD (chronic obstructive pulmonary disease) (HCC), COVID-19, COVID-19 vaccine administered, DM II (diabetes mellitus, type II), controlled (HCC), GERD (gastroesophageal reflux disease), HTN (hypertension), Hyperlipidemia, Kidney stone, On home O2, Osteoarthritis, Sleep apnea, Under care of team, Under care of team, Under care of team, Under care of team, Wears glasses, Wears partial dentures, and Wellness examination.  Past Surgical History   has a past surgical history that includes Thyroid surgery; Breast reduction surgery (Bilateral, 1989); Hysterectomy; hernia repair; Cerebral angiogram (04/04/2024); Cerebral angiogram (10/11/2023); Cerebral angiogram (06/19/2024); Cholecystectomy; Cerebral angiogram (12/12/2024); Breast biopsy (Right); Cataract extraction w/ intraocular lens implant (Bilateral, 2020); Colonoscopy w/ polypectomy (01/19/2023); and other surgical history (01/15/2025).  Social History   reports that she has been smoking cigarettes. She started smoking about 58 years ago. She has a 71.6 pack-year smoking history. She has never been exposed to tobacco smoke. She has never used smokeless tobacco.   reports current alcohol use of about 1.0 standard drink of alcohol per week.   reports no history of drug use.  Family History  family history includes Dementia in her mother; Heart Disease in her father; Hypertension in her father and mother; No Known Problems in her sister; Stroke in her father and mother.    Review of Systems:  CONSTITUTIONAL:  negative for fevers, chills, fatigue and malaise    EYES:  negative for double vision, blurred vision and photophobia     HEENT:  negative for tinnitus, epistaxis and sore throat    RESPIRATORY:  negative for cough, shortness of breath, wheezing    CARDIOVASCULAR:  negative for chest pain, palpitations, syncope, edema    GASTROINTESTINAL:  negative for nausea, vomiting    GENITOURINARY:  negative for incontinence

## 2025-01-16 NOTE — CARE COORDINATION
Case Management Assessment  Initial Observation Evaluation    Date/Time of Evaluation: 1/16/2025 12:00 PM  Assessment Completed by: Staci Avalos RN    If patient is discharged prior to next notation, then this note serves as note for discharge by case management.    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     Patient Admission Status: Observation   Readmission Risk (Low < 19, Mod (19-27), High > 27): Readmission Risk Score: 14.7    Current PCP: Thania Rahman MD  PCP verified by CM? (P) Yes       History Provided by: (P) Patient  Patient Orientation: (P) Alert and Oriented, Person, Place, Situation, Self    Patient Cognition: (P) Alert      ADLS  Prior functional level: (P) Independent in ADLs/IADLs  Current functional level: (P) Independent in ADLs/IADLs  Family can provide assistance at DC: (P) Yes  Would you like Case Management to discuss the discharge plan with any other family members/significant others, and if so, who? (P) No    Financial    Payor: MEDICARE / Plan: MEDICARE PART A AND B / Product Type: *No Product type* /       Transportation/Food Security/Housing Addressed:  No issues identified.     Equipment needs: none    Case Management Services Information Letter Provided [x]    Transition plan: Plan is home with ,  will transport, denies needs.

## 2025-01-16 NOTE — ANESTHESIA POSTPROCEDURE EVALUATION
Department of Anesthesiology  Postprocedure Note    Patient: Dahlia Zhang  MRN: [id number]  YOB: 1950  Date of evaluation: 1/15/2025    Procedure Summary       Date: 01/15/25 Room / Location: [facility]    Anesthesia Start: 1022 Anesthesia Stop: 1435    Procedure: IR ANGIOGRAM CAROTID CEREBRAL BILATERAL Diagnosis:       Basilar artery aneurysm (HCC)      Basilar artery embolism    Scheduled Providers: Brent Gonzalez MD Responsible Provider: Jacqueline Collazo MD    Anesthesia Type: general ASA Status: 3            Anesthesia Type: No value filed.    Thao Phase I: Thao Score: 9    Thao Phase II:      Anesthesia Post Evaluation    Patient location during evaluation: ICU  Patient participation: complete - patient participated  Level of consciousness: awake and alert  Airway patency: patent  Nausea & Vomiting: no nausea and no vomiting  Cardiovascular status: blood pressure returned to baseline  Respiratory status: acceptable  Hydration status: euvolemic  Comments: Patient awake  in neuro ICU  C/o of pain  in RT eye - hurts with lid motion     Eye not pink  Slightly tearing     Likely  minor corneal irritation / abrasion        Rec :   Ketorolac eyedrops for 24 hours    BSS irrigation every  1-2 hours.     If no resolution after 24 hours would advise an ophthalmology consult.        Multimodal analgesia pain management approach  Pain management: adequate    No notable events documented.

## 2025-01-17 NOTE — PROGRESS NOTES
CLINICAL PHARMACY NOTE: MEDS TO BEDS    Total # of Prescriptions Filled: 1   The following medications were delivered to the patient:  KETOROLAC 0.5% SOLN    Additional Documentation:     NANCY ROE

## 2025-01-19 LAB
MICROORGANISM SPEC CULT: ABNORMAL
SPECIMEN DESCRIPTION: ABNORMAL

## 2025-01-23 ENCOUNTER — ANESTHESIA (OUTPATIENT)
Dept: INTERVENTIONAL RADIOLOGY/VASCULAR | Age: 75
End: 2025-01-23
Payer: MEDICARE

## 2025-01-23 ENCOUNTER — APPOINTMENT (OUTPATIENT)
Dept: CT IMAGING | Age: 75
DRG: 023 | End: 2025-01-23
Payer: MEDICARE

## 2025-01-23 ENCOUNTER — APPOINTMENT (OUTPATIENT)
Dept: INTERVENTIONAL RADIOLOGY/VASCULAR | Age: 75
DRG: 023 | End: 2025-01-23
Payer: MEDICARE

## 2025-01-23 ENCOUNTER — ANESTHESIA EVENT (OUTPATIENT)
Dept: INTERVENTIONAL RADIOLOGY/VASCULAR | Age: 75
End: 2025-01-23
Payer: MEDICARE

## 2025-01-23 ENCOUNTER — APPOINTMENT (OUTPATIENT)
Dept: GENERAL RADIOLOGY | Age: 75
DRG: 023 | End: 2025-01-23
Payer: MEDICARE

## 2025-01-23 ENCOUNTER — HOSPITAL ENCOUNTER (INPATIENT)
Age: 75
LOS: 23 days | Discharge: NURSING, REHAB OR LONG-TERM CARE | DRG: 023 | End: 2025-02-15
Attending: EMERGENCY MEDICINE | Admitting: PSYCHIATRY & NEUROLOGY
Payer: MEDICARE

## 2025-01-23 ENCOUNTER — APPOINTMENT (OUTPATIENT)
Dept: MRI IMAGING | Age: 75
DRG: 023 | End: 2025-01-23
Payer: MEDICARE

## 2025-01-23 DIAGNOSIS — R53.1 LEFT-SIDED WEAKNESS: Primary | ICD-10-CM

## 2025-01-23 DIAGNOSIS — M79.89 LEFT ARM SWELLING: ICD-10-CM

## 2025-01-23 PROBLEM — I63.81 THALAMIC STROKE (HCC): Status: ACTIVE | Noted: 2025-01-23

## 2025-01-23 PROBLEM — G93.40 ENCEPHALOPATHY: Status: ACTIVE | Noted: 2025-01-23

## 2025-01-23 LAB
ANION GAP SERPL CALCULATED.3IONS-SCNC: 17 MMOL/L (ref 9–16)
BACTERIA URNS QL MICRO: NORMAL
BACTERIA URNS QL MICRO: NORMAL
BASOPHILS # BLD: 0 K/UL (ref 0–0.2)
BASOPHILS NFR BLD: 0 % (ref 0–2)
BILIRUB UR QL STRIP: NEGATIVE
BILIRUB UR QL STRIP: NEGATIVE
BUN BLD-MCNC: 14 MG/DL (ref 8–26)
BUN SERPL-MCNC: 13 MG/DL (ref 8–23)
CA-I BLD-SCNC: 1.18 MMOL/L (ref 1.15–1.33)
CALCIUM SERPL-MCNC: 9.1 MG/DL (ref 8.6–10.4)
CASTS #/AREA URNS LPF: NORMAL /LPF (ref 0–8)
CASTS #/AREA URNS LPF: NORMAL /LPF (ref 0–8)
CHLORIDE BLD-SCNC: 95 MMOL/L (ref 98–107)
CHLORIDE SERPL-SCNC: 96 MMOL/L (ref 98–107)
CHOLEST SERPL-MCNC: 141 MG/DL (ref 0–199)
CHOLESTEROL/HDL RATIO: 2.5
CK SERPL-CCNC: 115 U/L (ref 26–192)
CLARITY UR: CLEAR
CLARITY UR: CLEAR
CLOSURE TME COLL+ADP BLD: 84 SEC (ref 67–112)
CO2 BLD CALC-SCNC: 26 MMOL/L (ref 22–30)
CO2 SERPL-SCNC: 22 MMOL/L (ref 20–31)
COLLAGEN EPINEPHRINE TIME: 118 SEC (ref 85–172)
COLOR UR: YELLOW
COLOR UR: YELLOW
CREAT SERPL-MCNC: 0.9 MG/DL (ref 0.6–0.9)
EGFR, POC: >90 ML/MIN/1.73M2
EOSINOPHIL # BLD: 0 K/UL (ref 0–0.44)
EOSINOPHILS RELATIVE PERCENT: 0 % (ref 1–4)
EPI CELLS #/AREA URNS HPF: NORMAL /HPF (ref 0–5)
EPI CELLS #/AREA URNS HPF: NORMAL /HPF (ref 0–5)
ERYTHROCYTE [DISTWIDTH] IN BLOOD BY AUTOMATED COUNT: 14.1 % (ref 11.8–14.4)
GFR, ESTIMATED: 67 ML/MIN/1.73M2
GLUCOSE BLD-MCNC: 147 MG/DL (ref 65–105)
GLUCOSE BLD-MCNC: 242 MG/DL (ref 74–100)
GLUCOSE SERPL-MCNC: 225 MG/DL (ref 74–99)
GLUCOSE UR STRIP-MCNC: ABNORMAL MG/DL
GLUCOSE UR STRIP-MCNC: ABNORMAL MG/DL
HCO3 VENOUS: 26.8 MMOL/L (ref 22–29)
HCT VFR BLD AUTO: 38 % (ref 36.3–47.1)
HCT VFR BLD AUTO: 42 % (ref 36–46)
HDLC SERPL-MCNC: 56 MG/DL
HGB BLD-MCNC: 12 G/DL (ref 11.9–15.1)
HGB UR QL STRIP.AUTO: NEGATIVE
HGB UR QL STRIP.AUTO: NEGATIVE
IMM GRANULOCYTES # BLD AUTO: 0.52 K/UL (ref 0–0.3)
IMM GRANULOCYTES NFR BLD: 2 %
INR PPP: 1.1
KETONES UR STRIP-MCNC: ABNORMAL MG/DL
KETONES UR STRIP-MCNC: ABNORMAL MG/DL
LACTIC ACID, WHOLE BLOOD: 4.1 MMOL/L (ref 0.7–2.1)
LDLC SERPL CALC-MCNC: 64 MG/DL (ref 0–100)
LEUKOCYTE ESTERASE UR QL STRIP: NEGATIVE
LEUKOCYTE ESTERASE UR QL STRIP: NEGATIVE
LYMPHOCYTES NFR BLD: 0.26 K/UL (ref 1.1–3.7)
LYMPHOCYTES RELATIVE PERCENT: 1 % (ref 24–43)
MCH RBC QN AUTO: 28 PG (ref 25.2–33.5)
MCHC RBC AUTO-ENTMCNC: 31.6 G/DL (ref 28.4–34.8)
MCV RBC AUTO: 88.6 FL (ref 82.6–102.9)
MONOCYTES NFR BLD: 0.26 K/UL (ref 0.1–1.2)
MONOCYTES NFR BLD: 1 % (ref 3–12)
MORPHOLOGY: NORMAL
MYOGLOBIN SERPL-MCNC: 63 NG/ML (ref 25–58)
NEUTROPHILS NFR BLD: 96 % (ref 36–65)
NEUTS SEG NFR BLD: 24.96 K/UL (ref 1.5–8.1)
NITRITE UR QL STRIP: NEGATIVE
NITRITE UR QL STRIP: NEGATIVE
NRBC BLD-RTO: 0 PER 100 WBC
O2 SAT, VEN: 31 % (ref 60–85)
PARTIAL THROMBOPLASTIN TIME: 24.8 SEC (ref 23–36.5)
PCO2 VENOUS: 44.3 MM HG (ref 41–51)
PH UR STRIP: 5.5 [PH] (ref 5–8)
PH UR STRIP: 6 [PH] (ref 5–8)
PH VENOUS: 7.39 (ref 7.32–7.43)
PLATELET # BLD AUTO: 314 K/UL (ref 138–453)
PLATELET FUNCTION INTERP: NORMAL
PMV BLD AUTO: 11.3 FL (ref 8.1–13.5)
PO2 VENOUS: 20 MM HG (ref 30–50)
POC ANION GAP: 18 MMOL/L (ref 7–16)
POC CREATININE: 0.7 MG/DL (ref 0.51–1.19)
POC HEMOGLOBIN (CALC): 14.3 G/DL (ref 12–16)
POC LACTIC ACID: 7.9 MMOL/L (ref 0.56–1.39)
POSITIVE BASE EXCESS, VEN: 1.4 MMOL/L (ref 0–3)
POTASSIUM BLD-SCNC: 3.8 MMOL/L (ref 3.5–4.5)
POTASSIUM SERPL-SCNC: 3.7 MMOL/L (ref 3.7–5.3)
PROT UR STRIP-MCNC: ABNORMAL MG/DL
PROT UR STRIP-MCNC: ABNORMAL MG/DL
PROTHROMBIN TIME: 13.9 SEC (ref 11.7–14.9)
RBC # BLD AUTO: 4.29 M/UL (ref 3.95–5.11)
RBC #/AREA URNS HPF: NORMAL /HPF (ref 0–4)
RBC #/AREA URNS HPF: NORMAL /HPF (ref 0–4)
SODIUM BLD-SCNC: 138 MMOL/L (ref 138–146)
SODIUM SERPL-SCNC: 135 MMOL/L (ref 136–145)
SP GR UR STRIP: 1.05 (ref 1–1.03)
SP GR UR STRIP: 1.08 (ref 1–1.03)
TRIGL SERPL-MCNC: 105 MG/DL
TROPONIN I SERPL HS-MCNC: 34 NG/L (ref 0–14)
TROPONIN I SERPL HS-MCNC: 39 NG/L (ref 0–14)
TSH SERPL DL<=0.05 MIU/L-ACNC: 0.92 UIU/ML (ref 0.27–4.2)
UROBILINOGEN UR STRIP-ACNC: NORMAL EU/DL (ref 0–1)
UROBILINOGEN UR STRIP-ACNC: NORMAL EU/DL (ref 0–1)
VLDLC SERPL CALC-MCNC: 21 MG/DL (ref 1–30)
WBC #/AREA URNS HPF: NORMAL /HPF (ref 0–5)
WBC #/AREA URNS HPF: NORMAL /HPF (ref 0–5)
WBC OTHER # BLD: 26 K/UL (ref 3.5–11.3)

## 2025-01-23 PROCEDURE — 82330 ASSAY OF CALCIUM: CPT

## 2025-01-23 PROCEDURE — 82947 ASSAY GLUCOSE BLOOD QUANT: CPT

## 2025-01-23 PROCEDURE — 70450 CT HEAD/BRAIN W/O DYE: CPT

## 2025-01-23 PROCEDURE — 85730 THROMBOPLASTIN TIME PARTIAL: CPT

## 2025-01-23 PROCEDURE — 83874 ASSAY OF MYOGLOBIN: CPT

## 2025-01-23 PROCEDURE — 82803 BLOOD GASES ANY COMBINATION: CPT

## 2025-01-23 PROCEDURE — 87040 BLOOD CULTURE FOR BACTERIA: CPT

## 2025-01-23 PROCEDURE — 84520 ASSAY OF UREA NITROGEN: CPT

## 2025-01-23 PROCEDURE — 82565 ASSAY OF CREATININE: CPT

## 2025-01-23 PROCEDURE — 80048 BASIC METABOLIC PNL TOTAL CA: CPT

## 2025-01-23 PROCEDURE — 95816 EEG AWAKE AND DROWSY: CPT

## 2025-01-23 PROCEDURE — 76377 3D RENDER W/INTRP POSTPROCES: CPT | Performed by: PSYCHIATRY & NEUROLOGY

## 2025-01-23 PROCEDURE — 84484 ASSAY OF TROPONIN QUANT: CPT

## 2025-01-23 PROCEDURE — 99223 1ST HOSP IP/OBS HIGH 75: CPT | Performed by: PSYCHIATRY & NEUROLOGY

## 2025-01-23 PROCEDURE — 85576 BLOOD PLATELET AGGREGATION: CPT

## 2025-01-23 PROCEDURE — C9460 INJECTION, CANGRELOR: HCPCS | Performed by: STUDENT IN AN ORGANIZED HEALTH CARE EDUCATION/TRAINING PROGRAM

## 2025-01-23 PROCEDURE — C1887 CATHETER, GUIDING: HCPCS

## 2025-01-23 PROCEDURE — 2500000003 HC RX 250 WO HCPCS

## 2025-01-23 PROCEDURE — 85610 PROTHROMBIN TIME: CPT

## 2025-01-23 PROCEDURE — 99222 1ST HOSP IP/OBS MODERATE 55: CPT | Performed by: PSYCHIATRY & NEUROLOGY

## 2025-01-23 PROCEDURE — 70496 CT ANGIOGRAPHY HEAD: CPT

## 2025-01-23 PROCEDURE — 3700000001 HC ADD 15 MINUTES (ANESTHESIA)

## 2025-01-23 PROCEDURE — 2000000000 HC ICU R&B

## 2025-01-23 PROCEDURE — 99285 EMERGENCY DEPT VISIT HI MDM: CPT

## 2025-01-23 PROCEDURE — 6360000004 HC RX CONTRAST MEDICATION: Performed by: PSYCHIATRY & NEUROLOGY

## 2025-01-23 PROCEDURE — 76377 3D RENDER W/INTRP POSTPROCES: CPT

## 2025-01-23 PROCEDURE — 36226 PLACE CATH VERTEBRAL ART: CPT | Performed by: PSYCHIATRY & NEUROLOGY

## 2025-01-23 PROCEDURE — 84443 ASSAY THYROID STIM HORMONE: CPT

## 2025-01-23 PROCEDURE — 82550 ASSAY OF CK (CPK): CPT

## 2025-01-23 PROCEDURE — 6360000002 HC RX W HCPCS: Performed by: REGISTERED NURSE

## 2025-01-23 PROCEDURE — 81001 URINALYSIS AUTO W/SCOPE: CPT

## 2025-01-23 PROCEDURE — 6370000000 HC RX 637 (ALT 250 FOR IP)

## 2025-01-23 PROCEDURE — 2500000003 HC RX 250 WO HCPCS: Performed by: REGISTERED NURSE

## 2025-01-23 PROCEDURE — 71045 X-RAY EXAM CHEST 1 VIEW: CPT

## 2025-01-23 PROCEDURE — 36226 PLACE CATH VERTEBRAL ART: CPT

## 2025-01-23 PROCEDURE — C1769 GUIDE WIRE: HCPCS

## 2025-01-23 PROCEDURE — 6360000002 HC RX W HCPCS: Performed by: STUDENT IN AN ORGANIZED HEALTH CARE EDUCATION/TRAINING PROGRAM

## 2025-01-23 PROCEDURE — 80051 ELECTROLYTE PANEL: CPT

## 2025-01-23 PROCEDURE — B31RZZZ FLUOROSCOPY OF INTRACRANIAL ARTERIES: ICD-10-PCS | Performed by: PSYCHIATRY & NEUROLOGY

## 2025-01-23 PROCEDURE — 6360000004 HC RX CONTRAST MEDICATION

## 2025-01-23 PROCEDURE — 95819 EEG AWAKE AND ASLEEP: CPT | Performed by: PSYCHIATRY & NEUROLOGY

## 2025-01-23 PROCEDURE — 36415 COLL VENOUS BLD VENIPUNCTURE: CPT

## 2025-01-23 PROCEDURE — 6360000002 HC RX W HCPCS

## 2025-01-23 PROCEDURE — 85014 HEMATOCRIT: CPT

## 2025-01-23 PROCEDURE — 2580000003 HC RX 258: Performed by: STUDENT IN AN ORGANIZED HEALTH CARE EDUCATION/TRAINING PROGRAM

## 2025-01-23 PROCEDURE — 87086 URINE CULTURE/COLONY COUNT: CPT

## 2025-01-23 PROCEDURE — 2580000003 HC RX 258

## 2025-01-23 PROCEDURE — 70551 MRI BRAIN STEM W/O DYE: CPT

## 2025-01-23 PROCEDURE — 3700000000 HC ANESTHESIA ATTENDED CARE

## 2025-01-23 PROCEDURE — 85025 COMPLETE CBC W/AUTO DIFF WBC: CPT

## 2025-01-23 PROCEDURE — 2580000003 HC RX 258: Performed by: REGISTERED NURSE

## 2025-01-23 PROCEDURE — 82553 CREATINE MB FRACTION: CPT

## 2025-01-23 PROCEDURE — 80061 LIPID PANEL: CPT

## 2025-01-23 PROCEDURE — 83605 ASSAY OF LACTIC ACID: CPT

## 2025-01-23 RX ORDER — ROSUVASTATIN CALCIUM 20 MG/1
20 TABLET, COATED ORAL NIGHTLY
Status: DISCONTINUED | OUTPATIENT
Start: 2025-01-23 | End: 2025-02-15 | Stop reason: HOSPADM

## 2025-01-23 RX ORDER — ONDANSETRON 4 MG/1
4 TABLET, ORALLY DISINTEGRATING ORAL EVERY 8 HOURS PRN
Status: DISCONTINUED | OUTPATIENT
Start: 2025-01-23 | End: 2025-02-15 | Stop reason: HOSPADM

## 2025-01-23 RX ORDER — POTASSIUM CHLORIDE 7.45 MG/ML
10 INJECTION INTRAVENOUS PRN
Status: DISCONTINUED | OUTPATIENT
Start: 2025-01-23 | End: 2025-02-05 | Stop reason: SDUPTHER

## 2025-01-23 RX ORDER — ENOXAPARIN SODIUM 100 MG/ML
40 INJECTION SUBCUTANEOUS DAILY
Status: DISCONTINUED | OUTPATIENT
Start: 2025-01-23 | End: 2025-02-03

## 2025-01-23 RX ORDER — DILTIAZEM HYDROCHLORIDE 120 MG/1
120 CAPSULE, COATED, EXTENDED RELEASE ORAL DAILY
Status: DISCONTINUED | OUTPATIENT
Start: 2025-01-23 | End: 2025-01-23

## 2025-01-23 RX ORDER — SODIUM CHLORIDE 0.9 % (FLUSH) 0.9 %
5-40 SYRINGE (ML) INJECTION EVERY 12 HOURS SCHEDULED
Status: DISCONTINUED | OUTPATIENT
Start: 2025-01-23 | End: 2025-02-15 | Stop reason: HOSPADM

## 2025-01-23 RX ORDER — IOPAMIDOL 755 MG/ML
90 INJECTION, SOLUTION INTRAVASCULAR
Status: COMPLETED | OUTPATIENT
Start: 2025-01-23 | End: 2025-01-23

## 2025-01-23 RX ORDER — ALBUTEROL SULFATE 90 UG/1
1 INHALANT RESPIRATORY (INHALATION) EVERY 6 HOURS PRN
Status: DISCONTINUED | OUTPATIENT
Start: 2025-01-23 | End: 2025-02-13

## 2025-01-23 RX ORDER — METOPROLOL TARTRATE 25 MG/1
25 TABLET, FILM COATED ORAL 2 TIMES DAILY
Status: DISCONTINUED | OUTPATIENT
Start: 2025-01-23 | End: 2025-01-27

## 2025-01-23 RX ORDER — PROPOFOL 10 MG/ML
INJECTION, EMULSION INTRAVENOUS
Status: DISCONTINUED | OUTPATIENT
Start: 2025-01-23 | End: 2025-01-23 | Stop reason: SDUPTHER

## 2025-01-23 RX ORDER — ONDANSETRON 2 MG/ML
4 INJECTION INTRAMUSCULAR; INTRAVENOUS EVERY 6 HOURS PRN
Status: DISCONTINUED | OUTPATIENT
Start: 2025-01-23 | End: 2025-01-23

## 2025-01-23 RX ORDER — MAGNESIUM SULFATE IN WATER 40 MG/ML
2000 INJECTION, SOLUTION INTRAVENOUS PRN
Status: DISCONTINUED | OUTPATIENT
Start: 2025-01-23 | End: 2025-02-15 | Stop reason: HOSPADM

## 2025-01-23 RX ORDER — METOPROLOL TARTRATE 1 MG/ML
INJECTION, SOLUTION INTRAVENOUS
Status: COMPLETED
Start: 2025-01-23 | End: 2025-01-23

## 2025-01-23 RX ORDER — ASPIRIN 81 MG/1
81 TABLET ORAL NIGHTLY
Status: DISCONTINUED | OUTPATIENT
Start: 2025-01-23 | End: 2025-01-29

## 2025-01-23 RX ORDER — ONDANSETRON 2 MG/ML
INJECTION INTRAMUSCULAR; INTRAVENOUS
Status: DISCONTINUED | OUTPATIENT
Start: 2025-01-23 | End: 2025-01-23 | Stop reason: SDUPTHER

## 2025-01-23 RX ORDER — POTASSIUM CHLORIDE 1500 MG/1
40 TABLET, EXTENDED RELEASE ORAL PRN
Status: DISCONTINUED | OUTPATIENT
Start: 2025-01-23 | End: 2025-02-05 | Stop reason: SDUPTHER

## 2025-01-23 RX ORDER — CLOPIDOGREL BISULFATE 75 MG/1
75 TABLET ORAL DAILY
Status: DISCONTINUED | OUTPATIENT
Start: 2025-01-23 | End: 2025-01-24

## 2025-01-23 RX ORDER — DEXAMETHASONE SODIUM PHOSPHATE 4 MG/ML
INJECTION, SOLUTION INTRA-ARTICULAR; INTRALESIONAL; INTRAMUSCULAR; INTRAVENOUS; SOFT TISSUE
Status: DISCONTINUED | OUTPATIENT
Start: 2025-01-23 | End: 2025-01-23 | Stop reason: SDUPTHER

## 2025-01-23 RX ORDER — LIDOCAINE 4 G/G
1 PATCH TOPICAL DAILY
Status: DISCONTINUED | OUTPATIENT
Start: 2025-01-24 | End: 2025-01-24

## 2025-01-23 RX ORDER — METOPROLOL TARTRATE 1 MG/ML
INJECTION, SOLUTION INTRAVENOUS
Status: DISPENSED
Start: 2025-01-23 | End: 2025-01-24

## 2025-01-23 RX ORDER — SODIUM CHLORIDE 0.9 % (FLUSH) 0.9 %
5-40 SYRINGE (ML) INJECTION EVERY 12 HOURS SCHEDULED
Status: DISCONTINUED | OUTPATIENT
Start: 2025-01-23 | End: 2025-01-24

## 2025-01-23 RX ORDER — LORAZEPAM 2 MG/ML
2 INJECTION INTRAMUSCULAR EVERY 5 MIN PRN
Status: DISCONTINUED | OUTPATIENT
Start: 2025-01-23 | End: 2025-01-24

## 2025-01-23 RX ORDER — SODIUM CHLORIDE 0.9 % (FLUSH) 0.9 %
5-40 SYRINGE (ML) INJECTION PRN
Status: DISCONTINUED | OUTPATIENT
Start: 2025-01-23 | End: 2025-02-15 | Stop reason: HOSPADM

## 2025-01-23 RX ORDER — HYDRALAZINE HYDROCHLORIDE 20 MG/ML
10 INJECTION INTRAMUSCULAR; INTRAVENOUS EVERY 6 HOURS PRN
Status: DISCONTINUED | OUTPATIENT
Start: 2025-01-23 | End: 2025-01-23

## 2025-01-23 RX ORDER — METOPROLOL TARTRATE 1 MG/ML
5 INJECTION, SOLUTION INTRAVENOUS EVERY 6 HOURS
Status: DISCONTINUED | OUTPATIENT
Start: 2025-01-23 | End: 2025-01-23

## 2025-01-23 RX ORDER — HEPARIN 100 UNIT/ML
100 SYRINGE INTRAVENOUS ONCE
Status: DISCONTINUED | OUTPATIENT
Start: 2025-01-23 | End: 2025-01-23

## 2025-01-23 RX ORDER — PANTOPRAZOLE SODIUM 40 MG/1
40 TABLET, DELAYED RELEASE ORAL
Status: DISCONTINUED | OUTPATIENT
Start: 2025-01-24 | End: 2025-01-25

## 2025-01-23 RX ORDER — PHENYLEPHRINE HCL IN 0.9% NACL 1 MG/10 ML
SYRINGE (ML) INTRAVENOUS
Status: DISCONTINUED | OUTPATIENT
Start: 2025-01-23 | End: 2025-01-23 | Stop reason: SDUPTHER

## 2025-01-23 RX ORDER — ESCITALOPRAM OXALATE 10 MG/1
10 TABLET ORAL DAILY
Status: DISCONTINUED | OUTPATIENT
Start: 2025-01-23 | End: 2025-02-15 | Stop reason: HOSPADM

## 2025-01-23 RX ORDER — VASOPRESSIN 20 [USP'U]/ML
INJECTION, SOLUTION INTRAMUSCULAR; SUBCUTANEOUS
Status: DISCONTINUED | OUTPATIENT
Start: 2025-01-23 | End: 2025-01-23 | Stop reason: SDUPTHER

## 2025-01-23 RX ORDER — ONDANSETRON 4 MG/1
4 TABLET, ORALLY DISINTEGRATING ORAL EVERY 8 HOURS PRN
Status: DISCONTINUED | OUTPATIENT
Start: 2025-01-23 | End: 2025-01-23

## 2025-01-23 RX ORDER — MORPHINE SULFATE 4 MG/ML
4 INJECTION, SOLUTION INTRAMUSCULAR; INTRAVENOUS EVERY 4 HOURS PRN
Status: DISCONTINUED | OUTPATIENT
Start: 2025-01-23 | End: 2025-01-24

## 2025-01-23 RX ORDER — LIDOCAINE HYDROCHLORIDE 10 MG/ML
INJECTION, SOLUTION EPIDURAL; INFILTRATION; INTRACAUDAL; PERINEURAL
Status: DISCONTINUED | OUTPATIENT
Start: 2025-01-23 | End: 2025-01-23 | Stop reason: SDUPTHER

## 2025-01-23 RX ORDER — SODIUM CHLORIDE 9 MG/ML
INJECTION, SOLUTION INTRAVENOUS PRN
Status: DISCONTINUED | OUTPATIENT
Start: 2025-01-23 | End: 2025-02-15 | Stop reason: HOSPADM

## 2025-01-23 RX ORDER — LABETALOL HYDROCHLORIDE 5 MG/ML
10 INJECTION, SOLUTION INTRAVENOUS
Status: DISCONTINUED | OUTPATIENT
Start: 2025-01-23 | End: 2025-01-23

## 2025-01-23 RX ORDER — 0.9 % SODIUM CHLORIDE 0.9 %
500 INTRAVENOUS SOLUTION INTRAVENOUS ONCE
Status: COMPLETED | OUTPATIENT
Start: 2025-01-23 | End: 2025-01-23

## 2025-01-23 RX ORDER — METOPROLOL TARTRATE 50 MG
50 TABLET ORAL 2 TIMES DAILY
Status: DISCONTINUED | OUTPATIENT
Start: 2025-01-23 | End: 2025-01-23

## 2025-01-23 RX ORDER — SODIUM CHLORIDE 9 MG/ML
INJECTION, SOLUTION INTRAVENOUS CONTINUOUS
Status: DISCONTINUED | OUTPATIENT
Start: 2025-01-23 | End: 2025-01-24

## 2025-01-23 RX ORDER — SODIUM CHLORIDE, SODIUM LACTATE, POTASSIUM CHLORIDE, CALCIUM CHLORIDE 600; 310; 30; 20 MG/100ML; MG/100ML; MG/100ML; MG/100ML
INJECTION, SOLUTION INTRAVENOUS
Status: DISCONTINUED | OUTPATIENT
Start: 2025-01-23 | End: 2025-01-23 | Stop reason: SDUPTHER

## 2025-01-23 RX ORDER — POLYETHYLENE GLYCOL 3350 17 G/17G
17 POWDER, FOR SOLUTION ORAL DAILY PRN
Status: DISCONTINUED | OUTPATIENT
Start: 2025-01-23 | End: 2025-01-24

## 2025-01-23 RX ORDER — ROCURONIUM BROMIDE 10 MG/ML
INJECTION, SOLUTION INTRAVENOUS
Status: DISCONTINUED | OUTPATIENT
Start: 2025-01-23 | End: 2025-01-23 | Stop reason: SDUPTHER

## 2025-01-23 RX ORDER — IODIXANOL 270 MG/ML
150 INJECTION, SOLUTION INTRAVASCULAR
Status: COMPLETED | OUTPATIENT
Start: 2025-01-23 | End: 2025-01-23

## 2025-01-23 RX ORDER — METOPROLOL TARTRATE 1 MG/ML
5 INJECTION, SOLUTION INTRAVENOUS ONCE
Status: COMPLETED | OUTPATIENT
Start: 2025-01-23 | End: 2025-01-23

## 2025-01-23 RX ORDER — FENTANYL CITRATE 50 UG/ML
INJECTION, SOLUTION INTRAMUSCULAR; INTRAVENOUS
Status: DISCONTINUED | OUTPATIENT
Start: 2025-01-23 | End: 2025-01-23 | Stop reason: SDUPTHER

## 2025-01-23 RX ORDER — ONDANSETRON 2 MG/ML
4 INJECTION INTRAMUSCULAR; INTRAVENOUS EVERY 6 HOURS PRN
Status: DISCONTINUED | OUTPATIENT
Start: 2025-01-23 | End: 2025-02-15 | Stop reason: HOSPADM

## 2025-01-23 RX ORDER — HEPARIN SODIUM 1000 [USP'U]/ML
INJECTION, SOLUTION INTRAVENOUS; SUBCUTANEOUS
Status: DISCONTINUED | OUTPATIENT
Start: 2025-01-23 | End: 2025-01-23 | Stop reason: SDUPTHER

## 2025-01-23 RX ORDER — METOPROLOL TARTRATE 1 MG/ML
5 INJECTION, SOLUTION INTRAVENOUS
Status: DISCONTINUED | OUTPATIENT
Start: 2025-01-23 | End: 2025-01-24

## 2025-01-23 RX ORDER — CEFAZOLIN SODIUM 1 G/3ML
INJECTION, POWDER, FOR SOLUTION INTRAMUSCULAR; INTRAVENOUS
Status: DISCONTINUED | OUTPATIENT
Start: 2025-01-23 | End: 2025-01-23 | Stop reason: SDUPTHER

## 2025-01-23 RX ORDER — SODIUM CHLORIDE 9 MG/ML
INJECTION, SOLUTION INTRAVENOUS
Status: DISCONTINUED | OUTPATIENT
Start: 2025-01-23 | End: 2025-01-23 | Stop reason: SDUPTHER

## 2025-01-23 RX ORDER — HYDRALAZINE HYDROCHLORIDE 20 MG/ML
5 INJECTION INTRAMUSCULAR; INTRAVENOUS
Status: DISCONTINUED | OUTPATIENT
Start: 2025-01-23 | End: 2025-01-24

## 2025-01-23 RX ADMIN — DILTIAZEM HYDROCHLORIDE 120 MG: 120 CAPSULE, COATED, EXTENDED RELEASE ORAL at 19:28

## 2025-01-23 RX ADMIN — SODIUM CHLORIDE: 9 INJECTION, SOLUTION INTRAVENOUS at 19:39

## 2025-01-23 RX ADMIN — SODIUM CHLORIDE, POTASSIUM CHLORIDE, SODIUM LACTATE AND CALCIUM CHLORIDE: 600; 310; 30; 20 INJECTION, SOLUTION INTRAVENOUS at 21:12

## 2025-01-23 RX ADMIN — Medication 200 MCG: at 21:23

## 2025-01-23 RX ADMIN — HEPARIN SODIUM 2000 UNITS: 1000 INJECTION INTRAVENOUS; SUBCUTANEOUS at 21:35

## 2025-01-23 RX ADMIN — METOPROLOL TARTRATE 5 MG: 5 INJECTION INTRAVENOUS at 19:27

## 2025-01-23 RX ADMIN — Medication 200 MCG: at 21:25

## 2025-01-23 RX ADMIN — SODIUM CHLORIDE 500 ML: 9 INJECTION, SOLUTION INTRAVENOUS at 19:30

## 2025-01-23 RX ADMIN — PROPOFOL 50 MG: 10 INJECTION, EMULSION INTRAVENOUS at 21:41

## 2025-01-23 RX ADMIN — ROCURONIUM BROMIDE 50 MG: 10 INJECTION, SOLUTION INTRAVENOUS at 21:24

## 2025-01-23 RX ADMIN — VASOPRESSIN 1 UNITS: 20 INJECTION, SOLUTION INTRAVENOUS at 21:38

## 2025-01-23 RX ADMIN — DEXAMETHASONE SODIUM PHOSPHATE 4 MG: 4 INJECTION INTRA-ARTICULAR; INTRALESIONAL; INTRAMUSCULAR; INTRAVENOUS; SOFT TISSUE at 21:30

## 2025-01-23 RX ADMIN — Medication 200 MCG: at 21:21

## 2025-01-23 RX ADMIN — ONDANSETRON 4 MG: 2 INJECTION INTRAMUSCULAR; INTRAVENOUS at 22:16

## 2025-01-23 RX ADMIN — METOPROLOL TARTRATE 5 MG: 1 INJECTION, SOLUTION INTRAVENOUS at 19:27

## 2025-01-23 RX ADMIN — MORPHINE SULFATE 4 MG: 4 INJECTION INTRAVENOUS at 23:52

## 2025-01-23 RX ADMIN — FENTANYL CITRATE 50 MCG: 50 INJECTION, SOLUTION INTRAMUSCULAR; INTRAVENOUS at 21:09

## 2025-01-23 RX ADMIN — LIDOCAINE HYDROCHLORIDE 50 MG: 10 INJECTION, SOLUTION EPIDURAL; INFILTRATION; INTRACAUDAL; PERINEURAL at 21:09

## 2025-01-23 RX ADMIN — CEFAZOLIN 2 G: 1 INJECTION, POWDER, FOR SOLUTION INTRAMUSCULAR; INTRAVENOUS at 21:28

## 2025-01-23 RX ADMIN — PHENYLEPHRINE HYDROCHLORIDE 100 MCG/MIN: 10 INJECTION INTRAVENOUS at 21:23

## 2025-01-23 RX ADMIN — SUGAMMADEX 200 MG: 100 INJECTION, SOLUTION INTRAVENOUS at 22:19

## 2025-01-23 RX ADMIN — SODIUM CHLORIDE, PRESERVATIVE FREE 10 ML: 5 INJECTION INTRAVENOUS at 20:04

## 2025-01-23 RX ADMIN — SODIUM CHLORIDE: 9 INJECTION, SOLUTION INTRAVENOUS at 21:20

## 2025-01-23 RX ADMIN — IOPAMIDOL 90 ML: 755 INJECTION, SOLUTION INTRAVENOUS at 13:24

## 2025-01-23 RX ADMIN — VASOPRESSIN 0.02 UNITS/MIN: 20 INJECTION, SOLUTION INTRAVENOUS at 21:48

## 2025-01-23 RX ADMIN — CANGRELOR 2 MCG/KG/MIN: 50 INJECTION, POWDER, LYOPHILIZED, FOR SOLUTION INTRAVENOUS at 21:33

## 2025-01-23 RX ADMIN — IODIXANOL 50 ML: 270 INJECTION, SOLUTION INTRAVASCULAR at 22:26

## 2025-01-23 RX ADMIN — PROPOFOL 150 MG: 10 INJECTION, EMULSION INTRAVENOUS at 21:09

## 2025-01-23 RX ADMIN — Medication 200 MCG: at 21:17

## 2025-01-23 RX ADMIN — Medication 100 MCG: at 21:13

## 2025-01-23 ASSESSMENT — PAIN SCALES - GENERAL
PAINLEVEL_OUTOF10: 3
PAINLEVEL_OUTOF10: 8

## 2025-01-23 ASSESSMENT — PAIN DESCRIPTION - ORIENTATION: ORIENTATION: LOWER

## 2025-01-23 ASSESSMENT — PAIN DESCRIPTION - LOCATION
LOCATION: BACK
LOCATION: HEAD

## 2025-01-23 ASSESSMENT — PAIN DESCRIPTION - DESCRIPTORS: DESCRIPTORS: ACHING

## 2025-01-23 ASSESSMENT — PAIN - FUNCTIONAL ASSESSMENT: PAIN_FUNCTIONAL_ASSESSMENT: NONE - DENIES PAIN

## 2025-01-23 NOTE — ED PROVIDER NOTES
[facility]     Emergency Department     Faculty Attestation    I performed a history and physical examination of the patient and discussed management with the resident. I reviewed the resident's note and agree with the documented findings and plan of care. Any areas of disagreement are noted on the chart. I was personally present for the key portions of any procedures. I have documented in the chart those procedures where I was not present during the key portions. I have reviewed the emergency nurses triage note. I agree with the chief complaint, past medical history, past surgical history, allergies, medications, social and family history as documented unless otherwise noted below. For Physician Assistant/ Nurse Practitioner cases/documentation I have personally evaluated this patient and have completed at least one if not all key elements of the E/M (history, physical exam, and MDM). Additional findings are as noted.    Stroke alert noncritical.     Joshua Bello MD  01/23/25 1457

## 2025-01-23 NOTE — ED PROVIDER NOTES
[facility] EMERGENCY DEPARTMENT  Emergency Department Encounter  Emergency Medicine Resident     Pt Name:Dahlia Zhang  MRN: [id number]  Birthdate 1950  Date of evaluation: 1/23/25  PCP:  Thania Rahman MD  Note Started: 1:22 PM EST      CHIEF COMPLAINT       Chief Complaint   Patient presents with    Extremity Weakness       HISTORY OF PRESENT ILLNESS  (Location/Symptom, Timing/Onset, Context/Setting, Quality, Duration, Modifying Factors, Severity.)      Dahlia Zhang is a 74 y.o. female who presents with left facial droop, aphasia, slurred speech and left upper extremity weakness that started today when she woke up.  Patient states she was normal prior to going to sleep last night at approximately 10 PM but woke up with the symptoms that have progressively gotten worse.  Of note patient had an aneurysm repair on last Wednesday and was taken off of her Plavix prior to her surgery.  Plavix has not been resumed.  Patient denies any falls or recent trauma.  Is experiencing some mild shortness of breath and states she is on 2 to 3 L of oxygen at baseline at home for history of COPD.  Currently denies any chest pain but has overall generalized feeling of weakness and fatigue.    PAST MEDICAL / SURGICAL / SOCIAL / FAMILY HISTORY      has a past medical history of Anticoagulated, Anxiety and depression, Atrial fibrillation (HCC), Cerebral aneurysm, Cerebral artery occlusion with cerebral infarction (HCC), Colon polyps, COPD (chronic obstructive pulmonary disease) (HCC), COVID-19, COVID-19 vaccine administered, DM II (diabetes mellitus, type II), controlled (HCC), GERD (gastroesophageal reflux disease), HTN (hypertension), Hyperlipidemia, Kidney stone, On home O2, Osteoarthritis, Sleep apnea, Under care of team, Under care of team, Under care of team, Under care of team, Wears glasses, Wears partial dentures, and Wellness examination.     has a past surgical history that includes Thyroid  (!) 107   Temp 99.3 °F (37.4 °C) (Oral)   Resp 30   Ht 1.702 m (5' 7\")   Wt 90.4 kg (199 lb 4.7 oz)   SpO2 97%   BMI 31.21 kg/m²     Physical Exam  Vitals reviewed.   Constitutional:       General: She is not in acute distress.     Appearance: She is ill-appearing. She is not toxic-appearing.   HENT:      Head: Normocephalic.      Nose: No congestion or rhinorrhea.      Mouth/Throat:      Mouth: Mucous membranes are moist.   Eyes:      Extraocular Movements: Extraocular movements intact.      Conjunctiva/sclera: Conjunctivae normal.      Pupils: Pupils are equal, round, and reactive to light.   Cardiovascular:      Rate and Rhythm: Normal rate and regular rhythm.      Pulses: Normal pulses.   Pulmonary:      Effort: Pulmonary effort is normal. No respiratory distress.      Breath sounds: Normal breath sounds. No stridor. No wheezing.   Abdominal:      General: There is no distension.      Palpations: Abdomen is soft.      Tenderness: There is no abdominal tenderness. There is no guarding or rebound.   Musculoskeletal:      Cervical back: Neck supple.      Right lower leg: No edema.      Left lower leg: No edema.   Skin:     General: Skin is warm.      Capillary Refill: Capillary refill takes less than 2 seconds.   Neurological:      Mental Status: She is alert and oriented to person, place, and time.      GCS: GCS eye subscore is 4. GCS verbal subscore is 5. GCS motor subscore is 6.      Cranial Nerves: Facial asymmetry present.      Sensory: Sensation is intact.      Motor: Weakness present.      Coordination: Finger-Nose-Finger Test abnormal.      Comments: Left-sided facial droop.  Left-sided pronator drift.  Bilateral lower extremity weakness.   Psychiatric:         Mood and Affect: Mood normal.         DDX/DIAGNOSTIC RESULTS / EMERGENCY DEPARTMENT COURSE / MDM     Medical Decision Making  74-year-old female last well-known last night prior to going to sleep approximately 10 PM.  Presenting with left

## 2025-01-23 NOTE — PROGRESS NOTES
[facility]     Emergency/Trauma Note    PATIENT NAME: Dahlia Zhang    Shift date: 01/23/2025  Shift day: Thursday   Shift # 1    Room # 26/26     Name: Dahlia Zhang            Age: 74 y.o.  Gender: female          Quaker: None   Place of Religious:     Trauma/Incident type: Race Alert  Admit Date & Time: 1/23/2025  1:08 PM  TRAUMA NAME: None    ADVANCE DIRECTIVES IN CHART?  No    NAME OF DECISION MAKER:     RELATIONSHIP OF DECISION MAKER TO PATIENT:     PATIENT/EVENT DESCRIPTION:  Dahlia Zhang is a 74 y.o. female who arrived via ground ambulance as race alert. Patient to be admitted to 26/26. Patient was awake when  entered her room.      SPIRITUAL ASSESSMENT-INTERVENTION-OUTCOME:  No spiritual assessment was carried out because patient was having a rough time. Family was present and open to spiritual care.  provided ministry of presence, offered support, prayed with patient and family and reassured them that they were in good hands. Patient and family expressed appreciation for the spiritual and emotional support they received.     PATIENT BELONGINGS:  This  did not handle patient's belongings.     ANY BELONGINGS OF SIGNIFICANT VALUE NOTED:  Unknown    REGISTRATION STAFF NOTIFIED?  Yes    WHAT IS YOUR SPIRITUAL CARE PLAN FOR THIS PATIENT?:  Follow up visits recommended for ongoing assessment of patient's condition and for more prayers and support.     Electronically signed by Chaplain Ana, on 1/23/2025 at 2:57 PM.  [facility]  [phone]

## 2025-01-23 NOTE — PROCEDURES
PROCEDURE NOTE  Date: 1/23/2025   Name: Dahlia Zhang  YOB: 1950    Procedures            Date: 1/23/2025  Referring physician: Dr. Raya     Indication  Patient aged 74 y with CVA. EEG done to assess for epileptiform activity.    Introduction  This routine 20-minute EEG was recorded using the [employer] 10-20 System on a Key Ring workstation at 256 samples/s. Automated spike and seizure detection algorithms were applied.    Description  During the maximal alert state, a well-regulated, symmetric, and reactive 5-6 Hz posterior dominant rhythm was seen. No consistent focal slowing or interhemispheric asymmetry was noted. Stage I and stage II sleep were observed. There were no interictal epileptiform discharges or electrographic seizures.    Activations  Hyperventilation was not performed. Intermittent photic stimulation was performed and demonstrated no posterior driving response.    Impression  Abnormal awake EEG. The slowing mentioned above suggests mild non specific encephalopathy.     No epileptiform discharges were identified. Please note the absence of such activity on this record cannot conclusively rule out an epileptic disorder. If such is still clinically suspected, a repeat study with sleep deprivation and/or prolonged sampling may be helpful.    Da Blue MD  Epilepsy Board Certified.  Neurology Board Certified.    Electronically Signed

## 2025-01-23 NOTE — ED NOTES
ED to inpatient nurses report      Chief Complaint:  Chief Complaint   Patient presents with    Extremity Weakness     Present to ED from: home    MOA:     LOC: alert and orientated to name, place, date  Mobility: Requires assistance * 1  Oxygen Baseline: 3L NC    Current needs required: 3L NC   Pending ED orders: MRI  Present condition: slurred speech, left sided facial droop, R/LLE weakness    Why did the patient come to the ED? Stroke like symptoms  What is the plan? MRI, antibiotics  Any procedures or intervention occur? Blood cultures  Any safety concerns??    Mental Status:  Level of Consciousness: Alert (0)    Psych Assessment:      Vital signs   Vitals:    01/23/25 1322 01/23/25 1336 01/23/25 1415   BP: 111/74     Pulse: (!) 106     Resp: (!) 36     Temp:   99.3 °F (37.4 °C)   TempSrc:   Oral   SpO2: 98%     Weight:  90.4 kg (199 lb 4.7 oz)    Height:  1.702 m (5' 7\")         Vitals:  Patient Vitals for the past 24 hrs:   BP Temp Temp src Pulse Resp SpO2 Height Weight   01/23/25 1415 -- 99.3 °F (37.4 °C) Oral -- -- -- -- --   01/23/25 1336 -- -- -- -- -- -- 1.702 m (5' 7\") 90.4 kg (199 lb 4.7 oz)   01/23/25 1322 111/74 -- -- (!) 106 (!) 36 98 % -- --      Visit Vitals  /74   Pulse (!) 106   Temp 99.3 °F (37.4 °C) (Oral)   Resp (!) 36   Ht 1.702 m (5' 7\")   Wt 90.4 kg (199 lb 4.7 oz)   SpO2 98%   BMI 31.21 kg/m²        LDAs:   Peripheral IV 01/23/25 Right;Anterior Forearm (Active)       Ambulatory Status:  Presents to emergency department  because of falls (Syncope, seizure, or loss of consciousness): No, Age > 70: Yes, Altered Mental Status, Intoxication with alcohol or substance confusion (Disorientation, impaired judgment, poor safety awaremess, or inability to follow instructions): Yes, Impaired Mobility: Ambulates or transfers with assistive devices or assistance; Unable to ambulate or transer.: No, Nursing Judgement: No    Diagnosis:  DISPOSITION Admitted 01/23/2025 03:16:55 PM   Final diagnoses:  Neutrophils % 96 (*)     Lymphocytes % 1 (*)     Monocytes % 1 (*)     Eosinophils % 0 (*)     Immature Granulocytes Absolute 0.52 (*)     Neutrophils Absolute 24.96 (*)     Lymphocytes Absolute 0.26 (*)     Myoglobin 63 (*)     Troponin, High Sensitivity 34 (*)     All other components within normal limits   ELECTROLYTES PLUS - Abnormal; Notable for the following components:    POC Chloride 95 (*)     POC Anion Gap 18 (*)     All other components within normal limits   VENOUS BLOOD GAS, POINT OF CARE - Abnormal; Notable for the following components:    PO2, Babak 20.0 (*)     O2 Sat, Babak 31.0 (*)     All other components within normal limits   LACTIC ACID,POINT OF CARE - Abnormal; Notable for the following components:    POC Lactic Acid 7.9 (*)     All other components within normal limits   POCT GLUCOSE - Abnormal; Notable for the following components:    POC Glucose 242 (*)     All other components within normal limits   CULTURE, BLOOD 1   CULTURE, BLOOD 2   HGB/HCT   CALCIUM, IONIC (POC)   TSH   URINALYSIS WITH REFLEX TO CULTURE   LACTIC ACID   TROPONIN   CREATININE W/GFR POINT OF CARE   UREA N (POC)       Electronically signed by Suzi Santizo RN on 1/23/2025 at 3:27 PM

## 2025-01-23 NOTE — CONSULTS
Stroke Neurology Consult Note  Stroke Alert @12:50 PM on 1/23/2025  Arrival at bedside @12:52 PM    Reason for Consult:  ED Stroke Alert  Requesting Physician:  ED team   Endovascular Neurosurgeon: Balwinder Michaud MD  Stroke Team: Balwinder Michaud MD  History Obtained From:  patient, electronic medical record  Chief Complaint:  Altered mental status   Allergies:  Lisinopril, Codeine, Norco [hydrocodone-acetaminophen], and Percocet [oxycodone-acetaminophen]    History of Presenting Illness     Dahlia Zhang is a 74 y.o. right handed female with a history of ruptured basilar tip aneurysm in October 2023 s/p stenting, hypertension, hyperlipidemia, type 2 diabetes, GERD, new onset atrial fibrillation not on AC presents to [facility] for altered mental status.    On the morning of 1/23/2025, patient was noted to have increased lethargy and generalized weakness along with left facial droop.  These were concerning as patient was at her baseline and doing better yesterday.  She does not recall the time when she went to sleep, thus last known well is unknown.  Potentially she went to sleep between 6-8 PM on 1/22.     Patient was seen and examined at bedside upon arrival to [facility] ED. She was increasingly lethargic and short of breath, however she was able to cross midline without gaze preference, had intact fluency and repetition but did have mild dysarthria. She had generalized weakness.       CT head w/o contrast shows no acute finding.     CTA head and neck show flow diverting stent along basilar artery and proximal PCA with embolization coils at basilar tip. Severe narrowing within the distal basilar stent. Small amount of residual aneurysmal filling at the neck, approximately 2 mm.       LKW: Unknown  NIHSS: 9  Modified Ochoa Scale: 1  SBP: 111/74  Glucose: 225  CT head without contrast: No acute finding  TNK: Not indicated due to unknown last known well and prior history of SAH  Endovascular: N/A    Past  Histories          Diagnosis Date    Anticoagulated     ELIQUIS    Anxiety and depression     Atrial fibrillation (HCC) 10/15/2023    new onset while in Hosp    Cerebral aneurysm 10/11/2023    ruptured , SAH ,  hosp x 2 weeks  : had acute HA    Cerebral artery occlusion with cerebral infarction (HCC) 08/08/2024    HA , blurry vision , left leg weakness , slurred speech    Colon polyps     COPD (chronic obstructive pulmonary disease) (HCC)     COVID-19     3-4 times    COVID-19 vaccine administered     DM II (diabetes mellitus, type II), controlled ([facility])     GERD (gastroesophageal reflux disease)     HTN (hypertension)     Hyperlipidemia     Kidney stone     once but did not require surgery    On home O2     3-4L / nightly    Osteoarthritis     Sleep apnea     Cpap    Under care of team     Cardiology ,  SHANNA,  last seen 6/26/2024    Under care of team     Sleep Andrea, last seen 7/3/2024    Under care of team     NeuroEndovascular , Dr. Pena ,  last seen 12/26/24    Under care of team     Neurology , [facility] , last seen 10/25/2024    Wears glasses     Wears partial dentures     upper    Wellness examination     PCP , Andrea, last sen 11/15/2024         Procedure Laterality Date    BREAST BIOPSY Right     since 1989 ; benign    BREAST REDUCTION SURGERY Bilateral 1989    CATARACT EXTRACTION W/ INTRAOCULAR LENS IMPLANT Bilateral 2020    CEREBRAL ANGIOGRAM  04/04/2024    DIAGNOSTIC / DR PENA    CEREBRAL ANGIOGRAM  10/11/2023    s/p WEB device embolization with obliteration of aneurysm, achieving Chris 1 on 10/11/2023.    CEREBRAL ANGIOGRAM  06/19/2024    ANGIOGRAM CAROTID CEREBRAL BILATERAL , with stent embolization    CEREBRAL ANGIOGRAM  12/12/2024    CHOLECYSTECTOMY      COLONOSCOPY W/ POLYPECTOMY  01/19/2023    has had several with polyps every time : 11/27/2017 , 8/14/2014    HERNIA REPAIR      abdominal hernia repair , states has had several    HYSTERECTOMY (CERVIX STATUS UNKNOWN)      2000's , ovaries  of Exercise per Week: 0 days     Minutes of Exercise per Session: 0 min   Stress: No Stress Concern Present (1/10/2022)    Received from [employer]    [employer] - Occupational Stress Questionnaire     Feeling of Stress : Only a little   Social Connections: Moderately Isolated (1/10/2022)    Received from [employer]    Social Connection and Isolation Panel [NHANES]     Frequency of Communication with Friends and Family: More than three times a week     Frequency of Social Gatherings with Friends and Family: Once a week     Attends Orthodox Services: Never     Active Member of Clubs or Organizations: No     Attends Club or Organization Meetings: Never     Marital Status:    Intimate Partner Violence: Not on file   Housing Stability: Low Risk  (1/22/2025)    Received from [employer]    Housing Instability     Are you worried or concerned that in the next two months you may not have stable housing that you own, rent or stay in as a part of a household?: No         Problem Relation Age of Onset    Stroke Mother     Hypertension Mother     Dementia Mother     Heart Disease Father     Stroke Father     Hypertension Father     No Known Problems Sister      Prior to Admission medications    Medication Sig Start Date End Date Taking? Authorizing Provider   metFORMIN (GLUCOPHAGE) 500 MG tablet Take 2 tablets by mouth Daily with supper 1/18/25   Ashley Romero APRN - CNP   hydrALAZINE (APRESOLINE) 50 MG tablet Take 2 tablets by mouth daily    ProviderGlenis MD   hydrALAZINE (APRESOLINE) 50 MG tablet Take 1 tablet by mouth at bedtime    Glenis Lewis MD   omeprazole (PRILOSEC) 40 MG delayed release capsule Take 1 capsule by mouth daily    Glenis Lewis MD   umeclidinium bromide (INCRUSE ELLIPTA) 62.5 MCG/ACT inhaler Inhale 1 puff into the lungs daily    Glenis Lewis MD

## 2025-01-23 NOTE — H&P
[facility]   IN-PATIENT SERVICE   [facility]    HISTORY AND PHYSICAL EXAMINATION            Date:   1/23/2025  Patient name:  Dahlia Zhang  Date of admission:  1/23/2025  1:08 PM  MRN:   [id number]  Account:  [id number]  YOB: 1950  PCP:    Thania Rahman MD  Room:   26/26  Code Status:    Prior    Chief Complaint:     Chief Complaint   Patient presents with    Extremity Weakness       History Obtained From:     patient, electronic medical record    History of Present Illness:     Dahlia Zhang is a 74 y.o. 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     On the morning of 1/23/2025, patient was noted to have increased lethargy and generalized weakness along with left facial droop.  These were concerning as patient was at her baseline and doing better yesterday.  She does not recall the time when she went to sleep, thus last known well is unknown.  Potentially she went to sleep between 6-8 PM on 1/22.      Patient was seen and examined at bedside upon arrival to [facility] ED. She was increasingly lethargic and short of breath, however she was able to cross midline without gaze preference, had intact fluency and repetition but did have mild dysarthria. She had generalized weakness.         CT head w/o contrast shows no acute finding.      CTA head and neck show flow diverting stent along basilar artery and proximal PCA with embolization coils at basilar tip. Severe narrowing within the distal basilar stent. Small amount of residual aneurysmal filling at the neck, approximately 2 mm.        LKW: Unknown  NIHSS: 9  Modified Montgomery Scale: 1  SBP: 111/74  Glucose: 225  CT head without contrast: No acute finding  TNK: Not indicated due to unknown last known  well and prior history of SAH  Endovascular: N/A      Past Medical History:     Past Medical History:   Diagnosis Date    Anticoagulated     ELIQUIS    Anxiety and depression     Atrial fibrillation (HCC) 10/15/2023    new onset while in Hosp    Cerebral aneurysm 10/11/2023    ruptured , SAH ,  hosp x 2 weeks  : had acute HA    Cerebral artery occlusion with cerebral infarction (HCC) 08/08/2024    HA , blurry vision , left leg weakness , slurred speech    Colon polyps     COPD (chronic obstructive pulmonary disease) (HCC)     COVID-19     3-4 times    COVID-19 vaccine administered     DM II (diabetes mellitus, type II), controlled ([facility])     GERD (gastroesophageal reflux disease)     HTN (hypertension)     Hyperlipidemia     Kidney stone     once but did not require surgery    On home O2     3-4L / nightly    Osteoarthritis     Sleep apnea     Cpap    Under care of team     Cardiology ,  SHANNA,  last seen 6/26/2024    Under care of team     Sleep Andrea, last seen 7/3/2024    Under care of team     NeuroEndovascular , Dr. Pena ,  last seen 12/26/24    Under care of team     Neurology , [facility] , last seen 10/25/2024    Wears glasses     Wears partial dentures     upper    Wellness examination     PCP , Andrea, last sen 11/15/2024        Past Surgical History:     Past Surgical History:   Procedure Laterality Date    BREAST BIOPSY Right     since 1989 ; benign    BREAST REDUCTION SURGERY Bilateral 1989    CATARACT EXTRACTION W/ INTRAOCULAR LENS IMPLANT Bilateral 2020    CEREBRAL ANGIOGRAM  04/04/2024    DIAGNOSTIC / DR PENA    CEREBRAL ANGIOGRAM  10/11/2023    s/p WEB device embolization with obliteration of aneurysm, achieving Chris 1 on 10/11/2023.    CEREBRAL ANGIOGRAM  06/19/2024    ANGIOGRAM CAROTID CEREBRAL BILATERAL , with stent embolization    CEREBRAL ANGIOGRAM  12/12/2024    CHOLECYSTECTOMY      COLONOSCOPY W/ POLYPECTOMY  01/19/2023    has had several with polyps every time : 11/27/2017 ,

## 2025-01-23 NOTE — ED NOTES
Pt to ED via EMS w/ c/o weakness and L sided facial droop.   Pt had R/L lower extremity weakness. Left sided facial droop w/ slurred speech.   Pt does have hx of an aneurysm repair on 1/21/25 . Pt was told to restart Plavix at follow up out pt neuro visit.   EDIL 01/22/2025 @ 2000.   Pt has hx COPD arrived on 3 L NC, home oxygen level.   Pt a &ox4, resting in cot, respirations even and unlabored.

## 2025-01-24 ENCOUNTER — APPOINTMENT (OUTPATIENT)
Dept: CT IMAGING | Age: 75
DRG: 023 | End: 2025-01-24
Payer: MEDICARE

## 2025-01-24 ENCOUNTER — APPOINTMENT (OUTPATIENT)
Dept: GENERAL RADIOLOGY | Age: 75
DRG: 023 | End: 2025-01-24
Payer: MEDICARE

## 2025-01-24 PROBLEM — A49.8 INFECTION DUE TO ESBL-PRODUCING ESCHERICHIA COLI: Status: ACTIVE | Noted: 2025-01-24

## 2025-01-24 PROBLEM — N39.0 COMPLICATED UTI (URINARY TRACT INFECTION): Status: ACTIVE | Noted: 2025-01-24

## 2025-01-24 PROBLEM — Z16.12 INFECTION DUE TO ESBL-PRODUCING ESCHERICHIA COLI: Status: ACTIVE | Noted: 2025-01-24

## 2025-01-24 LAB
ALBUMIN SERPL-MCNC: 3.2 G/DL (ref 3.5–5.2)
ALBUMIN SERPL-MCNC: 3.3 G/DL (ref 3.5–5.2)
ALBUMIN/GLOB SERPL: 1.3 {RATIO} (ref 1–2.5)
ALBUMIN/GLOB SERPL: 1.5 {RATIO} (ref 1–2.5)
ALP SERPL-CCNC: 62 U/L (ref 35–104)
ALP SERPL-CCNC: 64 U/L (ref 35–104)
ALT SERPL-CCNC: 61 U/L (ref 10–35)
ALT SERPL-CCNC: 82 U/L (ref 10–35)
ANION GAP SERPL CALCULATED.3IONS-SCNC: 12 MMOL/L (ref 9–16)
AST SERPL-CCNC: 113 U/L (ref 10–35)
AST SERPL-CCNC: 86 U/L (ref 10–35)
B PARAP IS1001 DNA NPH QL NAA+NON-PROBE: NOT DETECTED
B PERT DNA SPEC QL NAA+PROBE: NOT DETECTED
BASOPHILS # BLD: 0 K/UL (ref 0–0.2)
BASOPHILS NFR BLD: 0 % (ref 0–2)
BILIRUB DIRECT SERPL-MCNC: 0.2 MG/DL (ref 0–0.2)
BILIRUB INDIRECT SERPL-MCNC: 0.1 MG/DL (ref 0–1)
BILIRUB SERPL-MCNC: 0.3 MG/DL (ref 0–1.2)
BILIRUB SERPL-MCNC: 0.3 MG/DL (ref 0–1.2)
BNP SERPL-MCNC: 2955 PG/ML (ref 0–450)
BUN SERPL-MCNC: 17 MG/DL (ref 8–23)
C PNEUM DNA NPH QL NAA+NON-PROBE: NOT DETECTED
CALCIUM SERPL-MCNC: 7.9 MG/DL (ref 8.6–10.4)
CHLORIDE SERPL-SCNC: 101 MMOL/L (ref 98–107)
CO2 SERPL-SCNC: 22 MMOL/L (ref 20–31)
CREAT SERPL-MCNC: 0.7 MG/DL (ref 0.6–0.9)
CRP SERPL HS-MCNC: 205 MG/L (ref 0–5)
EOSINOPHIL # BLD: 0 K/UL (ref 0–0.4)
EOSINOPHILS RELATIVE PERCENT: 0 % (ref 1–4)
ERYTHROCYTE [DISTWIDTH] IN BLOOD BY AUTOMATED COUNT: 14 % (ref 11.8–14.4)
EST. AVERAGE GLUCOSE BLD GHB EST-MCNC: 163 MG/DL
FLUAV RNA NPH QL NAA+NON-PROBE: NOT DETECTED
FLUBV RNA NPH QL NAA+NON-PROBE: NOT DETECTED
GFR, ESTIMATED: >90 ML/MIN/1.73M2
GLOBULIN SER CALC-MCNC: 2.2 G/DL
GLUCOSE BLD-MCNC: 148 MG/DL (ref 65–105)
GLUCOSE BLD-MCNC: 169 MG/DL (ref 65–105)
GLUCOSE BLD-MCNC: 205 MG/DL (ref 65–105)
GLUCOSE SERPL-MCNC: 191 MG/DL (ref 74–99)
HADV DNA NPH QL NAA+NON-PROBE: NOT DETECTED
HBA1C MFR BLD: 7.3 % (ref 4–6)
HCOV 229E RNA NPH QL NAA+NON-PROBE: NOT DETECTED
HCOV HKU1 RNA NPH QL NAA+NON-PROBE: NOT DETECTED
HCOV NL63 RNA NPH QL NAA+NON-PROBE: NOT DETECTED
HCOV OC43 RNA NPH QL NAA+NON-PROBE: NOT DETECTED
HCT VFR BLD AUTO: 31 % (ref 36.3–47.1)
HGB BLD-MCNC: 10.2 G/DL (ref 11.9–15.1)
HMPV RNA NPH QL NAA+NON-PROBE: NOT DETECTED
HPIV1 RNA NPH QL NAA+NON-PROBE: NOT DETECTED
HPIV2 RNA NPH QL NAA+NON-PROBE: NOT DETECTED
HPIV3 RNA NPH QL NAA+NON-PROBE: NOT DETECTED
HPIV4 RNA NPH QL NAA+NON-PROBE: NOT DETECTED
IMM GRANULOCYTES # BLD AUTO: 0 K/UL (ref 0–0.3)
IMM GRANULOCYTES NFR BLD: 0 %
INHIBITION AA TEG: 95.7 % (ref 0–11)
INHIBITION ADP TEG: 98.8 % (ref 0–17)
LACTIC ACID, WHOLE BLOOD: 1.9 MMOL/L (ref 0.7–2.1)
LYMPHOCYTES NFR BLD: 0.21 K/UL (ref 1–4.8)
LYMPHOCYTES RELATIVE PERCENT: 1 % (ref 24–44)
M PNEUMO DNA NPH QL NAA+NON-PROBE: NOT DETECTED
MA (MAX CLOT) TEG KAOLIN: 68.7 MM (ref 53–68)
MA(AA) TEG: 21.6 MM (ref 51–71)
MA(ACTIVATED) TEG: 19.5 MM (ref 2–19)
MA(ADP) TEG: 20.1 MM (ref 45–69)
MCH RBC QN AUTO: 28 PG (ref 25.2–33.5)
MCHC RBC AUTO-ENTMCNC: 32.9 G/DL (ref 28.4–34.8)
MCV RBC AUTO: 85.2 FL (ref 82.6–102.9)
MICROORGANISM SPEC CULT: NO GROWTH
MONOCYTES NFR BLD: 0 % (ref 1–7)
MONOCYTES NFR BLD: 0 K/UL (ref 0.1–0.8)
MORPHOLOGY: NORMAL
NEUTROPHILS NFR BLD: 99 % (ref 36–66)
NEUTS SEG NFR BLD: 20.39 K/UL (ref 1.8–7.7)
NRBC BLD-RTO: 0 PER 100 WBC
PLATELET # BLD AUTO: 282 K/UL (ref 138–453)
PMV BLD AUTO: 11.4 FL (ref 8.1–13.5)
POTASSIUM SERPL-SCNC: 3.7 MMOL/L (ref 3.7–5.3)
PROT SERPL-MCNC: 5.5 G/DL (ref 6.6–8.7)
PROT SERPL-MCNC: 5.6 G/DL (ref 6.6–8.7)
RBC # BLD AUTO: 3.64 M/UL (ref 3.95–5.11)
RSV RNA NPH QL NAA+NON-PROBE: NOT DETECTED
RV+EV RNA NPH QL NAA+NON-PROBE: NOT DETECTED
SARS-COV-2 RNA NPH QL NAA+NON-PROBE: NOT DETECTED
SERVICE CMNT-IMP: NORMAL
SODIUM SERPL-SCNC: 135 MMOL/L (ref 136–145)
SPECIMEN DESCRIPTION: NORMAL
SPECIMEN DESCRIPTION: NORMAL
TROPONIN I SERPL HS-MCNC: 38 NG/L (ref 0–14)
TROPONIN I SERPL HS-MCNC: 44 NG/L (ref 0–14)
WBC OTHER # BLD: 20.6 K/UL (ref 3.5–11.3)

## 2025-01-24 PROCEDURE — 6370000000 HC RX 637 (ALT 250 FOR IP)

## 2025-01-24 PROCEDURE — 2580000003 HC RX 258

## 2025-01-24 PROCEDURE — 86738 MYCOPLASMA ANTIBODY: CPT

## 2025-01-24 PROCEDURE — 85025 COMPLETE CBC W/AUTO DIFF WBC: CPT

## 2025-01-24 PROCEDURE — 0202U NFCT DS 22 TRGT SARS-COV-2: CPT

## 2025-01-24 PROCEDURE — 2000000000 HC ICU R&B

## 2025-01-24 PROCEDURE — G0545 PR INHERENT VISIT TO INPT: HCPCS | Performed by: INTERNAL MEDICINE

## 2025-01-24 PROCEDURE — 2500000003 HC RX 250 WO HCPCS

## 2025-01-24 PROCEDURE — 6370000000 HC RX 637 (ALT 250 FOR IP): Performed by: NURSE PRACTITIONER

## 2025-01-24 PROCEDURE — 99222 1ST HOSP IP/OBS MODERATE 55: CPT | Performed by: INTERNAL MEDICINE

## 2025-01-24 PROCEDURE — 94761 N-INVAS EAR/PLS OXIMETRY MLT: CPT

## 2025-01-24 PROCEDURE — 6360000002 HC RX W HCPCS

## 2025-01-24 PROCEDURE — 2700000000 HC OXYGEN THERAPY PER DAY

## 2025-01-24 PROCEDURE — 6360000002 HC RX W HCPCS: Performed by: NURSE PRACTITIONER

## 2025-01-24 PROCEDURE — 86140 C-REACTIVE PROTEIN: CPT

## 2025-01-24 PROCEDURE — 83036 HEMOGLOBIN GLYCOSYLATED A1C: CPT

## 2025-01-24 PROCEDURE — 83605 ASSAY OF LACTIC ACID: CPT

## 2025-01-24 PROCEDURE — 94640 AIRWAY INHALATION TREATMENT: CPT

## 2025-01-24 PROCEDURE — 80053 COMPREHEN METABOLIC PANEL: CPT

## 2025-01-24 PROCEDURE — 92523 SPEECH SOUND LANG COMPREHEN: CPT

## 2025-01-24 PROCEDURE — 70450 CT HEAD/BRAIN W/O DYE: CPT

## 2025-01-24 PROCEDURE — 83880 ASSAY OF NATRIURETIC PEPTIDE: CPT

## 2025-01-24 PROCEDURE — 2500000003 HC RX 250 WO HCPCS: Performed by: STUDENT IN AN ORGANIZED HEALTH CARE EDUCATION/TRAINING PROGRAM

## 2025-01-24 PROCEDURE — 82947 ASSAY GLUCOSE BLOOD QUANT: CPT

## 2025-01-24 PROCEDURE — 36415 COLL VENOUS BLD VENIPUNCTURE: CPT

## 2025-01-24 PROCEDURE — 2580000003 HC RX 258: Performed by: NURSE PRACTITIONER

## 2025-01-24 PROCEDURE — 71045 X-RAY EXAM CHEST 1 VIEW: CPT

## 2025-01-24 PROCEDURE — 99233 SBSQ HOSP IP/OBS HIGH 50: CPT | Performed by: PSYCHIATRY & NEUROLOGY

## 2025-01-24 PROCEDURE — C9460 INJECTION, CANGRELOR: HCPCS | Performed by: STUDENT IN AN ORGANIZED HEALTH CARE EDUCATION/TRAINING PROGRAM

## 2025-01-24 PROCEDURE — 80076 HEPATIC FUNCTION PANEL: CPT

## 2025-01-24 PROCEDURE — 2580000003 HC RX 258: Performed by: STUDENT IN AN ORGANIZED HEALTH CARE EDUCATION/TRAINING PROGRAM

## 2025-01-24 PROCEDURE — 84484 ASSAY OF TROPONIN QUANT: CPT

## 2025-01-24 PROCEDURE — 85576 BLOOD PLATELET AGGREGATION: CPT

## 2025-01-24 PROCEDURE — 99223 1ST HOSP IP/OBS HIGH 75: CPT | Performed by: PSYCHIATRY & NEUROLOGY

## 2025-01-24 PROCEDURE — 6360000002 HC RX W HCPCS: Performed by: STUDENT IN AN ORGANIZED HEALTH CARE EDUCATION/TRAINING PROGRAM

## 2025-01-24 RX ORDER — FENTANYL CITRATE 50 UG/ML
50 INJECTION, SOLUTION INTRAMUSCULAR; INTRAVENOUS ONCE
Status: COMPLETED | OUTPATIENT
Start: 2025-01-24 | End: 2025-01-24

## 2025-01-24 RX ORDER — INSULIN LISPRO 100 [IU]/ML
0-4 INJECTION, SOLUTION INTRAVENOUS; SUBCUTANEOUS
Status: DISCONTINUED | OUTPATIENT
Start: 2025-01-24 | End: 2025-02-07 | Stop reason: SDUPTHER

## 2025-01-24 RX ORDER — ONDANSETRON 4 MG/1
4 TABLET, ORALLY DISINTEGRATING ORAL EVERY 8 HOURS PRN
Status: DISCONTINUED | OUTPATIENT
Start: 2025-01-24 | End: 2025-01-24

## 2025-01-24 RX ORDER — MIDODRINE HYDROCHLORIDE 5 MG/1
2.5 TABLET ORAL 3 TIMES DAILY PRN
Status: DISCONTINUED | OUTPATIENT
Start: 2025-01-24 | End: 2025-01-24

## 2025-01-24 RX ORDER — POLYETHYLENE GLYCOL 3350 17 G/17G
17 POWDER, FOR SOLUTION ORAL DAILY PRN
Status: DISCONTINUED | OUTPATIENT
Start: 2025-01-24 | End: 2025-02-15 | Stop reason: HOSPADM

## 2025-01-24 RX ORDER — SODIUM CHLORIDE 0.9 % (FLUSH) 0.9 %
5-40 SYRINGE (ML) INJECTION PRN
Status: DISCONTINUED | OUTPATIENT
Start: 2025-01-24 | End: 2025-02-15 | Stop reason: HOSPADM

## 2025-01-24 RX ORDER — LIDOCAINE 4 G/G
1 PATCH TOPICAL DAILY
Status: DISCONTINUED | OUTPATIENT
Start: 2025-01-24 | End: 2025-02-15 | Stop reason: HOSPADM

## 2025-01-24 RX ORDER — DEXTROSE MONOHYDRATE 100 MG/ML
INJECTION, SOLUTION INTRAVENOUS CONTINUOUS PRN
Status: DISCONTINUED | OUTPATIENT
Start: 2025-01-24 | End: 2025-02-15 | Stop reason: HOSPADM

## 2025-01-24 RX ORDER — SODIUM CHLORIDE 0.9 % (FLUSH) 0.9 %
5-40 SYRINGE (ML) INJECTION EVERY 12 HOURS SCHEDULED
Status: DISCONTINUED | OUTPATIENT
Start: 2025-01-24 | End: 2025-01-24

## 2025-01-24 RX ORDER — ACETAMINOPHEN 325 MG/1
650 TABLET ORAL EVERY 4 HOURS PRN
Status: DISCONTINUED | OUTPATIENT
Start: 2025-01-24 | End: 2025-02-15 | Stop reason: HOSPADM

## 2025-01-24 RX ORDER — PHENYLEPHRINE HCL IN 0.9% NACL 50MG/250ML
10-300 PLASTIC BAG, INJECTION (ML) INTRAVENOUS CONTINUOUS
Status: DISCONTINUED | OUTPATIENT
Start: 2025-01-24 | End: 2025-01-24

## 2025-01-24 RX ORDER — FENTANYL CITRATE 50 UG/ML
INJECTION, SOLUTION INTRAMUSCULAR; INTRAVENOUS
Status: COMPLETED
Start: 2025-01-24 | End: 2025-01-24

## 2025-01-24 RX ORDER — DILTIAZEM HYDROCHLORIDE 120 MG/1
120 CAPSULE, COATED, EXTENDED RELEASE ORAL DAILY
Status: DISCONTINUED | OUTPATIENT
Start: 2025-01-24 | End: 2025-01-30

## 2025-01-24 RX ORDER — ONDANSETRON 2 MG/ML
4 INJECTION INTRAMUSCULAR; INTRAVENOUS EVERY 6 HOURS PRN
Status: DISCONTINUED | OUTPATIENT
Start: 2025-01-24 | End: 2025-01-24

## 2025-01-24 RX ORDER — SODIUM CHLORIDE 9 MG/ML
INJECTION, SOLUTION INTRAVENOUS PRN
Status: DISCONTINUED | OUTPATIENT
Start: 2025-01-24 | End: 2025-02-15 | Stop reason: HOSPADM

## 2025-01-24 RX ORDER — GLUCAGON 1 MG/ML
1 KIT INJECTION PRN
Status: DISCONTINUED | OUTPATIENT
Start: 2025-01-24 | End: 2025-02-15 | Stop reason: HOSPADM

## 2025-01-24 RX ORDER — DILTIAZEM HYDROCHLORIDE 120 MG/1
120 CAPSULE, COATED, EXTENDED RELEASE ORAL DAILY
Status: DISCONTINUED | OUTPATIENT
Start: 2025-01-24 | End: 2025-01-24

## 2025-01-24 RX ORDER — SODIUM CHLORIDE 9 MG/ML
INJECTION, SOLUTION INTRAVENOUS CONTINUOUS
Status: DISCONTINUED | OUTPATIENT
Start: 2025-01-24 | End: 2025-01-24

## 2025-01-24 RX ADMIN — PHENYLEPHRINE HYDROCHLORIDE 30 MCG/MIN: 10 INJECTION INTRAVENOUS at 01:43

## 2025-01-24 RX ADMIN — ENOXAPARIN SODIUM 40 MG: 100 INJECTION SUBCUTANEOUS at 08:03

## 2025-01-24 RX ADMIN — SODIUM CHLORIDE: 9 INJECTION, SOLUTION INTRAVENOUS at 00:17

## 2025-01-24 RX ADMIN — FENTANYL CITRATE 50 MCG: 50 INJECTION, SOLUTION INTRAMUSCULAR; INTRAVENOUS at 10:41

## 2025-01-24 RX ADMIN — TICAGRELOR 180 MG: 90 TABLET ORAL at 05:34

## 2025-01-24 RX ADMIN — MEROPENEM 1000 MG: 1 INJECTION INTRAVENOUS at 20:17

## 2025-01-24 RX ADMIN — SODIUM CHLORIDE, PRESERVATIVE FREE 10 ML: 5 INJECTION INTRAVENOUS at 00:33

## 2025-01-24 RX ADMIN — METOPROLOL TARTRATE 25 MG: 25 TABLET, FILM COATED ORAL at 20:07

## 2025-01-24 RX ADMIN — MEROPENEM 1000 MG: 1 INJECTION INTRAVENOUS at 11:34

## 2025-01-24 RX ADMIN — PANTOPRAZOLE SODIUM 40 MG: 40 TABLET, DELAYED RELEASE ORAL at 05:34

## 2025-01-24 RX ADMIN — ESCITALOPRAM OXALATE 10 MG: 10 TABLET ORAL at 08:03

## 2025-01-24 RX ADMIN — ASPIRIN 81 MG: 81 TABLET, COATED ORAL at 20:07

## 2025-01-24 RX ADMIN — ACETAMINOPHEN 650 MG: 325 TABLET ORAL at 09:13

## 2025-01-24 RX ADMIN — CANGRELOR 2 MCG/KG/MIN: 50 INJECTION, POWDER, LYOPHILIZED, FOR SOLUTION INTRAVENOUS at 01:45

## 2025-01-24 RX ADMIN — DILTIAZEM HYDROCHLORIDE 120 MG: 120 CAPSULE, COATED, EXTENDED RELEASE ORAL at 09:36

## 2025-01-24 RX ADMIN — SODIUM CHLORIDE, PRESERVATIVE FREE 10 ML: 5 INJECTION INTRAVENOUS at 20:07

## 2025-01-24 RX ADMIN — TIOTROPIUM BROMIDE INHALATION SPRAY 2 PUFF: 3.12 SPRAY, METERED RESPIRATORY (INHALATION) at 09:08

## 2025-01-24 RX ADMIN — TICAGRELOR 90 MG: 90 TABLET ORAL at 20:07

## 2025-01-24 RX ADMIN — MIDODRINE HYDROCHLORIDE 2.5 MG: 5 TABLET ORAL at 00:31

## 2025-01-24 RX ADMIN — ROSUVASTATIN CALCIUM 20 MG: 20 TABLET, FILM COATED ORAL at 20:07

## 2025-01-24 RX ADMIN — INSULIN LISPRO 1 UNITS: 100 INJECTION, SOLUTION INTRAVENOUS; SUBCUTANEOUS at 21:30

## 2025-01-24 RX ADMIN — SODIUM CHLORIDE: 9 INJECTION, SOLUTION INTRAVENOUS at 20:16

## 2025-01-24 RX ADMIN — SODIUM CHLORIDE, PRESERVATIVE FREE 10 ML: 5 INJECTION INTRAVENOUS at 08:03

## 2025-01-24 RX ADMIN — PHENYLEPHRINE HYDROCHLORIDE 60 MCG/MIN: 10 INJECTION INTRAVENOUS at 09:39

## 2025-01-24 NOTE — PROGRESS NOTES
Daily Progress Note  Neuro Critical Care    Patient Name: Dahlia Zhang  Patient : 1950  Room/Bed: 0115/0115-01  Code Status: FULL  Allergies:   Allergies   Allergen Reactions    Lisinopril Angioedema    Codeine Nausea And Vomiting    Norco [Hydrocodone-Acetaminophen] Nausea And Vomiting    Percocet [Oxycodone-Acetaminophen] Nausea And Vomiting       CHIEF COMPLAINT:      Back pain     INTERVAL HISTORY    Initial Presentation (Admitted 25):  The patient is a 74 y.o. 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 Patient initially presented to [facility] ED on 25 with complaints of altered mental status, increased lethargy, dysarthria, left facial droop and generalized weakness. Initial NIHSS 9.  /59.  Patient has been compliant with Aspirin and Plavix post recent stent placement.  She remains off Eliquis as planned.  MRI brain without contrast showed acute infarct in right von, punctate infarcts in right cerebellum, thalamus and right occipital lobe.  CTA showed severe narrowing within the distal basilar stent, small ~2mm amount of residual aneurysmal filling and neck and 60% L cervical ICA stenosis.  Labs revealed hyperglycemia (225), elevated lactic (4.1), mild troponin elevation (34), mild transaminitis and leukocytosis (WBC 26.0).  ID was consulted from ED.  Patient was taken for urgent DSA showing previously treated basilar tip aneurysm (s/p WEB, LVIS x2) is completely obliterated, stents are patent and well apposed with filling defect at the basilar tip; possible thrombus vs metal artifact.  Given Cangrelor bolus followed by infusion.    Admitted to the Neuro ICU post procedure.  NIHSS 1.  On exam post procedure, patient is alert and answering questions appropriately. Exam limited 2/2 post op restrictions  Heparin was administered for a target ACT of 250-300. A 6 Indonesian Neuron 070 intermediate catheter (IC) was then  advanced over a 5F select catheter and a stiff glidewire guide wire to the level of the aortic arch. There is a 360 vascular loop at the origin of the left vertebral artery requiring the catheter to be in the arch while navigating the wire to access and straighten the loop up to the V3 segment before tracking the catheter Left VA technique: The left subclavian artery was then selectively catheterized and the left vertebral artery was selectively catheterized with roadmap guidance. Digital subtraction angiography of the intracranial left vertebrobasilar run-off was obtained in frontal and lateral projections. Interpretation: The left vertebral artery injection demonstrates normal antegrade opacification of the left vertebral artery, including the cervical segment, basilar artery, and respective branches. There is visualization of the previously treated ruptured basilar tip aneurysm s/p WEB 10/11/23 c/b residual neck s/p LVIS placement 6/2024. There is residual neck filling measuring 2.74mm height x 1.61mm width/neck. The stent is patent and well-apposed with no endoleak or stenosis.  Retrograde contrast opacification of the contralateral right V4 segment was achieved and demonstrated no evidence of an aneurysm at the origin of the right posterior inferior cerebellar artery.  There was no evidence of cerebral aneurysm, arteriovenous malformation, or arterial stenosis. Capillary and venous phase images were unremarkable. Basilar Tip aneurysm embolization: The select catheter was removed and headway 21 microcatheter was advanced over a Synchro2 014 microwire into the left PCA artery. The microwire was removed and a LVIS 4.5x23mm  spanning from the left P2 segment to the mid-basilar artery. The stent was telescoped through the previously deployed LVIS stent and crossed the residual aneurysmal neck. Stent  left arm:  0 - no drift, limb holds 90 (or 45) degrees for full 10 seconds  5b.  Motor right arm:  0 - no drift, limb holds 90 (or 45) degrees for full 10 seconds  6a.  Motor left le - no drift; leg holds 30 degree position for full 5 seconds  6b.  Motor right le - no drift; leg holds 30 degree position for full 5 seconds  7.    Limb Ataxia:  0 - absent  8.    Sensory:  0 - normal; no sensory loss  9.    Best Language:  0 - no aphasia, normal  10.  Dysarthria:  0 - normal  11.  Extinction and Inattention:  0 - no abnormality  TOTAL: 0    DRAINS:  [x] There are no drains for Neuro Critical Care to monitor at this time.     ASSESSMENT AND PLAN:       The patient is a 75 yo female with a history of SAH 2/2 basilar tip aneurysm rupture (s/p WEB, LVIS x2), COPD, afib, HTN, HLD, ischemic stroke (L occipital, and smoking who presented to [facility] ED on 25 with complaints of altered mental status, increased lethargy, dysarthria, left facial droop and generalized weakness. Initial NIHSS 9.  /59.  Patient has been compliant with Aspirin and Plavix post recent stent placement.  She remains off Eliquis as planned.  MRI brain without contrast showed acute infarct in right von, punctate infarcts in right cerebellum, thalamus and right occipital lobe.  CTA showed severe narrowing within the distal basilar stent.  Taken for urgent DSA showing previously treated basilar tip aneurysm (s/p WEB, LVIS x2) is completely obliterated, stents are patent and well apposed with filling defect at the basilar tip; possible thrombus vs metal artifact.  Given Cangrelor bolus followed by infusion, transitioned to Brilinta.      NEUROLOGIC:  Acute right pontine, right cerebellar, right thalamus and right occipital lobe infarctions likely secondary to thrombosed basilar tip LVIS  - MRI brain without contrast showed acute infarct in right von, punctate infarcts in right cerebellum, thalamus and right occipital lobe.  - POD

## 2025-01-24 NOTE — PROGRESS NOTES
Facility/Department: [address] NEURO ICU  Initial Speech/Language/Cognitive Assessment    NAME: Dahlia Zhang  : 1950   MRN: [id number]  ADMISSION DATE: 2025  ADMITTING DIAGNOSIS: has Atrial fibrillation (HCC); Subarachnoid hemorrhage from basilar artery aneurysm (HCC); MRI-safe endovascular aneurysm coil present; Basilar artery aneurysm (HCC); Aneurysm, cerebral; Ischemic stroke (HCC); Hypertension; Mixed hyperlipidemia; Current every day smoker; Visual disturbance; Basilar artery embolism; Encephalopathy; Left-sided weakness; and Thalamic stroke (HCC) on their problem list.      Date of Eval: 2025   Evaluating Therapist: KRANTHI Kruse    RECENT RESULTS  CT OF HEAD/MRI: IMPRESSION:  1. Evolving lacunar infarct within the right paramedian von and right  thalamus. Tiny infarcts in the cerebellum difficult to appreciate within the  constraints of CT acquisition, as identified on the recent MRI brain.  2. No acute intracranial hemorrhage or significant mass effect.  3. Remote left PCA territory cortical infarct involving the occipital pole.  4. Moderate chronic small vessel ischemic changes.    Primary Complaint: The patient is a 74 y.o. 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 MRI brain earlier today showed large acute infarct in right von, punctate infarcts in right cerebellum, thalamus and right occipital lobe.  CTA showed severe narrowing within the distal basilar stent, small ~2mm amount of residual aneurysmal filling and neck and 60% L cervical ICA stenosis. Presented this admission as a stroke alert due to AMS - increased lethargy, dysarthria, left facial droop and generalized weakness.   Was

## 2025-01-24 NOTE — PROGRESS NOTES
Pt arrived to unit at 2300 with REGINA and Carol CRUZ. Patient c/o back pain so lidocaine patch and morphine ordered and administered.    At this point (0045), patient noted to be going in and out of AFIB RVR with a rate in the 140s-160s, not sustaining, however, occurring more and more frequently. NCC resident to reorder patient's home Cardizem. See MAR. Patient's BP sustaining < 130 (goal is 130-160) so Midodrine given PO and 100 mL NS gtt started. Plan of care ongoing.

## 2025-01-24 NOTE — PROGRESS NOTES
Endovascular Neurosurgery Consult    Pt Name: Dahlia Zhang  MRN: [id number]  YOB: 1950  Date of evaluation: 1/24/2025  Primary Care Physician: Thania Rahman MD  Reason for evaluation: Concern for pontine stroke in the presence of previously treated basilar anneurysm with WEB device and LVIS stent    SUBJECTIVE:   Patient tolerating procedure well, currently in normal sinus rhythm.  Sheath removed and Vascade applied this a.m.  MRI shows acute right pontine infarcts with multiple punctate infarcts in the posterior circulation.      History of Chief Complaint:    Dahlia Zhang is a 74 y.o. 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     On the morning of 1/23/2025, patient was noted to have increased lethargy and generalized weakness along with left facial droop.  These were concerning as patient was at her baseline and doing better yesterday.  She does not recall the time when she went to sleep, thus last known well is unknown.  Potentially she went to sleep between 6-8 PM on 1/22.      Patient was seen and examined at bedside upon arrival to [facility] ED. She was increasingly lethargic and short of breath, however she was able to cross midline without gaze preference, had intact fluency and repetition but did have mild dysarthria. She had generalized weakness.         CT head w/o contrast shows no acute finding.      CTA head and neck show flow diverting stent along basilar artery and proximal PCA with embolization coils at basilar tip. Severe narrowing within the distal basilar stent. Small amount of residual aneurysmal filling at the neck, approximately 2 mm.        Allergies  is allergic to lisinopril, codeine, norco [hydrocodone-acetaminophen], and percocet  with a WEB intrasaccular device (10/11/2023) and a second-stage LVIS 4.5 x 23mm stent (6/2024) and a third-stage  LVIS 4.5 x 23mm stent spanning from the left P2 segment to the mid-basilar artery (1/15/2025) is completely obliterated.  The stents are patent and well apposed with a filling defect at the basilar tip that could represent partial thrombus versus metal artifact.   There is a 360 vascular loop at the origin of the left vertebral artery requiring the catheter to be in the arch while navigating the wire to access and straighten the loop up to the V3 segment before tracking the catheter   Cangrelor bolus 15 mcg/kg over 2 minutes followed by maintenance dose of 2 mcg/kg was given.  IMPRESSIONS:     Concern for acute infarct    PLANS:   Continue aspirin and Brilinta  -160  Lay flat, do not bend left leg for 3 hours post sheath removal  Thank you for the interesting evaluation. Further recommendations to follow.    Erwin Villela MD,   Pager [phone]  Stroke, Neurocritical Care & Neurointervention  [facility]  [facility]  [facility] [place]  Electronically signed 1/24/2025 at 12:34 PM

## 2025-01-24 NOTE — CARE COORDINATION
Met with pt to assess for support and complete PHQ-9 screen. Pt's husb and sister present with pt permission.   Pt stated she lives with her husb. Stated she has one son, who lives in [facility] and a dtr who lives in [place]. Stated she has one grchild. Pt's sister also lives in [facility] [facility]. Pt reports family is supportive and help as needed. Pt reports she feels depressed \"every day\". She denies any SI. Stated she is on Lexapro, prescribed by her PCP.  When asked if the meds have been helpful, she stated \" I guess\".  Pt stated she has not had any counseling but her PCP has talked to her about it. She was agreeable to receiving counseling resources, which were provided. Pt stated she does keep her PCP updated on how she is feeling.  Patient Health Questionnaire-9 (PHQ-9)    Over the last 2 weeks, how often have you been bothered by any of the following problems?    1. Little Interest or pleasure in doing things?   [x] Not at all  [] Several Days  [] More than half the day  []  Nearly every day    2. Feeling down, depressed or hopeless?    [] Not at all  [] Several Days  [] More than half the day  [x]  Nearly every day    3. Trouble falling or staying asleep, or sleeping too much?   [x] Not at all  [] Several Days  [] More than half the day  []  Nearly every day    4. Feeling tired or having little energy?   [x] Not at all  [] Several Days  [] More than half the day  []  Nearly every day    5. Poor apettite or overeating?   [x] Not at all  [] Several Days  [] More than half the day  []  Nearly every day    6. Feeling bad about yourself-or that you are a failure or have let yourself or your family down?   [x] Not at all  [] Several Days  [] More than half the day  []  Nearly every day    7. Trouble concentrating on things, such as reading the newspaper or watching television?   [x] Not at all  [] Several Days  [] More than half the day  []  Nearly every day    8. Moving or speaking so slowly that other people could have noticed?  Or the opposite-being so fidgety or restless that you have been moving around a lot more than usual?   [x] Not at all  [] Several Days  [] More than half the day  []  Nearly every day    9. Thoughts that you would be better off dead or of hurting yourself in some way?   [x] Not at all  [] Several Days  [] More than half the day  []  Nearly every day    Total Score: 3    If you checked off any problems, how difficult have these problems made it for you to do your work, take care of things at home, or get along with other people?   [] Not difficult at all  [] Somewhat Difficult  [] Very Difficult  []  Extremely Difficult

## 2025-01-24 NOTE — BRIEF OP NOTE
[facility]    NEUROENDOVASCULAR SERVICE: POST-OP NOTE: 1/23/2025    Pt Name: Dahlia Zhang  MRN: [id number]  YOB: 1950  Date of Procedure: 1/23/2025  Primary Care Physician: Thania Rahman MD        Pre-Procedural Diagnosis:Ruptured basilar tip aneurysm s/p WEB 10/11/23 c/b residual neck s/p LVIS placement 6/2024 status post Second LVIS embolization on 1/15/2025  Post-Procedural Diagnosis:same       Procedure Performed:Diagnostic Cerebral Angiogram    Surgeon:   Balwinder Michaud MD    Fellow:  Da Stoll MD     Assisting Tech:  Ron Vera    PRE-PROCEDURAL EXAM:  Neurological exam performed and unchanged from initial H&P or consult      Anesthesia: General Anesthesia  An Immediate re-assessment was completed prior to sedation, and it is determined to be safe to proceed.  Complications: none    Intra-Operative EXAM:  Patient sedated with unchanged limited neurological exam    EBL: < Minimal      Cc            Specimens: Were not Obtained  Contrast:     Visipaque 270 low osmolar 50 Cc             Fluoro: 12.4 min    Findings:  Please see dictated Radiology note for further details  The basilar artery tip aneurysm, previously treated with a WEB intrasaccular device (10/11/2023) and a second-stage LVIS 4.5 x 23mm stent (6/2024) and a third-stage  LVIS 4.5 x 23mm stent spanning from the left P2 segment to the mid-basilar artery (1/15/2025) is completely obliterated.  The stents are patent and well apposed with a filling defect at the basilar tip that could represent partial thrombus versus metal artifact.   There is a 360 vascular loop at the origin of the left vertebral artery requiring the catheter to be in the arch while navigating the wire to access and straighten the loop up to the V3 segment before tracking the catheter   Cangrelor bolus 15 mcg/kg over 2 minutes followed by maintenance dose of 2 mcg/kg was given.             POST-PROCEDURAL EXAM :   Stable  neurological Exam  Neurological exam performed and unchanged from initial H&P or consult    Closure:  left 5 Spanish sheath sutured in place        POST-PROCEDURAL MONITORING : see orders  Disposition: Neuro ICU      Recommendations:  Back to Neuro ICU  Do not bend left leg for 3 hours.  Groin checks per protocol.  Peripheral pulse checks per protocol.  SBP goal 130-160  Repeat CT head 4 AM in the morning.  If no bleed, load with Brilinta 180 mg and stop cangrelor 30 minutes after loading.    Follow up with Da Stoll MD  12 weeks after discharge and Dr. Carl 3-4 months after discharge.        MD Balwinder Garvey MD   Pager [phone]  Stroke, Neurocritical Care & Neurointervention  [facility]  [facility] Stroke [facility] [place]  Electronically signed 1/23/2025 at 10:27 PM

## 2025-01-24 NOTE — PROGRESS NOTES
Carol CRUZ from Neuro Intervention states not to start the Cangrelor and to send it with the patient when she goes down to lab

## 2025-01-24 NOTE — CARE COORDINATION
Case Management Assessment  Initial Evaluation    Date/Time of Evaluation: 1/24/2025 10:21 AM  Assessment Completed by: Julio Stone RN    If patient is discharged prior to next notation, then this note serves as note for discharge by case management.    Patient Name: Dahlia Rick                   YOB: 1950  Diagnosis: Encephalopathy [G93.40]  Left-sided weakness [R53.1]  Thalamic stroke (HCC) [I63.81]                   Date / Time: 1/23/2025  1:08 PM    Patient Admission Status: Inpatient   Readmission Risk (Low < 19, Mod (19-27), High > 27): Readmission Risk Score: 18.7    Current PCP: Thania Rahman MD  PCP verified by CM? (P) Yes    Chart Reviewed: Yes      History Provided by: (P) Patient  Patient Orientation: (P) Alert and Oriented    Patient Cognition: (P) Alert    Hospitalization in the last 30 days (Readmission):  Yes    If yes, Readmission Assessment in  Navigator will be completed.    Advance Directives:      Code Status: Full Code   Patient's Primary Decision Maker is:      Primary Decision Maker: felipa rick - Spouse - [phone]    Discharge Planning:    Patient lives with: (P) Spouse/Significant Other Type of Home: (P) House  Primary Care Giver: (P) Self  Patient Support Systems include: (P) Spouse/Significant Other, Children   Current Financial resources: (P) Medicare  Current community resources:    Current services prior to admission: (P) C-pap, Oxygen Therapy            Current DME: (P) Oxygen Therapy (Comment), Shower Chair, Cane, Walker (With C-Pap 3-4 L per NC at Night)            Type of Home Care services:  (P) None    ADLS  Prior functional level: (P) Independent in ADLs/IADLs  Current functional level: (P) Independent in ADLs/IADLs    PT AM-PAC:   /24  OT AM-PAC:   /24    Family can provide assistance at DC: (P) Yes  Would you like Case Management to discuss the discharge plan with any other family members/significant others, and if so, who? (P) Yes  (Spouse-felipa)  Plans to Return to Present Housing: (P) Yes  Other Identified Issues/Barriers to RETURNING to current housing: None Noted  Potential Assistance needed at discharge: (P) N/A            Potential DME:    Patient expects to discharge to: (P) House  Plan for transportation at discharge: (P) Family    Financial    Payor: MEDICARE / Plan: MEDICARE PART A AND B / Product Type: *No Product type* /     Does insurance require precert for SNF: No    Potential assistance Purchasing Medications: (P) No  Meds-to-Beds request: Yes      [id number] - [place], [place] - [address] - P [phone] - F [phone]  [address]  [place] [address]  Phone: [phone] Fax: [phone]      Notes:    Factors facilitating achievement of predicted outcomes: Family support, Motivated, Cooperative, and Pleasant    Barriers to discharge: Decreased endurance    Additional Case Management Notes: Spoke with patient/spouse, explained role. Copy of Case Management information given to patient. IMM signed, copy given to patient. Verified address, phone, PCP and insurance. Goal is home, has transportation and DME in the home. Agreeable to acute rehab or therapy at home if needed following DC    The Plan for Transition of Care is related to the following treatment goals of Encephalopathy [G93.40]  Left-sided weakness [R53.1]  Thalamic stroke (HCC) [I63.81]    IF APPLICABLE: The Patient and/or patient representative Dahlia and her family were provided with a choice of provider and agrees with the discharge plan. Freedom of choice list with basic dialogue that supports the patient's individualized plan of care/goals and shares the quality data associated with the providers was provided to:     Patient Representative Name:       The Patient and/or Patient Representative Agree with the Discharge Plan?  Yes    Julio Stone RN  Case Management Department  Ph: [phone]

## 2025-01-24 NOTE — CARE COORDINATION
01/24/25 1015   Readmission Assessment   Number of Days since last admission? 1-7 days   Previous Disposition Home with Family   Who is being Interviewed Patient   What was the patient's/caregiver's perception as to why they think they needed to return back to the hospital? Other (Comment)  (Medical issues)   Did you visit your Primary Care Physician after you left the hospital, before you returned this time? No   Why weren't you able to visit your PCP? Did not have an appointment   Did you see a specialist, such as Cardiac, Pulmonary, Orthopedic Physician, etc. after you left the hospital? No   Who advised the patient to return to the hospital? Self-referral   Does the patient report anything that got in the way of taking their medications? No   In our efforts to provide the best possible care to you and others like you, can you think of anything that we could have done to help you after you left the hospital the first time, so that you might not have needed to return so soon? Other (Comment)  (None Noted)

## 2025-01-24 NOTE — PROGRESS NOTES
Called CT. Spoke w/ Citlali. Unable to come down currently d/t trauma alert then stroke alert to follow. Will receive call back when ready

## 2025-01-24 NOTE — PROCEDURES
PROCEDURE NOTE  Date: 1/24/2025   Name: Dahlia Zhang  YOB: 1950    Neurointerventional Sheath Pull Note:    The left groin was exposed, prepped and draped in standard sterile technique.  A dose of 50 mcg of Fentanyl was administered intravenously. Meropenem was administered intravenously. Sheath was pulled from the L common femoral artery.  Hemostasis was achieved with a 5F vascade device followed by 15 min of manual compression.     Groin site was clean, dry and intact with no hematoma at site after hemostasis achieved. Distal left pedal pulse was unchanged and intact after groin seal.     - L leg flat for 3 hours.    - Vascular and groin checks q 15 x 4 then q 30 minutes x 4 then q 1 hour x 3.    - Advance activity as tolerated after 3 hours        Da Stoll MD  Endovascular Neurosurgery Fellow     Stroke, Neurocritical Care & Neurointervention  [facility]  [facility] Stroke [facility] [place]

## 2025-01-24 NOTE — SEDATION DOCUMENTATION
Post Procedure Transfer from IR Table  [x] Tubes and Lines intact         16 fr ward          7.5 oral tracheal tube          Right A-line     Post Procedure Neuro-Checks/NIHSS/Vitals  [x] Completed post procedure  [x] Completed bedside handoff  [x] Frequency ordered  [x] Verbal communication of frequency      Post Procedure Puncture Site Checks  [x] Completed post procedure  [x] Completed bedside handoff  [x] Frequency ordered  [x] Verbal communication of frequency    Post Procedure Pulse  Checks  [x] Completed post procedure  [x] Completed bedside handoff  [x] Frequency ordered  [x] Verbal communication of frequency    Order Set  [x] Post Neuro-Endo Procedure  [] Stroke  [] t-PA     B/P control  [x] Verbal Communication: -160   [x] Order in Care Path    Medication Review  [x] Given during procedure; IV ancef 2 gm, IV heparin 2000 units,   [x] Current drips/meds/fluids; 2135 IV Cangrelor 15 mcg/kg  bolus given followed by IV Cangrelor gtt @ 2mcg/kg    [x] Physician Notified of All changes in Assessment  2030 patient is awake, oriented self, place, able to answer one word answers correctly, like husbands name, and month. Expressive aphasia noted when patient has to give answers longer than 1 word, at time word salad, understands directions and follows commands,  drift to left arm, both legs with paresis, pedal pulses palpable but pulse less strong on left than right. Both feet cool to touch with good humberto.  Ward in place and draining low amount urine yellow in color.   2105 patient was intubated airway protection d/t having ice cream @ 1730.    2132 left femoral access obtained.   2135 patient has temp of 101.6 and WBC 26   2215 procedure end, left femoral access suttered in place.  Plan to keep until can d/c cangrelor.    2226 patient extubated.   2230 patient awake, sleepy, slurred speech, answers name and place correctly, following commands, no drift noted to left arm at this time. Both legs still  weak, wiggled toes, pulses palpable right 2+, left 1+. Feet cool to touch. Needs stay flat till morning, ward draining small amount yellow slightly clowdy urine.  Temp remain greater 101 for entire procedure.  Heart rhythm Afib with controlled rate in 80 - 90's.  Right femoral access still has dressing of guaze and opsite on.  Tore dressing off, site well healed and no sign of abnormal tissue. Area clean dry and no bruising.  Left access from today to remain, sheath connected to pressure tubing and sutured in place. Opsite dressing applied.    2240 patient transferred to [facility] in bed, monitored by NAN and CRNA   2245 bedside report given to Radha. Patient connected to monitor.  Radha to take over post checks @ 2300.      Family  [x] Location Post Procedure  2055  followed patient to IR, in IR waiting room.

## 2025-01-24 NOTE — H&P
Neuro ICU History & Physical    Patient Name: Dahlia Zhang  Patient : 1950  Room/Bed: 0133/0133-01  Code Status: full  Allergies:   Allergies   Allergen Reactions    Lisinopril Angioedema    Codeine Nausea And Vomiting    Norco [Hydrocodone-Acetaminophen] Nausea And Vomiting    Percocet [Oxycodone-Acetaminophen] Nausea And Vomiting       CHIEF COMPLAINT     Extremity weakness    HPI    History Obtained From: patient, EMR    The patient is a 74 y.o. 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 MRI brain earlier today showed large acute infarct in right von, punctate infarcts in right cerebellum, thalamus and right occipital lobe.  CTA showed severe narrowing within the distal basilar stent, small ~2mm amount of residual aneurysmal filling and neck and 60% L cervical ICA stenosis. Presented this admission as a stroke alert due to AMS - increased lethargy, dysarthria, left facial droop and generalized weakness.   Was complaining of sharp right orbital pain during her admission earlier this month, resolved after 1x 10mg decadron and ketorolac eyedrops per notes. Has a previous history of afib diagnosed in 2023 previously on eliquis which was held earlier this month to be restarted after 2-4 weeks follow up with endovascular.   On exam post procedure, patient is alert and answering questions appropriately. Exam limited 2/2 post op restrictions however spontaneously moving bilateral lower extremities.   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, subsequently found to have residual neck s/p LVIS stent embolization     has had several with polyps every time : 11/27/2017 , 8/14/2014    HERNIA REPAIR      abdominal hernia repair , states has had several    HYSTERECTOMY (CERVIX STATUS UNKNOWN)      2000's , ovaries remain    OTHER SURGICAL HISTORY  01/15/2025    IR ANGIOGRAM CAROTID CEREBRAL BILATERAL    THYROID SURGERY      lumpectomy near thyroid : benign       Social History:   Social History     Socioeconomic History    Marital status:      Spouse name: Not on file    Number of children: Not on file    Years of education: Not on file    Highest education level: Not on file   Occupational History    Not on file   Tobacco Use    Smoking status: Every Day     Current packs/day: 0.50     Average packs/day: 1.2 packs/day for 58.1 years (71.6 ttl pk-yrs)     Types: Cigarettes     Start date: 1967     Passive exposure: Never    Smokeless tobacco: Never   Vaping Use    Vaping status: Never Used   Substance and Sexual Activity    Alcohol use: Yes     Alcohol/week: 1.0 standard drink of alcohol     Types: 1 Drinks containing 0.5 oz of alcohol per week     Comment: 2 times per month    Drug use: Never    Sexual activity: Not Currently     Partners: Male   Other Topics Concern    Not on file   Social History Narrative    Not on file     Social Determinants of Health     Financial Resource Strain: Low Risk  (1/22/2025)    Received from [employer]    Overall Financial Resource Strain (CARDIA)     Difficulty of Paying Living Expenses: Not hard at all   Food Insecurity: No Food Insecurity (1/22/2025)    Received from [facility]    Hunger Screening     Within the past 12 months we worried whether our food would run out before we got money to buy more.: Never True     Within the past 12 months the food we bought just didn't last and we didn't have money to get more.: Never True   Transportation Needs: No Transportation Needs (1/22/2025)    Received from [facility]    PRAPARE - Transportation

## 2025-01-24 NOTE — ANESTHESIA PRE PROCEDURE
daily 10/26/23   Lucy Simmons APRN - CNP   metoprolol tartrate (LOPRESSOR) 50 MG tablet Take 1 tablet by mouth 2 times daily 10/26/23   Lucy Simmons APRN - CNP   dilTIAZem (CARDIZEM CD) 120 MG extended release capsule Take 1 capsule by mouth daily 10/27/23   Lucy Simmons APRN - CNP   potassium chloride (KLOR-CON M) 20 MEQ extended release tablet Take 1 tablet by mouth daily 10/26/23   Lucy Simmons APRN - CNP       Current medications:    No current facility-administered medications for this visit.     No current outpatient medications on file.     Facility-Administered Medications Ordered in Other Visits   Medication Dose Route Frequency Provider Last Rate Last Admin    albuterol sulfate HFA (PROVENTIL;VENTOLIN;PROAIR) 108 (90 Base) MCG/ACT inhaler 1 puff  1 puff Inhalation Q6H PRN Karley, Galileo, DO        aspirin EC tablet 81 mg  81 mg Oral Nightly Karley, Galileo, DO        clopidogrel (PLAVIX) tablet 75 mg  75 mg Oral Daily Karley, Galileo, DO        escitalopram (LEXAPRO) tablet 10 mg  10 mg Oral Daily Karley, Galileo, DO        [START ON 1/24/2025] pantoprazole (PROTONIX) tablet 40 mg  40 mg Oral QAM AC Karley, Galileo, DO        tiotropium (SPIRIVA RESPIMAT) 2.5 MCG/ACT inhaler 2 puff  2 puff Inhalation Daily RT Karley, Galileo, DO        sodium chloride flush 0.9 % injection 5-40 mL  5-40 mL IntraVENous 2 times per day Karley, Galileo, DO   10 mL at 01/23/25 2004    sodium chloride flush 0.9 % injection 5-40 mL  5-40 mL IntraVENous PRN Karley, Galileo, DO        0.9 % sodium chloride infusion   IntraVENous PRN Karley, Galileo, DO        potassium chloride (KLOR-CON M) extended release tablet 40 mEq  40 mEq Oral PRN Karley, Galileo, DO        Or    potassium bicarb-citric acid (EFFER-K) effervescent tablet 40 mEq  40 mEq Oral PRN Karley, Galileo, DO        Or    potassium chloride 10 mEq/100 mL IVPB (Peripheral Line)  10 mEq IntraVENous PRN Galileo Crandall DO        magnesium sulfate 2000 mg in 50 mL IVPB premix  2,000

## 2025-01-24 NOTE — PROGRESS NOTES
EEG showed findings suggestive of mild, non-specific encephalopathy.    A repeat CT head on 1/24/25 showed evolving lacunar infarct within the right paramedian von and right thalamus. Tiny infarcts in the cerebellum difficult to appreciate within the  constraints of CT acquisition, as identified on the recent MRI brain. No acute intracranial hemorrhage or significant mass effect. Remote left PCA territory cortical infarct involving the occipital pole.  Moderate chronic small vessel ischemic changes.    ID was consulted for concern of underlying infection because of leukocytosis.    CURRENT EVALUATION- DAILY INTERVAL CHANGES 1/24/2025  /67   Pulse 72   Temp 99.7 °F (37.6 °C)   Resp 20   Ht 1.702 m (5' 7\")   Wt 90.4 kg (199 lb 4.7 oz)   SpO2 93%   BMI 31.21 kg/m²     Afebrile  VS stable  Afib rate controlled    The patient is alert and oriented on 4 L NC  She underwent sheath removal this AM and received a one time dose of Ancef for prophylaxis.   She experienced some hypotension this AM and was started on vasopressors, which has been weaned off.    IV meropenem initiated for previous ESBL E. Coli UTI    Lab-work reviewed  Leukocytosis on a down-trend.   Elevated lactic acid has resolved  CRP elevation noted at 205  Viral respiratory panel negative   Mycoplasma IgM is pending.     Discussed with RN    Labs, X rays reviewed: 1/24/2025 with independent review of X rays    I have independently reviewed/ordered the following labs:    CBC with Differential:   Recent Labs     01/23/25  1336 01/24/25  0554   WBC 26.0* 20.6*   HGB 12.0 10.2*   HCT 38.0 31.0*    282   LYMPHOPCT 1* PENDING   MONOPCT 1* PENDING   EOSPCT 0* PENDING     BMP:   Recent Labs     01/23/25  1336 01/24/25  0554   * 135*   K 3.7 3.7   CL 96* 101   CO2 22 22   BUN 13 17   CREATININE 0.9 0.7     Hepatic Function Panel:   Recent Labs     01/24/25  0015 01/24/25  0554   BILIDIR 0.2  --    IBILI 0.1  --    BILITOT 0.3 0.3   ALKPHOS  the head was performed without the administration of intravenous contrast. Automated exposure control, iterative reconstruction, and/or weight based adjustment of the mA/kV was utilized to reduce the radiation dose to as low as reasonably achievable. COMPARISON: CT brain 08/08/2024, brain MRI 08/08/2024 HISTORY: ORDERING SYSTEM PROVIDED HISTORY: left facial droop, increased lethargy TECHNOLOGIST PROVIDED HISTORY: left facial droop, increased lethargy Decision Support Exception - unselect if not a suspected or confirmed emergency medical condition->Emergency Medical Condition (MA) Reason for Exam: left facial droop FINDINGS: BRAIN/VENTRICLES: There is no acute intracranial hemorrhage, mass effect or midline shift.  No abnormal extra-axial fluid collection.  The gray-white differentiation is maintained without evidence of an acute infarct.  There are stents within the basilar artery and bilateral proximal posterior cerebral arteries.  There is a remote infarct in the left occipital lobe. There are remote lacunar infarcts in the right corona radiata and right basal ganglia.  There is no evidence of hydrocephalus. Patchy bilateral periventricular and subcortical white matter hypodensities are nonspecific, but compatible with chronic microvascular ischemic change. ORBITS: The visualized portion of the orbits demonstrate no acute abnormality. SINUSES: The visualized paranasal sinuses and mastoid air cells demonstrate no acute abnormality. SOFT TISSUES/SKULL:  No acute abnormality of the visualized skull or soft tissues.     No appreciable acute intracranial abnormality. Remote left occipital lobe and remote lacunar infarcts in the right basal ganglia and corona radiata. The findings were sent to the Radiology Results Communication Center at 2:04 pm on 1/23/2025 to be communicated to a licensed caregiver.       Medical Decision [id number]:     Results       Procedure Component Value Units Date/Time    Culture,      Note:      Thank you for allowing us to participate in the care of this patient. Please call with questions.    VIKTORIA Nguyen - HOLLIS  Pager: [phone] - Office: [phone]

## 2025-01-24 NOTE — CONSULTS
Cholecystectomy; Cerebral angiogram (12/12/2024); Breast biopsy (Right); Cataract extraction w/ intraocular lens implant (Bilateral, 2020); Colonoscopy w/ polypectomy (01/19/2023); and other surgical history (01/15/2025).  Social History   reports that she has been smoking cigarettes. She started smoking about 58 years ago. She has a 71.6 pack-year smoking history. She has never been exposed to tobacco smoke. She has never used smokeless tobacco.   reports current alcohol use of about 1.0 standard drink of alcohol per week.   reports no history of drug use.  Family History  family history includes Dementia in her mother; Heart Disease in her father; Hypertension in her father and mother; No Known Problems in her sister; Stroke in her father and mother.    Review of Systems:  CONSTITUTIONAL:  negative for fevers, chills, fatigue and malaise    EYES:  negative for double vision, blurred vision and photophobia     HEENT:  negative for tinnitus, epistaxis and sore throat    RESPIRATORY:  negative for cough, shortness of breath, wheezing    CARDIOVASCULAR:  negative for chest pain, palpitations, syncope, edema    GASTROINTESTINAL:  negative for nausea, vomiting    GENITOURINARY:  negative for incontinence    MUSCULOSKELETAL:  negative for neck or back pain    NEUROLOGICAL:  Negative for weakness and tingling  negative for headaches and dizziness    PSYCHIATRIC:  negative for anxiety      Review of systems otherwise negative.      OBJECTIVE:   Vitals: /67   Pulse 75   Temp 98.4 °F (36.9 °C) (Oral)   Resp 18   Ht 1.702 m (5' 7\")   Wt 90.4 kg (199 lb 4.7 oz)   SpO2 97%   BMI 31.21 kg/m²   GEN: Laying in bed, no distress  CVS: Palpable pulses throughout, no JVD  CHEST: On supplemental oxygen   ABD: Soft, NTTP  NEURO:     MENTAL STATUS: AAOx3    LANG/SPEECH: Fluent, intact naming, repetition & comprehension    CRANIAL NERVES:    II: Pupils equal and reactive     III, IV, VI: Intact oculomotor movements     V:  Matt  Electronically signed 1/24/2025 at 12:29 PM

## 2025-01-25 ENCOUNTER — APPOINTMENT (OUTPATIENT)
Dept: GENERAL RADIOLOGY | Age: 75
DRG: 023 | End: 2025-01-25
Payer: MEDICARE

## 2025-01-25 ENCOUNTER — APPOINTMENT (OUTPATIENT)
Dept: ULTRASOUND IMAGING | Age: 75
DRG: 023 | End: 2025-01-25
Payer: MEDICARE

## 2025-01-25 ENCOUNTER — APPOINTMENT (OUTPATIENT)
Dept: CT IMAGING | Age: 75
DRG: 023 | End: 2025-01-25
Payer: MEDICARE

## 2025-01-25 PROBLEM — D72.825 BANDEMIA: Status: ACTIVE | Noted: 2025-01-25

## 2025-01-25 PROBLEM — E87.20 LACTIC ACIDOSIS: Status: ACTIVE | Noted: 2025-01-25

## 2025-01-25 PROBLEM — N12 PYELONEPHRITIS: Status: ACTIVE | Noted: 2025-01-25

## 2025-01-25 PROBLEM — R79.82 CRP ELEVATED: Status: ACTIVE | Noted: 2025-01-25

## 2025-01-25 PROBLEM — R29.810 FACIAL DROOP: Status: ACTIVE | Noted: 2025-01-25

## 2025-01-25 LAB
ALBUMIN SERPL-MCNC: 3.3 G/DL (ref 3.5–5.2)
ALBUMIN/GLOB SERPL: 1.3 {RATIO} (ref 1–2.5)
ALP SERPL-CCNC: 66 U/L (ref 35–104)
ALT SERPL-CCNC: 78 U/L (ref 10–35)
ANION GAP SERPL CALCULATED.3IONS-SCNC: 7 MMOL/L (ref 9–16)
AST SERPL-CCNC: 65 U/L (ref 10–35)
BASOPHILS # BLD: <0.03 K/UL (ref 0–0.2)
BASOPHILS NFR BLD: 0 % (ref 0–2)
BILIRUB DIRECT SERPL-MCNC: 0.1 MG/DL (ref 0–0.2)
BILIRUB INDIRECT SERPL-MCNC: 0.1 MG/DL (ref 0–1)
BILIRUB SERPL-MCNC: 0.2 MG/DL (ref 0–1.2)
BUN SERPL-MCNC: 13 MG/DL (ref 8–23)
CALCIUM SERPL-MCNC: 8.3 MG/DL (ref 8.6–10.4)
CHLORIDE SERPL-SCNC: 104 MMOL/L (ref 98–107)
CO2 SERPL-SCNC: 25 MMOL/L (ref 20–31)
CREAT SERPL-MCNC: 0.6 MG/DL (ref 0.6–0.9)
EOSINOPHIL # BLD: 0.13 K/UL (ref 0–0.44)
EOSINOPHILS RELATIVE PERCENT: 1 % (ref 1–4)
ERYTHROCYTE [DISTWIDTH] IN BLOOD BY AUTOMATED COUNT: 14.3 % (ref 11.8–14.4)
GFR, ESTIMATED: >90 ML/MIN/1.73M2
GLOBULIN SER CALC-MCNC: 2.5 G/DL
GLUCOSE BLD-MCNC: 138 MG/DL (ref 65–105)
GLUCOSE SERPL-MCNC: 134 MG/DL (ref 74–99)
HCT VFR BLD AUTO: 29.6 % (ref 36.3–47.1)
HCT VFR BLD AUTO: 32.2 % (ref 36.3–47.1)
HGB BLD-MCNC: 9.6 G/DL (ref 11.9–15.1)
HGB BLD-MCNC: 9.6 G/DL (ref 11.9–15.1)
IMM GRANULOCYTES # BLD AUTO: 0.05 K/UL (ref 0–0.3)
IMM GRANULOCYTES NFR BLD: 1 %
LIPASE SERPL-CCNC: 20 U/L (ref 13–60)
LYMPHOCYTES NFR BLD: 1.04 K/UL (ref 1.1–3.7)
LYMPHOCYTES RELATIVE PERCENT: 10 % (ref 24–43)
MCH RBC QN AUTO: 27.8 PG (ref 25.2–33.5)
MCHC RBC AUTO-ENTMCNC: 32.4 G/DL (ref 28.4–34.8)
MCV RBC AUTO: 85.8 FL (ref 82.6–102.9)
MONOCYTES NFR BLD: 0.39 K/UL (ref 0.1–1.2)
MONOCYTES NFR BLD: 4 % (ref 3–12)
NEUTROPHILS NFR BLD: 85 % (ref 36–65)
NEUTS SEG NFR BLD: 8.98 K/UL (ref 1.5–8.1)
NRBC BLD-RTO: 0 PER 100 WBC
PLATELET # BLD AUTO: 233 K/UL (ref 138–453)
PMV BLD AUTO: 10.8 FL (ref 8.1–13.5)
POTASSIUM SERPL-SCNC: 4 MMOL/L (ref 3.7–5.3)
PROT SERPL-MCNC: 5.8 G/DL (ref 6.6–8.7)
RBC # BLD AUTO: 3.45 M/UL (ref 3.95–5.11)
SODIUM SERPL-SCNC: 136 MMOL/L (ref 136–145)
TROPONIN I SERPL HS-MCNC: 38 NG/L (ref 0–14)
TROPONIN I SERPL HS-MCNC: 43 NG/L (ref 0–14)
WBC OTHER # BLD: 10.6 K/UL (ref 3.5–11.3)

## 2025-01-25 PROCEDURE — 85025 COMPLETE CBC W/AUTO DIFF WBC: CPT

## 2025-01-25 PROCEDURE — 93005 ELECTROCARDIOGRAM TRACING: CPT | Performed by: PSYCHIATRY & NEUROLOGY

## 2025-01-25 PROCEDURE — 94761 N-INVAS EAR/PLS OXIMETRY MLT: CPT

## 2025-01-25 PROCEDURE — 74174 CTA ABD&PLVS W/CONTRAST: CPT

## 2025-01-25 PROCEDURE — 2500000003 HC RX 250 WO HCPCS

## 2025-01-25 PROCEDURE — 2500000003 HC RX 250 WO HCPCS: Performed by: EMERGENCY MEDICINE

## 2025-01-25 PROCEDURE — 6360000004 HC RX CONTRAST MEDICATION

## 2025-01-25 PROCEDURE — 99233 SBSQ HOSP IP/OBS HIGH 50: CPT | Performed by: PSYCHIATRY & NEUROLOGY

## 2025-01-25 PROCEDURE — 6370000000 HC RX 637 (ALT 250 FOR IP)

## 2025-01-25 PROCEDURE — 83690 ASSAY OF LIPASE: CPT

## 2025-01-25 PROCEDURE — 6370000000 HC RX 637 (ALT 250 FOR IP): Performed by: NURSE PRACTITIONER

## 2025-01-25 PROCEDURE — 85014 HEMATOCRIT: CPT

## 2025-01-25 PROCEDURE — 6360000002 HC RX W HCPCS: Performed by: NURSE PRACTITIONER

## 2025-01-25 PROCEDURE — 80076 HEPATIC FUNCTION PANEL: CPT

## 2025-01-25 PROCEDURE — 36415 COLL VENOUS BLD VENIPUNCTURE: CPT

## 2025-01-25 PROCEDURE — G0545 PR INHERENT VISIT TO INPT: HCPCS | Performed by: INTERNAL MEDICINE

## 2025-01-25 PROCEDURE — 2700000000 HC OXYGEN THERAPY PER DAY

## 2025-01-25 PROCEDURE — 2000000000 HC ICU R&B

## 2025-01-25 PROCEDURE — 85018 HEMOGLOBIN: CPT

## 2025-01-25 PROCEDURE — 82947 ASSAY GLUCOSE BLOOD QUANT: CPT

## 2025-01-25 PROCEDURE — 71260 CT THORAX DX C+: CPT

## 2025-01-25 PROCEDURE — 2580000003 HC RX 258

## 2025-01-25 PROCEDURE — 6360000002 HC RX W HCPCS: Performed by: INTERNAL MEDICINE

## 2025-01-25 PROCEDURE — 99232 SBSQ HOSP IP/OBS MODERATE 35: CPT | Performed by: PSYCHIATRY & NEUROLOGY

## 2025-01-25 PROCEDURE — 2580000003 HC RX 258: Performed by: INTERNAL MEDICINE

## 2025-01-25 PROCEDURE — 94640 AIRWAY INHALATION TREATMENT: CPT

## 2025-01-25 PROCEDURE — 84484 ASSAY OF TROPONIN QUANT: CPT

## 2025-01-25 PROCEDURE — 71045 X-RAY EXAM CHEST 1 VIEW: CPT

## 2025-01-25 PROCEDURE — 6360000004 HC RX CONTRAST MEDICATION: Performed by: EMERGENCY MEDICINE

## 2025-01-25 PROCEDURE — 76770 US EXAM ABDO BACK WALL COMP: CPT

## 2025-01-25 PROCEDURE — 99232 SBSQ HOSP IP/OBS MODERATE 35: CPT | Performed by: INTERNAL MEDICINE

## 2025-01-25 PROCEDURE — 6360000002 HC RX W HCPCS

## 2025-01-25 PROCEDURE — 2580000003 HC RX 258: Performed by: NURSE PRACTITIONER

## 2025-01-25 PROCEDURE — 93005 ELECTROCARDIOGRAM TRACING: CPT | Performed by: EMERGENCY MEDICINE

## 2025-01-25 PROCEDURE — 80048 BASIC METABOLIC PNL TOTAL CA: CPT

## 2025-01-25 RX ORDER — MORPHINE SULFATE 4 MG/ML
4 INJECTION, SOLUTION INTRAMUSCULAR; INTRAVENOUS EVERY 4 HOURS PRN
Status: DISCONTINUED | OUTPATIENT
Start: 2025-01-25 | End: 2025-01-26

## 2025-01-25 RX ORDER — DILTIAZEM HYDROCHLORIDE 5 MG/ML
10 INJECTION INTRAVENOUS ONCE
Status: COMPLETED | OUTPATIENT
Start: 2025-01-25 | End: 2025-01-25

## 2025-01-25 RX ORDER — IOPAMIDOL 755 MG/ML
100 INJECTION, SOLUTION INTRAVASCULAR
Status: COMPLETED | OUTPATIENT
Start: 2025-01-25 | End: 2025-01-25

## 2025-01-25 RX ORDER — METOPROLOL TARTRATE 1 MG/ML
5 INJECTION, SOLUTION INTRAVENOUS EVERY 5 MIN PRN
Status: COMPLETED | OUTPATIENT
Start: 2025-01-25 | End: 2025-01-25

## 2025-01-25 RX ORDER — IOPAMIDOL 755 MG/ML
75 INJECTION, SOLUTION INTRAVASCULAR
Status: COMPLETED | OUTPATIENT
Start: 2025-01-25 | End: 2025-01-25

## 2025-01-25 RX ADMIN — SODIUM CHLORIDE 8 MG/HR: 9 INJECTION, SOLUTION INTRAVENOUS at 21:26

## 2025-01-25 RX ADMIN — METOPROLOL TARTRATE 5 MG: 5 INJECTION INTRAVENOUS at 20:04

## 2025-01-25 RX ADMIN — MORPHINE SULFATE 4 MG: 4 INJECTION INTRAVENOUS at 19:28

## 2025-01-25 RX ADMIN — TICAGRELOR 90 MG: 90 TABLET ORAL at 07:30

## 2025-01-25 RX ADMIN — ENOXAPARIN SODIUM 40 MG: 100 INJECTION SUBCUTANEOUS at 07:29

## 2025-01-25 RX ADMIN — METOPROLOL TARTRATE 5 MG: 5 INJECTION INTRAVENOUS at 14:36

## 2025-01-25 RX ADMIN — SODIUM CHLORIDE, PRESERVATIVE FREE 10 ML: 5 INJECTION INTRAVENOUS at 20:14

## 2025-01-25 RX ADMIN — IOPAMIDOL 100 ML: 755 INJECTION, SOLUTION INTRAVENOUS at 19:51

## 2025-01-25 RX ADMIN — METOPROLOL TARTRATE 25 MG: 25 TABLET, FILM COATED ORAL at 21:25

## 2025-01-25 RX ADMIN — PANTOPRAZOLE SODIUM 40 MG: 40 TABLET, DELAYED RELEASE ORAL at 07:30

## 2025-01-25 RX ADMIN — ALBUTEROL SULFATE 1 PUFF: 90 AEROSOL, METERED RESPIRATORY (INHALATION) at 14:56

## 2025-01-25 RX ADMIN — SODIUM CHLORIDE, PRESERVATIVE FREE 10 ML: 5 INJECTION INTRAVENOUS at 07:29

## 2025-01-25 RX ADMIN — ESCITALOPRAM OXALATE 10 MG: 10 TABLET ORAL at 07:30

## 2025-01-25 RX ADMIN — DILTIAZEM HYDROCHLORIDE 10 MG: 5 INJECTION INTRAVENOUS at 17:02

## 2025-01-25 RX ADMIN — ONDANSETRON 4 MG: 2 INJECTION INTRAMUSCULAR; INTRAVENOUS at 20:08

## 2025-01-25 RX ADMIN — DILTIAZEM HYDROCHLORIDE 120 MG: 120 CAPSULE, COATED, EXTENDED RELEASE ORAL at 07:30

## 2025-01-25 RX ADMIN — ONDANSETRON 4 MG: 2 INJECTION INTRAMUSCULAR; INTRAVENOUS at 14:10

## 2025-01-25 RX ADMIN — ACETAMINOPHEN 650 MG: 325 TABLET ORAL at 14:19

## 2025-01-25 RX ADMIN — IOPAMIDOL 75 ML: 755 INJECTION, SOLUTION INTRAVENOUS at 13:55

## 2025-01-25 RX ADMIN — ROSUVASTATIN CALCIUM 20 MG: 20 TABLET, FILM COATED ORAL at 21:25

## 2025-01-25 RX ADMIN — TIOTROPIUM BROMIDE INHALATION SPRAY 2 PUFF: 3.12 SPRAY, METERED RESPIRATORY (INHALATION) at 09:09

## 2025-01-25 RX ADMIN — MEROPENEM 1000 MG: 1 INJECTION INTRAVENOUS at 12:02

## 2025-01-25 RX ADMIN — SODIUM CHLORIDE, PRESERVATIVE FREE 80 MG: 5 INJECTION INTRAVENOUS at 21:25

## 2025-01-25 RX ADMIN — METOPROLOL TARTRATE 5 MG: 5 INJECTION INTRAVENOUS at 15:52

## 2025-01-25 RX ADMIN — WATER 5 MG: 1 INJECTION INTRAMUSCULAR; INTRAVENOUS; SUBCUTANEOUS at 23:59

## 2025-01-25 RX ADMIN — METOPROLOL TARTRATE 25 MG: 25 TABLET, FILM COATED ORAL at 08:25

## 2025-01-25 RX ADMIN — MEROPENEM 1000 MG: 1 INJECTION INTRAVENOUS at 20:12

## 2025-01-25 RX ADMIN — MEROPENEM 1000 MG: 1 INJECTION INTRAVENOUS at 03:50

## 2025-01-25 ASSESSMENT — PAIN SCALES - GENERAL
PAINLEVEL_OUTOF10: 9
PAINLEVEL_OUTOF10: 9
PAINLEVEL_OUTOF10: 7
PAINLEVEL_OUTOF10: 5

## 2025-01-25 ASSESSMENT — PAIN DESCRIPTION - LOCATION
LOCATION: ABDOMEN
LOCATION: ABDOMEN

## 2025-01-25 ASSESSMENT — PAIN DESCRIPTION - ORIENTATION: ORIENTATION: LOWER

## 2025-01-25 ASSESSMENT — PAIN DESCRIPTION - DESCRIPTORS: DESCRIPTORS: ACHING

## 2025-01-25 NOTE — PROGRESS NOTES
Endovascular Neurosurgery Progress Notes    Pt Name: Dahlia Zhang  MRN: [id number]  YOB: 1950  Date of evaluation: 1/25/2025  Primary Care Physician: Thania Rahman MD  Reason for evaluation: Concern for pontine stroke in the presence of previously treated basilar anneurysm with WEB device and LVIS stent    SUBJECTIVE:   She is doing well from neuroendovascular perspective.  She is complaining of shortness of breath and wheezing.       History of Chief Complaint:    Dahlia Zhang is a 74 y.o. 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     On the morning of 1/23/2025, patient was noted to have increased lethargy and generalized weakness along with left facial droop.  These were concerning as patient was at her baseline and doing better yesterday.  She does not recall the time when she went to sleep, thus last known well is unknown.  Potentially she went to sleep between 6-8 PM on 1/22.      Patient was seen and examined at bedside upon arrival to [facility] ED. She was increasingly lethargic and short of breath, however she was able to cross midline without gaze preference, had intact fluency and repetition but did have mild dysarthria. She had generalized weakness.         CT head w/o contrast shows no acute finding.      CTA head and neck show flow diverting stent along basilar artery and proximal PCA with embolization coils at basilar tip. Severe narrowing within the distal basilar stent. Small amount of residual aneurysmal filling at the neck, approximately 2 mm.        Allergies  is allergic to lisinopril, codeine, norco [hydrocodone-acetaminophen], and percocet [oxycodone-acetaminophen].  Medications  Prior to Admission medications    Medication Sig Start Date End Date Taking?

## 2025-01-25 NOTE — PROGRESS NOTES
[facility] -   Infectious diseases evaluation  admission date 1/23/2025    reason for consultation:   E coli pyelo    Impression :   Current:  Bandemia 20  CRP elevation  E coli ESBL pyelonephritis  Lactic acidosis  Chronic A fib  Red blood per rectum 1/25 - large clots      Other:    Discussion / summary of stay / plan of care/ Recommendations:     HENCE:   This is a pyelonephritis with a urine culture yesterday and E. coli and 1/16 associated leukocytosis of 20 and CRP elevation, lactic acidosis  Improved in general on treatment  Unfortunately Macrobid will not get to the kidney    Meropenem E. coli ESBL pyelonephritis x 7 - 14 days  Get US renal  look for obstruction  and in order to decide length of the meropenem     Red blood per rectum 1/25 - large clots  CTAP done urgent pend results  Pt in A fib RVR - very stressed - comforted  GI consulted    Infection Control Recommendations   [place] Precautions  Contact Isolation       Antimicrobial Stewardship Recommendations     Targeted therapy  Duration  of therapy    History of Present Illness:   Initial history:  Dahlia Zhang is a 74 y.o.-year-old female     Interval changes  1/25/2025   Patient Vitals for the past 8 hrs:   BP Temp Temp src Pulse Resp SpO2   01/25/25 1700 (!) 140/89 -- -- (!) 110 19 99 %   01/25/25 1600 124/86 98.2 °F (36.8 °C) Oral (!) 105 (!) 32 97 %   01/25/25 1500 128/76 -- -- (!) 129 21 98 %   01/25/25 1419 (!) 151/106 -- -- (!) 127 (!) 31 100 %   01/25/25 1300 114/65 -- -- (!) 114 30 100 %   01/25/25 1200 129/71 98.4 °F (36.9 °C) Oral (!) 103 20 99 %   01/25/25 1100 113/85 -- -- (!) 105 28 100 %   01/25/25 1000 (!) 105/57 -- -- (!) 113 22 93 %   1/25  Stressed and  - RVR A fib-- abd soft non tender and blood in stools large earlier - CTAP done and pend- no fever    Summary of relevant labs:  Labs:  WBC10.6  Micro:      Procedures      Cardiology      Imaging:  CT chest 1/25  1. No  speech recognition program.  While intending to generate adocument that actually reflects the content of the visit, the document can still have some errors including those of syntax and sound a like substitutions which may escape proof reading.  It such instances, actual meaningcan be extrapolated by contextual diversion.    Mae Kaur MD  Office: [phone]  Perfect serve / office [phone]

## 2025-01-25 NOTE — PROGRESS NOTES
Daily Progress Note  Neuro Critical Care    Patient Name: Dahlia Zhang  Patient : 1950  Room/Bed: 0115/0115-01  Code Status: FULL  Allergies:   Allergies   Allergen Reactions    Lisinopril Angioedema    Codeine Nausea And Vomiting    Norco [Hydrocodone-Acetaminophen] Nausea And Vomiting    Percocet [Oxycodone-Acetaminophen] Nausea And Vomiting       CHIEF COMPLAINT:      Back pain     INTERVAL HISTORY    Initial Presentation (Admitted 25):  The patient is a 74 y.o. 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 Patient initially presented to [facility] ED on 25 with complaints of altered mental status, increased lethargy, dysarthria, left facial droop and generalized weakness. Initial NIHSS 9.  /59.  Patient has been compliant with Aspirin and Plavix post recent stent placement.  She remains off Eliquis as planned.  MRI brain without contrast showed acute infarct in right von, punctate infarcts in right cerebellum, thalamus and right occipital lobe.  CTA showed severe narrowing within the distal basilar stent, small ~2mm amount of residual aneurysmal filling and neck and 60% L cervical ICA stenosis.  Labs revealed hyperglycemia (225), elevated lactic (4.1), mild troponin elevation (34), mild transaminitis and leukocytosis (WBC 26.0).  ID was consulted from ED.  Patient was taken for urgent DSA showing previously treated basilar tip aneurysm (s/p WEB, LVIS x2) is completely obliterated, stents are patent and well apposed with filling defect at the basilar tip; possible thrombus vs metal artifact.  Given Cangrelor bolus followed by infusion.    Admitted to the Neuro ICU post procedure.  NIHSS 1.  On exam post procedure, patient is alert and answering questions appropriately. Exam limited 2/2 post op restrictions  however spontaneously moving bilateral lower extremities.     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  During recent admission patient was treated for UTI (culture eventually grew E.Coli, ESBL.  She was changed from Keflex to Macrobid by her PCP and was on day 2/10. Patient was also admitted in August 2024 with headache, dysarthria, blurred vision and left lower extremity weakness.   She was on Aspirin and Eliquis at that time.  Found to have acute/subacute infarcts in the posterior circulation, small right MCA infarction.    1/24: groin sheath removed. ID consulted for ESBL positive E.Coli UTI. Started on meropenum.     Interval Events:   N acute events overnight. Groin sheath pulled yesterday. Afebrile. Mild tachycardia overnight to low 100s. Off phenylephrine. On 3L NC.       CURRENT MEDICATIONS:  SCHEDULED MEDICATIONS:   lidocaine  1 patch TransDERmal Daily    insulin lispro  0-4 Units SubCUTAneous 4x Daily AC & HS    ticagrelor  90 mg Oral BID    dilTIAZem  120 mg Oral Daily    meropenem  1,000 mg IntraVENous Q8H    aspirin  81 mg Oral Nightly    escitalopram  10 mg Oral Daily    pantoprazole  40 mg Oral QAM AC    tiotropium  2 puff Inhalation Daily RT    sodium chloride flush  5-40 mL IntraVENous 2 times per day    enoxaparin  40 mg SubCUTAneous Daily    rosuvastatin  20 mg Oral Nightly    metoprolol tartrate  25 mg Oral BID     CONTINUOUS INFUSIONS:   dextrose      sodium chloride 100 mL/hr at 01/24/25 2322    sodium chloride      sodium chloride       PRN MEDICATIONS:   glucose, dextrose bolus **OR** dextrose bolus, glucagon (rDNA), dextrose, acetaminophen, sodium chloride flush, sodium chloride, polyethylene glycol, albuterol sulfate HFA, sodium chloride flush, sodium chloride, potassium chloride **OR** potassium

## 2025-01-25 NOTE — PROGRESS NOTES
Occupational Therapy    [facility]  Occupational Therapy Not Seen Note    DATE: 2025    NAME: Dahlia Zhang  MRN: [id number]   : 1950      Patient not seen this date for Occupational Therapy due to:    Patient is not appropriate for active participation in OT evaluation/treatment at this time d/t RN reports patient is SOB and tachycardic, will f/u as appropriate, as able     Electronically signed by Thais Dunn OT on 2025 at 2:22 PM

## 2025-01-26 ENCOUNTER — APPOINTMENT (OUTPATIENT)
Dept: GENERAL RADIOLOGY | Age: 75
DRG: 023 | End: 2025-01-26
Payer: MEDICARE

## 2025-01-26 ENCOUNTER — ANESTHESIA EVENT (OUTPATIENT)
Dept: OPERATING ROOM | Age: 75
End: 2025-01-26
Payer: MEDICARE

## 2025-01-26 ENCOUNTER — ANESTHESIA (OUTPATIENT)
Dept: OPERATING ROOM | Age: 75
End: 2025-01-26
Payer: MEDICARE

## 2025-01-26 PROBLEM — R57.9 SHOCK: Status: ACTIVE | Noted: 2025-01-26

## 2025-01-26 PROBLEM — R93.5 ABNORMAL CT OF THE ABDOMEN: Status: ACTIVE | Noted: 2025-01-26

## 2025-01-26 PROBLEM — K57.90 DIVERTICULAR DISEASE: Status: ACTIVE | Noted: 2025-01-26

## 2025-01-26 PROBLEM — D62 ACUTE BLOOD LOSS ANEMIA: Status: ACTIVE | Noted: 2025-01-26

## 2025-01-26 PROBLEM — K92.2 GASTROINTESTINAL HEMORRHAGE: Status: ACTIVE | Noted: 2025-01-26

## 2025-01-26 LAB
AMMONIA PLAS-SCNC: 14 UMOL/L (ref 11–51)
ANION GAP SERPL CALCULATED.3IONS-SCNC: 5 MMOL/L (ref 9–16)
ANION GAP SERPL CALCULATED.3IONS-SCNC: 6 MMOL/L (ref 9–16)
BASOPHILS # BLD: 0.03 K/UL (ref 0–0.2)
BASOPHILS # BLD: 0.04 K/UL (ref 0–0.2)
BASOPHILS NFR BLD: 0 % (ref 0–2)
BASOPHILS NFR BLD: 0 % (ref 0–2)
BUN SERPL-MCNC: 10 MG/DL (ref 8–23)
BUN SERPL-MCNC: 11 MG/DL (ref 8–23)
CALCIUM SERPL-MCNC: 8 MG/DL (ref 8.6–10.4)
CALCIUM SERPL-MCNC: 8.2 MG/DL (ref 8.6–10.4)
CHLORIDE SERPL-SCNC: 100 MMOL/L (ref 98–107)
CHLORIDE SERPL-SCNC: 101 MMOL/L (ref 98–107)
CO2 SERPL-SCNC: 28 MMOL/L (ref 20–31)
CO2 SERPL-SCNC: 31 MMOL/L (ref 20–31)
CREAT SERPL-MCNC: 0.6 MG/DL (ref 0.6–0.9)
CREAT SERPL-MCNC: 0.6 MG/DL (ref 0.6–0.9)
EKG ATRIAL RATE: 170 BPM
EKG Q-T INTERVAL: 342 MS
EKG QRS DURATION: 76 MS
EKG QTC CALCULATION (BAZETT): 479 MS
EKG R AXIS: 14 DEGREES
EKG T AXIS: 84 DEGREES
EKG VENTRICULAR RATE: 118 BPM
EOSINOPHIL # BLD: 0.1 K/UL (ref 0–0.44)
EOSINOPHIL # BLD: 0.16 K/UL (ref 0–0.44)
EOSINOPHILS RELATIVE PERCENT: 1 % (ref 1–4)
EOSINOPHILS RELATIVE PERCENT: 1 % (ref 1–4)
ERYTHROCYTE [DISTWIDTH] IN BLOOD BY AUTOMATED COUNT: 14.3 % (ref 11.8–14.4)
ERYTHROCYTE [DISTWIDTH] IN BLOOD BY AUTOMATED COUNT: 14.5 % (ref 11.8–14.4)
GFR, ESTIMATED: >90 ML/MIN/1.73M2
GFR, ESTIMATED: >90 ML/MIN/1.73M2
GLUCOSE BLD-MCNC: 109 MG/DL (ref 65–105)
GLUCOSE BLD-MCNC: 112 MG/DL (ref 65–105)
GLUCOSE BLD-MCNC: 114 MG/DL (ref 65–105)
GLUCOSE SERPL-MCNC: 144 MG/DL (ref 74–99)
GLUCOSE SERPL-MCNC: 154 MG/DL (ref 74–99)
HCT VFR BLD AUTO: 23.9 % (ref 36.3–47.1)
HCT VFR BLD AUTO: 25.1 % (ref 36.3–47.1)
HCT VFR BLD AUTO: 26.5 % (ref 36.3–47.1)
HCT VFR BLD AUTO: 26.8 % (ref 36.3–47.1)
HCT VFR BLD AUTO: 27.2 % (ref 36.3–47.1)
HGB BLD-MCNC: 7.5 G/DL (ref 11.9–15.1)
HGB BLD-MCNC: 8.1 G/DL (ref 11.9–15.1)
HGB BLD-MCNC: 8.2 G/DL (ref 11.9–15.1)
HGB BLD-MCNC: 8.6 G/DL (ref 11.9–15.1)
HGB BLD-MCNC: 8.6 G/DL (ref 11.9–15.1)
IMM GRANULOCYTES # BLD AUTO: 0.07 K/UL (ref 0–0.3)
IMM GRANULOCYTES # BLD AUTO: 0.09 K/UL (ref 0–0.3)
IMM GRANULOCYTES NFR BLD: 1 %
IMM GRANULOCYTES NFR BLD: 1 %
LYMPHOCYTES NFR BLD: 1.38 K/UL (ref 1.1–3.7)
LYMPHOCYTES NFR BLD: 2.19 K/UL (ref 1.1–3.7)
LYMPHOCYTES RELATIVE PERCENT: 10 % (ref 24–43)
LYMPHOCYTES RELATIVE PERCENT: 16 % (ref 24–43)
MCH RBC QN AUTO: 27.8 PG (ref 25.2–33.5)
MCH RBC QN AUTO: 28.7 PG (ref 25.2–33.5)
MCHC RBC AUTO-ENTMCNC: 30.6 G/DL (ref 28.4–34.8)
MCHC RBC AUTO-ENTMCNC: 32.1 G/DL (ref 28.4–34.8)
MCV RBC AUTO: 89.3 FL (ref 82.6–102.9)
MCV RBC AUTO: 91.1 FL (ref 82.6–102.9)
MONOCYTES NFR BLD: 1.06 K/UL (ref 0.1–1.2)
MONOCYTES NFR BLD: 1.12 K/UL (ref 0.1–1.2)
MONOCYTES NFR BLD: 8 % (ref 3–12)
MONOCYTES NFR BLD: 8 % (ref 3–12)
NEUTROPHILS NFR BLD: 74 % (ref 36–65)
NEUTROPHILS NFR BLD: 80 % (ref 36–65)
NEUTS SEG NFR BLD: 10.2 K/UL (ref 1.5–8.1)
NEUTS SEG NFR BLD: 10.84 K/UL (ref 1.5–8.1)
NRBC BLD-RTO: 0 PER 100 WBC
NRBC BLD-RTO: 0 PER 100 WBC
PLATELET # BLD AUTO: 216 K/UL (ref 138–453)
PLATELET # BLD AUTO: 219 K/UL (ref 138–453)
PMV BLD AUTO: 10.8 FL (ref 8.1–13.5)
PMV BLD AUTO: 11.5 FL (ref 8.1–13.5)
POTASSIUM SERPL-SCNC: 4.2 MMOL/L (ref 3.7–5.3)
POTASSIUM SERPL-SCNC: 4.7 MMOL/L (ref 3.7–5.3)
RBC # BLD AUTO: 2.91 M/UL (ref 3.95–5.11)
RBC # BLD AUTO: 3 M/UL (ref 3.95–5.11)
RBC # BLD: ABNORMAL 10*6/UL
SODIUM SERPL-SCNC: 134 MMOL/L (ref 136–145)
SODIUM SERPL-SCNC: 137 MMOL/L (ref 136–145)
WBC OTHER # BLD: 13.5 K/UL (ref 3.5–11.3)
WBC OTHER # BLD: 13.7 K/UL (ref 3.5–11.3)

## 2025-01-26 PROCEDURE — 2500000003 HC RX 250 WO HCPCS

## 2025-01-26 PROCEDURE — 6370000000 HC RX 637 (ALT 250 FOR IP)

## 2025-01-26 PROCEDURE — 2000000000 HC ICU R&B

## 2025-01-26 PROCEDURE — 99233 SBSQ HOSP IP/OBS HIGH 50: CPT | Performed by: PSYCHIATRY & NEUROLOGY

## 2025-01-26 PROCEDURE — 2580000003 HC RX 258

## 2025-01-26 PROCEDURE — 85014 HEMATOCRIT: CPT

## 2025-01-26 PROCEDURE — 6360000002 HC RX W HCPCS

## 2025-01-26 PROCEDURE — 74018 RADEX ABDOMEN 1 VIEW: CPT

## 2025-01-26 PROCEDURE — 99291 CRITICAL CARE FIRST HOUR: CPT | Performed by: PSYCHIATRY & NEUROLOGY

## 2025-01-26 PROCEDURE — 30233N1 TRANSFUSION OF NONAUTOLOGOUS RED BLOOD CELLS INTO PERIPHERAL VEIN, PERCUTANEOUS APPROACH: ICD-10-PCS | Performed by: INTERNAL MEDICINE

## 2025-01-26 PROCEDURE — 93010 ELECTROCARDIOGRAM REPORT: CPT | Performed by: INTERNAL MEDICINE

## 2025-01-26 PROCEDURE — 7100000000 HC PACU RECOVERY - FIRST 15 MIN: Performed by: INTERNAL MEDICINE

## 2025-01-26 PROCEDURE — 85018 HEMOGLOBIN: CPT

## 2025-01-26 PROCEDURE — 86920 COMPATIBILITY TEST SPIN: CPT

## 2025-01-26 PROCEDURE — 36430 TRANSFUSION BLD/BLD COMPNT: CPT

## 2025-01-26 PROCEDURE — 86901 BLOOD TYPING SEROLOGIC RH(D): CPT

## 2025-01-26 PROCEDURE — 99232 SBSQ HOSP IP/OBS MODERATE 35: CPT | Performed by: INTERNAL MEDICINE

## 2025-01-26 PROCEDURE — 86850 RBC ANTIBODY SCREEN: CPT

## 2025-01-26 PROCEDURE — 94761 N-INVAS EAR/PLS OXIMETRY MLT: CPT

## 2025-01-26 PROCEDURE — 94640 AIRWAY INHALATION TREATMENT: CPT

## 2025-01-26 PROCEDURE — 45378 DIAGNOSTIC COLONOSCOPY: CPT | Performed by: INTERNAL MEDICINE

## 2025-01-26 PROCEDURE — 0DJD8ZZ INSPECTION OF LOWER INTESTINAL TRACT, VIA NATURAL OR ARTIFICIAL OPENING ENDOSCOPIC: ICD-10-PCS | Performed by: INTERNAL MEDICINE

## 2025-01-26 PROCEDURE — P9040 RBC LEUKOREDUCED IRRADIATED: HCPCS

## 2025-01-26 PROCEDURE — 6370000000 HC RX 637 (ALT 250 FOR IP): Performed by: INTERNAL MEDICINE

## 2025-01-26 PROCEDURE — 2580000003 HC RX 258: Performed by: INTERNAL MEDICINE

## 2025-01-26 PROCEDURE — 2700000000 HC OXYGEN THERAPY PER DAY

## 2025-01-26 PROCEDURE — 3609027000 HC COLONOSCOPY: Performed by: INTERNAL MEDICINE

## 2025-01-26 PROCEDURE — 3700000001 HC ADD 15 MINUTES (ANESTHESIA): Performed by: INTERNAL MEDICINE

## 2025-01-26 PROCEDURE — 6360000002 HC RX W HCPCS: Performed by: INTERNAL MEDICINE

## 2025-01-26 PROCEDURE — 93005 ELECTROCARDIOGRAM TRACING: CPT

## 2025-01-26 PROCEDURE — 82947 ASSAY GLUCOSE BLOOD QUANT: CPT

## 2025-01-26 PROCEDURE — 85025 COMPLETE CBC W/AUTO DIFF WBC: CPT

## 2025-01-26 PROCEDURE — 7100000001 HC PACU RECOVERY - ADDTL 15 MIN: Performed by: INTERNAL MEDICINE

## 2025-01-26 PROCEDURE — 6360000002 HC RX W HCPCS: Performed by: SPECIALIST

## 2025-01-26 PROCEDURE — 86900 BLOOD TYPING SEROLOGIC ABO: CPT

## 2025-01-26 PROCEDURE — 36415 COLL VENOUS BLD VENIPUNCTURE: CPT

## 2025-01-26 PROCEDURE — 80048 BASIC METABOLIC PNL TOTAL CA: CPT

## 2025-01-26 PROCEDURE — 3700000000 HC ANESTHESIA ATTENDED CARE: Performed by: INTERNAL MEDICINE

## 2025-01-26 PROCEDURE — 82140 ASSAY OF AMMONIA: CPT

## 2025-01-26 PROCEDURE — 99223 1ST HOSP IP/OBS HIGH 75: CPT | Performed by: INTERNAL MEDICINE

## 2025-01-26 PROCEDURE — 2500000003 HC RX 250 WO HCPCS: Performed by: INTERNAL MEDICINE

## 2025-01-26 PROCEDURE — 2580000003 HC RX 258: Performed by: SPECIALIST

## 2025-01-26 RX ORDER — NOREPINEPHRINE BITARTRATE 0.06 MG/ML
1-100 INJECTION, SOLUTION INTRAVENOUS CONTINUOUS
Status: DISCONTINUED | OUTPATIENT
Start: 2025-01-26 | End: 2025-01-26

## 2025-01-26 RX ORDER — FENTANYL CITRATE 50 UG/ML
25 INJECTION, SOLUTION INTRAMUSCULAR; INTRAVENOUS EVERY 5 MIN PRN
Status: CANCELLED | OUTPATIENT
Start: 2025-01-26

## 2025-01-26 RX ORDER — DILTIAZEM HYDROCHLORIDE 5 MG/ML
10 INJECTION INTRAVENOUS ONCE
Status: COMPLETED | OUTPATIENT
Start: 2025-01-26 | End: 2025-01-26

## 2025-01-26 RX ORDER — NALOXONE HYDROCHLORIDE 0.4 MG/ML
INJECTION, SOLUTION INTRAMUSCULAR; INTRAVENOUS; SUBCUTANEOUS PRN
Status: CANCELLED | OUTPATIENT
Start: 2025-01-26

## 2025-01-26 RX ORDER — PROPOFOL 10 MG/ML
INJECTION, EMULSION INTRAVENOUS
Status: DISCONTINUED | OUTPATIENT
Start: 2025-01-26 | End: 2025-01-26 | Stop reason: SDUPTHER

## 2025-01-26 RX ORDER — SODIUM CHLORIDE 9 MG/ML
INJECTION, SOLUTION INTRAVENOUS CONTINUOUS
Status: DISCONTINUED | OUTPATIENT
Start: 2025-01-26 | End: 2025-01-28

## 2025-01-26 RX ORDER — METOPROLOL TARTRATE 1 MG/ML
5 INJECTION, SOLUTION INTRAVENOUS ONCE
Status: COMPLETED | OUTPATIENT
Start: 2025-01-26 | End: 2025-01-26

## 2025-01-26 RX ORDER — ONDANSETRON 2 MG/ML
4 INJECTION INTRAMUSCULAR; INTRAVENOUS
Status: CANCELLED | OUTPATIENT
Start: 2025-01-26 | End: 2025-01-27

## 2025-01-26 RX ORDER — MORPHINE SULFATE 4 MG/ML
4 INJECTION, SOLUTION INTRAMUSCULAR; INTRAVENOUS EVERY 4 HOURS PRN
Status: DISCONTINUED | OUTPATIENT
Start: 2025-01-26 | End: 2025-01-26

## 2025-01-26 RX ORDER — LABETALOL HYDROCHLORIDE 5 MG/ML
INJECTION, SOLUTION INTRAVENOUS
Status: DISCONTINUED | OUTPATIENT
Start: 2025-01-26 | End: 2025-01-26 | Stop reason: SDUPTHER

## 2025-01-26 RX ORDER — NOREPINEPHRINE BITARTRATE 0.06 MG/ML
INJECTION, SOLUTION INTRAVENOUS
Status: COMPLETED
Start: 2025-01-26 | End: 2025-01-26

## 2025-01-26 RX ORDER — MIDAZOLAM HYDROCHLORIDE 2 MG/2ML
1 INJECTION, SOLUTION INTRAMUSCULAR; INTRAVENOUS ONCE
Status: COMPLETED | OUTPATIENT
Start: 2025-01-26 | End: 2025-01-26

## 2025-01-26 RX ORDER — DIPHENHYDRAMINE HYDROCHLORIDE 50 MG/ML
25 INJECTION INTRAMUSCULAR; INTRAVENOUS ONCE
Status: COMPLETED | OUTPATIENT
Start: 2025-01-26 | End: 2025-01-26

## 2025-01-26 RX ORDER — SODIUM CHLORIDE 0.9 % (FLUSH) 0.9 %
5-40 SYRINGE (ML) INJECTION EVERY 12 HOURS SCHEDULED
Status: CANCELLED | OUTPATIENT
Start: 2025-01-26

## 2025-01-26 RX ORDER — HYDROXYZINE HYDROCHLORIDE 50 MG/ML
50 INJECTION, SOLUTION INTRAMUSCULAR ONCE
Status: COMPLETED | OUTPATIENT
Start: 2025-01-26 | End: 2025-01-26

## 2025-01-26 RX ORDER — SODIUM CHLORIDE 9 MG/ML
INJECTION, SOLUTION INTRAVENOUS
Status: DISCONTINUED | OUTPATIENT
Start: 2025-01-26 | End: 2025-01-26 | Stop reason: SDUPTHER

## 2025-01-26 RX ORDER — SODIUM CHLORIDE 9 MG/ML
INJECTION, SOLUTION INTRAVENOUS PRN
Status: CANCELLED | OUTPATIENT
Start: 2025-01-26

## 2025-01-26 RX ORDER — SODIUM CHLORIDE 0.9 % (FLUSH) 0.9 %
5-40 SYRINGE (ML) INJECTION PRN
Status: CANCELLED | OUTPATIENT
Start: 2025-01-26

## 2025-01-26 RX ORDER — 0.9 % SODIUM CHLORIDE 0.9 %
500 INTRAVENOUS SOLUTION INTRAVENOUS ONCE
Status: COMPLETED | OUTPATIENT
Start: 2025-01-26 | End: 2025-01-26

## 2025-01-26 RX ORDER — FENTANYL CITRATE 50 UG/ML
50 INJECTION, SOLUTION INTRAMUSCULAR; INTRAVENOUS EVERY 5 MIN PRN
Status: CANCELLED | OUTPATIENT
Start: 2025-01-26

## 2025-01-26 RX ORDER — METOPROLOL TARTRATE 1 MG/ML
5 INJECTION, SOLUTION INTRAVENOUS EVERY 10 MIN PRN
Status: DISCONTINUED | OUTPATIENT
Start: 2025-01-26 | End: 2025-01-26

## 2025-01-26 RX ORDER — SODIUM CHLORIDE 9 MG/ML
INJECTION, SOLUTION INTRAVENOUS PRN
Status: DISCONTINUED | OUTPATIENT
Start: 2025-01-26 | End: 2025-02-15 | Stop reason: HOSPADM

## 2025-01-26 RX ORDER — DIGOXIN 0.25 MG/ML
250 INJECTION INTRAMUSCULAR; INTRAVENOUS
Status: COMPLETED | OUTPATIENT
Start: 2025-01-26 | End: 2025-01-26

## 2025-01-26 RX ADMIN — ESCITALOPRAM OXALATE 10 MG: 10 TABLET ORAL at 08:44

## 2025-01-26 RX ADMIN — SODIUM CHLORIDE 500 ML: 9 INJECTION, SOLUTION INTRAVENOUS at 08:49

## 2025-01-26 RX ADMIN — DEXTROSE 150 MG: 50 INJECTION, SOLUTION INTRAVENOUS at 04:23

## 2025-01-26 RX ADMIN — MEROPENEM 1000 MG: 1 INJECTION INTRAVENOUS at 11:42

## 2025-01-26 RX ADMIN — Medication 5 MCG/MIN: at 03:33

## 2025-01-26 RX ADMIN — DILTIAZEM HYDROCHLORIDE 10 MG: 5 INJECTION INTRAVENOUS at 02:41

## 2025-01-26 RX ADMIN — SODIUM CHLORIDE, PRESERVATIVE FREE 10 ML: 5 INJECTION INTRAVENOUS at 20:11

## 2025-01-26 RX ADMIN — SODIUM CHLORIDE: 9 INJECTION, SOLUTION INTRAVENOUS at 11:39

## 2025-01-26 RX ADMIN — METOPROLOL TARTRATE 5 MG: 5 INJECTION INTRAVENOUS at 08:47

## 2025-01-26 RX ADMIN — PROPOFOL 120 MCG/KG/MIN: 10 INJECTION, EMULSION INTRAVENOUS at 11:44

## 2025-01-26 RX ADMIN — ROSUVASTATIN CALCIUM 20 MG: 20 TABLET, FILM COATED ORAL at 20:11

## 2025-01-26 RX ADMIN — Medication 5 MG: at 12:15

## 2025-01-26 RX ADMIN — HYDROXYZINE HYDROCHLORIDE 50 MG: 50 INJECTION, SOLUTION INTRAMUSCULAR at 04:24

## 2025-01-26 RX ADMIN — Medication 0.5 MG/MIN: at 10:19

## 2025-01-26 RX ADMIN — MEROPENEM 1000 MG: 1 INJECTION INTRAVENOUS at 20:16

## 2025-01-26 RX ADMIN — NOREPINEPHRINE BITARTRATE 5 MCG/MIN: 0.06 INJECTION, SOLUTION INTRAVENOUS at 03:33

## 2025-01-26 RX ADMIN — METOPROLOL TARTRATE 5 MG: 5 INJECTION INTRAVENOUS at 02:11

## 2025-01-26 RX ADMIN — METOPROLOL TARTRATE 5 MG: 5 INJECTION INTRAVENOUS at 17:42

## 2025-01-26 RX ADMIN — POLYETHYLENE GLYCOL 3350, SODIUM SULFATE ANHYDROUS, SODIUM BICARBONATE, SODIUM CHLORIDE, POTASSIUM CHLORIDE 4000 ML: 236; 22.74; 6.74; 5.86; 2.97 POWDER, FOR SOLUTION ORAL at 01:14

## 2025-01-26 RX ADMIN — SODIUM CHLORIDE, PRESERVATIVE FREE 10 ML: 5 INJECTION INTRAVENOUS at 08:44

## 2025-01-26 RX ADMIN — MIDAZOLAM HYDROCHLORIDE 1 MG: 1 INJECTION, SOLUTION INTRAMUSCULAR; INTRAVENOUS at 01:47

## 2025-01-26 RX ADMIN — AMIODARONE HYDROCHLORIDE 1 MG/MIN: 50 INJECTION, SOLUTION INTRAVENOUS at 04:37

## 2025-01-26 RX ADMIN — TIOTROPIUM BROMIDE INHALATION SPRAY 2 PUFF: 3.12 SPRAY, METERED RESPIRATORY (INHALATION) at 08:22

## 2025-01-26 RX ADMIN — MEROPENEM 1000 MG: 1 INJECTION INTRAVENOUS at 03:25

## 2025-01-26 RX ADMIN — SODIUM CHLORIDE: 9 INJECTION, SOLUTION INTRAVENOUS at 14:34

## 2025-01-26 RX ADMIN — DIPHENHYDRAMINE HYDROCHLORIDE 25 MG: 50 INJECTION INTRAMUSCULAR; INTRAVENOUS at 01:47

## 2025-01-26 RX ADMIN — SODIUM CHLORIDE 8 MG/HR: 9 INJECTION, SOLUTION INTRAVENOUS at 17:06

## 2025-01-26 RX ADMIN — DIGOXIN 250 MCG: 0.25 INJECTION INTRAMUSCULAR; INTRAVENOUS at 03:45

## 2025-01-26 RX ADMIN — METOPROLOL TARTRATE 25 MG: 25 TABLET, FILM COATED ORAL at 20:11

## 2025-01-26 ASSESSMENT — PAIN DESCRIPTION - ORIENTATION: ORIENTATION: LOWER

## 2025-01-26 ASSESSMENT — PAIN DESCRIPTION - LOCATION: LOCATION: ABDOMEN

## 2025-01-26 ASSESSMENT — PAIN SCALES - GENERAL
PAINLEVEL_OUTOF10: 2
PAINLEVEL_OUTOF10: 5
PAINLEVEL_OUTOF10: 5

## 2025-01-26 ASSESSMENT — PAIN DESCRIPTION - DESCRIPTORS: DESCRIPTORS: ACHING

## 2025-01-26 NOTE — CONSULTS
[facility] Gastroenterology  Consultation Note     .  Chief Complaint:  Extremity weakness    Reason for consult:    Severe abdominal pain  Large bloody bowel movement    History of present illness:   HPI taken from staff, chart patient and family  This is a 74 y.o. female who was admitted to the hospital for postop monitoring after DSA.   Patient developed rectal bleeding yesterday with passage of clots.   CTA abdomen and pelvis consistent with active sigmoid colon bleeding, colonic diverticulosis, moderate atherosclerosis with normal patency of the branches of the aorta    1/2023 colonoscopy at [facility] consistent with severe diverticulosis in sigmoid colon with small and large mouth diverticula of sigmoid colon with diverticular spasm. S/p polypectomy.     BMP unremarkable, CBC shows leukocytosis with WBCs of 13.7, hemoglobin 12 g/dL with slow downward trend to 7.5 g/dL this a.m..   Slight transaminase noted with ALT 78, AST 65  Patient normally takes aspirin and Plavix at home due to CVA  During neurological workup on this admission patient has been on cangrelor but that has been held once she developed rectal bleeding last night  This a.m. patient had another episode large amount of hematochezia with clots, went into A-fib for which she received 5 mg of Lopressor IV and was started on amiodarone drip  Heart rate under better control at 104, patient's blood pressure was hypotensive early this a.m. but currently 149/74  Patient received 500 mL fluid bolus  Patient is alert and oriented x 3, NG tube in place  Patient denies any history of significant heavy significant alcohol consumption, no NSAID use, no drug use  Is a daily smoker  No recent weight loss, no recent rectal bleeding at home prior to arrival  Last evening patient received GoLytely bowel prep  Previous GI history:   2023 colonoscopy at [facility]  Impression:                   - Severe diverticulosis in the sigmoid colon.                                  There was evidence of diverticular spasm.                                 - Three 4 to 7 mm polyps in the transverse                                 colon and in the ascending colon, removed with                                 a cold snare. Resected and retrieved.                                 - One 8 mm polyp in the sigmoid colon, removed                                 with a hot snare. Resected and retrieved.   Primary GI specialist:    Past Medical/Social/Family History:  Past Medical History:   Diagnosis Date    Anticoagulated     ELIQUIS    Anxiety and depression     Atrial fibrillation (HCC) 10/15/2023    new onset while in Hosp    Cerebral aneurysm 10/11/2023    ruptured , SAH ,  hosp x 2 weeks  : had acute HA    Cerebral artery occlusion with cerebral infarction (HCC) 08/08/2024    HA , blurry vision , left leg weakness , slurred speech    Colon polyps     COPD (chronic obstructive pulmonary disease) (HCC)     COVID-19     3-4 times    COVID-19 vaccine administered     DM II (diabetes mellitus, type II), controlled ([facility])     GERD (gastroesophageal reflux disease)     HTN (hypertension)     Hyperlipidemia     Kidney stone     once but did not require surgery    On home O2     3-4L / nightly    Osteoarthritis     Sleep apnea     Cpap    Under care of team     Cardiology ,  SHANNA,  last seen 6/26/2024    Under care of team     Andrea De Anda, last seen 7/3/2024    Under care of team     NeuroEndovascular , Dr. Pena ,  last seen 12/26/24    Under care of team     Neurology , [facility] , last seen 10/25/2024    Wears glasses     Wears partial dentures     upper    Wellness examination     PCP , Andrea, last sen 11/15/2024     Past Surgical History:   Procedure Laterality Date    BREAST BIOPSY Right     since 1989 ; benign    BREAST REDUCTION SURGERY Bilateral 1989    CATARACT EXTRACTION W/ INTRAOCULAR LENS IMPLANT Bilateral 2020    CEREBRAL ANGIOGRAM  04/04/2024    DIAGNOSTIC / DR PENA     lobe infarcts likely secondary to thrombosed basilar tip LVIS,  History of COPD           Recommendations and plan:    Will plan for colonoscopy in the OR  Recommend patient receive 1 unit of PRBCs at this time due to declining hemoglobin, ongoing rectal bleeding  Continue to monitor for active GI bleeding  Daily labs, trend hemoglobin, transfuse as clinically indicated  Keep patient n.p.o.  Strict aspiration precautions  Complete plan of care to follow endoscopy-above plan discussed with primary and Dr. Porter           This plan was formulated in collaboration with Dr. Charlotte MD  Please feel free to contact us with any questions or concerns      [facility]astroenter[facility]  Juno Gage, APRN - CNP   1/26/2025    10:19 AM     Estimated time of  mins reviewing chart, assessing patient and formulating plan of care    This note was created with the assistance of a speech-recognition program.  Although the intention is to generate a document that actually reflects the content of the visit, no guarantees can be provided that every mistake has been identified and corrected by editing.     Attending Physician Statement  I have discussed the care of Dahlia Zhang and I have examined the patient myselft and taken ros and hpi , including pertinent history and exam findings,  with the author of this note . I have reviewed the key elements of all parts of the encounter with the nurse practitioner/resident.  I agree with the assessment, plan and orders as documented by the above health care provider.  I have made necessary changes as deemed appropriate directly in the note.     More than 50% of the time was spent taking care of this patient in addition to the nurse practitioner time.  That also included history taking follow-up physical examination and review of system.    Electronically signed by Cipriano Porter MD

## 2025-01-26 NOTE — PLAN OF CARE
Problem: Safety - Medical Restraint  Goal: Remains free of injury from restraints (Restraint for Interference with Medical Device)  Description: INTERVENTIONS:  1. Determine that other, less restrictive measures have been tried or would not be effective before applying the restraint  2. Evaluate the patient's condition at the time of restraint application  3. Inform patient/family regarding the reason for restraint  4. Q2H: Monitor safety, psychosocial status, comfort, nutrition and hydration  Outcome: Completed  Flowsheets  Taken 1/26/2025 0600 by Janeth Powell RN  Remains free of injury from restraints (restraint for interference with medical device): Every 2 hours: Monitor safety, psychosocial status, comfort, nutrition and hydration  Taken 1/26/2025 0400 by Janeth Powell RN  Remains free of injury from restraints (restraint for interference with medical device): Every 2 hours: Monitor safety, psychosocial status, comfort, nutrition and hydration  Taken 1/26/2025 0203 by Janeth Powell RN  Remains free of injury from restraints (restraint for interference with medical device):   Determine that other, less restrictive measures have been tried or would not be effective before applying the restraint   Evaluate the patient's condition at the time of restraint application   Every 2 hours: Monitor safety, psychosocial status, comfort, nutrition and hydration

## 2025-01-26 NOTE — OP NOTE
Colonoscopy report    Colonoscopy Procedure Note    Procedure:  Colonoscopy    Procedure Date: 1/26/2025    Indications: Hematochezia    Sedation:  MAC    Attending Physician:  Dr. Cipriano Porter MD.     Assistant Physician: None    Consent:   Informed consent was obtained for the procedure after explaining the risks including bleeding, perforation, aspiration, arrhythmia, risks related to sedation, reaction to medications and rarely death, benefits and alternatives to the patient. The patient verbalized understanding and agreed to proceed with the procedure.       Procedure Details:  The patient was placed in the left lateral decubitus position.  Oxygen and cardiac monitoring equipment was attached. The patient's vital signs were monitored continuously  throughout the procedure. After appropriate sedation was achieved, a rectal examination was performed.  The colonoscope was inserted into the rectum and advanced under direct vision to the cecum, which was identified by the ileocecal valve and appendiceal orifice.  The quality of the colonic preparation was poor.  A careful inspection was made as the colonoscope was withdrawn, including a retroflexed view of the rectum; findings and interventions are described below.  Appropriate photodocumentation was obtained.           Complications:  None    Estimated blood loss:  Minimal           Disposition:  [facility]           Condition: stable    Withdrawal Time: 21 minutes    Findings:   The bowel prep was fair to poor with few stool balls noted throughout.  There was blood noted in the colon however no signs of active bleeding identified  Multiple small and large mouth diverticula noted throughout the colon, the diverticular disease was extremely severe in the sigmoid colon where it also caused some luminal narrowing as well as stricturing.  Water immersion was used to open the diverticula and the scope was gently advanced.  Multiple polyps noted throughout the colon

## 2025-01-26 NOTE — PROGRESS NOTES
Two attempts made to call patient's  Terry at 2 different numbers on patient's chart, and both numbers went to voicemail. Writer called patient's Daughter and updated her on the overnight events, medications given, current patient condition, and pending GI scope this morning.

## 2025-01-26 NOTE — PROGRESS NOTES
Endovascular Neurosurgery Progress Notes    Pt Name: Dahlia Zhang  MRN: [id number]  YOB: 1950  Date of evaluation: 1/26/2025  Primary Care Physician: Thania Rahman MD  Reason for evaluation: Concern for pontine stroke in the presence of previously treated basilar anneurysm with WEB device and LVIS stent    SUBJECTIVE:   She was stable until last night when she started passing blood clots per rectum. A CTA of the abdomen and pelvis revealed active extravasation from the proximal sigmoid colon. Colonoscopy: diverticular disease being the likely cause of the bleeding. Aspirin and Brilinta have been held. If bleeding recurs, GI recommends either IR embolization or surgical evaluation.     History of Chief Complaint:    Dahlia Zhang is a 74 y.o. right handed female with a history of ruptured basilar tip aneurysm in October 2023 s/p stenting, hypertension, hyperlipidemia, type 2 diabetes, GERD,  atrial fibrillation not on AC presents to [facility] for altered mental status.     On the morning of 1/23/2025, patient was noted to have increased lethargy and generalized weakness along with left facial droop.  These were concerning as patient was at her baseline and doing better yesterday.  She does not recall the time when she went to sleep, thus last known well is unknown.  Potentially she went to sleep between 6-8 PM on 1/22.      Patient was seen and examined at bedside upon arrival to [facility] ED. She was increasingly lethargic and short of breath, however she was able to cross midline without gaze preference, had intact fluency and repetition but did have mild dysarthria. She had generalized weakness.         CT head w/o contrast shows no acute finding.      CTA head and neck show flow diverting stent along basilar artery and proximal PCA with embolization coils at basilar tip. Severe narrowing within the distal basilar stent. Small amount of residual aneurysmal filling at the  changes in von and thalamus (See above).  Emergent DSA revealed: The stents are patent and well apposed with a filling defect at the basilar tip that could represent partial thrombus versus metal artifact. She was treated with a Canngrelor drip and showed neuro improvement. On 1/26, she developed rectal bleeding, with CTA indicating extravasation from the sigmoid colon Colonoscopy: bleeding diverticular disease. Aspirin and Brilinta were held, and GI recommends embolization or surgical evaluation if rebleeding occurs.      Endovascular history: Ruptured basilar artery tip aneurysm, previously treated with a WEB intrasaccular device (10/11/2023) and a second-stage LVIS 4.5 x 23mm stent (6/2024) and a third-stage  LVIS 4.5 x 23mm stent spanning from the left P2 segment to the mid-basilar artery (1/15/2025) is completely obliterated.      PLANS:   Hold: aspirin and Brilinta  -160  A-fib with RVR: Continue Cardizem and metoprolol.  Holding Eliquis for the time being. Cardiology on board   Lower GI bleed: GI on board.   PT OT evaluate  Meropenem by ID for UTI.  Shortness of breath/wheezing: X-ray and breathing treatment per neuro ICU    Follow-up with me in clinic in 6 weeks    Discussed with Dr. Keily Stoll MD,   Pager [phone]  Stroke, Neurocritical Care & Neurointervention  [facility]  [facility]roke [facility] [place]  Electronically signed 1/26/2025 at 3:10 PM

## 2025-01-26 NOTE — CONSENT
Informed Consent for Blood Component Transfusion Note    I have discussed with the patient the rationale for blood component transfusion; its benefits in treating or preventing fatigue, organ damage, or death; and its risk which includes mild transfusion reactions, rare risk of blood borne infection, or more serious but rare reactions. I have discussed the alternatives to transfusion, including the risk and consequences of not receiving transfusion. The patient had an opportunity to ask questions and had agreed to proceed with transfusion of blood components.    Electronically signed by Gaby Szymanski DO on 1/26/25 at 8:11 AM EST

## 2025-01-26 NOTE — PROGRESS NOTES
Daily Progress Note  Neuro Critical Care    Patient Name: Dahlia Zhang  Patient : 1950  Room/Bed: 0115/0115-01  Code Status: Full Code  Allergies:   Allergies   Allergen Reactions    Lisinopril Angioedema    Codeine Nausea And Vomiting    Norco [Hydrocodone-Acetaminophen] Nausea And Vomiting    Percocet [Oxycodone-Acetaminophen] Nausea And Vomiting       CHIEF COMPLAINT:        AMS     INTERVAL HISTORY    Initial Presentation (Admitted 2025):  The patient is a 74 y.o. 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 Patient initially presented to [facility] ED on 25 with complaints of altered mental status, increased lethargy, dysarthria, left facial droop and generalized weakness. Initial NIHSS 9.  /59.  Patient has been compliant with Aspirin and Plavix post recent stent placement.  She remains off Eliquis as planned.  MRI brain without contrast showed acute infarct in right von, punctate infarcts in right cerebellum, thalamus and right occipital lobe.  CTA showed severe narrowing within the distal basilar stent, small ~2mm amount of residual aneurysmal filling and neck and 60% L cervical ICA stenosis.  Labs revealed hyperglycemia (225), elevated lactic (4.1), mild troponin elevation (34), mild transaminitis and leukocytosis (WBC 26.0).  ID was consulted from ED.  Patient was taken for urgent DSA showing previously treated basilar tip aneurysm (s/p WEB, LVIS x2) is completely obliterated, stents are patent and well apposed with filling defect at the basilar tip; possible thrombus vs metal artifact.  Given Cangrelor bolus followed by infusion.     Admitted to the Neuro ICU post procedure.  NIHSS 1.  On exam post procedure, patient is alert and answering questions appropriately. Exam limited 2/2 post op restrictions    01/26/25 0345 (!) 148/109 -- -- (!) 147 25 100 %   01/26/25 0330 (!) 75/43 -- -- (!) 147 (!) 38 100 %   01/26/25 0315 (!) 68/46 -- -- (!) 136 20 100 %   01/26/25 0300 (!) 72/38 -- -- (!) 149 21 99 %   01/26/25 0200 (!) 97/57 -- -- (!) 160 22 99 %   01/26/25 0100 139/87 -- -- (!) 142 20 95 %   01/26/25 0000 (!) 161/108 98.4 °F (36.9 °C) Oral (!) 121 18 100 %   01/25/25 2300 (!) 145/95 -- -- (!) 108 21 100 %         RECENT LABS:   Lab Results   Component Value Date    WBC 13.7 (H) 01/26/2025    HGB 7.5 (L) 01/26/2025    HCT 23.9 (L) 01/26/2025     01/26/2025    CHOL 141 01/23/2025    TRIG 105 01/23/2025    HDL 56 01/23/2025    ALT 78 (H) 01/25/2025    AST 65 (H) 01/25/2025     01/26/2025    K 4.7 01/26/2025     01/26/2025    CREATININE 0.6 01/26/2025    BUN 11 01/26/2025    CO2 31 01/26/2025    TSH 0.92 01/23/2025    INR 1.1 01/23/2025    LABA1C 7.3 (H) 01/24/2025     24 HOUR INTAKE/OUTPUT:  Intake/Output Summary (Last 24 hours) at 1/26/2025 1013  Last data filed at 1/25/2025 1550  Gross per 24 hour   Intake 100 ml   Output --   Net 100 ml     XR ABDOMEN FOR NG/OG/NE TUBE PLACEMENT   Final Result   Gastric tube with the tip in the proximal aspect of the stomach.         CTA ABDOMEN PELVIS W WO CONTRAST   Final Result   1. Extravasation of contrast at the proximal sigmoid colon consistent with   active intestinal bleeding.   2. Small amount of air in the bladder which could be iatrogenic.  Air can   also be seen in the bladder in the setting of infection with a gas-forming   organism.   3. No acute findings elsewhere in the abdomen or pelvis.   4. Colonic diverticulosis.   5. Small hiatal hernia.   6. Moderate atherosclerosis with normal patency of the branches of the aorta.         US RENAL COMPLETE   Final Result   Unremarkable ultrasound of the kidneys and urinary bladder.         CT CHEST PULMONARY EMBOLISM W CONTRAST   Final Result   1. No obvious filling defect in the central pulmonary

## 2025-01-27 ENCOUNTER — APPOINTMENT (OUTPATIENT)
Dept: GENERAL RADIOLOGY | Age: 75
DRG: 023 | End: 2025-01-27
Payer: MEDICARE

## 2025-01-27 PROBLEM — K63.5 POLYP OF COLON: Status: ACTIVE | Noted: 2025-01-27

## 2025-01-27 LAB
ABO/RH: NORMAL
ANION GAP SERPL CALCULATED.3IONS-SCNC: 8 MMOL/L (ref 9–16)
ANTIBODY SCREEN: NEGATIVE
ARM BAND NUMBER: NORMAL
BASOPHILS # BLD: 0.05 K/UL (ref 0–0.2)
BASOPHILS NFR BLD: 1 % (ref 0–2)
BLOOD BANK BLOOD PRODUCT EXPIRATION DATE: NORMAL
BLOOD BANK DISPENSE STATUS: NORMAL
BLOOD BANK ISBT PRODUCT BLOOD TYPE: 6200
BLOOD BANK PRODUCT CODE: NORMAL
BLOOD BANK SAMPLE EXPIRATION: NORMAL
BLOOD BANK UNIT TYPE AND RH: NORMAL
BPU ID: NORMAL
BUN SERPL-MCNC: 6 MG/DL (ref 8–23)
CALCIUM SERPL-MCNC: 7.9 MG/DL (ref 8.6–10.4)
CHLORIDE SERPL-SCNC: 106 MMOL/L (ref 98–107)
CO2 SERPL-SCNC: 29 MMOL/L (ref 20–31)
COMPONENT: NORMAL
CREAT SERPL-MCNC: 0.5 MG/DL (ref 0.6–0.9)
CROSSMATCH RESULT: NORMAL
EKG ATRIAL RATE: 288 BPM
EKG ATRIAL RATE: 340 BPM
EKG Q-T INTERVAL: 332 MS
EKG Q-T INTERVAL: 378 MS
EKG QRS DURATION: 78 MS
EKG QRS DURATION: 88 MS
EKG QTC CALCULATION (BAZETT): 475 MS
EKG QTC CALCULATION (BAZETT): 484 MS
EKG R AXIS: 13 DEGREES
EKG R AXIS: 7 DEGREES
EKG T AXIS: 104 DEGREES
EKG T AXIS: 74 DEGREES
EKG VENTRICULAR RATE: 128 BPM
EKG VENTRICULAR RATE: 95 BPM
EOSINOPHIL # BLD: 0.3 K/UL (ref 0–0.44)
EOSINOPHILS RELATIVE PERCENT: 3 % (ref 1–4)
ERYTHROCYTE [DISTWIDTH] IN BLOOD BY AUTOMATED COUNT: 14.8 % (ref 11.8–14.4)
GFR, ESTIMATED: >90 ML/MIN/1.73M2
GLUCOSE BLD-MCNC: 114 MG/DL (ref 65–105)
GLUCOSE BLD-MCNC: 175 MG/DL (ref 65–105)
GLUCOSE SERPL-MCNC: 122 MG/DL (ref 74–99)
HCT VFR BLD AUTO: 25.9 % (ref 36.3–47.1)
HGB BLD-MCNC: 8.1 G/DL (ref 11.9–15.1)
IMM GRANULOCYTES # BLD AUTO: 0.05 K/UL (ref 0–0.3)
IMM GRANULOCYTES NFR BLD: 1 %
INTERVENTION: NORMAL
LYMPHOCYTES NFR BLD: 2.69 K/UL (ref 1.1–3.7)
LYMPHOCYTES RELATIVE PERCENT: 25 % (ref 24–43)
M PNEUMO IGM SER QL IA: 0.16
MCH RBC QN AUTO: 27.6 PG (ref 25.2–33.5)
MCHC RBC AUTO-ENTMCNC: 31.3 G/DL (ref 28.4–34.8)
MCV RBC AUTO: 88.1 FL (ref 82.6–102.9)
MONOCYTES NFR BLD: 0.82 K/UL (ref 0.1–1.2)
MONOCYTES NFR BLD: 8 % (ref 3–12)
NEUTROPHILS NFR BLD: 62 % (ref 36–65)
NEUTS SEG NFR BLD: 6.81 K/UL (ref 1.5–8.1)
NRBC BLD-RTO: 0 PER 100 WBC
PLATELET # BLD AUTO: 198 K/UL (ref 138–453)
PMV BLD AUTO: 10.9 FL (ref 8.1–13.5)
POTASSIUM SERPL-SCNC: 3.7 MMOL/L (ref 3.7–5.3)
RBC # BLD AUTO: 2.94 M/UL (ref 3.95–5.11)
RBC # BLD: ABNORMAL 10*6/UL
SODIUM SERPL-SCNC: 143 MMOL/L (ref 136–145)
TRANSFUSION STATUS: NORMAL
UNIT DIVISION: 0
UNIT ISSUE DATE/TIME: NORMAL
WBC OTHER # BLD: 10.7 K/UL (ref 3.5–11.3)

## 2025-01-27 PROCEDURE — 6370000000 HC RX 637 (ALT 250 FOR IP)

## 2025-01-27 PROCEDURE — 2700000000 HC OXYGEN THERAPY PER DAY

## 2025-01-27 PROCEDURE — 36415 COLL VENOUS BLD VENIPUNCTURE: CPT

## 2025-01-27 PROCEDURE — 94640 AIRWAY INHALATION TREATMENT: CPT

## 2025-01-27 PROCEDURE — 99233 SBSQ HOSP IP/OBS HIGH 50: CPT | Performed by: PSYCHIATRY & NEUROLOGY

## 2025-01-27 PROCEDURE — 97535 SELF CARE MNGMENT TRAINING: CPT

## 2025-01-27 PROCEDURE — 85025 COMPLETE CBC W/AUTO DIFF WBC: CPT

## 2025-01-27 PROCEDURE — 99232 SBSQ HOSP IP/OBS MODERATE 35: CPT | Performed by: INTERNAL MEDICINE

## 2025-01-27 PROCEDURE — 6370000000 HC RX 637 (ALT 250 FOR IP): Performed by: INTERNAL MEDICINE

## 2025-01-27 PROCEDURE — 2500000003 HC RX 250 WO HCPCS

## 2025-01-27 PROCEDURE — 2580000003 HC RX 258: Performed by: INTERNAL MEDICINE

## 2025-01-27 PROCEDURE — 99291 CRITICAL CARE FIRST HOUR: CPT | Performed by: STUDENT IN AN ORGANIZED HEALTH CARE EDUCATION/TRAINING PROGRAM

## 2025-01-27 PROCEDURE — 80048 BASIC METABOLIC PNL TOTAL CA: CPT

## 2025-01-27 PROCEDURE — 94761 N-INVAS EAR/PLS OXIMETRY MLT: CPT

## 2025-01-27 PROCEDURE — 2580000003 HC RX 258

## 2025-01-27 PROCEDURE — 2500000003 HC RX 250 WO HCPCS: Performed by: INTERNAL MEDICINE

## 2025-01-27 PROCEDURE — 97129 THER IVNTJ 1ST 15 MIN: CPT

## 2025-01-27 PROCEDURE — 93010 ELECTROCARDIOGRAM REPORT: CPT | Performed by: INTERNAL MEDICINE

## 2025-01-27 PROCEDURE — 71045 X-RAY EXAM CHEST 1 VIEW: CPT

## 2025-01-27 PROCEDURE — 97530 THERAPEUTIC ACTIVITIES: CPT

## 2025-01-27 PROCEDURE — 6360000002 HC RX W HCPCS: Performed by: INTERNAL MEDICINE

## 2025-01-27 PROCEDURE — 99222 1ST HOSP IP/OBS MODERATE 55: CPT | Performed by: INTERNAL MEDICINE

## 2025-01-27 PROCEDURE — 97162 PT EVAL MOD COMPLEX 30 MIN: CPT

## 2025-01-27 PROCEDURE — 82947 ASSAY GLUCOSE BLOOD QUANT: CPT

## 2025-01-27 PROCEDURE — 2000000000 HC ICU R&B

## 2025-01-27 PROCEDURE — 97130 THER IVNTJ EA ADDL 15 MIN: CPT

## 2025-01-27 PROCEDURE — 97166 OT EVAL MOD COMPLEX 45 MIN: CPT

## 2025-01-27 RX ORDER — 0.9 % SODIUM CHLORIDE 0.9 %
500 INTRAVENOUS SOLUTION INTRAVENOUS ONCE
Status: COMPLETED | OUTPATIENT
Start: 2025-01-27 | End: 2025-01-27

## 2025-01-27 RX ORDER — METOPROLOL TARTRATE 1 MG/ML
5 INJECTION, SOLUTION INTRAVENOUS ONCE
Status: COMPLETED | OUTPATIENT
Start: 2025-01-27 | End: 2025-01-27

## 2025-01-27 RX ORDER — METOPROLOL TARTRATE 50 MG
50 TABLET ORAL 2 TIMES DAILY
Status: DISCONTINUED | OUTPATIENT
Start: 2025-01-27 | End: 2025-01-30

## 2025-01-27 RX ORDER — HYDRALAZINE HYDROCHLORIDE 20 MG/ML
5 INJECTION INTRAMUSCULAR; INTRAVENOUS
Status: DISCONTINUED | OUTPATIENT
Start: 2025-01-27 | End: 2025-01-28

## 2025-01-27 RX ADMIN — METOPROLOL TARTRATE 50 MG: 50 TABLET, FILM COATED ORAL at 09:41

## 2025-01-27 RX ADMIN — DILTIAZEM HYDROCHLORIDE 120 MG: 120 CAPSULE, COATED, EXTENDED RELEASE ORAL at 08:30

## 2025-01-27 RX ADMIN — SODIUM CHLORIDE, PRESERVATIVE FREE 10 ML: 5 INJECTION INTRAVENOUS at 20:22

## 2025-01-27 RX ADMIN — METOPROLOL TARTRATE 50 MG: 50 TABLET, FILM COATED ORAL at 20:19

## 2025-01-27 RX ADMIN — SODIUM CHLORIDE 8 MG/HR: 9 INJECTION, SOLUTION INTRAVENOUS at 02:31

## 2025-01-27 RX ADMIN — SODIUM CHLORIDE: 9 INJECTION, SOLUTION INTRAVENOUS at 02:31

## 2025-01-27 RX ADMIN — Medication 0.5 MG/MIN: at 19:40

## 2025-01-27 RX ADMIN — ACETAMINOPHEN 650 MG: 325 TABLET ORAL at 10:27

## 2025-01-27 RX ADMIN — MEROPENEM 1000 MG: 1 INJECTION INTRAVENOUS at 02:33

## 2025-01-27 RX ADMIN — SODIUM CHLORIDE 500 ML: 9 INJECTION, SOLUTION INTRAVENOUS at 08:27

## 2025-01-27 RX ADMIN — ESCITALOPRAM OXALATE 10 MG: 10 TABLET ORAL at 08:29

## 2025-01-27 RX ADMIN — MEROPENEM 1000 MG: 1 INJECTION INTRAVENOUS at 12:11

## 2025-01-27 RX ADMIN — ROSUVASTATIN CALCIUM 20 MG: 20 TABLET, FILM COATED ORAL at 20:19

## 2025-01-27 RX ADMIN — MEROPENEM 1000 MG: 1 INJECTION INTRAVENOUS at 19:32

## 2025-01-27 RX ADMIN — TIOTROPIUM BROMIDE INHALATION SPRAY 2 PUFF: 3.12 SPRAY, METERED RESPIRATORY (INHALATION) at 07:33

## 2025-01-27 RX ADMIN — Medication 0.5 MG/MIN: at 02:30

## 2025-01-27 RX ADMIN — SODIUM CHLORIDE 8 MG/HR: 9 INJECTION, SOLUTION INTRAVENOUS at 14:24

## 2025-01-27 RX ADMIN — METOPROLOL TARTRATE 5 MG: 5 INJECTION INTRAVENOUS at 08:20

## 2025-01-27 ASSESSMENT — PAIN SCALES - GENERAL: PAINLEVEL_OUTOF10: 2

## 2025-01-27 NOTE — CONSULTS
Attestation signed by    [facility]    Consult / H&P               Today's Date: 1/27/2025  Patient Name: Dahlia Zhang  Date of admission: 1/23/2025  1:08 PM  Patient's age: 74 y.o., 1950  Admission Dx: Encephalopathy [G93.40]  Left-sided weakness [R53.1]  Thalamic stroke (HCC) [I63.81]    Reason for Consult:  Afib with RVR    Requesting Physician: Terry Deluna MD    CHIEF COMPLAINT:  Altered mental status.    History Obtained From:  patient, electronic medical record    HISTORY OF PRESENT ILLNESS:    Patient initially presented wit altered mental status.  Patient was found to have imaging findings concerning for acute ischemic stroke.  Patient was started on antiplatelet infusion and developed lower GI bleed.  Patient also was found to have A-fib with RVR currently on amiodarone, Cardizem and Lopressor.  Cardiology consulted for uncontrolled RVR    Past Medical History:   has a past medical history of Anticoagulated, Anxiety and depression, Atrial fibrillation (HCC), Cerebral aneurysm, Cerebral artery occlusion with cerebral infarction (HCC), Colon polyps, COPD (chronic obstructive pulmonary disease) (HCC), COVID-19, COVID-19 vaccine administered, DM II (diabetes mellitus, type II), controlled (HCC), GERD (gastroesophageal reflux disease), HTN (hypertension), Hyperlipidemia, Kidney stone, On home O2, Osteoarthritis, Sleep apnea, Under care of team, Under care of team, Under care of team, Under care of team, Wears glasses, Wears partial dentures, and Wellness examination.    Past Surgical History:   has a past surgical history that includes Thyroid surgery; Breast reduction surgery (Bilateral, 1989); Hysterectomy; hernia repair; Cerebral angiogram (04/04/2024); Cerebral angiogram (10/11/2023); Cerebral angiogram (06/19/2024); Cholecystectomy; Cerebral angiogram (12/12/2024); Breast biopsy (Right); Cataract extraction w/ intraocular lens implant (Bilateral, 2020); Colonoscopy w/  standard drink of alcohol per week. She reports that she does not use drugs.     Family History: family history includes Dementia in her mother; Heart Disease in her father; Hypertension in her father and mother; No Known Problems in her sister; Stroke in her father and mother.     REVIEW OF SYSTEMS:    Constitutional: there has been no unanticipated weight loss. There's been No change in energy level, No change in activity level.     Eyes: No visual changes or diplopia. No scleral icterus.  ENT: No Headaches  Cardiovascular: palpitations.  Respiratory: No previous pulmonary problems, No cough  Gastrointestinal: No abdominal pain.  No change in bowel or bladder habits.  Genitourinary: No dysuria, trouble voiding, or hematuria.  Musculoskeletal:  No gait disturbance, No weakness or joint complaints.  Integumentary: No rash or pruritis.  Neurological: No headache, diplopia, change in muscle strength, numbness or tingling. No change in gait, balance, coordination, mood, affect, memory, mentation, behavior.  Psychiatric: No anxiety, or depression.  Endocrine: No temperature intolerance. No excessive thirst, fluid intake, or urination. No tremor.  Hematologic/Lymphatic: No abnormal bruising or bleeding, blood clots or swollen lymph nodes.  Allergic/Immunologic: No nasal congestion or hives.      PHYSICAL EXAM:      BP (!) 153/104   Pulse (!) 107   Temp 97.9 °F (36.6 °C) (Oral)   Resp 20   Ht 1.702 m (5' 7\")   Wt 90.4 kg (199 lb 4.7 oz)   SpO2 100%   BMI 31.21 kg/m²    Constitutional and General Appearance: alert, cooperative, no distress and appears stated age  HEENT: PERRL, no cervical lymphadenopathy. No masses palpable. Normal oral mucosa  Respiratory:  Normal excursion and expansion without use of accessory muscles  Resp Auscultation: Good respiratory effort. No for increased work of breathing. On auscultation: clear to auscultation bilaterally  Cardiovascular:  The apical impulse is not displaced  Heart tones

## 2025-01-27 NOTE — PLAN OF CARE
Problem: Chronic Conditions and Co-morbidities  Goal: Patient's chronic conditions and co-morbidity symptoms are monitored and maintained or improved  Outcome: Progressing  Flowsheets (Taken 1/27/2025 0800)  Care Plan - Patient's Chronic Conditions and Co-Morbidity Symptoms are Monitored and Maintained or Improved:   Monitor and assess patient's chronic conditions and comorbid symptoms for stability, deterioration, or improvement   Collaborate with multidisciplinary team to address chronic and comorbid conditions and prevent exacerbation or deterioration     Problem: Discharge Planning  Goal: Discharge to home or other facility with appropriate resources  Outcome: Progressing  Flowsheets (Taken 1/27/2025 0800)  Discharge to home or other facility with appropriate resources:   Identify barriers to discharge with patient and caregiver   Arrange for needed discharge resources and transportation as appropriate     Problem: Pain  Goal: Verbalizes/displays adequate comfort level or baseline comfort level  Outcome: Progressing     Problem: Safety - Adult  Goal: Free from fall injury  Outcome: Progressing     Problem: ABCDS Injury Assessment  Goal: Absence of physical injury  Outcome: Progressing     Problem: Respiratory - Adult  Goal: Achieves optimal ventilation and oxygenation  1/27/2025 1755 by Beena Jordan RN  Outcome: Progressing  1/27/2025 0736 by Shraddha Najera RCP  Outcome: Progressing     Problem: Neurosensory - Adult  Goal: Achieves stable or improved neurological status  Outcome: Progressing  Flowsheets (Taken 1/27/2025 0800)  Achieves stable or improved neurological status:   Assess for and report changes in neurological status   Initiate measures to prevent increased intracranial pressure   Maintain blood pressure and fluid volume within ordered parameters to optimize cerebral perfusion and minimize risk of hemorrhage   Monitor temperature, glucose, and sodium. Initiate appropriate interventions as

## 2025-01-27 NOTE — PROGRESS NOTES
Daily Progress Note  Neuro Critical Care    Patient Name: Dahlia Zhang  Patient : 1950  Room/Bed: 0115/0115-01  Code Status: Full Code  Allergies:   Allergies   Allergen Reactions    Lisinopril Angioedema    Codeine Nausea And Vomiting    Norco [Hydrocodone-Acetaminophen] Nausea And Vomiting    Percocet [Oxycodone-Acetaminophen] Nausea And Vomiting       CHIEF COMPLAINT:        AMS     INTERVAL HISTORY    Initial Presentation (Admitted 2025):  The patient is a 74 y.o. 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 Patient initially presented to [facility] ED on 25 with complaints of altered mental status, increased lethargy, dysarthria, left facial droop and generalized weakness. Initial NIHSS 9.  /59.  Patient has been compliant with Aspirin and Plavix post recent stent placement.  She remains off Eliquis as planned.  MRI brain without contrast showed acute infarct in right von, punctate infarcts in right cerebellum, thalamus and right occipital lobe.  CTA showed severe narrowing within the distal basilar stent, small ~2mm amount of residual aneurysmal filling and neck and 60% L cervical ICA stenosis.  Labs revealed hyperglycemia (225), elevated lactic (4.1), mild troponin elevation (34), mild transaminitis and leukocytosis (WBC 26.0).  ID was consulted from ED.  Patient was taken for urgent DSA showing previously treated basilar tip aneurysm (s/p WEB, LVIS x2) is completely obliterated, stents are patent and well apposed with filling defect at the basilar tip; possible thrombus vs metal artifact.  Given Cangrelor bolus followed by infusion.     Admitted to the Neuro ICU post procedure.  NIHSS 1.  On exam post procedure, patient is alert and answering questions appropriately. Exam limited 2/2 post op restrictions  the neck, approximately 2   mm.   3. 60% stenosis of the left proximal cervical ICA due to calcified plaque.         IR ANGIOGRAM CAROTID C EREBRAL BILATERAL    (Results Pending)   XR CHEST PORTABLE    (Results Pending)       Labs and Images reviewed with:  [x] Dr. Katelynn Marie  [] Dr. Terry Deluna  [] There are no new interval images to review.     PHYSICAL EXAM       CONSTITUTIONAL:  Well developed, well nourished.  Alert and oriented x 2, in no acute distress. GCS 15. Nontoxic. No aphasia. Ever so slight slurring of speech.    HEAD:  normocephalic, atraumatic    EYES:  PERRLA, EOMI   ENT:  moist mucous membranes   NECK:  supple, symmetric   LUNGS:  Equal air entry bilaterally, clear   CARDIOVASCULAR:  normal s1 / s2, RRR, distal pulses intact, left groin arterial sheath in place without drainage or hematoma    ABDOMEN:  Soft, no rigidity, normal bowel sounds    NEUROLOGIC:  Mental Status:  Alert and oriented x3.  Following commands.  GCS 15.               Cranial Nerves:    II: Visual fields:  normal  III: Pupils:  equal, round, reactive to light  III,IV,VI: Extra Ocular Movements: intact  V: Facial sensation:  intact  VII: Facial strength: intact     Motor Exam:    Drift:  absent  Tone:  normal     5/5 strength in the all extremities       Sensory:    Touch:    Right Upper Extremity:  normal  Left Upper Extremity:  normal  Right Lower Extremity:  normal  Left Lower Extremity:  normal        Coordination/Dysmetria:  Finger to Nose:   Right:  normal  Left:  normal      DRAINS:  [x] There are no drains for Neuro Critical Care to monitor at this time.     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  Initial NIHSS 9. /59. Patient has been compliant with Aspirin and Plavix post recent stent placement. She remains off Eliquis as planned. MRI brain without contrast showed acute infarct in right von, punctate infarcts in right cerebellum, thalamus and right occipital lobe. CTA showed severe narrowing within the distal basilar stent. Taken for urgent DSA showing previously treated basilar tip aneurysm (s/p WEB, LVIS x2) is completely obliterated, stents are patent and well apposed with filling defect at the basilar tip; possible thrombus vs metal artifact. Given Cangrelor bolus followed by infusion, transitioned to Brilinta.     NEUROLOGIC:  Acute right pontine, right cerebellar, right thalamus and right occipital lobe infarctions likely secondary to thrombosed basilar tip LVIS  - MRI brain without contrast showed acute infarct in right von, punctate infarcts in right cerebellum, thalamus and right occipital lobe.  - POD #3 s/p DSA showing previously treated basilar tip aneurysm (s/p WEB, LVIS x2) is completely obliterated, stents are patent and well apposed with filling defect at the basilar tip; possible thrombus vs metal artifact  - Continue home Crestor 20mg QHS for secondary stroke prevention  - Will need to resume home Eliquis at some point for atrial fibrillation, timing TBD  - Neuro Endovascular following; discussion regarding further intervention ongoing  - Left groin sheath removed on 1/24  - Goal -160, avoid symptomatic hypotension  - Neuro checks per protocol    - Aspirin and Brilinta held 1/26 in setting of acute GI bleed     CARDIOVASCULAR:  Essential HTN  Atrial fibrillation with chronic anticoagulation (home Eliquis on hold PTA due to recent aneurysm treatment with LVIS)  Mild troponin elevation likely type II secondary to above  - Goal -160  - Hold home Hydralazine, Losartan-HCTZ  - Continue home Lopressor 50 mg twice daily and Cardizem 120 mg ER daily  - Home Eliquis remains on

## 2025-01-27 NOTE — PROGRESS NOTES
[facility]   Gastroenterology Progress Note    Dahlia Zhang is a 74 y.o. female patient.  Hospitalization Day:4      Chief consult reason:   Severe abdominal pain  Large bloody bowel movement    Subjective:  Patient seen and examined, no acute events overnight  No rectal bleeding or bowel movement  Patient had colonoscopy yesterday that identified severe pandiverticulosis with luminal narrowing in sigmoid colon, multiple polyps noted but not removed due to active bleeding.   Hemoglobin stable overnight at 8.1 g/dL without additional transfusion    VITALS:  BP (!) 136/120   Pulse (!) 114   Temp 97.9 °F (36.6 °C) (Oral)   Resp 20   Ht 1.702 m (5' 7\")   Wt 90.4 kg (199 lb 4.7 oz)   SpO2 100%   BMI 31.21 kg/m²   TEMPERATURE:  Current - Temp: 97.9 °F (36.6 °C); Max - Temp  Av.9 °F (36.6 °C)  Min: 96.8 °F (36 °C)  Max: 98.4 °F (36.9 °C)    Physical Assessment:  General appearance: Sleeping  Mental Status:  oriented to person, place   Lungs:  clear to auscultation bilaterally, normal effort  Heart:  regular rate and rhythm, no murmur  Abdomen:  soft, nontender, nondistended, normal bowel sounds, no masses, hepatomegaly, splenomegaly  Extremities:  no edema, redness, tenderness in the calves  Skin:  no gross lesions, rashes, induration    Data Review:    Labs and Imaging:       CBC:  Recent Labs     25  0324 25  2026 25  0102 25  0346 25  0820 25  1301 25  1901 25  0331   WBC 10.6  --  13.5* 13.7*  --   --   --  10.7   HGB 9.6*   < > 8.6* 8.1* 7.5* 8.6* 8.2* 8.1*   MCV 85.8  --  89.3 91.1  --   --   --  88.1   RDW 14.3  --  14.3 14.5*  --   --   --  14.8*     --  216 219  --   --   --  198    < > = values in this interval not displayed.       ANEMIA STUDIES:  No results for input(s): \"TIBC\", \"FERRITIN\", \"HBDMMHAS82\", \"FOLATE\", \"OCCULTBLD\" in the last 72 hours.    Invalid input(s): \"LABIRON\"    BMP:  Recent Labs     25  0102  01/26/25  0346 01/27/25  0331   * 137 143   K 4.2 4.7 3.7    101 106   CO2 28 31 29   BUN 10 11 6*   CREATININE 0.6 0.6 0.5*   GLUCOSE 144* 154* 122*   CALCIUM 8.0* 8.2* 7.9*       LFTS:  Recent Labs     01/25/25  0324   ALKPHOS 66   ALT 78*   AST 65*   BILITOT 0.2   BILIDIR 0.1       Other pertinent labs:      Gastroenterology impression:    Diverticular bleeding-resolved  S/p colonoscopy-severe pandiverticulosis with sigmoid narrowing  1/27 no rectal bleeding, no BM, hemoglobin stable without additional transfusion  S/p CTA abdomen and pelvis-no source of bleeding identified  Hypotension-improved with fluids  Acute blood loss anemia-no rectal bleeding overnight  A-fib with chronic anticoagulation-patient normally is on Eliquis at home but this is on hold due to recent aneurysmal treatment  Acute right pontine, right cerebellar right thalamus and right bicipital lobe infarcts likely secondary to thrombosed basilar tip LVIS,  History of COPD    Plan:    Okay for diet as tolerated from GI perspective  Continue to monitor for active GI bleeding  Monitor closely for tachycardia, hypotension, other signs of shock etc  If patient develops rectal bleeding, recommend IR consultation for embolization versus surgical evaluation  Will defer to antiplatelet/anticoagulant resumption to primary team as the patient does have moderate to high risk of rebleeding  Patient will eventually need repeat colonoscopy in OP setting once cleared to be off AC/AP for polypectomy  GI will sign off, please call with questions or concerns      This plan was formulated in collaboration with Dr. Charlotte MD    Thank you for allowing me to participate in the care of your patient.  Please feel free to contact me with any questions or concerns.     [facility]   Juno Gage, APRN - CNP   [phone]  1/27/2025  11:27 AM    Estimated time of  mins reviewing chart, assessing patient and

## 2025-01-27 NOTE — PROGRESS NOTES
Physical Therapy  Facility/Department: [address] NEURO ICU  Physical Therapy Initial Assessment    Name: Dahlia Zhang  : 1950  MRN: [id number]  Date of Service: 2025    Discharge Recommendations:  Therapy recommended at discharge   PT Equipment Recommendations  Equipment Needed: Yes  Mobility Devices: Walker  Walker: Rolling  Other: if she cannot find hers      Patient Diagnosis(es): The encounter diagnosis was Left-sided weakness.  Past Medical History:  has a past medical history of Anticoagulated, Anxiety and depression, Atrial fibrillation (HCC), Cerebral aneurysm, Cerebral artery occlusion with cerebral infarction (HCC), Colon polyps, COPD (chronic obstructive pulmonary disease) (HCC), COVID-19, COVID-19 vaccine administered, DM II (diabetes mellitus, type II), controlled (HCC), GERD (gastroesophageal reflux disease), HTN (hypertension), Hyperlipidemia, Kidney stone, On home O2, Osteoarthritis, Sleep apnea, Under care of team, Under care of team, Under care of team, Under care of team, Wears glasses, Wears partial dentures, and Wellness examination.  Past Surgical History:  has a past surgical history that includes Thyroid surgery; Breast reduction surgery (Bilateral, ); Hysterectomy; hernia repair; Cerebral angiogram (2024); Cerebral angiogram (10/11/2023); Cerebral angiogram (2024); Cholecystectomy; Cerebral angiogram (2024); Breast biopsy (Right); Cataract extraction w/ intraocular lens implant (Bilateral, ); Colonoscopy w/ polypectomy (2023); other surgical history (01/15/2025); and Colonoscopy (N/A, 2025).    Assessment  Body Structures, Functions, Activity Limitations Requiring Skilled Therapeutic Intervention: Decreased functional mobility ;Decreased strength;Decreased safe awareness;Decreased endurance;Decreased balance  Assessment: Pt able to ambulate 10 ft from bed to commode chair, Mayra for safety and balance. Amb 10 ft with rw CGA, improved  steadiness with support.  Pt Mayra for transfers, CGA for bed mobility. Pt would require assist in previous living siutation due to limited mobility and strength. Pt will benefit from continued therapy to improve deficits and progress function.  Therapy Prognosis: Good  Decision Making: Medium Complexity  Requires PT Follow-Up: Yes  Activity Tolerance  Activity Tolerance: Patient limited by fatigue;Patient limited by endurance    Plan  Physical Therapy Plan  General Plan:  (5-6x/week)  Current Treatment Recommendations: Strengthening, Balance training, Functional mobility training, Transfer training, Endurance training, Stair training, Gait training, Therapeutic activities, Safety education & training, Patient/Caregiver education & training, Neuromuscular re-education  Safety Devices  Type of Devices: Call light within reach, Gait belt, Left in chair, Chair alarm in place, Nurse notified  Restraints  Restraints Initially in Place: No    Restrictions  Restrictions/Precautions  Restrictions/Precautions: Fall Risk  Activity Level: Up as Tolerated  Required Braces or Orthoses?: No     Subjective  General  Chart Reviewed: Yes  Patient assessed for rehabilitation services?: Yes  Family/Caregiver Present: Yes (spouse and daughter)  Follows Commands: Within Functional Limits  General  General Comments: RN and pt agreeable to treatment session  Subjective  Subjective: Pt reports  4/10 back pain, repositioned in chair for comfort.  Numbness in toes         Social/Functional History  Social/Functional History  Lives With: Spouse  Type of Home: House  Home Layout: One level  Home Access: Level entry  Bathroom Shower/Tub: Walk-in shower  Bathroom Equipment: Shower chair, Grab bars in shower  Home Equipment: Oxygen, Walker - Rolling  Has the patient had two or more falls in the past year or any fall with injury in the past year?: Yes  Receives Help From: Family  Prior Level of Assist for ADLs: Independent  Prior Level of Assist  visits  Short Term Goal 1: Pt complete bed mobility modified indep with compensatory strategies as needed  Short Term Goal 2: Pt complete transfers with SBA with least restrictive device, no  loss of balance  Short Term Goal 3: Pt amb 150 ft with least restrictive device, even step length and proper safety awareness  Short Term Goal 4: Pt demonstrate 4 stairs with 1 rail and SBA without loss of balance for cumminity assess  Short Term Goal 5: Pt indep with high level balance strategies to decrease risk of falls and promote return to function       Education  Patient Education  Education Given To: Patient;Family  Education Provided: Role of Therapy;Plan of Care  Education Provided Comments: importance of increasing activity  Education Method: Verbal  Barriers to Learning: None  Education Outcome: Verbalized understanding;Continued education needed      Therapy Time   Individual Concurrent Group Co-treatment   Time In 0829         Time Out 0908         Minutes 39         Timed Code Treatment Minutes: 23 Minutes       Abby Willis, PT

## 2025-01-27 NOTE — PROGRESS NOTES
Occupational Therapy  Occupational Therapy Initial Evaluation  Facility/Department: [address] NEURO ICU   Patient Name: Dahlia Zhang        MRN: [id number]    : 1950    Date of Service: 2025    Chief Complaint   Patient presents with    Extremity Weakness     Past Medical History:  has a past medical history of Anticoagulated, Anxiety and depression, Atrial fibrillation ([facility]), Cerebral aneurysm, Cerebral artery occlusion with cerebral infarction ([facility]), Colon polyps, COPD (chronic obstructive pulmonary disease) ([facility]), COVID-19, COVID-19 vaccine administered, DM II (diabetes mellitus, type II), controlled ([facility]), GERD (gastroesophageal reflux disease), HTN (hypertension), Hyperlipidemia, Kidney stone, On home O2, Osteoarthritis, Sleep apnea, Under care of team, Under care of team, Under care of team, Under care of team, Wears glasses, Wears partial dentures, and Wellness examination.  Past Surgical History:  has a past surgical history that includes Thyroid surgery; Breast reduction surgery (Bilateral, ); Hysterectomy; hernia repair; Cerebral angiogram (2024); Cerebral angiogram (10/11/2023); Cerebral angiogram (2024); Cholecystectomy; Cerebral angiogram (2024); Breast biopsy (Right); Cataract extraction w/ intraocular lens implant (Bilateral, ); Colonoscopy w/ polypectomy (2023); other surgical history (01/15/2025); Colonoscopy (N/A, 2025); and Colonoscopy (N/A, 2025).    Discharge Recommendations  Discharge Recommendations: Patient would benefit from continued therapy after discharge  OT Equipment Recommendations  Equipment Needed: No  Further therapy recommended at discharge.The patient should be able to tolerate at least 3 hours of therapy per day over 5 days or 15 hours over 7 days.   This patient may benefit from a Physical Medicine and Rehab consult.      Assessment  Performance deficits / Impairments: Decreased functional mobility ;Decreased ADL  PRN.  Short Term Goal 3: demo safe functional transfers/mobility with SUP, LRD PRN for engagement in ADLs.  Short Term Goal 4: demo dynamic standing during functional activities for 12+ mins with SUP, LRD PRN.  Short Term Goal 5: demo 100% safety awareness throughout entire session independently with no vc's.    Plan  Occupational Therapy Plan  Times Per Week: 4-5x/wk  Current Treatment Recommendations: Balance training, Functional mobility training, Endurance training, Pain management, Home management training, Patient/Caregiver education & training, Equipment evaluation, education, & procurement, Safety education & training, Self-Care / ADL, Cognitive/Perceptual training    Minutes  OT Individual Minutes  Time In: 1344  Time Out: 1410  Minutes: 26  Time Code Minutes   Timed Code Treatment Minutes: 23 Minutes    Electronically signed by MORGAN Rivera/L on 1/27/25 at 2:55 PM EST

## 2025-01-27 NOTE — PROGRESS NOTES
[facility] -   Infectious diseases evaluation  admission date 1/23/2025    reason for consultation:   E coli pyelo    Impression :   Current:  Bandemia 20  CRP elevation  E coli ESBL pyelonephritis  Lactic acidosis  Chronic A fib  Red blood per rectum 1/25 - large clots - diverticular sigmoid bleed and many sigmoid polyps - colonosc 1/26 - poor prep      Other:    Discussion / summary of stay / plan of care/ Recommendations:     HENCE:   This is a pyelonephritis with a urine culture yesterday and E. coli and 1/16 associated leukocytosis of 20 and CRP elevation, lactic acidosis  Improved in general on treatment  Unfortunately Macrobid will not get to the kidney  Meropenem E. coli ESBL pyelonephritis x 7 -days till 1/31  Renal US  no obstruction    Red blood per rectum 1/25 - large clots  CTAP urgent -.1/25  Extravasation of contrast at the proximal sigmoid colon consistent with active intestinal bleeding.  GI consulted - colonoscopy 1/26 -Severe pandiverticulosis with resulting deformity/luminal narrowing in the sigmoid colon.  Multiple polyps throughout the colon    Infection Control Recommendations   [place] Precautions  Contact Isolation       Antimicrobial Stewardship Recommendations     Targeted therapy  Duration  of therapy    History of Present Illness:   Initial history:  Dahlia Zhang is a 74 y.o.-year-old female     Interval changes  1/26/2025   Patient Vitals for the past 8 hrs:   BP Temp Temp src Pulse Resp SpO2   01/26/25 2000 124/63 98 °F (36.7 °C) Oral 100 16 99 %   01/26/25 1900 (!) 157/72 -- -- 94 14 100 %   01/26/25 1810 (!) 155/67 -- -- -- -- --   01/26/25 1800 -- 98.4 °F (36.9 °C) -- -- -- --   01/26/25 1742 -- -- -- (!) 152 20 99 %   01/26/25 1700 (!) 122/96 -- -- (!) 117 17 95 %   01/26/25 1600 115/66 97.9 °F (36.6 °C) Axillary (!) 111 21 100 %   01/26/25 1500 137/89 -- -- (!) 114 20 --   1/25  Stressed and  - RVR A fib-- abd soft non  tender and blood in stools large earlier - CTAP done and pend- no fever    1/26  No fever  BC neg 1/23  Resp PCR neg 1/24  Colonoscopy - poor prep but many polyps and diverticular scarring  Calm and tachycardic    Summary of relevant labs:  Labs:  WBC10.6 - 13  Micro:      Procedures      Cardiology      Imaging:  CT chest 1/25  1. No obvious filling defect in the central pulmonary arterial vasculature.   Evaluation of the lobar pulmonary arterial vasculature and beyond is limited   as above.   2. Cardiomegaly/four-chamber cardiac enlargement.  Coronary artery disease.   Atherosclerotic calcification, tortuosity and atheromatous plaque of the   aorta.   3. Moderate to severe elevation of the right hemidiaphragm.   4. Mild dependent atelectasis and respiratory motion.  Moderate emphysema.   5. Moderate hiatal hernia.   6. Fatty liver.  Prior cholecystectomy.   7. Moderate stool burden.   8. Mild colonic diverticulosis.Acute posterior circulation infarcts including a right pontine infarct.    CT head  Acute posterior circulation infarcts including a right pontine infarct.    I have personally reviewed the past medical history, past surgical history, medications, social history, and family history, and I haveupdated the database accordingly.      Allergies:   Lisinopril, Codeine, Norco [hydrocodone-acetaminophen], and Percocet [oxycodone-acetaminophen]     Review of Systems:     Review of Systems   Constitutional:  Positive for activity change. Negative for diaphoresis and fatigue.   HENT:  Negative for congestion.    Eyes:  Negative for discharge.   Respiratory:  Negative for apnea and shortness of breath.    Cardiovascular:  Negative for chest pain.   Gastrointestinal:  Negative for abdominal distention.   Endocrine: Negative for cold intolerance, heat intolerance and polydipsia.   Genitourinary:  Negative for dysuria.   Musculoskeletal:  Negative for arthralgias.   Skin:  Negative for color change.

## 2025-01-27 NOTE — PROGRESS NOTES
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    Aneurysm, cerebral I67.1    Ischemic stroke ([facility]) I63.9    Hypertension I10    Mixed hyperlipidemia E78.2    Current every day smoker F17.200    Visual disturbance H53.9    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    Encephalopathy G93.40    Left-sided weakness R53.1    Thalamic stroke ([facility]) I63.81    Complicated UTI (urinary tract infection) N39.0    Infection due to ESBL-producing Escherichia coli A49.8, Z16.12    Facial droop R29.810    CRP elevated R79.82    Bandemia D72.825    Pyelonephritis N12    Lactic acidosis E87.20    Gastrointestinal hemorrhage K92.2    Acute blood loss anemia D62    Abnormal CT of the abdomen R93.5    Shock R57.9    Diverticular disease K57.90       Pain: 0/10    Cognitive Treatment    Treatment time: 930-953      Subjective: [x] Alert [x] Cooperative     [] Confused     [] Agitated    [] Lethargic      Objective/Assessment:    Recall:   Delayed Recall: 3/3 in 4 trials,   Memory and Mental Manipulation- Alphabetical Order-3 Words 9/10 to 10/10 with repetition  Memory for Shapes and Pictures- Picture Retention: 8/10 to 10/10 with min cueing   Functional Memory- Paragraphs: 8/14 to 14/14 with repetitions and min to mod cueing  Word List Retention- Category Inclusion: 12/18 to 18/18 with repetition and mod cueing         Plan:  [x] Continue ST services    [] Discharge from ST:      Discharge recommendations: [x]  Further therapy recommended at discharge.The patient should be able to tolerate at least 3 hours of therapy per day over 5 days or 15 hours over 7 days. [] Further therapy recommended

## 2025-01-27 NOTE — ANESTHESIA POSTPROCEDURE EVALUATION
Department of Anesthesiology  Postprocedure Note    Patient: Dahlia Zhang  MRN: [id number]  YOB: 1950  Date of evaluation: 1/27/2025    Procedure Summary       Date: 01/23/25 Room / Location: [facility]    Anesthesia Start: 2056 Anesthesia Stop: 2254    Procedure: IR ANGIOGRAM CAROTID CEREBRAL BILATERAL Diagnosis: (STENT)    Scheduled Providers:  Responsible Provider: Shivani Cheung MD    Anesthesia Type: general ASA Status: 4 - Emergent            Anesthesia Type: No value filed.    Thao Phase I:      Thao Phase II:      Anesthesia Post Evaluation    Patient location during evaluation: bedside  Patient participation: complete - patient participated  Level of consciousness: awake and awake and alert  Pain score: 1  Airway patency: patent  Nausea & Vomiting: no nausea and no vomiting  Cardiovascular status: blood pressure returned to baseline and hemodynamically stable  Respiratory status: acceptable  Hydration status: euvolemic  Pain management: adequate        No notable events documented.

## 2025-01-27 NOTE — PROGRESS NOTES
Patient stating she has had trouble with meals for some times and often times coughs at every meal.  also agrees that patient has been coughing with meals and that it has been going on for a while. Discussed with Cj TYLER with neuro critical care and speech swallow study ordered. Continue to monitor will continue to encourage patient eats sitting upright in high fowlers and takes small bites and chews well.

## 2025-01-27 NOTE — PROGRESS NOTES
[facility] - Progress Note    Today's Date and Time: 1/27/2025, 1:45 PM    Impression :     Acute right pontine infarct and posterior circulation infarcts  S/p angiogram 1/23/25  Leukocytosis  CRP elevation  ESBL E. 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 Coli UTI  Repeat urine culture pending from 1/23/25  No growth on blood cultures thus far  Mycoplasma IgM pending  Viral respiratory panel negative    Medical Decision Making/Summary/Discussion:1/27/2025         Elements of Medical Decision Making:  Note: I have independently performed the steps listed below as part of the medical decision making and evaluation.   Examined and discussed with patient.  E. coli ESBL complicated urinary tract infection  Urine culture positive on 1/16/2024.  Received treatment with Keflex.  Organism is resistant  Urine culture positive on 1/20/2025 at [facility].  Received treatment with Macrobid 1/22/2025  Acute right pontine infarct and posterior circulation infarcts  Status post angiogram 1/23/2025  Reactive leukocytosis  Lactic acidosis  Atrial fibrillation with RVR  Status post subarachnoid hemorrhage with ruptured basilar tip aneurysm on 10/11/2023  Status post web device embolization on 10/11/2023  Residual neck aneurysm status post LVIS placement on 6/19/2024  Status post elective cerebral angiogram and stent  placement for residual aneurysm on 1/15/2025  COPD on home O2  Diabetes mellitus type 2  Labs, medications, radiologic studies were reviewed with personal review of films  Radiologic studies  Lab work  Cultures  Urine cultures with E. coli ESBL positive  Large amounts of data were reviewed  Please see history of present illness  Discussed with nursing Staff, Discharge planner  Dr. Deluna's service  Infection Control and Prevention measures reviewed  Universal precaution  Contact isolation  All prior entries were reviewed  Discussed with neurology  Administer medications as ordered  Meropenem for E. coli ESBL positive  Prognosis:   Guarded  Discharge planning reviewed  Follow up as outpatient.    Aleksey Craig MD     Infection Control Recommendations   [place] Precautions  Contact Precautions for ESBL     Antimicrobial Stewardship Recommendations     Simplification of therapy  Targeted therapy    Coordination of Outpatient Care:   Estimated Length of IV antimicrobials:TBD  Patient will need Midline Catheter Insertion: TBD  Patient will need PICC line Insertion:TBD  Patient will need: Home IV , Infusion Center,  SNF,  LTAC: TBD  Patient will need outpatient wound care:     Chief complaint/reason for consultation:   Lethargy, elevated WBC      History of Present Illness:   Dahlia Zhang is a 74 y.o.-year-old female who was initially admitted on 1/23/2025. Patient seen at the request of Dr. Deluna    INITIAL HISTORY:    This patient has a history of smoking, HTN, HLD, DM II, Afib on Eliquis and Plavix, COPD on home O2.     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  She underwent WEB device embolization with obliteration of the aneurysm on 10/11/23, achieving Chris 1.    A follow-up angiogram on 4/4/24 revealed a residual 1-2 mm neck aneurysm with abnormal basilar trunk/fusiform dilation.  She underwent second-stage DSA with stent embolization of the residual  No

## 2025-01-27 NOTE — PROGRESS NOTES
Endovascular Neurosurgery Progress Notes    Pt Name: Dahlia Zhang  MRN: [id number]  YOB: 1950  Date of evaluation: 1/27/2025  Primary Care Physician: Thania Rahman MD  Reason for evaluation: Concern for pontine stroke in the presence of previously treated basilar anneurysm with WEB device and LVIS stent    SUBJECTIVE:   Stable overnight.  No acute events from a neuroendovascular perspective     History of Chief Complaint:    Dahlia Zhang is a 74 y.o. right handed female with a history of ruptured basilar tip aneurysm in October 2023 s/p stenting, hypertension, hyperlipidemia, type 2 diabetes, GERD,  atrial fibrillation not on AC presents to [facility] for altered mental status.     On the morning of 1/23/2025, patient was noted to have increased lethargy and generalized weakness along with left facial droop.  These were concerning as patient was at her baseline and doing better yesterday.  She does not recall the time when she went to sleep, thus last known well is unknown.  Potentially she went to sleep between 6-8 PM on 1/22.      Patient was seen and examined at bedside upon arrival to [facility] ED. She was increasingly lethargic and short of breath, however she was able to cross midline without gaze preference, had intact fluency and repetition but did have mild dysarthria. She had generalized weakness.         CT head w/o contrast shows no acute finding.      CTA head and neck show flow diverting stent along basilar artery and proximal PCA with embolization coils at basilar tip. Severe narrowing within the distal basilar stent. Small amount of residual aneurysmal filling at the neck, approximately 2 mm.        Allergies  is allergic to lisinopril, codeine, norco [hydrocodone-acetaminophen], and percocet [oxycodone-acetaminophen].  Medications  Prior to Admission medications    Medication Sig Start Date End Date Taking? Authorizing Provider   metFORMIN (GLUCOPHAGE) 500   Resp 18   Ht 1.702 m (5' 7\")   Wt 90.4 kg (199 lb 4.7 oz)   SpO2 100%   BMI 31.21 kg/m²   GEN: Laying in bed, no distress  CVS: Palpable pulses throughout, no JVD  CHEST: On supplemental oxygen   ABD: Soft, NTTP  NEURO:     MENTAL STATUS: AAOx3    LANG/SPEECH: Fluent, intact naming, repetition & comprehension    CRANIAL NERVES:    II: Pupils equal and reactive     III, IV, VI: Intact oculomotor movements     V: Normal     VII: Left facial weakness     VIII: normal hearing to speech    MOTOR:     Upper Extremity  Lower Extremity   Right  4+/5 3/5   Left 3+/5 3+/5        REFLEXES: 2+/4 throughout, Bilateral flexor plantar responses     SENSORY: Normal to soft touch in all extremiteis    COORD: Normal finger to nose and heel to shin, no tremor, no dysmetria    LABS:     Recent Labs     01/24/25  0554 01/25/25  0324 01/25/25  2026 01/26/25  0102 01/26/25  0346 01/26/25  0820 01/26/25  1301 01/26/25  1901 01/27/25  0331   WBC 20.6* 10.6  --  13.5* 13.7*  --   --   --  10.7   HGB 10.2* 9.6*   < > 8.6* 8.1*   < > 8.6* 8.2* 8.1*   HCT 31.0* 29.6*   < > 26.8* 26.5*   < > 27.2* 25.1* 25.9*    233  --  216 219  --   --   --  198   * 136  --  134* 137  --   --   --  143   K 3.7 4.0  --  4.2 4.7  --   --   --  3.7    104  --  100 101  --   --   --  106   CO2 22 25  --  28 31  --   --   --  29   BUN 17 13  --  10 11  --   --   --  6*   CREATININE 0.7 0.6  --  0.6 0.6  --   --   --  0.5*   CALCIUM 7.9* 8.3*  --  8.0* 8.2*  --   --   --  7.9*   * 65*  --   --   --   --   --   --   --    ALT 82* 78*  --   --   --   --   --   --   --    BILITOT 0.3 0.2  --   --   --   --   --   --   --    BILIDIR  --  0.1  --   --   --   --   --   --   --     < > = values in this interval not displayed.     Recent Labs     01/25/25  0324 01/25/25  1535   ALKPHOS 66  --    ALT 78*  --    AST 65*  --    BILITOT 0.2  --    BILIDIR 0.1  --    LIPASE  --  20     RADIOLOGY:   Images were personally reviewed including:  CT

## 2025-01-28 ENCOUNTER — APPOINTMENT (OUTPATIENT)
Dept: GENERAL RADIOLOGY | Age: 75
DRG: 023 | End: 2025-01-28
Payer: MEDICARE

## 2025-01-28 LAB
ANION GAP SERPL CALCULATED.3IONS-SCNC: 8 MMOL/L (ref 9–16)
BASOPHILS # BLD: 0.06 K/UL (ref 0–0.2)
BASOPHILS NFR BLD: 1 % (ref 0–2)
BUN SERPL-MCNC: 5 MG/DL (ref 8–23)
CALCIUM SERPL-MCNC: 7.9 MG/DL (ref 8.6–10.4)
CHLORIDE SERPL-SCNC: 105 MMOL/L (ref 98–107)
CO2 SERPL-SCNC: 28 MMOL/L (ref 20–31)
CREAT SERPL-MCNC: 0.5 MG/DL (ref 0.6–0.9)
EKG ATRIAL RATE: 70 BPM
EKG P AXIS: -22 DEGREES
EKG P-R INTERVAL: 196 MS
EKG Q-T INTERVAL: 372 MS
EKG QRS DURATION: 86 MS
EKG QTC CALCULATION (BAZETT): 401 MS
EKG R AXIS: 43 DEGREES
EKG T AXIS: 117 DEGREES
EKG VENTRICULAR RATE: 70 BPM
EOSINOPHIL # BLD: 0.31 K/UL (ref 0–0.44)
EOSINOPHILS RELATIVE PERCENT: 2 % (ref 1–4)
ERYTHROCYTE [DISTWIDTH] IN BLOOD BY AUTOMATED COUNT: 14.7 % (ref 11.8–14.4)
GFR, ESTIMATED: >90 ML/MIN/1.73M2
GLUCOSE BLD-MCNC: 131 MG/DL (ref 65–105)
GLUCOSE BLD-MCNC: 142 MG/DL (ref 65–105)
GLUCOSE BLD-MCNC: 145 MG/DL (ref 65–105)
GLUCOSE SERPL-MCNC: 133 MG/DL (ref 74–99)
HCT VFR BLD AUTO: 25.7 % (ref 36.3–47.1)
HGB BLD-MCNC: 8 G/DL (ref 11.9–15.1)
IMM GRANULOCYTES # BLD AUTO: 0.1 K/UL (ref 0–0.3)
IMM GRANULOCYTES NFR BLD: 1 %
LYMPHOCYTES NFR BLD: 4.28 K/UL (ref 1.1–3.7)
LYMPHOCYTES RELATIVE PERCENT: 33 % (ref 24–43)
MAGNESIUM SERPL-MCNC: 1.9 MG/DL (ref 1.6–2.4)
MCH RBC QN AUTO: 28 PG (ref 25.2–33.5)
MCHC RBC AUTO-ENTMCNC: 31.1 G/DL (ref 28.4–34.8)
MCV RBC AUTO: 89.9 FL (ref 82.6–102.9)
MICROORGANISM SPEC CULT: NORMAL
MICROORGANISM SPEC CULT: NORMAL
MONOCYTES NFR BLD: 0.99 K/UL (ref 0.1–1.2)
MONOCYTES NFR BLD: 8 % (ref 3–12)
NEUTROPHILS NFR BLD: 55 % (ref 36–65)
NEUTS SEG NFR BLD: 7.43 K/UL (ref 1.5–8.1)
NRBC BLD-RTO: 0 PER 100 WBC
PLATELET # BLD AUTO: 224 K/UL (ref 138–453)
PMV BLD AUTO: 10.9 FL (ref 8.1–13.5)
POTASSIUM SERPL-SCNC: 3.4 MMOL/L (ref 3.7–5.3)
RBC # BLD AUTO: 2.86 M/UL (ref 3.95–5.11)
RBC # BLD: ABNORMAL 10*6/UL
SERVICE CMNT-IMP: NORMAL
SERVICE CMNT-IMP: NORMAL
SODIUM SERPL-SCNC: 141 MMOL/L (ref 136–145)
SPECIMEN DESCRIPTION: NORMAL
SPECIMEN DESCRIPTION: NORMAL
WBC OTHER # BLD: 13.2 K/UL (ref 3.5–11.3)

## 2025-01-28 PROCEDURE — 82947 ASSAY GLUCOSE BLOOD QUANT: CPT

## 2025-01-28 PROCEDURE — 6370000000 HC RX 637 (ALT 250 FOR IP): Performed by: NURSE PRACTITIONER

## 2025-01-28 PROCEDURE — 92610 EVALUATE SWALLOWING FUNCTION: CPT

## 2025-01-28 PROCEDURE — 2580000003 HC RX 258: Performed by: INTERNAL MEDICINE

## 2025-01-28 PROCEDURE — 6370000000 HC RX 637 (ALT 250 FOR IP): Performed by: INTERNAL MEDICINE

## 2025-01-28 PROCEDURE — 6370000000 HC RX 637 (ALT 250 FOR IP)

## 2025-01-28 PROCEDURE — 6360000002 HC RX W HCPCS: Performed by: NURSE PRACTITIONER

## 2025-01-28 PROCEDURE — 2000000000 HC ICU R&B

## 2025-01-28 PROCEDURE — 99291 CRITICAL CARE FIRST HOUR: CPT | Performed by: STUDENT IN AN ORGANIZED HEALTH CARE EDUCATION/TRAINING PROGRAM

## 2025-01-28 PROCEDURE — 2500000003 HC RX 250 WO HCPCS: Performed by: INTERNAL MEDICINE

## 2025-01-28 PROCEDURE — G0545 PR INHERENT VISIT TO INPT: HCPCS | Performed by: INTERNAL MEDICINE

## 2025-01-28 PROCEDURE — 83735 ASSAY OF MAGNESIUM: CPT

## 2025-01-28 PROCEDURE — 80048 BASIC METABOLIC PNL TOTAL CA: CPT

## 2025-01-28 PROCEDURE — 74230 X-RAY XM SWLNG FUNCJ C+: CPT

## 2025-01-28 PROCEDURE — 99232 SBSQ HOSP IP/OBS MODERATE 35: CPT | Performed by: INTERNAL MEDICINE

## 2025-01-28 PROCEDURE — 94761 N-INVAS EAR/PLS OXIMETRY MLT: CPT

## 2025-01-28 PROCEDURE — 94640 AIRWAY INHALATION TREATMENT: CPT

## 2025-01-28 PROCEDURE — 99223 1ST HOSP IP/OBS HIGH 75: CPT | Performed by: PHYSICAL MEDICINE & REHABILITATION

## 2025-01-28 PROCEDURE — 2580000003 HC RX 258: Performed by: NURSE PRACTITIONER

## 2025-01-28 PROCEDURE — 6360000002 HC RX W HCPCS: Performed by: INTERNAL MEDICINE

## 2025-01-28 PROCEDURE — 99233 SBSQ HOSP IP/OBS HIGH 50: CPT | Performed by: PSYCHIATRY & NEUROLOGY

## 2025-01-28 PROCEDURE — 93010 ELECTROCARDIOGRAM REPORT: CPT | Performed by: INTERNAL MEDICINE

## 2025-01-28 PROCEDURE — 85025 COMPLETE CBC W/AUTO DIFF WBC: CPT

## 2025-01-28 PROCEDURE — 92611 MOTION FLUOROSCOPY/SWALLOW: CPT

## 2025-01-28 PROCEDURE — 93005 ELECTROCARDIOGRAM TRACING: CPT | Performed by: PSYCHIATRY & NEUROLOGY

## 2025-01-28 PROCEDURE — 6360000002 HC RX W HCPCS

## 2025-01-28 PROCEDURE — 36415 COLL VENOUS BLD VENIPUNCTURE: CPT

## 2025-01-28 PROCEDURE — 99233 SBSQ HOSP IP/OBS HIGH 50: CPT | Performed by: NURSE PRACTITIONER

## 2025-01-28 PROCEDURE — 2700000000 HC OXYGEN THERAPY PER DAY

## 2025-01-28 RX ORDER — AMIODARONE HYDROCHLORIDE 200 MG/1
200 TABLET ORAL 2 TIMES DAILY
Status: DISCONTINUED | OUTPATIENT
Start: 2025-01-28 | End: 2025-02-15 | Stop reason: HOSPADM

## 2025-01-28 RX ORDER — HYDRALAZINE HYDROCHLORIDE 20 MG/ML
10 INJECTION INTRAMUSCULAR; INTRAVENOUS
Status: DISCONTINUED | OUTPATIENT
Start: 2025-01-28 | End: 2025-01-29

## 2025-01-28 RX ORDER — LABETALOL HYDROCHLORIDE 5 MG/ML
10 INJECTION, SOLUTION INTRAVENOUS
Status: DISCONTINUED | OUTPATIENT
Start: 2025-01-28 | End: 2025-01-29

## 2025-01-28 RX ORDER — ZOLPIDEM TARTRATE 5 MG/1
5 TABLET ORAL NIGHTLY PRN
Status: DISCONTINUED | OUTPATIENT
Start: 2025-01-28 | End: 2025-02-15 | Stop reason: HOSPADM

## 2025-01-28 RX ORDER — LOSARTAN POTASSIUM 50 MG/1
50 TABLET ORAL DAILY
Status: DISCONTINUED | OUTPATIENT
Start: 2025-01-28 | End: 2025-01-29

## 2025-01-28 RX ORDER — TIZANIDINE 2 MG/1
2 TABLET ORAL ONCE
Status: COMPLETED | OUTPATIENT
Start: 2025-01-28 | End: 2025-01-28

## 2025-01-28 RX ADMIN — ACETAMINOPHEN 650 MG: 325 TABLET ORAL at 00:36

## 2025-01-28 RX ADMIN — DILTIAZEM HYDROCHLORIDE 120 MG: 120 CAPSULE, COATED, EXTENDED RELEASE ORAL at 08:54

## 2025-01-28 RX ADMIN — TIZANIDINE 2 MG: 2 TABLET ORAL at 14:26

## 2025-01-28 RX ADMIN — ROSUVASTATIN CALCIUM 20 MG: 20 TABLET, FILM COATED ORAL at 19:47

## 2025-01-28 RX ADMIN — ASPIRIN 81 MG: 81 TABLET, COATED ORAL at 19:47

## 2025-01-28 RX ADMIN — MEROPENEM 1000 MG: 1 INJECTION INTRAVENOUS at 19:47

## 2025-01-28 RX ADMIN — ZOLPIDEM TARTRATE 5 MG: 5 TABLET ORAL at 19:47

## 2025-01-28 RX ADMIN — SODIUM CHLORIDE 8 MG/HR: 9 INJECTION, SOLUTION INTRAVENOUS at 00:39

## 2025-01-28 RX ADMIN — HYDRALAZINE HYDROCHLORIDE 5 MG: 20 INJECTION INTRAMUSCULAR; INTRAVENOUS at 06:08

## 2025-01-28 RX ADMIN — AMIODARONE HYDROCHLORIDE 200 MG: 200 TABLET ORAL at 11:01

## 2025-01-28 RX ADMIN — SODIUM CHLORIDE, PRESERVATIVE FREE 5 ML: 5 INJECTION INTRAVENOUS at 08:52

## 2025-01-28 RX ADMIN — SODIUM CHLORIDE, PRESERVATIVE FREE 10 ML: 5 INJECTION INTRAVENOUS at 19:48

## 2025-01-28 RX ADMIN — METOPROLOL TARTRATE 50 MG: 50 TABLET, FILM COATED ORAL at 08:52

## 2025-01-28 RX ADMIN — ESCITALOPRAM OXALATE 10 MG: 10 TABLET ORAL at 08:52

## 2025-01-28 RX ADMIN — MEROPENEM 1000 MG: 1 INJECTION INTRAVENOUS at 11:02

## 2025-01-28 RX ADMIN — AMIODARONE HYDROCHLORIDE 200 MG: 200 TABLET ORAL at 19:47

## 2025-01-28 RX ADMIN — HYDRALAZINE HYDROCHLORIDE 5 MG: 20 INJECTION INTRAMUSCULAR; INTRAVENOUS at 01:33

## 2025-01-28 RX ADMIN — ALBUTEROL SULFATE 1 PUFF: 90 AEROSOL, METERED RESPIRATORY (INHALATION) at 15:06

## 2025-01-28 RX ADMIN — METOPROLOL TARTRATE 50 MG: 50 TABLET, FILM COATED ORAL at 19:47

## 2025-01-28 RX ADMIN — LOSARTAN POTASSIUM 50 MG: 50 TABLET, FILM COATED ORAL at 08:52

## 2025-01-28 RX ADMIN — MEROPENEM 1000 MG: 1 INJECTION INTRAVENOUS at 03:49

## 2025-01-28 RX ADMIN — HYDRALAZINE HYDROCHLORIDE 10 MG: 20 INJECTION INTRAMUSCULAR; INTRAVENOUS at 09:23

## 2025-01-28 RX ADMIN — POTASSIUM BICARBONATE 40 MEQ: 782 TABLET, EFFERVESCENT ORAL at 08:52

## 2025-01-28 ASSESSMENT — PAIN DESCRIPTION - DESCRIPTORS: DESCRIPTORS: ACHING

## 2025-01-28 ASSESSMENT — PAIN SCALES - GENERAL: PAINLEVEL_OUTOF10: 9

## 2025-01-28 ASSESSMENT — PAIN DESCRIPTION - ORIENTATION: ORIENTATION: ANTERIOR

## 2025-01-28 ASSESSMENT — PAIN DESCRIPTION - LOCATION: LOCATION: HEAD

## 2025-01-28 NOTE — PROGRESS NOTES
of the residual aneurysm on 6/19/24.  After this procedure, there was still some residual filling into the neck, achieving Chris 2.    A repeat angiogram was completed on 12/12/24 showed no change.    On 1/15/25, the patient underwent elective aneurysm coiling of the residual basilar tip with stent placement.     While at [facility] for the procedure, she was experiencing urinary urgency and frequency.  A urine culture was obtained on 1/16/25 and she was discharged the same day with a script for Keflex.    She continue to experience urinary symptoms after completing the antibiotics and notified her PCP on 1/20/25.    Her PCP was unable to see the results of the urine culture that was positive for ESBL E. Coli, and ordered another urine culture, which was completed on 1/20/25.    The repeat urine culture also resulted positive for ESBL E. Coli, and the patient was initiated on Macrobid on 1/22/25.    CURRENT ADMISSION:    On 1/23/25. She presented to the ED via EMS with complaints of weakness and left-sided facial droop, left-sided weakness and slurred speech that started earlier in the day.     Her vital signs were significant for Afib with HR of 106, /59, RR 36 and temperature was 99.3 F. She was complaining of some shortness of breath, and it was noted that her oxygen tubing was not hooked to the oxygen tank.She wears 3-4 L NC at home. SOB resolved after her nasal cannula was applied properly.    Initial lab-work was significant for glucose 225, lactic 4.1, BNP 2,955, myoglobin 63, troponin 34, ALT 82, , WBC 26.0 with left shift of 24.9.    No growth thus far on blood cultures and urine culture is pending.    A stat CT head showed no acute intracranial abnormalities. Remote infarcts noted in the left occipital lobe and right basal ganglia and corona radiata.     A CTA head/neck showed flow diverting stent along the basilar artery and proximal PCAs with  embolization coils at the basilar tip. There is severe  acute abnormality of the salivary and thyroid glands. BONES: No acute osseous abnormality. CTA HEAD: ANTERIOR CIRCULATION: Scattered vascular calcifications throughout the carotid siphons. No significant stenosis of the intracranial internal carotid, anterior cerebral, or middle cerebral arteries. No aneurysm. POSTERIOR CIRCULATION: Flow diverting stent along the basilar artery and proximal PCAs with embolization coils at the basilar tip.  There is severe narrowing within the distal basilar stent.  No significant stenosis of the posterior cerebral arteries.  Small amount of residual aneurysmal filling at the neck, approximately 2 mm (series 601, image 124). OTHER: No dural venous sinus thrombosis on this non-dedicated study. BRAIN: No mass effect or midline shift. No extra-axial fluid collection. The gray-white differentiation is maintained.     1. Flow diverting stent along the basilar artery and proximal PCAs with embolization coils at the basilar tip. There is severe narrowing within the distal basilar stent. 2. Small amount of residual aneurysmal filling at the neck, approximately 2 mm. 3. 60% stenosis of the left proximal cervical ICA due to calcified plaque.     CT HEAD WO CONTRAST    Addendum Date: 1/23/2025    ADDENDUM: Results were conveyed to Dr. Galileo Crandall at 2:11 p.m. EST on 01/23/2025 by the radiology results communication center.     Result Date: 1/23/2025  EXAMINATION: CT OF THE HEAD WITHOUT CONTRAST  1/23/2025 12:20 pm TECHNIQUE: CT of the head was performed without the administration of intravenous contrast. Automated exposure control, iterative reconstruction, and/or weight based adjustment of the mA/kV was utilized to reduce the radiation dose to as low as reasonably achievable. COMPARISON: CT brain 08/08/2024, brain MRI 08/08/2024 HISTORY: ORDERING SYSTEM PROVIDED HISTORY: left facial droop, increased lethargy TECHNOLOGIST PROVIDED HISTORY: left facial droop, increased lethargy Decision Support  Exception - unselect if not a suspected or confirmed emergency medical condition->Emergency Medical Condition (MA) Reason for Exam: left facial droop FINDINGS: BRAIN/VENTRICLES: There is no acute intracranial hemorrhage, mass effect or midline shift.  No abnormal extra-axial fluid collection.  The gray-white differentiation is maintained without evidence of an acute infarct.  There are stents within the basilar artery and bilateral proximal posterior cerebral arteries.  There is a remote infarct in the left occipital lobe. There are remote lacunar infarcts in the right corona radiata and right basal ganglia.  There is no evidence of hydrocephalus. Patchy bilateral periventricular and subcortical white matter hypodensities are nonspecific, but compatible with chronic microvascular ischemic change. ORBITS: The visualized portion of the orbits demonstrate no acute abnormality. SINUSES: The visualized paranasal sinuses and mastoid air cells demonstrate no acute abnormality. SOFT TISSUES/SKULL:  No acute abnormality of the visualized skull or soft tissues.     No appreciable acute intracranial abnormality. Remote left occipital lobe and remote lacunar infarcts in the right basal ganglia and corona radiata. The findings were sent to the Radiology Results Communication Center at 2:04 pm on 1/23/2025 to be communicated to a licensed caregiver.       Medical Decision [id number]:     Results       Procedure Component Value Units Date/Time    Infectious Disease Intervention [[id number]] Collected: 01/25/25 1800    Order Status: Completed Updated: 01/27/25 1446     Intervention Duration of Therapy    Respiratory Panel, Molecular, with COVID-19 (Restricted: peds pts or suitable admitted adults) [[id number]] Collected: 01/24/25 0953    Order Status: Completed Specimen: Nasopharyngeal Swab Updated: 01/24/25 1132     Specimen Description .NASOPHARYNGEAL SWAB     Adenovirus PCR Not Detected     Coronavirus 229E PCR Not

## 2025-01-28 NOTE — PROGRESS NOTES
Daily Progress Note  Neuro Critical Care    Patient Name: Dahlia Zhang  Patient : 1950  Room/Bed: 0115/0115-01  Code Status: Full Code  Allergies:   Allergies   Allergen Reactions    Lisinopril Angioedema    Codeine Nausea And Vomiting    Norco [Hydrocodone-Acetaminophen] Nausea And Vomiting    Percocet [Oxycodone-Acetaminophen] Nausea And Vomiting       CHIEF COMPLAINT:        Acute stroke     INTERVAL HISTORY    Initial Presentation (Admitted 2025):  The patient is a 74 y.o. 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 Patient initially presented to [facility] ED on 25 with complaints of altered mental status, increased lethargy, dysarthria, left facial droop and generalized weakness. Initial NIHSS 9.  /59.  Patient has been compliant with Aspirin and Plavix post recent stent placement.  She remains off Eliquis as planned.  MRI brain without contrast showed acute infarct in right von, punctate infarcts in right cerebellum, thalamus and right occipital lobe.  CTA showed severe narrowing within the distal basilar stent, small ~2mm amount of residual aneurysmal filling and neck and 60% L cervical ICA stenosis.  Labs revealed hyperglycemia (225), elevated lactic (4.1), mild troponin elevation (34), mild transaminitis and leukocytosis (WBC 26.0).  ID was consulted from ED.  Patient was taken for urgent DSA showing previously treated basilar tip aneurysm (s/p WEB, LVIS x2) is completely obliterated, stents are patent and well apposed with filling defect at the basilar tip; possible thrombus vs metal artifact.  Given Cangrelor bolus followed by infusion.     Admitted to the Neuro ICU post procedure.  NIHSS 1.  On exam post procedure, patient is alert and answering questions appropriately. Exam limited 2/2 post op  CONTRAST   Final Result   1. Extravasation of contrast at the proximal sigmoid colon consistent with   active intestinal bleeding.   2. Small amount of air in the bladder which could be iatrogenic.  Air can   also be seen in the bladder in the setting of infection with a gas-forming   organism.   3. No acute findings elsewhere in the abdomen or pelvis.   4. Colonic diverticulosis.   5. Small hiatal hernia.   6. Moderate atherosclerosis with normal patency of the branches of the aorta.         US RENAL COMPLETE   Final Result   Unremarkable ultrasound of the kidneys and urinary bladder.         CT CHEST PULMONARY EMBOLISM W CONTRAST   Final Result   1. No obvious filling defect in the central pulmonary arterial vasculature.   Evaluation of the lobar pulmonary arterial vasculature and beyond is limited   as above.   2. Cardiomegaly/four-chamber cardiac enlargement.  Coronary artery disease.   Atherosclerotic calcification, tortuosity and atheromatous plaque of the   aorta.   3. Moderate to severe elevation of the right hemidiaphragm.   4. Mild dependent atelectasis and respiratory motion.  Moderate emphysema.   5. Moderate hiatal hernia.   6. Fatty liver.  Prior cholecystectomy.   7. Moderate stool burden.   8. Mild colonic diverticulosis.         XR CHEST (SINGLE VIEW FRONTAL)   Final Result   1. Worsening bibasilar atelectasis.         CT HEAD WO CONTRAST   Final Result   1. Evolving lacunar infarct within the right paramedian von and right   thalamus. Tiny infarcts in the cerebellum difficult to appreciate within the   constraints of CT acquisition, as identified on the recent MRI brain.   2. No acute intracranial hemorrhage or significant mass effect.   3. Remote left PCA territory cortical infarct involving the occipital pole.   4. Moderate chronic small vessel ischemic changes.         XR CHEST PORTABLE   Final Result   1. No acute cardiopulmonary process.   2. Calcified nodules in the left lung base suggesting

## 2025-01-28 NOTE — PROGRESS NOTES
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      Recent Chest Xray/CT of Chest: 25 XR CHEST PORTABLE   IMPRESSION:  No acute process.     Bibasilar hypoaeration    Date of Eval: 2025  Evaluating Therapist: KRANTHI Rose    Current Diet level:  Current Diet : Regular  Current Liquid Diet : Thin    Primary Complaint  Dahlia Zhang is a 74 y.o. 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     On the morning of 2025, patient was noted to have increased lethargy and generalized weakness along with left facial droop.  These were concerning as patient was at her baseline and doing better yesterday.  She does not recall the time when she went to sleep, thus last known well is unknown.  Potentially she went to sleep between 6-8 PM on .      Patient was seen and examined at bedside upon arrival to [facility] ED. She was increasingly lethargic and short of breath, however she was able  to cross midline without gaze preference, had intact fluency and repetition but did have mild dysarthria. She had generalized weakness.         CT head w/o contrast shows no acute finding.      CTA head and neck show flow diverting stent along basilar artery and proximal PCA with embolization coils at basilar tip. Severe narrowing within the distal basilar stent. Small amount of residual aneurysmal filling at the neck, approximately 2 mm.      Pain:  Pain Assessment  Pain Assessment: None - Denies Pain  Pain Level: 9  Patient's Stated Pain Goal: 0 - No pain  Pain Location: Head  Pain Orientation: Anterior  Pain Descriptors: Aching  Response to Pain Intervention: Pain score improved, Patient satisfied    Reason for Referral  Dahlia Zhang was referred for a bedside swallow evaluation to assess the efficiency of her swallow function, identify signs and symptoms of aspiration and make recommendations regarding safe dietary consistencies, effective compensatory strategies, and safe eating environment.    Impression  Dysphagia Diagnosis: Suspected needs further assessment    Patient seen for BSE pt presented with puree, soft and bite sized, regular and thin liquids. Pt tolerated all solids without any overt s/s of aspiration, pt presented with immediate throat clear x1 and decreased O2 with thin liquids. RN and pt reports coughing with liquids and \"choking\" episode with Jell-O yesterday PM. Recommend Modified Barium Swallow study to further assess oropharyngeal phase of swallow and r/o aspiration. Pt to remain NPO at this time.        Treatment Plan  Requires SLP Intervention: Yes   2-3x/week  D/C Recommendations: Ongoing speech therapy is recommended at next level of care;Ongoing speech therapy is recommended during this hospitalization       Recommended Diet and Intervention  Diet Solids Recommendation: Regular  Liquid Consistency Recommendation: Thin  Recommended Form of Meds: PO  Recommendations: Modified barium

## 2025-01-28 NOTE — PROGRESS NOTES
(KLOR-CON M) 20 MEQ extended release tablet Take 1 tablet by mouth daily 10/26/23   Lucy Simmons, APRN - CNP    Scheduled Meds:   losartan  50 mg Oral Daily    amiodarone  200 mg Oral BID    metoprolol tartrate  50 mg Oral BID    meropenem  1,000 mg IntraVENous Q8H    [START ON 1/29/2025] pantoprazole (PROTONIX) 40 mg in sodium chloride (PF) 0.9 % 10 mL injection  40 mg IntraVENous Daily    lidocaine  1 patch TransDERmal Daily    insulin lispro  0-4 Units SubCUTAneous 4x Daily AC & HS    [Held by provider] ticagrelor  90 mg Oral BID    dilTIAZem  120 mg Oral Daily    aspirin  81 mg Oral Nightly    escitalopram  10 mg Oral Daily    tiotropium  2 puff Inhalation Daily RT    sodium chloride flush  5-40 mL IntraVENous 2 times per day    enoxaparin  40 mg SubCUTAneous Daily    rosuvastatin  20 mg Oral Nightly     Continuous Infusions:   sodium chloride      sodium chloride 75 mL/hr at 01/28/25 0729    pantoprazole 8 mg/hr (01/28/25 0729)    dextrose      sodium chloride Stopped (01/25/25 0653)    sodium chloride      sodium chloride       PRN Meds:.hydrALAZINE, labetalol, sodium chloride, glucose, dextrose bolus **OR** dextrose bolus, glucagon (rDNA), dextrose, acetaminophen, sodium chloride flush, sodium chloride, polyethylene glycol, albuterol sulfate HFA, sodium chloride flush, sodium chloride, potassium chloride **OR** potassium alternative oral replacement **OR** potassium chloride, magnesium sulfate, ondansetron **OR** ondansetron, sodium chloride flush, sodium chloride  Past Medical History   has a past medical history of Anticoagulated, Anxiety and depression, Atrial fibrillation (HCC), Cerebral aneurysm, Cerebral artery occlusion with cerebral infarction (HCC), Colon polyps, COPD (chronic obstructive pulmonary disease) (HCC), COVID-19, COVID-19 vaccine administered, DM II (diabetes mellitus, type II), controlled (HCC), GERD (gastroesophageal reflux disease), HTN (hypertension), Hyperlipidemia, Kidney stone,  On home O2, Osteoarthritis, Sleep apnea, Under care of team, Under care of team, Under care of team, Under care of team, Wears glasses, Wears partial dentures, and Wellness examination.  Past Surgical History   has a past surgical history that includes Thyroid surgery; Breast reduction surgery (Bilateral, 1989); Hysterectomy; hernia repair; Cerebral angiogram (04/04/2024); Cerebral angiogram (10/11/2023); Cerebral angiogram (06/19/2024); Cholecystectomy; Cerebral angiogram (12/12/2024); Breast biopsy (Right); Cataract extraction w/ intraocular lens implant (Bilateral, 2020); Colonoscopy w/ polypectomy (01/19/2023); other surgical history (01/15/2025); Colonoscopy (N/A, 01/26/2025); and Colonoscopy (N/A, 1/26/2025).  Social History   reports that she has been smoking cigarettes. She started smoking about 58 years ago. She has a 71.6 pack-year smoking history. She has never been exposed to tobacco smoke. She has never used smokeless tobacco.   reports current alcohol use of about 1.0 standard drink of alcohol per week.   reports no history of drug use.  Family History  family history includes Dementia in her mother; Heart Disease in her father; Hypertension in her father and mother; No Known Problems in her sister; Stroke in her father and mother.    Review of Systems:  CONSTITUTIONAL:  negative for fevers, chills, fatigue and malaise    EYES:  negative for double vision, blurred vision and photophobia     HEENT:  negative for tinnitus, epistaxis and sore throat    RESPIRATORY:  negative for cough, shortness of breath, wheezing    CARDIOVASCULAR:  negative for chest pain, palpitations, syncope, edema    GASTROINTESTINAL:  negative for nausea, vomiting    GENITOURINARY:  negative for incontinence    MUSCULOSKELETAL:  negative for neck or back pain    NEUROLOGICAL:  Negative for weakness and tingling  negative for headaches and dizziness    PSYCHIATRIC:  negative for anxiety      Review of systems otherwise  negative.      OBJECTIVE:   Vitals: BP (!) 159/60   Pulse 67   Temp 98.1 °F (36.7 °C)   Resp 18   Ht 1.702 m (5' 7\")   Wt 90.4 kg (199 lb 4.7 oz)   SpO2 100%   BMI 31.21 kg/m²       Gen: No acute distress  MS: Awake, Oriented x3, No obvious aphasia  CN: Pupils reactive, no gaze deviation, mild left facial droop, tongue midline, no dysarthria  Motor: Moves all extremities antigravity but overall 4+/5 in RUE+RLE and 4/5 in LUE+LLE  Sens: Responds to LT in all extremities  Gait: Deferred      LABS:     Recent Labs     01/26/25  0346 01/26/25  0820 01/26/25  1901 01/27/25  0331 01/28/25  0334   WBC 13.7*  --   --  10.7 13.2*   HGB 8.1*   < > 8.2* 8.1* 8.0*   HCT 26.5*   < > 25.1* 25.9* 25.7*     --   --  198 224     --   --  143 141   K 4.7  --   --  3.7 3.4*     --   --  106 105   CO2 31  --   --  29 28   BUN 11  --   --  6* 5*   CREATININE 0.6  --   --  0.5* 0.5*   MG  --   --   --   --  1.9   CALCIUM 8.2*  --   --  7.9* 7.9*    < > = values in this interval not displayed.     No results for input(s): \"ALKPHOS\", \"ALT\", \"AST\", \"BILITOT\", \"BILIDIR\", \"LABALBU\", \"AMYLASE\", \"LIPASE\" in the last 72 hours.    RADIOLOGY:   Images were personally reviewed including:  CT brain without contrast: Remote left occipital lobe and lacunar infarcts in the right basal ganglia and corona radiata  CTA/CTP imaging:       DSA 1/23/2025  Please see dictated Radiology note for further details  The basilar artery tip aneurysm, previously treated with a WEB intrasaccular device (10/11/2023) and a second-stage LVIS 4.5 x 23mm stent (6/2024) and a third-stage  LVIS 4.5 x 23mm stent spanning from the left P2 segment to the mid-basilar artery (1/15/2025) is completely obliterated.  The stents are patent and well apposed with a filling defect at the basilar tip that could represent partial thrombus versus metal artifact.   There is a 360 vascular loop at the origin of the left vertebral artery requiring the catheter to be

## 2025-01-28 NOTE — PROCEDURES
INSTRUMENTAL SWALLOW REPORT  MODIFIED BARIUM SWALLOW    NAME: Dahlia Zhang   : 1950  MRN: [id number]       Date of Eval: 2025              Referring Diagnosis(es):      Past Medical History:  has a past medical history of Anticoagulated, Anxiety and depression, Atrial fibrillation (HCC), Cerebral aneurysm, Cerebral artery occlusion with cerebral infarction (HCC), Colon polyps, COPD (chronic obstructive pulmonary disease) (HCC), COVID-19, COVID-19 vaccine administered, DM II (diabetes mellitus, type II), controlled (HCC), GERD (gastroesophageal reflux disease), HTN (hypertension), Hyperlipidemia, Kidney stone, On home O2, Osteoarthritis, Sleep apnea, Under care of team, Under care of team, Under care of team, Under care of team, Wears glasses, Wears partial dentures, and Wellness examination.  Past Surgical History:  has a past surgical history that includes Thyroid surgery; Breast reduction surgery (Bilateral, ); Hysterectomy; hernia repair; Cerebral angiogram (2024); Cerebral angiogram (10/11/2023); Cerebral angiogram (2024); Cholecystectomy; Cerebral angiogram (2024); Breast biopsy (Right); Cataract extraction w/ intraocular lens implant (Bilateral, ); Colonoscopy w/ polypectomy (2023); other surgical history (01/15/2025); Colonoscopy (N/A, 2025); and Colonoscopy (N/A, 2025).       Type of Study: Initial MBS      Patient Complaints/Reason for Referral:  Dahlia Zhang was referred for a MBS to assess the efficiency of his/her swallow function, assess for aspiration, and to make recommendations regarding safe dietary consistencies, effective compensatory strategies, and safe eating environment.       Onset of problem:      Dahlia Zhang is a 74 y.o. 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   D/C Recommendations: Ongoing speech therapy is recommended at next level of care;Ongoing speech therapy is recommended during this hospitalization  Postural Changes and/or Swallow Maneuvers: Upright 90 degrees      Recommended Exercises:    Therapeutic Interventions: Diet tolerance monitoring    Education: Images and recommendations were reviewed with pt following this exam.   Patient Education: yes  Patient Education Response: Verbalizes understanding    Safety Devices  Restraints Initially in Place: No      Goals:    Long Term:       To Maximize safety with intake, optimize nutrition/hydration and minimize risk for aspiration.    Short Term:    Dysphagia Goals: The patient will tolerate recommended diet without observed clinical signs of aspiration      Oral Preparation / Oral Phase: WFL     Adeuqate mastication and oral manipulation for consistencies tested      Pharyngeal Phase: WFL     Puree: No penetration no aspiration min vallec stasis  Soft Solids: No penetration no aspiration min vallec stasis  Thin straw:  No penetration no aspiration min vallec stasis  Snelling thick straw:   No penetration no aspiration min vallec stasis    Esophageal Phase  Esophageal Screen: WFL        Pain      0/10      Therapy Time:   Individual Concurrent Group Co-treatment   Time In  1119         Time Out  1140         Minutes                     Raquel Mooney, SLP, 1/28/2025, 12:56 PM    PROCEDURE NOTE  Date: 1/28/2025   Name: Dahlia Zhang  YOB: 1950    Procedures

## 2025-01-28 NOTE — PLAN OF CARE
Problem: Chronic Conditions and Co-morbidities  Goal: Patient's chronic conditions and co-morbidity symptoms are monitored and maintained or improved  1/28/2025 1622 by Ana Marrero RN  Outcome: Progressing  1/28/2025 0558 by Adrianna Wiggins RN  Outcome: Progressing     Problem: Discharge Planning  Goal: Discharge to home or other facility with appropriate resources  1/28/2025 1622 by Ana Marrero RN  Outcome: Progressing  1/28/2025 0558 by Adrianna Wiggins RN  Outcome: Progressing     Problem: Pain  Goal: Verbalizes/displays adequate comfort level or baseline comfort level  1/28/2025 1622 by Ana Marrero RN  Outcome: Progressing  1/28/2025 0558 by Adrianna Wiggins RN  Outcome: Progressing     Problem: Safety - Adult  Goal: Free from fall injury  1/28/2025 1622 by Ana Marrero RN  Outcome: Progressing  1/28/2025 0558 by Adrianna Wiggins RN  Outcome: Progressing     Problem: ABCDS Injury Assessment  Goal: Absence of physical injury  1/28/2025 1622 by Ana Marrero RN  Outcome: Progressing  1/28/2025 0558 by Adrianna Wiggins RN  Outcome: Progressing     Problem: Respiratory - Adult  Goal: Achieves optimal ventilation and oxygenation  1/28/2025 1622 by Ana Marrero RN  Outcome: Progressing  1/28/2025 0558 by Adrianna Wiggins RN  Outcome: Progressing     Problem: Neurosensory - Adult  Goal: Achieves stable or improved neurological status  1/28/2025 1622 by Ana Marrero RN  Outcome: Progressing  1/28/2025 0558 by Adrianna Wiggins RN  Outcome: Progressing     Problem: Cardiovascular - Adult  Goal: Maintains optimal cardiac output and hemodynamic stability  1/28/2025 1622 by Ana Marrero RN  Outcome: Progressing  1/28/2025 0558 by Adrianna Wiggins RN  Outcome: Progressing  Goal: Absence of cardiac dysrhythmias or at baseline  1/28/2025 1622 by Ana Marrero RN  Outcome: Progressing  1/28/2025 0558 by Adrianna Wiggins RN  Outcome: Progressing     Problem: Gastrointestinal - Adult  Goal:

## 2025-01-28 NOTE — CONSULTS
Physical Medicine & Rehabilitation  Consult Note      Admitting Physician:   Katelynn Marie MD    Primary Care Provider:   Thania Rahman MD     Reason for Consult:  Acute Inpatient Rehabilitation    Chief Complaint: AMS    History of Present Illness:  Referring Provider is requesting an evaluation for appropriate placement upon discharge from acute care. History from chart review and patient, spouse.    Dahlia Zhang is a 74 y.o. RHD female admitted to [facility] on 1/23/2025.      Patient admitted with increased lethargy and weakness with L facial droop. CT head was negative for acute changes. 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 There is some question of partial thrombus vs metal artifact on scans.     Neuro endovascular: Dr. Michaud performed diagnostic cerebral angiogram on 1/23/25.     Gastroenterology: consulted for abdominal pain and large bloody bowel movement. History of severe diverticulosis on last colonoscopy January 2023. She was transfused 1 unit PRBCs 1/26. Dr. oPrter performed colonoscopy 1/26/25 with severe pandiverticulosis and multiple polyps. Blood was noted in colon with no active bleeding.    Cardiology: consulted for A Fib with RVR. Recommending Watchman procedure after outpatient evauation. Rate control with amiodarone IV, Cardizem and Lopressor. Anticoag/antiplatelets on hold for GI bleed.    ID: consulted for ESBL E Coli UTI. Currently treating with IV Meropenem planned through 1/31/25.     Review of Systems:  Constitutional: negative for anorexia, chills, fatigue, fevers, sweats and weight loss  Eyes: negative for redness and visual disturbance  Ears, nose, mouth, throat, and face: negative for earaches, sore throat and tinnitus  Respiratory: negative for cough and shortness of breath  Cardiovascular: negative for chest pain, dyspnea and palpitations  Gastrointestinal: negative   Alcohol/week: 1.0 standard drink of alcohol     Types: 1 Drinks containing 0.5 oz of alcohol per week     Comment: 2 times per month    Drug use: Never    Sexual activity: Not Currently     Partners: Male     Social Determinants of Health     Financial Resource Strain: Low Risk  (1/22/2025)    Received from [employer]    Overall Financial Resource Strain (CARDIA)     Difficulty of Paying Living Expenses: Not hard at all   Food Insecurity: No Food Insecurity (1/22/2025)    Received from [employer]    Hunger Screening     Within the past 12 months we worried whether our food would run out before we got money to buy more.: Never True     Within the past 12 months the food we bought just didn't last and we didn't have money to get more.: Never True   Transportation Needs: No Transportation Needs (1/22/2025)    Received from [employer]    PRAPARE - Transportation     Lack of Transportation (Medical): No     Lack of Transportation (Non-Medical): No   Physical Activity: Inactive (1/10/2022)    [facility] [employer]    Exercise Vital Sign     Days of Exercise per Week: 0 days     Minutes of Exercise per Session: 0 min   Stress: No Stress Concern Present (1/10/2022)    [facility] from [employer], [facility]    [employer] [place] [employer] - Occupational Stress Questionnaire     Feeling of Stress : Only a little   Social Connections: Moderately Isolated (1/10/2022)    Received from [employer], [facility]    Social Connection and Isolation Panel [NHANES]     Frequency of Communication with Friends and Family: More than three times a week     Frequency of Social Gatherings with Friends and Family: Once a week     Attends Jainism Services: Never     Active Member of Clubs or Organizations: No     Attends Club or Organization Meetings: Never     Marital Status:    Housing  turgor.  PSYCH: Mood WNL. Affect WNL. Appropriately interactive.    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 : currently holding ASA, Brilinta. BP management. For outpatient follow up Dr. Michaud 6 weeks  Atrial fibrillation: rate controlled on amiodarone, Lopressor, Cardizem  COPD: nocturnal oxygen dependent  DM II  HTN  HLD  GERD  HANNAH: treated with CPAP     Recommendations:    Diagnosis:  Encephalopathy  Therapy: Has PT/OT needs  Medical Necessity: As above  Support: supportive spouse  Rehab Recommendation: In my opinion the patient will require acute inpatient rehabilitation and meets criteria for IRF level care once medically stable per primary and consulting services. Anticipate she will be able to tolerate 3 hours of therapy per day or 900 minutes per week in rehabilitation. The patient requires multidisciplinary rehabilitation treatment including medical management by a PM&R physician, 24 hour rehabilitation nursing, Physical/Occupational therapy, speech therapy, rehabilitation social work, and nutrition services. Patient and family also require education in post-hospital precautions and home exercise routine, adaptive techniques and deficit compensation strategies, strengthening and conditioning, equipment prescription and instructions in use. Provision of services in a less intensive environment risks significant complications and more limited functional outcomes.  DVT Prophylaxis: on Lovenox    It was my pleasure to evaluate Dahlia Zhang today.  Please call with questions.    JAIME MILLARD MD        This note is created with the assistance of a speech recognition program.  While intending to generate a document that actually reflects the content of the visit, the document can still have some errors including those of syntax and sound a like substitutions which may escape proof reading.  In such instances,

## 2025-01-28 NOTE — PROGRESS NOTES
[facility]   Progress Note                   Date:   1/28/2025  Patient name: Dahlia Zhang  Date of admission:  1/23/2025  1:08 PM  MRN:   [id number]  YOB: 1950  PCP: Thania Rahman MD    Reason for Admission: Encephalopathy [G93.40]  Left-sided weakness [R53.1]  Thalamic stroke (HCC) [I63.81]    Subjective:       Clinical Changes / Abnormalities:Pt seen and examined in the room.  Patient resting in bed. She is somewhat drowsy. Pt denies any CP or sob.  Labs, vitals and tele reviewed- sinus arrhythmia, rate 70's-90's    Medications:   Scheduled Meds:   losartan  50 mg Oral Daily    metoprolol tartrate  50 mg Oral BID    meropenem  1,000 mg IntraVENous Q8H    [START ON 1/29/2025] pantoprazole (PROTONIX) 40 mg in sodium chloride (PF) 0.9 % 10 mL injection  40 mg IntraVENous Daily    lidocaine  1 patch TransDERmal Daily    insulin lispro  0-4 Units SubCUTAneous 4x Daily AC & HS    [Held by provider] ticagrelor  90 mg Oral BID    dilTIAZem  120 mg Oral Daily    [Held by provider] aspirin  81 mg Oral Nightly    escitalopram  10 mg Oral Daily    tiotropium  2 puff Inhalation Daily RT    sodium chloride flush  5-40 mL IntraVENous 2 times per day    [Held by provider] enoxaparin  40 mg SubCUTAneous Daily    rosuvastatin  20 mg Oral Nightly     Continuous Infusions:   amiodarone 0.5 mg/min (01/28/25 0729)    sodium chloride      sodium chloride 75 mL/hr at 01/28/25 0729    pantoprazole 8 mg/hr (01/28/25 0729)    dextrose      sodium chloride Stopped (01/25/25 0653)    sodium chloride      sodium chloride       CBC:   Recent Labs     01/26/25  0346 01/26/25  0820 01/26/25  1901 01/27/25  0331 01/28/25 0334   WBC 13.7*  --   --  10.7 13.2*   HGB 8.1*   < > 8.2* 8.1* 8.0*     --   --  198 224    < > = values in this interval not displayed.     BMP:    Recent Labs     01/26/25  0346 01/27/25  0331 01/28/25 0334    143 141   K 4.7 3.7 3.4*    106 105   CO2 31 29 28

## 2025-01-29 PROBLEM — I69.30 CEREBRAL MULTI-INFARCT STATE: Status: ACTIVE | Noted: 2025-01-29

## 2025-01-29 LAB
ANION GAP SERPL CALCULATED.3IONS-SCNC: 9 MMOL/L (ref 9–16)
BASOPHILS # BLD: 0.11 K/UL (ref 0–0.2)
BASOPHILS NFR BLD: 1 % (ref 0–2)
BUN SERPL-MCNC: 5 MG/DL (ref 8–23)
CALCIUM SERPL-MCNC: 8 MG/DL (ref 8.6–10.4)
CHLORIDE SERPL-SCNC: 104 MMOL/L (ref 98–107)
CO2 SERPL-SCNC: 28 MMOL/L (ref 20–31)
CREAT SERPL-MCNC: 0.5 MG/DL (ref 0.6–0.9)
EOSINOPHIL # BLD: 0.3 K/UL (ref 0–0.44)
EOSINOPHILS RELATIVE PERCENT: 2 % (ref 1–4)
ERYTHROCYTE [DISTWIDTH] IN BLOOD BY AUTOMATED COUNT: 14.7 % (ref 11.8–14.4)
GFR, ESTIMATED: >90 ML/MIN/1.73M2
GLUCOSE BLD-MCNC: 124 MG/DL (ref 65–105)
GLUCOSE BLD-MCNC: 127 MG/DL (ref 65–105)
GLUCOSE BLD-MCNC: 147 MG/DL (ref 65–105)
GLUCOSE SERPL-MCNC: 121 MG/DL (ref 74–99)
HCT VFR BLD AUTO: 27.9 % (ref 36.3–47.1)
HGB BLD-MCNC: 8.7 G/DL (ref 11.9–15.1)
IMM GRANULOCYTES # BLD AUTO: 0.17 K/UL (ref 0–0.3)
IMM GRANULOCYTES NFR BLD: 1 %
LYMPHOCYTES NFR BLD: 4.12 K/UL (ref 1.1–3.7)
LYMPHOCYTES RELATIVE PERCENT: 23 % (ref 24–43)
MCH RBC QN AUTO: 27.9 PG (ref 25.2–33.5)
MCHC RBC AUTO-ENTMCNC: 31.2 G/DL (ref 28.4–34.8)
MCV RBC AUTO: 89.4 FL (ref 82.6–102.9)
MONOCYTES NFR BLD: 1.08 K/UL (ref 0.1–1.2)
MONOCYTES NFR BLD: 6 % (ref 3–12)
NEUTROPHILS NFR BLD: 67 % (ref 36–65)
NEUTS SEG NFR BLD: 11.82 K/UL (ref 1.5–8.1)
NRBC BLD-RTO: 0 PER 100 WBC
PLATELET # BLD AUTO: 293 K/UL (ref 138–453)
PMV BLD AUTO: 11.1 FL (ref 8.1–13.5)
POTASSIUM SERPL-SCNC: 3.6 MMOL/L (ref 3.7–5.3)
RBC # BLD AUTO: 3.12 M/UL (ref 3.95–5.11)
RBC # BLD: ABNORMAL 10*6/UL
SODIUM SERPL-SCNC: 141 MMOL/L (ref 136–145)
WBC OTHER # BLD: 17.6 K/UL (ref 3.5–11.3)

## 2025-01-29 PROCEDURE — G0545 PR INHERENT VISIT TO INPT: HCPCS | Performed by: INTERNAL MEDICINE

## 2025-01-29 PROCEDURE — 97116 GAIT TRAINING THERAPY: CPT

## 2025-01-29 PROCEDURE — 6360000002 HC RX W HCPCS: Performed by: NURSE PRACTITIONER

## 2025-01-29 PROCEDURE — 2580000003 HC RX 258: Performed by: NURSE PRACTITIONER

## 2025-01-29 PROCEDURE — 99233 SBSQ HOSP IP/OBS HIGH 50: CPT | Performed by: PSYCHIATRY & NEUROLOGY

## 2025-01-29 PROCEDURE — 6370000000 HC RX 637 (ALT 250 FOR IP): Performed by: INTERNAL MEDICINE

## 2025-01-29 PROCEDURE — 6370000000 HC RX 637 (ALT 250 FOR IP)

## 2025-01-29 PROCEDURE — 97535 SELF CARE MNGMENT TRAINING: CPT

## 2025-01-29 PROCEDURE — 99232 SBSQ HOSP IP/OBS MODERATE 35: CPT | Performed by: STUDENT IN AN ORGANIZED HEALTH CARE EDUCATION/TRAINING PROGRAM

## 2025-01-29 PROCEDURE — 2500000003 HC RX 250 WO HCPCS: Performed by: INTERNAL MEDICINE

## 2025-01-29 PROCEDURE — 94640 AIRWAY INHALATION TREATMENT: CPT

## 2025-01-29 PROCEDURE — 2580000003 HC RX 258: Performed by: INTERNAL MEDICINE

## 2025-01-29 PROCEDURE — 97110 THERAPEUTIC EXERCISES: CPT

## 2025-01-29 PROCEDURE — 99232 SBSQ HOSP IP/OBS MODERATE 35: CPT | Performed by: INTERNAL MEDICINE

## 2025-01-29 PROCEDURE — 36415 COLL VENOUS BLD VENIPUNCTURE: CPT

## 2025-01-29 PROCEDURE — 99223 1ST HOSP IP/OBS HIGH 75: CPT | Performed by: PSYCHIATRY & NEUROLOGY

## 2025-01-29 PROCEDURE — 2060000000 HC ICU INTERMEDIATE R&B

## 2025-01-29 PROCEDURE — 6360000002 HC RX W HCPCS: Performed by: INTERNAL MEDICINE

## 2025-01-29 PROCEDURE — 6370000000 HC RX 637 (ALT 250 FOR IP): Performed by: NURSE PRACTITIONER

## 2025-01-29 PROCEDURE — 97530 THERAPEUTIC ACTIVITIES: CPT

## 2025-01-29 PROCEDURE — 85025 COMPLETE CBC W/AUTO DIFF WBC: CPT

## 2025-01-29 PROCEDURE — 94761 N-INVAS EAR/PLS OXIMETRY MLT: CPT

## 2025-01-29 PROCEDURE — 80048 BASIC METABOLIC PNL TOTAL CA: CPT

## 2025-01-29 PROCEDURE — 99232 SBSQ HOSP IP/OBS MODERATE 35: CPT | Performed by: NURSE PRACTITIONER

## 2025-01-29 PROCEDURE — 82947 ASSAY GLUCOSE BLOOD QUANT: CPT

## 2025-01-29 PROCEDURE — 2700000000 HC OXYGEN THERAPY PER DAY

## 2025-01-29 RX ORDER — LOSARTAN POTASSIUM 50 MG/1
100 TABLET ORAL DAILY
Status: DISCONTINUED | OUTPATIENT
Start: 2025-01-29 | End: 2025-02-08

## 2025-01-29 RX ORDER — LABETALOL HYDROCHLORIDE 5 MG/ML
10 INJECTION, SOLUTION INTRAVENOUS
Status: DISCONTINUED | OUTPATIENT
Start: 2025-01-29 | End: 2025-02-06

## 2025-01-29 RX ORDER — HYDRALAZINE HYDROCHLORIDE 20 MG/ML
10 INJECTION INTRAMUSCULAR; INTRAVENOUS
Status: DISCONTINUED | OUTPATIENT
Start: 2025-01-29 | End: 2025-02-01

## 2025-01-29 RX ORDER — POTASSIUM CHLORIDE 1500 MG/1
40 TABLET, EXTENDED RELEASE ORAL ONCE
Status: COMPLETED | OUTPATIENT
Start: 2025-01-29 | End: 2025-01-29

## 2025-01-29 RX ORDER — SENNA AND DOCUSATE SODIUM 50; 8.6 MG/1; MG/1
2 TABLET, FILM COATED ORAL DAILY
Status: DISCONTINUED | OUTPATIENT
Start: 2025-01-29 | End: 2025-02-15 | Stop reason: HOSPADM

## 2025-01-29 RX ORDER — IPRATROPIUM BROMIDE AND ALBUTEROL SULFATE 2.5; .5 MG/3ML; MG/3ML
1 SOLUTION RESPIRATORY (INHALATION) EVERY 4 HOURS PRN
Status: DISCONTINUED | OUTPATIENT
Start: 2025-01-29 | End: 2025-02-13

## 2025-01-29 RX ORDER — IPRATROPIUM BROMIDE AND ALBUTEROL SULFATE 2.5; .5 MG/3ML; MG/3ML
1 SOLUTION RESPIRATORY (INHALATION) ONCE
Status: COMPLETED | OUTPATIENT
Start: 2025-01-29 | End: 2025-01-29

## 2025-01-29 RX ORDER — ASPIRIN 81 MG/1
81 TABLET ORAL DAILY
Status: DISCONTINUED | OUTPATIENT
Start: 2025-01-29 | End: 2025-02-10

## 2025-01-29 RX ADMIN — ROSUVASTATIN CALCIUM 20 MG: 20 TABLET, FILM COATED ORAL at 21:03

## 2025-01-29 RX ADMIN — Medication 10 MG: at 15:24

## 2025-01-29 RX ADMIN — ASPIRIN 81 MG: 81 TABLET, COATED ORAL at 11:56

## 2025-01-29 RX ADMIN — TIOTROPIUM BROMIDE INHALATION SPRAY 2 PUFF: 3.12 SPRAY, METERED RESPIRATORY (INHALATION) at 09:21

## 2025-01-29 RX ADMIN — SODIUM CHLORIDE 40 MG: 9 INJECTION INTRAMUSCULAR; INTRAVENOUS; SUBCUTANEOUS at 08:01

## 2025-01-29 RX ADMIN — METOPROLOL TARTRATE 50 MG: 50 TABLET, FILM COATED ORAL at 08:01

## 2025-01-29 RX ADMIN — SODIUM CHLORIDE, PRESERVATIVE FREE 10 ML: 5 INJECTION INTRAVENOUS at 21:04

## 2025-01-29 RX ADMIN — AMIODARONE HYDROCHLORIDE 200 MG: 200 TABLET ORAL at 08:02

## 2025-01-29 RX ADMIN — HYDRALAZINE HYDROCHLORIDE 10 MG: 20 INJECTION INTRAMUSCULAR; INTRAVENOUS at 10:29

## 2025-01-29 RX ADMIN — ENOXAPARIN SODIUM 40 MG: 100 INJECTION SUBCUTANEOUS at 08:01

## 2025-01-29 RX ADMIN — AMIODARONE HYDROCHLORIDE 200 MG: 200 TABLET ORAL at 21:03

## 2025-01-29 RX ADMIN — SODIUM CHLORIDE: 9 INJECTION, SOLUTION INTRAVENOUS at 21:09

## 2025-01-29 RX ADMIN — ESCITALOPRAM OXALATE 10 MG: 10 TABLET ORAL at 08:02

## 2025-01-29 RX ADMIN — ACETAMINOPHEN 650 MG: 325 TABLET ORAL at 19:26

## 2025-01-29 RX ADMIN — MEROPENEM 1000 MG: 1 INJECTION INTRAVENOUS at 03:12

## 2025-01-29 RX ADMIN — SENNOSIDES AND DOCUSATE SODIUM 2 TABLET: 50; 8.6 TABLET ORAL at 11:56

## 2025-01-29 RX ADMIN — IPRATROPIUM BROMIDE AND ALBUTEROL SULFATE 1 DOSE: .5; 2.5 SOLUTION RESPIRATORY (INHALATION) at 11:42

## 2025-01-29 RX ADMIN — METOPROLOL TARTRATE 50 MG: 50 TABLET, FILM COATED ORAL at 21:03

## 2025-01-29 RX ADMIN — POTASSIUM CHLORIDE 40 MEQ: 1500 TABLET, EXTENDED RELEASE ORAL at 17:38

## 2025-01-29 RX ADMIN — MEROPENEM 1000 MG: 1 INJECTION INTRAVENOUS at 12:01

## 2025-01-29 RX ADMIN — MEROPENEM 1000 MG: 1 INJECTION INTRAVENOUS at 21:10

## 2025-01-29 RX ADMIN — DILTIAZEM HYDROCHLORIDE 120 MG: 120 CAPSULE, COATED, EXTENDED RELEASE ORAL at 08:03

## 2025-01-29 RX ADMIN — SODIUM CHLORIDE, PRESERVATIVE FREE 10 ML: 5 INJECTION INTRAVENOUS at 08:24

## 2025-01-29 RX ADMIN — POTASSIUM BICARBONATE 40 MEQ: 782 TABLET, EFFERVESCENT ORAL at 08:01

## 2025-01-29 RX ADMIN — LOSARTAN POTASSIUM 100 MG: 50 TABLET, FILM COATED ORAL at 08:01

## 2025-01-29 ASSESSMENT — PAIN DESCRIPTION - LOCATION
LOCATION: HEAD
LOCATION: HEAD

## 2025-01-29 ASSESSMENT — PAIN SCALES - GENERAL
PAINLEVEL_OUTOF10: 4
PAINLEVEL_OUTOF10: 7

## 2025-01-29 ASSESSMENT — PAIN DESCRIPTION - FREQUENCY
FREQUENCY: CONTINUOUS
FREQUENCY: INTERMITTENT

## 2025-01-29 ASSESSMENT — PAIN DESCRIPTION - ONSET
ONSET: ON-GOING
ONSET: ON-GOING

## 2025-01-29 ASSESSMENT — PAIN DESCRIPTION - PAIN TYPE
TYPE: ACUTE PAIN
TYPE: ACUTE PAIN

## 2025-01-29 ASSESSMENT — PAIN DESCRIPTION - ORIENTATION
ORIENTATION: ANTERIOR
ORIENTATION: ANTERIOR

## 2025-01-29 ASSESSMENT — PAIN DESCRIPTION - DESCRIPTORS
DESCRIPTORS: ACHING
DESCRIPTORS: ACHING

## 2025-01-29 NOTE — CARE COORDINATION
Post Acute Facility/Agency List     Provided patient with the following list, the list includes the overall star ratings obtained from CMS per the Medicare Web site (www.Medicare.gov):     [] Long Term Acute Care Facilities  [x] Acute Inpatient Rehabilitation Facilities  [x] Skilled Nursing Facilities  [] Hospice Facilities  [] Home Care    Provided verbal instructions on how to utilize the QR Code to obtain additional detailed star ratings from www.Medicare.gov     offered to print and provide the detailed list:    []Accepted   [x]Declined    The following printed detailed lists were provided:     CMS        PAtient has chosen [facility].  She is still reviewing for SNF    [address] from [facility], they have accepted

## 2025-01-29 NOTE — PROGRESS NOTES
Physical Therapy  Facility/Department: [address] NEURO ICU   Physical Therapy Daily Treatment Note    Patient Name: Dahlia Zhang        MRN: [id number]    : 1950    Date of Service: 2025    Chief Complaint   Patient presents with    Extremity Weakness     Past Medical History:  has a past medical history of Anticoagulated, Anxiety and depression, Atrial fibrillation ([facility]), Cerebral aneurysm, Cerebral artery occlusion with cerebral infarction ([facility]), Colon polyps, COPD (chronic obstructive pulmonary disease) ([facility]), COVID-19, COVID-19 vaccine administered, DM II (diabetes mellitus, type II), controlled ([facility]), GERD (gastroesophageal reflux disease), HTN (hypertension), Hyperlipidemia, Kidney stone, On home O2, Osteoarthritis, Sleep apnea, Under care of team, Under care of team, Under care of team, Under care of team, Wears glasses, Wears partial dentures, and Wellness examination.  Past Surgical History:  has a past surgical history that includes Thyroid surgery; Breast reduction surgery (Bilateral, ); Hysterectomy; hernia repair; Cerebral angiogram (2024); Cerebral angiogram (10/11/2023); Cerebral angiogram (2024); Cholecystectomy; Cerebral angiogram (2024); Breast biopsy (Right); Cataract extraction w/ intraocular lens implant (Bilateral, ); Colonoscopy w/ polypectomy (2023); other surgical history (01/15/2025); Colonoscopy (N/A, 2025); and Colonoscopy (N/A, 2025).    Discharge Recommendations  Discharge Recommendations: Patient able to tolerate 3 hours of therapy per day, Patient would benefit from continued therapy after discharge  PT Equipment Recommendations  Equipment Needed: Yes  Mobility Devices: Walker  Walker: Rolling  Other: if she cannot find hers    Assessment  Body Structures, Functions, Activity Limitations Requiring Skilled Therapeutic Intervention: Decreased functional mobility ;Decreased strength;Decreased safe awareness;Decreased  Standing balance assessed with use of RW.    Exercise  PT Exercises  Exercise Treatment: Supine BLE exercises: Ankle pumps x15, Hip Abd/Add x15, Straight leg raise x10. Seated BLE exercises: LAQ x15, Marches x15, Hip Abd/Add x15. I/S 2 x10.    Patient Education  Patient Education  Education Given To: Patient;Family  Education Provided: Role of Therapy;Plan of Care;Transfer Training;Mobility Training;Fall Prevention Strategies;Low Vision Education;Equipment  Education Provided Comments: Gait training, use of patch on sunglasses for double-vision, transfer training, importance of I/S, seated exercises  Education Method: Verbal;Demonstration  Barriers to Learning: None  Education Outcome: Verbalized understanding;Continued education needed    Plan  Physical Therapy Plan  General Plan:  (5-6x/week)  Current Treatment Recommendations: Strengthening, Balance training, Functional mobility training, Transfer training, Endurance training, Stair training, Gait training, Therapeutic activities, Safety education & training, Patient/Caregiver education & training, Neuromuscular re-education    Goals  Short Term Goals  Time Frame for Short Term Goals: 14 visits  Short Term Goal 1: Pt complete bed mobility modified indep with compensatory strategies as needed  Short Term Goal 2: Pt complete transfers with SBA with least restrictive device, no  loss of balance  Short Term Goal 3: Pt amb 150 ft with least restrictive device, even step length and proper safety awareness  Short Term Goal 4: Pt demonstrate 4 stairs with 1 rail and SBA without loss of balance for cumminity assess  Short Term Goal 5: Pt indep with high level balance strategies to decrease risk of falls and promote return to function    Minutes  PT Individual Minutes  Time In: 0836  Time Out: 0920  Minutes: 44  Time Code Minutes  Timed Code Treatment Minutes: 38 Minutes    Electronically signed by Tanisha Schwab PTA on 1/29/25 at 9:44 AM EST

## 2025-01-29 NOTE — H&P
[facility]   IN-PATIENT SERVICE   [place]    HISTORY AND PHYSICAL EXAMINATION            Date:   1/29/2025  Patient name:  Dahlia Zhang  Date of admission:  1/23/2025  1:08 PM  MRN:   [id number]  Account:  [id number]  YOB: 1950  PCP:    Thania Rahman MD  Room:   [address] [place] [address]  Code Status:    Full Code    Chief Complaint:     Chief Complaint   Patient presents with    Extremity Weakness       History Obtained From:     patient, electronic medical record    History of Present Illness:     The patient is a 74 y.o. female with PMH of SAH 2/2 basilar tip aneurysm rupture (s/p WEB, LVIS x2), COPD, afib, HTN, HLD, ischemic stroke (L occipital) and smoking who presented to the [facility] ED on 1/23 with altered mental status, increased lethargy, dysarthria, L facial droop, and generalized weakness. Initial NIHSS was 9.     Hospital Course:  1/24: ID consulted 2/2 ESBL positive E.Coli UTI, started Meropenum  1/26: Patient experiencing persistent epigastric and back pain. CTA A/P with active GIB. GI consulted and patient underwent colonoscopy displaying severe pandiverticulosis with resulting deformity/luminal narrowing in sigmoid colon, and multiple polyps throughout colon. GI recommending IR embolization if further bleeding.   1/27: Afib RVR. Increased Metoprolol PO dose. Cardiology on board.   1/28: Converted to NSR overnight. Transitioned to Amiodarone PO. Hypertensive overnight, SBP as high as 190. Started Losartan 50 mg QD.   1/29: Patient intermittently confused overnight, received Ambien for sleep. Slight dysarthria noted. Hypertensive overnight, increased Losartan to 100 mg QD.       Past Medical History:     Past Medical History:   Diagnosis Date    Anticoagulated     ELIQUIS    Anxiety and depression     Atrial fibrillation (HCC) 10/15/2023    new onset while in Hosp    Cerebral aneurysm 10/11/2023    ruptured , SAH ,  hosp x 2 weeks  : had acute HA     Cerebral artery occlusion with cerebral infarction (HCC) 08/08/2024    HA , blurry vision , left leg weakness , slurred speech    Colon polyps     COPD (chronic obstructive pulmonary disease) (HCC)     COVID-19     3-4 times    COVID-19 vaccine administered     DM II (diabetes mellitus, type II), controlled (HCC)     GERD (gastroesophageal reflux disease)     HTN (hypertension)     Hyperlipidemia     Kidney stone     once but did not require surgery    On home O2     3-4L / nightly    Osteoarthritis     Sleep apnea     Cpap    Under care of team     Cardiology ,  SHANNA,  last seen 6/26/2024    Under care of team     Sleep Andrea, last seen 7/3/2024    Under care of team     NeuroEndovascular , Dr. Pena ,  last seen 12/26/24    Under care of team     Neurology , [facility] , last seen 10/25/2024    Wears glasses     Wears partial dentures     upper    Wellness examination     PCP , Andrea, last sen 11/15/2024        Past Surgical History:     Past Surgical History:   Procedure Laterality Date    BREAST BIOPSY Right     since 1989 ; benign    BREAST REDUCTION SURGERY Bilateral 1989    CATARACT EXTRACTION W/ INTRAOCULAR LENS IMPLANT Bilateral 2020    CEREBRAL ANGIOGRAM  04/04/2024    DIAGNOSTIC / DR PENA    CEREBRAL ANGIOGRAM  10/11/2023    s/p WEB device embolization with obliteration of aneurysm, achieving Chris 1 on 10/11/2023.    CEREBRAL ANGIOGRAM  06/19/2024    ANGIOGRAM CAROTID CEREBRAL BILATERAL , with stent embolization    CEREBRAL ANGIOGRAM  12/12/2024    CHOLECYSTECTOMY      COLONOSCOPY N/A 01/26/2025    COLONOSCOPY N/A 1/26/2025    COLONOSCOPY DIAGNOSTIC performed by Cipriano Porter MD at [facility] OR    COLONOSCOPY W/ POLYPECTOMY  01/19/2023    has had several with polyps every time : 11/27/2017 , 8/14/2014    HERNIA REPAIR      abdominal hernia repair , states has had several    HYSTERECTOMY (CERVIX STATUS UNKNOWN)      2000's , ovaries remain    OTHER SURGICAL HISTORY  01/15/2025    IR ANGIOGRAM    dilTIAZem (CARDIZEM CD) 120 MG extended release capsule Take 1 capsule by mouth daily 10/27/23   Lucy Simmons APRN - CNP   potassium chloride (KLOR-CON M) 20 MEQ extended release tablet Take 1 tablet by mouth daily 10/26/23   Lucy Simmons APRN - CNP        Allergies:     Lisinopril, Codeine, Norco [hydrocodone-acetaminophen], and Percocet [oxycodone-acetaminophen]    Social History:     Tobacco:    reports that she has been smoking cigarettes. She started smoking about 58 years ago. She has a 71.6 pack-year smoking history. She has never been exposed to tobacco smoke. She has never used smokeless tobacco.  Alcohol:      reports current alcohol use of about 1.0 standard drink of alcohol per week.  Drug Use:  reports no history of drug use.    Family History:     Family History   Problem Relation Age of Onset    Stroke Mother     Hypertension Mother     Dementia Mother     Heart Disease Father     Stroke Father     Hypertension Father     No Known Problems Sister        Review of Systems:     Review of Systems   Respiratory:  Positive for shortness of breath.    Cardiovascular: Negative.    Gastrointestinal: Negative.    Neurological:  Positive for dizziness.   Hematological: Negative.    Psychiatric/Behavioral: Negative.           Physical Exam:   BP (!) 158/58   Pulse 62   Temp 98.7 °F (37.1 °C) (Oral)   Resp 27   Ht 1.702 m (5' 7\")   Wt 90.4 kg (199 lb 4.7 oz)   SpO2 99%   BMI 31.21 kg/m²   Temp (24hrs), Av.7 °F (37.1 °C), Min:98.2 °F (36.8 °C), Max:98.9 °F (37.2 °C)    Recent Labs     25  0919 25  1332 25  1711 25  1153   POCGLU 145* 142* 131* 124*       Intake/Output Summary (Last 24 hours) at 2025 1254  Last data filed at 2025 0630  Gross per 24 hour   Intake 3355.96 ml   Output --   Net 3355.96 ml         Neurologic Exam    GENERAL  Appears comfortable and in no distress   HEENT  NC/ AT   HEART  S1 and S2 heard; palpation of pulses: radial pulse    NECK

## 2025-01-29 NOTE — PROGRESS NOTES
Endovascular Neurosurgery Progress Notes    Pt Name: Dahlia Zhang  MRN: [id number]  YOB: 1950  Date of evaluation: 1/29/2025  Primary Care Physician: Thania Rahman MD  Reason for evaluation: Concern for pontine stroke in the presence of previously treated basilar anneurysm with WEB device and LVIS stent    SUBJECTIVE:     NAEO from EVN perspective. Continues on Aspirin. States headache is a little better. Is being transferred to Neuro Step down. No new complaints or focal neuro deficits.        History of Chief Complaint:    Dahlia Zhang is a 74 y.o. right handed female with a history of ruptured basilar tip aneurysm in October 2023 s/p stenting, hypertension, hyperlipidemia, type 2 diabetes, GERD,  atrial fibrillation not on AC presents to [facility] for altered mental status.     On the morning of 1/23/2025, patient was noted to have increased lethargy and generalized weakness along with left facial droop.  These were concerning as patient was at her baseline and doing better yesterday.  She does not recall the time when she went to sleep, thus last known well is unknown.  Potentially she went to sleep between 6-8 PM on 1/22.      Patient was seen and examined at bedside upon arrival to [facility] ED. She was increasingly lethargic and short of breath, however she was able to cross midline without gaze preference, had intact fluency and repetition but did have mild dysarthria. She had generalized weakness.         CT head w/o contrast shows no acute finding.      CTA head and neck show flow diverting stent along basilar artery and proximal PCA with embolization coils at basilar tip. Severe narrowing within the distal basilar stent. Small amount of residual aneurysmal filling at the neck, approximately 2 mm.        Allergies  is allergic to lisinopril, codeine, norco [hydrocodone-acetaminophen], and percocet [oxycodone-acetaminophen].  Medications  Prior to Admission  negative.      OBJECTIVE:   Vitals: BP (!) 158/58   Pulse 69   Temp 98.7 °F (37.1 °C) (Oral)   Resp 23   Ht 1.702 m (5' 7\")   Wt 90.4 kg (199 lb 4.7 oz)   SpO2 98%   BMI 31.21 kg/m²       Gen: No acute distress  MS: Awake, Oriented x3, No obvious aphasia  CN: Pupils reactive, no gaze deviation, mild left facial droop, tongue midline, no dysarthria  Motor: Moves all extremities antigravity but overall 4+/5 in RUE+RLE and 4/5 in LUE+LLE  Sens: Responds to LT in all extremities  Gait: Deferred      LABS:     Recent Labs     01/27/25  0331 01/28/25  0334 01/29/25  0409 01/29/25  1041   WBC 10.7 13.2*  --  17.6*   HGB 8.1* 8.0*  --  8.7*   HCT 25.9* 25.7*  --  27.9*    224  --  293    141 141  --    K 3.7 3.4* 3.6*  --     105 104  --    CO2 29 28 28  --    BUN 6* 5* 5*  --    CREATININE 0.5* 0.5* 0.5*  --    MG  --  1.9  --   --    CALCIUM 7.9* 7.9* 8.0*  --      No results for input(s): \"ALKPHOS\", \"ALT\", \"AST\", \"BILITOT\", \"BILIDIR\", \"LABALBU\", \"AMYLASE\", \"LIPASE\" in the last 72 hours.    RADIOLOGY:   Images were personally reviewed including:  CT brain without contrast: Remote left occipital lobe and lacunar infarcts in the right basal ganglia and corona radiata  CTA/CTP imaging:       DSA 1/23/2025  Please see dictated Radiology note for further details  The basilar artery tip aneurysm, previously treated with a WEB intrasaccular device (10/11/2023) and a second-stage LVIS 4.5 x 23mm stent (6/2024) and a third-stage  LVIS 4.5 x 23mm stent spanning from the left P2 segment to the mid-basilar artery (1/15/2025) is completely obliterated.  The stents are patent and well apposed with a filling defect at the basilar tip that could represent partial thrombus versus metal artifact.   There is a 360 vascular loop at the origin of the left vertebral artery requiring the catheter to be in the arch while navigating the wire to access and straighten the loop up to the V3 segment before tracking the

## 2025-01-29 NOTE — PROGRESS NOTES
[facility] - Progress Note    Today's Date and Time: 1/29/2025, 3:01 PM    Impression :     Acute right pontine infarct and posterior circulation infarcts  S/p angiogram 1/23/25  Leukocytosis  CRP elevation  ESBL E. 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 Coli UTI. Stop date on 1-31-25.  Repeat urine culture from 1/23/25: No growth   No growth on blood cultures   Mycoplasma IgM: negative   Viral respiratory panel negative    Medical Decision Making/Summary/Discussion:1/29/2025         Elements of Medical Decision Making:  Note: I have independently performed the steps listed below as part of the medical decision making and evaluation.   Examined and discussed with patient.  E. coli ESBL complicated urinary tract infection  Urine culture positive on 1/16/2024.  Received treatment with Keflex.  Organism is resistant  Urine culture positive on 1/20/2025 at [facility].  Received treatment with Macrobid 1/22/2025  Acute right pontine infarct and posterior circulation infarcts  Status post angiogram 1/23/2025  Reactive leukocytosis  Lactic acidosis  Atrial fibrillation with RVR  Status post subarachnoid hemorrhage with ruptured basilar tip aneurysm on 10/11/2023  Status post web device embolization on 10/11/2023  Residual neck aneurysm status post LVIS placement on 6/19/2024  Status post elective cerebral  Date: 1/24/2025  EXAMINATION: ONE XRAY VIEW OF THE CHEST 1/24/2025 12:15 am COMPARISON: None. HISTORY: ORDERING SYSTEM PROVIDED HISTORY: SOB and wheezing - rule out infection vs fluid overload TECHNOLOGIST PROVIDED HISTORY: SOB and wheezing - rule out infection vs fluid overload Reason for Exam: Portable/Supine FINDINGS: The mediastinum is unremarkable. The cardiac silhouette is normal size. The lungs are clear.  There are calcified nodules in the left lung base suggesting granulomas.     1. No acute cardiopulmonary process. 2. Calcified nodules in the left lung base suggesting granulomas.     MRI BRAIN WO CONTRAST    Result Date: 1/23/2025  EXAMINATION: MRI OF THE BRAIN WITHOUT CONTRAST,  1/23/2025 5:14 pm TECHNIQUE: Multiplanar multisequence MRI of the brain was performed without the administration of intravenous contrast. COMPARISON: 01/23/2025 and 08/08/2024 HISTORY: ORDERING SYSTEM PROVIDED HISTORY:  Left facial droop, recent hx of basilar stent and prior SAH in October 2023. TECHNOLOGIST PROVIDED HISTORY: Left facial droop, recent hx of basilar stent and prior SAH in October 2023. Decision Support Exception - unselect if not a suspected or confirmed emergency medical condition->Emergency Medical Condition (MA) Reason for Exam:  Left facial droop, recent hx of basilar stent and prior SAH in October 2023. FINDINGS: The examination is motion degraded. INTRACRANIAL STRUCTURES/VENTRICLES: Acute infarcts in the posterior circulation.  Large acute infarct in the right half of the von.  Two punctate infarcts in the cerebellum.  Punctate infarct in the right thalamus and punctate infarct in the right occipital lobe.  There are additional chronic infarcts in multiple vascular territories.  There is no acute hemorrhage, herniation, or hydrocephalus. ORBITS: The visualized portion of the orbits demonstrate no acute abnormality. SINUSES: The visualized paranasal sinuses and mastoid air cells demonstrate no acute

## 2025-01-29 NOTE — PROGRESS NOTES
chloride, polyethylene glycol, albuterol sulfate HFA, sodium chloride flush, sodium chloride, potassium chloride **OR** potassium alternative oral replacement **OR** potassium chloride, magnesium sulfate, ondansetron **OR** ondansetron, sodium chloride flush, sodium chloride    VITALS:  Temperature Range: Temp: 98.7 °F (37.1 °C) Temp  Av.6 °F (37 °C)  Min: 98.1 °F (36.7 °C)  Max: 98.9 °F (37.2 °C)  BP Range: Systolic (24hrs), Av , Min:131 , Max:196     Diastolic (24hrs), Av, Min:44, Max:133    Pulse Range: Pulse  Av.4  Min: 60  Max: 77  Respiration Range: Resp  Av.6  Min: 12  Max: 27  Current Pulse Ox: SpO2: 97 %  24HR Pulse Ox Range: SpO2  Av.4 %  Min: 86 %  Max: 100 %  Patient Vitals for the past 12 hrs:   BP Temp Temp src Pulse Resp SpO2   25 0600 (!) 156/57 -- -- 66 18 --   25 0500 (!) 173/78 -- -- 69 18 --   25 0400 (!) 163/80 98.7 °F (37.1 °C) Oral 62 20 97 %   25 0300 (!) 165/51 -- -- 64 18 96 %   25 0208 (!) 154/60 -- -- 68 21 (!) 89 %   25 0200 -- -- -- 70 18 (!) 89 %   25 0100 (!) 174/83 -- -- 63 20 100 %   25 0013 (!) 194/79 98.8 °F (37.1 °C) Oral 69 21 97 %   25 0000 -- -- -- 67 19 (!) 86 %   25 2300 (!) 143/66 -- -- 68 26 95 %   25 2200 (!) 131/96 -- -- 63 -- 90 %   25 2104 (!) 160/60 98.8 °F (37.1 °C) Oral 66 12 100 %   25 (!) 181/133 -- -- 61 -- 100 %   25 (!) 194/65 -- -- 71 -- 100 %   25 (!) 186/61 -- -- 77 27 95 %   25 (!) 168/64 -- -- 75 -- 94 %         RECENT LABS:   Lab Results   Component Value Date    WBC 13.2 (H) 2025    HGB 8.0 (L) 2025    HCT 25.7 (L) 2025     2025    CHOL 141 2025    TRIG 105 2025    HDL 56 2025    ALT 78 (H) 2025    AST 65 (H) 2025     2025    K 3.6 (L) 2025     2025    CREATININE 0.5 (L) 2025    BUN 5 (L) 2025    CO2 28  MRI brain without contrast showed acute infarct in right von, punctate infarcts in right cerebellum, thalamus and right occipital lobe. CTA showed severe narrowing within the distal basilar stent. Taken for urgent DSA showing previously treated basilar tip aneurysm (s/p WEB, LVIS x2) is completely obliterated, stents are patent and well apposed with filling defect at the basilar tip; possible thrombus vs metal artifact. Given Cangrelor bolus followed by infusion, transitioned to Brilinta. Course complicated by lower GI bleeding secondary to severe diverticular disease.      NEUROLOGIC:  Acute right pontine, right cerebellar, right thalamus and right occipital lobe infarctions likely secondary to thrombosed basilar tip LVIS  - MRI brain without contrast showed acute infarct in right von, punctate infarcts in right cerebellum, thalamus and right occipital lobe.  - POD #6 s/p DSA showing previously treated basilar tip aneurysm (s/p WEB, LVIS x2) is completely obliterated, stents are patent and well apposed with filling defect at the basilar tip; possible thrombus vs metal artifact  - Continue Aspirin 81mg QD today    - Brilinta 90mg BID remains on hold (since 1/26) due to acute GI bleed   - Will need to resume home Eliquis at some point for atrial fibrillation, timing TBD  - Neuro Endovascular following; discussing adding Brilinta vs Eliquis and timing given GI bleed  - Goal -160  - Neuro checks per protocol    CARDIOVASCULAR:  Essential HTN  Persistent atrial fibrillation, chronic anticoagulation (home Eliquis on hold PTA due to recent aneurysm treatment with LVIS)  Afib with RVR; resolved  Mild troponin elevation likely type II secondary to above  - Goal -160  - Increase Losartan to 100mg QD (home dose)   - Hold home Hydralazine, HCTZ; slowly reinitiate as indicated   - Transitioned to Amiodarone 200mg PO BID yesterday  - Continue home Lopressor 50 mg twice daily and Cardizem 120 mg ER daily  - Home  AC/HS    OTHER:  - Continue home Lexapro 10mg QD  - Lidocaine patch for chronic back pain  - Ambien 5mg QHS PRN for sleep  - PT/OT/ST   - PM&R following; ARU candidate   - Code Status: Full Code    PROPHYLAXIS:  Stress ulcer: PPI as above     DVT PROPHYLAXIS:  - SCD sleeves - Thigh High   - Lovenox 40mg QD subQ    DISPOSITION:  [x] To remain ICU for close monitoring in setting of basilar artery in stent thrombosis, GI bleed.      We will continue to follow along.     For any changes in exam or patient status please contact Neuro Critical Care.    At least 20min of critical care time spent at patient bedside examining patient,reviewing the images personally, speaking with the nursing staff regarding recommendations.        VIKTORIA Sorto - CNP  Neuro Critical Care  1/29/2025     6:43 AM

## 2025-01-29 NOTE — PLAN OF CARE
Problem: Chronic Conditions and Co-morbidities  Goal: Patient's chronic conditions and co-morbidity symptoms are monitored and maintained or improved  Outcome: Progressing     Problem: Discharge Planning  Goal: Discharge to home or other facility with appropriate resources  1/29/2025 1623 by Alicia Arriaga RN  Outcome: Progressing  1/29/2025 0513 by Angela Whittington RN  Outcome: Progressing  Flowsheets (Taken 1/28/2025 2000)  Discharge to home or other facility with appropriate resources: Identify barriers to discharge with patient and caregiver     Problem: Pain  Goal: Verbalizes/displays adequate comfort level or baseline comfort level  1/29/2025 1623 by Alicia Arriaga RN  Outcome: Progressing  1/29/2025 0513 by Angela Whittington RN  Outcome: Progressing     Problem: Safety - Adult  Goal: Free from fall injury  1/29/2025 1623 by Alicia Arriaga RN  Outcome: Progressing  1/29/2025 0513 by Angela Whittington RN  Outcome: Progressing     Problem: ABCDS Injury Assessment  Goal: Absence of physical injury  Outcome: Progressing     Problem: Respiratory - Adult  Goal: Achieves optimal ventilation and oxygenation  Recent Flowsheet Documentation  Taken 1/29/2025 1142 by Rita Rondon RCP  Achieves optimal ventilation and oxygenation:   Assess for changes in respiratory status   Respiratory therapy support as indicated  1/29/2025 0513 by Angela Whittington RN  Outcome: Progressing  Flowsheets (Taken 1/28/2025 2000)  Achieves optimal ventilation and oxygenation: Assess for changes in respiratory status     Problem: Neurosensory - Adult  Goal: Achieves stable or improved neurological status  1/29/2025 1623 by Alicia Arriaga RN  Outcome: Progressing  1/29/2025 0513 by Angela Whittington RN  Outcome: Progressing  Flowsheets (Taken 1/28/2025 2000)  Achieves stable or improved neurological status: Assess for and report changes in neurological status  Goal: Absence of seizures  1/29/2025 1623 by Bart  LAUREN Vargas  Outcome: Progressing  1/29/2025 0513 by Angela Whittington RN  Outcome: Progressing  Flowsheets (Taken 1/28/2025 2000)  Absence of seizures: Monitor for seizure activity.  If seizure occurs, document type and location of movements and any associated apnea  Goal: Remains free of injury related to seizures activity  1/29/2025 1623 by Alicia Arriaga RN  Outcome: Progressing  1/29/2025 0513 by Angela Whittington RN  Outcome: Progressing  Flowsheets (Taken 1/28/2025 2000)  Remains free of injury related to seizure activity: Maintain airway, patient safety  and administer oxygen as ordered  Goal: Achieves maximal functionality and self care  Outcome: Progressing     Problem: Cardiovascular - Adult  Goal: Maintains optimal cardiac output and hemodynamic stability  Outcome: Progressing  Goal: Absence of cardiac dysrhythmias or at baseline  Outcome: Progressing     Problem: Gastrointestinal - Adult  Goal: Maintains or returns to baseline bowel function  Outcome: Progressing  Goal: Maintains adequate nutritional intake  Outcome: Progressing  Goal: Minimal or absence of nausea and vomiting  Outcome: Progressing     Problem: Infection - Adult  Goal: Absence of infection at discharge  Outcome: Progressing  Goal: Absence of infection during hospitalization  Outcome: Progressing  Goal: Absence of fever/infection during anticipated neutropenic period  Outcome: Progressing     Problem: Skin/Tissue Integrity - Adult  Goal: Skin integrity remains intact  Outcome: Progressing  Goal: Incisions, wounds, or drain sites healing without S/S of infection  Outcome: Progressing  Goal: Oral mucous membranes remain intact  Outcome: Progressing     Problem: Musculoskeletal - Adult  Goal: Return mobility to safest level of function  Outcome: Progressing  Goal: Maintain proper alignment of affected body part  Outcome: Progressing  Goal: Return ADL status to a safe level of function  Outcome: Progressing     Problem:

## 2025-01-29 NOTE — PLAN OF CARE
Problem: Chronic Conditions and Co-morbidities  Goal: Patient's chronic conditions and co-morbidity symptoms are monitored and maintained or improved  Recent Flowsheet Documentation  Taken 1/28/2025 2000 by Angela Whittington RN  Care Plan - Patient's Chronic Conditions and Co-Morbidity Symptoms are Monitored and Maintained or Improved: Monitor and assess patient's chronic conditions and comorbid symptoms for stability, deterioration, or improvement  1/28/2025 1622 by Ana Marrero RN  Outcome: Progressing     Problem: Pain  Goal: Verbalizes/displays adequate comfort level or baseline comfort level  1/29/2025 0513 by Angela Whittington RN  Outcome: Progressing  1/28/2025 1622 by Ana Marrero RN  Outcome: Progressing     Problem: Safety - Adult  Goal: Free from fall injury  1/29/2025 0513 by Angela Whittington RN  Outcome: Progressing  1/28/2025 1622 by Ana Marrero RN  Outcome: Progressing     Problem: ABCDS Injury Assessment  Goal: Absence of physical injury  1/28/2025 1622 by Ana Marrero RN  Outcome: Progressing     Problem: Respiratory - Adult  Goal: Achieves optimal ventilation and oxygenation  1/29/2025 0513 by Angela Whittington RN  Outcome: Progressing  Flowsheets (Taken 1/28/2025 2000)  Achieves optimal ventilation and oxygenation: Assess for changes in respiratory status  1/28/2025 1622 by Ana Marrero RN  Outcome: Progressing     Problem: Neurosensory - Adult  Goal: Achieves stable or improved neurological status  1/29/2025 0513 by Angela Whittington RN  Outcome: Progressing  Flowsheets (Taken 1/28/2025 2000)  Achieves stable or improved neurological status: Assess for and report changes in neurological status  1/28/2025 1622 by Ana Marrero RN  Outcome: Progressing  Goal: Absence of seizures  Outcome: Progressing  Flowsheets (Taken 1/28/2025 2000)  Absence of seizures: Monitor for seizure activity.  If seizure occurs, document type and location of  alignment of affected body part: Support and protect limb and body alignment per provider's orders  Goal: Return ADL status to a safe level of function  Recent Flowsheet Documentation  Taken 1/28/2025 2000 by Angela Whittington RN  Return ADL Status to a Safe Level of Function:   Administer medication as ordered   Assess activities of daily living deficits and provide assistive devices as needed

## 2025-01-29 NOTE — PROGRESS NOTES
Occupational Therapy  Occupational Therapy Daily Treatment Note  Facility/Department: [address] NEURO ICU   Patient Name: Dahlia Zhang        MRN: [id number]    : 1950    Date of Service: 2025    Chief Complaint   Patient presents with    Extremity Weakness     Past Medical History:  has a past medical history of Anticoagulated, Anxiety and depression, Atrial fibrillation ([facility]), Cerebral aneurysm, Cerebral artery occlusion with cerebral infarction ([facility]), Colon polyps, COPD (chronic obstructive pulmonary disease) ([facility]), COVID-19, COVID-19 vaccine administered, DM II (diabetes mellitus, type II), controlled ([facility]), GERD (gastroesophageal reflux disease), HTN (hypertension), Hyperlipidemia, Kidney stone, On home O2, Osteoarthritis, Sleep apnea, Under care of team, Under care of team, Under care of team, Under care of team, Wears glasses, Wears partial dentures, and Wellness examination.  Past Surgical History:  has a past surgical history that includes Thyroid surgery; Breast reduction surgery (Bilateral, ); Hysterectomy; hernia repair; Cerebral angiogram (2024); Cerebral angiogram (10/11/2023); Cerebral angiogram (2024); Cholecystectomy; Cerebral angiogram (2024); Breast biopsy (Right); Cataract extraction w/ intraocular lens implant (Bilateral, ); Colonoscopy w/ polypectomy (2023); other surgical history (01/15/2025); Colonoscopy (N/A, 2025); and Colonoscopy (N/A, 2025).    Discharge Recommendations  Discharge Recommendations: Patient would benefit from continued therapy after discharge     Assessment  Performance deficits / Impairments: Decreased functional mobility ;Decreased ADL status;Decreased safe awareness;Decreased endurance;Decreased balance  Assessment: Pt would benefit from continued acute care and post acute care OT to address above listed deficits.  Prognosis: Good  Activity Tolerance  Activity Tolerance: Patient Tolerated treatment well;Patient  fair carryover.    Activities of Daily Living  Feeding: Independent;Setup  Grooming: Minimal assistance;Setup;Verbal cueing;Increased time to complete (Min A-wash hair, SBA-dry/brush hair, wash face, brush teeth)  UE Bathing: Stand by assistance;Setup;Verbal cueing;Increased time to complete (assist to wash back)  LE Bathing: Stand by assistance;Setup;Verbal cueing;Increased time to complete (able to wash BLE and feet using fig 4 tech w/some difficulty d/t SOB)  UE Dressing: Stand by assistance;Setup;Verbal cueing;Increased time to complete (assist to doff/don gown)  LE Dressing: Setup;Verbal cueing;Increased time to complete;Minimal assistance (assist to straighten and pull up footies on bilat feet d/t fatigue and SOB, assist to thread feet into pull up brief seated on toilet)  Toileting: Setup;Increased time to complete;Adaptive equipment;Stand by assistance (able to complete julius care seated on toilet)  Additional Comments: Pt up in chair upon arrival. STS for func mob to and from bathroom w/CGA using RW. Pt able to complete julius care seated on toilet. Transferred to recliner and setup at tray table for self care (see above for LOF). Pt needed increased time and assist d/t fatigue, needed rest breaks d/t SOB w/wheezing w/increased activity. Pt wanting to get back in bed for a nap. STS w/CGA using RW and transfer back to supine in bed w/bed flat w/SBA.    Balance  Balance  Sitting: Without support;With support (seated at EOB, in recliner and on toilet w/SBA ~60 min)  Standing: With support (stood ~5 min for transfers and func mob w/RW and CGA)    Transfers/Mobility  Bed mobility  Supine to Sit: Unable to assess (up in chair upon arrival)  Sit to Supine: Stand by assistance   Scooting: Stand by assistance  Bed Mobility Comments: HOB flat  Transfers  Sit to stand: Contact guard assistance  Stand to sit: Contact guard assistance  Transfer Comments: w/RW and vc's for hand placement  Toilet Transfers  Toilet -

## 2025-01-29 NOTE — PROGRESS NOTES
[facility]   Progress Note                   Date:   1/29/2025  Patient name: Dahlia Zhang  Date of admission:  1/23/2025  1:08 PM  MRN:   [id number]  YOB: 1950  PCP: Thania Rahman MD    Reason for Admission: Encephalopathy [G93.40]  Left-sided weakness [R53.1]  Thalamic stroke (HCC) [I63.81]    Subjective:       Clinical Changes / Abnormalities:Pt seen and examined in the room.  Patient is sitting up in chair.much more alert. Pt denies any CP or sob.  Labs, vitals and tele reviewed- sinus arrhythmia, rate 70's    Medications:   Scheduled Meds:   losartan  100 mg Oral Daily    sennosides-docusate sodium  2 tablet Oral Daily    aspirin  81 mg Oral Daily    amiodarone  200 mg Oral BID    metoprolol tartrate  50 mg Oral BID    meropenem  1,000 mg IntraVENous Q8H    pantoprazole (PROTONIX) 40 mg in sodium chloride (PF) 0.9 % 10 mL injection  40 mg IntraVENous Daily    lidocaine  1 patch TransDERmal Daily    insulin lispro  0-4 Units SubCUTAneous 4x Daily AC & HS    [Held by provider] ticagrelor  90 mg Oral BID    dilTIAZem  120 mg Oral Daily    escitalopram  10 mg Oral Daily    tiotropium  2 puff Inhalation Daily RT    sodium chloride flush  5-40 mL IntraVENous 2 times per day    enoxaparin  40 mg SubCUTAneous Daily    rosuvastatin  20 mg Oral Nightly     Continuous Infusions:   sodium chloride      dextrose      sodium chloride Stopped (01/25/25 0653)    sodium chloride      sodium chloride       CBC:   Recent Labs     01/27/25  0331 01/28/25  0334 01/29/25  1041   WBC 10.7 13.2* 17.6*   HGB 8.1* 8.0* 8.7*    224 293     BMP:    Recent Labs     01/27/25  0331 01/28/25  0334 01/29/25  0409    141 141   K 3.7 3.4* 3.6*    105 104   CO2 29 28 28   BUN 6* 5* 5*   CREATININE 0.5* 0.5* 0.5*   GLUCOSE 122* 133* 121*     Hepatic: No results for input(s): \"AST\", \"ALT\", \"BILITOT\", \"ALKPHOS\" in the last 72 hours.    Invalid input(s): \"ALB\"  Troponin:   No results  for input(s): \"TROPHS\" in the last 72 hours.    BNP: No results for input(s): \"BNP\" in the last 72 hours.  Lipids: No results for input(s): \"CHOL\", \"HDL\" in the last 72 hours.    Invalid input(s): \"LDLCALCU\"  INR: No results for input(s): \"INR\" in the last 72 hours.    EKG: atrial fibrillation, rate uncontrolled.  ECHO:    TTE 8/8/2024: 60-65%  Stress Test: not obtained.  Cardiac Angiography: not obtained.  Objective:   Vitals: BP (!) 158/58   Pulse 62   Temp 98.7 °F (37.1 °C) (Oral)   Resp 27   Ht 1.702 m (5' 7\")   Wt 90.4 kg (199 lb 4.7 oz)   SpO2 99%   BMI 31.21 kg/m²   General appearance: alert and cooperative with exam  HEENT: Head: Normocephalic, no lesions, without obvious abnormality.  Neck: no JVD, trachea midline, no adenopathy  Lungs: Clear to auscultation  Heart: Regular rate and rhythm, s1/s2 auscultated, no murmurs  Abdomen: soft, non-tender, bowel sounds active  Extremities: no edema  Neurologic: not done    Assessment / Acute Cardiac Problems:   Patient Active Problem List:     Atrial fibrillation (HCC)     Subarachnoid hemorrhage from basilar artery aneurysm (HCC)     MRI-safe endovascular aneurysm coil present     Basilar artery aneurysm (HCC)     Aneurysm, cerebral     Ischemic stroke (HCC)     Hypertension     Mixed hyperlipidemia     Current every day smoker     Visual disturbance     Basilar artery embolism     Encephalopathy     Left-sided weakness     Thalamic stroke (HCC)     Complicated UTI (urinary tract infection)     Infection due to ESBL-producing Escherichia coli     Facial droop     CRP elevated     Bandemia     Pyelonephritis     Lactic acidosis     Gastrointestinal hemorrhage     Acute blood loss anemia     Abnormal CT of the abdomen     Shock     Diverticulosis     Polyp of colon    IMPRESSION:    -A-fib with RVR. [id number] Score: 6  -Essential hypertension  -Lower GI bleed from diverticulosis  -Ruptured basilar artery tip aneurysm, previously treated with a WEB

## 2025-01-30 ENCOUNTER — APPOINTMENT (OUTPATIENT)
Dept: CT IMAGING | Age: 75
DRG: 023 | End: 2025-01-30
Payer: MEDICARE

## 2025-01-30 LAB
ANION GAP SERPL CALCULATED.3IONS-SCNC: 8 MMOL/L (ref 9–16)
BASOPHILS # BLD: 0.08 K/UL (ref 0–0.2)
BASOPHILS NFR BLD: 1 % (ref 0–2)
BUN SERPL-MCNC: 4 MG/DL (ref 8–23)
CALCIUM SERPL-MCNC: 8.4 MG/DL (ref 8.6–10.4)
CHLORIDE SERPL-SCNC: 103 MMOL/L (ref 98–107)
CO2 SERPL-SCNC: 29 MMOL/L (ref 20–31)
CREAT SERPL-MCNC: 0.5 MG/DL (ref 0.6–0.9)
EOSINOPHIL # BLD: 0.25 K/UL (ref 0–0.44)
EOSINOPHILS RELATIVE PERCENT: 2 % (ref 1–4)
ERYTHROCYTE [DISTWIDTH] IN BLOOD BY AUTOMATED COUNT: 14.7 % (ref 11.8–14.4)
GFR, ESTIMATED: >90 ML/MIN/1.73M2
GLUCOSE BLD-MCNC: 120 MG/DL (ref 65–105)
GLUCOSE BLD-MCNC: 121 MG/DL (ref 65–105)
GLUCOSE BLD-MCNC: 142 MG/DL (ref 65–105)
GLUCOSE BLD-MCNC: 163 MG/DL (ref 65–105)
GLUCOSE SERPL-MCNC: 124 MG/DL (ref 74–99)
HCT VFR BLD AUTO: 24.4 % (ref 36.3–47.1)
HGB BLD-MCNC: 7.8 G/DL (ref 11.9–15.1)
IMM GRANULOCYTES # BLD AUTO: 0.11 K/UL (ref 0–0.3)
IMM GRANULOCYTES NFR BLD: 1 %
LYMPHOCYTES NFR BLD: 3.18 K/UL (ref 1.1–3.7)
LYMPHOCYTES RELATIVE PERCENT: 22 % (ref 24–43)
MCH RBC QN AUTO: 28.2 PG (ref 25.2–33.5)
MCHC RBC AUTO-ENTMCNC: 32 G/DL (ref 28.4–34.8)
MCV RBC AUTO: 88.1 FL (ref 82.6–102.9)
MONOCYTES NFR BLD: 0.94 K/UL (ref 0.1–1.2)
MONOCYTES NFR BLD: 6 % (ref 3–12)
NEUTROPHILS NFR BLD: 69 % (ref 36–65)
NEUTS SEG NFR BLD: 10.13 K/UL (ref 1.5–8.1)
NRBC BLD-RTO: 0 PER 100 WBC
PLATELET # BLD AUTO: 325 K/UL (ref 138–453)
PMV BLD AUTO: 11 FL (ref 8.1–13.5)
POTASSIUM SERPL-SCNC: 3.5 MMOL/L (ref 3.7–5.3)
RBC # BLD AUTO: 2.77 M/UL (ref 3.95–5.11)
RBC # BLD: ABNORMAL 10*6/UL
SODIUM SERPL-SCNC: 140 MMOL/L (ref 136–145)
WBC OTHER # BLD: 14.7 K/UL (ref 3.5–11.3)

## 2025-01-30 PROCEDURE — 2500000003 HC RX 250 WO HCPCS: Performed by: INTERNAL MEDICINE

## 2025-01-30 PROCEDURE — 99232 SBSQ HOSP IP/OBS MODERATE 35: CPT | Performed by: INTERNAL MEDICINE

## 2025-01-30 PROCEDURE — 94761 N-INVAS EAR/PLS OXIMETRY MLT: CPT

## 2025-01-30 PROCEDURE — 2060000000 HC ICU INTERMEDIATE R&B

## 2025-01-30 PROCEDURE — 85025 COMPLETE CBC W/AUTO DIFF WBC: CPT

## 2025-01-30 PROCEDURE — 6370000000 HC RX 637 (ALT 250 FOR IP): Performed by: INTERNAL MEDICINE

## 2025-01-30 PROCEDURE — 99233 SBSQ HOSP IP/OBS HIGH 50: CPT | Performed by: PSYCHIATRY & NEUROLOGY

## 2025-01-30 PROCEDURE — APPSS30 APP SPLIT SHARED TIME 16-30 MINUTES: Performed by: NURSE PRACTITIONER

## 2025-01-30 PROCEDURE — 6370000000 HC RX 637 (ALT 250 FOR IP)

## 2025-01-30 PROCEDURE — 36415 COLL VENOUS BLD VENIPUNCTURE: CPT

## 2025-01-30 PROCEDURE — 94640 AIRWAY INHALATION TREATMENT: CPT

## 2025-01-30 PROCEDURE — 6360000002 HC RX W HCPCS: Performed by: NURSE PRACTITIONER

## 2025-01-30 PROCEDURE — 2580000003 HC RX 258: Performed by: NURSE PRACTITIONER

## 2025-01-30 PROCEDURE — 70450 CT HEAD/BRAIN W/O DYE: CPT

## 2025-01-30 PROCEDURE — G0545 PR INHERENT VISIT TO INPT: HCPCS | Performed by: INTERNAL MEDICINE

## 2025-01-30 PROCEDURE — 80048 BASIC METABOLIC PNL TOTAL CA: CPT

## 2025-01-30 PROCEDURE — 2700000000 HC OXYGEN THERAPY PER DAY

## 2025-01-30 PROCEDURE — 6370000000 HC RX 637 (ALT 250 FOR IP): Performed by: SURGERY

## 2025-01-30 PROCEDURE — 6370000000 HC RX 637 (ALT 250 FOR IP): Performed by: NURSE PRACTITIONER

## 2025-01-30 PROCEDURE — 82947 ASSAY GLUCOSE BLOOD QUANT: CPT

## 2025-01-30 PROCEDURE — 99233 SBSQ HOSP IP/OBS HIGH 50: CPT | Performed by: SURGERY

## 2025-01-30 RX ORDER — PANTOPRAZOLE SODIUM 40 MG/1
40 TABLET, DELAYED RELEASE ORAL
Status: DISCONTINUED | OUTPATIENT
Start: 2025-01-30 | End: 2025-02-15 | Stop reason: HOSPADM

## 2025-01-30 RX ORDER — DILTIAZEM HYDROCHLORIDE 240 MG/1
240 CAPSULE, COATED, EXTENDED RELEASE ORAL DAILY
Status: DISCONTINUED | OUTPATIENT
Start: 2025-01-31 | End: 2025-02-01

## 2025-01-30 RX ORDER — METOPROLOL TARTRATE 50 MG
100 TABLET ORAL 2 TIMES DAILY
Status: DISCONTINUED | OUTPATIENT
Start: 2025-01-30 | End: 2025-02-01

## 2025-01-30 RX ORDER — POLYETHYLENE GLYCOL 3350 17 G/17G
17 POWDER, FOR SOLUTION ORAL ONCE
Status: COMPLETED | OUTPATIENT
Start: 2025-01-30 | End: 2025-01-30

## 2025-01-30 RX ADMIN — SODIUM CHLORIDE, PRESERVATIVE FREE 5 ML: 5 INJECTION INTRAVENOUS at 21:24

## 2025-01-30 RX ADMIN — ESCITALOPRAM OXALATE 10 MG: 10 TABLET ORAL at 09:49

## 2025-01-30 RX ADMIN — AMIODARONE HYDROCHLORIDE 200 MG: 200 TABLET ORAL at 21:17

## 2025-01-30 RX ADMIN — MEROPENEM 1000 MG: 1 INJECTION INTRAVENOUS at 12:07

## 2025-01-30 RX ADMIN — ASPIRIN 81 MG: 81 TABLET, COATED ORAL at 09:49

## 2025-01-30 RX ADMIN — ROSUVASTATIN CALCIUM 20 MG: 20 TABLET, FILM COATED ORAL at 21:18

## 2025-01-30 RX ADMIN — POLYETHYLENE GLYCOL 3350 17 G: 17 POWDER, FOR SOLUTION ORAL at 09:49

## 2025-01-30 RX ADMIN — ENOXAPARIN SODIUM 40 MG: 100 INJECTION SUBCUTANEOUS at 09:48

## 2025-01-30 RX ADMIN — ACETAMINOPHEN 650 MG: 325 TABLET ORAL at 15:03

## 2025-01-30 RX ADMIN — HYDRALAZINE HYDROCHLORIDE 10 MG: 20 INJECTION INTRAMUSCULAR; INTRAVENOUS at 01:08

## 2025-01-30 RX ADMIN — SENNOSIDES AND DOCUSATE SODIUM 2 TABLET: 50; 8.6 TABLET ORAL at 09:49

## 2025-01-30 RX ADMIN — SODIUM CHLORIDE, PRESERVATIVE FREE 5 ML: 5 INJECTION INTRAVENOUS at 10:03

## 2025-01-30 RX ADMIN — ZOLPIDEM TARTRATE 5 MG: 5 TABLET ORAL at 21:18

## 2025-01-30 RX ADMIN — TIOTROPIUM BROMIDE INHALATION SPRAY 2 PUFF: 3.12 SPRAY, METERED RESPIRATORY (INHALATION) at 07:53

## 2025-01-30 RX ADMIN — LOSARTAN POTASSIUM 100 MG: 50 TABLET, FILM COATED ORAL at 09:49

## 2025-01-30 RX ADMIN — METOPROLOL TARTRATE 100 MG: 50 TABLET, FILM COATED ORAL at 21:18

## 2025-01-30 RX ADMIN — METOPROLOL TARTRATE 50 MG: 50 TABLET, FILM COATED ORAL at 09:48

## 2025-01-30 RX ADMIN — PANTOPRAZOLE SODIUM 40 MG: 40 TABLET, DELAYED RELEASE ORAL at 09:58

## 2025-01-30 RX ADMIN — HYDRALAZINE HYDROCHLORIDE 10 MG: 20 INJECTION INTRAMUSCULAR; INTRAVENOUS at 09:49

## 2025-01-30 RX ADMIN — MEROPENEM 1000 MG: 1 INJECTION INTRAVENOUS at 04:07

## 2025-01-30 RX ADMIN — AMIODARONE HYDROCHLORIDE 200 MG: 200 TABLET ORAL at 09:49

## 2025-01-30 RX ADMIN — MEROPENEM 1000 MG: 1 INJECTION INTRAVENOUS at 21:26

## 2025-01-30 RX ADMIN — Medication 5 MG: at 04:37

## 2025-01-30 ASSESSMENT — PAIN SCALES - GENERAL
PAINLEVEL_OUTOF10: 0
PAINLEVEL_OUTOF10: 2
PAINLEVEL_OUTOF10: 4
PAINLEVEL_OUTOF10: 4
PAINLEVEL_OUTOF10: 0
PAINLEVEL_OUTOF10: 4
PAINLEVEL_OUTOF10: 0
PAINLEVEL_OUTOF10: 7

## 2025-01-30 ASSESSMENT — PAIN SCALES - WONG BAKER: WONGBAKER_NUMERICALRESPONSE: HURTS A LITTLE BIT

## 2025-01-30 ASSESSMENT — PAIN DESCRIPTION - PAIN TYPE
TYPE: ACUTE PAIN
TYPE: ACUTE PAIN

## 2025-01-30 ASSESSMENT — PAIN DESCRIPTION - LOCATION
LOCATION: HEAD
LOCATION: HEAD

## 2025-01-30 ASSESSMENT — PAIN DESCRIPTION - DESCRIPTORS: DESCRIPTORS: ACHING

## 2025-01-30 ASSESSMENT — PAIN DESCRIPTION - ONSET: ONSET: ON-GOING

## 2025-01-30 ASSESSMENT — PAIN DESCRIPTION - FREQUENCY
FREQUENCY: INTERMITTENT
FREQUENCY: CONTINUOUS

## 2025-01-30 ASSESSMENT — PAIN DESCRIPTION - ORIENTATION: ORIENTATION: LEFT

## 2025-01-30 NOTE — PLAN OF CARE
Problem: Chronic Conditions and Co-morbidities  Goal: Patient's chronic conditions and co-morbidity symptoms are monitored and maintained or improved  1/30/2025 0056 by Alondra Laboy RN  Outcome: Progressing  Flowsheets (Taken 1/29/2025 2000)  Care Plan - Patient's Chronic Conditions and Co-Morbidity Symptoms are Monitored and Maintained or Improved:   Monitor and assess patient's chronic conditions and comorbid symptoms for stability, deterioration, or improvement   Collaborate with multidisciplinary team to address chronic and comorbid conditions and prevent exacerbation or deterioration   Update acute care plan with appropriate goals if chronic or comorbid symptoms are exacerbated and prevent overall improvement and discharge  1/29/2025 1623 by Alicia Arriaga RN  Outcome: Progressing     Problem: Discharge Planning  Goal: Discharge to home or other facility with appropriate resources  1/30/2025 0056 by Alondra Laboy RN  Outcome: Progressing  Flowsheets (Taken 1/29/2025 2000)  Discharge to home or other facility with appropriate resources:   Identify barriers to discharge with patient and caregiver   Identify discharge learning needs (meds, wound care, etc)  1/29/2025 1623 by Alicia Arriaga, RN  Outcome: Progressing     Problem: Pain  Goal: Verbalizes/displays adequate comfort level or baseline comfort level  1/30/2025 0056 by Alondra Laboy RN  Outcome: Progressing  Flowsheets (Taken 1/29/2025 2000)  Verbalizes/displays adequate comfort level or baseline comfort level:   Encourage patient to monitor pain and request assistance   Assess pain using appropriate pain scale   Administer analgesics based on type and severity of pain and evaluate response   Implement non-pharmacological measures as appropriate and evaluate response   Consider cultural and social influences on pain and pain management   Notify Licensed Independent Practitioner if interventions unsuccessful or patient reports new  pain  1/29/2025 1623 by Alicia Arriaga RN  Outcome: Progressing     Problem: Safety - Adult  Goal: Free from fall injury  1/30/2025 0056 by Alondra Laboy RN  Outcome: Progressing  1/29/2025 1623 by Alicia Arriaga RN  Outcome: Progressing     Problem: ABCDS Injury Assessment  Goal: Absence of physical injury  1/30/2025 0056 by Alondra Laboy RN  Outcome: Progressing  1/29/2025 1623 by Alicia Arriaga RN  Outcome: Progressing     Problem: Respiratory - Adult  Goal: Achieves optimal ventilation and oxygenation  Outcome: Progressing  Flowsheets  Taken 1/29/2025 2000 by Alondra Laboy RN  Achieves optimal ventilation and oxygenation:   Assess for changes in respiratory status   Assess for changes in mentation and behavior   Position to facilitate oxygenation and minimize respiratory effort   Oxygen supplementation based on oxygen saturation or arterial blood gases   Initiate smoking cessation protocol as indicated   Encourage broncho-pulmonary hygiene including cough, deep breathe, incentive spirometry   Assess the need for suctioning and aspirate as needed   Assess and instruct to report shortness of breath or any respiratory difficulty   Respiratory therapy support as indicated  Taken 1/29/2025 1142 by Rita Rondon RCP  Achieves optimal ventilation and oxygenation:   Assess for changes in respiratory status   Respiratory therapy support as indicated     Problem: Neurosensory - Adult  Goal: Achieves stable or improved neurological status  1/30/2025 0056 by Alondra Laboy RN  Outcome: Progressing  Flowsheets (Taken 1/29/2025 2000)  Achieves stable or improved neurological status:   Assess for and report changes in neurological status   Maintain blood pressure and fluid volume within ordered parameters to optimize cerebral perfusion and minimize risk of hemorrhage  1/29/2025 1623 by Alicia Arriaga RN  Outcome: Progressing  Goal: Absence of seizures  1/30/2025 0056 by Alondra Laboy RN  Outcome:  LAUREN Cantu  Outcome: Progressing  1/29/2025 1623 by Alicia Arriaga RN  Outcome: Progressing  Goal: Return ADL status to a safe level of function  1/30/2025 0056 by Alondra Laboy RN  Outcome: Progressing  1/29/2025 1623 by Alicia Arriaga RN  Outcome: Progressing     Problem: Genitourinary - Adult  Goal: Absence of urinary retention  1/30/2025 0056 by Alondra Laboy RN  Outcome: Progressing  1/29/2025 1623 by Alicia Arriaga RN  Outcome: Progressing     Problem: Metabolic/Fluid and Electrolytes - Adult  Goal: Electrolytes maintained within normal limits  1/30/2025 0056 by Alondra Laboy RN  Outcome: Progressing  1/29/2025 1623 by Alicia Arriaga RN  Outcome: Progressing  Goal: Hemodynamic stability and optimal renal function maintained  1/30/2025 0056 by Alondra Laboy RN  Outcome: Progressing  1/29/2025 1623 by Alicia Arriaga RN  Outcome: Progressing  Goal: Glucose maintained within prescribed range  1/30/2025 0056 by Alondra Labyo RN  Outcome: Progressing  1/29/2025 1623 by Alicia Arriaga RN  Outcome: Progressing     Problem: Hematologic - Adult  Goal: Maintains hematologic stability  1/30/2025 0056 by Alondra Laboy RN  Outcome: Progressing  1/29/2025 1623 by Alicia Arriaga RN  Outcome: Progressing

## 2025-01-30 NOTE — PROGRESS NOTES
[facility]   Progress Note                   Date:   1/30/2025  Patient name: Dahlia Zhang  Date of admission:  1/23/2025  1:08 PM  MRN:   [id number]  YOB: 1950  PCP: Thania Rahman MD    Reason for Admission: Encephalopathy [G93.40]  Left-sided weakness [R53.1]  Thalamic stroke (HCC) [I63.81]    Subjective:       Clinical Changes / Abnormalities:Pt seen and examined in the room.  Patient resting in bed. She is somewhat drowsy. Pt denies any CP or sob.  Labs, vitals and tele reviewed- sinus arrhythmia, rate 70's-90's    Medications:   Scheduled Meds:   pantoprazole  40 mg Oral QAM AC    losartan  100 mg Oral Daily    sennosides-docusate sodium  2 tablet Oral Daily    aspirin  81 mg Oral Daily    amiodarone  200 mg Oral BID    metoprolol tartrate  50 mg Oral BID    meropenem  1,000 mg IntraVENous Q8H    lidocaine  1 patch TransDERmal Daily    insulin lispro  0-4 Units SubCUTAneous 4x Daily AC & HS    [Held by provider] ticagrelor  90 mg Oral BID    dilTIAZem  120 mg Oral Daily    escitalopram  10 mg Oral Daily    tiotropium  2 puff Inhalation Daily RT    sodium chloride flush  5-40 mL IntraVENous 2 times per day    enoxaparin  40 mg SubCUTAneous Daily    rosuvastatin  20 mg Oral Nightly     Continuous Infusions:   sodium chloride      dextrose      sodium chloride Stopped (01/29/25 2110)    sodium chloride      sodium chloride       CBC:   Recent Labs     01/28/25  0334 01/29/25  1041 01/30/25  0610   WBC 13.2* 17.6* 14.7*   HGB 8.0* 8.7* 7.8*    293 325     BMP:    Recent Labs     01/28/25  0334 01/29/25  0409 01/30/25  0610    141 140   K 3.4* 3.6* 3.5*    104 103   CO2 28 28 29   BUN 5* 5* 4*   CREATININE 0.5* 0.5* 0.5*   GLUCOSE 133* 121* 124*     Hepatic: No results for input(s): \"AST\", \"ALT\", \"BILITOT\", \"ALKPHOS\" in the last 72 hours.    Invalid input(s): \"ALB\"  Troponin:   No results for input(s): \"TROPHS\" in the last 72 hours.    BNP: No results  for input(s): \"BNP\" in the last 72 hours.  Lipids: No results for input(s): \"CHOL\", \"HDL\" in the last 72 hours.    Invalid input(s): \"LDLCALCU\"  INR: No results for input(s): \"INR\" in the last 72 hours.    EKG: atrial fibrillation, rate uncontrolled.  ECHO:    TTE 8/8/2024: 60-65%  Stress Test: not obtained.  Cardiac Angiography: not obtained.  Objective:   Vitals: BP (!) 149/58   Pulse 60   Temp 97.7 °F (36.5 °C) (Oral)   Resp 24   Ht 1.702 m (5' 7\")   Wt 90.4 kg (199 lb 4.7 oz)   SpO2 95%   BMI 31.21 kg/m²   General appearance: alert and cooperative with exam  HEENT: Head: Normocephalic, no lesions, without obvious abnormality.  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)     MRI-safe endovascular aneurysm coil present     Basilar artery aneurysm (HCC)     Aneurysm, cerebral     Ischemic stroke (HCC)     Hypertension     Mixed hyperlipidemia     Current every day smoker     Visual disturbance     Basilar artery embolism     Encephalopathy     Left-sided weakness     Thalamic stroke (HCC)     Complicated UTI (urinary tract infection)     Infection due to ESBL-producing Escherichia coli     Facial droop     CRP elevated     Bandemia     Pyelonephritis     Lactic acidosis     Gastrointestinal hemorrhage     Acute blood loss anemia     Abnormal CT of the abdomen     Shock     Diverticulosis     Polyp of colon    IMPRESSION:    -A-fib with RVR. [id number] Score: 6  -Essential hypertension  -Lower GI bleed from diverticulosis  -Ruptured basilar artery tip aneurysm, previously treated with a WEB intrasaccular device 10/11/2023 and stenting.  -Acute ischemic

## 2025-01-30 NOTE — PLAN OF CARE
Problem: Chronic Conditions and Co-morbidities  Goal: Patient's chronic conditions and co-morbidity symptoms are monitored and maintained or improved  1/30/2025 0608 by Jade James RN  Outcome: Progressing  Flowsheets (Taken 1/30/2025 0230)  Care Plan - Patient's Chronic Conditions and Co-Morbidity Symptoms are Monitored and Maintained or Improved: Monitor and assess patient's chronic conditions and comorbid symptoms for stability, deterioration, or improvement  1/30/2025 0056 by Alondra Laboy RN  Outcome: Progressing  Flowsheets (Taken 1/29/2025 2000)  Care Plan - Patient's Chronic Conditions and Co-Morbidity Symptoms are Monitored and Maintained or Improved:   Monitor and assess patient's chronic conditions and comorbid symptoms for stability, deterioration, or improvement   Collaborate with multidisciplinary team to address chronic and comorbid conditions and prevent exacerbation or deterioration   Update acute care plan with appropriate goals if chronic or comorbid symptoms are exacerbated and prevent overall improvement and discharge  1/29/2025 1623 by Alicia Arriaga RN  Outcome: Progressing     Problem: Discharge Planning  Goal: Discharge to home or other facility with appropriate resources  1/30/2025 0608 by Jade James RN  Outcome: Progressing  Flowsheets (Taken 1/30/2025 0230)  Discharge to home or other facility with appropriate resources: Identify barriers to discharge with patient and caregiver  1/30/2025 0056 by Alondra Laboy RN  Outcome: Progressing  Flowsheets (Taken 1/29/2025 2000)  Discharge to home or other facility with appropriate resources:   Identify barriers to discharge with patient and caregiver   Identify discharge learning needs (meds, wound care, etc)  1/29/2025 1623 by Alicia Arriaga RN  Outcome: Progressing     Problem: Safety - Adult  Goal: Free from fall injury  1/30/2025 0608 by Jade James RN  Outcome: Progressing  1/30/2025 0056 by Alondra Laboy

## 2025-01-30 NOTE — PROGRESS NOTES
metoprolol tartrate  100 mg Oral BID    [START ON 1/31/2025] dilTIAZem  240 mg Oral Daily    losartan  100 mg Oral Daily    sennosides-docusate sodium  2 tablet Oral Daily    aspirin  81 mg Oral Daily    amiodarone  200 mg Oral BID    meropenem  1,000 mg IntraVENous Q8H    lidocaine  1 patch TransDERmal Daily    insulin lispro  0-4 Units SubCUTAneous 4x Daily AC & HS    [Held by provider] ticagrelor  90 mg Oral BID    escitalopram  10 mg Oral Daily    tiotropium  2 puff Inhalation Daily RT    sodium chloride flush  5-40 mL IntraVENous 2 times per day    enoxaparin  40 mg SubCUTAneous Daily    rosuvastatin  20 mg Oral Nightly       Social History:     Social History     Socioeconomic History    Marital status:      Spouse name: Not on file    Number of children: Not on file    Years of education: Not on file    Highest education level: Not on file   Occupational History    Not on file   Tobacco Use    Smoking status: Every Day     Current packs/day: 0.50     Average packs/day: 1.2 packs/day for 58.1 years (71.6 ttl pk-yrs)     Types: Cigarettes     Start date: 1967     Passive exposure: Never    Smokeless tobacco: Never   Vaping Use    Vaping status: Never Used   Substance and Sexual Activity    Alcohol use: Yes     Alcohol/week: 1.0 standard drink of alcohol     Types: 1 Drinks containing 0.5 oz of alcohol per week     Comment: 2 times per month    Drug use: Never    Sexual activity: Not Currently     Partners: Male   Other Topics Concern    Not on file   Social History Narrative    Not on file     Social Determinants of Health     Financial Resource Strain: Low Risk  (1/22/2025)    Received from [facility]    Overall Financial Resource Strain (CARDIA)     Difficulty of Paying Living Expenses: Not hard at all   Food Insecurity: No Food Insecurity (1/22/2025)    Received from [facility]    Hunger Screening     Within the past 12 months we worried whether our food would run out before  HISTORY: ORDERING SYSTEM PROVIDED HISTORY: SOB and wheezing - rule out infection vs fluid overload TECHNOLOGIST PROVIDED HISTORY: SOB and wheezing - rule out infection vs fluid overload Reason for Exam: Portable/Supine FINDINGS: The mediastinum is unremarkable. The cardiac silhouette is normal size. The lungs are clear.  There are calcified nodules in the left lung base suggesting granulomas.     1. No acute cardiopulmonary process. 2. Calcified nodules in the left lung base suggesting granulomas.     MRI BRAIN WO CONTRAST    Result Date: 1/23/2025  EXAMINATION: MRI OF THE BRAIN WITHOUT CONTRAST,  1/23/2025 5:14 pm TECHNIQUE: Multiplanar multisequence MRI of the brain was performed without the administration of intravenous contrast. COMPARISON: 01/23/2025 and 08/08/2024 HISTORY: ORDERING SYSTEM PROVIDED HISTORY:  Left facial droop, recent hx of basilar stent and prior SAH in October 2023. TECHNOLOGIST PROVIDED HISTORY: Left facial droop, recent hx of basilar stent and prior SAH in October 2023. Decision Support Exception - unselect if not a suspected or confirmed emergency medical condition->Emergency Medical Condition (MA) Reason for Exam:  Left facial droop, recent hx of basilar stent and prior SAH in October 2023. FINDINGS: The examination is motion degraded. INTRACRANIAL STRUCTURES/VENTRICLES: Acute infarcts in the posterior circulation.  Large acute infarct in the right half of the von.  Two punctate infarcts in the cerebellum.  Punctate infarct in the right thalamus and punctate infarct in the right occipital lobe.  There are additional chronic infarcts in multiple vascular territories.  There is no acute hemorrhage, herniation, or hydrocephalus. ORBITS: The visualized portion of the orbits demonstrate no acute abnormality. SINUSES: The visualized paranasal sinuses and mastoid air cells demonstrate no acute abnormality. BONES/SOFT TISSUES: The bone marrow signal intensity appears normal. The soft tissues  (36.5 °C), Max:98.1 °F (36.7 °C)    General Appearance: Awake, alert, and in no apparent distress  Eyes: Sclera anicteric; conjunctivae pink\"} No hemorhages, no embolic phenomena.  ENT: Oropharynx clear, without erythema, exudate, or thrush.No tenderness of sinuses. No nasal drainage.  Neck: Supple, without lymphadenopathy. No JVD  Pulmonary/Chest: Clear to auscultation, without wheezes, rales, or rhonchi. No areas of consolidation.Good air movement bilaterally. No egophony.  Cardiovascular: Regular rate and rhythm without murmurs, rubs, or gallops. S1 and S2 normal.  Abdomen: Soft, no tenderness, bowel sounds normal  All four Extremities: No cyanosis, clubbing, edema, or effusions.  Neurologic: No gross sensory or motor deficits.  Skin: No rash or lesions. IV site inspected: no inflammation.      Medical Decision Making:   I have independently reviewed/ordered the following labs:    CBC with Differential:   Recent Labs     01/29/25  1041 01/30/25  0610   WBC 17.6* 14.7*   HGB 8.7* 7.8*   HCT 27.9* 24.4*    325   LYMPHOPCT 23* 22*   MONOPCT 6 6   EOSPCT 2 2     BMP:   Recent Labs     01/28/25  0334 01/29/25  0409 01/30/25  0610    141 140   K 3.4* 3.6* 3.5*    104 103   CO2 28 28 29   BUN 5* 5* 4*   CREATININE 0.5* 0.5* 0.5*   MG 1.9  --   --      Hepatic Function Panel: No results for input(s): \"LABALBU\", \"BILIDIR\", \"IBILI\", \"BILITOT\", \"ALKPHOS\", \"ALT\", \"AST\" in the last 72 hours.    Invalid input(s): \"PROT\"  No results found for: \"VANCOTROUGH\"     Medications:      pantoprazole  40 mg Oral QAM AC    metoprolol tartrate  100 mg Oral BID    [START ON 1/31/2025] dilTIAZem  240 mg Oral Daily    losartan  100 mg Oral Daily    sennosides-docusate sodium  2 tablet Oral Daily    aspirin  81 mg Oral Daily    amiodarone  200 mg Oral BID    meropenem  1,000 mg IntraVENous Q8H    lidocaine  1 patch TransDERmal Daily    insulin lispro  0-4 Units SubCUTAneous 4x Daily AC & HS    [Held by provider] ticagrelor

## 2025-01-30 NOTE — PROGRESS NOTES
Endovascular Neurosurgery Progress Notes    Pt Name: Dahlia Zhang  MRN: [id number]  YOB: 1950  Date of evaluation: 1/30/2025  Primary Care Physician: Thania Rahman MD  Reason for evaluation: Concern for pontine stroke in the presence of previously treated basilar anneurysm with WEB device and LVIS stent    SUBJECTIVE:     NAEO from EVN perspective. Headache is improving. No new complaints or focal neuro deficits. Has difficulty sleeping, but otherwise no issues.        History of Chief Complaint:    Dahlia Zhang is a 74 y.o. 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 for altered mental status.     On the morning of 1/23/2025, patient was noted to have increased lethargy and generalized weakness along with left facial droop.  These were concerning as patient was at her baseline and doing better yesterday.  She does not recall the time when she went to sleep, thus last known well is unknown.  Potentially she went to sleep between 6-8 PM on 1/22.      Patient was seen and examined at bedside upon arrival to [facility] ED. She was increasingly lethargic and short of breath, however she was able to cross midline without gaze preference, had intact fluency and repetition but did have mild dysarthria. She had generalized weakness.         CT head w/o contrast shows no acute finding.      CTA head and neck show flow diverting stent along basilar artery and proximal PCA with embolization coils at basilar tip. Severe narrowing within the distal basilar stent. Small amount of residual aneurysmal filling at the neck, approximately 2 mm.        Allergies  is allergic to lisinopril, codeine, norco [hydrocodone-acetaminophen], and percocet [oxycodone-acetaminophen].  Medications  Prior to Admission medications    Medication Sig  01/30/25 at 12:52 PM  Stroke, Neurocritical Care & Neurointervention  [facility] Stroke [facility]

## 2025-01-30 NOTE — DISCHARGE INSTR - COC
Continuity of Care Form    Patient Name: Dahlia Zhang   :  1950  MRN:  [id number]    Admit date:  2025  Discharge date:  2/15/25    Code Status Order: Full Code   Advance Directives:   Advance Care Flowsheet Documentation             Admitting Physician:  Belén Keith MD  PCP: Thania Rahman MD    Discharging Nurse: Judy Watkins  Discharging Hospital Unit/Room#: 0135/0135-01  Discharging Unit Phone Number: [phone]    Emergency Contact:   Extended Emergency Contact Information  Primary Emergency Contact: felipa zhang  Address: [address]  Home Phone: [phone]  Mobile Phone: [phone]  Relation: Spouse  Secondary Emergency Contact: Arabella Zhang  Home Phone: [phone]  Mobile Phone: [phone]  Relation: Child  Preferred language: English   needed? No    Past Surgical History:  Past Surgical History:   Procedure Laterality Date    BREAST BIOPSY Right     since  ; benign    BREAST REDUCTION SURGERY Bilateral     CATARACT EXTRACTION W/ INTRAOCULAR LENS IMPLANT Bilateral     CEREBRAL ANGIOGRAM  2024    DIAGNOSTIC / DR PENA    CEREBRAL ANGIOGRAM  10/11/2023    s/p WEB device embolization with obliteration of aneurysm, achieving Chris 1 on 10/11/2023.    CEREBRAL ANGIOGRAM  2024    ANGIOGRAM CAROTID CEREBRAL BILATERAL , with stent embolization    CEREBRAL ANGIOGRAM  2024    CHOLECYSTECTOMY      COLONOSCOPY N/A 2025    COLONOSCOPY N/A 2025    COLONOSCOPY DIAGNOSTIC performed by Cipriano Porter MD at [facility] OR    COLONOSCOPY W/ POLYPECTOMY  2023    has had several with polyps every time : 2017 , 2014    HERNIA REPAIR      abdominal hernia repair , states has had several    HYSTERECTOMY (CERVIX STATUS UNKNOWN)      's , ovaries remain    OTHER SURGICAL HISTORY  01/15/2025    IR ANGIOGRAM CAROTID CEREBRAL BILATERAL    THYROID SURGERY       Electronically signed by Peter Fagan MD on 2/15/25 at 1:49 PM EST

## 2025-01-30 NOTE — PROGRESS NOTES
[facility] Neurology   IN-PATIENT SERVICE   [facility]    Progress Note             Date:   1/30/2025  Patient name:  Dahlia Zhang  Date of admission:  1/23/2025  1:08 PM  MRN:   [id number]  Account:  [id number]  YOB: 1950  PCP:    Thania Rahman MD  Room:   [address] [place] [address]  Code Status:    Full Code    Chief Complaint:     Chief Complaint   Patient presents with    Extremity Weakness       Interval hx:     The patient was seen and examined at bedside. Is vitally stable, alert and oriented x 3.   No acute events overnight.  No BM yet.   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 She presented to [facility] with altered mental status. On the morning of January 23, 2025, she was noted to have increased lethargy, generalized weakness, and a left facial droop. These symptoms were concerning as they represented a change from her baseline, as she had been feeling better the day prior. The patient could not recall when she went to sleep, so her last known well time is uncertain, but it is thought to be between 6-8 PM on January 22.  Upon arrival at the [facility] ED, the patient was increasingly lethargic and short of breath. She was able to cross the midline without gaze preference and demonstrated intact fluency and repetition, though she exhibited mild dysarthria. Generalized weakness was noted on examination. A CT head without contrast showed no acute findings. A CTA of the head and neck revealed a flow-diverting stent along the basilar artery and proximal PCA, with embolization coils at the basilar tip. There was severe narrowing within the distal basilar stent, and a small amount     Collection Time: 01/30/25  6:10 AM   Result Value Ref Range    Sodium 140 136 - 145 mmol/L    Potassium 3.5 (L) 3.7 - 5.3 mmol/L    Chloride 103 98 - 107 mmol/L    CO2 29 20 - 31 mmol/L    Anion Gap 8 (L) 9 - 16 mmol/L    Glucose 124 (H) 74 - 99 mg/dL    BUN 4 (L) 8 - 23 mg/dL    Creatinine 0.5 (L) 0.6 - 0.9 mg/dL    Est, Glom Filt Rate >90 >60 mL/min/1.73m2    Calcium 8.4 (L) 8.6 - 10.4 mg/dL   CBC with Auto Differential    Collection Time: 01/30/25  6:10 AM   Result Value Ref Range    WBC 14.7 (H) 3.5 - 11.3 k/uL    RBC 2.77 (L) 3.95 - 5.11 m/uL    Hemoglobin 7.8 (L) 11.9 - 15.1 g/dL    Hematocrit 24.4 (L) 36.3 - 47.1 %    MCV 88.1 82.6 - 102.9 fL    MCH 28.2 25.2 - 33.5 pg    MCHC 32.0 28.4 - 34.8 g/dL    RDW 14.7 (H) 11.8 - 14.4 %    Platelets 325 138 - 453 k/uL    MPV 11.0 8.1 - 13.5 fL    NRBC Automated 0.0 0.0 per 100 WBC    RBC Morphology ANISOCYTOSIS PRESENT     Neutrophils % 69 (H) 36 - 65 %    Lymphocytes % 22 (L) 24 - 43 %    Monocytes % 6 3 - 12 %    Eosinophils % 2 1 - 4 %    Basophils % 1 0 - 2 %    Immature Granulocytes % 1 (H) 0 %    Neutrophils Absolute 10.13 (H) 1.50 - 8.10 k/uL    Lymphocytes Absolute 3.18 1.10 - 3.70 k/uL    Monocytes Absolute 0.94 0.10 - 1.20 k/uL    Eosinophils Absolute 0.25 0.00 - 0.44 k/uL    Basophils Absolute 0.08 0.00 - 0.20 k/uL    Immature Granulocytes Absolute 0.11 0.00 - 0.30 k/uL     Recent Labs     01/30/25  0610   WBC 14.7*   RBC 2.77*   HGB 7.8*   HCT 24.4*   MCV 88.1   MCH 28.2   MCHC 32.0   RDW 14.7*      MPV 11.0     Recent Labs     01/30/25  0610      K 3.5*      CO2 29   BUN 4*   CREATININE 0.5*   GLUCOSE 124*   CALCIUM 8.4*     Hemoglobin A1C   Date Value Ref Range Status   01/24/2025 7.3 (H) 4.0 - 6.0 % Final       Assessment :      Primary Problem  Encephalopathy    Active Hospital Problems    Diagnosis Date Noted    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

## 2025-01-30 NOTE — PLAN OF CARE
Problem: Chronic Conditions and Co-morbidities  Goal: Patient's chronic conditions and co-morbidity symptoms are monitored and maintained or improved  1/30/2025 1825 by Judy Watkins RN  Outcome: Progressing  1/30/2025 0608 by Jade James RN  Outcome: Progressing  Flowsheets (Taken 1/30/2025 0230)  Care Plan - Patient's Chronic Conditions and Co-Morbidity Symptoms are Monitored and Maintained or Improved: Monitor and assess patient's chronic conditions and comorbid symptoms for stability, deterioration, or improvement     Problem: Discharge Planning  Goal: Discharge to home or other facility with appropriate resources  1/30/2025 1825 by Judy Watkins RN  Outcome: Progressing  1/30/2025 0608 by Jade James RN  Outcome: Progressing  Flowsheets (Taken 1/30/2025 0230)  Discharge to home or other facility with appropriate resources: Identify barriers to discharge with patient and caregiver     Problem: Pain  Goal: Verbalizes/displays adequate comfort level or baseline comfort level  1/30/2025 1825 by Judy Watkins RN  Outcome: Progressing  1/30/2025 0608 by Jade James RN  Outcome: Progressing  Flowsheets (Taken 1/30/2025 0230)  Verbalizes/displays adequate comfort level or baseline comfort level: Encourage patient to monitor pain and request assistance     Problem: Safety - Adult  Goal: Free from fall injury  1/30/2025 1825 by Judy Watkins RN  Outcome: Progressing  1/30/2025 0608 by Jade James RN  Outcome: Progressing     Problem: ABCDS Injury Assessment  Goal: Absence of physical injury  1/30/2025 1825 by Judy Watkins RN  Outcome: Progressing  1/30/2025 0608 by Jade James RN  Outcome: Progressing     Problem: Respiratory - Adult  Goal: Achieves optimal ventilation and oxygenation  1/30/2025 1825 by Judy Watkins RN  Outcome: Progressing  1/30/2025 0608 by Jade James RN  Outcome: Progressing  Flowsheets (Taken 1/30/2025 0230)  Achieves optimal ventilation and oxygenation: Assess for  changes in respiratory status     Problem: Neurosensory - Adult  Goal: Achieves stable or improved neurological status  1/30/2025 1825 by Judy Watkins RN  Outcome: Progressing  1/30/2025 0608 by Jade James RN  Outcome: Progressing  Flowsheets (Taken 1/30/2025 0230)  Achieves stable or improved neurological status: Assess for and report changes in neurological status  Goal: Absence of seizures  1/30/2025 1825 by Judy Watkins RN  Outcome: Progressing  1/30/2025 0608 by Jade James RN  Outcome: Progressing  Flowsheets (Taken 1/30/2025 0230)  Absence of seizures: Monitor for seizure activity.  If seizure occurs, document type and location of movements and any associated apnea  Goal: Remains free of injury related to seizures activity  1/30/2025 1825 by Judy Watkins RN  Outcome: Progressing  1/30/2025 0608 by Jade James RN  Outcome: Progressing  Flowsheets (Taken 1/30/2025 0230)  Remains free of injury related to seizure activity: Maintain airway, patient safety  and administer oxygen as ordered  Goal: Achieves maximal functionality and self care  1/30/2025 1825 by Judy Watkins RN  Outcome: Progressing  1/30/2025 0608 by Jade James RN  Outcome: Progressing  Flowsheets (Taken 1/30/2025 0230)  Achieves maximal functionality and self care: Monitor swallowing and airway patency with patient fatigue and changes in neurological status     Problem: Cardiovascular - Adult  Goal: Maintains optimal cardiac output and hemodynamic stability  1/30/2025 1825 by Judy Watkins RN  Outcome: Progressing  1/30/2025 0608 by Jade James RN  Outcome: Progressing  Flowsheets (Taken 1/30/2025 0230)  Maintains optimal cardiac output and hemodynamic stability: Monitor blood pressure and heart rate  Goal: Absence of cardiac dysrhythmias or at baseline  1/30/2025 1825 by Judy Watkins RN  Outcome: Progressing  1/30/2025 0608 by Jade James RN  Outcome: Progressing  Flowsheets (Taken 1/30/2025 0230)  Absence of  Function: Administer medication as ordered     Problem: Genitourinary - Adult  Goal: Absence of urinary retention  1/30/2025 1825 by Judy Watkins RN  Outcome: Progressing  1/30/2025 0608 by Jade James RN  Outcome: Progressing  Flowsheets (Taken 1/30/2025 0230)  Absence of urinary retention: Assess patient’s ability to void and empty bladder     Problem: Metabolic/Fluid and Electrolytes - Adult  Goal: Electrolytes maintained within normal limits  1/30/2025 1825 by Judy Watkins RN  Outcome: Progressing  1/30/2025 0608 by Jade James RN  Outcome: Progressing  Flowsheets (Taken 1/30/2025 0230)  Electrolytes maintained within normal limits: Monitor labs and assess patient for signs and symptoms of electrolyte imbalances  Goal: Hemodynamic stability and optimal renal function maintained  1/30/2025 1825 by Judy Watkins RN  Outcome: Progressing  1/30/2025 0608 by Jade James RN  Outcome: Progressing  Flowsheets (Taken 1/30/2025 0230)  Hemodynamic stability and optimal renal function maintained: Monitor labs and assess for signs and symptoms of volume excess or deficit  Goal: Glucose maintained within prescribed range  1/30/2025 1825 by Judy Watkins RN  Outcome: Progressing  1/30/2025 0608 by Jade James RN  Outcome: Progressing  Flowsheets (Taken 1/30/2025 0230)  Glucose maintained within prescribed range: Monitor blood glucose as ordered     Problem: Hematologic - Adult  Goal: Maintains hematologic stability  1/30/2025 1825 by Judy Watkins RN  Outcome: Progressing  1/30/2025 0608 by Jade James RN  Outcome: Progressing  Flowsheets (Taken 1/30/2025 0230)  Maintains hematologic stability: Assess for signs and symptoms of bleeding or hemorrhage

## 2025-01-30 NOTE — CARE COORDINATION
Transitional Planning    1055 D/C order in, PC to [facility], not sure if bed is available, she will look into it and CB.    [facility] will call

## 2025-01-30 NOTE — PROGRESS NOTES
Physical Therapy        Physical Therapy Cancel Note      DATE: 2025    NAME: Dahlia Zhang  MRN: [id number]   : 1950      Patient not seen this date for Physical Therapy due to:    Testing: Patient currently off unit for CT scan.     Will check back as able.     Electronically signed by Tanisha Schwab PTA on 2025 at 2:11 PM

## 2025-01-31 LAB
ANION GAP SERPL CALCULATED.3IONS-SCNC: 8 MMOL/L (ref 9–16)
BASOPHILS # BLD: 0.09 K/UL (ref 0–0.2)
BASOPHILS NFR BLD: 1 % (ref 0–2)
BUN SERPL-MCNC: 5 MG/DL (ref 8–23)
CALCIUM SERPL-MCNC: 8.4 MG/DL (ref 8.6–10.4)
CHLORIDE SERPL-SCNC: 105 MMOL/L (ref 98–107)
CO2 SERPL-SCNC: 29 MMOL/L (ref 20–31)
CREAT SERPL-MCNC: 0.5 MG/DL (ref 0.6–0.9)
EOSINOPHIL # BLD: 0.27 K/UL (ref 0–0.44)
EOSINOPHILS RELATIVE PERCENT: 2 % (ref 1–4)
ERYTHROCYTE [DISTWIDTH] IN BLOOD BY AUTOMATED COUNT: 14.8 % (ref 11.8–14.4)
GFR, ESTIMATED: >90 ML/MIN/1.73M2
GLUCOSE BLD-MCNC: 102 MG/DL (ref 65–105)
GLUCOSE BLD-MCNC: 136 MG/DL (ref 65–105)
GLUCOSE BLD-MCNC: 158 MG/DL (ref 65–105)
GLUCOSE BLD-MCNC: 167 MG/DL (ref 65–105)
GLUCOSE SERPL-MCNC: 102 MG/DL (ref 74–99)
HCT VFR BLD AUTO: 27.1 % (ref 36.3–47.1)
HGB BLD-MCNC: 8.5 G/DL (ref 11.9–15.1)
IMM GRANULOCYTES # BLD AUTO: 0.11 K/UL (ref 0–0.3)
IMM GRANULOCYTES NFR BLD: 1 %
LYMPHOCYTES NFR BLD: 3.32 K/UL (ref 1.1–3.7)
LYMPHOCYTES RELATIVE PERCENT: 29 % (ref 24–43)
MCH RBC QN AUTO: 27.9 PG (ref 25.2–33.5)
MCHC RBC AUTO-ENTMCNC: 31.4 G/DL (ref 28.4–34.8)
MCV RBC AUTO: 88.9 FL (ref 82.6–102.9)
MONOCYTES NFR BLD: 0.99 K/UL (ref 0.1–1.2)
MONOCYTES NFR BLD: 9 % (ref 3–12)
NEUTROPHILS NFR BLD: 58 % (ref 36–65)
NEUTS SEG NFR BLD: 6.71 K/UL (ref 1.5–8.1)
NRBC BLD-RTO: 0.2 PER 100 WBC
PLATELET # BLD AUTO: 316 K/UL (ref 138–453)
PMV BLD AUTO: 11 FL (ref 8.1–13.5)
POTASSIUM SERPL-SCNC: 3.6 MMOL/L (ref 3.7–5.3)
RBC # BLD AUTO: 3.05 M/UL (ref 3.95–5.11)
RBC # BLD: ABNORMAL 10*6/UL
SODIUM SERPL-SCNC: 142 MMOL/L (ref 136–145)
WBC OTHER # BLD: 11.5 K/UL (ref 3.5–11.3)

## 2025-01-31 PROCEDURE — 80048 BASIC METABOLIC PNL TOTAL CA: CPT

## 2025-01-31 PROCEDURE — 99232 SBSQ HOSP IP/OBS MODERATE 35: CPT | Performed by: INTERNAL MEDICINE

## 2025-01-31 PROCEDURE — 94761 N-INVAS EAR/PLS OXIMETRY MLT: CPT

## 2025-01-31 PROCEDURE — 99233 SBSQ HOSP IP/OBS HIGH 50: CPT | Performed by: PSYCHIATRY & NEUROLOGY

## 2025-01-31 PROCEDURE — 6360000002 HC RX W HCPCS: Performed by: NURSE PRACTITIONER

## 2025-01-31 PROCEDURE — 6370000000 HC RX 637 (ALT 250 FOR IP): Performed by: SURGERY

## 2025-01-31 PROCEDURE — 6370000000 HC RX 637 (ALT 250 FOR IP): Performed by: INTERNAL MEDICINE

## 2025-01-31 PROCEDURE — 6370000000 HC RX 637 (ALT 250 FOR IP)

## 2025-01-31 PROCEDURE — 94640 AIRWAY INHALATION TREATMENT: CPT

## 2025-01-31 PROCEDURE — 2060000000 HC ICU INTERMEDIATE R&B

## 2025-01-31 PROCEDURE — 6370000000 HC RX 637 (ALT 250 FOR IP): Performed by: NURSE PRACTITIONER

## 2025-01-31 PROCEDURE — 36415 COLL VENOUS BLD VENIPUNCTURE: CPT

## 2025-01-31 PROCEDURE — 85025 COMPLETE CBC W/AUTO DIFF WBC: CPT

## 2025-01-31 PROCEDURE — 2500000003 HC RX 250 WO HCPCS: Performed by: INTERNAL MEDICINE

## 2025-01-31 PROCEDURE — 2580000003 HC RX 258: Performed by: NURSE PRACTITIONER

## 2025-01-31 PROCEDURE — 97530 THERAPEUTIC ACTIVITIES: CPT

## 2025-01-31 PROCEDURE — 6360000002 HC RX W HCPCS: Performed by: INTERNAL MEDICINE

## 2025-01-31 PROCEDURE — G0545 PR INHERENT VISIT TO INPT: HCPCS | Performed by: INTERNAL MEDICINE

## 2025-01-31 PROCEDURE — 82947 ASSAY GLUCOSE BLOOD QUANT: CPT

## 2025-01-31 PROCEDURE — 97535 SELF CARE MNGMENT TRAINING: CPT

## 2025-01-31 PROCEDURE — APPSS30 APP SPLIT SHARED TIME 16-30 MINUTES: Performed by: NURSE PRACTITIONER

## 2025-01-31 PROCEDURE — 2700000000 HC OXYGEN THERAPY PER DAY

## 2025-01-31 RX ADMIN — ESCITALOPRAM OXALATE 10 MG: 10 TABLET ORAL at 08:15

## 2025-01-31 RX ADMIN — METOPROLOL TARTRATE 100 MG: 50 TABLET, FILM COATED ORAL at 08:15

## 2025-01-31 RX ADMIN — LOSARTAN POTASSIUM 100 MG: 50 TABLET, FILM COATED ORAL at 08:15

## 2025-01-31 RX ADMIN — HYDRALAZINE HYDROCHLORIDE 10 MG: 20 INJECTION INTRAMUSCULAR; INTRAVENOUS at 05:47

## 2025-01-31 RX ADMIN — ASPIRIN 81 MG: 81 TABLET, COATED ORAL at 08:15

## 2025-01-31 RX ADMIN — ENOXAPARIN SODIUM 40 MG: 100 INJECTION SUBCUTANEOUS at 08:15

## 2025-01-31 RX ADMIN — AMIODARONE HYDROCHLORIDE 200 MG: 200 TABLET ORAL at 08:15

## 2025-01-31 RX ADMIN — MEROPENEM 1000 MG: 1 INJECTION INTRAVENOUS at 05:29

## 2025-01-31 RX ADMIN — ROSUVASTATIN CALCIUM 20 MG: 20 TABLET, FILM COATED ORAL at 21:27

## 2025-01-31 RX ADMIN — MEROPENEM 1000 MG: 1 INJECTION INTRAVENOUS at 14:12

## 2025-01-31 RX ADMIN — PANTOPRAZOLE SODIUM 40 MG: 40 TABLET, DELAYED RELEASE ORAL at 05:28

## 2025-01-31 RX ADMIN — AMIODARONE HYDROCHLORIDE 200 MG: 200 TABLET ORAL at 21:27

## 2025-01-31 RX ADMIN — SENNOSIDES AND DOCUSATE SODIUM 2 TABLET: 50; 8.6 TABLET ORAL at 08:15

## 2025-01-31 RX ADMIN — SODIUM CHLORIDE, PRESERVATIVE FREE 10 ML: 5 INJECTION INTRAVENOUS at 21:27

## 2025-01-31 RX ADMIN — ONDANSETRON 4 MG: 2 INJECTION INTRAMUSCULAR; INTRAVENOUS at 10:10

## 2025-01-31 RX ADMIN — HYDRALAZINE HYDROCHLORIDE 10 MG: 20 INJECTION INTRAMUSCULAR; INTRAVENOUS at 00:24

## 2025-01-31 RX ADMIN — DILTIAZEM HYDROCHLORIDE 240 MG: 240 CAPSULE, EXTENDED RELEASE ORAL at 08:16

## 2025-01-31 RX ADMIN — TIOTROPIUM BROMIDE INHALATION SPRAY 2 PUFF: 3.12 SPRAY, METERED RESPIRATORY (INHALATION) at 07:44

## 2025-01-31 RX ADMIN — METOPROLOL TARTRATE 100 MG: 50 TABLET, FILM COATED ORAL at 21:26

## 2025-01-31 NOTE — PROGRESS NOTES
Occupational Therapy  Occupational Therapy Daily Treatment Note  Facility/Department: [address] STEPDOWN   Patient Name: Dahlia Zhang        MRN: [id number]    : 1950    Date of Service: 2025    Chief Complaint   Patient presents with    Extremity Weakness     Past Medical History:  has a past medical history of Anticoagulated, Anxiety and depression, Atrial fibrillation ([facility]), Cerebral aneurysm, Cerebral artery occlusion with cerebral infarction ([facility]), Colon polyps, COPD (chronic obstructive pulmonary disease) ([facility]), COVID-19, COVID-19 vaccine administered, DM II (diabetes mellitus, type II), controlled (HCC), GERD (gastroesophageal reflux disease), HTN (hypertension), Hyperlipidemia, Kidney stone, On home O2, Osteoarthritis, Sleep apnea, Under care of team, Under care of team, Under care of team, Under care of team, Wears glasses, Wears partial dentures, and Wellness examination.  Past Surgical History:  has a past surgical history that includes Thyroid surgery; Breast reduction surgery (Bilateral, ); Hysterectomy; hernia repair; Cerebral angiogram (2024); Cerebral angiogram (10/11/2023); Cerebral angiogram (2024); Cholecystectomy; Cerebral angiogram (2024); Breast biopsy (Right); Cataract extraction w/ intraocular lens implant (Bilateral, ); Colonoscopy w/ polypectomy (2023); other surgical history (01/15/2025); Colonoscopy (N/A, 2025); and Colonoscopy (N/A, 2025).    Discharge Recommendations  Discharge Recommendations: Patient would benefit from continued therapy after discharge       Assessment  Performance deficits / Impairments: Decreased functional mobility ;Decreased ADL status;Decreased safe awareness;Decreased endurance;Decreased balance  Assessment: Pt limited by above deficits impacting pts functional mobility/ADL performance. Pt is expected to require skilled OT services to increase safety and promote increased independence t/o ADL/IADLs and

## 2025-01-31 NOTE — PROGRESS NOTES
[facility] Neurology   IN-PATIENT SERVICE   [facility]    Progress Note             Date:   1/31/2025  Patient name:  Dahlia Zhang  Date of admission:  1/23/2025  1:08 PM  MRN:   [id number]  Account:  [id number]  YOB: 1950  PCP:    Thania Rahman MD  Room:   [address] [place] [address]  Code Status:    Full Code    Chief Complaint:     Chief Complaint   Patient presents with    Extremity Weakness       Interval hx:     The patient was seen and examined at bedside. Is vitally stable, alert and oriented x 3.   Per nursing report, pt developed L sided HA, not relieved by Tylenol, and pt noted to have new L sided facial droop. Stat CT head showed acute L PCA territory infarct involving parasagittal L occipital  Pt endorses having a BM which is her first post-colonoscopy.   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 She presented to [facility] with altered mental status. On the morning of January 23, 2025, she was noted to have increased lethargy, generalized weakness, and a left facial droop. These symptoms were concerning as they represented a change from her baseline, as she had been feeling better the day prior. The patient could not recall when she went to sleep, so her last known well time is uncertain, but it is thought to be between 6-8 PM on January 22.  Upon arrival at the [facility] ED, the patient was increasingly lethargic and short of breath. She was able to cross the midline without gaze preference and demonstrated intact fluency and repetition, though she exhibited mild dysarthria. Generalized weakness was noted on examination. A CT head without contrast showed no acute findings. A  CTA of the head and neck revealed a flow-diverting stent along the basilar artery and proximal PCA, with embolization coils at the basilar tip. There was severe narrowing within the distal basilar stent, and a small amount of residual aneurysmal filling (approximately 2 mm) was noted at the neck of the aneurysm.     Hospital Course:  1/24: ID consulted 2/2 ESBL positive E.Coli UTI, started Meropenum  1/26: Patient experiencing persistent epigastric and back pain. CTA A/P with active GIB. GI consulted and patient underwent colonoscopy displaying severe pandiverticulosis with resulting deformity/luminal narrowing in sigmoid colon, and multiple polyps throughout colon. GI recommending IR embolization if further bleeding.   1/27: Afib RVR. Increased Metoprolol PO dose. Cardiology on board.   1/28: Converted to NSR overnight. Transitioned to Amiodarone PO. Hypertensive overnight, SBP as high as 190. Started Losartan 50 mg QD.   1/29: Patient intermittently confused overnight, received Ambien for sleep. Slight dysarthria noted. Hypertensive overnight, increased Losartan to 100 mg QD.   1/30: Transferred out of neuro ICU. Accepted by [facility]. Pt endorsed new HA and nursing noted new L sided facial droop - stat CT head showed evolving of original infarct that caused her presentation.   1/31: IV Meropenem completed.       Past Medical History:     Past Medical History:   Diagnosis Date    Anticoagulated     ELIQUIS    Anxiety and depression     Atrial fibrillation (HCC) 10/15/2023    new onset while in Hosp    Cerebral aneurysm 10/11/2023    ruptured , SAH ,  hosp x 2 weeks  : had acute HA    Cerebral artery occlusion with cerebral infarction (HCC) 08/08/2024    HA , blurry vision , left leg weakness , slurred speech    Colon polyps     COPD (chronic obstructive pulmonary disease) (HCC)     COVID-19     3-4 times    COVID-19 vaccine administered     DM II (diabetes mellitus, type II), controlled ([facility])     GERD  facial symmetry  VIII   -     Intact hearing   IX,X -     Symmetrical palate  XI    -     Symmetrical shoulder shrug  XII   -     Midline tongue, no atrophy    MOTOR FUNCTION: RUE: Significant for good strength of grade 5/5 in proximal and distal muscle groups   LUE: Significant for good strength of grade 5/5 in proximal and distal muscle groups   RLE: Significant for good strength of grade 5/5 in proximal and distal muscle groups   LLE: Significant for good strength of grade 5/5 in proximal and distal muscle groups      Normal bulk, normal tone and no involuntary movements, no tremor   SENSORY FUNCTION:  Normal touch, normal pinprick, normal vibration, normal proprioception   CEREBELLAR FUNCTION:  Intact fine motor control over upper limbs and lower limbs   REFLEX FUNCTION:  Symmetric in upper and lower extremities, no Babinski sign   STATION and GAIT  Deferred       Investigations:      Laboratory Testing:  Recent Results (from the past 24 hour(s))   POC Glucose Fingerstick    Collection Time: 01/30/25 12:04 PM   Result Value Ref Range    POC Glucose 142 (H) 65 - 105 mg/dL   POC Glucose Fingerstick    Collection Time: 01/30/25  3:45 PM   Result Value Ref Range    POC Glucose 120 (H) 65 - 105 mg/dL   POC Glucose Fingerstick    Collection Time: 01/30/25  7:36 PM   Result Value Ref Range    POC Glucose 163 (H) 65 - 105 mg/dL   Basic Metabolic Panel    Collection Time: 01/31/25  3:48 AM   Result Value Ref Range    Sodium 142 136 - 145 mmol/L    Potassium 3.6 (L) 3.7 - 5.3 mmol/L    Chloride 105 98 - 107 mmol/L    CO2 29 20 - 31 mmol/L    Anion Gap 8 (L) 9 - 16 mmol/L    Glucose 102 (H) 74 - 99 mg/dL    BUN 5 (L) 8 - 23 mg/dL    Creatinine 0.5 (L) 0.6 - 0.9 mg/dL    Est, Glom Filt Rate >90 >60 mL/min/1.73m2    Calcium 8.4 (L) 8.6 - 10.4 mg/dL   CBC with Auto Differential    Collection Time: 01/31/25  3:48 AM   Result Value Ref Range    WBC 11.5 (H) 3.5 - 11.3 k/uL    RBC 3.05 (L) 3.95 - 5.11 m/uL    Hemoglobin 8.5 (L)

## 2025-01-31 NOTE — PLAN OF CARE
Problem: Chronic Conditions and Co-morbidities  Goal: Patient's chronic conditions and co-morbidity symptoms are monitored and maintained or improved  1/31/2025 0638 by Iqra Plasencia RN  Outcome: Progressing  1/30/2025 1825 by Judy Watkins RN  Outcome: Progressing     Problem: Discharge Planning  Goal: Discharge to home or other facility with appropriate resources  1/31/2025 0638 by Iqra Plasencia RN  Outcome: Progressing  1/30/2025 1825 by Judy Watkins RN  Outcome: Progressing     Problem: Pain  Goal: Verbalizes/displays adequate comfort level or baseline comfort level  1/31/2025 0638 by Iqra Plasencia RN  Outcome: Progressing  1/30/2025 1825 by Judy Watkins RN  Outcome: Progressing     Problem: Safety - Adult  Goal: Free from fall injury  1/31/2025 0638 by Iqra Plasencia RN  Outcome: Progressing  1/30/2025 1825 by Judy Watkins RN  Outcome: Progressing     Problem: ABCDS Injury Assessment  Goal: Absence of physical injury  1/31/2025 0638 by Iqra Plasencia RN  Outcome: Progressing  1/30/2025 1825 by Judy Watkins RN  Outcome: Progressing     Problem: Respiratory - Adult  Goal: Achieves optimal ventilation and oxygenation  1/31/2025 0638 by Iqra Plasencia RN  Outcome: Progressing  1/31/2025 0356 by Denisse Patel RCP  Outcome: Progressing  Flowsheets (Taken 1/31/2025 0356)  Achieves optimal ventilation and oxygenation:   Assess for changes in respiratory status   Respiratory therapy support as indicated   Oxygen supplementation based on oxygen saturation or arterial blood gases  1/30/2025 1825 by Judy Watkins RN  Outcome: Progressing     Problem: Neurosensory - Adult  Goal: Achieves stable or improved neurological status  1/31/2025 0638 by Iqra Plasencia RN  Outcome: Progressing  1/30/2025 1825 by Judy Watkins RN  Outcome: Progressing  Goal: Absence of seizures  1/31/2025 0638 by Iqra Plasencia RN  Outcome: Progressing  1/30/2025 1825 by Roland  LAUREN Kim  Outcome: Progressing  Goal: Remains free of injury related to seizures activity  1/31/2025 0638 by Iqra Plasencia RN  Outcome: Progressing  1/30/2025 1825 by Judy Watkins RN  Outcome: Progressing  Goal: Achieves maximal functionality and self care  1/31/2025 0638 by Iqra Plasencia RN  Outcome: Progressing  1/30/2025 1825 by Judy Watkins RN  Outcome: Progressing     Problem: Cardiovascular - Adult  Goal: Maintains optimal cardiac output and hemodynamic stability  1/31/2025 0638 by Iqra Plasencia RN  Outcome: Progressing  1/30/2025 1825 by Judy Watkins RN  Outcome: Progressing  Goal: Absence of cardiac dysrhythmias or at baseline  1/31/2025 0638 by Iqra Plasencia RN  Outcome: Progressing  1/30/2025 1825 by Judy Watkins RN  Outcome: Progressing     Problem: Gastrointestinal - Adult  Goal: Maintains or returns to baseline bowel function  1/31/2025 0638 by Iqra Plasencia RN  Outcome: Progressing  1/30/2025 1825 by Judy Watkins RN  Outcome: Progressing  Goal: Maintains adequate nutritional intake  1/31/2025 0638 by Iqra Plasencia RN  Outcome: Progressing  1/30/2025 1825 by Judy Watkins RN  Outcome: Progressing  Goal: Minimal or absence of nausea and vomiting  1/31/2025 0638 by Iqra Plasencia RN  Outcome: Progressing  1/30/2025 1825 by Judy Watkins RN  Outcome: Progressing     Problem: Infection - Adult  Goal: Absence of infection at discharge  1/31/2025 0638 by Iqra Plasencia RN  Outcome: Progressing  1/30/2025 1825 by Judy Watkins RN  Outcome: Progressing  Goal: Absence of infection during hospitalization  1/31/2025 0638 by Iqra Plasencia RN  Outcome: Progressing  1/30/2025 1825 by Judy Watkins RN  Outcome: Progressing  Goal: Absence of fever/infection during anticipated neutropenic period  1/30/2025 1825 by Judy Watkins RN  Outcome: Progressing     Problem: Skin/Tissue Integrity - Adult  Goal: Skin integrity remains intact  1/30/2025

## 2025-01-31 NOTE — PROGRESS NOTES
Ps sent to rosalie yepez at 1006:    pt was working with pt and started to stare off with no response while on the toilet they got her to the chair she slumped a little to the left this has since resolved. speech is a little more slurred than this am. however shes oriented. she says she has double vision. all extremities have full sensation and no drift. vitals stable.     Dr yepez came to bedside to assess. Possible vasovagal episode.

## 2025-01-31 NOTE — PLAN OF CARE
Problem: Chronic Conditions and Co-morbidities  Goal: Patient's chronic conditions and co-morbidity symptoms are monitored and maintained or improved  1/31/2025 1049 by Meghan Patel RN  Outcome: Progressing  1/31/2025 0638 by Iqra Plasencia RN  Outcome: Progressing     Problem: Discharge Planning  Goal: Discharge to home or other facility with appropriate resources  1/31/2025 1049 by Meghan Patel RN  Outcome: Progressing  1/31/2025 0638 by Iqra Plasencia RN  Outcome: Progressing     Problem: Pain  Goal: Verbalizes/displays adequate comfort level or baseline comfort level  1/31/2025 1049 by Meghan Patel RN  Outcome: Progressing  1/31/2025 0638 by Iqra Plasencia RN  Outcome: Progressing     Problem: Safety - Adult  Goal: Free from fall injury  1/31/2025 1049 by Meghan Patel RN  Outcome: Progressing  1/31/2025 0638 by Iqra Plasencia RN  Outcome: Progressing     Problem: ABCDS Injury Assessment  Goal: Absence of physical injury  1/31/2025 1049 by Meghan Patel RN  Outcome: Progressing  1/31/2025 0638 by Iqra Plasencia RN  Outcome: Progressing     Problem: Respiratory - Adult  Goal: Achieves optimal ventilation and oxygenation  1/31/2025 1049 by Meghan Patel RN  Outcome: Progressing  1/31/2025 0638 by Iqra Plasencia RN  Outcome: Progressing  1/31/2025 0356 by Denisse Patel RCP  Outcome: Progressing  Flowsheets (Taken 1/31/2025 0356)  Achieves optimal ventilation and oxygenation:   Assess for changes in respiratory status   Respiratory therapy support as indicated   Oxygen supplementation based on oxygen saturation or arterial blood gases     Problem: Neurosensory - Adult  Goal: Achieves stable or improved neurological status  1/31/2025 1049 by Meghan Patel RN  Outcome: Progressing  1/31/2025 0638 by Iqra Plasencia RN  Outcome: Progressing  Goal: Absence of seizures  1/31/2025 1049 by Meghan Patel RN  Outcome:  Skin integrity remains intact  Outcome: Progressing  Goal: Incisions, wounds, or drain sites healing without S/S of infection  Outcome: Progressing  Goal: Oral mucous membranes remain intact  Outcome: Progressing     Problem: Musculoskeletal - Adult  Goal: Return mobility to safest level of function  Outcome: Progressing  Goal: Maintain proper alignment of affected body part  Outcome: Progressing  Goal: Return ADL status to a safe level of function  Outcome: Progressing     Problem: Genitourinary - Adult  Goal: Absence of urinary retention  Outcome: Progressing     Problem: Metabolic/Fluid and Electrolytes - Adult  Goal: Electrolytes maintained within normal limits  Outcome: Progressing  Goal: Hemodynamic stability and optimal renal function maintained  Outcome: Progressing  Goal: Glucose maintained within prescribed range  Outcome: Progressing     Problem: Hematologic - Adult  Goal: Maintains hematologic stability  Outcome: Progressing

## 2025-01-31 NOTE — PROGRESS NOTES
[facility] - Progress Note    Today's Date and Time: 1/31/2025, 11:50 AM    Impression :     Acute right pontine infarct and posterior circulation infarcts  S/p angiogram 1/23/25  Leukocytosis  CRP elevation  ESBL E. 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 Coli UTI. Stop date on 1-31-25.  Repeat urine culture from 1/23/25: No growth   No growth on blood cultures   Mycoplasma IgM: negative   Viral respiratory panel negative    Medical Decision Making/Summary/Discussion:1/31/2025         Elements of Medical Decision Making:  Note: I have independently performed the steps listed below as part of the medical decision making and evaluation.   Examined and discussed with patient.  E. coli ESBL complicated urinary tract infection  Urine culture positive on 1/16/2024.  Received treatment with Keflex.  Organism is resistant  Urine culture positive on 1/20/2025 at [facility].  Received treatment with Macrobid 1/22/2025  Acute right pontine infarct and posterior circulation infarcts  Status post angiogram 1/23/2025  Reactive leukocytosis  Lactic acidosis  Atrial fibrillation with RVR  Status post subarachnoid hemorrhage with ruptured basilar tip aneurysm on 10/11/2023  Status post web device embolization on 10/11/2023  Residual neck aneurysm status post LVIS placement on 6/19/2024  Status post  Headache, dizziness  Skin: No rash or lesions. IV site inspected: no inflammation.      Medical Decision Making:   I have independently reviewed/ordered the following labs:    CBC with Differential:   Recent Labs     01/30/25  0610 01/31/25  0348   WBC 14.7* 11.5*   HGB 7.8* 8.5*   HCT 24.4* 27.1*    316   LYMPHOPCT 22* 29   MONOPCT 6 9   EOSPCT 2 2     BMP:   Recent Labs     01/30/25  0610 01/31/25  0348    142   K 3.5* 3.6*    105   CO2 29 29   BUN 4* 5*   CREATININE 0.5* 0.5*     Hepatic Function Panel: No results for input(s): \"LABALBU\", \"BILIDIR\", \"IBILI\", \"BILITOT\", \"ALKPHOS\", \"ALT\", \"AST\" in the last 72 hours.    Invalid input(s): \"PROT\"  No results found for: \"VANCOTROUGH\"     Medications:      pantoprazole  40 mg Oral QAM AC    metoprolol tartrate  100 mg Oral BID    dilTIAZem  240 mg Oral Daily    losartan  100 mg Oral Daily    sennosides-docusate sodium  2 tablet Oral Daily    aspirin  81 mg Oral Daily    amiodarone  200 mg Oral BID    meropenem  1,000 mg IntraVENous Q8H    lidocaine  1 patch TransDERmal Daily    insulin lispro  0-4 Units SubCUTAneous 4x Daily AC & HS    [Held by provider] ticagrelor  90 mg Oral BID    escitalopram  10 mg Oral Daily    tiotropium  2 puff Inhalation Daily RT    sodium chloride flush  5-40 mL IntraVENous 2 times per day    enoxaparin  40 mg SubCUTAneous Daily    rosuvastatin  20 mg Oral Nightly       Thank you for allowing us to participate in the care of this patient. Please call with questions.    VIKTORIA Parmar  Pager: [phone]  - Office: [phone]

## 2025-01-31 NOTE — PROGRESS NOTES
Nutrition Assessment     Type and Reason for Visit: Initial, LOS    Nutrition Recommendations/Plan:   Continue current diet as tolerated  If poor intake continues <25%, consider adding high kcal, high pro ONS or frozen ONS   Monitor intake, wt, meds, labs     Malnutrition Assessment:  Malnutrition Status: Insufficient data    Nutrition Assessment:  LOS. Possible d/c to [facility] Sunday per CM note. Per documentation, has variable PO intakes averaging 26-50%. No wt loss, 9lb wt gain in 1 month. LBM 1/30. No edema. Labs: K 3.6 mmol/L, Ca 8.4 mg/dL. Meds: Senokot, protonix, zofran.    Estimated Daily Nutrient Needs:  Energy (kcal):  4774-8911 kcal/d Weight Used for Energy Requirements: Ideal     Protein (g):   gm/d Weight Used for Protein Requirements: Ideal        Fluid (ml/day):  or per physician Method Used for Fluid Requirements: 1 ml/kcal    Nutrition Related Findings:   Meds/labs reviewed Wound Type: None    Current Nutrition Therapies:    ADULT DIET; Regular    Anthropometric Measures:  Height: 170.2 cm (5' 7\")  Current Body Wt: 90.3 kg (199 lb)   BMI: 31.2    Nutrition Diagnosis:   Inadequate oral intake related to inadequate protein-energy intake as evidenced by variable po intake, intake 26-50%    Nutrition Interventions:   Food and/or Nutrient Delivery: Continue Current Diet  Nutrition Education/Counseling: No recommendation at this time  Coordination of Nutrition Care: Continue to monitor while inpatient     Goals:  Goals: Meet at least 75% of estimated needs, prior to discharge  Type of Goal: New goal    Nutrition Monitoring and Evaluation:   Behavioral-Environmental Outcomes: None Identified  Food/Nutrient Intake Outcomes: Food and Nutrient Intake  Physical Signs/Symptoms Outcomes: Biochemical Data, Weight    Discharge Planning:    No discharge needs at this time     Meghan Eagle MS, RDN, LDN  Contact: [phone]

## 2025-01-31 NOTE — CARE COORDINATION
Transitional Planning    1000 VM received from Gaby at [facility]. Have bed available and can admit at 3pm.     1211 PS to Dr. Briggs updating on above, states that she will stay today.       1215 Spoke to Gaby at [facility], won't have a bed until Sunday.

## 2025-01-31 NOTE — PLAN OF CARE
Problem: Respiratory - Adult  Goal: Achieves optimal ventilation and oxygenation  1/31/2025 0356 by Denisse Patel RCP  Outcome: Progressing  Flowsheets (Taken 1/31/2025 0356)  Achieves optimal ventilation and oxygenation:   Assess for changes in respiratory status   Respiratory therapy support as indicated   Oxygen supplementation based on oxygen saturation or arterial blood gases  1/30/2025 1825 by Judy Watkins, RN  Outcome: Progressing

## 2025-01-31 NOTE — PROGRESS NOTES
Writer completing bedside shift report with day nurse Judy. Patient complaining of Left sided headache 7/10 that was no relieved with tylenol administer prior. Writer completed Neuro, NIH, and obtained VS. Per report from LAUREN Kim patients NIH prior was 0. During assessment patient noted to have a new Left sided facial droop. Dr. Riggs notified of patient's new onset facial droop, repeat CT HD results, and came to bedside to assess. No new orders placed at this time.

## 2025-01-31 NOTE — PROGRESS NOTES
General Neurology Progress Note  [facility]            Date:   1/31/2025  Patient name:  Dahlia Zhang  Date of admission:  1/23/2025  1:08 PM  MRN:   [id number]  Account:  [id number]  YOB: 1950  PCP:    Thania Rahman MD  Room:   [address] [place] [address]  Code Status:    Full Code  Chief Complaint:   Chief Complaint   Patient presents with    Extremity Weakness     Allergies: Lisinopril, Codeine, Norco [hydrocodone-acetaminophen], and Percocet [oxycodone-acetaminophen]  Interval history      Patient was seen and examined at bedside this morning. Labs, chart, and VS reviewed. No acute overnight events.         Review of Systems   Constitutional:  Negative for activity change, appetite change, chills, fatigue, fever and unexpected weight change.   HENT:  Negative for congestion, drooling, hearing loss, nosebleeds, tinnitus, trouble swallowing and voice change.    Eyes:  Negative for photophobia, pain, discharge, redness, itching and visual disturbance.   Respiratory:  Negative for apnea, cough, choking, chest tightness, shortness of breath and wheezing.    Cardiovascular:  Negative for chest pain, palpitations and leg swelling.   Gastrointestinal:  Negative for abdominal distention, abdominal pain, constipation, diarrhea, nausea and vomiting.   Endocrine: Negative for cold intolerance, heat intolerance, polydipsia and polyphagia.   Genitourinary:  Negative for difficulty urinating, dysuria, flank pain, frequency and hematuria.   Musculoskeletal:  Negative for arthralgias, back pain, gait problem, myalgias, neck pain and neck stiffness.   Neurological:  Positive for speech difficulty. Negative for dizziness, tremors, seizures, syncope, facial asymmetry, weakness, light-headedness, numbness and headaches.   Psychiatric/Behavioral:  Negative for agitation, behavioral problems, confusion, decreased concentration, dysphoric mood, hallucinations and sleep disturbance. The patient is  deviation    V: normal    VII: no facial asymmetry    VIII: normal hearing to speech    MOTOR: 5/5 in both upper and lower extremities    REFLEXES: Normal reflexes throughout, bilateral flexor plantars    SENSORY: Normal to soft touch throughout    COORD: Normal finger to nose and heel to shin, no tremor, no dysmetria    Investigations   Laboratory Testing:  Recent Results (from the past 24 hour(s))   POC Glucose Fingerstick    Collection Time: 01/30/25 12:04 PM   Result Value Ref Range    POC Glucose 142 (H) 65 - 105 mg/dL   POC Glucose Fingerstick    Collection Time: 01/30/25  3:45 PM   Result Value Ref Range    POC Glucose 120 (H) 65 - 105 mg/dL   POC Glucose Fingerstick    Collection Time: 01/30/25  7:36 PM   Result Value Ref Range    POC Glucose 163 (H) 65 - 105 mg/dL   Basic Metabolic Panel    Collection Time: 01/31/25  3:48 AM   Result Value Ref Range    Sodium 142 136 - 145 mmol/L    Potassium 3.6 (L) 3.7 - 5.3 mmol/L    Chloride 105 98 - 107 mmol/L    CO2 29 20 - 31 mmol/L    Anion Gap 8 (L) 9 - 16 mmol/L    Glucose 102 (H) 74 - 99 mg/dL    BUN 5 (L) 8 - 23 mg/dL    Creatinine 0.5 (L) 0.6 - 0.9 mg/dL    Est, Glom Filt Rate >90 >60 mL/min/1.73m2    Calcium 8.4 (L) 8.6 - 10.4 mg/dL   CBC with Auto Differential    Collection Time: 01/31/25  3:48 AM   Result Value Ref Range    WBC 11.5 (H) 3.5 - 11.3 k/uL    RBC 3.05 (L) 3.95 - 5.11 m/uL    Hemoglobin 8.5 (L) 11.9 - 15.1 g/dL    Hematocrit 27.1 (L) 36.3 - 47.1 %    MCV 88.9 82.6 - 102.9 fL    MCH 27.9 25.2 - 33.5 pg    MCHC 31.4 28.4 - 34.8 g/dL    RDW 14.8 (H) 11.8 - 14.4 %    Platelets 316 138 - 453 k/uL    MPV 11.0 8.1 - 13.5 fL    NRBC Automated 0.2 (H) 0.0 per 100 WBC    Neutrophils % 58 36 - 65 %    Lymphocytes % 29 24 - 43 %    Monocytes % 9 3 - 12 %    Eosinophils % 2 1 - 4 %    Basophils % 1 0 - 2 %    Immature Granulocytes % 1 (H) 0 %    Neutrophils Absolute 6.71 1.50 - 8.10 k/uL    Lymphocytes Absolute 3.32 1.10 - 3.70 k/uL    Monocytes Absolute 0.99  artifact).  Antithrombotic Plan:  Continue Aspirin 81 mg QD.  Hold Brilinta 90 mg BID due to acute GI bleed (held since 1/26).  Atrial fibrillation: Home Eliquis will need to be resumed, timing TBD.  Neuroendovascular Team Following: No plan for further intervention.  BP Goal: Maintain -160 mmHg.  Neuro checks per protocol.    2. Essential Hypertension  Continue Losartan 100 mg QD.  Hold home antihypertensives (Hydralazine, HCTZ); reinitiate as needed.  Continue:  Amiodarone 200 mg BID  Lopressor 50 mg BID  Cardizem 120 mg ER QD  Cardiology following, awaiting recommendations.    3. ESBL E. Coli UTI  Treatment: Meropenem IV, end date 1/31.  Pending repeat cultures from 1/23.  ID following, awaiting recommendations.    4. Acute Lower GI Bleed  Post-colonoscopy (1/26):  No active bleeding.  Severe pandiverticulosis causing luminal narrowing in the sigmoid colon.  Multiple polyps throughout the colon.  Continue to monitor for active GI bleeding  If patient develops rectal bleeding, recommend IR consultation for embolization versus surgical evaluation  Per GI: moderate to high risk of rebleeding, deferred AC/AP to primary team   Patient will eventually need repeat colonoscopy in OP setting once cleared to be off AC/AP for polypectomy    5. History of COPD  Baseline O2 requirement: 3L NC.    6. Prophylaxis & Supportive Care  DVT prophylaxis: Lovenox subcutaneous.  GI prophylaxis: PPI  Diet: Regular, 5 carb choices (75g/meal).  Rehabilitation Plan: Likely Acute Rehab Unit (ARU) once medically stable.    7. Disposition & Consults  OT/PT/SW: All consulted.  Code Status: Full Code.    Patient was seen and discussed with attending physician.    Brennan Briggs DO  Neurology Resident PGY-4  [employer]  1/31/2025    Copy sent to Thania Joe MD

## 2025-02-01 ENCOUNTER — APPOINTMENT (OUTPATIENT)
Dept: CT IMAGING | Age: 75
DRG: 023 | End: 2025-02-01
Payer: MEDICARE

## 2025-02-01 ENCOUNTER — APPOINTMENT (OUTPATIENT)
Dept: GENERAL RADIOLOGY | Age: 75
DRG: 023 | End: 2025-02-01
Payer: MEDICARE

## 2025-02-01 LAB
ANION GAP SERPL CALCULATED.3IONS-SCNC: 12 MMOL/L (ref 9–16)
BASOPHILS # BLD: 0.09 K/UL (ref 0–0.2)
BASOPHILS NFR BLD: 1 % (ref 0–2)
BODY TEMPERATURE: 37
BUN SERPL-MCNC: 8 MG/DL (ref 8–23)
CALCIUM SERPL-MCNC: 8.5 MG/DL (ref 8.6–10.4)
CHLORIDE SERPL-SCNC: 103 MMOL/L (ref 98–107)
CO2 SERPL-SCNC: 25 MMOL/L (ref 20–31)
COHGB MFR BLD: 0.3 % (ref 0–5)
CREAT SERPL-MCNC: 0.6 MG/DL (ref 0.6–0.9)
EOSINOPHIL # BLD: 0.27 K/UL (ref 0–0.44)
EOSINOPHILS RELATIVE PERCENT: 2 % (ref 1–4)
ERYTHROCYTE [DISTWIDTH] IN BLOOD BY AUTOMATED COUNT: 14.6 % (ref 11.8–14.4)
FIO2 ON VENT: ABNORMAL %
GFR, ESTIMATED: >90 ML/MIN/1.73M2
GLUCOSE BLD-MCNC: 121 MG/DL (ref 65–105)
GLUCOSE BLD-MCNC: 123 MG/DL (ref 65–105)
GLUCOSE BLD-MCNC: 134 MG/DL (ref 65–105)
GLUCOSE BLD-MCNC: 138 MG/DL (ref 65–105)
GLUCOSE SERPL-MCNC: 112 MG/DL (ref 74–99)
HCO3 VENOUS: 29.6 MMOL/L (ref 24–30)
HCT VFR BLD AUTO: 27.6 % (ref 36.3–47.1)
HGB BLD-MCNC: 8.6 G/DL (ref 11.9–15.1)
IMM GRANULOCYTES # BLD AUTO: 0.11 K/UL (ref 0–0.3)
IMM GRANULOCYTES NFR BLD: 1 %
LYMPHOCYTES NFR BLD: 3.39 K/UL (ref 1.1–3.7)
LYMPHOCYTES RELATIVE PERCENT: 30 % (ref 24–43)
MCH RBC QN AUTO: 27.8 PG (ref 25.2–33.5)
MCHC RBC AUTO-ENTMCNC: 31.2 G/DL (ref 28.4–34.8)
MCV RBC AUTO: 89.3 FL (ref 82.6–102.9)
MONOCYTES NFR BLD: 1.11 K/UL (ref 0.1–1.2)
MONOCYTES NFR BLD: 10 % (ref 3–12)
NEUTROPHILS NFR BLD: 56 % (ref 36–65)
NEUTS SEG NFR BLD: 6.48 K/UL (ref 1.5–8.1)
NRBC BLD-RTO: 0 PER 100 WBC
O2 SAT, VEN: 89.4 % (ref 60–85)
PCO2 VENOUS: 46.3 MM HG (ref 39–55)
PH VENOUS: 7.42 (ref 7.32–7.42)
PLATELET # BLD AUTO: 371 K/UL (ref 138–453)
PMV BLD AUTO: 10.7 FL (ref 8.1–13.5)
PO2 VENOUS: 55.7 MM HG (ref 30–50)
POSITIVE BASE EXCESS, VEN: 4.5 MMOL/L (ref 0–2)
POTASSIUM SERPL-SCNC: 3.8 MMOL/L (ref 3.7–5.3)
RBC # BLD AUTO: 3.09 M/UL (ref 3.95–5.11)
RBC # BLD: ABNORMAL 10*6/UL
SODIUM SERPL-SCNC: 140 MMOL/L (ref 136–145)
WBC OTHER # BLD: 11.5 K/UL (ref 3.5–11.3)

## 2025-02-01 PROCEDURE — 94761 N-INVAS EAR/PLS OXIMETRY MLT: CPT

## 2025-02-01 PROCEDURE — 6370000000 HC RX 637 (ALT 250 FOR IP)

## 2025-02-01 PROCEDURE — 2700000000 HC OXYGEN THERAPY PER DAY

## 2025-02-01 PROCEDURE — 2580000003 HC RX 258

## 2025-02-01 PROCEDURE — 6370000000 HC RX 637 (ALT 250 FOR IP): Performed by: INTERNAL MEDICINE

## 2025-02-01 PROCEDURE — 82947 ASSAY GLUCOSE BLOOD QUANT: CPT

## 2025-02-01 PROCEDURE — 82805 BLOOD GASES W/O2 SATURATION: CPT

## 2025-02-01 PROCEDURE — 36415 COLL VENOUS BLD VENIPUNCTURE: CPT

## 2025-02-01 PROCEDURE — G0545 PR INHERENT VISIT TO INPT: HCPCS | Performed by: INTERNAL MEDICINE

## 2025-02-01 PROCEDURE — 85025 COMPLETE CBC W/AUTO DIFF WBC: CPT

## 2025-02-01 PROCEDURE — 99233 SBSQ HOSP IP/OBS HIGH 50: CPT | Performed by: PSYCHIATRY & NEUROLOGY

## 2025-02-01 PROCEDURE — 6370000000 HC RX 637 (ALT 250 FOR IP): Performed by: SURGERY

## 2025-02-01 PROCEDURE — 99233 SBSQ HOSP IP/OBS HIGH 50: CPT | Performed by: SURGERY

## 2025-02-01 PROCEDURE — 6360000002 HC RX W HCPCS: Performed by: NURSE PRACTITIONER

## 2025-02-01 PROCEDURE — 99232 SBSQ HOSP IP/OBS MODERATE 35: CPT | Performed by: INTERNAL MEDICINE

## 2025-02-01 PROCEDURE — 2500000003 HC RX 250 WO HCPCS: Performed by: INTERNAL MEDICINE

## 2025-02-01 PROCEDURE — 97110 THERAPEUTIC EXERCISES: CPT

## 2025-02-01 PROCEDURE — 70450 CT HEAD/BRAIN W/O DYE: CPT

## 2025-02-01 PROCEDURE — 71045 X-RAY EXAM CHEST 1 VIEW: CPT

## 2025-02-01 PROCEDURE — 80048 BASIC METABOLIC PNL TOTAL CA: CPT

## 2025-02-01 PROCEDURE — 6370000000 HC RX 637 (ALT 250 FOR IP): Performed by: NURSE PRACTITIONER

## 2025-02-01 PROCEDURE — 94640 AIRWAY INHALATION TREATMENT: CPT

## 2025-02-01 PROCEDURE — 6360000004 HC RX CONTRAST MEDICATION

## 2025-02-01 PROCEDURE — 97530 THERAPEUTIC ACTIVITIES: CPT

## 2025-02-01 PROCEDURE — 2060000000 HC ICU INTERMEDIATE R&B

## 2025-02-01 PROCEDURE — 70498 CT ANGIOGRAPHY NECK: CPT

## 2025-02-01 RX ORDER — DILTIAZEM HYDROCHLORIDE 120 MG/1
120 CAPSULE, COATED, EXTENDED RELEASE ORAL DAILY
Status: DISCONTINUED | OUTPATIENT
Start: 2025-02-02 | End: 2025-02-08

## 2025-02-01 RX ORDER — METOPROLOL TARTRATE 25 MG/1
50 TABLET, FILM COATED ORAL 2 TIMES DAILY
Status: DISCONTINUED | OUTPATIENT
Start: 2025-02-01 | End: 2025-02-08

## 2025-02-01 RX ORDER — SODIUM CHLORIDE 9 MG/ML
INJECTION, SOLUTION INTRAVENOUS CONTINUOUS
Status: DISCONTINUED | OUTPATIENT
Start: 2025-02-01 | End: 2025-02-12

## 2025-02-01 RX ORDER — IOPAMIDOL 755 MG/ML
75 INJECTION, SOLUTION INTRAVASCULAR
Status: COMPLETED | OUTPATIENT
Start: 2025-02-01 | End: 2025-02-01

## 2025-02-01 RX ADMIN — SODIUM CHLORIDE: 9 INJECTION, SOLUTION INTRAVENOUS at 10:08

## 2025-02-01 RX ADMIN — SENNOSIDES AND DOCUSATE SODIUM 2 TABLET: 50; 8.6 TABLET ORAL at 08:15

## 2025-02-01 RX ADMIN — ASPIRIN 81 MG: 81 TABLET, COATED ORAL at 08:16

## 2025-02-01 RX ADMIN — ENOXAPARIN SODIUM 40 MG: 100 INJECTION SUBCUTANEOUS at 08:16

## 2025-02-01 RX ADMIN — PANTOPRAZOLE SODIUM 40 MG: 40 TABLET, DELAYED RELEASE ORAL at 08:15

## 2025-02-01 RX ADMIN — AMIODARONE HYDROCHLORIDE 200 MG: 200 TABLET ORAL at 20:22

## 2025-02-01 RX ADMIN — ROSUVASTATIN CALCIUM 20 MG: 20 TABLET, FILM COATED ORAL at 20:20

## 2025-02-01 RX ADMIN — METOPROLOL TARTRATE 50 MG: 50 TABLET ORAL at 20:22

## 2025-02-01 RX ADMIN — SODIUM CHLORIDE, PRESERVATIVE FREE 10 ML: 5 INJECTION INTRAVENOUS at 20:28

## 2025-02-01 RX ADMIN — LOSARTAN POTASSIUM 100 MG: 50 TABLET, FILM COATED ORAL at 08:16

## 2025-02-01 RX ADMIN — Medication 5 MG: at 20:20

## 2025-02-01 RX ADMIN — Medication 10 MG: at 17:35

## 2025-02-01 RX ADMIN — AMIODARONE HYDROCHLORIDE 200 MG: 200 TABLET ORAL at 08:16

## 2025-02-01 RX ADMIN — ESCITALOPRAM OXALATE 10 MG: 10 TABLET ORAL at 08:15

## 2025-02-01 RX ADMIN — HYDRALAZINE HYDROCHLORIDE 10 MG: 20 INJECTION INTRAMUSCULAR; INTRAVENOUS at 11:46

## 2025-02-01 RX ADMIN — TIOTROPIUM BROMIDE INHALATION SPRAY 2 PUFF: 3.12 SPRAY, METERED RESPIRATORY (INHALATION) at 07:24

## 2025-02-01 RX ADMIN — IOPAMIDOL 75 ML: 755 INJECTION, SOLUTION INTRAVENOUS at 11:00

## 2025-02-01 RX ADMIN — DILTIAZEM HYDROCHLORIDE 240 MG: 240 CAPSULE, EXTENDED RELEASE ORAL at 08:17

## 2025-02-01 NOTE — PROGRESS NOTES
Physical Therapy  Facility/Department: [address] STEPDOWN   Physical Therapy Daily Treatment Note    Patient Name: Dahlia Zhang        MRN: [id number]    : 1950    Date of Service: 2025    Chief Complaint   Patient presents with    Extremity Weakness     Past Medical History:  has a past medical history of Anticoagulated, Anxiety and depression, Atrial fibrillation (HCC), Cerebral aneurysm, Cerebral artery occlusion with cerebral infarction (HCC), Colon polyps, COPD (chronic obstructive pulmonary disease) ([facility]), COVID-19, COVID-19 vaccine administered, DM II (diabetes mellitus, type II), controlled (HCC), GERD (gastroesophageal reflux disease), HTN (hypertension), Hyperlipidemia, Kidney stone, On home O2, Osteoarthritis, Sleep apnea, Under care of team, Under care of team, Under care of team, Under care of team, Wears glasses, Wears partial dentures, and Wellness examination.  Past Surgical History:  has a past surgical history that includes Thyroid surgery; Breast reduction surgery (Bilateral, ); Hysterectomy; hernia repair; Cerebral angiogram (2024); Cerebral angiogram (10/11/2023); Cerebral angiogram (2024); Cholecystectomy; Cerebral angiogram (2024); Breast biopsy (Right); Cataract extraction w/ intraocular lens implant (Bilateral, ); Colonoscopy w/ polypectomy (2023); other surgical history (01/15/2025); Colonoscopy (N/A, 2025); and Colonoscopy (N/A, 2025).    Discharge Recommendations  Discharge Recommendations: Patient able to tolerate 3 hours of therapy per day, Patient would benefit from continued therapy after discharge  PT Equipment Recommendations  Equipment Needed: Yes  Mobility Devices: Walker  Walker: Rolling  Other: if she cannot find hers    Assessment  Body Structures, Functions, Activity Limitations Requiring Skilled Therapeutic Intervention: Decreased functional mobility ;Decreased strength;Decreased safe awareness;Decreased  with compensatory strategies as needed  Short Term Goal 2: Pt complete transfers with SBA with least restrictive device, no  loss of balance  Short Term Goal 3: Pt amb 150 ft with least restrictive device, even step length and proper safety awareness  Short Term Goal 4: Pt demonstrate 4 stairs with 1 rail and SBA without loss of balance for cumminity assess  Short Term Goal 5: Pt indep with high level balance strategies to decrease risk of falls and promote return to function    Minutes  PT Individual Minutes  Time In: 0904  Time Out: 0944  Minutes: 40  Time Code Minutes  Timed Code Treatment Minutes: 38 Minutes    Electronically signed by Deon Ash PTA on 2/1/25 at 10:21 AM EST

## 2025-02-01 NOTE — PLAN OF CARE
Problem: Respiratory - Adult  Goal: Achieves optimal ventilation and oxygenation  2/1/2025 0726 by Farheen Herrera RCP  Outcome: Progressing  2/1/2025 0508 by Sandy Schaffer RN  Outcome: Progressing  Flowsheets (Taken 1/31/2025 2000)  Achieves optimal ventilation and oxygenation:   Assess for changes in mentation and behavior   Assess for changes in respiratory status   Oxygen supplementation based on oxygen saturation or arterial blood gases   Position to facilitate oxygenation and minimize respiratory effort   Encourage broncho-pulmonary hygiene including cough, deep breathe, incentive spirometry   Initiate smoking cessation protocol as indicated   Assess the need for suctioning and aspirate as needed

## 2025-02-01 NOTE — PROGRESS NOTES
[facility] Neurology   IN-PATIENT SERVICE   [facility]    Progress Note             Date:   2/1/2025  Patient name:  Dahlia Zhang  Date of admission:  1/23/2025  1:08 PM  MRN:   [id number]  Account:  [id number]  YOB: 1950  PCP:    Thania Rahman MD  Room:   [address] [place] [address]  Code Status:    Full Code    Chief Complaint:     Chief Complaint   Patient presents with    Extremity Weakness       Interval hx:     Patient seen and examined at bedside this morning.  Had another episode of worsening dysarthria and lethargy following physical exertion after ambulating with PT ~10am and again ~10:45am.  Resolved on reeval during rounds. CT, CTA unremarkable for acute changes.  Neurological exam unchanged from yesterday.  Vitals stable.      Encouraged home CPAP use overnight and during naps  Gentle hydration for now  Reached out to cardio about possible decreasing cardizem 240 mg due to dose dependent orthostatic hypotension s/e, f/u recs      Brief History of Present Illness:     As per H&P:    The patient is a 74 y.o. female with PMH of SAH 2/2 basilar tip aneurysm rupture (s/p WEB, LVIS x2), COPD, afib, HTN, HLD, ischemic stroke (L occipital) and smoking who presented to the [facility] ED on 1/23 with altered mental status, increased lethargy, dysarthria, L facial droop, and generalized weakness. Initial NIHSS was 9.      Hospital Course:  1/24: ID consulted 2/2 ESBL positive E.Coli UTI, started Meropenum  1/26: Patient experiencing persistent epigastric and back pain. CTA A/P with active GIB. GI consulted and patient underwent colonoscopy displaying severe pandiverticulosis with resulting deformity/luminal narrowing in sigmoid colon, and multiple polyps throughout colon. GI recommending IR embolization if further bleeding.   1/27: Afib RVR. Increased Metoprolol PO dose. Cardiology on board.   1/28: Converted to NSR overnight. Transitioned to Amiodarone PO. Hypertensive  overnight, SBP as high as 190. Started Losartan 50 mg QD.   1/29: Patient intermittently confused overnight, received Ambien for sleep. Slight dysarthria noted. Hypertensive overnight, increased Losartan to 100 mg QD.            Past Medical History:     Past Medical History:   Diagnosis Date    Anticoagulated     ELIQUIS    Anxiety and depression     Atrial fibrillation (HCC) 10/15/2023    new onset while in Hosp    Cerebral aneurysm 10/11/2023    ruptured , SAH ,  hosp x 2 weeks  : had acute HA    Cerebral artery occlusion with cerebral infarction ([facility]) 08/08/2024    HA , blurry vision , left leg weakness , slurred speech    Colon polyps     COPD (chronic obstructive pulmonary disease) ([facility])     COVID-19     3-4 times    COVID-19 vaccine administered     DM II (diabetes mellitus, type II), controlled ([facility])     GERD (gastroesophageal reflux disease)     HTN (hypertension)     Hyperlipidemia     Kidney stone     once but did not require surgery    On home O2     3-4L / nightly    Osteoarthritis     Sleep apnea     Cpap    Under care of team     Cardiology ,  SHANNA,  last seen 6/26/2024    Under care of team     Andrea De Anda, last seen 7/3/2024    Under care of team     NeuroEndovascular , Dr. Pena ,  last seen 12/26/24    Under care of team     Neurology , [facility] , last seen 10/25/2024    Wears glasses     Wears partial dentures     upper    Wellness examination     PCP , Andrea, last sen 11/15/2024        Past Surgical History:     Past Surgical History:   Procedure Laterality Date    BREAST BIOPSY Right     since 1989 ; benign    BREAST REDUCTION SURGERY Bilateral 1989    CATARACT EXTRACTION W/ INTRAOCULAR LENS IMPLANT Bilateral 2020    CEREBRAL ANGIOGRAM  04/04/2024    DIAGNOSTIC / DR PENA    CEREBRAL ANGIOGRAM  10/11/2023    s/p WEB device embolization with obliteration of aneurysm, achieving Chris 1 on 10/11/2023.    CEREBRAL ANGIOGRAM  06/19/2024    ANGIOGRAM CAROTID CEREBRAL BILATERAL , with stent  for further intervention   Either resume Brilinta with aspirin or resume Eliquis  - Goal -160  - Neuro checks per protocol     Essential HTN  - Continue Losartan 100 mg QD  - Continue Amiodarone 200 mg PO BID, will decrease on outpatient cardiology follow-up  - Continue Lopressor 50 mg BID and Cardizem 240 mg ER QD (likely contributing to orthostatic hypotension due to recently increased dose, reach out to cardiology to consider decreasing dose)  - Cardiology consulted:   Discontinue hydralazine, HCTZ due to tachycardia and hypokalemia  Cardiology recommending watchman's device outpatient for A-fib     ESBL E.Coli UTI (resolved)  - Meropenem course completed 1/31  - Infectious disease consulted per protocol     Acute lower GI bleed (resolved)  - s/p colonoscopy on 1/26   - Continue Senokot-S daily  - Follow-up AC/AP recs     Hx of COPD  - 3L NC at baseline  - Keep O2 sats >88-92%     DVT prophylaxis: Lovenox subcu  GI prophylaxis: PPI  Diet: Regular, 5 carb choices (75 gm/meal)  Disposition: Likely ARU when medically stable     OT/PT/SW: all consulted     Code status: Full code        Consultations:   IP CONSULT TO CASE MANAGEMENT  IP CONSULT TO GI  IP CONSULT TO CARDIOLOGY  IP CONSULT TO PHYSICAL MEDICINE REHAB  IP CONSULT TO OPHTHALMOLOGY      Margaret Graf MD  Neurology Resident  2/1/2025  8:57 AM    Copy sent to Thania Rahmna MD

## 2025-02-01 NOTE — PROGRESS NOTES
[facility]   Progress Note                   Date:   2/1/2025  Patient name: Dahlia Zhang  Date of admission:  1/23/2025  1:08 PM  MRN:   [id number]  YOB: 1950  PCP: Thania Rahman MD    Reason for Admission: Encephalopathy [G93.40]  Left-sided weakness [R53.1]  Thalamic stroke (HCC) [I63.81]    Subjective:       Clinical Changes / Abnormalities: Reconsulted as patient positive orthostatic with dizziness -  pt seen and examined in the room.  Patient resting in bed. She is somewhat drowsy. Pt denies any CP or sob.  Labs, vitals and tele reviewed-remains sinus rhythm on telemetry    Medications:   Scheduled Meds:   pantoprazole  40 mg Oral QAM AC    metoprolol tartrate  100 mg Oral BID    dilTIAZem  240 mg Oral Daily    losartan  100 mg Oral Daily    sennosides-docusate sodium  2 tablet Oral Daily    aspirin  81 mg Oral Daily    amiodarone  200 mg Oral BID    lidocaine  1 patch TransDERmal Daily    insulin lispro  0-4 Units SubCUTAneous 4x Daily AC & HS    [Held by provider] ticagrelor  90 mg Oral BID    escitalopram  10 mg Oral Daily    tiotropium  2 puff Inhalation Daily RT    sodium chloride flush  5-40 mL IntraVENous 2 times per day    enoxaparin  40 mg SubCUTAneous Daily    rosuvastatin  20 mg Oral Nightly     Continuous Infusions:   sodium chloride 75 mL/hr at 02/01/25 1008    sodium chloride      dextrose      sodium chloride Stopped (01/29/25 2110)    sodium chloride      sodium chloride       CBC:   Recent Labs     01/30/25  0610 01/31/25  0348 02/01/25  0401   WBC 14.7* 11.5* 11.5*   HGB 7.8* 8.5* 8.6*    316 371     BMP:    Recent Labs     01/30/25  0610 01/31/25  0348 02/01/25  0401    142 140   K 3.5* 3.6* 3.8    105 103   CO2 29 29 25   BUN 4* 5* 8   CREATININE 0.5* 0.5* 0.6   GLUCOSE 124* 102* 112*     Hepatic: No results for input(s): \"AST\", \"ALT\", \"BILITOT\", \"ALKPHOS\" in the last 72 hours.    Invalid input(s): \"ALB\"  Troponin:   No

## 2025-02-01 NOTE — PROGRESS NOTES
Patient seen and examined at bedside due to worsening dysarthria when ambulating with PT. Lethargic, requiring repeated verbal stimulation to stay awake. No drift, oriented to self and place, states it is 2005. Aox4 at her baseline. -170s with a drop to 150s on standing. HR wnl. Will start gentle hydration and adjust meds associated with orthostatic hypotension. Hb 8.6, stable. Fall precautions placed for now. VBG ordered d/t COPD history however symptoms likely 2/2 SBP.     ~10:45 repeat episode of slurred speech with left gaze noted by bedside RN, CT CTA was ordered. No gaze on exam however dysarthria appears slightly worsened. Has been having similar episodes since admission, last episodes on 1/30 with noncon head CT ordered and prior to that on 1/28.

## 2025-02-01 NOTE — PLAN OF CARE
Problem: Chronic Conditions and Co-morbidities  Goal: Patient's chronic conditions and co-morbidity symptoms are monitored and maintained or improved  Outcome: Progressing  Flowsheets (Taken 1/31/2025 2000)  Care Plan - Patient's Chronic Conditions and Co-Morbidity Symptoms are Monitored and Maintained or Improved:   Monitor and assess patient's chronic conditions and comorbid symptoms for stability, deterioration, or improvement   Collaborate with multidisciplinary team to address chronic and comorbid conditions and prevent exacerbation or deterioration   Update acute care plan with appropriate goals if chronic or comorbid symptoms are exacerbated and prevent overall improvement and discharge     Problem: Discharge Planning  Goal: Discharge to home or other facility with appropriate resources  Outcome: Progressing  Flowsheets (Taken 1/31/2025 2000)  Discharge to home or other facility with appropriate resources:   Identify barriers to discharge with patient and caregiver   Arrange for needed discharge resources and transportation as appropriate   Identify discharge learning needs (meds, wound care, etc)   Arrange for interpreters to assist at discharge as needed     Problem: Pain  Goal: Verbalizes/displays adequate comfort level or baseline comfort level  Outcome: Progressing     Problem: Safety - Adult  Goal: Free from fall injury  Outcome: Progressing  Flowsheets (Taken 1/31/2025 2000)  Free From Fall Injury:   Instruct family/caregiver on patient safety   Based on caregiver fall risk screen, instruct family/caregiver to ask for assistance with transferring infant if caregiver noted to have fall risk factors     Problem: ABCDS Injury Assessment  Goal: Absence of physical injury  Outcome: Progressing  Flowsheets (Taken 1/31/2025 2000)  Absence of Physical Injury: Implement safety measures based on patient assessment     Problem: Respiratory - Adult  Goal: Achieves optimal ventilation and oxygenation  Outcome:  Progressing  Flowsheets (Taken 1/31/2025 2000)  Achieves optimal ventilation and oxygenation:   Assess for changes in mentation and behavior   Assess for changes in respiratory status   Oxygen supplementation based on oxygen saturation or arterial blood gases   Position to facilitate oxygenation and minimize respiratory effort   Encourage broncho-pulmonary hygiene including cough, deep breathe, incentive spirometry   Initiate smoking cessation protocol as indicated   Assess the need for suctioning and aspirate as needed     Problem: Neurosensory - Adult  Goal: Achieves stable or improved neurological status  Outcome: Progressing  Flowsheets (Taken 1/31/2025 2000)  Achieves stable or improved neurological status:   Initiate measures to prevent increased intracranial pressure   Assess for and report changes in neurological status   Maintain blood pressure and fluid volume within ordered parameters to optimize cerebral perfusion and minimize risk of hemorrhage   Monitor temperature, glucose, and sodium. Initiate appropriate interventions as ordered  Goal: Absence of seizures  Outcome: Progressing  Flowsheets (Taken 1/31/2025 2000)  Absence of seizures:   Monitor for seizure activity.  If seizure occurs, document type and location of movements and any associated apnea   If seizure occurs, turn head to side and suction secretions as needed   Support airway/breathing, administer oxygen as needed   Diagnostic studies as ordered   Administer anticonvulsants as ordered  Goal: Remains free of injury related to seizures activity  Outcome: Progressing  Flowsheets (Taken 1/31/2025 2000)  Remains free of injury related to seizure activity:   Maintain airway, patient safety  and administer oxygen as ordered   Monitor patient for seizure activity, document and report duration and description of seizure to Licensed Independent Practitioner   If seizure occurs, turn patient to side and suction secretions as needed   Reorient patient

## 2025-02-02 ENCOUNTER — APPOINTMENT (OUTPATIENT)
Dept: GENERAL RADIOLOGY | Age: 75
DRG: 023 | End: 2025-02-02
Payer: MEDICARE

## 2025-02-02 LAB
ANION GAP SERPL CALCULATED.3IONS-SCNC: 10 MMOL/L (ref 9–16)
BASOPHILS # BLD: 0.12 K/UL (ref 0–0.2)
BASOPHILS NFR BLD: 1 % (ref 0–2)
BUN SERPL-MCNC: 10 MG/DL (ref 8–23)
CALCIUM SERPL-MCNC: 8.6 MG/DL (ref 8.6–10.4)
CHLORIDE SERPL-SCNC: 105 MMOL/L (ref 98–107)
CO2 SERPL-SCNC: 26 MMOL/L (ref 20–31)
CREAT SERPL-MCNC: 0.5 MG/DL (ref 0.6–0.9)
EOSINOPHIL # BLD: 0.34 K/UL (ref 0–0.44)
EOSINOPHILS RELATIVE PERCENT: 3 % (ref 1–4)
ERYTHROCYTE [DISTWIDTH] IN BLOOD BY AUTOMATED COUNT: 14.6 % (ref 11.8–14.4)
GFR, ESTIMATED: >90 ML/MIN/1.73M2
GLUCOSE BLD-MCNC: 125 MG/DL (ref 65–105)
GLUCOSE BLD-MCNC: 135 MG/DL (ref 65–105)
GLUCOSE BLD-MCNC: 151 MG/DL (ref 65–105)
GLUCOSE BLD-MCNC: 156 MG/DL (ref 65–105)
GLUCOSE SERPL-MCNC: 117 MG/DL (ref 74–99)
HCT VFR BLD AUTO: 29.5 % (ref 36.3–47.1)
HGB BLD-MCNC: 9.1 G/DL (ref 11.9–15.1)
IMM GRANULOCYTES # BLD AUTO: 0.06 K/UL (ref 0–0.3)
IMM GRANULOCYTES NFR BLD: 1 %
LYMPHOCYTES NFR BLD: 3.4 K/UL (ref 1.1–3.7)
LYMPHOCYTES RELATIVE PERCENT: 26 % (ref 24–43)
MCH RBC QN AUTO: 27.5 PG (ref 25.2–33.5)
MCHC RBC AUTO-ENTMCNC: 30.8 G/DL (ref 28.4–34.8)
MCV RBC AUTO: 89.1 FL (ref 82.6–102.9)
MONOCYTES NFR BLD: 1.48 K/UL (ref 0.1–1.2)
MONOCYTES NFR BLD: 11 % (ref 3–12)
NEUTROPHILS NFR BLD: 58 % (ref 36–65)
NEUTS SEG NFR BLD: 7.91 K/UL (ref 1.5–8.1)
NRBC BLD-RTO: 0 PER 100 WBC
PLATELET # BLD AUTO: 389 K/UL (ref 138–453)
PMV BLD AUTO: 10.6 FL (ref 8.1–13.5)
POTASSIUM SERPL-SCNC: 3.5 MMOL/L (ref 3.7–5.3)
RBC # BLD AUTO: 3.31 M/UL (ref 3.95–5.11)
RBC # BLD: ABNORMAL 10*6/UL
SODIUM SERPL-SCNC: 141 MMOL/L (ref 136–145)
WBC OTHER # BLD: 13.3 K/UL (ref 3.5–11.3)

## 2025-02-02 PROCEDURE — 6370000000 HC RX 637 (ALT 250 FOR IP): Performed by: NURSE PRACTITIONER

## 2025-02-02 PROCEDURE — 6370000000 HC RX 637 (ALT 250 FOR IP): Performed by: INTERNAL MEDICINE

## 2025-02-02 PROCEDURE — 80048 BASIC METABOLIC PNL TOTAL CA: CPT

## 2025-02-02 PROCEDURE — 99232 SBSQ HOSP IP/OBS MODERATE 35: CPT | Performed by: INTERNAL MEDICINE

## 2025-02-02 PROCEDURE — 2580000003 HC RX 258

## 2025-02-02 PROCEDURE — 2060000000 HC ICU INTERMEDIATE R&B

## 2025-02-02 PROCEDURE — 6360000002 HC RX W HCPCS

## 2025-02-02 PROCEDURE — 99233 SBSQ HOSP IP/OBS HIGH 50: CPT | Performed by: PSYCHIATRY & NEUROLOGY

## 2025-02-02 PROCEDURE — 36415 COLL VENOUS BLD VENIPUNCTURE: CPT

## 2025-02-02 PROCEDURE — 6370000000 HC RX 637 (ALT 250 FOR IP): Performed by: SURGERY

## 2025-02-02 PROCEDURE — 6370000000 HC RX 637 (ALT 250 FOR IP)

## 2025-02-02 PROCEDURE — 82947 ASSAY GLUCOSE BLOOD QUANT: CPT

## 2025-02-02 PROCEDURE — 6360000002 HC RX W HCPCS: Performed by: NURSE PRACTITIONER

## 2025-02-02 PROCEDURE — 2500000003 HC RX 250 WO HCPCS: Performed by: INTERNAL MEDICINE

## 2025-02-02 PROCEDURE — 99232 SBSQ HOSP IP/OBS MODERATE 35: CPT | Performed by: PSYCHIATRY & NEUROLOGY

## 2025-02-02 PROCEDURE — 94640 AIRWAY INHALATION TREATMENT: CPT

## 2025-02-02 PROCEDURE — G0545 PR INHERENT VISIT TO INPT: HCPCS | Performed by: INTERNAL MEDICINE

## 2025-02-02 PROCEDURE — 85025 COMPLETE CBC W/AUTO DIFF WBC: CPT

## 2025-02-02 PROCEDURE — 71045 X-RAY EXAM CHEST 1 VIEW: CPT

## 2025-02-02 RX ORDER — HYDRALAZINE HYDROCHLORIDE 20 MG/ML
10 INJECTION INTRAMUSCULAR; INTRAVENOUS EVERY 6 HOURS PRN
Status: DISCONTINUED | OUTPATIENT
Start: 2025-02-02 | End: 2025-02-03

## 2025-02-02 RX ADMIN — PANTOPRAZOLE SODIUM 40 MG: 40 TABLET, DELAYED RELEASE ORAL at 09:16

## 2025-02-02 RX ADMIN — METOPROLOL TARTRATE 50 MG: 50 TABLET ORAL at 20:26

## 2025-02-02 RX ADMIN — METOPROLOL TARTRATE 50 MG: 50 TABLET ORAL at 09:14

## 2025-02-02 RX ADMIN — LOSARTAN POTASSIUM 100 MG: 50 TABLET, FILM COATED ORAL at 09:14

## 2025-02-02 RX ADMIN — SODIUM CHLORIDE: 9 INJECTION, SOLUTION INTRAVENOUS at 14:29

## 2025-02-02 RX ADMIN — HYDRALAZINE HYDROCHLORIDE 10 MG: 20 INJECTION INTRAMUSCULAR; INTRAVENOUS at 23:25

## 2025-02-02 RX ADMIN — TIOTROPIUM BROMIDE INHALATION SPRAY 2 PUFF: 3.12 SPRAY, METERED RESPIRATORY (INHALATION) at 08:54

## 2025-02-02 RX ADMIN — HYDRALAZINE HYDROCHLORIDE 10 MG: 20 INJECTION INTRAMUSCULAR; INTRAVENOUS at 16:35

## 2025-02-02 RX ADMIN — AMIODARONE HYDROCHLORIDE 200 MG: 200 TABLET ORAL at 09:14

## 2025-02-02 RX ADMIN — SODIUM CHLORIDE, PRESERVATIVE FREE 10 ML: 5 INJECTION INTRAVENOUS at 09:14

## 2025-02-02 RX ADMIN — ASPIRIN 81 MG: 81 TABLET, COATED ORAL at 09:14

## 2025-02-02 RX ADMIN — ENOXAPARIN SODIUM 40 MG: 100 INJECTION SUBCUTANEOUS at 09:13

## 2025-02-02 RX ADMIN — ROSUVASTATIN CALCIUM 20 MG: 20 TABLET, FILM COATED ORAL at 20:24

## 2025-02-02 RX ADMIN — ESCITALOPRAM OXALATE 10 MG: 10 TABLET ORAL at 09:13

## 2025-02-02 RX ADMIN — SENNOSIDES AND DOCUSATE SODIUM 2 TABLET: 50; 8.6 TABLET ORAL at 09:13

## 2025-02-02 RX ADMIN — DILTIAZEM HYDROCHLORIDE 120 MG: 120 CAPSULE, COATED, EXTENDED RELEASE ORAL at 09:13

## 2025-02-02 RX ADMIN — AMIODARONE HYDROCHLORIDE 200 MG: 200 TABLET ORAL at 20:26

## 2025-02-02 RX ADMIN — SODIUM CHLORIDE, PRESERVATIVE FREE 10 ML: 5 INJECTION INTRAVENOUS at 20:28

## 2025-02-02 RX ADMIN — ZOLPIDEM TARTRATE 5 MG: 5 TABLET ORAL at 20:24

## 2025-02-02 ASSESSMENT — PAIN SCALES - GENERAL
PAINLEVEL_OUTOF10: 0
PAINLEVEL_OUTOF10: 0

## 2025-02-02 NOTE — PROGRESS NOTES
[facility] Neurology   IN-PATIENT SERVICE   [facility]    Progress Note             Date:   2/2/2025  Patient name:  Dahlia Zhang  Date of admission:  1/23/2025  1:08 PM  MRN:   [id number]  Account:  [id number]  YOB: 1950  PCP:    Thania Rahman MD  Room:   [address] [place] [address]  Code Status:    Full Code    Chief Complaint:     Chief Complaint   Patient presents with    Extremity Weakness       Interval hx:     Patient seen and examined at bedside this morning.  Neurological exam unchanged from yesterday, remains intermittently lethargic. No acute events overnight. Vitals stable.      Encouraged home CPAP use overnight and during naps  Gentle hydration for now        Brief History of Present Illness:     As per H&P:    The patient is a 74 y.o. 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 Initial NIHSS was 9.      Hospital Course:  1/24: ID consulted 2/2 ESBL positive E.Coli UTI, started Meropenum  1/26: Patient experiencing persistent epigastric and back pain. CTA A/P with active GIB. GI consulted and patient underwent colonoscopy displaying severe pandiverticulosis with resulting deformity/luminal narrowing in sigmoid colon, and multiple polyps throughout colon. GI recommending IR embolization if further bleeding.   1/27: Afib RVR. Increased Metoprolol PO dose. Cardiology on board.   1/28: Converted to NSR overnight. Transitioned to Amiodarone PO. Hypertensive overnight, SBP as high as 190. Started Losartan 50 mg QD.   1/29: Patient intermittently confused overnight, received Ambien for sleep. Slight dysarthria noted. Hypertensive overnight, increased Losartan to 100 mg QD.            Past Medical History:     Past Medical History:  Vicente, including pertinent history and exam findings with the resident. I have seen and examined the patient and the key elements of the encounter have been performed by me. I agree with the assessment, plan and orders as documented by the resident with changes made to the note as needed.     Neurological Work-Up  Date Study Findings   1/23/25 CT Head No acute hemorrhage     1/23/25 CTA Head & Neck Severe narrowing within the distal basilar stent, small residual aneurysmal filling (~2mm)   1/23/25 MRI Brain Acute infarcts in right rafael, right cerebellum, right thalamus, right occipital lobe   1/23/25 DSA Patent stents, filling defect at basilar tip (thrombus vs. metal artifact)   1/26/25 CTA Abdomen Active GI bleed from sigmoid colon   1/26/25 Colonoscopy Severe diverticulosis, luminal narrowing, multiple polyps   8/8/24 2D Echo LVEF 60-65%   EEG Not performed        Assessment & Recommendations  Acute Ischemic Stroke in the Right Rafael, Cerebellum, Thalamus, and Occipital Lobe (ICD-10: I63.81)  The patient had ischemic strokes in the posterior circulation secondary to basilar artery thrombosis within a previously stented segment.     The patient continues to have speech dysarthria, unchanged from yesterday.  Will discuss with GI about initiating either Brilinta or Eliquis as per neuroendovascular  Currently anticoagulation is on hold due to GI bleed  Cardiology on board with the plan to pursue Watchman device on outpatient basis  Blood pressure management as per cardiology.  Anticipate discharge to [facility] Raya MD  Neurology    This note is created with the assistance of a speech-recognition program. While intending to generate a document that actually reflects the content of the visit, the document can still have some errors including those of syntax and sound a- like substitutions which may escape proofreading.  In such instances, actual meaning can be extrapolated by contextual derivation.

## 2025-02-02 NOTE — PLAN OF CARE
Problem: Chronic Conditions and Co-morbidities  Goal: Patient's chronic conditions and co-morbidity symptoms are monitored and maintained or improved  Outcome: Progressing  Flowsheets (Taken 2/1/2025 2000)  Care Plan - Patient's Chronic Conditions and Co-Morbidity Symptoms are Monitored and Maintained or Improved: Monitor and assess patient's chronic conditions and comorbid symptoms for stability, deterioration, or improvement     Problem: Discharge Planning  Goal: Discharge to home or other facility with appropriate resources  Outcome: Progressing  Flowsheets (Taken 2/1/2025 2000)  Discharge to home or other facility with appropriate resources: Identify barriers to discharge with patient and caregiver     Problem: Pain  Goal: Verbalizes/displays adequate comfort level or baseline comfort level  Outcome: Progressing     Problem: Safety - Adult  Goal: Free from fall injury  Outcome: Progressing  Flowsheets (Taken 2/1/2025 2322)  Free From Fall Injury: Instruct family/caregiver on patient safety     Problem: ABCDS Injury Assessment  Goal: Absence of physical injury  Outcome: Progressing  Flowsheets (Taken 2/1/2025 2322)  Absence of Physical Injury: Implement safety measures based on patient assessment     Problem: Respiratory - Adult  Goal: Achieves optimal ventilation and oxygenation  Outcome: Progressing     Problem: Neurosensory - Adult  Goal: Achieves stable or improved neurological status  Outcome: Progressing     Problem: Cardiovascular - Adult  Goal: Maintains optimal cardiac output and hemodynamic stability  Outcome: Progressing  Flowsheets (Taken 2/1/2025 2000)  Maintains optimal cardiac output and hemodynamic stability: Monitor blood pressure and heart rate     Problem: Cardiovascular - Adult  Goal: Absence of cardiac dysrhythmias or at baseline  Outcome: Progressing  Flowsheets (Taken 2/1/2025 2000)  Absence of cardiac dysrhythmias or at baseline: Monitor cardiac rate and rhythm     Problem:  2/1/2025 2000)  Hemodynamic stability and optimal renal function maintained: Monitor labs and assess for signs and symptoms of volume excess or deficit  Goal: Glucose maintained within prescribed range  Outcome: Progressing  Flowsheets (Taken 2/1/2025 2000)  Glucose maintained within prescribed range: Monitor blood glucose as ordered     Problem: Hematologic - Adult  Goal: Maintains hematologic stability  Outcome: Progressing  Flowsheets (Taken 2/1/2025 2000)  Maintains hematologic stability: Assess for signs and symptoms of bleeding or hemorrhage     Problem: Nutrition Deficit:  Goal: Optimize nutritional status  Outcome: Progressing

## 2025-02-02 NOTE — PROGRESS NOTES
Endovascular Neurosurgery Progress Notes    Pt Name: Dahlia Zhang  MRN: [id number]  YOB: 1950  Date of evaluation: 2/2/2025  Primary Care Physician: Thania Rahman MD  Reason for evaluation: Concern for pontine stroke in the presence of previously treated basilar anneurysm with WEB device and LVIS stent    SUBJECTIVE:     Pt had fluctuating episodes of lethargy, slurred speech and left gaze, which resolved with hydration and adjustment of BP meds. Rpt CT/CTA unremarkable for any new changes. Pt continues to do well since then and no complaints this morning and no new focal neuro deficits.        History of Chief Complaint:    Dahlia Zhang is a 74 y.o. 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 for altered mental status.     On the morning of 1/23/2025, patient was noted to have increased lethargy and generalized weakness along with left facial droop.  These were concerning as patient was at her baseline and doing better yesterday.  She does not recall the time when she went to sleep, thus last known well is unknown.  Potentially she went to sleep between 6-8 PM on 1/22.      Patient was seen and examined at bedside upon arrival to [facility] ED. She was increasingly lethargic and short of breath, however she was able to cross midline without gaze preference, had intact fluency and repetition but did have mild dysarthria. She had generalized weakness.         CT head w/o contrast shows no acute finding.      CTA head and neck show flow diverting stent along basilar artery and proximal PCA with embolization coils at basilar tip. Severe narrowing within the distal basilar stent. Small amount of residual aneurysmal filling at the neck, approximately 2 mm.        Allergies  is allergic to lisinopril, codeine, norco  MD Dg, Pager [phone]  Electronically signed 02/02/25 at 11:14 AM  Stroke, Neurocritical Care & Neurointervention  [facility]  [facility]

## 2025-02-02 NOTE — PROGRESS NOTES
Endovascular Neurosurgery Progress Notes    Pt Name: Dahlia Zhang  MRN: [id number]  YOB: 1950  Date of evaluation: 2/2/2025  Primary Care Physician: Thania Rahman MD  Reason for evaluation: Concern for pontine stroke in the presence of previously treated basilar anneurysm with WEB device and LVIS stent    SUBJECTIVE:     NAEO from EVN perspective. No new focal neuro deficits. No new complaints, states she is looking forward to going home.       History of Chief Complaint:    Dahlia Zhang is a 74 y.o. 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     On the morning of 1/23/2025, patient was noted to have increased lethargy and generalized weakness along with left facial droop.  These were concerning as patient was at her baseline and doing better yesterday.  She does not recall the time when she went to sleep, thus last known well is unknown.  Potentially she went to sleep between 6-8 PM on 1/22.      Patient was seen and examined at bedside upon arrival to [facility] ED. She was increasingly lethargic and short of breath, however she was able to cross midline without gaze preference, had intact fluency and repetition but did have mild dysarthria. She had generalized weakness.         CT head w/o contrast shows no acute finding.      CTA head and neck show flow diverting stent along basilar artery and proximal PCA with embolization coils at basilar tip. Severe narrowing within the distal basilar stent. Small amount of residual aneurysmal filling at the neck, approximately 2 mm.        Allergies  is allergic to lisinopril, codeine, norco [hydrocodone-acetaminophen], and percocet [oxycodone-acetaminophen].  Medications  Prior to Admission medications    Medication Sig Start Date End Date Taking?  Authorizing Provider   metFORMIN (GLUCOPHAGE) 500 MG tablet Take 2 tablets by mouth Daily with supper 1/18/25   Ashley Romero APRN - CNP   hydrALAZINE (APRESOLINE) 50 MG tablet Take 2 tablets by mouth daily    Glenis Lewis MD   hydrALAZINE (APRESOLINE) 50 MG tablet Take 1 tablet by mouth at bedtime    Glenis Lewis MD   omeprazole (PRILOSEC) 40 MG delayed release capsule Take 1 capsule by mouth daily    Glenis Lewis MD   umeclidinium bromide (INCRUSE ELLIPTA) 62.5 MCG/ACT inhaler Inhale 1 puff into the lungs daily    Glenis Lewis MD   calcium carbonate (TUMS) 500 MG chewable tablet Take 1 tablet by mouth daily as needed for Heartburn    Glenis Lewis MD   albuterol sulfate HFA (PROVENTIL;VENTOLIN;PROAIR) 108 (90 Base) MCG/ACT inhaler Inhale 1 puff into the lungs every 6 hours as needed 11/15/24   Glenis Lewis MD   clopidogrel (PLAVIX) 75 MG tablet Take 1 tablet by mouth daily 12/26/24   Sujey Carl MD   atorvastatin (LIPITOR) 10 MG tablet Take 1 tablet by mouth nightly  Patient not taking: Reported on 1/15/2025 8/9/24   Brayan Blackwell MD   acetaminophen (TYLENOL) 500 MG tablet Take 1 tablet by mouth every 6 hours as needed for Pain    Glenis Lewis MD   aspirin 81 MG EC tablet Take 1 tablet by mouth nightly    Glenis Lewis MD   losartan-hydroCHLOROthiazide (HYZAAR) 100-25 MG per tablet Take 1 tablet by mouth nightly    Glenis Lewis MD   escitalopram (LEXAPRO) 10 MG tablet Take 1 tablet by mouth nightly  Patient taking differently: Take 1 tablet by mouth daily 10/26/23   Lucy Simmons APRN - CNP   metoprolol tartrate (LOPRESSOR) 50 MG tablet Take 1 tablet by mouth 2 times daily 10/26/23   Lucy Simmons APRN - CNP   dilTIAZem (CARDIZEM CD) 120 MG extended release capsule Take 1 capsule by mouth daily 10/27/23   Lucy Simmons APRN - CNP   potassium chloride (KLOR-CON M) 20 MEQ extended release tablet Take 1 tablet by  fibrillation, presented with altered mental status on 1/23/2025. She exhibited lethargy, generalized weakness, and left facial droop, with CT head showing no acute findings but MRI suggesting early ischemic changes in von and thalamus (See above).  Emergent DSA revealed: The stents are patent and well apposed with a filling defect at the basilar tip that could represent partial thrombus versus metal artifact. She was treated with a Canngrelor drip and showed neuro improvement. On 1/26, she developed rectal bleeding, with CTA indicating extravasation from the sigmoid colon Colonoscopy: bleeding diverticular disease. Aspirin and Brilinta were held, and GI recommends embolization or surgical evaluation if rebleeding occurs. 1/27/25 GI signed off and stated no evidence of recurrent bleeding.      Endovascular history: Ruptured basilar artery tip aneurysm, previously treated with a WEB intrasaccular device (10/11/2023) and a second-stage LVIS 4.5 x 23mm stent (6/2024) and a third-stage  LVIS 4.5 x 23mm stent spanning from the left P2 segment to the mid-basilar artery (1/15/2025) is completely obliterated.     PLANS:     -- -160  -- Restarted on Aspirin, no recurrence of hemorrhage  -- Will discuss starting Brilenta in addition to Aspirin. Or alternatively stopping Aspirin and starting on Eliquis anticoagulation. Discussions ongoing about optimal timing based on clinical progress.   -- Afib RVR management with Cardiology on board  -- Infectious management by ID on board  -- PT/OT/ST on board  -- Rest of care per primary team    Discussed with Dr. Carl    Please arrange follow-up with Dr. Josafat baker in 6 weeks, and with  in 3-4 months.    Serafin Conte MD, Pager [phone]  Electronically signed 02/02/25 at 11:12 AM  Stroke, Neurocritical Care & Neurointervention  [facility]  [facility]

## 2025-02-02 NOTE — PROGRESS NOTES
6/19/2024  Status post elective cerebral angiogram and stent placement for residual aneurysm on 1/15/2025  COPD on home O2  Diabetes mellitus type 2  Labs, medications, radiologic studies were reviewed with personal review of films  Radiologic studies  Lab work  Cultures  Urine cultures with E. coli ESBL positive  Large amounts of data were reviewed  Please see history of present illness  Discussed with nursing Staff, Discharge planner  Dr. Deluna's service  Infection Control and Prevention measures reviewed  Universal precaution  Contact isolation  All prior entries were reviewed  Discussed with neurology  Administer medications as ordered  Completed Meropenem for E. coli ESBL positive  Prognosis:   Guarded  Discharge planning reviewed  Follow up as outpatient.    Aleksey Craig MD     Infection Control Recommendations   [place] Precautions  Contact Precautions for ESBL     Antimicrobial Stewardship Recommendations     Simplification of therapy  Targeted therapy    Coordination of Outpatient Care:   Estimated Length of IV antimicrobials:TBD  Patient will need Midline Catheter Insertion: TBD  Patient will need PICC line Insertion:TBD  Patient will need: Home IV , Infusion Center,  SNF,  LTAC: TBD  Patient will need outpatient wound care:     Chief complaint/reason for consultation:   Lethargy, elevated WBC      History of Present Illness:   Dahlia Zhang is a 74 y.o.-year-old female who was initially admitted on 1/23/2025. Patient seen at the request of Dr. Deluna    INITIAL HISTORY:    This patient has a history of smoking, HTN, HLD, DM II, Afib on Eliquis and Plavix, COPD on home O2.     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  She underwent WEB device embolization with obliteration of the aneurysm on 10/11/23, achieving Chris 1.    A follow-up angiogram on 4/4/24 revealed a residual 1-2 mm neck aneurysm with abnormal basilar trunk/fusiform dilation.  She  08/08/2024    HA , blurry vision , left leg weakness , slurred speech    Colon polyps     COPD (chronic obstructive pulmonary disease) (HCC)     COVID-19     3-4 times    COVID-19 vaccine administered     DM II (diabetes mellitus, type II), controlled (HCC)     GERD (gastroesophageal reflux disease)     HTN (hypertension)     Hyperlipidemia     Kidney stone     once but did not require surgery    On home O2     3-4L / nightly    Osteoarthritis     Sleep apnea     Cpap    Under care of team     Cardiology ,  SHANNA,  last seen 6/26/2024    Under care of team     Sleep Andrea, last seen 7/3/2024    Under care of team     NeuroEndovascular , Dr. Pena ,  last seen 12/26/24    Under care of team     Neurology , [facility] , last seen 10/25/2024    Wears glasses     Wears partial dentures     upper    Wellness examination     PCP , Andrea, last sen 11/15/2024       Past Surgical  History:     Past Surgical History:   Procedure Laterality Date    BREAST BIOPSY Right     since 1989 ; benign    BREAST REDUCTION SURGERY Bilateral 1989    CATARACT EXTRACTION W/ INTRAOCULAR LENS IMPLANT Bilateral 2020    CEREBRAL ANGIOGRAM  04/04/2024    DIAGNOSTIC / DR PENA    CEREBRAL ANGIOGRAM  10/11/2023    s/p WEB device embolization with obliteration of aneurysm, achieving Chris 1 on 10/11/2023.    CEREBRAL ANGIOGRAM  06/19/2024    ANGIOGRAM CAROTID CEREBRAL BILATERAL , with stent embolization    CEREBRAL ANGIOGRAM  12/12/2024    CHOLECYSTECTOMY      COLONOSCOPY N/A 01/26/2025    COLONOSCOPY N/A 1/26/2025    COLONOSCOPY DIAGNOSTIC performed by Cipriano Porter MD at [facility] OR    COLONOSCOPY W/ POLYPECTOMY  01/19/2023    has had several with polyps every time : 11/27/2017 , 8/14/2014    HERNIA REPAIR      abdominal hernia repair , states has had several    HYSTERECTOMY (CERVIX STATUS UNKNOWN)      2000's , ovaries remain    OTHER SURGICAL HISTORY  01/15/2025    IR ANGIOGRAM CAROTID CEREBRAL BILATERAL    THYROID SURGERY      lumpectomy  COMPARISON: None. HISTORY: ORDERING SYSTEM PROVIDED HISTORY: SOB and wheezing - rule out infection vs fluid overload TECHNOLOGIST PROVIDED HISTORY: SOB and wheezing - rule out infection vs fluid overload Reason for Exam: Portable/Supine FINDINGS: The mediastinum is unremarkable. The cardiac silhouette is normal size. The lungs are clear.  There are calcified nodules in the left lung base suggesting granulomas.     1. No acute cardiopulmonary process. 2. Calcified nodules in the left lung base suggesting granulomas.     MRI BRAIN WO CONTRAST    Result Date: 1/23/2025  EXAMINATION: MRI OF THE BRAIN WITHOUT CONTRAST,  1/23/2025 5:14 pm TECHNIQUE: Multiplanar multisequence MRI of the brain was performed without the administration of intravenous contrast. COMPARISON: 01/23/2025 and 08/08/2024 HISTORY: ORDERING SYSTEM PROVIDED HISTORY:  Left facial droop, recent hx of basilar stent and prior SAH in October 2023. TECHNOLOGIST PROVIDED HISTORY: Left facial droop, recent hx of basilar stent and prior SAH in October 2023. Decision Support Exception - unselect if not a suspected or confirmed emergency medical condition->Emergency Medical Condition (MA) Reason for Exam:  Left facial droop, recent hx of basilar stent and prior SAH in October 2023. FINDINGS: The examination is motion degraded. INTRACRANIAL STRUCTURES/VENTRICLES: Acute infarcts in the posterior circulation.  Large acute infarct in the right half of the von.  Two punctate infarcts in the cerebellum.  Punctate infarct in the right thalamus and punctate infarct in the right occipital lobe.  There are additional chronic infarcts in multiple vascular territories.  There is no acute hemorrhage, herniation, or hydrocephalus. ORBITS: The visualized portion of the orbits demonstrate no acute abnormality. SINUSES: The visualized paranasal sinuses and mastoid air cells demonstrate no acute abnormality. BONES/SOFT TISSUES: The bone marrow signal intensity appears normal. The

## 2025-02-02 NOTE — PROGRESS NOTES
Attempted to reach patient for [facility]. LVM with call back number.    RN PS cardiology \"/73 HR 61, should I administer labetalol 10 for SBP greater than 160 hold for HR below 60? HR has been average 55 bpm all day.\"    Cardiology responded \"Give hydralazine 10 mg\"

## 2025-02-03 ENCOUNTER — APPOINTMENT (OUTPATIENT)
Dept: CT IMAGING | Age: 75
DRG: 023 | End: 2025-02-03
Payer: MEDICARE

## 2025-02-03 LAB
ANION GAP SERPL CALCULATED.3IONS-SCNC: 10 MMOL/L (ref 9–16)
BASOPHILS # BLD: 0.12 K/UL (ref 0–0.2)
BASOPHILS NFR BLD: 1 % (ref 0–2)
BUN SERPL-MCNC: 6 MG/DL (ref 8–23)
CALCIUM SERPL-MCNC: 8.2 MG/DL (ref 8.6–10.4)
CHLORIDE SERPL-SCNC: 106 MMOL/L (ref 98–107)
CO2 SERPL-SCNC: 24 MMOL/L (ref 20–31)
CREAT SERPL-MCNC: 0.5 MG/DL (ref 0.6–0.9)
EOSINOPHIL # BLD: 0.38 K/UL (ref 0–0.44)
EOSINOPHILS RELATIVE PERCENT: 3 % (ref 1–4)
ERYTHROCYTE [DISTWIDTH] IN BLOOD BY AUTOMATED COUNT: 14.2 % (ref 11.8–14.4)
GFR, ESTIMATED: >90 ML/MIN/1.73M2
GLUCOSE BLD-MCNC: 105 MG/DL (ref 65–105)
GLUCOSE BLD-MCNC: 116 MG/DL (ref 65–105)
GLUCOSE BLD-MCNC: 125 MG/DL (ref 65–105)
GLUCOSE BLD-MCNC: 169 MG/DL (ref 65–105)
GLUCOSE SERPL-MCNC: 121 MG/DL (ref 74–99)
HCT VFR BLD AUTO: 28.8 % (ref 36.3–47.1)
HGB BLD-MCNC: 9.1 G/DL (ref 11.9–15.1)
IMM GRANULOCYTES # BLD AUTO: 0.05 K/UL (ref 0–0.3)
IMM GRANULOCYTES NFR BLD: 0 %
LYMPHOCYTES NFR BLD: 3.22 K/UL (ref 1.1–3.7)
LYMPHOCYTES RELATIVE PERCENT: 26 % (ref 24–43)
MCH RBC QN AUTO: 28 PG (ref 25.2–33.5)
MCHC RBC AUTO-ENTMCNC: 31.6 G/DL (ref 28.4–34.8)
MCV RBC AUTO: 88.6 FL (ref 82.6–102.9)
MONOCYTES NFR BLD: 1.45 K/UL (ref 0.1–1.2)
MONOCYTES NFR BLD: 12 % (ref 3–12)
NEUTROPHILS NFR BLD: 58 % (ref 36–65)
NEUTS SEG NFR BLD: 7.32 K/UL (ref 1.5–8.1)
NRBC BLD-RTO: 0 PER 100 WBC
PLATELET # BLD AUTO: 392 K/UL (ref 138–453)
PMV BLD AUTO: 10.8 FL (ref 8.1–13.5)
POTASSIUM SERPL-SCNC: 3.2 MMOL/L (ref 3.7–5.3)
RBC # BLD AUTO: 3.25 M/UL (ref 3.95–5.11)
SODIUM SERPL-SCNC: 140 MMOL/L (ref 136–145)
WBC OTHER # BLD: 12.5 K/UL (ref 3.5–11.3)

## 2025-02-03 PROCEDURE — 80048 BASIC METABOLIC PNL TOTAL CA: CPT

## 2025-02-03 PROCEDURE — 6360000002 HC RX W HCPCS

## 2025-02-03 PROCEDURE — 6370000000 HC RX 637 (ALT 250 FOR IP)

## 2025-02-03 PROCEDURE — 6370000000 HC RX 637 (ALT 250 FOR IP): Performed by: INTERNAL MEDICINE

## 2025-02-03 PROCEDURE — 99232 SBSQ HOSP IP/OBS MODERATE 35: CPT | Performed by: INTERNAL MEDICINE

## 2025-02-03 PROCEDURE — 99232 SBSQ HOSP IP/OBS MODERATE 35: CPT | Performed by: PSYCHIATRY & NEUROLOGY

## 2025-02-03 PROCEDURE — 6360000002 HC RX W HCPCS: Performed by: NURSE PRACTITIONER

## 2025-02-03 PROCEDURE — 92507 TX SP LANG VOICE COMM INDIV: CPT

## 2025-02-03 PROCEDURE — 99222 1ST HOSP IP/OBS MODERATE 55: CPT | Performed by: HOSPITALIST

## 2025-02-03 PROCEDURE — 92526 ORAL FUNCTION THERAPY: CPT

## 2025-02-03 PROCEDURE — 6370000000 HC RX 637 (ALT 250 FOR IP): Performed by: NURSE PRACTITIONER

## 2025-02-03 PROCEDURE — 82947 ASSAY GLUCOSE BLOOD QUANT: CPT

## 2025-02-03 PROCEDURE — 94761 N-INVAS EAR/PLS OXIMETRY MLT: CPT

## 2025-02-03 PROCEDURE — 2580000003 HC RX 258

## 2025-02-03 PROCEDURE — APPSS30 APP SPLIT SHARED TIME 16-30 MINUTES: Performed by: NURSE PRACTITIONER

## 2025-02-03 PROCEDURE — 94640 AIRWAY INHALATION TREATMENT: CPT

## 2025-02-03 PROCEDURE — 36415 COLL VENOUS BLD VENIPUNCTURE: CPT

## 2025-02-03 PROCEDURE — 70450 CT HEAD/BRAIN W/O DYE: CPT

## 2025-02-03 PROCEDURE — 85025 COMPLETE CBC W/AUTO DIFF WBC: CPT

## 2025-02-03 PROCEDURE — 6370000000 HC RX 637 (ALT 250 FOR IP): Performed by: HOSPITALIST

## 2025-02-03 PROCEDURE — 99233 SBSQ HOSP IP/OBS HIGH 50: CPT | Performed by: PSYCHIATRY & NEUROLOGY

## 2025-02-03 PROCEDURE — G0545 PR INHERENT VISIT TO INPT: HCPCS | Performed by: INTERNAL MEDICINE

## 2025-02-03 PROCEDURE — 6370000000 HC RX 637 (ALT 250 FOR IP): Performed by: SURGERY

## 2025-02-03 PROCEDURE — 2060000000 HC ICU INTERMEDIATE R&B

## 2025-02-03 RX ORDER — TRAZODONE HYDROCHLORIDE 50 MG/1
50 TABLET ORAL NIGHTLY
Status: DISCONTINUED | OUTPATIENT
Start: 2025-02-03 | End: 2025-02-15

## 2025-02-03 RX ORDER — CLONIDINE HYDROCHLORIDE 0.1 MG/1
0.1 TABLET ORAL 2 TIMES DAILY
Status: DISCONTINUED | OUTPATIENT
Start: 2025-02-03 | End: 2025-02-08

## 2025-02-03 RX ORDER — HYDRALAZINE HYDROCHLORIDE 20 MG/ML
10 INJECTION INTRAMUSCULAR; INTRAVENOUS ONCE
Status: COMPLETED | OUTPATIENT
Start: 2025-02-03 | End: 2025-02-03

## 2025-02-03 RX ORDER — HYDRALAZINE HYDROCHLORIDE 20 MG/ML
10 INJECTION INTRAMUSCULAR; INTRAVENOUS EVERY 4 HOURS PRN
Status: DISCONTINUED | OUTPATIENT
Start: 2025-02-03 | End: 2025-02-06

## 2025-02-03 RX ADMIN — HYDRALAZINE HYDROCHLORIDE 10 MG: 20 INJECTION INTRAMUSCULAR; INTRAVENOUS at 02:13

## 2025-02-03 RX ADMIN — HYDRALAZINE HYDROCHLORIDE 10 MG: 20 INJECTION INTRAMUSCULAR; INTRAVENOUS at 02:56

## 2025-02-03 RX ADMIN — DILTIAZEM HYDROCHLORIDE 120 MG: 120 CAPSULE, COATED, EXTENDED RELEASE ORAL at 09:01

## 2025-02-03 RX ADMIN — Medication 5 MG: at 00:45

## 2025-02-03 RX ADMIN — LOSARTAN POTASSIUM 100 MG: 50 TABLET, FILM COATED ORAL at 08:59

## 2025-02-03 RX ADMIN — ASPIRIN 81 MG: 81 TABLET, COATED ORAL at 08:59

## 2025-02-03 RX ADMIN — ENOXAPARIN SODIUM 40 MG: 100 INJECTION SUBCUTANEOUS at 08:59

## 2025-02-03 RX ADMIN — ESCITALOPRAM OXALATE 10 MG: 10 TABLET ORAL at 08:59

## 2025-02-03 RX ADMIN — TIOTROPIUM BROMIDE INHALATION SPRAY 2 PUFF: 3.12 SPRAY, METERED RESPIRATORY (INHALATION) at 07:57

## 2025-02-03 RX ADMIN — TRAZODONE HYDROCHLORIDE 50 MG: 50 TABLET ORAL at 02:52

## 2025-02-03 RX ADMIN — CLONIDINE HYDROCHLORIDE 0.1 MG: 0.1 TABLET ORAL at 16:00

## 2025-02-03 RX ADMIN — SODIUM CHLORIDE: 9 INJECTION, SOLUTION INTRAVENOUS at 22:20

## 2025-02-03 RX ADMIN — AMIODARONE HYDROCHLORIDE 200 MG: 200 TABLET ORAL at 08:59

## 2025-02-03 RX ADMIN — PANTOPRAZOLE SODIUM 40 MG: 40 TABLET, DELAYED RELEASE ORAL at 08:59

## 2025-02-03 RX ADMIN — POTASSIUM BICARBONATE 40 MEQ: 782 TABLET, EFFERVESCENT ORAL at 18:12

## 2025-02-03 RX ADMIN — SENNOSIDES AND DOCUSATE SODIUM 2 TABLET: 50; 8.6 TABLET ORAL at 08:59

## 2025-02-03 RX ADMIN — METOPROLOL TARTRATE 50 MG: 50 TABLET ORAL at 09:02

## 2025-02-03 ASSESSMENT — PAIN SCALES - GENERAL: PAINLEVEL_OUTOF10: 0

## 2025-02-03 ASSESSMENT — PAIN SCALES - WONG BAKER: WONGBAKER_NUMERICALRESPONSE: NO HURT

## 2025-02-03 NOTE — CARE COORDINATION
Transitional Planning    1215 PC to [facility] at [facility], has beds, can accept. CM will discuss with MD regarding stability for d/c.    1221 PC to Dr. Lindsay Riggs regarding stability for discharge, reply was that he will be rounding shortly.      1305 per neuro team will stay 2 more days.

## 2025-02-03 NOTE — PROGRESS NOTES
[facility]   IN-PATIENT SERVICE   [facility]    Progress Note             Date:   2/3/2025  Patient name:  Dahlia Zhang  Date of admission:  1/23/2025  1:08 PM  MRN:   [id number]  Account:  [id number]  YOB: 1950  PCP:    Thania Rahman MD  Room:   [address] [place] [address]  Code Status:    Full Code    Chief Complaint:     Chief Complaint   Patient presents with    Extremity Weakness       Interval hx:     The patient was seen and examined at bedside. Is vitally stable, no acute event overnight, patient more aphasic today, discussed with RN, patient was given trazodone 50 mg at night, zolpidem 5 mg will reassess.      Brief History of Present Illness:     As per H&P:     The patient is a 74 y.o. female with PMH of SAH 2/2 basilar tip aneurysm rupture (s/p WEB, LVIS x2), COPD, afib, HTN, HLD, ischemic stroke (L occipital) and smoking who presented to the [facility] ED on 1/23 with altered mental status, increased lethargy, dysarthria, L facial droop, and generalized weakness. Initial NIHSS was 9.      Hospital Course:  1/24: ID consulted 2/2 ESBL positive E.Coli UTI, started Meropenum  1/26: Patient experiencing persistent epigastric and back pain. CTA A/P with active GIB. GI consulted and patient underwent colonoscopy displaying severe pandiverticulosis with resulting deformity/luminal narrowing in sigmoid colon, and multiple polyps throughout colon. GI recommending IR embolization if further bleeding.   1/27: Afib RVR. Increased Metoprolol PO dose. Cardiology on board.   1/28: Converted to NSR overnight. Transitioned to Amiodarone PO. Hypertensive overnight, SBP as high as 190. Started Losartan 50 mg QD.   1/29: Patient intermittently confused overnight, received Ambien for sleep. Slight dysarthria noted. Hypertensive overnight, increased Losartan to 100 mg QD.         Past Medical History:     Past Medical History:   Diagnosis Date    Anticoagulated     ELIQUIS

## 2025-02-03 NOTE — PROGRESS NOTES
[facility] - Progress Note    Today's Date and Time: 2/3/2025, 11:26 AM    Impression :     Acute right pontine infarct and posterior circulation infarcts  S/p angiogram 1/23/25  Leukocytosis  CRP elevation  ESBL E. Coli UTI  Detected 1/16/24 from [facility]-Treated with Keflex, which was resistant  Detected 1/20/25 through [facility]-Started on Macrobid on 1/22/25  Elevated lactic acid-resolved  Afib RVR  S/p subarachnoid hemorrhage with ruptured basilar tip aneurysm on 10/11/23  S/p WEB device embolization on 10/11/23   Residual neck aneurysm s/p LVIS placement on 6/19/24  S/p elective cerebral angiogram and stent placement for residual aneurysm on 1/15/25  Chronic Afib on chronic anticoagulation with Eliquis  COPD on home O2  DM II  HTN  HLD  OA  Depression  Anxiety  Smoker    Recommendations:   Monitor off antibiotics  Completed IV Meropenem x 5 days for ESBL E. Coli UTI. Stop date on 1-31-25.  Repeat urine culture from 1/23/25: No growth   No growth on blood cultures   Mycoplasma IgM: negative   Viral respiratory panel negative    Medical Decision Making/Summary/Discussion:2/3/2025         Elements of Medical Decision Making:  Note: I have independently performed the steps listed below as part of the medical decision making and evaluation.   Examined and discussed with patient.  E. coli ESBL complicated urinary tract infection  Urine culture positive on 1/16/2024.  Received treatment with Keflex.  Organism is resistant  Urine culture positive on 1/20/2025 at [facility].  Received treatment with Macrobid 1/22/2025  Acute right pontine infarct and posterior circulation infarcts  Status post angiogram 1/23/2025  Reactive leukocytosis  Lactic acidosis  Atrial fibrillation with RVR  Status post subarachnoid hemorrhage with ruptured basilar tip aneurysm on 10/11/2023  Status post web device embolization on 10/11/2023  Residual neck aneurysm status post LVIS placement on  with  embolization coils at the basilar tip. There is severe narrowing within the distal basilar stent.  A small amount of residual aneurysmal filling noted at the neck. 60% stenosis of the left proximal cervical ICA due to calcified plaque was also noted.    MRI brain revealed acute posterior circulation infarcts including a right pontine infarct.     CXR showed no acute cardiopulmonary process.    She underwent a diagnostic cerebral angiogram on 1/23/25.    Angiogram findings per post-op note 1/23/25:  Please see dictated Radiology note for further details  The basilar artery tip aneurysm, previously treated with a WEB intrasaccular device (10/11/2023) and a second-stage LVIS 4.5 x 23mm stent (6/2024) and a third-stage  LVIS 4.5 x 23mm stent spanning from the left P2 segment to the mid-basilar artery (1/15/2025) is completely obliterated.  The stents are patent and well apposed with a filling defect at the basilar tip that could represent partial thrombus versus metal artifact.   There is a 360 vascular loop at the origin of the left vertebral artery requiring the catheter to be in the arch while navigating the wire to access and straighten the loop up to the V3 segment before tracking the catheter   Cangrelor bolus 15 mcg/kg over 2 minutes followed by maintenance dose of 2 mcg/kg was given.     Neurology was consulted and an EEG showed findings suggestive of mild, non-specific encephalopathy.    A repeat CT head on 1/24/25 showed evolving lacunar infarct within the right paramedian von and right thalamus. Tiny infarcts in the cerebellum difficult to appreciate within the  constraints of CT acquisition, as identified on the recent MRI brain. No acute intracranial hemorrhage or significant mass effect. Remote left PCA territory cortical infarct involving the occipital pole.  Moderate chronic small vessel ischemic changes.    ID was consulted for concern of underlying infection because of leukocytosis.    CURRENT  last 72 hours.    Invalid input(s): \"PROT\"  No results found for: \"VANCOTROUGH\"     Medications:      traZODone  50 mg Oral Nightly    dilTIAZem  120 mg Oral Daily    metoprolol tartrate  50 mg Oral BID    pantoprazole  40 mg Oral QAM AC    losartan  100 mg Oral Daily    sennosides-docusate sodium  2 tablet Oral Daily    aspirin  81 mg Oral Daily    amiodarone  200 mg Oral BID    lidocaine  1 patch TransDERmal Daily    insulin lispro  0-4 Units SubCUTAneous 4x Daily AC & HS    [Held by provider] ticagrelor  90 mg Oral BID    escitalopram  10 mg Oral Daily    tiotropium  2 puff Inhalation Daily RT    sodium chloride flush  5-40 mL IntraVENous 2 times per day    enoxaparin  40 mg SubCUTAneous Daily    rosuvastatin  20 mg Oral Nightly       Thank you for allowing us to participate in the care of this patient. Please call with questions.    Sarah Barnes, APRN  Pager: [phone]  - Office: [phone]

## 2025-02-03 NOTE — CONSULTS
[facility]  Office: [phone]  Magan Reina DO, Alex Ruiz DO, Zackery Woodson DO, Dino Faye DO, Nadia Nowak MD, Rebecca Kinney MD, Adriana Link MD, Adrienne Howe MD,  Herberth Zapata MD, Ruperto Rodriguez MD, Frances Correa MD,  Da Anderson DO, Salvador Valle MD, Prakash Rdz MD, Franki Reina DO, Vanessa Montes MD,  Aman Stahl DO, Angelica Richter MD, Isabela Busby MD, Delaney Hammonds MD, Alena Morgan MD,  Kedar Collins MD, Noemy Monroy MD, Erin Lord MD, Florecita Garcia MD, Konstantin Najera MD, Becca Oseguera MD, Alvarado Spangler DO, Alexis Trejo MD, Da Quinonez MD, Mohsin Reza, MD, Shirley Waterhouse, CNP,  Ladan Huntley CNP, Alvarado Quinones, CNP,  Chanda Pierce, DNP, Kristen Ordonez, CNP, Ignacia Aggarwal, CNP, Amna Zimmer, CNP, Jade Cope, CNP, Mari Cortes, PA-C, Linda Chow PA-C, Edita Bustamante, CNP, Kaylan Patel, CNP,  Ayesha Amaya, CNP, Abby Jhaveri, CNP, Mikki Greenberg, CNP,  Scarlett Vazquez, CNS, Shima Coto, CNP, Peg Juarez, CNP,   Dorita Mcallister, CNP         [facility]   IN-PATIENT SERVICE   [facility]    CONSULTATION / HISTORY AND PHYSICAL EXAMINATION            Date:   2/3/2025  Patient name:  Dahlia Zhang  Date of admission:  1/23/2025  1:08 PM  MRN:   [id number]  Account:  [id number]  YOB: 1950  PCP:    Thania Rahman MD  Room:   [facility]0135-  Code Status:    Full Code    Physician Requesting Consult: Belén Keith*    Reason for Consult: Medical management including but not limited to essential hypertension, diabetes mellitus, chronic atrial fibrillation,    History Obtained From:     patient, electronic medical record    History of Present Illness:     Dahlia Zhang is a 74 y.o.  /  female who presents with Extremity Weakness   and is admitted to the hospital for the management of Encephalopathy.    This 74-year-old female was brought to the  hospital due to increased lethargy and generalized weakness with a left-sided facial droop.  The patient has a complex neurological history over the last couple of weeks.  She was admitted on 15 January where she underwent cerebral angiogram.  The patient has a history of a subarachnoid hemorrhage in October 2023 and at that point in time was found to have a ruptured basilar tip aneurysm.  The patient was treated with a web device at that point in time.  The patient was brought to the hospital and underwent a LVIS embolization in January 15.  The patient was observed in the neurointensive care unit.  She did have some right eye pain and pressure which was treated with Decadron.  Eyedrops were initiated and eye pain resolved.  The patient ultimately was discharged home on the 17th.    The patient Mee presented to the hospital on the 23rd with increased lethargy and generalized weakness.  She was found to have a left facial droop.  She was seen by neurology and neurocritical care and ultimately admitted to the intensive care unit with ischemia of the posterior circulation.  The patient has been found to have a right pontine infarct.    While in the hospital the patient has been seen by gastroenterology, cardiology, physiatry, ophthalmology in addition to neurology and neuro endovascular service.  The patient has been treated for an ESBL E. coli with meropenem.  She has completed antibiotic course.    Cardiology has been consulted for A-fib with RVR.  The patient is presently maintained on amiodarone and Lopressor.  Eliquis is presently on hold as the patient has a history of GI bleed and large bloody bowel movement on this admission.  Gastroenterology suspects diverticular bleed.  Recommendation is to continue to hold Eliquis given her lower GI bleed in addition to her recent aneurysm intervention.    Physiatry has been consulted for ARU consideration.    Blood pressure has been somewhat labile and we have been asked  Lymphocytes % 26 24 - 43 %    Monocytes % 12 3 - 12 %    Eosinophils % 3 1 - 4 %    Basophils % 1 0 - 2 %    Immature Granulocytes % 0 0 %    Neutrophils Absolute 7.32 1.50 - 8.10 k/uL    Lymphocytes Absolute 3.22 1.10 - 3.70 k/uL    Monocytes Absolute 1.45 (H) 0.10 - 1.20 k/uL    Eosinophils Absolute 0.38 0.00 - 0.44 k/uL    Basophils Absolute 0.12 0.00 - 0.20 k/uL    Immature Granulocytes Absolute 0.05 0.00 - 0.30 k/uL   POC Glucose Fingerstick    Collection Time: 02/03/25  7:36 AM   Result Value Ref Range    POC Glucose 105 65 - 105 mg/dL   POC Glucose Fingerstick    Collection Time: 02/03/25 12:37 PM   Result Value Ref Range    POC Glucose 116 (H) 65 - 105 mg/dL       Imaging/Diagnostics:  XR CHEST PORTABLE    Result Date: 2/2/2025  No acute process.  No pulmonary edema.     CTA HEAD NECK W CONTRAST    Result Date: 2/1/2025  1. Limited evaluation of the distal basilar artery and proximal posterior cerebral arteries due to artifact from prior stenting.  There appears to be a similar severe stenosis of the distal basilar artery. 2. Otherwise, no large vessel occlusion, significant stenosis or cerebral aneurysm identified within the brain. 3. Short segment 60% stenosis at the origin of the left internal carotid artery based on NASCET criteria.     XR CHEST PORTABLE    Result Date: 2/1/2025  1. Elevated right diaphragm with plethora central markings/probable mild basilar atelectasis; no consolidation or large effusion. 2. Mildly enlarged but stable cardiac silhouette. 3. High density overlying the right axilla, of unclear origin but possibly extrinsic.     CT HEAD WO CONTRAST    Result Date: 2/1/2025  1. No acute intracranial abnormality. 2. Stable wedge-shaped area of low attenuation in the left occipital lobe representing an acute infarct. 3. Old lacune infarcts involving the right basal ganglia. 4. Global parenchymal volume loss with chronic microvascular ischemic changes.     CT HEAD WO CONTRAST    Result Date:

## 2025-02-03 NOTE — PROGRESS NOTES
Occupational Therapy    [facility]  Occupational Therapy Not Seen Note    DATE: 2/3/2025    NAME: Dahlia Zhang  MRN: [id number]   : 1950      Patient not seen this date for Occupational Therapy due to:    Testing: CT SCAN    Plan to check back as able    Electronically signed by SYLVIA Jim on 2/3/2025 at 1:55 PM

## 2025-02-03 NOTE — PLAN OF CARE
Problem: Safety - Adult  Goal: Free from fall injury  2/3/2025 0644 by Chani Meehan, RN  Outcome: Progressing  Flowsheets (Taken 2/2/2025 1959)  Free From Fall Injury: Instruct family/caregiver on patient safety     Problem: ABCDS Injury Assessment  Goal: Absence of physical injury  Outcome: Progressing  Flowsheets (Taken 2/2/2025 1959)  Absence of Physical Injury: Implement safety measures based on patient assessment     Problem: Neurosensory - Adult  Goal: Achieves stable or improved neurological status  Outcome: Progressing  Flowsheets (Taken 2/2/2025 2000)  Achieves stable or improved neurological status: Assess for and report changes in neurological status     Problem: Cardiovascular - Adult  Goal: Maintains optimal cardiac output and hemodynamic stability  Outcome: Progressing     Problem: Cardiovascular - Adult  Goal: Absence of cardiac dysrhythmias or at baseline  Outcome: Progressing     Problem: Infection - Adult  Goal: Absence of infection at discharge  Outcome: Progressing     Problem: Skin/Tissue Integrity - Adult  Goal: Skin integrity remains intact  Outcome: Progressing  Flowsheets (Taken 2/2/2025 1959)  Skin Integrity Remains Intact: Monitor for areas of redness and/or skin breakdown

## 2025-02-03 NOTE — PLAN OF CARE
Problem: Chronic Conditions and Co-morbidities  Goal: Patient's chronic conditions and co-morbidity symptoms are monitored and maintained or improved  Outcome: Progressing  Flowsheets (Taken 2/1/2025 2000 by Chani Meehan, RN)  Care Plan - Patient's Chronic Conditions and Co-Morbidity Symptoms are Monitored and Maintained or Improved: Monitor and assess patient's chronic conditions and comorbid symptoms for stability, deterioration, or improvement     Problem: Discharge Planning  Goal: Discharge to home or other facility with appropriate resources  Outcome: Progressing  Flowsheets (Taken 2/1/2025 2000 by Chani Meehan, RN)  Discharge to home or other facility with appropriate resources: Identify barriers to discharge with patient and caregiver     Problem: Pain  Goal: Verbalizes/displays adequate comfort level or baseline comfort level  Outcome: Progressing  Flowsheets (Taken 1/30/2025 0230 by Jade James, RN)  Verbalizes/displays adequate comfort level or baseline comfort level: Encourage patient to monitor pain and request assistance     Problem: Safety - Adult  Goal: Free from fall injury  2/3/2025 1709 by Nelia Terrell RN  Outcome: Progressing  Flowsheets (Taken 2/3/2025 1118)  Free From Fall Injury: Instruct family/caregiver on patient safety  2/3/2025 0644 by Chani Meehan RN  Outcome: Progressing  Flowsheets (Taken 2/2/2025 1959)  Free From Fall Injury: Instruct family/caregiver on patient safety     Problem: ABCDS Injury Assessment  Goal: Absence of physical injury  2/3/2025 1709 by Nelia Terrell, RN  Outcome: Progressing  Flowsheets (Taken 2/3/2025 1118)  Absence of Physical Injury: Implement safety measures based on patient assessment  2/3/2025 0644 by Chani Meehan, RN  Outcome: Progressing  Flowsheets (Taken 2/2/2025 1959)  Absence of Physical Injury: Implement safety measures based on patient assessment     Problem: Respiratory - Adult  Goal: Achieves optimal ventilation

## 2025-02-03 NOTE — PROGRESS NOTES
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810    CRP elevated R79.82    Bandemia D72.825    Pyelonephritis N12    Lactic acidosis E87.20    Gastrointestinal hemorrhage K92.2    Acute blood loss anemia D62    Abnormal CT of the abdomen R93.5    Shock R57.9    Diverticulosis K57.90    Polyp of colon K63.5    Cerebral multi-infarct state I69.30       Pain: 0/10    Speech and Language Treatment  Treatment time: 6031-4083    Subjective: [] Alert [x] Cooperative     [] Confused     [] Agitated    [x] Lethargic      Objective/Assessment:  Auditory Comprehension: 1 step commands: 2/3 increased to 3/3 with min verbal cue  2 step: 2/4 increased to 4/4 with repetition and min visual cues    Yes/no simple-moderate: 6/7 not increased with mod verbal/visual cues    Verbal Expression: Sentence completion: 6/6 independently    Generative naming concrete: +0 increased to +3 with max verbal and phonemic cues    Confrontational namin/3 increased to 3/3 with mod phonemic cues    Speech: Pt with moderate-severe dysarthria, new since initial evaluation (25) and follow up treatments. Limited effectiveness of strategies for

## 2025-02-03 NOTE — PROGRESS NOTES
Physical Therapy        Physical Therapy Cancel Note      DATE: 2/3/2025    NAME: Dahlia Zhang  MRN: [id number]   : 1950      Patient not seen this date for Physical Therapy due to:    Other: Pt currently off the floor for CT of the head per RN; will check back later, as time allows.    Electronically signed by Ramiro Iyer PTA on 2/3/2025 at 1:53 PM

## 2025-02-03 NOTE — PROGRESS NOTES
Endovascular Neurosurgery Progress Notes    Pt Name: Dahlia Zhang  MRN: [id number]  YOB: 1950  Date of evaluation: 2/3/2025  Primary Care Physician: Thania Rahman MD  Reason for evaluation: Concern for pontine stroke in the presence of previously treated basilar anneurysm with WEB device and LVIS stent    SUBJECTIVE:     Patient seen and examined at bedside. Was undergoing speech therapy. Concerns persist for fluctuating dysarthria and possible aphasia component. Patient did receive sleep aid at time. Repeat CTA on 2/1/2025 with similar episode. Pending CTH WO.     History of Chief Complaint:    Dahlia Zhang is a 74 y.o. 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     On the morning of 1/23/2025, patient was noted to have increased lethargy and generalized weakness along with left facial droop.  These were concerning as patient was at her baseline and doing better yesterday.  She does not recall the time when she went to sleep, thus last known well is unknown.  Potentially she went to sleep between 6-8 PM on 1/22.      Patient was seen and examined at bedside upon arrival to [facility] ED. She was increasingly lethargic and short of breath, however she was able to cross midline without gaze preference, had intact fluency and repetition but did have mild dysarthria. She had generalized weakness.         CT head w/o contrast shows no acute finding.      CTA head and neck show flow diverting stent along basilar artery and proximal PCA with embolization coils at basilar tip. Severe narrowing within the distal basilar stent. Small amount of residual aneurysmal filling at the neck, approximately 2 mm.        Allergies  is allergic to lisinopril, codeine, norco [hydrocodone-acetaminophen], and    Vitals: BP (!) 173/72   Pulse 71   Temp 97.9 °F (36.6 °C) (Oral)   Resp 24   Ht 1.702 m (5' 7\")   Wt 90.4 kg (199 lb 4.7 oz)   SpO2 96%   BMI 31.21 kg/m²       Gen: No acute distress  MS: Awake, does follow command/ Expressively aphasic intermittently and dysarthric.  CN: Pupils reactive, no gaze deviation, mild left facial droop, tongue midline, no dysarthria  Motor: Moves all extremities antigravity but overall 4+/5 in RUE+RLE and 4/5 in LUE+LLE  Sens: Responds to LT in all extremities  Gait: Deferred      LABS:     Recent Labs     02/01/25  0401 02/02/25  1228 02/03/25  0549   WBC 11.5* 13.3* 12.5*   HGB 8.6* 9.1* 9.1*   HCT 27.6* 29.5* 28.8*    389 392    141 140   K 3.8 3.5* 3.2*    105 106   CO2 25 26 24   BUN 8 10 6*   CREATININE 0.6 0.5* 0.5*   CALCIUM 8.5* 8.6 8.2*     No results for input(s): \"ALKPHOS\", \"ALT\", \"AST\", \"BILITOT\", \"BILIDIR\", \"LABALBU\", \"AMYLASE\", \"LIPASE\" in the last 72 hours.    RADIOLOGY:   Images were personally reviewed including:  CT brain without contrast: Remote left occipital lobe and lacunar infarcts in the right basal ganglia and corona radiata  CTA/CTP imaging:       DSA 1/23/2025  Please see dictated Radiology note for further details  The basilar artery tip aneurysm, previously treated with a WEB intrasaccular device (10/11/2023) and a second-stage LVIS 4.5 x 23mm stent (6/2024) and a third-stage  LVIS 4.5 x 23mm stent spanning from the left P2 segment to the mid-basilar artery (1/15/2025) is completely obliterated.  The stents are patent and well apposed with a filling defect at the basilar tip that could represent partial thrombus versus metal artifact.   There is a 360 vascular loop at the origin of the left vertebral artery requiring the catheter to be in the arch while navigating the wire to access and straighten the loop up to the V3 segment before tracking the catheter   Cangrelor bolus 15 mcg/kg over 2 minutes followed by maintenance dose of 2  mcg/kg was given.                        IMPRESSIONS:   Dahlia Zhang, a 74-year-old female with a history of a ruptured basilar tip aneurysm, hypertension, hyperlipidemia, diabetes, GERD, and atrial fibrillation, presented with altered mental status on 1/23/2025. She exhibited lethargy, generalized weakness, and left facial droop, with CT head showing no acute findings but MRI suggesting early ischemic changes in von and thalamus (See above).  Emergent DSA revealed: The stents are patent and well apposed with a filling defect at the basilar tip that could represent partial thrombus versus metal artifact. She was treated with a Canngrelor drip and showed neuro improvement. On 1/26, she developed rectal bleeding, with CTA indicating extravasation from the sigmoid colon Colonoscopy: bleeding diverticular disease. Aspirin and Brilinta were held, and GI recommends embolization or surgical evaluation if rebleeding occurs. 1/27/25 GI signed off and stated no evidence of recurrent bleeding.      Endovascular history: Ruptured basilar artery tip aneurysm, previously treated with a WEB intrasaccular device (10/11/2023) and a second-stage LVIS 4.5 x 23mm stent (6/2024) and a third-stage  LVIS 4.5 x 23mm stent spanning from the left P2 segment to the mid-basilar artery (1/15/2025) is completely obliterated.     2/1 - fluctuating episodes of lethargy, slurred speech and left gaze, which resolved with hydration and adjustment of BP meds. Rpt CT/CTA unremarkable for any new changes. No recurrence    PLANS:     -- -160  -- Restarted on Aspirin, no recurrence of hemorrhage  -- Plan to start Eliquis . Stop Aspirin once Eliquis started.  -- Afib RVR management with Cardiology on board  -- Infectious management by ID on board  -- GI team on board, no further GI bleeding, signed off  -- PT/OT/ST on board  -- CTH  pending  -- Rest of care per primary team      Please arrange follow-up with Dr. Josafat baker in 6 weeks, and

## 2025-02-04 ENCOUNTER — APPOINTMENT (OUTPATIENT)
Dept: MRI IMAGING | Age: 75
DRG: 023 | End: 2025-02-04
Payer: MEDICARE

## 2025-02-04 ENCOUNTER — APPOINTMENT (OUTPATIENT)
Dept: CT IMAGING | Age: 75
DRG: 023 | End: 2025-02-04
Payer: MEDICARE

## 2025-02-04 LAB
ANION GAP SERPL CALCULATED.3IONS-SCNC: 11 MMOL/L (ref 9–16)
BASOPHILS # BLD: 0.08 K/UL (ref 0–0.2)
BASOPHILS NFR BLD: 1 % (ref 0–2)
BUN SERPL-MCNC: 9 MG/DL (ref 8–23)
CALCIUM SERPL-MCNC: 8.2 MG/DL (ref 8.6–10.4)
CHLORIDE SERPL-SCNC: 109 MMOL/L (ref 98–107)
CO2 SERPL-SCNC: 22 MMOL/L (ref 20–31)
CREAT SERPL-MCNC: 0.7 MG/DL (ref 0.6–0.9)
EOSINOPHIL # BLD: 0.36 K/UL (ref 0–0.44)
EOSINOPHILS RELATIVE PERCENT: 3 % (ref 1–4)
ERYTHROCYTE [DISTWIDTH] IN BLOOD BY AUTOMATED COUNT: 14.4 % (ref 11.8–14.4)
GFR, ESTIMATED: >90 ML/MIN/1.73M2
GLUCOSE BLD-MCNC: 116 MG/DL (ref 65–105)
GLUCOSE BLD-MCNC: 133 MG/DL (ref 65–105)
GLUCOSE BLD-MCNC: 176 MG/DL (ref 65–105)
GLUCOSE BLD-MCNC: 97 MG/DL (ref 65–105)
GLUCOSE SERPL-MCNC: 107 MG/DL (ref 74–99)
HCT VFR BLD AUTO: 27.9 % (ref 36.3–47.1)
HCT VFR BLD AUTO: 29.9 % (ref 36.3–47.1)
HGB BLD-MCNC: 8.6 G/DL (ref 11.9–15.1)
HGB BLD-MCNC: 9.3 G/DL (ref 11.9–15.1)
IMM GRANULOCYTES # BLD AUTO: 0.04 K/UL (ref 0–0.3)
IMM GRANULOCYTES NFR BLD: 0 %
LYMPHOCYTES NFR BLD: 2.63 K/UL (ref 1.1–3.7)
LYMPHOCYTES RELATIVE PERCENT: 23 % (ref 24–43)
MCH RBC QN AUTO: 27.3 PG (ref 25.2–33.5)
MCHC RBC AUTO-ENTMCNC: 30.8 G/DL (ref 28.4–34.8)
MCV RBC AUTO: 88.6 FL (ref 82.6–102.9)
MONOCYTES NFR BLD: 1.34 K/UL (ref 0.1–1.2)
MONOCYTES NFR BLD: 12 % (ref 3–12)
NEUTROPHILS NFR BLD: 61 % (ref 36–65)
NEUTS SEG NFR BLD: 6.85 K/UL (ref 1.5–8.1)
NRBC BLD-RTO: 0 PER 100 WBC
PLATELET # BLD AUTO: 430 K/UL (ref 138–453)
PMV BLD AUTO: 10.7 FL (ref 8.1–13.5)
POTASSIUM SERPL-SCNC: 3.3 MMOL/L (ref 3.7–5.3)
RBC # BLD AUTO: 3.15 M/UL (ref 3.95–5.11)
SODIUM SERPL-SCNC: 142 MMOL/L (ref 136–145)
WBC OTHER # BLD: 11.3 K/UL (ref 3.5–11.3)

## 2025-02-04 PROCEDURE — 6370000000 HC RX 637 (ALT 250 FOR IP)

## 2025-02-04 PROCEDURE — 99233 SBSQ HOSP IP/OBS HIGH 50: CPT | Performed by: PSYCHIATRY & NEUROLOGY

## 2025-02-04 PROCEDURE — APPSS30 APP SPLIT SHARED TIME 16-30 MINUTES: Performed by: NURSE PRACTITIONER

## 2025-02-04 PROCEDURE — 6370000000 HC RX 637 (ALT 250 FOR IP): Performed by: INTERNAL MEDICINE

## 2025-02-04 PROCEDURE — 36415 COLL VENOUS BLD VENIPUNCTURE: CPT

## 2025-02-04 PROCEDURE — 99232 SBSQ HOSP IP/OBS MODERATE 35: CPT | Performed by: PSYCHIATRY & NEUROLOGY

## 2025-02-04 PROCEDURE — 97530 THERAPEUTIC ACTIVITIES: CPT

## 2025-02-04 PROCEDURE — 85025 COMPLETE CBC W/AUTO DIFF WBC: CPT

## 2025-02-04 PROCEDURE — 0042T CT BRAIN PERFUSION: CPT

## 2025-02-04 PROCEDURE — 2060000000 HC ICU INTERMEDIATE R&B

## 2025-02-04 PROCEDURE — 85018 HEMOGLOBIN: CPT

## 2025-02-04 PROCEDURE — 99232 SBSQ HOSP IP/OBS MODERATE 35: CPT | Performed by: INTERNAL MEDICINE

## 2025-02-04 PROCEDURE — 80048 BASIC METABOLIC PNL TOTAL CA: CPT

## 2025-02-04 PROCEDURE — 6370000000 HC RX 637 (ALT 250 FOR IP): Performed by: HOSPITALIST

## 2025-02-04 PROCEDURE — 6370000000 HC RX 637 (ALT 250 FOR IP): Performed by: SURGERY

## 2025-02-04 PROCEDURE — 70551 MRI BRAIN STEM W/O DYE: CPT

## 2025-02-04 PROCEDURE — 97110 THERAPEUTIC EXERCISES: CPT

## 2025-02-04 PROCEDURE — 6370000000 HC RX 637 (ALT 250 FOR IP): Performed by: NURSE PRACTITIONER

## 2025-02-04 PROCEDURE — 6360000004 HC RX CONTRAST MEDICATION: Performed by: STUDENT IN AN ORGANIZED HEALTH CARE EDUCATION/TRAINING PROGRAM

## 2025-02-04 PROCEDURE — 6360000002 HC RX W HCPCS

## 2025-02-04 PROCEDURE — 82947 ASSAY GLUCOSE BLOOD QUANT: CPT

## 2025-02-04 PROCEDURE — 85014 HEMATOCRIT: CPT

## 2025-02-04 PROCEDURE — 97112 NEUROMUSCULAR REEDUCATION: CPT

## 2025-02-04 RX ORDER — IOPAMIDOL 755 MG/ML
50 INJECTION, SOLUTION INTRAVASCULAR
Status: COMPLETED | OUTPATIENT
Start: 2025-02-04 | End: 2025-02-04

## 2025-02-04 RX ADMIN — HYDRALAZINE HYDROCHLORIDE 10 MG: 20 INJECTION INTRAMUSCULAR; INTRAVENOUS at 21:23

## 2025-02-04 RX ADMIN — DILTIAZEM HYDROCHLORIDE 120 MG: 120 CAPSULE, COATED, EXTENDED RELEASE ORAL at 09:50

## 2025-02-04 RX ADMIN — LOSARTAN POTASSIUM 100 MG: 50 TABLET, FILM COATED ORAL at 09:50

## 2025-02-04 RX ADMIN — APIXABAN 5 MG: 5 TABLET, FILM COATED ORAL at 09:50

## 2025-02-04 RX ADMIN — ESCITALOPRAM OXALATE 10 MG: 10 TABLET ORAL at 09:51

## 2025-02-04 RX ADMIN — ASPIRIN 81 MG: 81 TABLET, COATED ORAL at 09:50

## 2025-02-04 RX ADMIN — IOPAMIDOL 50 ML: 755 INJECTION, SOLUTION INTRAVENOUS at 12:49

## 2025-02-04 RX ADMIN — HYDRALAZINE HYDROCHLORIDE 10 MG: 20 INJECTION INTRAMUSCULAR; INTRAVENOUS at 15:29

## 2025-02-04 RX ADMIN — CLONIDINE HYDROCHLORIDE 0.1 MG: 0.1 TABLET ORAL at 09:50

## 2025-02-04 RX ADMIN — AMIODARONE HYDROCHLORIDE 200 MG: 200 TABLET ORAL at 09:50

## 2025-02-04 RX ADMIN — METOPROLOL TARTRATE 50 MG: 50 TABLET ORAL at 10:07

## 2025-02-04 RX ADMIN — SENNOSIDES AND DOCUSATE SODIUM 2 TABLET: 50; 8.6 TABLET ORAL at 09:50

## 2025-02-04 ASSESSMENT — PAIN SCALES - GENERAL
PAINLEVEL_OUTOF10: 0

## 2025-02-04 ASSESSMENT — PAIN SCALES - WONG BAKER
WONGBAKER_NUMERICALRESPONSE: NO HURT

## 2025-02-04 NOTE — PROGRESS NOTES
Physical Therapy  Facility/Department: [address] STEPDOWN   Physical Therapy Daily Treatment Note    Patient Name: Dahlia Zhang        MRN: [id number]    : 1950    Date of Service: 2025    Chief Complaint   Patient presents with    Extremity Weakness     Past Medical History:  has a past medical history of Anticoagulated, Anxiety and depression, Atrial fibrillation (HCC), Cerebral aneurysm, Cerebral artery occlusion with cerebral infarction (HCC), Colon polyps, COPD (chronic obstructive pulmonary disease) ([facility]), COVID-19, COVID-19 vaccine administered, DM II (diabetes mellitus, type II), controlled (HCC), GERD (gastroesophageal reflux disease), HTN (hypertension), Hyperlipidemia, Kidney stone, On home O2, Osteoarthritis, Sleep apnea, Under care of team, Under care of team, Under care of team, Under care of team, Wears glasses, Wears partial dentures, and Wellness examination.  Past Surgical History:  has a past surgical history that includes Thyroid surgery; Breast reduction surgery (Bilateral, ); Hysterectomy; hernia repair; Cerebral angiogram (2024); Cerebral angiogram (10/11/2023); Cerebral angiogram (2024); Cholecystectomy; Cerebral angiogram (2024); Breast biopsy (Right); Cataract extraction w/ intraocular lens implant (Bilateral, ); Colonoscopy w/ polypectomy (2023); other surgical history (01/15/2025); Colonoscopy (N/A, 2025); and Colonoscopy (N/A, 2025).    Discharge Recommendations  Further therapy recommended at discharge.The patient should be able to tolerate at least 3 hours of therapy per day over 5 days or 15 hours over 7 days.   This patient may benefit from a Physical Medicine and Rehab consult.   PT Equipment Recommendations  Equipment Needed: Yes  Mobility Devices: Walker  Walker: Rolling  Other: if she cannot find hers    Assessment  Body Structures, Functions, Activity Limitations Requiring Skilled Therapeutic Intervention: Decreased  Yes (Simultaneous filing. User may not have seen previous data.)  Response To Previous Treatment: Patient reporting fatigue but able to participate.  Family/Caregiver Present: Yes (spouse)  Follows Commands: Within Functional Limits  Other (Comment): pt initially very lethargic but able to participate in session as session progressed and as pt was seated EOB  General  General Comments: Pt retired to recliner with call light within reach and chair alarm activated; RN notified.  Subjective  Subjective: Pt and RN agreeable to tx session. Pt supine in bed upon arrival with no c/o pain at rest or t/o treatment; pt pleasant but lethergic.     Objective  Orientation  Overall Orientation Status: Impaired  Orientation Level: Unable to assess (pt able to occasionally nod/shake head but otherwise not able to verbalize orientation this date d/t lethargy)  Cognition  Overall Cognitive Status: Exceptions  Arousal/Alertness: Delayed responses to stimuli;Inconsistent responses to stimuli  Following Commands: Follows multistep commands with repitition;Follows multistep commands with increased time;Inconsistently follows commands  Attention Span: Attends with cues to redirect  Memory: Appears intact  Safety Judgement: Decreased awareness of need for assistance;Decreased awareness of need for safety  Problem Solving: Assistance required to implement solutions;Assistance required to generate solutions;Assistance required to correct errors made;Decreased awareness of errors  Insights: Decreased awareness of deficits  Initiation: Requires cues for some  Sequencing: Requires cues for some  Cognition Comment: pt very lethargic at start of session with inability to nod/shake head; increased responsiveness as session progressed with pt able to nod/shake head and respond verbally x2 occasions    Mobility   Bed mobility  Supine to Sit: Dependent/Total;2 Person assistance (pt very lethargic and unable to assist with LE or trunk progression  despite max verbal and tactile cues)  Sit to Supine: Unable to assess (pt retired to recliner at end of session)  Scooting: Maximal assistance  Bed Mobility Comments: HOB elevated ~40 degrees. dependent x2 for sup to sit secondary to lethargy and initially maxA to maintain upright posture when EOB; progressing to Mayra with very brief periods of CGA but unable to maintain d/t weakness and poor trunk control. Pt with a post and R lean t/o seated EOB    Transfers  Sit to Stand: Moderate Assistance;2 Person Assistance  Stand to Sit: Moderate Assistance;2 Person Assistance  Bed to Chair: Dependent/Total;2 Person Assistance  Comment: Assessed with SS and without AD. SS utilized for first transfer d/t pt being lethargic; no AD utilized for second transfer with all transfers completed with modA x2. Pt completed x 3 transfers total. Pt required verbal and tactile cues for UE hand placement with good return. Pt    Ambulation  Comments: JESSICA this date d/t lethargy  More Ambulation?: No    Stairs/Curb  Stairs?: No    Balance   Balance  Posture: Fair  Sitting - Static: Fair;+  Sitting - Dynamic: Fair  Standing - Static: Fair;-  Comments: seated balance assessed EOB; standing balance assessed with SS and no AD. Pt overall modA-maxA when completing dynamic seated trunk control activities. Pt maxA initially, progressing to modA with very brief periods of CGA with static sitting    Exercise  PT Exercises  Exercise Treatment: Supine B LE exercises: glute sets, ankle pumps, quad sets x10 reps ea.  A/AROM Exercises: AAROM B LE: LAQ, marches, ankle pumps x10 reps ea. Pt able to initiate all exercises but AAROM d/t pt lethargy  Static Sitting Balance Exercises: sitting EOB ~15 min total  Dynamic Sitting Balance Exercises: ant/post and L/R trunk control exercises ~5 min total. pt modA with correcting anterior trunk leaning but maxA when correcting posterior trunk leaning. Overall modA with lateral trunk leans. max cues required for all to

## 2025-02-04 NOTE — PLAN OF CARE
Problem: Chronic Conditions and Co-morbidities  Goal: Patient's chronic conditions and co-morbidity symptoms are monitored and maintained or improved  Outcome: Progressing  Flowsheets (Taken 2/1/2025 2000 by Chani Meehan, RN)  Care Plan - Patient's Chronic Conditions and Co-Morbidity Symptoms are Monitored and Maintained or Improved: Monitor and assess patient's chronic conditions and comorbid symptoms for stability, deterioration, or improvement     Problem: Discharge Planning  Goal: Discharge to home or other facility with appropriate resources  Outcome: Progressing  Flowsheets (Taken 2/1/2025 2000 by Chani Meehan, RN)  Discharge to home or other facility with appropriate resources: Identify barriers to discharge with patient and caregiver     Problem: Pain  Goal: Verbalizes/displays adequate comfort level or baseline comfort level  Outcome: Progressing  Flowsheets (Taken 2/4/2025 0937)  Verbalizes/displays adequate comfort level or baseline comfort level: Encourage patient to monitor pain and request assistance     Problem: Safety - Adult  Goal: Free from fall injury  Outcome: Progressing  Flowsheets (Taken 2/3/2025 1118)  Free From Fall Injury: Instruct family/caregiver on patient safety     Problem: ABCDS Injury Assessment  Goal: Absence of physical injury  Outcome: Progressing  Flowsheets (Taken 2/3/2025 1118)  Absence of Physical Injury: Implement safety measures based on patient assessment     Problem: Respiratory - Adult  Goal: Achieves optimal ventilation and oxygenation  Outcome: Progressing     Problem: Neurosensory - Adult  Goal: Achieves stable or improved neurological status  Outcome: Progressing  Flowsheets (Taken 2/4/2025 0800)  Achieves stable or improved neurological status: Assess for and report changes in neurological status  Goal: Absence of seizures  Outcome: Progressing  Flowsheets (Taken 2/4/2025 0800)  Absence of seizures: Monitor for seizure activity.  If seizure occurs, document    Problem: Nutrition Deficit:  Goal: Optimize nutritional status  Outcome: Progressing

## 2025-02-04 NOTE — PROGRESS NOTES
Endovascular Neurosurgery Progress Notes    Pt Name: Dahlia Zhang  MRN: [id number]  YOB: 1950  Date of evaluation: 2/4/2025  Primary Care Physician: Thania Rahman MD  Reason for evaluation: Concern for pontine stroke in the presence of previously treated basilar anneurysm with WEB device and LVIS stent    SUBJECTIVE:     Patient seen and examined at bedside. Seems to be more lethargic as yesterday with worsening overall examination. CTH WO was without acute changes. Will obtain CTP.      History of Chief Complaint:    Dahlia Zhang is a 74 y.o. 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     On the morning of 1/23/2025, patient was noted to have increased lethargy and generalized weakness along with left facial droop.  These were concerning as patient was at her baseline and doing better yesterday.  She does not recall the time when she went to sleep, thus last known well is unknown.  Potentially she went to sleep between 6-8 PM on 1/22.      Patient was seen and examined at bedside upon arrival to [facility] ED. She was increasingly lethargic and short of breath, however she was able to cross midline without gaze preference, had intact fluency and repetition but did have mild dysarthria. She had generalized weakness.         CT head w/o contrast shows no acute finding.      CTA head and neck show flow diverting stent along basilar artery and proximal PCA with embolization coils at basilar tip. Severe narrowing within the distal basilar stent. Small amount of residual aneurysmal filling at the neck, approximately 2 mm.        Allergies  is allergic to lisinopril, codeine, norco [hydrocodone-acetaminophen], and percocet [oxycodone-acetaminophen].  Medications  Prior to Admission medications     Medication Sig Start Date End Date Taking? Authorizing Provider   metFORMIN (GLUCOPHAGE) 500 MG tablet Take 2 tablets by mouth Daily with supper 1/18/25   Ashley Romero APRN - CNP   hydrALAZINE (APRESOLINE) 50 MG tablet Take 2 tablets by mouth daily    Glenis Lewis MD   hydrALAZINE (APRESOLINE) 50 MG tablet Take 1 tablet by mouth at bedtime    Glenis Lewis MD   omeprazole (PRILOSEC) 40 MG delayed release capsule Take 1 capsule by mouth daily    Glenis Lewis MD   umeclidinium bromide (INCRUSE ELLIPTA) 62.5 MCG/ACT inhaler Inhale 1 puff into the lungs daily    Glenis Lewis MD   calcium carbonate (TUMS) 500 MG chewable tablet Take 1 tablet by mouth daily as needed for Heartburn    Glenis Lewis MD   albuterol sulfate HFA (PROVENTIL;VENTOLIN;PROAIR) 108 (90 Base) MCG/ACT inhaler Inhale 1 puff into the lungs every 6 hours as needed 11/15/24   Glenis Lewis MD   clopidogrel (PLAVIX) 75 MG tablet Take 1 tablet by mouth daily 12/26/24   Sujey Carl MD   atorvastatin (LIPITOR) 10 MG tablet Take 1 tablet by mouth nightly  Patient not taking: Reported on 1/15/2025 8/9/24   Brayan Blackwell MD   acetaminophen (TYLENOL) 500 MG tablet Take 1 tablet by mouth every 6 hours as needed for Pain    Glenis Lewis MD   aspirin 81 MG EC tablet Take 1 tablet by mouth nightly    Glenis Lewis MD   losartan-hydroCHLOROthiazide (HYZAAR) 100-25 MG per tablet Take 1 tablet by mouth nightly    Glenis Lewis MD   escitalopram (LEXAPRO) 10 MG tablet Take 1 tablet by mouth nightly  Patient taking differently: Take 1 tablet by mouth daily 10/26/23   Lucy Simmons APRN - CNP   metoprolol tartrate (LOPRESSOR) 50 MG tablet Take 1 tablet by mouth 2 times daily 10/26/23   Lucy Simmons APRN - CNP   dilTIAZem (CARDIZEM CD) 120 MG extended release capsule Take 1 capsule by mouth daily 10/27/23   Lucy Simmons APRN - CNP   potassium chloride (KLOR-CON M) 20

## 2025-02-04 NOTE — PLAN OF CARE
Problem: Chronic Conditions and Co-morbidities  Goal: Patient's chronic conditions and co-morbidity symptoms are monitored and maintained or improved  2/4/2025 0108 by Michelle Dominguez RN  Outcome: Progressing  2/3/2025 1709 by Nelia Terrell RN  Outcome: Progressing  Flowsheets (Taken 2/1/2025 2000 by Chani Meehan, RN)  Care Plan - Patient's Chronic Conditions and Co-Morbidity Symptoms are Monitored and Maintained or Improved: Monitor and assess patient's chronic conditions and comorbid symptoms for stability, deterioration, or improvement     Problem: Discharge Planning  Goal: Discharge to home or other facility with appropriate resources  2/4/2025 0108 by Michelle Dominguez RN  Outcome: Progressing  2/3/2025 1709 by Nelia Terrell RN  Outcome: Progressing  Flowsheets (Taken 2/1/2025 2000 by Chani Meehan, RN)  Discharge to home or other facility with appropriate resources: Identify barriers to discharge with patient and caregiver     Problem: Pain  Goal: Verbalizes/displays adequate comfort level or baseline comfort level  2/4/2025 0108 by Michelle Dominguez RN  Outcome: Progressing  2/3/2025 1709 by Nelia Terrell RN  Outcome: Progressing  Flowsheets (Taken 1/30/2025 0230 by Jade James, RN)  Verbalizes/displays adequate comfort level or baseline comfort level: Encourage patient to monitor pain and request assistance     Problem: Safety - Adult  Goal: Free from fall injury  2/4/2025 0108 by Michelle Dominguez RN  Outcome: Progressing  2/3/2025 1709 by Nelia Terrell RN  Outcome: Progressing  Flowsheets (Taken 2/3/2025 1118)  Free From Fall Injury: Instruct family/caregiver on patient safety     Problem: ABCDS Injury Assessment  Goal: Absence of physical injury  2/4/2025 0108 by Michelle Dominguez RN  Outcome: Progressing  2/3/2025 1709 by Nelia Terrell RN  Outcome: Progressing  Flowsheets (Taken 2/3/2025 1118)  Absence of Physical Injury: Implement safety measures based on patient  assessment     Problem: Respiratory - Adult  Goal: Achieves optimal ventilation and oxygenation  2/4/2025 0108 by Michelle Dominguez RN  Outcome: Progressing  2/3/2025 1709 by Nelia Terrell RN  Outcome: Progressing     Problem: Neurosensory - Adult  Goal: Achieves stable or improved neurological status  2/4/2025 0108 by Michelle Dominguez RN  Outcome: Progressing  2/3/2025 1709 by Nelia Terrell RN  Outcome: Progressing  Flowsheets (Taken 2/3/2025 0800)  Achieves stable or improved neurological status: Assess for and report changes in neurological status  Goal: Absence of seizures  2/4/2025 0108 by Michelle Dominguez RN  Outcome: Progressing  2/3/2025 1709 by Nelia Terrell RN  Outcome: Progressing  Flowsheets (Taken 2/3/2025 0800)  Absence of seizures: Monitor for seizure activity.  If seizure occurs, document type and location of movements and any associated apnea  Goal: Remains free of injury related to seizures activity  2/4/2025 0108 by Michelle Dominguez RN  Outcome: Progressing  2/3/2025 1709 by Nelia Terrell RN  Outcome: Progressing  Flowsheets (Taken 2/3/2025 0800)  Remains free of injury related to seizure activity: Maintain airway, patient safety  and administer oxygen as ordered  Goal: Achieves maximal functionality and self care  2/4/2025 0108 by Michelle Dominguez RN  Outcome: Progressing  2/3/2025 1709 by Nelia Terrell RN  Outcome: Progressing  Flowsheets (Taken 2/3/2025 0800)  Achieves maximal functionality and self care: Monitor swallowing and airway patency with patient fatigue and changes in neurological status     Problem: Cardiovascular - Adult  Goal: Maintains optimal cardiac output and hemodynamic stability  2/4/2025 0108 by Michelle Dominguez RN  Outcome: Progressing  2/3/2025 1709 by Nelia Terrell RN  Outcome: Progressing  Flowsheets (Taken 2/3/2025 0800)  Maintains optimal cardiac output and hemodynamic stability: Monitor blood pressure and heart rate  Goal: Absence of cardiac

## 2025-02-04 NOTE — PROGRESS NOTES
[facility] Neu[facility]   IN-PATIENT SERVICE   [facility]    Progress Note             Date:   2/4/2025  Patient name:  Dahlia Zhang  Date of admission:  1/23/2025  1:08 PM  MRN:   [id number]  Account:  [id number]  YOB: 1950  PCP:    Thania Rahman MD  Room:   [address] [place] [id number]  Code Status:    Full Code    Chief Complaint:     Chief Complaint   Patient presents with    Extremity Weakness       Interval hx:     The patient was seen and examined at bedside. Is vitally stable, patient was sleeping overnight, woke up for 30 minutes only, neuro exam was stable,, vitally stable, internal medicine was consulted for blood pressure management, blood pressure fairly controlled around 150s, CT scan did not show any acute bleed, hemoglobin 8.6, patient was started on Eliquis 5 mg twice daily yesterday, potassium replaced, my evaluation patient is still sleepy, not following commands, open eyes to painful stimuli, nonfocal exam localized to pain, trazodone and Klonopin was discontinued as may consider that because of drowsiness, was not given overnight, patient still confused, cannot rule out NICHO affected by right pontine infarct    Brief History of Present Illness:     As per H&P:     The patient is a 74 y.o. 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 Initial NIHSS was 9.      Hospital Course:  1/24: ID consulted 2/2 ESBL positive E.Coli UTI, started Meropenum  1/26: Patient experiencing persistent epigastric and back pain. CTA A/P with active GIB. GI consulted and patient underwent colonoscopy displaying severe pandiverticulosis with resulting deformity/luminal narrowing in sigmoid colon, and multiple polyps throughout colon. GI recommending IR  Abnormal CT of the abdomen [R93.5] 01/26/2025    Shock [R57.9] 01/26/2025    Diverticulosis [K57.90] 01/26/2025    Facial droop [R29.810] 01/25/2025    CRP elevated [R79.82] 01/25/2025    Bandemia [D72.825] 01/25/2025    Pyelonephritis [N12] 01/25/2025    Lactic acidosis [E87.20] 01/25/2025    Complicated UTI (urinary tract infection) [N39.0] 01/24/2025    Infection due to ESBL-producing Escherichia coli [A49.8, Z16.12] 01/24/2025    Encephalopathy [G93.40] 01/23/2025    Left-sided weakness [R53.1] 01/23/2025    Thalamic stroke (HCC) [I63.81] 01/23/2025       The patient is a 75 yo female with a history of 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 Initial NIHSS 9. /59. Patient has been compliant with Aspirin and Plavix post recent stent placement. She remains off Eliquis as planned. MRI brain without contrast showed acute infarct in right von, punctate infarcts in right cerebellum, thalamus and right occipital lobe. CTA showed severe narrowing within the distal basilar stent. Taken for urgent DSA showing previously treated basilar tip aneurysm (s/p WEB, LVIS x2) is completely obliterated, stents are patent and well apposed with filling defect at the basilar tip; possible thrombus vs metal artifact. Given Cangrelor bolus followed by infusion, transitioned to Brilinta. Course complicated by lower GI bleeding secondary to severe diverticular disease.     Plan:       Acute right pontine, cerebellar, thalamus, occipital lobe infarcts 2/2 to thrombosed basilar tip s/p WEB, LVIS x2   - S/p DSA showing previously treated basilar tip aneurysm (s/p WEB, LVIS x2)  - Restarted Aspirin 81mg QD   - Brilinta 90mg BID remains on hold (since 1/26) due to acute GI bleed   - Neuro

## 2025-02-04 NOTE — PROGRESS NOTES
[facility]  Office: [phone]  Magan Reina DO, Alex Ruiz DO, Zackery Woodson DO, Dino Faye DO, Nadia Nowak MD, Rebecca Kinney MD, Adriana Link MD, Adrienne Howe MD,  Herberth Zapata MD, Ruperto Rodriguez MD, Frances Correa MD,  Da Anderson DO, Salvador Valle MD, Prakash Rdz MD, Franki Reina DO, Vanessa Montes MD,  Aman Stahl DO, Angelica Richter MD, Isabela Busby MD, Delaney Hammonds MD, Alena Morgan MD,  Kedar Collins MD, Noemy Monroy MD, Erin Lord MD, Florecita Garcia MD, Konstantin Najera MD, Becca Oseguera MD, Alvarado Spangler DO, Alexis Trejo MD, Da Quinonez MD, Mohsin Reza, MD, Shirley Waterhouse, CNP,  Ladan Huntley CNP, Alvarado Qiunones, CNP,  Chanda Pierce, DNP, Kristen Ordonez, CNP, Ignacia Aggarwal, CNP, Amna Zimmer, CNP, Jade Cope, CNP, Mari Cortes, PA-C, Edita Bustamante, CNP, Kaylan Patel, CNP,  Ayesha Amaya, CNP, Abby Jhaveri, CNP, Mikki Greenberg, CNP,  Scarlett Vazquez, CNS, Shima Coto CNP, Peg Juarez, CNP,   Dorita Mcallsiter, CNP           [facility]   IN-PATIENT SERVICE   [facility]    Progress Note    2/4/2025    12:32 PM    Name:   Dahlia Zhang  MRN:     [id number]     Acct:      [id number]   Room:   0135/0135-01   Day:  12  Admit Date:  1/23/2025  1:08 PM    PCP:   Thania Rahman MD  Code Status:  Full Code    Subjective:     C/C:   Chief Complaint   Patient presents with    Extremity Weakness     Interval History Status: not changed.     The patient was seen and examined at bedside. Patient is sitting in the chair with  at bedside. Vital signs are stable with systolic blood pressures between 120-150. Hemoglobin 8.6. on examination patient appears lethargic, opens eyes to stimuli and follows command. Patient continues to be aphasic on exam. No signs of acute distress noted at this time. Will continue to monitor    Brief History:     The patient is a 74 y.o. female with PMH of  Repeat urine culture from 1/23/25: No growth   - ID following    Acute Lower GI bleed  - CTA abdomen and pelvis on 1/25 showing active GIB   - s/p colonoscopy on 1/26 showing severe pandiverticulosis with resulting deformity/luminal narrowing in sigmoid colon, and multiple polyps throughout colon. GI recommending IR embolization if further bleeding.   - continue Senokot-S 50 mg daily   - Hemoglobin stable at 8.6. No signs of active bleeding noted at this time.    DVT prophylaxis: Lovenox SQ  GI prophylaxis: PPI  Diet: Regular, 5 carb choices (75 gm/meal)  Disposition: Likely ARU when medically stable     OT/PT/SW: all consulted     Code status: Full code      Laura Tamayo  2/4/2025  12:32 PM     Attending Supervising Physician’s Attestation Statement  I, Herberth Zapata MD, have seen and examined Dahlia Zhang and personally performed and participated in the key or critical portions of the evaluation and management including personally performing the exam and medical decision making. I verify the accuracy of the documentation by the medical/PA student with the following additions/changes.     Additional Comments: BP controlled on current medications. Ok to dc from IM perspective. Thank  you for consult, call with questions     Electronically signed by Herberth Zapata MD on 2/4/2025 at 6:39 PM

## 2025-02-04 NOTE — PROGRESS NOTES
[facility] - Progress Note    Today's Date and Time: 2/4/2025, 3:15 PM    Impression :     Acute right pontine infarct and posterior circulation infarcts  S/p angiogram 1/23/25  Leukocytosis  CRP elevation  ESBL E. Coli UTI  Detected 1/16/24 from [facility]-Treated with Keflex, which was resistant  Detected 1/20/25 through [facility]-Started on Macrobid on 1/22/25  Elevated lactic acid-resolved  Afib RVR  S/p subarachnoid hemorrhage with ruptured basilar tip aneurysm on 10/11/23  S/p WEB device embolization on 10/11/23   Residual neck aneurysm s/p LVIS placement on 6/19/24  S/p elective cerebral angiogram and stent placement for residual aneurysm on 1/15/25  Chronic Afib on chronic anticoagulation with Eliquis  COPD on home O2  DM II  HTN  HLD  OA  Depression  Anxiety  Smoker    Recommendations:   Monitor off antibiotics  Completed IV Meropenem x 5 days for ESBL E. Coli UTI. Stop date on 1-31-25.  Repeat urine culture from 1/23/25: No growth   No growth on blood cultures   Mycoplasma IgM: negative   Viral respiratory panel negative    Medical Decision Making/Summary/Discussion:2/4/2025         Elements of Medical Decision Making:  Note: I have independently performed the steps listed below as part of the medical decision making and evaluation.   Examined and discussed with patient.  E. coli ESBL complicated urinary tract infection  Urine culture positive on 1/16/2024.  Received treatment with Keflex.  Organism is resistant  Urine culture positive on 1/20/2025 at [facility].  Received treatment with Macrobid 1/22/2025  Acute right pontine infarct and posterior circulation infarcts  Status post angiogram 1/23/2025  Reactive leukocytosis  Lactic acidosis  Atrial fibrillation with RVR  Status post subarachnoid hemorrhage with ruptured basilar tip aneurysm on 10/11/2023  Status post web device embolization on 10/11/2023  Residual neck aneurysm status post LVIS placement on  TURBIDITY Clear 2025 08:42 PM     Lab Results   Component Value Date/Time    CREATININE 0.7 2025 04:23 AM    GLUCOSE 107 2025 04:23 AM         Cultures:  Urine:  25: ESBL E. Coli  25: No growth   Blood:  25: No growth   Sputum :    Wound:    MRSA Nares:      Imagin-30-25:          Review of Systems:     Pertinent review of symptoms listed in Initial Evaluation and daily interval evaluations sections      Physical Examination :   Patient Vitals for the past 8 hrs:   BP Temp Temp src Pulse Resp SpO2   25 1511 (!) 181/69 97.9 °F (36.6 °C) Oral 55 23 97 %   25 1200 (!) 119/90 -- -- 90 23 --   25 1100 (!) 142/60 97.9 °F (36.6 °C) Oral (!) 102 22 93 %   25 0937 130/83 97.8 °F (36.6 °C) Oral (!) 132 17 94 %     General Appearance: somnolent. On nasal 02  Head:  Normocephalic, no trauma  Eyes: Pupils equal, round, reactive to light ; sclera anicteric; conjunctivae pink. No embolic phenomena.  ENT: Oropharynx clear, without erythema, exudate, or thrush. No tenderness of sinuses. Mouth/throat: mucosa pink and moist. No lesions. Dentition in good repair.  Neck:Supple, without lymphadenopathy. Thyroid normal, No bruits.  Pulmonary/Chest: Clear to auscultation, without wheezes, rales, or rhonchi.  No dullness to percussion.   Cardiovascular: Regular rate and rhythm without murmurs, rubs, or gallops.   Abdomen: Soft, non tender. Bowel sounds normal. No organomegaly  All four Extremities: No cyanosis, clubbing, edema, or effusions.  Neurologic: No gross sensory or motor deficits.Difficult to assess mentation . Shows dysarthria  Skin: Warm and dry with good turgor. Signs of peripheral arterial insufficiency. No ulcerations. No open wounds.      I have personally reviewed the past medical history, past surgical history, medications, social history, and family history, and I have updated the database accordingly.    Past Medical History:     Past Medical History:  as a part of a household?: No       Family History:     Family History   Problem Relation Age of Onset    Stroke Mother     Hypertension Mother     Dementia Mother     Heart Disease Father     Stroke Father     Hypertension Father     No Known Problems Sister         Allergies:   Lisinopril, Codeine, Norco [hydrocodone-acetaminophen], and Percocet [oxycodone-acetaminophen]       Medical Decision Making-Imaging:   CT HEAD WO CONTRAST    Result Date: 1/24/2025  EXAMINATION: CT OF THE HEAD WITHOUT CONTRAST  1/24/2025 4:27 am TECHNIQUE: CT of the head was performed without the administration of intravenous contrast. Automated exposure control, iterative reconstruction, and/or weight based adjustment of the mA/kV was utilized to reduce the radiation dose to as low as reasonably achievable. COMPARISON: MR brain/head CT 01/23/2025 HISTORY: ORDERING SYSTEM PROVIDED HISTORY: post procedure FINDINGS: BRAIN/VENTRICLES: Evolving lacunar infarct within the right paramedian von and right thalamus.  Tiny infarcts in the cerebellum difficult to appreciate within the constraints of CT acquisition, as identified on the recent MRI brain.  Remote left PCA territory cortical infarct involving the occipital pole.  Moderate chronic small vessel ischemic changes.  Stable caliber and configuration the ventricles.  No extra-axial fluid collections.  No acute intracranial hemorrhage.  Streak artifact associated with the posterior circulation flow diverting stent with embolization coils. ORBITS: The visualized portion of the orbits demonstrate no acute abnormality. SINUSES: The visualized paranasal sinuses and mastoid air cells demonstrate no acute abnormality. SOFT TISSUES/SKULL:  No acute abnormality of the visualized skull or soft tissues.  Right frontal preethi hole defect.     1. Evolving lacunar infarct within the right paramedian von and right thalamus. Tiny infarcts in the cerebellum difficult to appreciate within the constraints of CT

## 2025-02-04 NOTE — PROGRESS NOTES
Occupational Therapy  Occupational Therapy Daily Treatment Note  Facility/Department: [facility] 1C STEPDOWN   Patient Name: Dahlia Zhang        MRN: [id number]    : 1950    Date of Service: 2025    Chief Complaint   Patient presents with    Extremity Weakness     Past Medical History:  has a past medical history of Anticoagulated, Anxiety and depression, Atrial fibrillation (HCC), Cerebral aneurysm, Cerebral artery occlusion with cerebral infarction (HCC), Colon polyps, COPD (chronic obstructive pulmonary disease) ([facility]), COVID-19, COVID-19 vaccine administered, DM II (diabetes mellitus, type II), controlled (HCC), GERD (gastroesophageal reflux disease), HTN (hypertension), Hyperlipidemia, Kidney stone, On home O2, Osteoarthritis, Sleep apnea, Under care of team, Under care of team, Under care of team, Under care of team, Wears glasses, Wears partial dentures, and Wellness examination.  Past Surgical History:  has a past surgical history that includes Thyroid surgery; Breast reduction surgery (Bilateral, ); Hysterectomy; hernia repair; Cerebral angiogram (2024); Cerebral angiogram (10/11/2023); Cerebral angiogram (2024); Cholecystectomy; Cerebral angiogram (2024); Breast biopsy (Right); Cataract extraction w/ intraocular lens implant (Bilateral, ); Colonoscopy w/ polypectomy (2023); other surgical history (01/15/2025); Colonoscopy (N/A, 2025); and Colonoscopy (N/A, 2025).    Discharge Recommendations  Discharge Recommendations: Patient would benefit from continued therapy after discharge       Assessment  Performance deficits / Impairments: Decreased functional mobility ;Decreased ADL status;Decreased safe awareness;Decreased endurance;Decreased balance  Assessment: Pt limited by above deficits impacting ADL completion, ADL transfers and safe functional mobility to maximize pts potential and quality of life to enable pt to rreturn home at maximum level of  lethargy)  Cognition  Overall Cognitive Status: Exceptions  Arousal/Alertness: Delayed responses to stimuli;Inconsistent responses to stimuli  Following Commands: Follows multistep commands with repitition;Follows multistep commands with increased time;Inconsistently follows commands  Attention Span: Attends with cues to redirect  Memory: Appears intact  Safety Judgement: Decreased awareness of need for assistance;Decreased awareness of need for safety  Problem Solving: Assistance required to implement solutions;Assistance required to generate solutions;Assistance required to correct errors made;Decreased awareness of errors  Insights: Decreased awareness of deficits  Initiation: Requires cues for some  Sequencing: Requires cues for some  Cognition Comment: pt very lethargic at start of session with inability to nod/shake head; increased responsiveness as session progressed with pt able to nod/shake head and respond verbally x2 occasions    Activities of Daily Living   ADL's not addressed on this date.    Balance  Balance  Sitting: With support;Without support (Pt sat EOB initially with maxA with posterior lean and right lateral lean. Pt progressed to [facility] for ensured safety, able to maintain midline sitting, tolerating ~15min. Pt left sitting in recliner at end of session.)  Standing: With support (Pt static stood in jenniffer steady without paddles  ~1min with Mayra x2 at recliner.)    Transfers/Mobility  Bed mobility  Supine to Sit: Dependent/Total  Sit to Supine: Unable to assess  Scooting: Maximal assistance  Bed Mobility Comments: HOB elevated ~40 degrees. Pt dependent to transition sup>sit EOB. Pt required maxA for scooting forward toward EOB in prep to use JENNIFFER STEDY.         Transfers  Sit to stand: Moderate assistance;2 Person assistance  Stand to sit: Minimal assistance;2 Person assistance  Transfer Comments: Pt stood with modA x2 to transition sit to stand with use of jenniffer steady x2 attempts. Pt demo's sit to

## 2025-02-04 NOTE — PROGRESS NOTES
[facility]  Speech Language Pathology    Date: 2/4/2025  Patient Name: Dahlia Zhang  YOB: 1950   AGE: 74 y.o.  MRN: [id number]        Patient Not Available for Speech Therapy     Due to:  [] Testing  [] Hemodialysis  [] Cancelled by RN  [] Surgery   [] Intubation/Sedation/Pain Medication  [] Medical instability  [x] Other: Unable to wake pt. Despite max verbal and tactile cues.      Next scheduled treatment: 2/5  Completed by: MONTY CONTRERAS, SLP, M.A. CCC-SLP

## 2025-02-04 NOTE — PROGRESS NOTES
Patient lethargic tonight, but arousable to voice and painful stimuli. She is able to wake up long enough to say hi, but is unable to stay awake long enough to take medications safely or answer any orientation questions. V/s are stable, no signs of distress noted. Dr. Kang made aware, no new orders at this time. Just observe for now.

## 2025-02-05 ENCOUNTER — APPOINTMENT (OUTPATIENT)
Dept: GENERAL RADIOLOGY | Age: 75
DRG: 023 | End: 2025-02-05
Payer: MEDICARE

## 2025-02-05 LAB
ANION GAP SERPL CALCULATED.3IONS-SCNC: 12 MMOL/L (ref 9–16)
BASOPHILS # BLD: 0.11 K/UL (ref 0–0.2)
BASOPHILS NFR BLD: 1 % (ref 0–2)
BUN SERPL-MCNC: 7 MG/DL (ref 8–23)
CALCIUM SERPL-MCNC: 8.3 MG/DL (ref 8.6–10.4)
CHLORIDE SERPL-SCNC: 107 MMOL/L (ref 98–107)
CO2 SERPL-SCNC: 21 MMOL/L (ref 20–31)
CREAT SERPL-MCNC: 0.5 MG/DL (ref 0.6–0.9)
EOSINOPHIL # BLD: 0.45 K/UL (ref 0–0.44)
EOSINOPHILS RELATIVE PERCENT: 4 % (ref 1–4)
ERYTHROCYTE [DISTWIDTH] IN BLOOD BY AUTOMATED COUNT: 14.5 % (ref 11.8–14.4)
GFR, ESTIMATED: >90 ML/MIN/1.73M2
GLUCOSE BLD-MCNC: 106 MG/DL (ref 65–105)
GLUCOSE BLD-MCNC: 112 MG/DL (ref 65–105)
GLUCOSE BLD-MCNC: 143 MG/DL (ref 65–105)
GLUCOSE BLD-MCNC: 198 MG/DL (ref 65–105)
GLUCOSE SERPL-MCNC: 104 MG/DL (ref 74–99)
HCT VFR BLD AUTO: 30.9 % (ref 36.3–47.1)
HGB BLD-MCNC: 9.4 G/DL (ref 11.9–15.1)
IMM GRANULOCYTES # BLD AUTO: 0.04 K/UL (ref 0–0.3)
IMM GRANULOCYTES NFR BLD: 0 %
LYMPHOCYTES NFR BLD: 3.47 K/UL (ref 1.1–3.7)
LYMPHOCYTES RELATIVE PERCENT: 28 % (ref 24–43)
MCH RBC QN AUTO: 27.2 PG (ref 25.2–33.5)
MCHC RBC AUTO-ENTMCNC: 30.4 G/DL (ref 28.4–34.8)
MCV RBC AUTO: 89.3 FL (ref 82.6–102.9)
MONOCYTES NFR BLD: 1.38 K/UL (ref 0.1–1.2)
MONOCYTES NFR BLD: 11 % (ref 3–12)
NEUTROPHILS NFR BLD: 56 % (ref 36–65)
NEUTS SEG NFR BLD: 7.05 K/UL (ref 1.5–8.1)
NRBC BLD-RTO: 0 PER 100 WBC
PLATELET # BLD AUTO: 406 K/UL (ref 138–453)
PMV BLD AUTO: 11 FL (ref 8.1–13.5)
POTASSIUM SERPL-SCNC: 3.1 MMOL/L (ref 3.7–5.3)
RBC # BLD AUTO: 3.46 M/UL (ref 3.95–5.11)
RBC # BLD: ABNORMAL 10*6/UL
SODIUM SERPL-SCNC: 140 MMOL/L (ref 136–145)
WBC OTHER # BLD: 12.5 K/UL (ref 3.5–11.3)

## 2025-02-05 PROCEDURE — 36415 COLL VENOUS BLD VENIPUNCTURE: CPT

## 2025-02-05 PROCEDURE — 94761 N-INVAS EAR/PLS OXIMETRY MLT: CPT

## 2025-02-05 PROCEDURE — 85025 COMPLETE CBC W/AUTO DIFF WBC: CPT

## 2025-02-05 PROCEDURE — 2580000003 HC RX 258

## 2025-02-05 PROCEDURE — 6370000000 HC RX 637 (ALT 250 FOR IP): Performed by: INTERNAL MEDICINE

## 2025-02-05 PROCEDURE — 6360000002 HC RX W HCPCS: Performed by: NURSE PRACTITIONER

## 2025-02-05 PROCEDURE — 80048 BASIC METABOLIC PNL TOTAL CA: CPT

## 2025-02-05 PROCEDURE — 94640 AIRWAY INHALATION TREATMENT: CPT

## 2025-02-05 PROCEDURE — 6370000000 HC RX 637 (ALT 250 FOR IP): Performed by: SURGERY

## 2025-02-05 PROCEDURE — 95816 EEG AWAKE AND DROWSY: CPT

## 2025-02-05 PROCEDURE — 6360000002 HC RX W HCPCS

## 2025-02-05 PROCEDURE — 6370000000 HC RX 637 (ALT 250 FOR IP): Performed by: NURSE PRACTITIONER

## 2025-02-05 PROCEDURE — 99232 SBSQ HOSP IP/OBS MODERATE 35: CPT | Performed by: INTERNAL MEDICINE

## 2025-02-05 PROCEDURE — 99233 SBSQ HOSP IP/OBS HIGH 50: CPT | Performed by: PSYCHIATRY & NEUROLOGY

## 2025-02-05 PROCEDURE — 6370000000 HC RX 637 (ALT 250 FOR IP): Performed by: HOSPITALIST

## 2025-02-05 PROCEDURE — 2060000000 HC ICU INTERMEDIATE R&B

## 2025-02-05 PROCEDURE — 6370000000 HC RX 637 (ALT 250 FOR IP)

## 2025-02-05 PROCEDURE — 95819 EEG AWAKE AND ASLEEP: CPT | Performed by: PSYCHIATRY & NEUROLOGY

## 2025-02-05 PROCEDURE — 74018 RADEX ABDOMEN 1 VIEW: CPT

## 2025-02-05 PROCEDURE — G0545 PR INHERENT VISIT TO INPT: HCPCS | Performed by: INTERNAL MEDICINE

## 2025-02-05 PROCEDURE — 82947 ASSAY GLUCOSE BLOOD QUANT: CPT

## 2025-02-05 RX ORDER — POTASSIUM CHLORIDE 1500 MG/1
40 TABLET, EXTENDED RELEASE ORAL PRN
Status: DISCONTINUED | OUTPATIENT
Start: 2025-02-05 | End: 2025-02-15 | Stop reason: HOSPADM

## 2025-02-05 RX ORDER — POTASSIUM CHLORIDE 7.45 MG/ML
10 INJECTION INTRAVENOUS PRN
Status: DISCONTINUED | OUTPATIENT
Start: 2025-02-05 | End: 2025-02-15 | Stop reason: HOSPADM

## 2025-02-05 RX ORDER — POTASSIUM CHLORIDE 7.45 MG/ML
10 INJECTION INTRAVENOUS
Status: COMPLETED | OUTPATIENT
Start: 2025-02-05 | End: 2025-02-05

## 2025-02-05 RX ADMIN — APIXABAN 5 MG: 5 TABLET, FILM COATED ORAL at 22:07

## 2025-02-05 RX ADMIN — SODIUM CHLORIDE 5 MG/HR: 9 INJECTION, SOLUTION INTRAVENOUS at 16:24

## 2025-02-05 RX ADMIN — METOPROLOL TARTRATE 50 MG: 50 TABLET ORAL at 22:07

## 2025-02-05 RX ADMIN — CLONIDINE HYDROCHLORIDE 0.1 MG: 0.1 TABLET ORAL at 22:07

## 2025-02-05 RX ADMIN — TIOTROPIUM BROMIDE INHALATION SPRAY 2 PUFF: 3.12 SPRAY, METERED RESPIRATORY (INHALATION) at 07:47

## 2025-02-05 RX ADMIN — Medication 10 MG: at 15:45

## 2025-02-05 RX ADMIN — HYDRALAZINE HYDROCHLORIDE 10 MG: 20 INJECTION INTRAMUSCULAR; INTRAVENOUS at 08:52

## 2025-02-05 RX ADMIN — HYDRALAZINE HYDROCHLORIDE 10 MG: 20 INJECTION INTRAMUSCULAR; INTRAVENOUS at 14:37

## 2025-02-05 RX ADMIN — INSULIN LISPRO 1 UNITS: 100 INJECTION, SOLUTION INTRAVENOUS; SUBCUTANEOUS at 08:51

## 2025-02-05 RX ADMIN — POTASSIUM CHLORIDE 10 MEQ: 7.46 INJECTION, SOLUTION INTRAVENOUS at 13:14

## 2025-02-05 RX ADMIN — SODIUM CHLORIDE 6.5 MG/HR: 9 INJECTION, SOLUTION INTRAVENOUS at 20:23

## 2025-02-05 RX ADMIN — POTASSIUM CHLORIDE 10 MEQ: 7.46 INJECTION, SOLUTION INTRAVENOUS at 12:19

## 2025-02-05 RX ADMIN — AMIODARONE HYDROCHLORIDE 200 MG: 200 TABLET ORAL at 22:07

## 2025-02-05 RX ADMIN — ROSUVASTATIN CALCIUM 20 MG: 20 TABLET, FILM COATED ORAL at 22:07

## 2025-02-05 NOTE — PROGRESS NOTES
[facility]  Office: [phone]  Magan Reina DO, Alex Ruiz DO, Zackery Woodson DO, Dino Faye DO, Nadia Nowak MD, Rebecca Kinney MD, Adriana Link MD, Adrienne Howe MD,  Herberth Zapata MD, Ruperto Rodriguez MD, Frances Correa MD,  Da Anderson DO, Salvador Valle MD, Prakash Rdz MD, Franki Reina DO, Vanessa Montes MD,  Aman Stahl DO, Angelica Richter MD, Isabela Busby MD, Delaney Hammonds MD, Alena Morgan MD,  Kedar Collins MD, Noemy Monroy MD, Erin Lord MD, Florecita Garcia MD, Konstantin Najera MD, Becca Oseguera MD, Alvarado Spangler DO, Alexis Trejo MD, Da Quinonez MD, Mohsin Reza, MD, Shirley Waterhouse, CNP,  Ladan Huntley CNP, Alvarado Quinones, CNP,  Chanda Pierce, DNP, Kristen Ordonez, CNP, Ignacia Aggarwal, CNP, Amna Zimmer, CNP, Jade Cope, CNP, Mari Cortes, PA-C, Edita Bustamante, CNP, Kaylan Patel, CNP,  Ayesha Amaya, CNP, Abby Jhaveri, CNP, Mikki Greenberg, CNP,  Scarlett Vazquez, CNS, Shima Coto CNP, Peg Juarez, CNP,   Dorita Mcallister, CNP           [facility]   IN-PATIENT SERVICE   [facility]    Progress Note    2/5/2025    12:15 PM    Name:   Dahlia Zhang  MRN:     [id number]     Acct:      [id number]   Room:   0135/0135-01   Day:  13  Admit Date:  1/23/2025  1:08 PM    PCP:   Thania Rahman MD  Code Status:  Full Code    Subjective:     C/C:   Chief Complaint   Patient presents with    Extremity Weakness     Interval History Status: not changed.     The patient was seen and examined at bedside. Patient is laying in bed with family at bedside. Vital signs are remarkable for systolic blood pressures in the 170s to 180s. The nurse states that patient has not been able to take PO medications due to difficulty swallowing and \"choking\" with water. Patient is scheduled for swallow evaluation today; will monitor the results for possible medication adjustments. Hemoglobin stable at 9.4 today. on  tobacco smoke. She has never used smokeless tobacco. She reports current alcohol use of about 1.0 standard drink of alcohol per week. She reports that she does not use drugs.     Family History:   Family History   Problem Relation Age of Onset    Stroke Mother     Hypertension Mother     Dementia Mother     Heart Disease Father     Stroke Father     Hypertension Father     No Known Problems Sister        Vitals:  BP (!) 183/57   Pulse 67   Temp 97.5 °F (36.4 °C) (Axillary)   Resp 14   Ht 1.702 m (5' 7\")   Wt 90.4 kg (199 lb 4.7 oz)   SpO2 96%   BMI 31.21 kg/m²   Temp (24hrs), Av.7 °F (36.5 °C), Min:97.5 °F (36.4 °C), Max:98 °F (36.7 °C)    Recent Labs     25  1156 25  1605 25  1941 25  0724   POCGLU 133* 97 176* 198*       I/O (24Hr):    Intake/Output Summary (Last 24 hours) at 2025 1215  Last data filed at 2025 0332  Gross per 24 hour   Intake 100 ml   Output 2500 ml   Net -2400 ml       Labs:  Hematology:  Recent Labs     25  0549 25  0423 02/04/25  1421 25  0252   WBC 12.5* 11.3  --  12.5*   RBC 3.25* 3.15*  --  3.46*   HGB 9.1* 8.6* 9.3* 9.4*   HCT 28.8* 27.9* 29.9* 30.9*   MCV 88.6 88.6  --  89.3   MCH 28.0 27.3  --  27.2   MCHC 31.6 30.8  --  30.4   RDW 14.2 14.4  --  14.5*    430  --  406   MPV 10.8 10.7  --  11.0     Chemistry:  Recent Labs     25  0549 25  0423 02/05/25  0252    142 140   K 3.2* 3.3* 3.1*    109* 107   CO2 24 22 21   GLUCOSE 121* 107* 104*   BUN 6* 9 7*   CREATININE 0.5* 0.7 0.5*   ANIONGAP 10 11 12   LABGLOM >90 >90 >90   CALCIUM 8.2* 8.2* 8.3*     Recent Labs     25  2007 25  0708 25  1156 25  1605 25  19425  0724   POCGLU 169* 116* 133* 97 176* 198*     ABG:  Lab Results   Component Value Date/Time    POCPH 7.409 10/12/2023 03:51 AM    POCPCO2 40.3 10/12/2023 03:51 AM    POCPO2 92.9 10/12/2023 03:51 AM    POCHCO3 25.5 10/12/2023 03:51 AM    PBEA 0.8 10/12/2023

## 2025-02-05 NOTE — PROGRESS NOTES
[facility] - Progress Note    Today's Date and Time: 2/5/2025, 9:20 AM    Impression :     Acute right pontine infarct and posterior circulation infarcts  S/p angiogram 1/23/25  Leukocytosis  CRP elevation  ESBL E. 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 Coli UTI. Stop date on 1-31-25.  Repeat urine culture from 1/23/25: No growth   No growth on blood cultures   Mycoplasma IgM: negative   Viral respiratory panel negative    Medical Decision Making/Summary/Discussion:2/5/2025         Elements of Medical Decision Making:  Note: I have independently performed the steps listed below as part of the medical decision making and evaluation.   Examined and discussed with patient.  E. coli ESBL complicated urinary tract infection  Urine culture positive on 1/16/2024.  Received treatment with Keflex.  Organism is resistant  Urine culture positive on 1/20/2025 at [facility].  Received treatment with Macrobid 1/22/2025  Acute right pontine infarct and posterior circulation infarcts  Status post angiogram 1/23/2025  Reactive leukocytosis  Lactic acidosis  Atrial fibrillation with RVR  Status post subarachnoid hemorrhage with ruptured basilar tip aneurysm on 10/11/2023  Status post web device embolization on 10/11/2023  Residual neck aneurysm status post LVIS placement on  CATCH URINE     Special Requests Site: Urine     Culture NO GROWTH              Medical Decision Making-Other:     Note:      Thank you for allowing us to participate in the care of this patient. Please call with questions.    Judy Valadez MD            ATTESTATION:    I have discussed the case, including pertinent history and exam findings with the medical resident or medical student. I have evaluated the  History, physical findings and pictures of the patient and the key elements of the encounter have been performed by me. I have reviewed the laboratory data, other diagnostic studies and discussed them with the medical resident or student. I have updated the medical record where necessary.    I agree with the assessment, plan and orders as documented by the medical resident or student and I have modified them as necessary.         Aleksey Craig MD.

## 2025-02-05 NOTE — PLAN OF CARE
Problem: Respiratory - Adult  Goal: Achieves optimal ventilation and oxygenation  2/5/2025 0856 by Aurora Lopez RCP  Outcome: Progressing

## 2025-02-05 NOTE — PLAN OF CARE
Problem: Chronic Conditions and Co-morbidities  Goal: Patient's chronic conditions and co-morbidity symptoms are monitored and maintained or improved  Outcome: Progressing     Problem: Discharge Planning  Goal: Discharge to home or other facility with appropriate resources  Outcome: Progressing     Problem: Pain  Goal: Verbalizes/displays adequate comfort level or baseline comfort level  Outcome: Progressing     Problem: Safety - Adult  Goal: Free from fall injury  2/5/2025 1725 by Judy Watkins RN  Outcome: Progressing  2/5/2025 0638 by Chani Meehan RN  Outcome: Progressing     Problem: ABCDS Injury Assessment  Goal: Absence of physical injury  2/5/2025 1725 by Judy Watkins RN  Outcome: Progressing  2/5/2025 0638 by Chani Meehan RN  Outcome: Progressing     Problem: Respiratory - Adult  Goal: Achieves optimal ventilation and oxygenation  2/5/2025 1725 by Judy Watkins RN  Outcome: Progressing  2/5/2025 0856 by Aurora Lopez RCP  Outcome: Progressing  2/5/2025 0638 by Chani Meehan RN  Outcome: Progressing     Problem: Neurosensory - Adult  Goal: Achieves stable or improved neurological status  Outcome: Progressing  Goal: Absence of seizures  Outcome: Progressing  Goal: Remains free of injury related to seizures activity  Outcome: Progressing  Goal: Achieves maximal functionality and self care  Outcome: Progressing     Problem: Cardiovascular - Adult  Goal: Maintains optimal cardiac output and hemodynamic stability  Outcome: Progressing  Goal: Absence of cardiac dysrhythmias or at baseline  Outcome: Progressing     Problem: Gastrointestinal - Adult  Goal: Maintains or returns to baseline bowel function  Outcome: Progressing  Goal: Maintains adequate nutritional intake  Outcome: Progressing  Goal: Minimal or absence of nausea and vomiting  Outcome: Progressing     Problem: Infection - Adult  Goal: Absence of infection at discharge  2/5/2025 1725 by Judy Watkins RN  Outcome:

## 2025-02-05 NOTE — PROCEDURES
PROCEDURE NOTE  Date: 2/5/2025   Name: Dahlia Zhang  YOB: 1950    Procedures            Date: 2/5/2025  Referring physician: Dr. Keith    Indication  Patient aged 74 y with encephalopathy. EEG done to assess for epileptiform activity.    Introduction  This routine 25-minute EEG was recorded using the International 10-20 System on a [employer] workstation at 256 samples/s. Automated spike and seizure detection algorithms were applied.    Description  During the maximal alert state, a poorly-regulated, symmetric, and reactive 5 Hz posterior dominant rhythm was seen. No consistent focal slowing or interhemispheric asymmetry was noted. Stage I and stage II sleep were observed. There were no interictal epileptiform discharges or electrographic seizures.    Activations  Hyperventilation was not performed. Intermittent photic stimulation was performed and demonstrated no posterior driving response.    Impression  Abnormal awake EEG. The slowing mentioned above suggests mild-moderate non specific encephalopathy.     No epileptiform discharges were identified. Please note the absence of such activity on this record cannot conclusively rule out an epileptic disorder. If such is still clinically suspected, a repeat study with sleep deprivation and/or prolonged sampling may be helpful.    Da Blue MD  Epilepsy Board Certified.  Neurology Board Certified.    Electronically Signed

## 2025-02-05 NOTE — PROGRESS NOTES
[facility]  Speech Language Pathology    Date: 2/5/2025  Patient Name: Dahlia Zhang  YOB: 1950   AGE: 74 y.o.  MRN: [id number]        Patient Not Available for Speech Therapy     Due to:  [] Testing  [] Hemodialysis  [] Cancelled by RN  [] Surgery   [] Intubation/Sedation/Pain Medication  [] Medical instability  [x] Other:  Pt no awake and alert for ST BSSE X2 attempts on this date    Next scheduled treatment: as medically appropruiate  Completed by: Raquel Mooney, SLP, M.S. CCC-SLP

## 2025-02-05 NOTE — PROGRESS NOTES
Endovascular Neurosurgery Progress Notes    Pt Name: Dahlia Zhang  MRN: [id number]  YOB: 1950  Date of evaluation: 2/5/2025  Primary Care Physician: Thania Rahman MD  Reason for evaluation: Concern for pontine stroke in the presence of previously treated basilar anneurysm with WEB device and LVIS stent    SUBJECTIVE:     Patient seen and examined at bedside. MRI brain completed showing new infarct in the bilateral thalami, left occipital lobe and left midbrain. Examination revealing what seems to be ocular apraxia, absent blink to threat and sig dysarthria.      History of Chief Complaint:    Dahlia Zhang is a 74 y.o. right handed female with a history of ruptured basilar tip aneurysm in October 2023 s/p stenting, hypertension, hyperlipidemia, type 2 diabetes, GERD,  atrial fibrillation not on AC presents to [facility] for altered mental status.     On the morning of 1/23/2025, patient was noted to have increased lethargy and generalized weakness along with left facial droop.  These were concerning as patient was at her baseline and doing better yesterday.  She does not recall the time when she went to sleep, thus last known well is unknown.  Potentially she went to sleep between 6-8 PM on 1/22.      Patient was seen and examined at bedside upon arrival to [facility] ED. She was increasingly lethargic and short of breath, however she was able to cross midline without gaze preference, had intact fluency and repetition but did have mild dysarthria. She had generalized weakness.         CT head w/o contrast shows no acute finding.      CTA head and neck show flow diverting stent along basilar artery and proximal PCA with embolization coils at basilar tip. Severe narrowing within the distal basilar stent. Small amount of residual aneurysmal filling at the neck, approximately 2 mm.        Allergies  is allergic to lisinopril, codeine, norco [hydrocodone-acetaminophen], and  (HCC), GERD (gastroesophageal reflux disease), HTN (hypertension), Hyperlipidemia, Kidney stone, On home O2, Osteoarthritis, Sleep apnea, Under care of team, Under care of team, Under care of team, Under care of team, Wears glasses, Wears partial dentures, and Wellness examination.  Past Surgical History   has a past surgical history that includes Thyroid surgery; Breast reduction surgery (Bilateral, 1989); Hysterectomy; hernia repair; Cerebral angiogram (04/04/2024); Cerebral angiogram (10/11/2023); Cerebral angiogram (06/19/2024); Cholecystectomy; Cerebral angiogram (12/12/2024); Breast biopsy (Right); Cataract extraction w/ intraocular lens implant (Bilateral, 2020); Colonoscopy w/ polypectomy (01/19/2023); other surgical history (01/15/2025); Colonoscopy (N/A, 01/26/2025); and Colonoscopy (N/A, 1/26/2025).  Social History   reports that she has been smoking cigarettes. She started smoking about 58 years ago. She has a 71.6 pack-year smoking history. She has never been exposed to tobacco smoke. She has never used smokeless tobacco.   reports current alcohol use of about 1.0 standard drink of alcohol per week.   reports no history of drug use.  Family History  family history includes Dementia in her mother; Heart Disease in her father; Hypertension in her father and mother; No Known Problems in her sister; Stroke in her father and mother.    Review of Systems:  NA given current mentation.     Review of systems otherwise negative.      OBJECTIVE:   Vitals: BP (!) 159/71   Pulse 69   Temp 97.5 °F (36.4 °C) (Axillary)   Resp 14   Ht 1.702 m (5' 7\")   Wt 90.4 kg (199 lb 4.7 oz)   SpO2 96%   BMI 31.21 kg/m²       Gen: No acute distress  MS: Lethargic. Intermittently follows command,  dysarthric.  CN: Absent blink to threat and ocular apraxia seems to be evident. There is left sided facial droop  Motor: can elevate both uppers against gravity, 3+/5 and lowers as well 3/5.  Sens: Responds to LT in all  early ischemic changes in von and thalamus (See above).  Emergent DSA revealed: The stents are patent and well apposed with a filling defect at the basilar tip that could represent partial thrombus versus metal artifact. She was treated with a Canngrelor drip and showed neuro improvement. On 1/26, she developed rectal bleeding, with CTA indicating extravasation from the sigmoid colon Colonoscopy: bleeding diverticular disease. Aspirin and Brilinta were held, and GI recommends embolization or surgical evaluation if rebleeding occurs. 1/27/25 GI signed off and stated no evidence of recurrent bleeding.      Endovascular history: Ruptured basilar artery tip aneurysm, previously treated with a WEB intrasaccular device (10/11/2023) and a second-stage LVIS 4.5 x 23mm stent (6/2024) and a third-stage  LVIS 4.5 x 23mm stent spanning from the left P2 segment to the mid-basilar artery (1/15/2025) is completely obliterated.     2/1 - fluctuating episodes of lethargy, slurred speech and left gaze, which resolved with hydration and adjustment of BP meds. Rpt CT/CTA unremarkable for any new changes. No recurrence    PLANS:     -- -160  -- Restarted on Aspirin, no recurrence of hemorrhage  -- On eliquis.  -- MRI completed.   -- Afib RVR management with Cardiology on board  -- PT/OT/ST on board  -- Rest of care per primary team      Please arrange follow-up with Dr. Josafat baker in 6 weeks, and with  in 3-4 months.    Silvino Mathur MD, Pager [phone]  Electronically signed 02/05/25 at 8:03 AM  Stroke, Neurocritical Care & Neurointervention  [facility]  [facility]

## 2025-02-05 NOTE — PROGRESS NOTES
Nutrition Assessment     Type and Reason for Visit: Reassess    Nutrition Recommendations/Plan:   Continue NPO as medically necessary  Provide frozen ONS (Magic cup) TID with meals to support nutrition intake (once back on diet)  Replace electrolytes as able  Collect daily wt to ensure pt has not had wt loss during admission  Monitor PO intake, wt, meds, labs,      Malnutrition Assessment:  Malnutrition Status: At risk for malnutrition (poor PO intakes)    Nutrition Assessment:  Pt transferring to MRI at time of visit.. Currently NPO. Prior to NPO, tolerated regular diet well, but had poor intakes averaging 1-25% per documentation. Per chart review, pt strongly dislikes ONS, but did consume frozen ONS (magic cup). Recommend addint to diet once started back on PO diet. LBM 2/4. No edema. Labs: K 3.1 mmol/L, Ca 8.3 mg/dL. Meds: Humalog, senokot.    Estimated Daily Nutrient Needs:  Energy (kcal):  6085-6603 kcal/d Weight Used for Energy Requirements: Ideal     Protein (g):   gm/d Weight Used for Protein Requirements: Ideal        Fluid (ml/day):  or per physician Method Used for Fluid Requirements: 1 ml/kcal    Nutrition Related Findings:   Meds/labs reviewed Wound Type: None    Current Nutrition Therapies:    Diet NPO    Anthropometric Measures:  Height: 170.2 cm (5' 7\")  Current Body Wt: 90.3 kg (199 lb)   BMI: 31.2    Nutrition Diagnosis:   Inadequate oral intake related to inadequate protein-energy intake as evidenced by intake 0-25%    Nutrition Interventions:   Food and/or Nutrient Delivery: Continue NPO, Start Oral Diet, Start Oral Nutrition Supplement  Nutrition Education/Counseling: No recommendation at this time  Coordination of Nutrition Care: Continue to monitor while inpatient    Goals:  Goals: Meet at least 75% of estimated needs, prior to discharge  Type of Goal: Continue current goal  Previous Goal Met: No Progress toward Goal(s)    Nutrition Monitoring and Evaluation:   Behavioral-Environmental  Outcomes: None Identified  Food/Nutrient Intake Outcomes: Food and Nutrient Intake, Supplement Intake  Physical Signs/Symptoms Outcomes: Biochemical Data, GI Status, Weight    Discharge Planning:    Continue Oral Nutrition Supplement     Meghan Eagle MS, RDN, LDN  Contact: [phone]

## 2025-02-05 NOTE — PROGRESS NOTES
[facility]   IN-PATIENT SERVICE   [facility]    Progress Note             Date:   2/5/2025  Patient name:  Dahlia Zhang  Date of admission:  1/23/2025  1:08 PM  MRN:   [id number]  Account:  [id number]  YOB: 1950  PCP:    Thania Rahman MD  Room:   [address] [place] [address]  Code Status:    Full Code    Chief Complaint:     Chief Complaint   Patient presents with    Extremity Weakness       Interval hx:     The patient was seen and examined at bedside. 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    Brief History of Present Illness:     As per H&P:     The patient is a 74 y.o. female with PMH of SAH 2/2 basilar tip aneurysm rupture (s/p WEB, LVIS x2), COPD, afib, HTN, HLD, ischemic stroke (L occipital) and smoking who presented to the [facility] ED on 1/23 with altered mental status, increased lethargy, dysarthria, L facial droop, and generalized weakness. Initial NIHSS was 9.      Hospital Course:  1/24: ID consulted 2/2 ESBL positive E.Coli UTI, started Meropenum  1/26: Patient experiencing persistent epigastric and back pain. CTA A/P with active GIB. GI consulted and patient underwent colonoscopy displaying severe pandiverticulosis with resulting deformity/luminal narrowing in sigmoid colon, and multiple polyps throughout colon. GI recommending IR embolization if further bleeding.   1/27: Afib RVR. Increased Metoprolol PO dose. Cardiology on board.   1/28: Converted to NSR overnight. Transitioned to Amiodarone PO. Hypertensive overnight, SBP as high as 190. Started Losartan 50 mg QD.   1/29: Patient intermittently confused overnight, received Ambien for sleep. Slight dysarthria

## 2025-02-05 NOTE — PLAN OF CARE
Problem: Safety - Adult  Goal: Free from fall injury  2/5/2025 0638 by Chani Meehan RN  Outcome: Progressing     Problem: ABCDS Injury Assessment  Goal: Absence of physical injury  2/5/2025 0638 by Chani Meehan RN  Outcome: Progressing     Problem: Respiratory - Adult  Goal: Achieves optimal ventilation and oxygenation  2/5/2025 0638 by Chani Meehan RN  Outcome: Progressing     Problem: Infection - Adult  Goal: Absence of infection at discharge  2/5/2025 0638 by Chani Meehan RN  Outcome: Progressing

## 2025-02-06 ENCOUNTER — APPOINTMENT (OUTPATIENT)
Dept: GENERAL RADIOLOGY | Age: 75
DRG: 023 | End: 2025-02-06
Payer: MEDICARE

## 2025-02-06 LAB
ANION GAP SERPL CALCULATED.3IONS-SCNC: 13 MMOL/L (ref 9–16)
BASOPHILS # BLD: 0.11 K/UL (ref 0–0.2)
BASOPHILS NFR BLD: 1 % (ref 0–2)
BILIRUB UR QL STRIP: NEGATIVE
BUN SERPL-MCNC: 4 MG/DL (ref 8–23)
CALCIUM SERPL-MCNC: 8.3 MG/DL (ref 8.6–10.4)
CHLORIDE SERPL-SCNC: 105 MMOL/L (ref 98–107)
CLARITY UR: CLEAR
CO2 SERPL-SCNC: 21 MMOL/L (ref 20–31)
COLOR UR: YELLOW
COMMENT: ABNORMAL
CREAT SERPL-MCNC: 0.4 MG/DL (ref 0.6–0.9)
EOSINOPHIL # BLD: 0.46 K/UL (ref 0–0.44)
EOSINOPHILS RELATIVE PERCENT: 3 % (ref 1–4)
ERYTHROCYTE [DISTWIDTH] IN BLOOD BY AUTOMATED COUNT: 14.5 % (ref 11.8–14.4)
GFR, ESTIMATED: >90 ML/MIN/1.73M2
GLUCOSE BLD-MCNC: 101 MG/DL (ref 65–105)
GLUCOSE BLD-MCNC: 106 MG/DL (ref 65–105)
GLUCOSE BLD-MCNC: 115 MG/DL (ref 65–105)
GLUCOSE BLD-MCNC: 120 MG/DL (ref 65–105)
GLUCOSE SERPL-MCNC: 109 MG/DL (ref 74–99)
GLUCOSE UR STRIP-MCNC: NEGATIVE MG/DL
HCT VFR BLD AUTO: 31.1 % (ref 36.3–47.1)
HGB BLD-MCNC: 9.7 G/DL (ref 11.9–15.1)
HGB UR QL STRIP.AUTO: NEGATIVE
IMM GRANULOCYTES # BLD AUTO: 0.07 K/UL (ref 0–0.3)
IMM GRANULOCYTES NFR BLD: 1 %
KETONES UR STRIP-MCNC: ABNORMAL MG/DL
LEUKOCYTE ESTERASE UR QL STRIP: NEGATIVE
LYMPHOCYTES NFR BLD: 2.95 K/UL (ref 1.1–3.7)
LYMPHOCYTES RELATIVE PERCENT: 19 % (ref 24–43)
MCH RBC QN AUTO: 27.2 PG (ref 25.2–33.5)
MCHC RBC AUTO-ENTMCNC: 31.2 G/DL (ref 28.4–34.8)
MCV RBC AUTO: 87.1 FL (ref 82.6–102.9)
MONOCYTES NFR BLD: 1.33 K/UL (ref 0.1–1.2)
MONOCYTES NFR BLD: 9 % (ref 3–12)
NEUTROPHILS NFR BLD: 67 % (ref 36–65)
NEUTS SEG NFR BLD: 10.37 K/UL (ref 1.5–8.1)
NITRITE UR QL STRIP: NEGATIVE
NRBC BLD-RTO: 0 PER 100 WBC
PH UR STRIP: 7 [PH] (ref 5–8)
PLATELET # BLD AUTO: 447 K/UL (ref 138–453)
PMV BLD AUTO: 10.8 FL (ref 8.1–13.5)
POTASSIUM SERPL-SCNC: 3.3 MMOL/L (ref 3.7–5.3)
PROCALCITONIN SERPL-MCNC: 0.05 NG/ML (ref 0–0.09)
PROT UR STRIP-MCNC: NEGATIVE MG/DL
RBC # BLD AUTO: 3.57 M/UL (ref 3.95–5.11)
RBC # BLD: ABNORMAL 10*6/UL
SODIUM SERPL-SCNC: 139 MMOL/L (ref 136–145)
SP GR UR STRIP: 1.01 (ref 1–1.03)
UROBILINOGEN UR STRIP-ACNC: NORMAL EU/DL (ref 0–1)
WBC OTHER # BLD: 15.3 K/UL (ref 3.5–11.3)

## 2025-02-06 PROCEDURE — 6370000000 HC RX 637 (ALT 250 FOR IP): Performed by: NURSE PRACTITIONER

## 2025-02-06 PROCEDURE — 6370000000 HC RX 637 (ALT 250 FOR IP)

## 2025-02-06 PROCEDURE — 81003 URINALYSIS AUTO W/O SCOPE: CPT

## 2025-02-06 PROCEDURE — 6370000000 HC RX 637 (ALT 250 FOR IP): Performed by: INTERNAL MEDICINE

## 2025-02-06 PROCEDURE — 92507 TX SP LANG VOICE COMM INDIV: CPT

## 2025-02-06 PROCEDURE — 85025 COMPLETE CBC W/AUTO DIFF WBC: CPT

## 2025-02-06 PROCEDURE — 99232 SBSQ HOSP IP/OBS MODERATE 35: CPT | Performed by: INTERNAL MEDICINE

## 2025-02-06 PROCEDURE — 99233 SBSQ HOSP IP/OBS HIGH 50: CPT | Performed by: PSYCHIATRY & NEUROLOGY

## 2025-02-06 PROCEDURE — 6370000000 HC RX 637 (ALT 250 FOR IP): Performed by: SURGERY

## 2025-02-06 PROCEDURE — 82947 ASSAY GLUCOSE BLOOD QUANT: CPT

## 2025-02-06 PROCEDURE — 99232 SBSQ HOSP IP/OBS MODERATE 35: CPT | Performed by: PSYCHIATRY & NEUROLOGY

## 2025-02-06 PROCEDURE — 87040 BLOOD CULTURE FOR BACTERIA: CPT

## 2025-02-06 PROCEDURE — 6370000000 HC RX 637 (ALT 250 FOR IP): Performed by: HOSPITALIST

## 2025-02-06 PROCEDURE — 92526 ORAL FUNCTION THERAPY: CPT

## 2025-02-06 PROCEDURE — G0545 PR INHERENT VISIT TO INPT: HCPCS | Performed by: INTERNAL MEDICINE

## 2025-02-06 PROCEDURE — 6360000002 HC RX W HCPCS

## 2025-02-06 PROCEDURE — 84145 PROCALCITONIN (PCT): CPT

## 2025-02-06 PROCEDURE — 74018 RADEX ABDOMEN 1 VIEW: CPT

## 2025-02-06 PROCEDURE — APPSS30 APP SPLIT SHARED TIME 16-30 MINUTES: Performed by: NURSE PRACTITIONER

## 2025-02-06 PROCEDURE — 2060000000 HC ICU INTERMEDIATE R&B

## 2025-02-06 PROCEDURE — 2580000003 HC RX 258

## 2025-02-06 PROCEDURE — 71045 X-RAY EXAM CHEST 1 VIEW: CPT

## 2025-02-06 PROCEDURE — 80048 BASIC METABOLIC PNL TOTAL CA: CPT

## 2025-02-06 PROCEDURE — 36415 COLL VENOUS BLD VENIPUNCTURE: CPT

## 2025-02-06 RX ORDER — LABETALOL HYDROCHLORIDE 5 MG/ML
10 INJECTION, SOLUTION INTRAVENOUS
Status: DISCONTINUED | OUTPATIENT
Start: 2025-02-06 | End: 2025-02-11

## 2025-02-06 RX ORDER — HYDRALAZINE HYDROCHLORIDE 20 MG/ML
10 INJECTION INTRAMUSCULAR; INTRAVENOUS EVERY 4 HOURS PRN
Status: DISCONTINUED | OUTPATIENT
Start: 2025-02-06 | End: 2025-02-11

## 2025-02-06 RX ADMIN — CLONIDINE HYDROCHLORIDE 0.1 MG: 0.1 TABLET ORAL at 20:23

## 2025-02-06 RX ADMIN — ASPIRIN 81 MG: 81 TABLET, COATED ORAL at 10:07

## 2025-02-06 RX ADMIN — ESCITALOPRAM OXALATE 10 MG: 10 TABLET ORAL at 10:07

## 2025-02-06 RX ADMIN — SENNOSIDES AND DOCUSATE SODIUM 2 TABLET: 50; 8.6 TABLET ORAL at 10:06

## 2025-02-06 RX ADMIN — METOPROLOL TARTRATE 50 MG: 50 TABLET ORAL at 10:07

## 2025-02-06 RX ADMIN — AMIODARONE HYDROCHLORIDE 200 MG: 200 TABLET ORAL at 10:07

## 2025-02-06 RX ADMIN — APIXABAN 5 MG: 5 TABLET, FILM COATED ORAL at 10:07

## 2025-02-06 RX ADMIN — HYDRALAZINE HYDROCHLORIDE 10 MG: 20 INJECTION INTRAMUSCULAR; INTRAVENOUS at 09:11

## 2025-02-06 RX ADMIN — HYDRALAZINE HYDROCHLORIDE 10 MG: 20 INJECTION INTRAMUSCULAR; INTRAVENOUS at 16:05

## 2025-02-06 RX ADMIN — SODIUM CHLORIDE: 9 INJECTION, SOLUTION INTRAVENOUS at 20:39

## 2025-02-06 RX ADMIN — LOSARTAN POTASSIUM 100 MG: 50 TABLET, FILM COATED ORAL at 10:06

## 2025-02-06 RX ADMIN — ROSUVASTATIN CALCIUM 20 MG: 20 TABLET, FILM COATED ORAL at 20:23

## 2025-02-06 RX ADMIN — TIOTROPIUM BROMIDE INHALATION SPRAY 2 PUFF: 3.12 SPRAY, METERED RESPIRATORY (INHALATION) at 06:33

## 2025-02-06 RX ADMIN — AMIODARONE HYDROCHLORIDE 200 MG: 200 TABLET ORAL at 20:23

## 2025-02-06 RX ADMIN — CLONIDINE HYDROCHLORIDE 0.1 MG: 0.1 TABLET ORAL at 10:06

## 2025-02-06 RX ADMIN — APIXABAN 5 MG: 5 TABLET, FILM COATED ORAL at 20:23

## 2025-02-06 RX ADMIN — PANTOPRAZOLE SODIUM 40 MG: 40 TABLET, DELAYED RELEASE ORAL at 10:07

## 2025-02-06 RX ADMIN — POTASSIUM BICARBONATE 40 MEQ: 782 TABLET, EFFERVESCENT ORAL at 10:18

## 2025-02-06 NOTE — PROGRESS NOTES
Facility/Department: [address] STEPDOWN   CLINICAL BEDSIDE SWALLOW EVALUATION    NAME: Dahlia Zhang  : 1950  MRN: [id number]    ADMISSION DATE: 2025  ADMITTING DIAGNOSIS: has Atrial fibrillation (HCC); Subarachnoid hemorrhage from basilar artery aneurysm (HCC); MRI-safe endovascular aneurysm coil present; Basilar artery aneurysm (HCC); Aneurysm, cerebral; Ischemic stroke (HCC); Hypertension; Mixed hyperlipidemia; Current every day smoker; Visual disturbance; Basilar artery embolism; Encephalopathy; Left-sided weakness; Thalamic stroke (HCC); Complicated UTI (urinary tract infection); Infection due to ESBL-producing Escherichia coli; Facial droop; CRP elevated; Bandemia; Pyelonephritis; Lactic acidosis; Gastrointestinal hemorrhage; Acute blood loss anemia; Abnormal CT of the abdomen; Shock; Diverticulosis; Polyp of colon; and Cerebral multi-infarct state on their problem list.      Recent Chest Xray/CT of Chest: 25 XR CHEST PORTABLE   IMPRESSION:  No acute cardiopulmonary process.    Date of Eval: 2025  Evaluating Therapist: KRANTHI Rose    Current Diet level:  Current Diet : NPO  Current Liquid Diet : NPO    Primary Complaint   74-year-old woman with prior history of SAH secondary to basilar tip aneurysm rupture in 10/23 s/p stenting, HTN, HLD, left occipital ischemic stroke, smoking history presents with altered mental status, left facial droop and generalized weakness. Hospital course complicated by ESBL E. coli UTI and GI bleed. A-fib with RVR on metoprolol. EEG showed theta frequencies suggesting mild to moderate encephalopathy. Patient had decreased mentation and repeat CTP/MRI confirmed new ischemic infarcts in the bilateral thalamic region, left occipital lobe, left midbrain region suggestive of new ischemic stroke     Pain:  Pain Assessment:  Zapata-Baker Pain Rating: No hurt  Patient's Stated Pain Goal: 0 - No pain  Pain Location: Head  Pain Orientation: Left  Pain Descriptors:

## 2025-02-06 NOTE — CARE COORDINATION
Transitional Planning    [facility] Quality Flow/Interdisciplinary Rounds Progress Note    Quality Flow Rounds held on February 6, 2025 at 0930    Disciplines Attending:  Bedside Nurse, , and Nursing Unit Leadership    Barriers to Discharge: NG, medical complications    Anticipated Discharge Date:  2/7/25     Anticipated Discharge Disposition: LTAC, snf    [facility] RISK OF UNPLANNED READMISSION 2.0             19.2 Total Score        Discussed patient goal for the day, patient clinical progression, and barriers to discharge.  The following Goal(s) of the Day/Commitment(s) have been identified:   Discussed pt at IDR, has NG and has declined with PT/OT, may be more appropriate for snf.      CAMILA MICHAELS  February 6, 2025      1615 May be LTAC appropriate.

## 2025-02-06 NOTE — PLAN OF CARE
Problem: Pain  Goal: Verbalizes/displays adequate comfort level or baseline comfort level  2/6/2025 0622 by Chani Meehan RN  Outcome: Progressing     Problem: Safety - Adult  Goal: Free from fall injury  2/6/2025 0622 by Chani Meehan, RN  Outcome: Progressing     Problem: ABCDS Injury Assessment  Goal: Absence of physical injury  2/6/2025 0622 by Chani Meehan RN  Outcome: Progressing     Problem: Neurosensory - Adult  Goal: Achieves stable or improved neurological status  2/6/2025 0622 by Chani Meehan RN  Outcome: Progressing  Flowsheets (Taken 2/5/2025 2000)  Achieves stable or improved neurological status: Assess for and report changes in neurological status     Problem: Cardiovascular - Adult  Goal: Maintains optimal cardiac output and hemodynamic stability  2/6/2025 0622 by Chani Meehan RN  Outcome: Progressing  Flowsheets (Taken 2/5/2025 2000)  Maintains optimal cardiac output and hemodynamic stability: Monitor blood pressure and heart rate     Problem: Infection - Adult  Goal: Absence of infection at discharge  2/6/2025 0622 by Chani Meehan RN  Outcome: Progressing

## 2025-02-06 NOTE — PLAN OF CARE
Problem: Chronic Conditions and Co-morbidities  Goal: Patient's chronic conditions and co-morbidity symptoms are monitored and maintained or improved  Outcome: Progressing     Problem: Discharge Planning  Goal: Discharge to home or other facility with appropriate resources  Outcome: Progressing     Problem: Pain  Goal: Verbalizes/displays adequate comfort level or baseline comfort level  2/6/2025 1752 by Judy Watkins RN  Outcome: Progressing  2/6/2025 0622 by Chani Meehan RN  Outcome: Progressing     Problem: Safety - Adult  Goal: Free from fall injury  2/6/2025 1752 by Judy Watkins RN  Outcome: Progressing  2/6/2025 0622 by Chani Meehan RN  Outcome: Progressing     Problem: ABCDS Injury Assessment  Goal: Absence of physical injury  2/6/2025 1752 by Judy Watkins RN  Outcome: Progressing  2/6/2025 0622 by Chani Meehan RN  Outcome: Progressing     Problem: Respiratory - Adult  Goal: Achieves optimal ventilation and oxygenation  Outcome: Progressing     Problem: Neurosensory - Adult  Goal: Achieves stable or improved neurological status  2/6/2025 1752 by Judy Watkins RN  Outcome: Progressing  2/6/2025 0622 by Chani Meehan RN  Outcome: Progressing  Flowsheets (Taken 2/5/2025 2000)  Achieves stable or improved neurological status: Assess for and report changes in neurological status  Goal: Absence of seizures  Outcome: Progressing  Goal: Remains free of injury related to seizures activity  Outcome: Progressing  Goal: Achieves maximal functionality and self care  Outcome: Progressing     Problem: Cardiovascular - Adult  Goal: Maintains optimal cardiac output and hemodynamic stability  2/6/2025 1752 by Judy Watkins RN  Outcome: Progressing  2/6/2025 0622 by Chani Meehan RN  Outcome: Progressing  Flowsheets (Taken 2/5/2025 2000)  Maintains optimal cardiac output and hemodynamic stability: Monitor blood pressure and heart rate  Goal: Absence of cardiac dysrhythmias or at

## 2025-02-06 NOTE — PROGRESS NOTES
[facility]   IN-PATIENT SERVICE   [facility]    Progress Note             Date:   2/6/2025  Patient name:  Dahlia Zhang  Date of admission:  1/23/2025  1:08 PM  MRN:   [id number]  Account:  [id number]  YOB: 1950  PCP:    Thania Rahman MD  Room:   [address] [place] [address]  Code Status:    Full Code    Chief Complaint:     Chief Complaint   Patient presents with    Extremity Weakness       Interval hx:     The patient was seen and examined at bedside.  Hypertensive yesterday and patient was started on Cardene drip, NG tube placed for resume medications, white blood cell 15, started infectious workup, monitor off antibiotic, potassium replaced.  Exam unchanged from previous day    Brief History of Present Illness:     As per H&P:     The patient is a 74 y.o. 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 Initial NIHSS was 9.      Hospital Course:  1/24: ID consulted 2/2 ESBL positive E.Coli UTI, started Meropenum  1/26: Patient experiencing persistent epigastric and back pain. CTA A/P with active GIB. GI consulted and patient underwent colonoscopy displaying severe pandiverticulosis with resulting deformity/luminal narrowing in sigmoid colon, and multiple polyps throughout colon. GI recommending IR embolization if further bleeding.   1/27: Afib RVR. Increased Metoprolol PO dose. Cardiology on board.   1/28: Converted to NSR overnight. Transitioned to Amiodarone PO. Hypertensive overnight, SBP as high as 190. Started Losartan 50 mg QD.   1/29: Patient intermittently confused overnight, received Ambien for sleep. Slight dysarthria noted. Hypertensive overnight, increased Losartan to 100 mg QD.         Past Medical History:     Past  afebrile  -Chest x-ray unremarkable  -Follow infectious workup  -Monitor off antibiotic     Essential HTN  -Internal medicine consulted  - Continue Losartan 100 mg QD  - Continue Amiodarone 200 mg PO BID, will decrease on outpatient cardiology follow-up  - Continue Lopressor 50 mg BID   -Clonidine 0.1 mg twice daily  - Cardizem 120 mg ER QD  - Cardiology signed off:   Discontinue hydralazine, HCTZ due to tachycardia and hypokalemia  Cardiology recommending watchman's device outpatient for A-fib     ESBL E.Coli UTI (resolved)  - Meropenem course completed 1/31  - Infectious disease consulted per protocol     Acute lower GI bleed (resolved)  - s/p colonoscopy on 1/26   - Continue Senokot-S daily  -Hemoglobin 8.6 with no sign of active bleeding     Hx of COPD  - 3L NC at baseline  - Keep O2 sats >88-92%     DVT prophylaxis: Lovenox subcu  GI prophylaxis: PPI  Diet: Regular, 5 carb choices (75 gm/meal)  Disposition: Likely ARU when medically stable     OT/PT/SW: all consulted     Code status: Full code          Patient and Family Stroke Education    Patient and/or family Education discussed today:  exact type of CVA unknown  atrial fibrillation and carotid stenosis  Stroke Education Topics: Medication Compliance  Importance of Followup    Patient or family members expressed understanding on topics that were discussed and all their questions were answered.      Follow-up further recommendations after discussing case with the attending.  The plan was discussed with the patient, patient's family and the medical staff.   Consultations:   IP CONSULT TO CASE MANAGEMENT  IP CONSULT TO GI  IP CONSULT TO CARDIOLOGY  IP CONSULT TO PHYSICAL MEDICINE REHAB  IP CONSULT TO OPHTHALMOLOGY  IP CONSULT TO CARDIOLOGY  IP CONSULT TO INTERNAL MEDICINE  IP CONSULT TO VASCULAR ACCESS TEAM    Patient is admitted as inpatient status because of co-morbidities listed above, severity of signs and symptoms as outlined, requirement for current  medical therapies and most importantly because of direct risk to patient if care not provided in a hospital setting.    Parmjit Riggs MD  Neurology Resident  2/6/2025  9:31 AM    Copy sent to Thania Joe MD

## 2025-02-06 NOTE — PROGRESS NOTES
Physical Therapy        Physical Therapy Cancel Note      DATE: 2025    NAME: Dahlia Zhang  MRN: [id number]   : 1950      Patient not seen this date for Physical Therapy due to:    Patient is not appropriate for PT treatment at this time. Patient lethargic, unable to follow commands and open eyes. RN notified.    Will check back as able.       Electronically signed by Tanisha Schwab PTA on 2025 at 11:18 AM

## 2025-02-06 NOTE — PROGRESS NOTES
[facility]  Office: [phone]  Magan Reina DO, Alex Ruiz DO, Zackery Woodson DO, Dino Faye DO, Nadia Nowak MD, Rebecca Kinney MD, Adriana Link MD, Adrienne Howe MD,  Herberth Zapata MD, Ruperto Rodriguez MD, Frances Correa MD,  Da Anderson DO, Salvador Valle MD, Prakash Rdz MD, Franki Reina DO, Vanessa Montes MD,  Aman Stahl DO, Angelica Rihcter MD, Isabela Busby MD, Delaney Hammonds MD, Alena Morgan MD,  Kedar Collins MD, Noemy Monroy MD, Erin Lord MD, Florecita Garcia MD, Konstantin Najera MD, Becca Oseguera MD, Alvarado Spangler DO, Alexis Trejo MD, Da Quinonez MD, Mohsin Reza, MD, Shirley Waterhouse, CNP,  Ladan Huntley CNP, Alvarado Quinones, CNP,  Chanda Pierce, DNP, Kristen Ordonez, CNP, Ignacia Aggarwal, CNP, Amna Zimmer, CNP, Jade Cope, CNP, Mari Cortes, PA-C, Edita Bustamante, CNP, Kaylan Patel, CNP,  Ayesha Amaya, CNP, Abby Jhaveri, CNP, Mikki Greenberg, CNP,  Scarlett Vazquez, CNS, Shima Coto CNP, Peg Juarez CNP,   Dorita Mcallister, CNP           [facility]   IN-PATIENT SERVICE   [facility]    Progress Note    2/6/2025    10:18 AM    Name:   Dahlia Zhang  MRN:     [id number]     Acct:      [id number]   Room:   0135/0135-01   Day:  14  Admit Date:  1/23/2025  1:08 PM    PCP:   Thania Rahman MD  Code Status:  Full Code    Subjective:     C/C:   Chief Complaint   Patient presents with    Extremity Weakness     Interval History Status: not changed.     The patient was seen and examined at bedside. Patient is laying in bed. Vital signs are remarkable for systolic blood pressures in the 150s to 190s. Cardene drip started last night due to hypertension. Cardene discontinued overnight. NG tube was placed yesterday. The nurse states that NG tube was displaced last night; so patient was not able to receive morning oral medications. NG tube repositioned this morning. Placement verified with KUB. Will

## 2025-02-06 NOTE — PROGRESS NOTES
Endovascular Neurosurgery Progress Notes    Pt Name: Dahlia Zhang  MRN: [id number]  YOB: 1950  Date of evaluation: 2/6/2025  Primary Care Physician: Thania Rahman MD  Reason for evaluation: Concern for pontine stroke in the presence of previously treated basilar anneurysm with WEB device and LVIS stent    SUBJECTIVE:     Patient seen and examined at bedside. Examination unchanged compared to yesterday. NAEO     History of Chief Complaint:    Dahlia Zhang is a 74 y.o. 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     On the morning of 1/23/2025, patient was noted to have increased lethargy and generalized weakness along with left facial droop.  These were concerning as patient was at her baseline and doing better yesterday.  She does not recall the time when she went to sleep, thus last known well is unknown.  Potentially she went to sleep between 6-8 PM on 1/22.      Patient was seen and examined at bedside upon arrival to [facility] ED. She was increasingly lethargic and short of breath, however she was able to cross midline without gaze preference, had intact fluency and repetition but did have mild dysarthria. She had generalized weakness.         CT head w/o contrast shows no acute finding.      CTA head and neck show flow diverting stent along basilar artery and proximal PCA with embolization coils at basilar tip. Severe narrowing within the distal basilar stent. Small amount of residual aneurysmal filling at the neck, approximately 2 mm.        Allergies  is allergic to lisinopril, codeine, norco [hydrocodone-acetaminophen], and percocet [oxycodone-acetaminophen].  Medications  Prior to Admission medications    Medication Sig Start Date End Date Taking? Authorizing Provider   metFORMIN  Lucy RUTH, APRN - CNP    Scheduled Meds:   [Held by provider] traZODone  50 mg Oral Nightly    cloNIDine  0.1 mg Oral BID    apixaban  5 mg Oral BID    dilTIAZem  120 mg Oral Daily    metoprolol tartrate  50 mg Oral BID    pantoprazole  40 mg Oral QAM AC    losartan  100 mg Oral Daily    sennosides-docusate sodium  2 tablet Oral Daily    aspirin  81 mg Oral Daily    amiodarone  200 mg Oral BID    lidocaine  1 patch TransDERmal Daily    insulin lispro  0-4 Units SubCUTAneous 4x Daily AC & HS    [Held by provider] ticagrelor  90 mg Oral BID    escitalopram  10 mg Oral Daily    tiotropium  2 puff Inhalation Daily RT    sodium chloride flush  5-40 mL IntraVENous 2 times per day    rosuvastatin  20 mg Oral Nightly     Continuous Infusions:   niCARdipine Stopped (02/06/25 0057)    sodium chloride 75 mL/hr at 02/03/25 2220    sodium chloride      dextrose      sodium chloride Stopped (01/30/25 0211)    sodium chloride      sodium chloride       PRN Meds:.hydrALAZINE, labetalol, potassium chloride **OR** potassium alternative oral replacement **OR** potassium chloride, melatonin, ipratropium 0.5 mg-albuterol 2.5 mg, [Held by provider] zolpidem, sodium chloride, glucose, dextrose bolus **OR** dextrose bolus, glucagon (rDNA), dextrose, acetaminophen, sodium chloride flush, sodium chloride, polyethylene glycol, albuterol sulfate HFA, sodium chloride flush, sodium chloride, magnesium sulfate, ondansetron **OR** ondansetron, sodium chloride flush, sodium chloride  Past Medical History   has a past medical history of Anticoagulated, Anxiety and depression, Atrial fibrillation (HCC), Cerebral aneurysm, Cerebral artery occlusion with cerebral infarction (HCC), Colon polyps, COPD (chronic obstructive pulmonary disease) (HCC), COVID-19, COVID-19 vaccine administered, DM II (diabetes mellitus, type II), controlled (HCC), GERD (gastroesophageal reflux disease), HTN (hypertension), Hyperlipidemia, Kidney stone, On home O2,

## 2025-02-06 NOTE — PROGRESS NOTES
Sexual activity: Not Currently     Partners: Male   Other Topics Concern    Not on file   Social History Narrative    Not on file     Social Determinants of Health     Financial Resource Strain: Low Risk  (1/22/2025)    Received from [employer]    Overall Financial Resource Strain (CARDIA)     Difficulty of Paying Living Expenses: Not hard at all   Food Insecurity: No Food Insecurity (1/22/2025)    Received from [employer]    Hunger Screening     Within the past 12 months we worried whether our food would run out before we got money to buy more.: Never True     Within the past 12 months the food we bought just didn't last and we didn't have money to get more.: Never True   Transportation Needs: No Transportation Needs (1/22/2025)    Received from [employer]    PRAPARE - Transportation     Lack of Transportation (Medical): No     Lack of Transportation (Non-Medical): No   Physical Activity: Inactive (1/10/2022)    Received from [employer], [facility]    Exercise Vital Sign     Days of Exercise per Week: 0 days     Minutes of Exercise per Session: 0 min   Stress: No Stress Concern Present (1/10/2022)    Received from [employer], [facility]    [employer] [place] [employer] - Occupational Stress Questionnaire     Feeling of Stress : Only a little   Social Connections: Moderately Isolated (1/10/2022)    Received from [employer], [facility]    Social Connection and Isolation Panel [NHANES]     Frequency of Communication with Friends and Family: More than three times a week     Frequency of Social Gatherings with Friends and Family: Once a week     Attends Worship Services: Never     Active Member of Clubs or Organizations: No     Attends Club or Organization Meetings: Never     Marital Status:    Intimate Partner Violence: Not on file   Housing Stability: Low Risk  (1/22/2025)     hole defect.     1. Evolving lacunar infarct within the right paramedian von and right thalamus. Tiny infarcts in the cerebellum difficult to appreciate within the constraints of CT acquisition, as identified on the recent MRI brain. 2. No acute intracranial hemorrhage or significant mass effect. 3. Remote left PCA territory cortical infarct involving the occipital pole. 4. Moderate chronic small vessel ischemic changes.     XR CHEST PORTABLE    Result Date: 1/24/2025  EXAMINATION: ONE XRAY VIEW OF THE CHEST 1/24/2025 12:15 am COMPARISON: None. HISTORY: ORDERING SYSTEM PROVIDED HISTORY: SOB and wheezing - rule out infection vs fluid overload TECHNOLOGIST PROVIDED HISTORY: SOB and wheezing - rule out infection vs fluid overload Reason for Exam: Portable/Supine FINDINGS: The mediastinum is unremarkable. The cardiac silhouette is normal size. The lungs are clear.  There are calcified nodules in the left lung base suggesting granulomas.     1. No acute cardiopulmonary process. 2. Calcified nodules in the left lung base suggesting granulomas.     MRI BRAIN WO CONTRAST    Result Date: 1/23/2025  EXAMINATION: MRI OF THE BRAIN WITHOUT CONTRAST,  1/23/2025 5:14 pm TECHNIQUE: Multiplanar multisequence MRI of the brain was performed without the administration of intravenous contrast. COMPARISON: 01/23/2025 and 08/08/2024 HISTORY: ORDERING SYSTEM PROVIDED HISTORY:  Left facial droop, recent hx of basilar stent and prior SAH in October 2023. TECHNOLOGIST PROVIDED HISTORY: Left facial droop, recent hx of basilar stent and prior SAH in October 2023. Decision Support Exception - unselect if not a suspected or confirmed emergency medical condition->Emergency Medical Condition (MA) Reason for Exam:  Left facial droop, recent hx of basilar stent and prior SAH in October 2023. FINDINGS: The examination is motion degraded. INTRACRANIAL STRUCTURES/VENTRICLES: Acute infarcts in the posterior circulation.  Large acute infarct in the right  107 105   CO2 21 21   BUN 7* 4*   CREATININE 0.5* 0.4*     Hepatic Function Panel: No results for input(s): \"LABALBU\", \"BILIDIR\", \"IBILI\", \"BILITOT\", \"ALKPHOS\", \"ALT\", \"AST\" in the last 72 hours.    Invalid input(s): \"PROT\"  No results found for: \"VANCOTROUGH\"     Medications:      [Held by provider] traZODone  50 mg Oral Nightly    cloNIDine  0.1 mg Oral BID    apixaban  5 mg Oral BID    dilTIAZem  120 mg Oral Daily    metoprolol tartrate  50 mg Oral BID    pantoprazole  40 mg Oral QAM AC    losartan  100 mg Oral Daily    sennosides-docusate sodium  2 tablet Oral Daily    aspirin  81 mg Oral Daily    amiodarone  200 mg Oral BID    lidocaine  1 patch TransDERmal Daily    insulin lispro  0-4 Units SubCUTAneous 4x Daily AC & HS    [Held by provider] ticagrelor  90 mg Oral BID    escitalopram  10 mg Oral Daily    tiotropium  2 puff Inhalation Daily RT    sodium chloride flush  5-40 mL IntraVENous 2 times per day    rosuvastatin  20 mg Oral Nightly       Thank you for allowing us to participate in the care of this patient. Please call with questions.    VIKTORIA Parmar  Pager: [phone]  - Office: [phone]

## 2025-02-06 NOTE — PROGRESS NOTES
Occupational Therapy    [facility]  Occupational Therapy Not Seen Note    DATE: 2025    NAME: Dahlia Zhang  MRN: [id number]   : 1950      Patient not seen this date for Occupational Therapy due to:    Patient is not appropriate for active participation in OT treatment at this time d/t unable to arouse or become alert w/ verbal/tactile cueing, pt very lethargic w/ sternal rub, RN present during attempt.     Will continue to check back as able.    Electronically signed by NEGRO Low on 2025 at 11:18 AM

## 2025-02-06 NOTE — PROGRESS NOTES
Speech Language Pathology  [facility]    Speech Language Treatment Note    Date: 2/6/2025  Patient’s Name: Dahlia Zhang  MRN: [id number]  Diagnosis:   Patient Active Problem List   Diagnosis Code    Atrial fibrillation (HCC) I48.91    Subarachnoid hemorrhage from basilar artery aneurysm (HCC) I60.4    MRI-safe endovascular aneurysm coil present Z95.828    Basilar artery aneurysm (HCC) I72.5    Aneurysm, cerebral I67.1    Ischemic stroke (HCC) I63.9    Hypertension I10    Mixed hyperlipidemia E78.2    Current every day smoker F17.200    Visual disturbance H53.9    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    Encephalopathy G93.40    Left-sided weakness R53.1    Thalamic stroke ([facility]) I63.81    Complicated UTI (urinary tract infection) N39.0    Infection due to ESBL-producing Escherichia coli A49.8, Z16.12    Facial droop R29.810    CRP elevated R79.82    Bandemia D72.825    Pyelonephritis N12    Lactic acidosis E87.20    Gastrointestinal hemorrhage K92.2    Acute blood loss anemia D62    Abnormal CT of the abdomen R93.5    Shock R57.9    Diverticulosis K57.90    Polyp of colon K63.5    Cerebral multi-infarct state I69.30       Pain: 0/10, pt does not appear in pain.     Speech and Language Treatment  Treatment time: 8521-1171    Subjective: [x] Alert [] Cooperative     [] Confused     [] Agitated    [] Lethargic      Objective/Assessment:  Auditory Comprehension: -Following 1-step commands: 0/10, did not increased despite max verbal/visual cues (wiggle fingers, close eyes, squeeze hands, open mouth, close mouth, etc).     -Yes/No Questions: 0/8 increasing to 1/8 given max verbal cues    Verbal Expression: -Automatic speech  (#1-10): 0/10, did not increase using max visual/verbal cues (fingers)     -Automatic speech (nursery rhymes): 0/3 (happy birthday, twinkle twinkle, row your boat)    - Automatic speech (stating name): 0/3 attempts ST providing prompt \"I am Dahlia\" \"I am _____\".     Other: Due to  decreased visual status providing pt with visual cues is difficult and unsure how much she is able to see. Will continue to attempt cues as able.     Plan:  [x] Continue ST services    [] Discharge from ST:      Discharge recommendations: []  Further therapy recommended at discharge.The patient should be able to tolerate at least 3 hours of therapy per day over 5 days or 15 hours over 7 days. [x] Further therapy recommended at discharge.   [] No therapy recommended at discharge.      Treatment completed by: Angelina Osorio M.A., CCC-SLP

## 2025-02-07 ENCOUNTER — APPOINTMENT (OUTPATIENT)
Dept: CT IMAGING | Age: 75
DRG: 023 | End: 2025-02-07
Payer: MEDICARE

## 2025-02-07 ENCOUNTER — APPOINTMENT (OUTPATIENT)
Dept: MRI IMAGING | Age: 75
DRG: 023 | End: 2025-02-07
Payer: MEDICARE

## 2025-02-07 ENCOUNTER — APPOINTMENT (OUTPATIENT)
Dept: INTERVENTIONAL RADIOLOGY/VASCULAR | Age: 75
DRG: 023 | End: 2025-02-07
Payer: MEDICARE

## 2025-02-07 ENCOUNTER — ANESTHESIA EVENT (OUTPATIENT)
Dept: INTERVENTIONAL RADIOLOGY/VASCULAR | Age: 75
End: 2025-02-07
Payer: MEDICARE

## 2025-02-07 ENCOUNTER — ANESTHESIA (OUTPATIENT)
Dept: INTERVENTIONAL RADIOLOGY/VASCULAR | Age: 75
End: 2025-02-07
Payer: MEDICARE

## 2025-02-07 LAB
ACT BLD: 149 SEC (ref 79–149)
ANION GAP SERPL CALCULATED.3IONS-SCNC: 14 MMOL/L (ref 9–16)
BASOPHILS # BLD: 0.13 K/UL (ref 0–0.2)
BASOPHILS NFR BLD: 1 % (ref 0–2)
BUN SERPL-MCNC: 8 MG/DL (ref 8–23)
CALCIUM SERPL-MCNC: 8.4 MG/DL (ref 8.6–10.4)
CHLORIDE SERPL-SCNC: 108 MMOL/L (ref 98–107)
CO2 SERPL-SCNC: 18 MMOL/L (ref 20–31)
CREAT SERPL-MCNC: 0.6 MG/DL (ref 0.6–0.9)
EOSINOPHIL # BLD: 0.52 K/UL (ref 0–0.44)
EOSINOPHILS RELATIVE PERCENT: 4 % (ref 1–4)
ERYTHROCYTE [DISTWIDTH] IN BLOOD BY AUTOMATED COUNT: 14.7 % (ref 11.8–14.4)
GFR, ESTIMATED: >90 ML/MIN/1.73M2
GLUCOSE BLD-MCNC: 87 MG/DL (ref 65–105)
GLUCOSE BLD-MCNC: 89 MG/DL (ref 65–105)
GLUCOSE BLD-MCNC: 97 MG/DL (ref 65–105)
GLUCOSE SERPL-MCNC: 81 MG/DL (ref 74–99)
HCT VFR BLD AUTO: 25.8 % (ref 36.3–47.1)
HCT VFR BLD AUTO: 31.5 % (ref 36.3–47.1)
HGB BLD-MCNC: 8.2 G/DL (ref 11.9–15.1)
HGB BLD-MCNC: 9.3 G/DL (ref 11.9–15.1)
IMM GRANULOCYTES # BLD AUTO: 0.05 K/UL (ref 0–0.3)
IMM GRANULOCYTES NFR BLD: 0 %
LYMPHOCYTES NFR BLD: 2.92 K/UL (ref 1.1–3.7)
LYMPHOCYTES RELATIVE PERCENT: 22 % (ref 24–43)
MCH RBC QN AUTO: 27.1 PG (ref 25.2–33.5)
MCHC RBC AUTO-ENTMCNC: 29.5 G/DL (ref 28.4–34.8)
MCV RBC AUTO: 91.8 FL (ref 82.6–102.9)
MONOCYTES NFR BLD: 1.33 K/UL (ref 0.1–1.2)
MONOCYTES NFR BLD: 10 % (ref 3–12)
NEUTROPHILS NFR BLD: 63 % (ref 36–65)
NEUTS SEG NFR BLD: 8.34 K/UL (ref 1.5–8.1)
NRBC BLD-RTO: 0 PER 100 WBC
PHOSPHATE SERPL-MCNC: 3.3 MG/DL (ref 2.5–4.5)
PLATELET # BLD AUTO: 448 K/UL (ref 138–453)
PMV BLD AUTO: 10.9 FL (ref 8.1–13.5)
POTASSIUM SERPL-SCNC: 3.5 MMOL/L (ref 3.7–5.3)
RBC # BLD AUTO: 3.43 M/UL (ref 3.95–5.11)
RBC # BLD: ABNORMAL 10*6/UL
SODIUM SERPL-SCNC: 140 MMOL/L (ref 136–145)
WBC OTHER # BLD: 13.3 K/UL (ref 3.5–11.3)

## 2025-02-07 PROCEDURE — 2580000003 HC RX 258

## 2025-02-07 PROCEDURE — 6370000000 HC RX 637 (ALT 250 FOR IP): Performed by: HOSPITALIST

## 2025-02-07 PROCEDURE — 6360000004 HC RX CONTRAST MEDICATION: Performed by: PSYCHIATRY & NEUROLOGY

## 2025-02-07 PROCEDURE — 85347 COAGULATION TIME ACTIVATED: CPT

## 2025-02-07 PROCEDURE — C9460 INJECTION, CANGRELOR: HCPCS | Performed by: PSYCHIATRY & NEUROLOGY

## 2025-02-07 PROCEDURE — 2500000003 HC RX 250 WO HCPCS: Performed by: STUDENT IN AN ORGANIZED HEALTH CARE EDUCATION/TRAINING PROGRAM

## 2025-02-07 PROCEDURE — 70450 CT HEAD/BRAIN W/O DYE: CPT

## 2025-02-07 PROCEDURE — 2580000003 HC RX 258: Performed by: PSYCHIATRY & NEUROLOGY

## 2025-02-07 PROCEDURE — 6360000002 HC RX W HCPCS: Performed by: NURSE ANESTHETIST, CERTIFIED REGISTERED

## 2025-02-07 PROCEDURE — 85018 HEMOGLOBIN: CPT

## 2025-02-07 PROCEDURE — 6370000000 HC RX 637 (ALT 250 FOR IP): Performed by: SURGERY

## 2025-02-07 PROCEDURE — 6370000000 HC RX 637 (ALT 250 FOR IP): Performed by: NURSE PRACTITIONER

## 2025-02-07 PROCEDURE — 2500000003 HC RX 250 WO HCPCS: Performed by: INTERNAL MEDICINE

## 2025-02-07 PROCEDURE — 61630 BALO ANGIOPLASTY ICR PERQ: CPT

## 2025-02-07 PROCEDURE — 03CG3Z7 EXTIRPATION OF MATTER FROM INTRACRANIAL ARTERY USING STENT RETRIEVER, PERCUTANEOUS APPROACH: ICD-10-PCS | Performed by: PSYCHIATRY & NEUROLOGY

## 2025-02-07 PROCEDURE — 70498 CT ANGIOGRAPHY NECK: CPT

## 2025-02-07 PROCEDURE — 2580000003 HC RX 258: Performed by: NURSE ANESTHETIST, CERTIFIED REGISTERED

## 2025-02-07 PROCEDURE — C1769 GUIDE WIRE: HCPCS

## 2025-02-07 PROCEDURE — 3700000001 HC ADD 15 MINUTES (ANESTHESIA)

## 2025-02-07 PROCEDURE — 84100 ASSAY OF PHOSPHORUS: CPT

## 2025-02-07 PROCEDURE — 36415 COLL VENOUS BLD VENIPUNCTURE: CPT

## 2025-02-07 PROCEDURE — 6370000000 HC RX 637 (ALT 250 FOR IP): Performed by: INTERNAL MEDICINE

## 2025-02-07 PROCEDURE — 6360000002 HC RX W HCPCS

## 2025-02-07 PROCEDURE — 6370000000 HC RX 637 (ALT 250 FOR IP)

## 2025-02-07 PROCEDURE — 85014 HEMATOCRIT: CPT

## 2025-02-07 PROCEDURE — 61645 PERQ ART M-THROMBECT &/NFS: CPT | Performed by: PSYCHIATRY & NEUROLOGY

## 2025-02-07 PROCEDURE — 82947 ASSAY GLUCOSE BLOOD QUANT: CPT

## 2025-02-07 PROCEDURE — 3700000000 HC ANESTHESIA ATTENDED CARE

## 2025-02-07 PROCEDURE — 80048 BASIC METABOLIC PNL TOTAL CA: CPT

## 2025-02-07 PROCEDURE — B31R1ZZ FLUOROSCOPY OF INTRACRANIAL ARTERIES USING LOW OSMOLAR CONTRAST: ICD-10-PCS | Performed by: PSYCHIATRY & NEUROLOGY

## 2025-02-07 PROCEDURE — 99233 SBSQ HOSP IP/OBS HIGH 50: CPT | Performed by: PSYCHIATRY & NEUROLOGY

## 2025-02-07 PROCEDURE — 6360000004 HC RX CONTRAST MEDICATION: Performed by: INTERNAL MEDICINE

## 2025-02-07 PROCEDURE — 85025 COMPLETE CBC W/AUTO DIFF WBC: CPT

## 2025-02-07 PROCEDURE — 76937 US GUIDE VASCULAR ACCESS: CPT

## 2025-02-07 PROCEDURE — 70551 MRI BRAIN STEM W/O DYE: CPT

## 2025-02-07 PROCEDURE — 6360000002 HC RX W HCPCS: Performed by: PSYCHIATRY & NEUROLOGY

## 2025-02-07 PROCEDURE — 2000000000 HC ICU R&B

## 2025-02-07 RX ORDER — INSULIN LISPRO 100 [IU]/ML
0-4 INJECTION, SOLUTION INTRAVENOUS; SUBCUTANEOUS EVERY 4 HOURS
Status: DISCONTINUED | OUTPATIENT
Start: 2025-02-07 | End: 2025-02-15 | Stop reason: HOSPADM

## 2025-02-07 RX ORDER — HEPARIN SODIUM 1000 [USP'U]/ML
INJECTION, SOLUTION INTRAVENOUS; SUBCUTANEOUS
Status: DISCONTINUED | OUTPATIENT
Start: 2025-02-07 | End: 2025-02-07 | Stop reason: SDUPTHER

## 2025-02-07 RX ORDER — NICARDIPINE HYDROCHLORIDE 0.1 MG/ML
INJECTION INTRAVENOUS
Status: DISCONTINUED | OUTPATIENT
Start: 2025-02-07 | End: 2025-02-07 | Stop reason: SDUPTHER

## 2025-02-07 RX ORDER — SODIUM CHLORIDE 9 MG/ML
INJECTION, SOLUTION INTRAVENOUS PRN
Status: DISCONTINUED | OUTPATIENT
Start: 2025-02-07 | End: 2025-02-15 | Stop reason: HOSPADM

## 2025-02-07 RX ORDER — IOPAMIDOL 755 MG/ML
75 INJECTION, SOLUTION INTRAVASCULAR
Status: COMPLETED | OUTPATIENT
Start: 2025-02-07 | End: 2025-02-07

## 2025-02-07 RX ORDER — SODIUM CHLORIDE 0.9 % (FLUSH) 0.9 %
5-40 SYRINGE (ML) INJECTION EVERY 12 HOURS SCHEDULED
Status: DISCONTINUED | OUTPATIENT
Start: 2025-02-07 | End: 2025-02-15 | Stop reason: HOSPADM

## 2025-02-07 RX ORDER — IODIXANOL 270 MG/ML
100 INJECTION, SOLUTION INTRAVASCULAR
Status: COMPLETED | OUTPATIENT
Start: 2025-02-07 | End: 2025-02-07

## 2025-02-07 RX ORDER — SODIUM CHLORIDE, SODIUM LACTATE, POTASSIUM CHLORIDE, CALCIUM CHLORIDE 600; 310; 30; 20 MG/100ML; MG/100ML; MG/100ML; MG/100ML
INJECTION, SOLUTION INTRAVENOUS
Status: DISCONTINUED | OUTPATIENT
Start: 2025-02-07 | End: 2025-02-07 | Stop reason: SDUPTHER

## 2025-02-07 RX ORDER — SODIUM CHLORIDE 0.9 % (FLUSH) 0.9 %
5-40 SYRINGE (ML) INJECTION PRN
Status: DISCONTINUED | OUTPATIENT
Start: 2025-02-07 | End: 2025-02-15 | Stop reason: HOSPADM

## 2025-02-07 RX ORDER — CEFAZOLIN SODIUM 1 G/3ML
INJECTION, POWDER, FOR SOLUTION INTRAMUSCULAR; INTRAVENOUS
Status: DISCONTINUED | OUTPATIENT
Start: 2025-02-07 | End: 2025-02-07 | Stop reason: SDUPTHER

## 2025-02-07 RX ORDER — ONDANSETRON 2 MG/ML
INJECTION INTRAMUSCULAR; INTRAVENOUS
Status: DISCONTINUED | OUTPATIENT
Start: 2025-02-07 | End: 2025-02-07 | Stop reason: SDUPTHER

## 2025-02-07 RX ADMIN — SENNOSIDES AND DOCUSATE SODIUM 2 TABLET: 50; 8.6 TABLET ORAL at 10:34

## 2025-02-07 RX ADMIN — HEPARIN SODIUM 2000 UNITS: 1000 INJECTION INTRAVENOUS; SUBCUTANEOUS at 13:49

## 2025-02-07 RX ADMIN — ASPIRIN 81 MG: 81 TABLET, COATED ORAL at 10:34

## 2025-02-07 RX ADMIN — METOPROLOL TARTRATE 50 MG: 50 TABLET ORAL at 10:35

## 2025-02-07 RX ADMIN — CLONIDINE HYDROCHLORIDE 0.1 MG: 0.1 TABLET ORAL at 10:35

## 2025-02-07 RX ADMIN — LOSARTAN POTASSIUM 100 MG: 50 TABLET, FILM COATED ORAL at 10:35

## 2025-02-07 RX ADMIN — SODIUM CHLORIDE, PRESERVATIVE FREE 10 ML: 5 INJECTION INTRAVENOUS at 21:50

## 2025-02-07 RX ADMIN — SODIUM CHLORIDE: 9 INJECTION, SOLUTION INTRAVENOUS at 12:36

## 2025-02-07 RX ADMIN — NICARDIPINE HYDROCHLORIDE 2.5 MG/HR: 0.1 INJECTION INTRAVENOUS at 15:31

## 2025-02-07 RX ADMIN — IODIXANOL 110 ML: 270 INJECTION, SOLUTION INTRAVASCULAR at 17:20

## 2025-02-07 RX ADMIN — SODIUM CHLORIDE: 9 INJECTION, SOLUTION INTRAVENOUS at 21:26

## 2025-02-07 RX ADMIN — PANTOPRAZOLE SODIUM 40 MG: 40 TABLET, DELAYED RELEASE ORAL at 10:57

## 2025-02-07 RX ADMIN — SODIUM CHLORIDE, POTASSIUM CHLORIDE, SODIUM LACTATE AND CALCIUM CHLORIDE: 600; 310; 30; 20 INJECTION, SOLUTION INTRAVENOUS at 12:49

## 2025-02-07 RX ADMIN — CANGRELOR 2 MCG/KG/MIN: 50 INJECTION, POWDER, LYOPHILIZED, FOR SOLUTION INTRAVENOUS at 22:03

## 2025-02-07 RX ADMIN — SODIUM CHLORIDE, POTASSIUM CHLORIDE, SODIUM LACTATE AND CALCIUM CHLORIDE: 600; 310; 30; 20 INJECTION, SOLUTION INTRAVENOUS at 17:18

## 2025-02-07 RX ADMIN — SODIUM CHLORIDE, PRESERVATIVE FREE 10 ML: 5 INJECTION INTRAVENOUS at 21:49

## 2025-02-07 RX ADMIN — SODIUM CHLORIDE, PRESERVATIVE FREE 10 ML: 5 INJECTION INTRAVENOUS at 10:39

## 2025-02-07 RX ADMIN — ROSUVASTATIN CALCIUM 20 MG: 20 TABLET, FILM COATED ORAL at 21:50

## 2025-02-07 RX ADMIN — SODIUM CHLORIDE 5 MG/HR: 9 INJECTION, SOLUTION INTRAVENOUS at 18:08

## 2025-02-07 RX ADMIN — APIXABAN 5 MG: 5 TABLET, FILM COATED ORAL at 10:35

## 2025-02-07 RX ADMIN — AMIODARONE HYDROCHLORIDE 200 MG: 200 TABLET ORAL at 10:35

## 2025-02-07 RX ADMIN — POTASSIUM BICARBONATE 40 MEQ: 782 TABLET, EFFERVESCENT ORAL at 10:57

## 2025-02-07 RX ADMIN — IOPAMIDOL 75 ML: 755 INJECTION, SOLUTION INTRAVENOUS at 09:22

## 2025-02-07 RX ADMIN — CEFAZOLIN 2 G: 1 INJECTION, POWDER, FOR SOLUTION INTRAMUSCULAR; INTRAVENOUS at 13:26

## 2025-02-07 RX ADMIN — CLONIDINE HYDROCHLORIDE 0.1 MG: 0.1 TABLET ORAL at 21:50

## 2025-02-07 RX ADMIN — SODIUM CHLORIDE 2 MCG/KG/MIN: 9 INJECTION, SOLUTION INTRAVENOUS at 17:09

## 2025-02-07 RX ADMIN — AMIODARONE HYDROCHLORIDE 200 MG: 200 TABLET ORAL at 21:50

## 2025-02-07 RX ADMIN — ESCITALOPRAM OXALATE 10 MG: 10 TABLET ORAL at 10:35

## 2025-02-07 RX ADMIN — SODIUM CHLORIDE 7.5 MG/HR: 9 INJECTION, SOLUTION INTRAVENOUS at 21:29

## 2025-02-07 RX ADMIN — DILTIAZEM HYDROCHLORIDE 120 MG: 120 CAPSULE, COATED, EXTENDED RELEASE ORAL at 10:37

## 2025-02-07 RX ADMIN — ONDANSETRON 4 MG: 2 INJECTION INTRAMUSCULAR; INTRAVENOUS at 13:28

## 2025-02-07 ASSESSMENT — COPD QUESTIONNAIRES: CAT_SEVERITY: SEVERE

## 2025-02-07 NOTE — PROGRESS NOTES
Comprehensive Nutrition Assessment    Type and Reason for Visit:  Reassess    Nutrition Recommendations/Plan:   Continue NPO as medically necessary  When able, initiate standard with fiber (Jevity 1.5) @ 10 mL/hr and advance as tolerated to goal of 50 mL/hr  Monitor POC, nutrition status, wt, meds, labs, TF tolerance and adequacy     Malnutrition Assessment:  Malnutrition Status:  At risk for malnutrition (Poor intakes and NPO) (02/07/25 1131)    Context:  Chronic Illness     Findings of the 6 clinical characteristics of malnutrition:  Energy Intake:  75% or less estimated energy requirements for 1 month or longer  Weight Loss:  No weight loss     Body Fat Loss:  Mild body fat loss Orbital   Muscle Mass Loss:  Mild muscle mass loss Hand (interosseous)  Fluid Accumulation:  No fluid accumulation     Strength:  Not Performed    Nutrition Assessment:    Reassess for intake. Pt has had poor intakes of 0-25% when she had a diet and has been NPO since 2/4. Failed BSSE on 2/6. Spoke to RN and possible MBSS today if pt is more alert. RN also stated there is a possible consult to general surgery for PEG placement. Had a CT this morning. Currently in IR for DSA per physician. PS physician to discuss starting TF. When able to start TF after DSA, recommend standard with fiber (Jevity 1.5) with goal of 50mL/hr to provide 1800 kcals and 77 gm PRO/d. LBM 2/4. Labs: K 3.5 mmol/L, Ca 8.4 mg/dL. Meds: Protonix, senokot.    Nutrition Related Findings:    Meds/labs reviewed Wound Type: None       Current Nutrition Intake & Therapies:    Average Meal Intake: NPO  Average Supplements Intake: NPO  Diet NPO    Anthropometric Measures:  Height: 170.2 cm (5' 7\")  Ideal Body Weight (IBW): 135 lbs (61 kg)    Admission Body Weight: 90.3 kg (199 lb)  Current Body Weight: 90.3 kg (199 lb). Weight Source: Stated  Current BMI (kg/m2): 31.2  Usual Body Weight: 86.2 kg (190 lb)  % Weight Change (Calculated): 4.7  Weight Adjustment For: No

## 2025-02-07 NOTE — SEDATION DOCUMENTATION
Right femoral artery access obtained by dr loya    2000 units heparin IVP per anesthesia/verbal order dr barragan

## 2025-02-07 NOTE — PROGRESS NOTES
Patient not in room, at testing, discussed with nursing patient to be transferred to NICU-thought to have new CVA-patient increased lethargy ,not follow commands and not opening eyes.    Will follow noted prior with [facility]

## 2025-02-07 NOTE — ANESTHESIA PRE PROCEDURE
daily 10/26/23   Lucy Simmons APRN - CNP   metoprolol tartrate (LOPRESSOR) 50 MG tablet Take 1 tablet by mouth 2 times daily 10/26/23   Lucy Simmons APRN - CNP   dilTIAZem (CARDIZEM CD) 120 MG extended release capsule Take 1 capsule by mouth daily 10/27/23   Lucy Simmons APRN - CNP   potassium chloride (KLOR-CON M) 20 MEQ extended release tablet Take 1 tablet by mouth daily 10/26/23   Lucy Simmons APRN - CNP       Current medications:    Current Facility-Administered Medications   Medication Dose Route Frequency Provider Last Rate Last Admin    hydrALAZINE (APRESOLINE) injection 10 mg  10 mg IntraVENous Q4H PRN Margaret Graf MD   10 mg at 02/06/25 1605    labetalol (NORMODYNE;TRANDATE) injection 10 mg  10 mg IntraVENous Q1H PRN Margaret Graf MD        potassium chloride (KLOR-CON M) extended release tablet 40 mEq  40 mEq Oral PRN Parmjit Bejarano MD        Or    potassium bicarb-citric acid (EFFER-K) effervescent tablet 40 mEq  40 mEq Oral PRN Parmjit Bejarano MD   40 mEq at 02/07/25 1057    Or    potassium chloride 10 mEq/100 mL IVPB (Peripheral Line)  10 mEq IntraVENous PRN Parmjit Bejarano MD        niCARdipine (CARDENE) 25 mg in sodium chloride 0.9 % 250 mL infusion ([id number])  2.5-15 mg/hr IntraVENous Continuous Emmanuelle Villarreal MD   Stopped at 02/06/25 0057    [Held by provider] traZODone (DESYREL) tablet 50 mg  50 mg Oral Nightly Galileo Crandall DO   50 mg at 02/03/25 0252    cloNIDine (CATAPRES) tablet 0.1 mg  0.1 mg Oral BID Franki Reina DO   0.1 mg at 02/07/25 1035    apixaban (ELIQUIS) tablet 5 mg  5 mg Oral BID Parmjit Bejarano MD   5 mg at 02/07/25 1035    0.9 % sodium chloride infusion   IntraVENous Continuous Margaret Graf MD 75 mL/hr at 02/06/25 2039 New Bag at 02/06/25 2039    dilTIAZem (CARDIZEM CD) extended release capsule 120 mg  120 mg Oral Daily Valerie Baig APRN - NP   120 mg at 02/07/25 1037    metoprolol tartrate (LOPRESSOR)

## 2025-02-07 NOTE — PROGRESS NOTES
Endovascular Neurosurgery Progress Notes    Pt Name: Dahlia Zhang  MRN: [id number]  YOB: 1950  Date of evaluation: 2/7/2025  Primary Care Physician: Thania Rahman MD  Reason for evaluation: Concern for pontine stroke in the presence of previously treated basilar anneurysm with WEB device and LVIS stent    SUBJECTIVE:     Patient seen and examined at bedside. Examination unchanged compared to yesterday. NAEO. Undergoing CTH CTA.     History of Chief Complaint:    Dahlia Zhang is a 74 y.o. 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     On the morning of 1/23/2025, patient was noted to have increased lethargy and generalized weakness along with left facial droop.  These were concerning as patient was at her baseline and doing better yesterday.  She does not recall the time when she went to sleep, thus last known well is unknown.  Potentially she went to sleep between 6-8 PM on 1/22.      Patient was seen and examined at bedside upon arrival to [facility] ED. She was increasingly lethargic and short of breath, however she was able to cross midline without gaze preference, had intact fluency and repetition but did have mild dysarthria. She had generalized weakness.         CT head w/o contrast shows no acute finding.      CTA head and neck show flow diverting stent along basilar artery and proximal PCA with embolization coils at basilar tip. Severe narrowing within the distal basilar stent. Small amount of residual aneurysmal filling at the neck, approximately 2 mm.        Allergies  is allergic to lisinopril, codeine, norco [hydrocodone-acetaminophen], and percocet [oxycodone-acetaminophen].  Medications  Prior to Admission medications    Medication Sig Start Date End Date Taking? Authorizing

## 2025-02-07 NOTE — PROGRESS NOTES
[facility] Neurology   IN-PATIENT SERVICE   [facility]    Progress Note             Date:   2/7/2025  Patient name:  Dahlia Zhang  Date of admission:  1/23/2025  1:08 PM  MRN:   [id number]  Account:  [id number]  YOB: 1950  PCP:    Thania Rahman MD  Room:   [address] [place] [address]  Code Status:    Full Code    Chief Complaint:     Chief Complaint   Patient presents with    Extremity Weakness       Interval hx:     The patient was seen and examined at bedside.  Was on Cardene drip again yesterday, off Cardene midnight, try weaning of Cardene, white blood cells trending down, infectious workup unremarkable, afebrile, will monitor off antibiotic  Will have CT head without contrast  Today patient more lethargic, not opening eyes, not following commands but with withdrawal bilateral lower extremity and localized bilateral upper extremity.    Brief History of Present Illness:     As per H&P:     The patient is a 74 y.o. female with PMH of SAH 2/2 basilar tip aneurysm rupture (s/p WEB, LVIS x2), COPD, afib, HTN, HLD, ischemic stroke (L occipital) and smoking who presented to the [facility] ED on 1/23 with altered mental status, increased lethargy, dysarthria, L facial droop, and generalized weakness. Initial NIHSS was 9.      Hospital Course:  1/24: ID consulted 2/2 ESBL positive E.Coli UTI, started Meropenum  1/26: Patient experiencing persistent epigastric and back pain. CTA A/P with active GIB. GI consulted and patient underwent colonoscopy displaying severe pandiverticulosis with resulting deformity/luminal narrowing in sigmoid colon, and multiple polyps throughout colon. GI recommending IR embolization if further bleeding.   1/27: Afib RVR. Increased Metoprolol PO dose. Cardiology on board.   1/28: Converted to NSR overnight. Transitioned to Amiodarone PO. Hypertensive overnight, SBP as high as 190. Started Losartan 50 mg QD.   1/29: Patient intermittently confused overnight,  of COPD  - 3L NC at baseline  - Keep O2 sats >88-92%     DVT prophylaxis: Lovenox subcu  GI prophylaxis: PPI  Diet: Will start tube feed  Disposition: Likely ARU when medically stable     OT/PT/SW: all consulted     Code status: Full code          Patient and Family Stroke Education    Patient and/or family Education discussed today:  exact type of CVA unknown  atrial fibrillation and carotid stenosis  Stroke Education Topics: Medication Compliance  Importance of Followup    Patient or family members expressed understanding on topics that were discussed and all their questions were answered.      Follow-up further recommendations after discussing case with the attending.  The plan was discussed with the patient, patient's family and the medical staff.   Consultations:   IP CONSULT TO CASE MANAGEMENT  IP CONSULT TO GI  IP CONSULT TO CARDIOLOGY  IP CONSULT TO PHYSICAL MEDICINE REHAB  IP CONSULT TO OPHTHALMOLOGY  IP CONSULT TO CARDIOLOGY  IP CONSULT TO INTERNAL MEDICINE  IP CONSULT TO VASCULAR ACCESS TEAM    Patient is admitted as inpatient status because of co-morbidities listed above, severity of signs and symptoms as outlined, requirement for current medical therapies and most importantly because of direct risk to patient if care not provided in a hospital setting.    Parmjit Riggs MD  Neurology Resident  2/7/2025  9:07 AM    Copy sent to Thania Joe MD

## 2025-02-07 NOTE — ANESTHESIA POSTPROCEDURE EVALUATION
Department of Anesthesiology  Postprocedure Note    Patient: Dahlia Zhang  MRN: [id number]  YOB: 1950  Date of evaluation: 2/7/2025    Procedure Summary       Date: 02/07/25 Room / Location: [facility] Procedures    Anesthesia Start: 1236 Anesthesia Stop: 1756    Procedure: IR ANGIOGRAM CAROTID CEREBRAL BILATERAL Diagnosis: (worsening stroke symptoms)    Scheduled Providers:  Responsible Provider: Emeterio Aguayo MD    Anesthesia Type: general ASA Status: 4 - Emergent            Anesthesia Type: No value filed.    Thao Phase I:      Thao Phase II:    POST-OP ANESTHESIA NOTE       BP (!) 153/46   Pulse 57   Temp 98 °F (36.7 °C)   Resp 24   Ht 1.702 m (5' 7\")   Wt 90.4 kg (199 lb 4.7 oz)   SpO2 99%   BMI 31.21 kg/m²    Pain Assessment: None - Denies Pain  Pain Level: 0      Anesthesia Post Evaluation    Patient location during evaluation: ICU  Patient participation: complete - patient cannot participate  Level of consciousness: obtunded/minimal responses  Pain score: 0  Airway patency: patent  Nausea & Vomiting: no vomiting and no nausea  Cardiovascular status: hemodynamically stable  Respiratory status: acceptable  Hydration status: stable  Pain management: adequate        No notable events documented.

## 2025-02-07 NOTE — PROGRESS NOTES
Physician Progress Note      PATIENT:               SANTO DAWSON  [facility] #:                  [id number]  :                       1950  ADMIT DATE:       2025 1:08 PM  DISCH DATE:  RESPONDING  PROVIDER #:        FAMILIA ZAMAN          QUERY TEXT:    Pt admitted with Acute Stroke .  Pt noted to have elevated B/P's overnight 2/6   requiring Cardene gtt short period B/P's 199/78, 190/83, 154/114, HR 56-86.   If possible, please document in progress notes and discharge summary if you   are evaluating and/or treating any of the following:    The medical record reflects the following:  Risk Factors: Age 74 Stroke HTN Dysphagia SA-Fib  Clinical Indicators: elevated B/P's overnight 2/6 requiring Cardene gtt short   period B/P's 199/78, 190/83, 154/114, HR 56-86. 2/6 IM PN Vital signs are   remarkable for systolic blood pressures in the 150s to 190s. Cardene drip   started last night due to hypertension. Cardene discontinued overnight. NG   tube was placed yesterday. The nurse states that NG tube was displaced last   night; so patient was not able to receive morning oral medications. NG tube   repositioned this morning  Treatment: Cardene gtt, labs, monitor,  Any questions  Please call:  Constance Chakraborty RN,BSN,[facility]  M-F  6am-230 pm  [phone]  [email or web address]        Defined as SBP of >180 OR DBP >120 w/o associated organ damage. S/s may or may   not be present, but can include severe headache, SOB, epistaxis, severe   anxiety. Tx: adjustment of oral BP medication; IV meds not usually required.  Hypertensive Emergency: SBP of >180 OR DBP >120 w/ associated organ damage   (CVA, MI, acute CHF, [place], encephalopathy, pulmonary edema, and unstable   angina). Documentation should include specific organ affected.  Requires   immediate treatment, usually with IV meds.  Hypertensive Crisis, unspecified: SBP of > 180 OR DBP > 120 and includes   damage to blood vessels, including inflammation, leakage of fluid or

## 2025-02-07 NOTE — PLAN OF CARE
Problem: Pain  Goal: Verbalizes/displays adequate comfort level or baseline comfort level  2/6/2025 1752 by Judy Watkins RN  Outcome: Progressing     Problem: Safety - Adult  Goal: Free from fall injury  Recent Flowsheet Documentation  Taken 2/6/2025 2000 by Jenifer Collins RN  Free From Fall Injury: Instruct family/caregiver on patient safety  2/6/2025 1752 by Judy Watkins RN  Outcome: Progressing     Problem: ABCDS Injury Assessment  Goal: Absence of physical injury  Recent Flowsheet Documentation  Taken 2/6/2025 2000 by Jenifer Collins RN  Absence of Physical Injury: Implement safety measures based on patient assessment  2/6/2025 1752 by Judy Watkins RN  Outcome: Progressing     Problem: Neurosensory - Adult  Goal: Achieves stable or improved neurological status  Recent Flowsheet Documentation  Taken 2/6/2025 2000 by Jenifer Collins RN  Achieves stable or improved neurological status: Assess for and report changes in neurological status  2/6/2025 1752 by Judy Watkins RN  Outcome: Progressing     Problem: Neurosensory - Adult  Goal: Absence of seizures  Recent Flowsheet Documentation  Taken 2/6/2025 2000 by Jenifer Collins RN  Absence of seizures: Monitor for seizure activity.  If seizure occurs, document type and location of movements and any associated apnea  2/6/2025 1752 by Judy Watkins RN  Outcome: Progressing     Problem: Neurosensory - Adult  Goal: Remains free of injury related to seizures activity  Recent Flowsheet Documentation  Taken 2/6/2025 2000 by Jenifer Collins RN  Remains free of injury related to seizure activity: Maintain airway, patient safety  and administer oxygen as ordered  2/6/2025 1752 by Judy Watkins RN  Outcome: Progressing     Problem: Cardiovascular - Adult  Goal: Maintains optimal cardiac output and hemodynamic stability  Recent Flowsheet Documentation  Taken 2/6/2025 2000 by Jenifer Collins RN  Maintains optimal cardiac output and

## 2025-02-07 NOTE — PROGRESS NOTES
Occupational Therapy    [facility]  Occupational Therapy Not Seen Note    DATE: 2025    NAME: Dahlia Zhang  MRN: [id number]   : 1950      Patient not seen this date for Occupational Therapy due to:    Testing: CT    Next Scheduled Treatment:     Electronically signed by SYLVIA Jim on 2025 at 8:58 AM

## 2025-02-07 NOTE — SEDATION DOCUMENTATION
Pt arrives to neuro IR drowsy, not following commands or verbally responding. Pt transferred to IR table and applied to monitors. Anesthesia placed right raidal art line. Writer inserted ward with yellow urine return. Pedal pulses marked and palpable. Continue to closely monitor

## 2025-02-07 NOTE — PROGRESS NOTES
02/06/25 2003 02/07/25  0709   POCGLU 143* 120* 115* 106* 101 87     ABG:  Lab Results   Component Value Date/Time    POCPH 7.409 10/12/2023 03:51 AM    POCPCO2 40.3 10/12/2023 03:51 AM    POCPO2 92.9 10/12/2023 03:51 AM    POCHCO3 25.5 10/12/2023 03:51 AM    PBEA 0.8 10/12/2023 03:51 AM    LZGY8MWG 97.3 10/12/2023 03:51 AM    FIO2 INFORMATION NOT PROVIDED 02/01/2025 10:34 AM     Lab Results   Component Value Date/Time    SPECIAL RIGHT HAND 5 ML 02/06/2025 10:53 AM     Lab Results   Component Value Date/Time    CULTURE NO GROWTH 12 HOURS 02/06/2025 10:53 AM       Radiology:  CT HEAD WO CONTRAST    Result Date: 2/3/2025  No evident acute intracranial abnormality with evaluation partially limited by chronic changes as above.     XR CHEST PORTABLE    Result Date: 2/2/2025  No acute process.  No pulmonary edema.     CTA HEAD NECK W CONTRAST    Result Date: 2/1/2025  1. Limited evaluation of the distal basilar artery and proximal posterior cerebral arteries due to artifact from prior stenting.  There appears to be a similar severe stenosis of the distal basilar artery. 2. Otherwise, no large vessel occlusion, significant stenosis or cerebral aneurysm identified within the brain. 3. Short segment 60% stenosis at the origin of the left internal carotid artery based on NASCET criteria.     XR CHEST PORTABLE    Result Date: 2/1/2025  1. Elevated right diaphragm with plethora central markings/probable mild basilar atelectasis; no consolidation or large effusion. 2. Mildly enlarged but stable cardiac silhouette. 3. High density overlying the right axilla, of unclear origin but possibly extrinsic.     CT HEAD WO CONTRAST    Result Date: 2/1/2025  1. No acute intracranial abnormality. 2. Stable wedge-shaped area of low attenuation in the left occipital lobe representing an acute infarct. 3. Old lacune infarcts involving the right basal ganglia. 4. Global parenchymal volume loss with chronic microvascular ischemic changes.      CT HEAD WO CONTRAST    Result Date: 1/30/2025  Acute left PCA territory infarct involving the parasagittal left occipital lobe. Stable appearance of right para median von and thalamus infarcts. No acute intracranial hemorrhage. Critical results were communicated to Dr. Khan via voicemail at 2:03 PM on 1/30/2025 after multiple attempts to reach provider.       Physical Examination:        General appearance:  alert, cooperative and no distress  Lungs:  clear to auscultation bilaterally, normal effort  Heart:  regular rate and rhythm, no murmur  Abdomen:  soft, nontender, nondistended, normal bowel sounds, no masses, hepatomegaly, splenomegaly  Extremities:  no edema, redness, tenderness in the calves  Skin:  no gross lesions, rashes, induration    Assessment:        Hospital Problems             Last Modified POA    * (Principal) Encephalopathy 1/23/2025 Yes    Left-sided weakness 1/23/2025 Yes    Thalamic stroke (HCC) 1/23/2025 Yes    Complicated UTI (urinary tract infection) 1/24/2025 Yes    Infection due to ESBL-producing Escherichia coli 1/24/2025 Yes    Facial droop 1/25/2025 Yes    CRP elevated 1/25/2025 Yes    Bandemia 1/25/2025 Yes    Pyelonephritis 1/25/2025 Yes    Lactic acidosis 1/25/2025 Yes    Gastrointestinal hemorrhage 1/26/2025 Yes    Acute blood loss anemia 1/26/2025 Yes    Abnormal CT of the abdomen 1/26/2025 Yes    Shock 1/26/2025 Yes    Diverticulosis 1/27/2025 Yes    Polyp of colon 1/27/2025 Yes    Cerebral multi-infarct state 1/29/2025 Yes       Plan:        Acute stroke secondary to thrombosed basilar tip s/p WEB, LVIS x2   - S/p DSA showing previously treated basilar tip aneurysm (s/p WEB, LVIS x2)   - Aspirin 81 mg   - Crestor 20mg Nightly  - Brilinta on hold since 1/25 due to acute GI bleed  - Eliquis 5mg PO bid restarted on 2/3 per neuroloendovascular  - Neurology following.  - Goal SBP: 140 - 180 per neuroendovascular  - Neuroendovascular following; no plan for further intervention.  -  SQ  GI prophylaxis: PPI  Diet: Regular, 5 carb choices (75 gm/meal)  Disposition: Likely ARU when medically stable     OT/PT/SW: all consulted     Code status: Full code      Laura Tamayo  2/7/2025  11:19 AM      Attending Supervising Physician’s Attestation Statement    Additional Comments: patient transferred to ICU prior to my evaluation. Will defer further medical management to critical care team. Re-consult when out of ICU if needed. Will sign off     Electronically signed by Herberth Zapata MD on 2/7/2025 at 6:21 PM

## 2025-02-07 NOTE — BRIEF OP NOTE
[facility] [[email or web address]] Stroke Center    NEUROENDOVASCULAR SERVICE: POST-OP NOTE: 2/7/2025    Pt Name: Dahlia Zhang  MRN: [id number]  YOB: 1950  Date of Procedure: 2/7/2025  Primary Care Physician: Thania Rahman MD        Pre-Procedural Diagnosis:L PCA occlusion   Post-Procedural Diagnosis:s/p thrombectomy using TigerTriever and Balloon angioplasty       Procedure Performed:Diagnostic Cerebral Angiogram with mechanical thrombectomy/stentoplasty using Tiger retriever and balloon angioplasty    Surgeon:   Balwinder Michaud MD    Fellow:  Da Maurice MD and Serafin Conte MD PhD     Assisting Tech:  Iqraamos Kinney    PRE-PROCEDURAL EXAM:  Prestroke baseline mRS MODIFIED MOHIT SCORE: 4 - Moderate severe disability:  unable to walk or attend to own bodily needs without assistance.  Neurological exam performed and unchanged from initial H&P or consult      Anesthesia: MAC anesthesia  An Immediate re-assessment was completed prior to sedation, and it is determined to be safe to proceed.  Complications: none    Intra-Operative EXAM:  Patient sedated with unchanged limited neurological exam    EBL: < Minimal      Cc            Specimens: Were not Obtained  Contrast:     Visipaque 270 low osmolar 110 Cc             Fluoro: 67.8 min    Findings:  Please see dictated Radiology note for further details  The basilar artery tip aneurysm, previously treated with a WEB intrasaccular device (10/11/2023) and a second-stage LVIS 4.5 x 23mm stent (6/2024) and a third-stage  LVIS 4.5 x 23mm stent spanning from the left P2 segment to the mid-basilar artery (1/15/2025) is completely obliterated.  There is a filling defect of the basilar tip that extends to the left PCA stent.  The above lesion was treated with 6F short sheath, neuron 070, AXS Vecta 46 125cm, Headway 21, Synchro/Traxcess and Tigertreiver 21. Followed by Exchange length Synchro 300cm and NC emerge balloon 2x8mm.   Stentoplasty of the L PCA using  Alainaiver 21.   Submaximal angioplasty using NC Emerge 2x8 (x3 times, distal to proximal 10ATM)  Followed by cangrelor drip (15 mics per kilogram bolus followed by 2 mics per kilogram per minute).  Final angiographic run demonstrated patent bilateral PCA achieving TICI 2C          TICI score: 2c    12:36 PM  1:47 PM  5:38 PM  5:49 PM    POST-PROCEDURAL EXAM :   Stable neurological Exam  Neurological exam performed and unchanged from initial H&P or consult    Closure:  right  sutured in place  6   F        POST-PROCEDURAL MONITORING : see orders  Disposition: Neuro ICU      Recommendations:  Back to Neuro ICU  Do not bend right leg for 3 hours.  Groin checks per protocol.  Peripheral pulse checks per protocol.  SBP goal 110-150  Continue cangrelor until Migel morning.  Hold Eliquis and aspirin.  Repeat CT head now, tomorrow morning 5 AM and Sunday morning 5 AM  Follow up with Da Stoll MD  12 weeks after discharge and Dr. Carl 4-5 months after discharge.        MD Balwinder Garvey MD   Pager [phone]  Stroke, Neurocritical Care & Neurointervention  [facility]  [facility]  [facility] [place]  Electronically signed 2/7/2025 at 5:16 PM

## 2025-02-07 NOTE — H&P
Neuro ICU History & Physical    Patient Name: Dahlia Zhang  Patient : 1950  Room/Bed: 0117/0117-01  Code Status: Full Code  Allergies:   Allergies   Allergen Reactions    Lisinopril Angioedema    Codeine Nausea And Vomiting    Norco [Hydrocodone-Acetaminophen] Nausea And Vomiting    Percocet [Oxycodone-Acetaminophen] Nausea And Vomiting       CHIEF COMPLAINT     Extremity weakness    HPI    History Obtained From: Patient / EMR    Dahlia Zhang is a 74 y.o. female with hx of HTN, HLD, COPD, A-fib, prior left occipital ischemic infarction and SAH 2/2 ruptured basilar tip aneurysm (s/p WEB embolization 10/11/23 c/b residual neck s/p LVIS placement 2024 status post Second LVIS embolization on 1/15/2025) who was admitted to [place] on 2025 with altered mentation, increased lethargy with dysarthria, and left facial droop.  CT head with no acute intracranial abnormalities.  Remote infarcts in left occipital lobe, right basal ganglia and corona radiata.  CTA head and neck showed severe narrowing within the distal basilar stent.  Emergent DSA revealed stents are patent and well apposed with a filling defect at the basilar tip representing partial thrombus versus metal artifact.  She was treated with cannagrelor drip with improvement in neurologic status.  On , she developed rectal bleeding with the CTA indicating extravasation from sigmoid colon.,  Colonoscopy revealing bleeding diverticular disease.  Aspirin and Brilinta were held; GI evaluated and recommended embolization or surgical eval if bleeding recurs.  On , GI signed off and no evidence of recurrent bleeding.  Patient's hospital course complicated by ESBL UTI s/p 7 day course of meropenem through . Patient was gradually restarted on aspirin monotherapy, and then transitioned to Eliquis monotherapy. Patient has had fluctuating episodes of lethargy and slurred speech since being transferred to neuro stepdown. MRI brain was  injection 5-40 mL, 5-40 mL, IntraVENous, 2 times per day  sodium chloride flush 0.9 % injection 5-40 mL, 5-40 mL, IntraVENous, PRN  0.9 % sodium chloride infusion, , IntraVENous, PRN  hydrALAZINE (APRESOLINE) injection 10 mg, 10 mg, IntraVENous, Q4H PRN  labetalol (NORMODYNE;TRANDATE) injection 10 mg, 10 mg, IntraVENous, Q1H PRN  potassium chloride (KLOR-CON M) extended release tablet 40 mEq, 40 mEq, Oral, PRN **OR** potassium bicarb-citric acid (EFFER-K) effervescent tablet 40 mEq, 40 mEq, Oral, PRN **OR** potassium chloride 10 mEq/100 mL IVPB (Peripheral Line), 10 mEq, IntraVENous, PRN  niCARdipine (CARDENE) 25 mg in sodium chloride 0.9 % 250 mL infusion ([id number]), 2.5-15 mg/hr, IntraVENous, Continuous  [Held by provider] traZODone (DESYREL) tablet 50 mg, 50 mg, Oral, Nightly  cloNIDine (CATAPRES) tablet 0.1 mg, 0.1 mg, Oral, BID  apixaban (ELIQUIS) tablet 5 mg, 5 mg, Oral, BID  0.9 % sodium chloride infusion, , IntraVENous, Continuous  dilTIAZem (CARDIZEM CD) extended release capsule 120 mg, 120 mg, Oral, Daily  metoprolol tartrate (LOPRESSOR) tablet 50 mg, 50 mg, Oral, BID  melatonin tablet 5 mg, 5 mg, Oral, Nightly PRN  pantoprazole (PROTONIX) tablet 40 mg, 40 mg, Oral, QAM AC  losartan (COZAAR) tablet 100 mg, 100 mg, Oral, Daily  ipratropium 0.5 mg-albuterol 2.5 mg (DUONEB) nebulizer solution 1 Dose, 1 Dose, Inhalation, Q4H PRN  sennosides-docusate sodium (SENOKOT-S) 8.6-50 MG tablet 2 tablet, 2 tablet, Oral, Daily  aspirin EC tablet 81 mg, 81 mg, Oral, Daily  amiodarone (CORDARONE) tablet 200 mg, 200 mg, Oral, BID  [Held by provider] zolpidem (AMBIEN) tablet 5 mg, 5 mg, Oral, Nightly PRN  0.9 % sodium chloride infusion, , IntraVENous, PRN  lidocaine 4 % external patch 1 patch, 1 patch, TransDERmal, Daily  insulin lispro (HUMALOG,ADMELOG) injection vial 0-4 Units, 0-4 Units, SubCUTAneous, 4x Daily AC & HS  glucose chewable tablet 16 g, 4 tablet, Oral, PRN  dextrose bolus 10% 125 mL, 125 mL, IntraVENous, PRN

## 2025-02-07 NOTE — PROGRESS NOTES
[facility]  Speech Language Pathology    Date: 2/7/2025  Patient Name: Dahlia Zhang  YOB: 1950   AGE: 74 y.o.  MRN: [id number]        Patient Not Available for Speech Therapy     Due to:  [] Testing  [] Hemodialysis  [] Cancelled by RN  [] Surgery   [] Intubation/Sedation/Pain Medication  [] Medical instability  [x] Other: Pt off floor at CT. Will check back later this AM as able.     1106: ST attempt X2, per RN Kylie pt going for stat MRI and IR angiogram.     Next scheduled treatment:  2/10/25.  Completed by: Angelina Osorio M.A., CCC-SLP

## 2025-02-07 NOTE — PROGRESS NOTES
Physical Therapy        Physical Therapy Cancel Note      DATE: 2025    NAME: Dahlia Zhang  MRN: [id number]   : 1950      Patient not seen this date for Physical Therapy due to:    Other: Pt currently off the floor for CT; will check back later, as time allows.    Electronically signed by Ramiro Iyer PTA on 2025 at 8:56 AM

## 2025-02-08 ENCOUNTER — APPOINTMENT (OUTPATIENT)
Dept: CT IMAGING | Age: 75
DRG: 023 | End: 2025-02-08
Payer: MEDICARE

## 2025-02-08 ENCOUNTER — APPOINTMENT (OUTPATIENT)
Dept: GENERAL RADIOLOGY | Age: 75
DRG: 023 | End: 2025-02-08
Payer: MEDICARE

## 2025-02-08 LAB
ANION GAP SERPL CALCULATED.3IONS-SCNC: 12 MMOL/L (ref 9–16)
BUN SERPL-MCNC: 8 MG/DL (ref 8–23)
CALCIUM SERPL-MCNC: 7.8 MG/DL (ref 8.6–10.4)
CHLORIDE SERPL-SCNC: 108 MMOL/L (ref 98–107)
CO2 SERPL-SCNC: 18 MMOL/L (ref 20–31)
CREAT SERPL-MCNC: 0.4 MG/DL (ref 0.6–0.9)
ERYTHROCYTE [DISTWIDTH] IN BLOOD BY AUTOMATED COUNT: 14.5 % (ref 11.8–14.4)
GFR, ESTIMATED: >90 ML/MIN/1.73M2
GLUCOSE BLD-MCNC: 90 MG/DL (ref 65–105)
GLUCOSE BLD-MCNC: 91 MG/DL (ref 65–105)
GLUCOSE SERPL-MCNC: 82 MG/DL (ref 74–99)
HCT VFR BLD AUTO: 25.5 % (ref 36.3–47.1)
HCT VFR BLD AUTO: 26.5 % (ref 36.3–47.1)
HGB BLD-MCNC: 8.1 G/DL (ref 11.9–15.1)
HGB BLD-MCNC: 8.5 G/DL (ref 11.9–15.1)
MAGNESIUM SERPL-MCNC: 1.6 MG/DL (ref 1.6–2.4)
MCH RBC QN AUTO: 27.5 PG (ref 25.2–33.5)
MCHC RBC AUTO-ENTMCNC: 32.1 G/DL (ref 28.4–34.8)
MCV RBC AUTO: 85.8 FL (ref 82.6–102.9)
NRBC BLD-RTO: 0 PER 100 WBC
PLATELET # BLD AUTO: 421 K/UL (ref 138–453)
PMV BLD AUTO: 10.7 FL (ref 8.1–13.5)
POTASSIUM SERPL-SCNC: 3.3 MMOL/L (ref 3.7–5.3)
RBC # BLD AUTO: 3.09 M/UL (ref 3.95–5.11)
SODIUM SERPL-SCNC: 138 MMOL/L (ref 136–145)
WBC OTHER # BLD: 16.1 K/UL (ref 3.5–11.3)

## 2025-02-08 PROCEDURE — 80048 BASIC METABOLIC PNL TOTAL CA: CPT

## 2025-02-08 PROCEDURE — 70450 CT HEAD/BRAIN W/O DYE: CPT

## 2025-02-08 PROCEDURE — 6370000000 HC RX 637 (ALT 250 FOR IP)

## 2025-02-08 PROCEDURE — 2580000003 HC RX 258: Performed by: PSYCHIATRY & NEUROLOGY

## 2025-02-08 PROCEDURE — 2500000003 HC RX 250 WO HCPCS: Performed by: STUDENT IN AN ORGANIZED HEALTH CARE EDUCATION/TRAINING PROGRAM

## 2025-02-08 PROCEDURE — 83735 ASSAY OF MAGNESIUM: CPT

## 2025-02-08 PROCEDURE — 94761 N-INVAS EAR/PLS OXIMETRY MLT: CPT

## 2025-02-08 PROCEDURE — 82947 ASSAY GLUCOSE BLOOD QUANT: CPT

## 2025-02-08 PROCEDURE — 6370000000 HC RX 637 (ALT 250 FOR IP): Performed by: INTERNAL MEDICINE

## 2025-02-08 PROCEDURE — 99233 SBSQ HOSP IP/OBS HIGH 50: CPT | Performed by: PSYCHIATRY & NEUROLOGY

## 2025-02-08 PROCEDURE — 71045 X-RAY EXAM CHEST 1 VIEW: CPT

## 2025-02-08 PROCEDURE — 6370000000 HC RX 637 (ALT 250 FOR IP): Performed by: NURSE PRACTITIONER

## 2025-02-08 PROCEDURE — 2500000003 HC RX 250 WO HCPCS: Performed by: INTERNAL MEDICINE

## 2025-02-08 PROCEDURE — 85018 HEMOGLOBIN: CPT

## 2025-02-08 PROCEDURE — 2000000000 HC ICU R&B

## 2025-02-08 PROCEDURE — 6360000002 HC RX W HCPCS: Performed by: PSYCHIATRY & NEUROLOGY

## 2025-02-08 PROCEDURE — 94640 AIRWAY INHALATION TREATMENT: CPT

## 2025-02-08 PROCEDURE — 6370000000 HC RX 637 (ALT 250 FOR IP): Performed by: PSYCHIATRY & NEUROLOGY

## 2025-02-08 PROCEDURE — 85014 HEMATOCRIT: CPT

## 2025-02-08 PROCEDURE — 85027 COMPLETE CBC AUTOMATED: CPT

## 2025-02-08 PROCEDURE — C9460 INJECTION, CANGRELOR: HCPCS | Performed by: PSYCHIATRY & NEUROLOGY

## 2025-02-08 PROCEDURE — 6360000002 HC RX W HCPCS

## 2025-02-08 RX ORDER — DILTIAZEM HYDROCHLORIDE 30 MG/1
30 TABLET, FILM COATED ORAL EVERY 6 HOURS SCHEDULED
Status: DISCONTINUED | OUTPATIENT
Start: 2025-02-08 | End: 2025-02-15 | Stop reason: HOSPADM

## 2025-02-08 RX ORDER — LOSARTAN POTASSIUM 50 MG/1
100 TABLET ORAL DAILY
Status: DISCONTINUED | OUTPATIENT
Start: 2025-02-08 | End: 2025-02-15 | Stop reason: HOSPADM

## 2025-02-08 RX ORDER — METOPROLOL TARTRATE 50 MG
50 TABLET ORAL 2 TIMES DAILY
Status: DISCONTINUED | OUTPATIENT
Start: 2025-02-08 | End: 2025-02-15 | Stop reason: HOSPADM

## 2025-02-08 RX ADMIN — CANGRELOR 2 MCG/KG/MIN: 50 INJECTION, POWDER, LYOPHILIZED, FOR SOLUTION INTRAVENOUS at 02:56

## 2025-02-08 RX ADMIN — ROSUVASTATIN CALCIUM 20 MG: 20 TABLET, FILM COATED ORAL at 20:59

## 2025-02-08 RX ADMIN — CANGRELOR 2 MCG/KG/MIN: 50 INJECTION, POWDER, LYOPHILIZED, FOR SOLUTION INTRAVENOUS at 18:35

## 2025-02-08 RX ADMIN — TIOTROPIUM BROMIDE INHALATION SPRAY 2 PUFF: 3.12 SPRAY, METERED RESPIRATORY (INHALATION) at 08:57

## 2025-02-08 RX ADMIN — METOPROLOL TARTRATE 50 MG: 50 TABLET, FILM COATED ORAL at 20:59

## 2025-02-08 RX ADMIN — AMIODARONE HYDROCHLORIDE 200 MG: 200 TABLET ORAL at 08:44

## 2025-02-08 RX ADMIN — DILTIAZEM HYDROCHLORIDE 30 MG: 60 TABLET, FILM COATED ORAL at 08:47

## 2025-02-08 RX ADMIN — POTASSIUM BICARBONATE 40 MEQ: 782 TABLET, EFFERVESCENT ORAL at 08:45

## 2025-02-08 RX ADMIN — CANGRELOR 2 MCG/KG/MIN: 50 INJECTION, POWDER, LYOPHILIZED, FOR SOLUTION INTRAVENOUS at 12:00

## 2025-02-08 RX ADMIN — CANGRELOR 2 MCG/KG/MIN: 50 INJECTION, POWDER, LYOPHILIZED, FOR SOLUTION INTRAVENOUS at 23:23

## 2025-02-08 RX ADMIN — LOSARTAN POTASSIUM 100 MG: 50 TABLET, FILM COATED ORAL at 08:44

## 2025-02-08 RX ADMIN — METOPROLOL TARTRATE 50 MG: 50 TABLET, FILM COATED ORAL at 08:44

## 2025-02-08 RX ADMIN — SODIUM CHLORIDE 5 MG/HR: 9 INJECTION, SOLUTION INTRAVENOUS at 05:44

## 2025-02-08 RX ADMIN — HYDRALAZINE HYDROCHLORIDE 10 MG: 20 INJECTION INTRAMUSCULAR; INTRAVENOUS at 05:39

## 2025-02-08 RX ADMIN — HYDRALAZINE HYDROCHLORIDE 10 MG: 20 INJECTION INTRAMUSCULAR; INTRAVENOUS at 22:40

## 2025-02-08 RX ADMIN — Medication 10 MG: at 23:21

## 2025-02-08 RX ADMIN — SODIUM CHLORIDE, PRESERVATIVE FREE 10 ML: 5 INJECTION INTRAVENOUS at 21:00

## 2025-02-08 RX ADMIN — AMIODARONE HYDROCHLORIDE 200 MG: 200 TABLET ORAL at 20:59

## 2025-02-08 RX ADMIN — PANTOPRAZOLE SODIUM 40 MG: 40 TABLET, DELAYED RELEASE ORAL at 08:45

## 2025-02-08 RX ADMIN — HYDRALAZINE HYDROCHLORIDE 10 MG: 20 INJECTION INTRAMUSCULAR; INTRAVENOUS at 17:54

## 2025-02-08 RX ADMIN — SODIUM CHLORIDE, PRESERVATIVE FREE 10 ML: 5 INJECTION INTRAVENOUS at 21:01

## 2025-02-08 RX ADMIN — DILTIAZEM HYDROCHLORIDE 30 MG: 60 TABLET, FILM COATED ORAL at 17:56

## 2025-02-08 RX ADMIN — ESCITALOPRAM OXALATE 10 MG: 10 TABLET ORAL at 08:44

## 2025-02-08 RX ADMIN — SENNOSIDES AND DOCUSATE SODIUM 2 TABLET: 50; 8.6 TABLET ORAL at 08:44

## 2025-02-08 RX ADMIN — SODIUM CHLORIDE 2.5 MG/HR: 9 INJECTION, SOLUTION INTRAVENOUS at 07:53

## 2025-02-08 NOTE — PLAN OF CARE
Problem: Chronic Conditions and Co-morbidities  Goal: Patient's chronic conditions and co-morbidity symptoms are monitored and maintained or improved  Outcome: Progressing     Problem: Discharge Planning  Goal: Discharge to home or other facility with appropriate resources  Outcome: Progressing     Problem: Pain  Goal: Verbalizes/displays adequate comfort level or baseline comfort level  Outcome: Progressing     Problem: Safety - Adult  Goal: Free from fall injury  Outcome: Progressing     Problem: ABCDS Injury Assessment  Goal: Absence of physical injury  Outcome: Progressing     Problem: Neurosensory - Adult  Goal: Achieves stable or improved neurological status  Outcome: Progressing     Problem: Neurosensory - Adult  Goal: Absence of seizures  Outcome: Progressing     Problem: Neurosensory - Adult  Goal: Remains free of injury related to seizures activity  Outcome: Progressing     Problem: Neurosensory - Adult  Goal: Achieves maximal functionality and self care  Outcome: Progressing     Problem: Cardiovascular - Adult  Goal: Maintains optimal cardiac output and hemodynamic stability  Outcome: Progressing     Problem: Cardiovascular - Adult  Goal: Absence of cardiac dysrhythmias or at baseline  Outcome: Progressing     Problem: Gastrointestinal - Adult  Goal: Maintains or returns to baseline bowel function  Outcome: Progressing     Problem: Gastrointestinal - Adult  Goal: Maintains adequate nutritional intake  Outcome: Progressing     Problem: Gastrointestinal - Adult  Goal: Minimal or absence of nausea and vomiting  Outcome: Progressing     Problem: Safety - Medical Restraint  Goal: Remains free of injury from restraints (Restraint for Interference with Medical Device)  Description: INTERVENTIONS:  1. Determine that other, less restrictive measures have been tried or would not be effective before applying the restraint  2. Evaluate the patient's condition at the time of restraint application  3. Inform  patient/family regarding the reason for restraint  4. Q2H: Monitor safety, psychosocial status, comfort, nutrition and hydration  Recent Flowsheet Documentation  Taken 2/8/2025 0400 by Adrianna Wiggins RN  Remains free of injury from restraints (restraint for interference with medical device):   Determine that other, less restrictive measures have been tried or would not be effective before applying the restraint   Evaluate the patient's condition at the time of restraint application   Inform patient/family regarding the reason for restraint   Every 2 hours: Monitor safety, psychosocial status, comfort, nutrition and hydration  Taken 2/8/2025 0000 by Adrianna Wiggins RN  Remains free of injury from restraints (restraint for interference with medical device):   Determine that other, less restrictive measures have been tried or would not be effective before applying the restraint   Evaluate the patient's condition at the time of restraint application   Inform patient/family regarding the reason for restraint   Every 2 hours: Monitor safety, psychosocial status, comfort, nutrition and hydration  Taken 2/7/2025 2310 by Adrianna Wiggins RN  Remains free of injury from restraints (restraint for interference with medical device):   Determine that other, less restrictive measures have been tried or would not be effective before applying the restraint   Evaluate the patient's condition at the time of restraint application   Inform patient/family regarding the reason for restraint   Every 2 hours: Monitor safety, psychosocial status, comfort, nutrition and hydration

## 2025-02-08 NOTE — PROGRESS NOTES
Daily Progress Note  Neuro Critical Care    Patient Name: Dahlia Zhang  Patient : 1950  Room/Bed: 0117/0117-01  Code Status: Full Code  Allergies:   Allergies   Allergen Reactions    Lisinopril Angioedema    Codeine Nausea And Vomiting    Norco [Hydrocodone-Acetaminophen] Nausea And Vomiting    Percocet [Oxycodone-Acetaminophen] Nausea And Vomiting       CHIEF COMPLAINT:        Extremity weakness     INTERVAL HISTORY    Initial Presentation (Admitted 25):    Dahlia Zhang is a 74 y.o. 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  CT head with no acute intracranial abnormalities.  Remote infarcts in left occipital lobe, right basal ganglia and corona radiata.  CTA head and neck showed severe narrowing within the distal basilar stent.  Emergent DSA revealed stents are patent and well apposed with a filling defect at the basilar tip representing partial thrombus versus metal artifact.  She was treated with cannagrelor drip with improvement in neurologic status.    On , she developed rectal bleeding with the CTA indicating extravasation from sigmoid colon.,  Colonoscopy revealing bleeding diverticular disease.  Aspirin and Brilinta were held; GI evaluated and recommended embolization or surgical eval if bleeding recurs.  On , GI signed off and no evidence of recurrent bleeding.  Patient's hospital course complicated by ESBL UTI s/p 7 day course of meropenem through . Patient was gradually restarted on aspirin monotherapy, and then transitioned to Eliquis monotherapy. Patient has had fluctuating episodes of lethargy and slurred speech since being  replacement **OR** potassium chloride, melatonin, ipratropium 0.5 mg-albuterol 2.5 mg, [Held by provider] zolpidem, sodium chloride, glucose, dextrose bolus **OR** dextrose bolus, glucagon (rDNA), dextrose, acetaminophen, sodium chloride flush, sodium chloride, polyethylene glycol, albuterol sulfate HFA, sodium chloride flush, sodium chloride, magnesium sulfate, ondansetron **OR** ondansetron, sodium chloride flush, sodium chloride    VITALS:  Temperature Range: Temp: 98.1 °F (36.7 °C) Temp  Av °F (36.7 °C)  Min: 97.3 °F (36.3 °C)  Max: 98.3 °F (36.8 °C)  BP Range: Systolic (24hrs), Av , Min:111 , Max:142     Diastolic (24hrs), Av, Min:50, Max:61    Pulse Range: Pulse  Av.2  Min: 53  Max: 77  Respiration Range: Resp  Av.9  Min: 16  Max: 28  Current Pulse Ox: SpO2: 96 %  24HR Pulse Ox Range: SpO2  Av.1 %  Min: 93 %  Max: 100 %  Patient Vitals for the past 12 hrs:   BP Temp Temp src Pulse Resp SpO2   25 0600 -- 98.1 °F (36.7 °C) -- 75 26 96 %   25 0545 -- -- -- 77 28 95 %   25 0530 -- -- -- 68 22 97 %   25 0515 -- -- -- 68 23 97 %   25 0500 -- -- -- 66 22 96 %   25 0445 -- -- -- 71 25 95 %   25 0430 -- -- -- 69 16 97 %   25 0415 -- -- -- 70 23 96 %   25 0400 -- 98.1 °F (36.7 °C) -- 67 22 96 %   25 0345 -- -- -- 67 21 96 %   25 0330 -- -- -- 69 21 95 %   25 0315 -- -- -- 70 24 97 %   25 0300 -- -- -- 67 23 93 %   25 0245 -- -- -- 68 21 94 %   25 0230 -- -- -- 66 21 97 %   25 0215 -- -- -- 65 21 94 %   25 0200 -- 98.1 °F (36.7 °C) -- 65 22 95 %   25 0145 -- -- -- 63 21 95 %   25 0130 -- -- -- 69 24 96 %   25 0115 -- -- -- 64 21 97 %   25 0100 -- -- -- 68 22 98 %   25 0045 -- -- -- 63 19 95 %   25 0030 -- -- -- 66 20 96 %   25 0015 -- -- -- 67 22 96 %   25 0000 -- 98.3 °F (36.8 °C) -- 64 21 99 %   25 2345 -- -- -- 66 21 99 %   25        IMAGING:   CT HEAD WO CONTRAST    Result Date: 2/7/2025  1. No acute intracranial abnormality. 2. Lacunar infarct right periventricular white matter. 3. Stable mild age-appropriate atrophy.     MRI BRAIN WO CONTRAST    Result Date: 2/7/2025  1. Multifocal areas of restricted diffusion, compatible with subacute infarcts in the occipital lobes, parietal lobes, and cerebellar hemispheres, as well as asymmetric involvement in the left hippocampus appear relatively stable. However, subacute infarcts in the thalami, appear slightly worse, particularly along the superior aspects. 2. Subacute brainstem infarcts appear stable. 3. Cerebral and cerebellar parenchymal volume loss with chronic microvascular white matter ischemic disease. 4. Small chronic lacunar infarct in the right frontal lobe white matter. Chronic infarcts in the right basal ganglia.     CTA HEAD NECK W CONTRAST    Result Date: 2/7/2025  Approximately 50% stenosis of the proximal left internal carotid artery. Artifact from stent in the distal basilar artery and proximal posterior cerebral arteries that appears patent. No evidence for large vessel occlusion.     CT HEAD WO CONTRAST    Result Date: 2/7/2025  Mild chronic microvascular disease without acute intracranial abnormality. Remote left occipital lobe infarct.     XR ABDOMEN FOR NG/OG/NE TUBE PLACEMENT    Result Date: 2/6/2025  Nasogastric tube with tip overlying gastric body.     XR CHEST PORTABLE    Result Date: 2/6/2025  No acute cardiopulmonary process.     XR ABDOMEN FOR NG/OG/NE TUBE PLACEMENT    Result Date: 2/5/2025  NG tube in place, tip and proximal side port within the upper body/fundal region of the stomach.     MRI LIMITED BRAIN    Result Date: 2/5/2025  New acute posterior circulation infarcts with involvement of the brainstem and thalami.     CT BRAIN PERFUSION    Result Date: 2/4/2025  Potential degree of perfusion mismatch in the cerebellum most pronounced in the right cerebellar

## 2025-02-08 NOTE — PROGRESS NOTES
[facility]  Speech Language Pathology    Date: 2/8/2025  Patient Name: Dahlia Zhang  YOB: 1950   AGE: 74 y.o.  MRN: [id number]        Patient Not Available for Speech Therapy     Due to:  [] Testing  [] Hemodialysis  [] Cancelled by RN  [] Surgery   [] Intubation/Sedation/Pain Medication  [] Medical instability  [x] Other: Pt not appropriate for repeat swallow evaluation this date (lying flat, lethargic)    Next scheduled treatment: 2/9/25 as appropriate  Completed by: Kate Gracia, SLP, M.S. CCC-SLP

## 2025-02-08 NOTE — PLAN OF CARE
Problem: Chronic Conditions and Co-morbidities  Goal: Patient's chronic conditions and co-morbidity symptoms are monitored and maintained or improved  2/8/2025 1211 by Anirudh Mandel RN  Outcome: Progressing  2/8/2025 0629 by Adrianna Wiggins RN  Outcome: Progressing     Problem: Discharge Planning  Goal: Discharge to home or other facility with appropriate resources  2/8/2025 1211 by Anirudh Mandel RN  Outcome: Progressing  2/8/2025 0629 by Adrianna Wiggins RN  Outcome: Progressing     Problem: Pain  Goal: Verbalizes/displays adequate comfort level or baseline comfort level  2/8/2025 1211 by Anirudh Mandel RN  Outcome: Progressing  2/8/2025 0629 by Adrianna Wiggins RN  Outcome: Progressing     Problem: Safety - Adult  Goal: Free from fall injury  2/8/2025 1211 by Anirudh Mandel RN  Outcome: Progressing  2/8/2025 0629 by Adrianna Wiggins RN  Outcome: Progressing     Problem: ABCDS Injury Assessment  Goal: Absence of physical injury  2/8/2025 1211 by Anirudh Mandel RN  Outcome: Progressing  2/8/2025 0629 by Adrianna Wiggins RN  Outcome: Progressing     Problem: Respiratory - Adult  Goal: Achieves optimal ventilation and oxygenation  Outcome: Progressing  Flowsheets (Taken 2/8/2025 0857 by Candida Lopez JD)  Achieves optimal ventilation and oxygenation:   Respiratory therapy support as indicated   Assess and instruct to report shortness of breath or any respiratory difficulty   Assess the need for suctioning and aspirate as needed   Encourage broncho-pulmonary hygiene including cough, deep breathe, incentive spirometry   Initiate smoking cessation protocol as indicated   Oxygen supplementation based on oxygen saturation or arterial blood gases   Position to facilitate oxygenation and minimize respiratory effort   Assess for changes in mentation and behavior   Assess for changes in respiratory status     Problem: Neurosensory - Adult  Goal: Achieves stable or improved neurological status  2/8/2025 1211 by Tequila  access sites hourly  3.  Every 4-6 hours minimum:  Change oxygen saturation probe site  4.  Every 4-6 hours:  If on nasal continuous positive airway pressure, respiratory therapy assess nares and determine need for appliance change or resting period  Outcome: Progressing  Goal: Incisions, wounds, or drain sites healing without S/S of infection  Outcome: Progressing  Goal: Oral mucous membranes remain intact  Outcome: Progressing     Problem: Musculoskeletal - Adult  Goal: Return mobility to safest level of function  Outcome: Progressing  Goal: Maintain proper alignment of affected body part  Outcome: Progressing  Goal: Return ADL status to a safe level of function  Outcome: Progressing     Problem: Genitourinary - Adult  Goal: Absence of urinary retention  Outcome: Progressing     Problem: Metabolic/Fluid and Electrolytes - Adult  Goal: Electrolytes maintained within normal limits  Outcome: Progressing  Goal: Hemodynamic stability and optimal renal function maintained  Outcome: Progressing  Goal: Glucose maintained within prescribed range  Outcome: Progressing     Problem: Hematologic - Adult  Goal: Maintains hematologic stability  Outcome: Progressing     Problem: Nutrition Deficit:  Goal: Optimize nutritional status  Outcome: Progressing     Problem: Skin/Tissue Integrity  Goal: Skin integrity remains intact  Description: 1.  Monitor for areas of redness and/or skin breakdown  2.  Assess vascular access sites hourly  3.  Every 4-6 hours minimum:  Change oxygen saturation probe site  4.  Every 4-6 hours:  If on nasal continuous positive airway pressure, respiratory therapy assess nares and determine need for appliance change or resting period  Outcome: Progressing

## 2025-02-08 NOTE — PROGRESS NOTES
Endovascular Neurosurgery Progress Notes    Pt Name: Dahlia Zhang  MRN: [id number]  YOB: 1950  Date of evaluation: 2/8/2025  Primary Care Physician: Thania Rahman MD  Reason for evaluation: Concern for pontine stroke in the presence of previously treated basilar anneurysm with WEB device and LVIS stent    SUBJECTIVE:     The patient was seen and examined at the bedside. She is able to follow both central and peripheral commands and moves her bilateral extremities purposefully and against gravity. 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 She is having difficulty articulating and expressing words. The sheath is in place, and the groin is soft with no signs of subcutaneous hematoma or bruising. Continue cangrelor infusion until tomorrow morning. A repeat CT head was unremarkable for any bleeding. The family was updated at the bedside.     History of Chief Complaint:    Dahlia Zhang is a 74 y.o. 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     On the morning of 1/23/2025, patient was noted to have increased lethargy and generalized weakness along with left facial droop.  These were concerning as patient was at her baseline and doing better yesterday.  She does not recall the time when she went to sleep, thus last known well is unknown.  Potentially she went to sleep between 6-8 PM on 1/22.      Patient was seen and examined at bedside upon arrival to [facility] ED. She was increasingly lethargic and short of breath, however she was able to cross midline without gaze preference, had intact fluency and repetition but did have mild dysarthria. She had generalized weakness.         CT head w/o contrast shows no acute finding.      CTA head and neck show flow diverting  details  The basilar artery tip aneurysm, previously treated with a WEB intrasaccular device (10/11/2023) and a second-stage LVIS 4.5 x 23mm stent (6/2024) and a third-stage  LVIS 4.5 x 23mm stent spanning from the left P2 segment to the mid-basilar artery (1/15/2025) is completely obliterated.  The stents are patent and well apposed with a filling defect at the basilar tip that could represent partial thrombus versus metal artifact.   There is a 360 vascular loop at the origin of the left vertebral artery requiring the catheter to be in the arch while navigating the wire to access and straighten the loop up to the V3 segment before tracking the catheter   Cangrelor bolus 15 mcg/kg over 2 minutes followed by maintenance dose of 2 mcg/kg was given.                      MRI brain 2/4/2025      Brain MRI 2/7/2025          DSA with intracranial balloon angioplasty 2/7/2025  Findings:  Please see dictated Radiology note for further details  The basilar artery tip aneurysm, previously treated with a WEB intrasaccular device (10/11/2023) and a second-stage LVIS 4.5 x 23mm stent (6/2024) and a third-stage  LVIS 4.5 x 23mm stent spanning from the left P2 segment to the mid-basilar artery (1/15/2025) is completely obliterated.  There is a filling defect of the basilar tip that extends to the left PCA stent.  The above lesion was treated with 6F short sheath, neuron 070, AXS Vecta 46 125cm, Headway 21, Synchro/Traxcess and Tigertreiver 21. Followed by Exchange length Synchro 300cm and NC emerge balloon 2x8mm.   Stentoplasty of the L PCA using Tigertreiver 21.   Submaximal angioplasty using NC Emerge 2x8 (x3 times, distal to proximal 10ATM)  Followed by cangrelor drip (15 mics per kilogram bolus followed by 2 mics per kilogram per minute).  Final angiographic run demonstrated patent bilateral PCA achieving TICI 2C         IMPRESSIONS:   Dahlia Marcosriccomonica, a 74-year-old female with a history of a ruptured basilar tip aneurysm,

## 2025-02-09 ENCOUNTER — APPOINTMENT (OUTPATIENT)
Dept: CT IMAGING | Age: 75
DRG: 023 | End: 2025-02-09
Payer: MEDICARE

## 2025-02-09 LAB
ANION GAP SERPL CALCULATED.3IONS-SCNC: 15 MMOL/L (ref 9–16)
BASOPHILS # BLD: 0.1 K/UL (ref 0–0.2)
BASOPHILS NFR BLD: 1 % (ref 0–2)
BUN SERPL-MCNC: 9 MG/DL (ref 8–23)
CALCIUM SERPL-MCNC: 8.7 MG/DL (ref 8.6–10.4)
CHLORIDE SERPL-SCNC: 110 MMOL/L (ref 98–107)
CO2 SERPL-SCNC: 18 MMOL/L (ref 20–31)
CREAT SERPL-MCNC: 0.5 MG/DL (ref 0.6–0.9)
EOSINOPHIL # BLD: 0.16 K/UL (ref 0–0.44)
EOSINOPHILS RELATIVE PERCENT: 1 % (ref 1–4)
ERYTHROCYTE [DISTWIDTH] IN BLOOD BY AUTOMATED COUNT: 15 % (ref 11.8–14.4)
ERYTHROCYTE [DISTWIDTH] IN BLOOD BY AUTOMATED COUNT: 15 % (ref 11.8–14.4)
GFR, ESTIMATED: >90 ML/MIN/1.73M2
GLUCOSE BLD-MCNC: 115 MG/DL (ref 65–105)
GLUCOSE BLD-MCNC: 116 MG/DL (ref 65–105)
GLUCOSE BLD-MCNC: 118 MG/DL (ref 65–105)
GLUCOSE SERPL-MCNC: 126 MG/DL (ref 74–99)
HCT VFR BLD AUTO: 24.7 % (ref 36.3–47.1)
HCT VFR BLD AUTO: 29.2 % (ref 36.3–47.1)
HGB BLD-MCNC: 7.9 G/DL (ref 11.9–15.1)
HGB BLD-MCNC: 9 G/DL (ref 11.9–15.1)
IMM GRANULOCYTES # BLD AUTO: 0.07 K/UL (ref 0–0.3)
IMM GRANULOCYTES NFR BLD: 1 %
LYMPHOCYTES NFR BLD: 2.26 K/UL (ref 1.1–3.7)
LYMPHOCYTES RELATIVE PERCENT: 16 % (ref 24–43)
MCH RBC QN AUTO: 27.3 PG (ref 25.2–33.5)
MCH RBC QN AUTO: 27.6 PG (ref 25.2–33.5)
MCHC RBC AUTO-ENTMCNC: 30.8 G/DL (ref 28.4–34.8)
MCHC RBC AUTO-ENTMCNC: 32 G/DL (ref 28.4–34.8)
MCV RBC AUTO: 86.4 FL (ref 82.6–102.9)
MCV RBC AUTO: 88.5 FL (ref 82.6–102.9)
MONOCYTES NFR BLD: 1.07 K/UL (ref 0.1–1.2)
MONOCYTES NFR BLD: 8 % (ref 3–12)
NEUTROPHILS NFR BLD: 73 % (ref 36–65)
NEUTS SEG NFR BLD: 10.1 K/UL (ref 1.5–8.1)
NRBC BLD-RTO: 0 PER 100 WBC
NRBC BLD-RTO: 0 PER 100 WBC
PLATELET # BLD AUTO: 381 K/UL (ref 138–453)
PLATELET # BLD AUTO: 457 K/UL (ref 138–453)
PMV BLD AUTO: 10.6 FL (ref 8.1–13.5)
PMV BLD AUTO: 10.7 FL (ref 8.1–13.5)
POTASSIUM SERPL-SCNC: 3.6 MMOL/L (ref 3.7–5.3)
RBC # BLD AUTO: 2.86 M/UL (ref 3.95–5.11)
RBC # BLD AUTO: 3.3 M/UL (ref 3.95–5.11)
RBC # BLD: ABNORMAL 10*6/UL
SODIUM SERPL-SCNC: 143 MMOL/L (ref 136–145)
WBC OTHER # BLD: 12.8 K/UL (ref 3.5–11.3)
WBC OTHER # BLD: 13.8 K/UL (ref 3.5–11.3)

## 2025-02-09 PROCEDURE — 2580000003 HC RX 258: Performed by: PSYCHIATRY & NEUROLOGY

## 2025-02-09 PROCEDURE — 2000000000 HC ICU R&B

## 2025-02-09 PROCEDURE — 6360000002 HC RX W HCPCS

## 2025-02-09 PROCEDURE — 6370000000 HC RX 637 (ALT 250 FOR IP): Performed by: STUDENT IN AN ORGANIZED HEALTH CARE EDUCATION/TRAINING PROGRAM

## 2025-02-09 PROCEDURE — 6370000000 HC RX 637 (ALT 250 FOR IP): Performed by: NURSE PRACTITIONER

## 2025-02-09 PROCEDURE — 6360000002 HC RX W HCPCS: Performed by: REGISTERED NURSE

## 2025-02-09 PROCEDURE — 6370000000 HC RX 637 (ALT 250 FOR IP)

## 2025-02-09 PROCEDURE — 6360000002 HC RX W HCPCS: Performed by: PSYCHIATRY & NEUROLOGY

## 2025-02-09 PROCEDURE — 6370000000 HC RX 637 (ALT 250 FOR IP): Performed by: EMERGENCY MEDICINE

## 2025-02-09 PROCEDURE — 36415 COLL VENOUS BLD VENIPUNCTURE: CPT

## 2025-02-09 PROCEDURE — 85025 COMPLETE CBC W/AUTO DIFF WBC: CPT

## 2025-02-09 PROCEDURE — 2580000003 HC RX 258: Performed by: REGISTERED NURSE

## 2025-02-09 PROCEDURE — 99233 SBSQ HOSP IP/OBS HIGH 50: CPT | Performed by: PSYCHIATRY & NEUROLOGY

## 2025-02-09 PROCEDURE — 6370000000 HC RX 637 (ALT 250 FOR IP): Performed by: INTERNAL MEDICINE

## 2025-02-09 PROCEDURE — 6370000000 HC RX 637 (ALT 250 FOR IP): Performed by: PSYCHIATRY & NEUROLOGY

## 2025-02-09 PROCEDURE — 70450 CT HEAD/BRAIN W/O DYE: CPT

## 2025-02-09 PROCEDURE — C9460 INJECTION, CANGRELOR: HCPCS | Performed by: PSYCHIATRY & NEUROLOGY

## 2025-02-09 PROCEDURE — 2580000003 HC RX 258

## 2025-02-09 PROCEDURE — 85027 COMPLETE CBC AUTOMATED: CPT

## 2025-02-09 PROCEDURE — 6360000002 HC RX W HCPCS: Performed by: STUDENT IN AN ORGANIZED HEALTH CARE EDUCATION/TRAINING PROGRAM

## 2025-02-09 PROCEDURE — 80048 BASIC METABOLIC PNL TOTAL CA: CPT

## 2025-02-09 RX ORDER — SODIUM CHLORIDE 9 MG/ML
INJECTION, SOLUTION INTRAVENOUS PRN
Status: DISCONTINUED | OUTPATIENT
Start: 2025-02-09 | End: 2025-02-15 | Stop reason: HOSPADM

## 2025-02-09 RX ORDER — ASPIRIN 81 MG/1
81 TABLET, CHEWABLE ORAL DAILY
Status: DISCONTINUED | OUTPATIENT
Start: 2025-02-09 | End: 2025-02-15

## 2025-02-09 RX ORDER — SODIUM CHLORIDE 0.9 % (FLUSH) 0.9 %
5-40 SYRINGE (ML) INJECTION PRN
Status: DISCONTINUED | OUTPATIENT
Start: 2025-02-09 | End: 2025-02-15 | Stop reason: HOSPADM

## 2025-02-09 RX ORDER — SODIUM CHLORIDE 0.9 % (FLUSH) 0.9 %
5-40 SYRINGE (ML) INJECTION EVERY 12 HOURS SCHEDULED
Status: DISCONTINUED | OUTPATIENT
Start: 2025-02-09 | End: 2025-02-15 | Stop reason: HOSPADM

## 2025-02-09 RX ORDER — HYDRALAZINE HYDROCHLORIDE 50 MG/1
50 TABLET, FILM COATED ORAL EVERY 12 HOURS SCHEDULED
Status: DISCONTINUED | OUTPATIENT
Start: 2025-02-09 | End: 2025-02-10

## 2025-02-09 RX ORDER — FENTANYL CITRATE 50 UG/ML
50 INJECTION, SOLUTION INTRAMUSCULAR; INTRAVENOUS ONCE
Status: COMPLETED | OUTPATIENT
Start: 2025-02-09 | End: 2025-02-09

## 2025-02-09 RX ADMIN — METOPROLOL TARTRATE 50 MG: 50 TABLET, FILM COATED ORAL at 20:55

## 2025-02-09 RX ADMIN — DILTIAZEM HYDROCHLORIDE 30 MG: 60 TABLET, FILM COATED ORAL at 00:08

## 2025-02-09 RX ADMIN — DILTIAZEM HYDROCHLORIDE 30 MG: 60 TABLET, FILM COATED ORAL at 13:43

## 2025-02-09 RX ADMIN — PANTOPRAZOLE SODIUM 40 MG: 40 TABLET, DELAYED RELEASE ORAL at 05:33

## 2025-02-09 RX ADMIN — TICAGRELOR 180 MG: 90 TABLET ORAL at 08:46

## 2025-02-09 RX ADMIN — METOPROLOL TARTRATE 50 MG: 50 TABLET, FILM COATED ORAL at 09:15

## 2025-02-09 RX ADMIN — HYDRALAZINE HYDROCHLORIDE 50 MG: 50 TABLET ORAL at 09:15

## 2025-02-09 RX ADMIN — DILTIAZEM HYDROCHLORIDE 30 MG: 60 TABLET, FILM COATED ORAL at 18:29

## 2025-02-09 RX ADMIN — AMIODARONE HYDROCHLORIDE 200 MG: 200 TABLET ORAL at 20:55

## 2025-02-09 RX ADMIN — ESCITALOPRAM OXALATE 10 MG: 10 TABLET ORAL at 09:15

## 2025-02-09 RX ADMIN — ASPIRIN 81 MG 81 MG: 81 TABLET ORAL at 14:28

## 2025-02-09 RX ADMIN — CANGRELOR 2 MCG/KG/MIN: 50 INJECTION, POWDER, LYOPHILIZED, FOR SOLUTION INTRAVENOUS at 04:29

## 2025-02-09 RX ADMIN — ROSUVASTATIN CALCIUM 20 MG: 20 TABLET, FILM COATED ORAL at 20:55

## 2025-02-09 RX ADMIN — Medication 10 MG: at 03:00

## 2025-02-09 RX ADMIN — Medication 2000 MG: at 14:33

## 2025-02-09 RX ADMIN — HYDRALAZINE HYDROCHLORIDE 50 MG: 50 TABLET ORAL at 20:55

## 2025-02-09 RX ADMIN — AMIODARONE HYDROCHLORIDE 200 MG: 200 TABLET ORAL at 09:15

## 2025-02-09 RX ADMIN — LOSARTAN POTASSIUM 100 MG: 50 TABLET, FILM COATED ORAL at 09:15

## 2025-02-09 RX ADMIN — Medication 10 MG: at 00:55

## 2025-02-09 RX ADMIN — SODIUM CHLORIDE: 9 INJECTION, SOLUTION INTRAVENOUS at 00:07

## 2025-02-09 RX ADMIN — SODIUM CHLORIDE 5 MG/HR: 9 INJECTION, SOLUTION INTRAVENOUS at 03:21

## 2025-02-09 RX ADMIN — FENTANYL CITRATE 50 MCG: 50 INJECTION, SOLUTION INTRAMUSCULAR; INTRAVENOUS at 14:28

## 2025-02-09 RX ADMIN — DILTIAZEM HYDROCHLORIDE 30 MG: 60 TABLET, FILM COATED ORAL at 05:33

## 2025-02-09 NOTE — PLAN OF CARE
Problem: Chronic Conditions and Co-morbidities  Goal: Patient's chronic conditions and co-morbidity symptoms are monitored and maintained or improved  Outcome: Progressing  Flowsheets (Taken 2/8/2025 2000)  Care Plan - Patient's Chronic Conditions and Co-Morbidity Symptoms are Monitored and Maintained or Improved:   Monitor and assess patient's chronic conditions and comorbid symptoms for stability, deterioration, or improvement   Collaborate with multidisciplinary team to address chronic and comorbid conditions and prevent exacerbation or deterioration   Update acute care plan with appropriate goals if chronic or comorbid symptoms are exacerbated and prevent overall improvement and discharge     Problem: Discharge Planning  Goal: Discharge to home or other facility with appropriate resources  Outcome: Progressing  Flowsheets (Taken 2/8/2025 2000)  Discharge to home or other facility with appropriate resources:   Identify barriers to discharge with patient and caregiver   Arrange for needed discharge resources and transportation as appropriate   Identify discharge learning needs (meds, wound care, etc)     Problem: Pain  Goal: Verbalizes/displays adequate comfort level or baseline comfort level  Outcome: Progressing     Problem: Safety - Adult  Goal: Free from fall injury  Outcome: Progressing  Flowsheets (Taken 2/8/2025 2000)  Free From Fall Injury:   Instruct family/caregiver on patient safety   Based on caregiver fall risk screen, instruct family/caregiver to ask for assistance with transferring infant if caregiver noted to have fall risk factors     Problem: ABCDS Injury Assessment  Goal: Absence of physical injury  Outcome: Progressing  Flowsheets (Taken 2/8/2025 2000)  Absence of Physical Injury: Implement safety measures based on patient assessment     Problem: Respiratory - Adult  Goal: Achieves optimal ventilation and oxygenation  Outcome: Progressing  Flowsheets (Taken 2/8/2025 2000)  Achieves optimal  neurological status   Encourage and assist patient to increase activity and self care with guidance from physical therapy/occupational therapy   Encourage visually impaired, hearing impaired and aphasic patients to use assistive/communication devices     Problem: Cardiovascular - Adult  Goal: Maintains optimal cardiac output and hemodynamic stability  Outcome: Progressing  Flowsheets (Taken 2/8/2025 2000)  Maintains optimal cardiac output and hemodynamic stability:   Monitor blood pressure and heart rate   Monitor urine output and notify Licensed Independent Practitioner for values outside of normal range   Assess for signs of decreased cardiac output   Administer fluid and/or volume expanders as ordered   Administer vasoactive medications as ordered  Goal: Absence of cardiac dysrhythmias or at baseline  Outcome: Progressing  Flowsheets (Taken 2/8/2025 2000)  Absence of cardiac dysrhythmias or at baseline:   Monitor cardiac rate and rhythm   Assess for signs of decreased cardiac output   Administer antiarrhythmia medication and electrolyte replacement as ordered     Problem: Gastrointestinal - Adult  Goal: Maintains or returns to baseline bowel function  Outcome: Progressing  Flowsheets (Taken 2/8/2025 2000)  Maintains or returns to baseline bowel function:   Assess bowel function   Encourage oral fluids to ensure adequate hydration   Administer ordered medications as needed   Administer IV fluids as ordered to ensure adequate hydration  Goal: Maintains adequate nutritional intake  Outcome: Progressing  Flowsheets (Taken 2/8/2025 2000)  Maintains adequate nutritional intake:   Monitor percentage of each meal consumed   Identify factors contributing to decreased intake, treat as appropriate   Assist with meals as needed   Monitor intake and output, weight and lab values  Goal: Minimal or absence of nausea and vomiting  Outcome: Progressing  Flowsheets (Taken 2/8/2025 2000)  Minimal or absence of nausea and

## 2025-02-09 NOTE — PROGRESS NOTES
Daily Progress Note  Neuro Critical Care    Patient Name: Dahlia Zhang  Patient : 1950  Room/Bed: 0117/0117-01  Code Status: Full Code  Allergies:   Allergies   Allergen Reactions    Lisinopril Angioedema    Codeine Nausea And Vomiting    Norco [Hydrocodone-Acetaminophen] Nausea And Vomiting    Percocet [Oxycodone-Acetaminophen] Nausea And Vomiting       CHIEF COMPLAINT:        Extremity weakness     INTERVAL HISTORY    Initial Presentation (Admitted 25):    Dahlia Zhang is a 74 y.o. 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  CT head with no acute intracranial abnormalities.  Remote infarcts in left occipital lobe, right basal ganglia and corona radiata.  CTA head and neck showed severe narrowing within the distal basilar stent.  Emergent DSA revealed stents are patent and well apposed with a filling defect at the basilar tip representing partial thrombus versus metal artifact.  She was treated with cannagrelor drip with improvement in neurologic status.    On , she developed rectal bleeding with the CTA indicating extravasation from sigmoid colon.,  Colonoscopy revealing bleeding diverticular disease.  Aspirin and Brilinta were held; GI evaluated and recommended embolization or surgical eval if bleeding recurs.  On , GI signed off and no evidence of recurrent bleeding.  Patient's hospital course complicated by ESBL UTI s/p 7 day course of meropenem through . Patient was gradually restarted on aspirin monotherapy, and then transitioned to Eliquis monotherapy. Patient has had fluctuating episodes of lethargy and slurred speech since being

## 2025-02-09 NOTE — PROGRESS NOTES
Occupational Therapy    [facility]  Occupational Therapy Not Seen Note    DATE: 2025    NAME: Dahlia Zhang  MRN: [id number]   : 1950      Patient not seen this date for Occupational Therapy due to:    Strict Bedrest:Per RN bedrest to stay in  Attempt 2/10    Electronically signed by NEGRO Espinal on 2025 at 9:02 AM

## 2025-02-09 NOTE — PROGRESS NOTES
Physical Therapy        Physical Therapy Cancel Note      DATE: 2025    NAME: Dahlia Zhang  MRN: [id number]   : 1950      Patient not seen this date for Physical Therapy due to:    Patient is not appropriate for PT evaluation/treatment at this time d/t strict bed rest per RN. Will continue to pursue as able.       Electronically signed by Iqra Haq PTA on 2025 at 9:10 AM

## 2025-02-09 NOTE — PROGRESS NOTES
[facility]  Speech Language Pathology    Date: 2/9/2025  Patient Name: Dahlia Zhang  YOB: 1950   AGE: 74 y.o.  MRN: [id number]        Patient Not Available for Speech Therapy     Due to:  [] Testing  [] Hemodialysis  [] Cancelled by RN  [] Surgery   [] Intubation/Sedation/Pain Medication  [] Medical instability  [x] Other: Spoke with RN, pt. Not appropriate for PO trials or ST treatment at this time.     Next scheduled treatment: 2/10  Completed by: MONTY CONTRERAS, SLP, M.A. RAMON-SLP

## 2025-02-09 NOTE — PLAN OF CARE
Problem: Chronic Conditions and Co-morbidities  Goal: Patient's chronic conditions and co-morbidity symptoms are monitored and maintained or improved  2/9/2025 0645 by Sandy Schaffer RN  Outcome: Progressing  2/9/2025 0644 by Sandy Schaffer RN  Outcome: Progressing  Flowsheets (Taken 2/8/2025 2000)  Care Plan - Patient's Chronic Conditions and Co-Morbidity Symptoms are Monitored and Maintained or Improved:   Monitor and assess patient's chronic conditions and comorbid symptoms for stability, deterioration, or improvement   Collaborate with multidisciplinary team to address chronic and comorbid conditions and prevent exacerbation or deterioration   Update acute care plan with appropriate goals if chronic or comorbid symptoms are exacerbated and prevent overall improvement and discharge     Problem: Discharge Planning  Goal: Discharge to home or other facility with appropriate resources  2/9/2025 0645 by Sandy Schaffer RN  Outcome: Progressing  2/9/2025 0644 by Sandy Schaffer RN  Outcome: Progressing  Flowsheets (Taken 2/8/2025 2000)  Discharge to home or other facility with appropriate resources:   Identify barriers to discharge with patient and caregiver   Arrange for needed discharge resources and transportation as appropriate   Identify discharge learning needs (meds, wound care, etc)     Problem: Pain  Goal: Verbalizes/displays adequate comfort level or baseline comfort level  2/9/2025 0645 by Sandy Schaffer RN  Outcome: Progressing  2/9/2025 0644 by Sandy Schaffer RN  Outcome: Progressing     Problem: Safety - Adult  Goal: Free from fall injury  2/9/2025 0645 by Sandy Schaffer RN  Outcome: Progressing  2/9/2025 0644 by Sandy Schaffer RN  Outcome: Progressing  Flowsheets (Taken 2/8/2025 2000)  Free From Fall Injury:   Instruct family/caregiver on patient safety   Based on caregiver fall risk screen, instruct family/caregiver to ask for assistance with transferring infant if caregiver noted to  have fall risk factors     Problem: ABCDS Injury Assessment  Goal: Absence of physical injury  2/9/2025 0645 by Sandy Schaffer RN  Outcome: Progressing  2/9/2025 0644 by Sandy Schaffer RN  Outcome: Progressing  Flowsheets (Taken 2/8/2025 2000)  Absence of Physical Injury: Implement safety measures based on patient assessment     Problem: Respiratory - Adult  Goal: Achieves optimal ventilation and oxygenation  2/9/2025 0645 by Sandy Schaffer RN  Outcome: Progressing  2/9/2025 0644 by Sandy Schaffer RN  Outcome: Progressing  Flowsheets (Taken 2/8/2025 2000)  Achieves optimal ventilation and oxygenation:   Assess for changes in respiratory status   Position to facilitate oxygenation and minimize respiratory effort   Oxygen supplementation based on oxygen saturation or arterial blood gases   Initiate smoking cessation protocol as indicated   Assess and instruct to report shortness of breath or any respiratory difficulty     Problem: Neurosensory - Adult  Goal: Achieves stable or improved neurological status  2/9/2025 0645 by Sandy Schaffer RN  Outcome: Progressing  2/9/2025 0644 by Sandy Schaffer RN  Outcome: Progressing  Flowsheets (Taken 2/8/2025 2000)  Achieves stable or improved neurological status:   Assess for and report changes in neurological status   Initiate measures to prevent increased intracranial pressure   Maintain blood pressure and fluid volume within ordered parameters to optimize cerebral perfusion and minimize risk of hemorrhage   Monitor temperature, glucose, and sodium. Initiate appropriate interventions as ordered  Goal: Absence of seizures  2/9/2025 0645 by Sandy Schaffer RN  Outcome: Progressing  2/9/2025 0644 by Sandy Schaffer RN  Outcome: Progressing  Flowsheets (Taken 2/8/2025 2000)  Absence of seizures:   Monitor for seizure activity.  If seizure occurs, document type and location of movements and any associated apnea   If seizure occurs, turn head to side and suction  saturation probe site  4.  Every 4-6 hours:  If on nasal continuous positive airway pressure, respiratory therapy assess nares and determine need for appliance change or resting period  2/9/2025 0645 by Sandy Schaffer, RN  Outcome: Progressing  2/9/2025 0644 by Sandy Schaffer, RN  Outcome: Progressing  Flowsheets (Taken 2/8/2025 2000)  Skin Integrity Remains Intact:   Monitor for areas of redness and/or skin breakdown   Assess vascular access sites hourly   Every 4-6 hours minimum: Change oxygen saturation probe site   Every 4-6 hours: If on nasal continuous positive airway pressure, respiratory therapy assesses nares and determine need for appliance change or resting period

## 2025-02-10 LAB
ANION GAP SERPL CALCULATED.3IONS-SCNC: 15 MMOL/L (ref 9–16)
ANTI-XA UNFRAC HEPARIN: 0.34 IU/L
ANTI-XA UNFRAC HEPARIN: <0.1 IU/L
BUN SERPL-MCNC: 7 MG/DL (ref 8–23)
CALCIUM SERPL-MCNC: 8.3 MG/DL (ref 8.6–10.4)
CHLORIDE SERPL-SCNC: 111 MMOL/L (ref 98–107)
CO2 SERPL-SCNC: 19 MMOL/L (ref 20–31)
CREAT SERPL-MCNC: 0.5 MG/DL (ref 0.6–0.9)
ERYTHROCYTE [DISTWIDTH] IN BLOOD BY AUTOMATED COUNT: 15.3 % (ref 11.8–14.4)
GFR, ESTIMATED: >90 ML/MIN/1.73M2
GLUCOSE BLD-MCNC: 113 MG/DL (ref 65–105)
GLUCOSE BLD-MCNC: 118 MG/DL (ref 65–105)
GLUCOSE BLD-MCNC: 118 MG/DL (ref 65–105)
GLUCOSE BLD-MCNC: 119 MG/DL (ref 65–105)
GLUCOSE BLD-MCNC: 124 MG/DL (ref 65–105)
GLUCOSE SERPL-MCNC: 124 MG/DL (ref 74–99)
HCT VFR BLD AUTO: 27.4 % (ref 36.3–47.1)
HGB BLD-MCNC: 8.4 G/DL (ref 11.9–15.1)
INR PPP: 1.2
MAGNESIUM SERPL-MCNC: 1.8 MG/DL (ref 1.6–2.4)
MCH RBC QN AUTO: 27 PG (ref 25.2–33.5)
MCHC RBC AUTO-ENTMCNC: 30.7 G/DL (ref 28.4–34.8)
MCV RBC AUTO: 88.1 FL (ref 82.6–102.9)
NRBC BLD-RTO: 0 PER 100 WBC
PARTIAL THROMBOPLASTIN TIME: 29.2 SEC (ref 23–36.5)
PHOSPHATE SERPL-MCNC: 2.4 MG/DL (ref 2.5–4.5)
PLATELET # BLD AUTO: 460 K/UL (ref 138–453)
PMV BLD AUTO: 10.9 FL (ref 8.1–13.5)
POTASSIUM SERPL-SCNC: 2.9 MMOL/L (ref 3.7–5.3)
PROTHROMBIN TIME: 14.9 SEC (ref 11.7–14.9)
RBC # BLD AUTO: 3.11 M/UL (ref 3.95–5.11)
SODIUM SERPL-SCNC: 145 MMOL/L (ref 136–145)
WBC OTHER # BLD: 12.6 K/UL (ref 3.5–11.3)

## 2025-02-10 PROCEDURE — 6360000002 HC RX W HCPCS

## 2025-02-10 PROCEDURE — 6370000000 HC RX 637 (ALT 250 FOR IP)

## 2025-02-10 PROCEDURE — 85730 THROMBOPLASTIN TIME PARTIAL: CPT

## 2025-02-10 PROCEDURE — 83735 ASSAY OF MAGNESIUM: CPT

## 2025-02-10 PROCEDURE — 36415 COLL VENOUS BLD VENIPUNCTURE: CPT

## 2025-02-10 PROCEDURE — 6370000000 HC RX 637 (ALT 250 FOR IP): Performed by: PSYCHIATRY & NEUROLOGY

## 2025-02-10 PROCEDURE — 85027 COMPLETE CBC AUTOMATED: CPT

## 2025-02-10 PROCEDURE — 85520 HEPARIN ASSAY: CPT

## 2025-02-10 PROCEDURE — 2500000003 HC RX 250 WO HCPCS: Performed by: STUDENT IN AN ORGANIZED HEALTH CARE EDUCATION/TRAINING PROGRAM

## 2025-02-10 PROCEDURE — 82947 ASSAY GLUCOSE BLOOD QUANT: CPT

## 2025-02-10 PROCEDURE — 2000000000 HC ICU R&B

## 2025-02-10 PROCEDURE — 99233 SBSQ HOSP IP/OBS HIGH 50: CPT | Performed by: PSYCHIATRY & NEUROLOGY

## 2025-02-10 PROCEDURE — 94640 AIRWAY INHALATION TREATMENT: CPT

## 2025-02-10 PROCEDURE — 2580000003 HC RX 258: Performed by: REGISTERED NURSE

## 2025-02-10 PROCEDURE — 6370000000 HC RX 637 (ALT 250 FOR IP): Performed by: NURSE PRACTITIONER

## 2025-02-10 PROCEDURE — 2500000003 HC RX 250 WO HCPCS: Performed by: INTERNAL MEDICINE

## 2025-02-10 PROCEDURE — 6360000002 HC RX W HCPCS: Performed by: REGISTERED NURSE

## 2025-02-10 PROCEDURE — 94761 N-INVAS EAR/PLS OXIMETRY MLT: CPT

## 2025-02-10 PROCEDURE — 6370000000 HC RX 637 (ALT 250 FOR IP): Performed by: INTERNAL MEDICINE

## 2025-02-10 PROCEDURE — 85610 PROTHROMBIN TIME: CPT

## 2025-02-10 PROCEDURE — 80048 BASIC METABOLIC PNL TOTAL CA: CPT

## 2025-02-10 PROCEDURE — 99291 CRITICAL CARE FIRST HOUR: CPT | Performed by: STUDENT IN AN ORGANIZED HEALTH CARE EDUCATION/TRAINING PROGRAM

## 2025-02-10 PROCEDURE — 84100 ASSAY OF PHOSPHORUS: CPT

## 2025-02-10 PROCEDURE — 6370000000 HC RX 637 (ALT 250 FOR IP): Performed by: STUDENT IN AN ORGANIZED HEALTH CARE EDUCATION/TRAINING PROGRAM

## 2025-02-10 RX ORDER — HYDRALAZINE HYDROCHLORIDE 50 MG/1
100 TABLET, FILM COATED ORAL EVERY 12 HOURS SCHEDULED
Status: DISCONTINUED | OUTPATIENT
Start: 2025-02-10 | End: 2025-02-15 | Stop reason: HOSPADM

## 2025-02-10 RX ORDER — HEPARIN SODIUM 10000 [USP'U]/100ML
5-30 INJECTION, SOLUTION INTRAVENOUS CONTINUOUS
Status: DISCONTINUED | OUTPATIENT
Start: 2025-02-10 | End: 2025-02-15

## 2025-02-10 RX ORDER — HYDRALAZINE HYDROCHLORIDE 50 MG/1
50 TABLET, FILM COATED ORAL ONCE
Status: COMPLETED | OUTPATIENT
Start: 2025-02-10 | End: 2025-02-10

## 2025-02-10 RX ADMIN — SODIUM CHLORIDE, PRESERVATIVE FREE 5 ML: 5 INJECTION INTRAVENOUS at 08:00

## 2025-02-10 RX ADMIN — ROSUVASTATIN CALCIUM 20 MG: 20 TABLET, FILM COATED ORAL at 22:13

## 2025-02-10 RX ADMIN — HYDRALAZINE HYDROCHLORIDE 50 MG: 50 TABLET ORAL at 07:44

## 2025-02-10 RX ADMIN — DILTIAZEM HYDROCHLORIDE 30 MG: 60 TABLET, FILM COATED ORAL at 22:12

## 2025-02-10 RX ADMIN — AMIODARONE HYDROCHLORIDE 200 MG: 200 TABLET ORAL at 07:44

## 2025-02-10 RX ADMIN — SENNOSIDES AND DOCUSATE SODIUM 2 TABLET: 50; 8.6 TABLET ORAL at 07:43

## 2025-02-10 RX ADMIN — HEPARIN SODIUM 12 UNITS/KG/HR: 10000 INJECTION, SOLUTION INTRAVENOUS at 10:58

## 2025-02-10 RX ADMIN — POTASSIUM CHLORIDE 10 MEQ: 7.46 INJECTION, SOLUTION INTRAVENOUS at 12:00

## 2025-02-10 RX ADMIN — DILTIAZEM HYDROCHLORIDE 30 MG: 60 TABLET, FILM COATED ORAL at 18:23

## 2025-02-10 RX ADMIN — METOPROLOL TARTRATE 50 MG: 50 TABLET, FILM COATED ORAL at 22:13

## 2025-02-10 RX ADMIN — SODIUM CHLORIDE 15 MG/HR: 9 INJECTION, SOLUTION INTRAVENOUS at 12:00

## 2025-02-10 RX ADMIN — PANTOPRAZOLE SODIUM 40 MG: 40 TABLET, DELAYED RELEASE ORAL at 07:43

## 2025-02-10 RX ADMIN — LOSARTAN POTASSIUM 100 MG: 50 TABLET, FILM COATED ORAL at 07:44

## 2025-02-10 RX ADMIN — HYDRALAZINE HYDROCHLORIDE 50 MG: 50 TABLET ORAL at 10:59

## 2025-02-10 RX ADMIN — TICAGRELOR 90 MG: 90 TABLET ORAL at 11:44

## 2025-02-10 RX ADMIN — AMIODARONE HYDROCHLORIDE 200 MG: 200 TABLET ORAL at 22:13

## 2025-02-10 RX ADMIN — METOPROLOL TARTRATE 50 MG: 50 TABLET, FILM COATED ORAL at 07:44

## 2025-02-10 RX ADMIN — SODIUM CHLORIDE, PRESERVATIVE FREE 10 ML: 5 INJECTION INTRAVENOUS at 22:13

## 2025-02-10 RX ADMIN — TIOTROPIUM BROMIDE INHALATION SPRAY 2 PUFF: 3.12 SPRAY, METERED RESPIRATORY (INHALATION) at 08:05

## 2025-02-10 RX ADMIN — HYDRALAZINE HYDROCHLORIDE 10 MG: 20 INJECTION INTRAMUSCULAR; INTRAVENOUS at 13:11

## 2025-02-10 RX ADMIN — HYDRALAZINE HYDROCHLORIDE 100 MG: 50 TABLET ORAL at 22:12

## 2025-02-10 RX ADMIN — HYDRALAZINE HYDROCHLORIDE 10 MG: 20 INJECTION INTRAMUSCULAR; INTRAVENOUS at 21:44

## 2025-02-10 RX ADMIN — DILTIAZEM HYDROCHLORIDE 30 MG: 60 TABLET, FILM COATED ORAL at 00:16

## 2025-02-10 RX ADMIN — DILTIAZEM HYDROCHLORIDE 30 MG: 60 TABLET, FILM COATED ORAL at 07:44

## 2025-02-10 RX ADMIN — POTASSIUM CHLORIDE 10 MEQ: 7.46 INJECTION, SOLUTION INTRAVENOUS at 07:15

## 2025-02-10 RX ADMIN — POTASSIUM CHLORIDE 10 MEQ: 7.46 INJECTION, SOLUTION INTRAVENOUS at 10:23

## 2025-02-10 RX ADMIN — DILTIAZEM HYDROCHLORIDE 30 MG: 60 TABLET, FILM COATED ORAL at 13:11

## 2025-02-10 RX ADMIN — POTASSIUM CHLORIDE 10 MEQ: 7.46 INJECTION, SOLUTION INTRAVENOUS at 16:37

## 2025-02-10 RX ADMIN — SODIUM CHLORIDE, PRESERVATIVE FREE 5 ML: 5 INJECTION INTRAVENOUS at 07:58

## 2025-02-10 RX ADMIN — POTASSIUM CHLORIDE 10 MEQ: 7.46 INJECTION, SOLUTION INTRAVENOUS at 09:17

## 2025-02-10 RX ADMIN — ESCITALOPRAM OXALATE 10 MG: 10 TABLET ORAL at 07:44

## 2025-02-10 RX ADMIN — ASPIRIN 81 MG 81 MG: 81 TABLET ORAL at 07:44

## 2025-02-10 RX ADMIN — POTASSIUM CHLORIDE 10 MEQ: 7.46 INJECTION, SOLUTION INTRAVENOUS at 13:19

## 2025-02-10 RX ADMIN — TICAGRELOR 90 MG: 90 TABLET ORAL at 22:12

## 2025-02-10 NOTE — PROGRESS NOTES
Physical Therapy Cancel Note      DATE: 2/10/2025    NAME: Dahlia Zhang  MRN: [id number]   : 1950      Patient not seen this date for Physical Therapy due to:    Other: pt still has bed rest order, and critically low potassium (2.9); per RN, pt receiving potassium, will take out bed rest order; check pm       Electronically signed by Cisco Bowers, PT on 2/10/2025 at 10:53 AM

## 2025-02-10 NOTE — CARE COORDINATION
Patient  currently with NG on Tube Feeds.  Cardene and heparin drips continue.  Patient continues with lethargy.  Potassium 2.9  No family at bedside.  Need updated therapy notes for plan.   Palliative has been consulted.  Awaiting family discussion on goals of care

## 2025-02-10 NOTE — PROGRESS NOTES
Daily Progress Note  Neuro Critical Care    Patient Name: Dahlia Zhang  Patient : 1950  Room/Bed: 0117/0117-01  Code Status: Full Code  Allergies:   Allergies   Allergen Reactions    Lisinopril Angioedema    Codeine Nausea And Vomiting    Norco [Hydrocodone-Acetaminophen] Nausea And Vomiting    Percocet [Oxycodone-Acetaminophen] Nausea And Vomiting       CHIEF COMPLAINT:        Extremity weakness     INTERVAL HISTORY    Initial Presentation (Admitted 25):    Dahlia Zhang is a 74 y.o. 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  CT head with no acute intracranial abnormalities.  Remote infarcts in left occipital lobe, right basal ganglia and corona radiata.  CTA head and neck showed severe narrowing within the distal basilar stent.  Emergent DSA revealed stents are patent and well apposed with a filling defect at the basilar tip representing partial thrombus versus metal artifact.  She was treated with cannagrelor drip with improvement in neurologic status.    On , she developed rectal bleeding with the CTA indicating extravasation from sigmoid colon.,  Colonoscopy revealing bleeding diverticular disease.  Aspirin and Brilinta were held; GI evaluated and recommended embolization or surgical eval if bleeding recurs.  On , GI signed off and no evidence of recurrent bleeding.  Patient's hospital course complicated by ESBL UTI s/p 7 day course of meropenem through . Patient was gradually restarted on aspirin monotherapy, and then transitioned to Eliquis monotherapy. Patient has had fluctuating episodes of lethargy and slurred speech since being  transferred to neuro stepdown. MRI brain was repeated on 2/5/25 which revealed new infarct in the bilateral thalami, left occipital lobes, parietal lobes, and cerebellar hemispheres. Patient taken back for DSA on 2/7 with additional basilar stent placement and balloon angioplasty. Transferred back to neuro-icu post-operatively on cangrelor infusion and femoral sheath in place.    Hospital Course:   1/23 : DSA showed possible basilar tip thrombosis.  Started on Cangrelor.  1/24: CTH stable.  Loaded with Brilinta. Cangrelor stopped.  Goal -180.  Weaned off [place].  Sheath removed.  NIHSS 0.    1/25: worsening tachycardia, tachypnea, chest pain. EKG no changes x2. Trop stable x2. PE study negative.   1/26: lower GIB on CT. 1u PRBC. Amio gtt for afib. AC/AP held. Scope w/ GI, extensive diverticular disease, no active bleeding.   1/28: ASA, DVT ppx restarted.  Resumed half home Losartan 50mg QD.  MBSS: Regular, thins.  PO Amio started, infusion stopped.   2/7: had been on step-down, developed intermittent slurred speech/lethargy. MRI w/ new infarct in bilateral thalami, left occipital, parietal, cerebellar. DSA w/ stent placement and angioplasty. On cangrelor infusion  2/8: cardizem changed to 30 IR (can't crush ER). Continue to hold TF. Hay out. Stable CTH  2/9: Repeat CTH stable. Loaded with Brilinta, cangrelor held. Sheath pulled? Continue to hold TF until patient is sitting up to avoid aspiration. Hay remains, discussion with NEV to resume eliquis    Last 24h:     Cardene continued, GCS remained 10 overnight, 11 this AM. Repeat CTH stable yesterday morning, Cangrelor DC yesterday, Cardene gtt 2.5 currently for BP goals.     During rounds: restart brilinta, heparin, aspirin triple therapy to be held for PEG tube or other procedures. LTME.     CURRENT MEDICATIONS:  SCHEDULED MEDICATIONS:   hydrALAZINE  100 mg Per NG tube 2 times per day    ticagrelor  90 mg Per NG tube BID    aspirin  81 mg Oral Daily    sodium  not answer questions, does not follow commands. Sonorous.                      DRAINS:  [x] There are no drains for Neuro Critical Care to monitor at this time.     ASSESSMENT AND PLAN:         ASSESSMENT:     Dahlia Zhang is a 74-year-old female with history of ruptured basilar tip aneurysm of s/p WEB embolization 10/11/23 c/b residual neck s/p LVIS placement 6/2024 status post Second LVIS embolization on 1/15/2025 initially presented on 1/23/2025 with AMS, lethargy, and left facial droop.  CT extremity severe narrowing of basilar tip.  S/p emergent DSA with cangrelor infusion improvement stay complicated by acute GI bleed ESBL UTI.  S/p colonoscopy 7-day course of meropenem IV.  Started on ASA monotherapy on 1/29.  Worsening exam prompted repeat MRI brain on 2/5 showed new acute infarcts bilaterally in basilar artery territory.  Taken for repeat DSA on 2/7 with left PCA stentoplasty and submaximal balloon angioplasty x 3, TICI2c. MRI on 2/7 showing stable previous infarcts, subacute infarcts in thalami slightly worse.     PLAN/MEDICAL DECISION MAKING:      NEUROLOGIC:  Acute right pontine, cerebellar, thalamus, occipital lobe infarcts 2/2 to thrombosed basilar tip s/p WEB, LVIS x2,  MRI with multiple infarcts in the posterior circulation involving brainstem and bilateral thalami   S/p basilar Y-stent placement into b/l PCA and submaximal balloon angioplasty  - Neuro checks per protocol  - STRICT -150  - Cardene infusion as needed   - Repeat CT head now, post-operatively showed no acute process              -CTH morning of 2/8 at stable. Overnight repeat CTH given depressed mental status stable.               -CTH morning of 2/9 stable   -Previously on ASA and then Eliquis              - Per NEV loaded with Brilinta on 2/9 and cangrelor stopped 1 hour post load.    - Now on brilinta, asa, heparin low dose for anticoagulation/antiplatelet therapy, will hold for PEG or other procedures.     -

## 2025-02-10 NOTE — PROGRESS NOTES
Endovascular Neurosurgery Progress Notes    Pt Name: Dahlia Zhang  MRN: [id number]  YOB: 1950  Date of evaluation: 2/10/2025  Primary Care Physician: Thania Rahman MD  Reason for evaluation: Concern for pontine stroke in the presence of previously treated basilar anneurysm with WEB device and LVIS stent    SUBJECTIVE:     NAEO from EVN perspective.      History of Chief Complaint:    Dahlia Zhang is a 74 y.o. right handed female with a history of ruptured basilar tip aneurysm in October 2023 s/p stenting, hypertension, hyperlipidemia, type 2 diabetes, GERD,  atrial fibrillation not on AC presents to [facility] for altered mental status.     On the morning of 1/23/2025, patient was noted to have increased lethargy and generalized weakness along with left facial droop.  These were concerning as patient was at her baseline and doing better yesterday.  She does not recall the time when she went to sleep, thus last known well is unknown.  Potentially she went to sleep between 6-8 PM on 1/22.      Patient was seen and examined at bedside upon arrival to [facility] ED. She was increasingly lethargic and short of breath, however she was able to cross midline without gaze preference, had intact fluency and repetition but did have mild dysarthria. She had generalized weakness.         CT head w/o contrast shows no acute finding.      CTA head and neck show flow diverting stent along basilar artery and proximal PCA with embolization coils at basilar tip. Severe narrowing within the distal basilar stent. Small amount of residual aneurysmal filling at the neck, approximately 2 mm.        Allergies  is allergic to lisinopril, codeine, norco [hydrocodone-acetaminophen], and percocet [oxycodone-acetaminophen].  Medications  Prior to Admission medications    Medication Sig Start Date End Date Taking? Authorizing Provider   metFORMIN (GLUCOPHAGE) 500 MG tablet Take 2 tablets by mouth Daily  mg Per NG tube 2 times per day    aspirin  81 mg Oral Daily    sodium chloride flush  5-40 mL IntraVENous 2 times per day    dilTIAZem  30 mg Per NG tube 4 times per day    metoprolol tartrate  50 mg Per NG tube BID    losartan  100 mg Per NG tube Daily    sodium chloride flush  5-40 mL IntraVENous 2 times per day    insulin lispro  0-4 Units SubCUTAneous Q4H    [Held by provider] traZODone  50 mg Oral Nightly    [Held by provider] apixaban  5 mg Oral BID    pantoprazole  40 mg Oral QAM AC    sennosides-docusate sodium  2 tablet Oral Daily    [Held by provider] aspirin  81 mg Oral Daily    amiodarone  200 mg Oral BID    lidocaine  1 patch TransDERmal Daily    escitalopram  10 mg Oral Daily    tiotropium  2 puff Inhalation Daily RT    sodium chloride flush  5-40 mL IntraVENous 2 times per day    rosuvastatin  20 mg Oral Nightly     Continuous Infusions:   niCARdipine 2.5 mg/hr (02/09/25 0648)    sodium chloride      [Held by provider] cangrelor (KENGREAL) 50 mg in sodium chloride 0.9 % 250 mL infusion Stopped (02/09/25 0846)    sodium chloride      sodium chloride 75 mL/hr at 02/09/25 0234    sodium chloride      dextrose      sodium chloride Stopped (01/30/25 0211)    sodium chloride      sodium chloride       PRN Meds:.sodium chloride flush, sodium chloride, sodium chloride flush, sodium chloride, hydrALAZINE, labetalol, potassium chloride **OR** potassium alternative oral replacement **OR** potassium chloride, melatonin, ipratropium 0.5 mg-albuterol 2.5 mg, [Held by provider] zolpidem, sodium chloride, glucose, dextrose bolus **OR** dextrose bolus, glucagon (rDNA), dextrose, acetaminophen, sodium chloride flush, sodium chloride, polyethylene glycol, albuterol sulfate HFA, sodium chloride flush, sodium chloride, magnesium sulfate, ondansetron **OR** ondansetron, sodium chloride flush, sodium chloride  Past Medical History   has a past medical history of Anticoagulated, Anxiety and depression, Atrial fibrillation  (HCC), Cerebral aneurysm, Cerebral artery occlusion with cerebral infarction (HCC), Colon polyps, COPD (chronic obstructive pulmonary disease) (HCC), COVID-19, COVID-19 vaccine administered, DM II (diabetes mellitus, type II), controlled (HCC), GERD (gastroesophageal reflux disease), HTN (hypertension), Hyperlipidemia, Kidney stone, On home O2, Osteoarthritis, Sleep apnea, Under care of team, Under care of team, Under care of team, Under care of team, Wears glasses, Wears partial dentures, and Wellness examination.  Past Surgical History   has a past surgical history that includes Thyroid surgery; Breast reduction surgery (Bilateral, 1989); Hysterectomy; hernia repair; Cerebral angiogram (04/04/2024); Cerebral angiogram (10/11/2023); Cerebral angiogram (06/19/2024); Cholecystectomy; Cerebral angiogram (12/12/2024); Breast biopsy (Right); Cataract extraction w/ intraocular lens implant (Bilateral, 2020); Colonoscopy w/ polypectomy (01/19/2023); other surgical history (01/15/2025); Colonoscopy (N/A, 01/26/2025); and Colonoscopy (N/A, 1/26/2025).  Social History   reports that she has been smoking cigarettes. She started smoking about 58 years ago. She has a 71.6 pack-year smoking history. She has never been exposed to tobacco smoke. She has never used smokeless tobacco.   reports current alcohol use of about 1.0 standard drink of alcohol per week.   reports no history of drug use.  Family History  family history includes Dementia in her mother; Heart Disease in her father; Hypertension in her father and mother; No Known Problems in her sister; Stroke in her father and mother.    Review of Systems:  NA given current mentation.     Review of systems otherwise negative.      OBJECTIVE:   Vitals: BP (!) 165/68   Pulse 74   Temp 99 °F (37.2 °C)   Resp 26   Ht 1.702 m (5' 7\")   Wt 90.4 kg (199 lb 4.7 oz)   SpO2 96%   BMI 31.21 kg/m²       Gen: No acute distress  MS: Lethargic. Intermittently follows command,

## 2025-02-10 NOTE — PROGRESS NOTES
Comprehensive Nutrition Assessment    Type and Reason for Visit:  Reassess    Nutrition Recommendations/Plan:   Pt at risk for refeeding - please replace electrolytes as able (See electrolytes below  Start standard with fiber TF with goal of 50 mL/hr  Please collect updated wt   Monitor POC, labs, TF, wt, meds     Malnutrition Assessment:  Malnutrition Status:  Moderate malnutrition (02/10/25 1434)    Context:  Chronic Illness     Findings of the 6 clinical characteristics of malnutrition:  Energy Intake:  75% or less estimated energy requirements for 1 month or longer  Weight Loss:  No weight loss     Body Fat Loss:  Mild body fat loss Orbital   Muscle Mass Loss:  Mild muscle mass loss Hand (interosseous)  Fluid Accumulation:  No fluid accumulation     Strength:  Not Performed    Nutrition Assessment:    Started NG feeds. Spoke with RN and writer discussed concern for refeeding. Pt has had poor intakes and appetite since admission. NPO x6 days. Writer requested RN collect updated wt since last wt taken is a stated wt from 1/23/2025. Per documentation, pt did not tolerate BSSE 2/8. Labs: K 2.9 mmol/L, Ca 8.3 mg/dL, Phos 2.4 mg/dL. Meds: Senokot, protonix, potassium chloride    Nutrition Related Findings:    Meds/labs reviewed Wound Type: None       Current Nutrition Intake & Therapies:    Average Meal Intake: NPO  Average Supplements Intake: NPO  Diet NPO  ADULT TUBE FEEDING; Nasogastric; Standard with Fiber; Continuous; 10; Yes; 10; Q 6 hours; 50; 30; Q 6 hours    Anthropometric Measures:  Height: 170.2 cm (5' 7\")  Ideal Body Weight (IBW): 135 lbs (61 kg)    Admission Body Weight: 90.3 kg (199 lb)  Current Body Weight: 90.3 kg (199 lb),   IBW. Weight Source: Stated  Current BMI (kg/m2): 31.2  Usual Body Weight: 86.2 kg (190 lb)  % Weight Change (Calculated): 4.7  Weight Adjustment For: No Adjustment  BMI Categories: Obese Class 1 (BMI 30.0-34.9)    Estimated Daily Nutrient Needs:  Energy Requirements Based On:  Kcal/kg  Weight Used for Energy Requirements: Ideal  Energy (kcal/day): 3717-3034 kcal/d  Weight Used for Protein Requirements: Ideal  Protein (g/day):  gm/d  Method Used for Fluid Requirements: 1 ml/kcal  Fluid (ml/day): or per physician    Nutrition Diagnosis:   Inadequate oral intake related to swallowing difficulty as evidenced by NPO or clear liquid status due to medical condition, nutrition support - enteral nutrition    Nutrition Interventions:   Food and/or Nutrient Delivery: IV Fluid Delivery, Start Tube Feeding, Continue NPO  Nutrition Education/Counseling: No recommendation at this time  Coordination of Nutrition Care: Continue to monitor while inpatient    Goals:  Goals: Meet at least 75% of estimated needs, prior to discharge  Type of Goal: Continue current goal  Previous Goal Met: No Progress toward Goal(s)    Nutrition Monitoring and Evaluation:   Behavioral-Environmental Outcomes: None Identified  Food/Nutrient Intake Outcomes: Enteral Nutrition Intake/Tolerance, Diet Advancement/Tolerance, Progression of Nutrition  Physical Signs/Symptoms Outcomes: Biochemical Data, Chewing or Swallowing, Weight    Discharge Planning:    Too soon to determine     Meghan Eagle MS, RDN, LDN  Contact: [phone]

## 2025-02-10 NOTE — PROGRESS NOTES
Occupational Therapy    [facility]  Occupational Therapy Not Seen Note    DATE: 2/10/2025    NAME: Dahlia Zhang  MRN: [id number]   : 1950      Patient not seen this date for Occupational Therapy due to:    Patient still has bed rest order, and critically low potassium (2.9); per RN, pt receiving potassium, will take out bed rest order. Will continue as able.     Electronically signed by GE Hernández on 2/10/2025 at 11:46 AM   SHREYA Grewal/CURTIS

## 2025-02-11 PROBLEM — E87.6 HYPOKALEMIA: Status: ACTIVE | Noted: 2025-02-11

## 2025-02-11 PROBLEM — Z51.5 ENCOUNTER FOR PALLIATIVE CARE: Status: ACTIVE | Noted: 2025-02-11

## 2025-02-11 PROBLEM — Z71.89 GOALS OF CARE, COUNSELING/DISCUSSION: Status: ACTIVE | Noted: 2025-02-11

## 2025-02-11 LAB
ANION GAP SERPL CALCULATED.3IONS-SCNC: 12 MMOL/L (ref 9–16)
ANTI-XA UNFRAC HEPARIN: 0.39 IU/L
BUN SERPL-MCNC: 6 MG/DL (ref 8–23)
CALCIUM SERPL-MCNC: 8.3 MG/DL (ref 8.6–10.4)
CHLORIDE SERPL-SCNC: 109 MMOL/L (ref 98–107)
CO2 SERPL-SCNC: 20 MMOL/L (ref 20–31)
CREAT SERPL-MCNC: 0.5 MG/DL (ref 0.6–0.9)
ERYTHROCYTE [DISTWIDTH] IN BLOOD BY AUTOMATED COUNT: 15.2 % (ref 11.8–14.4)
GFR, ESTIMATED: >90 ML/MIN/1.73M2
GLUCOSE BLD-MCNC: 143 MG/DL (ref 65–105)
GLUCOSE BLD-MCNC: 143 MG/DL (ref 65–105)
GLUCOSE BLD-MCNC: 144 MG/DL (ref 65–105)
GLUCOSE BLD-MCNC: 148 MG/DL (ref 65–105)
GLUCOSE BLD-MCNC: 154 MG/DL (ref 65–105)
GLUCOSE BLD-MCNC: 172 MG/DL (ref 65–105)
GLUCOSE SERPL-MCNC: 139 MG/DL (ref 74–99)
HCT VFR BLD AUTO: 27.3 % (ref 36.3–47.1)
HCT VFR BLD AUTO: 28.4 % (ref 36.3–47.1)
HGB BLD-MCNC: 8.7 G/DL (ref 11.9–15.1)
HGB BLD-MCNC: 8.8 G/DL (ref 11.9–15.1)
MAGNESIUM SERPL-MCNC: 1.7 MG/DL (ref 1.6–2.4)
MCH RBC QN AUTO: 27.4 PG (ref 25.2–33.5)
MCHC RBC AUTO-ENTMCNC: 31.9 G/DL (ref 28.4–34.8)
MCV RBC AUTO: 85.8 FL (ref 82.6–102.9)
MICROORGANISM SPEC CULT: NORMAL
NRBC BLD-RTO: 0 PER 100 WBC
PLATELET # BLD AUTO: 413 K/UL (ref 138–453)
PMV BLD AUTO: 10.5 FL (ref 8.1–13.5)
POTASSIUM SERPL-SCNC: 2.8 MMOL/L (ref 3.7–5.3)
POTASSIUM SERPL-SCNC: 2.9 MMOL/L (ref 3.7–5.3)
RBC # BLD AUTO: 3.18 M/UL (ref 3.95–5.11)
SERVICE CMNT-IMP: NORMAL
SODIUM SERPL-SCNC: 141 MMOL/L (ref 136–145)
SPECIMEN DESCRIPTION: NORMAL
WBC OTHER # BLD: 13.8 K/UL (ref 3.5–11.3)

## 2025-02-11 PROCEDURE — 95711 VEEG 2-12 HR UNMONITORED: CPT

## 2025-02-11 PROCEDURE — 2500000003 HC RX 250 WO HCPCS: Performed by: STUDENT IN AN ORGANIZED HEALTH CARE EDUCATION/TRAINING PROGRAM

## 2025-02-11 PROCEDURE — 6360000002 HC RX W HCPCS

## 2025-02-11 PROCEDURE — 6370000000 HC RX 637 (ALT 250 FOR IP): Performed by: STUDENT IN AN ORGANIZED HEALTH CARE EDUCATION/TRAINING PROGRAM

## 2025-02-11 PROCEDURE — 6370000000 HC RX 637 (ALT 250 FOR IP): Performed by: NURSE PRACTITIONER

## 2025-02-11 PROCEDURE — 6370000000 HC RX 637 (ALT 250 FOR IP): Performed by: INTERNAL MEDICINE

## 2025-02-11 PROCEDURE — 2580000003 HC RX 258

## 2025-02-11 PROCEDURE — 99232 SBSQ HOSP IP/OBS MODERATE 35: CPT | Performed by: PSYCHIATRY & NEUROLOGY

## 2025-02-11 PROCEDURE — 6370000000 HC RX 637 (ALT 250 FOR IP): Performed by: PSYCHIATRY & NEUROLOGY

## 2025-02-11 PROCEDURE — 6370000000 HC RX 637 (ALT 250 FOR IP)

## 2025-02-11 PROCEDURE — 85027 COMPLETE CBC AUTOMATED: CPT

## 2025-02-11 PROCEDURE — 84132 ASSAY OF SERUM POTASSIUM: CPT

## 2025-02-11 PROCEDURE — 85014 HEMATOCRIT: CPT

## 2025-02-11 PROCEDURE — 85018 HEMOGLOBIN: CPT

## 2025-02-11 PROCEDURE — 2580000003 HC RX 258: Performed by: STUDENT IN AN ORGANIZED HEALTH CARE EDUCATION/TRAINING PROGRAM

## 2025-02-11 PROCEDURE — 83735 ASSAY OF MAGNESIUM: CPT

## 2025-02-11 PROCEDURE — 99291 CRITICAL CARE FIRST HOUR: CPT | Performed by: STUDENT IN AN ORGANIZED HEALTH CARE EDUCATION/TRAINING PROGRAM

## 2025-02-11 PROCEDURE — 94640 AIRWAY INHALATION TREATMENT: CPT

## 2025-02-11 PROCEDURE — 95700 EEG CONT REC W/VID EEG TECH: CPT

## 2025-02-11 PROCEDURE — 82947 ASSAY GLUCOSE BLOOD QUANT: CPT

## 2025-02-11 PROCEDURE — 99223 1ST HOSP IP/OBS HIGH 75: CPT | Performed by: NURSE PRACTITIONER

## 2025-02-11 PROCEDURE — 2000000000 HC ICU R&B

## 2025-02-11 PROCEDURE — 36415 COLL VENOUS BLD VENIPUNCTURE: CPT

## 2025-02-11 PROCEDURE — 97530 THERAPEUTIC ACTIVITIES: CPT

## 2025-02-11 PROCEDURE — 85520 HEPARIN ASSAY: CPT

## 2025-02-11 PROCEDURE — 2500000003 HC RX 250 WO HCPCS: Performed by: INTERNAL MEDICINE

## 2025-02-11 PROCEDURE — 80048 BASIC METABOLIC PNL TOTAL CA: CPT

## 2025-02-11 RX ORDER — MAGNESIUM SULFATE IN WATER 40 MG/ML
2000 INJECTION, SOLUTION INTRAVENOUS ONCE
Status: COMPLETED | OUTPATIENT
Start: 2025-02-11 | End: 2025-02-11

## 2025-02-11 RX ORDER — HYDRALAZINE HYDROCHLORIDE 20 MG/ML
10 INJECTION INTRAMUSCULAR; INTRAVENOUS
Status: DISCONTINUED | OUTPATIENT
Start: 2025-02-11 | End: 2025-02-15 | Stop reason: HOSPADM

## 2025-02-11 RX ORDER — SODIUM CHLORIDE FOR INHALATION 0.9 %
3 VIAL, NEBULIZER (ML) INHALATION EVERY 4 HOURS PRN
Status: DISCONTINUED | OUTPATIENT
Start: 2025-02-11 | End: 2025-02-15 | Stop reason: HOSPADM

## 2025-02-11 RX ORDER — LABETALOL HYDROCHLORIDE 5 MG/ML
10 INJECTION, SOLUTION INTRAVENOUS
Status: DISCONTINUED | OUTPATIENT
Start: 2025-02-11 | End: 2025-02-15 | Stop reason: HOSPADM

## 2025-02-11 RX ADMIN — IPRATROPIUM BROMIDE AND ALBUTEROL SULFATE 1 DOSE: .5; 2.5 SOLUTION RESPIRATORY (INHALATION) at 16:07

## 2025-02-11 RX ADMIN — DILTIAZEM HYDROCHLORIDE 30 MG: 60 TABLET, FILM COATED ORAL at 06:14

## 2025-02-11 RX ADMIN — AMIODARONE HYDROCHLORIDE 200 MG: 200 TABLET ORAL at 20:07

## 2025-02-11 RX ADMIN — SODIUM CHLORIDE, PRESERVATIVE FREE 10 ML: 5 INJECTION INTRAVENOUS at 20:07

## 2025-02-11 RX ADMIN — SODIUM CHLORIDE, PRESERVATIVE FREE 10 ML: 5 INJECTION INTRAVENOUS at 20:06

## 2025-02-11 RX ADMIN — IPRATROPIUM BROMIDE AND ALBUTEROL SULFATE 1 DOSE: .5; 2.5 SOLUTION RESPIRATORY (INHALATION) at 11:00

## 2025-02-11 RX ADMIN — AMIODARONE HYDROCHLORIDE 200 MG: 200 TABLET ORAL at 08:15

## 2025-02-11 RX ADMIN — HYDRALAZINE HYDROCHLORIDE 10 MG: 20 INJECTION INTRAMUSCULAR; INTRAVENOUS at 15:20

## 2025-02-11 RX ADMIN — POTASSIUM CHLORIDE 10 MEQ: 7.46 INJECTION, SOLUTION INTRAVENOUS at 12:14

## 2025-02-11 RX ADMIN — POTASSIUM CHLORIDE 10 MEQ: 7.46 INJECTION, SOLUTION INTRAVENOUS at 06:22

## 2025-02-11 RX ADMIN — METOPROLOL TARTRATE 50 MG: 50 TABLET, FILM COATED ORAL at 08:15

## 2025-02-11 RX ADMIN — POTASSIUM CHLORIDE 10 MEQ: 7.46 INJECTION, SOLUTION INTRAVENOUS at 10:39

## 2025-02-11 RX ADMIN — HYDRALAZINE HYDROCHLORIDE 10 MG: 20 INJECTION INTRAMUSCULAR; INTRAVENOUS at 21:30

## 2025-02-11 RX ADMIN — POTASSIUM CHLORIDE 10 MEQ: 7.46 INJECTION, SOLUTION INTRAVENOUS at 09:21

## 2025-02-11 RX ADMIN — SODIUM CHLORIDE: 9 INJECTION, SOLUTION INTRAVENOUS at 02:35

## 2025-02-11 RX ADMIN — HYDRALAZINE HYDROCHLORIDE 100 MG: 50 TABLET ORAL at 08:15

## 2025-02-11 RX ADMIN — POTASSIUM CHLORIDE 10 MEQ: 7.46 INJECTION, SOLUTION INTRAVENOUS at 13:15

## 2025-02-11 RX ADMIN — PANTOPRAZOLE SODIUM 40 MG: 40 TABLET, DELAYED RELEASE ORAL at 06:14

## 2025-02-11 RX ADMIN — SODIUM CHLORIDE: 9 INJECTION, SOLUTION INTRAVENOUS at 06:19

## 2025-02-11 RX ADMIN — POTASSIUM BICARBONATE 80 MEQ: 782 TABLET, EFFERVESCENT ORAL at 21:32

## 2025-02-11 RX ADMIN — LOSARTAN POTASSIUM 100 MG: 50 TABLET, FILM COATED ORAL at 08:15

## 2025-02-11 RX ADMIN — ROSUVASTATIN CALCIUM 20 MG: 20 TABLET, FILM COATED ORAL at 20:06

## 2025-02-11 RX ADMIN — ESCITALOPRAM OXALATE 10 MG: 10 TABLET ORAL at 08:15

## 2025-02-11 RX ADMIN — METOPROLOL TARTRATE 50 MG: 50 TABLET, FILM COATED ORAL at 20:06

## 2025-02-11 RX ADMIN — ASPIRIN 81 MG 81 MG: 81 TABLET ORAL at 08:15

## 2025-02-11 RX ADMIN — DILTIAZEM HYDROCHLORIDE 30 MG: 60 TABLET, FILM COATED ORAL at 18:10

## 2025-02-11 RX ADMIN — ACETAMINOPHEN 650 MG: 325 TABLET ORAL at 20:05

## 2025-02-11 RX ADMIN — HYDRALAZINE HYDROCHLORIDE 10 MG: 20 INJECTION INTRAMUSCULAR; INTRAVENOUS at 13:06

## 2025-02-11 RX ADMIN — POTASSIUM CHLORIDE 10 MEQ: 7.46 INJECTION, SOLUTION INTRAVENOUS at 08:10

## 2025-02-11 RX ADMIN — HEPARIN SODIUM 12 UNITS/KG/HR: 10000 INJECTION, SOLUTION INTRAVENOUS at 09:24

## 2025-02-11 RX ADMIN — TICAGRELOR 90 MG: 90 TABLET ORAL at 08:15

## 2025-02-11 RX ADMIN — HYDRALAZINE HYDROCHLORIDE 100 MG: 50 TABLET ORAL at 20:06

## 2025-02-11 RX ADMIN — TICAGRELOR 90 MG: 90 TABLET ORAL at 20:06

## 2025-02-11 RX ADMIN — SODIUM CHLORIDE: 9 INJECTION, SOLUTION INTRAVENOUS at 18:23

## 2025-02-11 RX ADMIN — MAGNESIUM SULFATE HEPTAHYDRATE 2000 MG: 40 INJECTION, SOLUTION INTRAVENOUS at 21:37

## 2025-02-11 ASSESSMENT — PAIN SCALES - GENERAL: PAINLEVEL_OUTOF10: 0

## 2025-02-11 NOTE — PROGRESS NOTES
Endovascular Neurosurgery Progress Notes    Pt Name: Dahlia Zhang  MRN: [id number]  YOB: 1950  Date of evaluation: 2/11/2025  Primary Care Physician: Thania Rahman MD  Reason for evaluation: Concern for pontine stroke in the presence of previously treated basilar anneurysm with WEB device and LVIS stent    SUBJECTIVE:     NAEO from EVN perspective. Continues to have fluctuating mentation, at times following commands and at times sleepier. No new focal neuro deficits.      History of Chief Complaint:    Dahlia Zhang is a 74 y.o. 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     On the morning of 1/23/2025, patient was noted to have increased lethargy and generalized weakness along with left facial droop.  These were concerning as patient was at her baseline and doing better yesterday.  She does not recall the time when she went to sleep, thus last known well is unknown.  Potentially she went to sleep between 6-8 PM on 1/22.      Patient was seen and examined at bedside upon arrival to [facility] ED. She was increasingly lethargic and short of breath, however she was able to cross midline without gaze preference, had intact fluency and repetition but did have mild dysarthria. She had generalized weakness.         CT head w/o contrast shows no acute finding.      CTA head and neck show flow diverting stent along basilar artery and proximal PCA with embolization coils at basilar tip. Severe narrowing within the distal basilar stent. Small amount of residual aneurysmal filling at the neck, approximately 2 mm.        Allergies  is allergic to lisinopril, codeine, norco [hydrocodone-acetaminophen], and percocet [oxycodone-acetaminophen].  Medications  Prior to Admission medications    Medication Sig  maintenance dose of 2 mcg/kg was given.                MRI brain 2/4/2025 2/7/25 L PCA occlusion s/p thrombectomy and angioplasty    The basilar artery tip aneurysm, previously treated with a WEB intrasaccular device (10/11/2023) and a second-stage LVIS 4.5 x 23mm stent (6/2024) and a third-stage  LVIS 4.5 x 23mm stent spanning from the left P2 segment to the mid-basilar artery (1/15/2025) is completely obliterated.  There is a filling defect of the basilar tip that extends to the left PCA stent.  The above lesion was treated with 6F short sheath, neuron 070, AXS Vecta 46 125cm, Headway 21, Synchro/Traxcess and Tigertreiver 21. Followed by Exchange length Synchro 300cm and NC emerge balloon 2x8mm.   Stentoplasty of the L PCA using Tigertreiver 21.   Submaximal angioplasty using NC Emerge 2x8 (x3 times, distal to proximal 10ATM)  Followed by cangrelor drip (15 mics per kilogram bolus followed by 2 mics per kilogram per minute).  Final angiographic run demonstrated patent bilateral PCA achieving TICI 2C               2/9/25 CT        IMPRESSIONS:     Dahlia Zhang, a 74-year-old female with a history of a ruptured basilar tip aneurysm, hypertension, hyperlipidemia, diabetes, GERD, and atrial fibrillation, presented with altered mental status on 1/23/2025. She exhibited lethargy, generalized weakness, and left facial droop, with CT head showing no acute findings but MRI suggesting early ischemic changes in von and thalamus (See above).  Emergent DSA revealed: The stents are patent and well apposed with a filling defect at the basilar tip that could represent partial thrombus versus metal artifact. She was treated with a Canngrelor drip and showed neuro improvement. On 1/26, she developed rectal bleeding, with CTA indicating extravasation from the sigmoid colon Colonoscopy: bleeding diverticular disease. Aspirin and Brilinta were held, and GI recommends embolization or surgical evaluation if

## 2025-02-11 NOTE — PLAN OF CARE
Problem: Chronic Conditions and Co-morbidities  Goal: Patient's chronic conditions and co-morbidity symptoms are monitored and maintained or improved  Outcome: Progressing  Flowsheets (Taken 2/10/2025 2000)  Care Plan - Patient's Chronic Conditions and Co-Morbidity Symptoms are Monitored and Maintained or Improved:   Monitor and assess patient's chronic conditions and comorbid symptoms for stability, deterioration, or improvement   Collaborate with multidisciplinary team to address chronic and comorbid conditions and prevent exacerbation or deterioration   Update acute care plan with appropriate goals if chronic or comorbid symptoms are exacerbated and prevent overall improvement and discharge     Problem: Discharge Planning  Goal: Discharge to home or other facility with appropriate resources  Outcome: Progressing  Flowsheets (Taken 2/10/2025 2000)  Discharge to home or other facility with appropriate resources:   Identify barriers to discharge with patient and caregiver   Identify discharge learning needs (meds, wound care, etc)     Problem: Pain  Goal: Verbalizes/displays adequate comfort level or baseline comfort level  Outcome: Progressing  Flowsheets (Taken 2/10/2025 2000)  Verbalizes/displays adequate comfort level or baseline comfort level:   Encourage patient to monitor pain and request assistance   Assess pain using appropriate pain scale   Administer analgesics based on type and severity of pain and evaluate response   Implement non-pharmacological measures as appropriate and evaluate response   Consider cultural and social influences on pain and pain management   Notify Licensed Independent Practitioner if interventions unsuccessful or patient reports new pain     Problem: Safety - Adult  Goal: Free from fall injury  Outcome: Progressing     Problem: ABCDS Injury Assessment  Goal: Absence of physical injury  Outcome: Progressing     Problem: Respiratory - Adult  Goal: Achieves optimal ventilation and  ordered   Assess activities of daily living deficits and provide assistive devices as needed   Obtain physical therapy/occupational therapy consults as needed   Assist and instruct patient to increase activity and self care as tolerated     Problem: Genitourinary - Adult  Goal: Absence of urinary retention  Outcome: Progressing  Flowsheets (Taken 2/10/2025 2000)  Absence of urinary retention: Monitor intake/output and perform bladder scan as needed     Problem: Metabolic/Fluid and Electrolytes - Adult  Goal: Electrolytes maintained within normal limits  Outcome: Progressing  Flowsheets (Taken 2/10/2025 2000)  Electrolytes maintained within normal limits:   Monitor labs and assess patient for signs and symptoms of electrolyte imbalances   Administer electrolyte replacement as ordered   Monitor response to electrolyte replacements, including repeat lab results as appropriate  Goal: Hemodynamic stability and optimal renal function maintained  Outcome: Progressing  Flowsheets (Taken 2/10/2025 2000)  Hemodynamic stability and optimal renal function maintained:   Monitor labs and assess for signs and symptoms of volume excess or deficit   Monitor intake, output and patient weight   Monitor response to interventions for patient's volume status, including labs, urine output, blood pressure (other measures as available)  Goal: Glucose maintained within prescribed range  Outcome: Progressing  Flowsheets (Taken 2/10/2025 2000)  Glucose maintained within prescribed range:   Monitor blood glucose as ordered   Assess for signs and symptoms of hyperglycemia and hypoglycemia   Assess barriers to adequate nutritional intake and initiate nutrition consult as needed     Problem: Hematologic - Adult  Goal: Maintains hematologic stability  Outcome: Progressing  Flowsheets (Taken 2/10/2025 2000)  Maintains hematologic stability:   Assess for signs and symptoms of bleeding or hemorrhage   Monitor labs for bleeding or clotting disorders      Problem: Nutrition Deficit:  Goal: Optimize nutritional status  Outcome: Progressing     Problem: Skin/Tissue Integrity  Goal: Skin integrity remains intact  Description: 1.  Monitor for areas of redness and/or skin breakdown  2.  Assess vascular access sites hourly  3.  Every 4-6 hours minimum:  Change oxygen saturation probe site  4.  Every 4-6 hours:  If on nasal continuous positive airway pressure, respiratory therapy assess nares and determine need for appliance change or resting period  Outcome: Progressing  Flowsheets (Taken 2/10/2025 2000)  Skin Integrity Remains Intact:   Monitor for areas of redness and/or skin breakdown   Assess vascular access sites hourly   Every 4-6 hours minimum: Change oxygen saturation probe site

## 2025-02-11 NOTE — CONSULTS
Palliative Care Inpatient Consult    NAME:  Dahlia Zhang  MEDICAL RECORD NUMBER:  [id number]  AGE: 74 y.o.   GENDER: female  : 1950  TODAY'S DATE:  2025    Reasons for Consultation:    Symptom and/or pain management  Provision of information regarding PC and/or hospice philosophies  Complex, time-intensive communication and interdisciplinary psychosocial support  Clarification of goals of care and/or assistance with difficult decision-making  Guidance in regards to resources and transition(s)    Members of PC team contributing to this consultation are: BEN Celestin    Plan      Palliative Interaction:    Patient examined this afternoon on rounds. Patient's  Terry and sister were present at the bedside. I introduced myself and my role with palliative care. Terry was aware that we would be speaking with them.     We discussed Dahlia's life prior to her hospitalization for planned stent. Terry states that she has been chronically fatigue since the initial stroke but has been independent. She would go to the store but things that would take 30 minutes now took her an hour.     We discussed her hospitalization thus far and the difficulty with the up and down journey that she has been on. Patient's sister received updates from the providers this morning. There are plans for a feeding tube. Terry confirms this and states that they are unsure what the future may look like for her. They want to give her a chance to recover but they also feel that if she remains in her current state, she would not want to live like this. They spoke about her overwhelming independence which is very important to her.     I asked Terry if patient has previously completed durable power of  for healthcare or living will. They do have documents at home for both. I asked if he could provide a copy - he will look for them. Patient has a son and daughter as well. Legal next of kin without document is spouse  [R79.82] 01/25/2025    Bandemia [D72.825] 01/25/2025    Pyelonephritis [N12] 01/25/2025    Lactic acidosis [E87.20] 01/25/2025    Complicated UTI (urinary tract infection) [N39.0] 01/24/2025    Infection due to ESBL-producing Escherichia coli [A49.8, Z16.12] 01/24/2025    Encephalopathy [G93.40] 01/23/2025    Left-sided weakness [R53.1] 01/23/2025    Thalamic stroke (HCC) [I63.81] 01/23/2025       PAST MEDICAL HISTORY      Diagnosis Date    Anticoagulated     ELIQUIS    Anxiety and depression     Atrial fibrillation (HCC) 10/15/2023    new onset while in Hosp    Cerebral aneurysm 10/11/2023    ruptured , SAH ,  hosp x 2 weeks  : had acute HA    Cerebral artery occlusion with cerebral infarction (HCC) 08/08/2024    HA , blurry vision , left leg weakness , slurred speech    Colon polyps     COPD (chronic obstructive pulmonary disease) ([facility])     COVID-19     3-4 times    COVID-19 vaccine administered     DM II (diabetes mellitus, type II), controlled ([facility])     GERD (gastroesophageal reflux disease)     HTN (hypertension)     Hyperlipidemia     Kidney stone     once but did not require surgery    On home O2     3-4L / nightly    Osteoarthritis     Sleep apnea     Cpap    Under care of team     Cardiology ,  [facility],  last seen 6/26/2024    Under care of team     Sleep Andrea, last seen 7/3/2024    Under care of team     NeuroEndovascular , Dr. Pena ,  last seen 12/26/24    Under care of team     Neurology  [facility] , last seen 10/25/2024    Wears glasses     Wears partial dentures     upper    Wellness examination     PCP , Andrea, last sen 11/15/2024       PAST SURGICAL HISTORY  Past Surgical History:   Procedure Laterality Date    BREAST BIOPSY Right     since 1989 ; benign    BREAST REDUCTION SURGERY Bilateral 1989    CATARACT EXTRACTION W/ INTRAOCULAR LENS IMPLANT Bilateral 2020    CEREBRAL ANGIOGRAM  04/04/2024    DIAGNOSTIC / DR PENA    CEREBRAL ANGIOGRAM  10/11/2023    s/p WEB device embolization with  Extensive disease; Can't do any work; Considerable assist; intake normal or reduced; LOC full/confusion  ___40%  Mainly in bed; Extensive disease; Mainly assist; intake normal or reduced; LOC full/confusion   ___30%  Bed Bound; Extensive disease; Total care; intake reduced; LOC full/confusion  __x_20%  Bed Bound; Extensive disease; Total care; intake minimal; Drowsy/coma  ___10%  Bed Bound; Extensive disease; Total care; Mouth care only; Drowsy/coma  ___0       Death    Principle Problem/Diagnosis:  Principal Problem:    Encephalopathy  Active Problems:    Left-sided weakness    Thalamic stroke (HCC)    Complicated UTI (urinary tract infection)    Infection due to ESBL-producing Escherichia coli    Facial droop    CRP elevated    Bandemia    Pyelonephritis    Lactic acidosis    Gastrointestinal hemorrhage    Acute blood loss anemia    Abnormal CT of the abdomen    Shock    Diverticulosis    Polyp of colon    Cerebral multi-infarct state  Resolved Problems:    * No resolved hospital problems. *      Please call with any palliative questions or concerns.  Palliative Care Team is available via perfect serve or via phone.     This note has been dictated by dragon, typing errors may be a possibility.  The total time I spent in seeing the patient, discussing goals of care, advanced directives, code status, greater than 50% time in counseling and other major issues was more than 76 minutes      Total time in talking with family, discussing with patient, chart review, and writing note: 76      Electronically signed by     BEN Celestin  Hospice/Palliative Care  [facility], [place], [place]  2/11/2025 8:23 AM  Palliative care office: [phone]

## 2025-02-11 NOTE — PROGRESS NOTES
Daily Progress Note  Neuro Critical Care    Patient Name: Dahlia Zhang  Patient : 1950  Room/Bed: 0117/0117-01  Code Status: Full Code  Allergies:   Allergies   Allergen Reactions    Lisinopril Angioedema    Codeine Nausea And Vomiting    Norco [Hydrocodone-Acetaminophen] Nausea And Vomiting    Percocet [Oxycodone-Acetaminophen] Nausea And Vomiting       CHIEF COMPLAINT:        Extremity weakness     INTERVAL HISTORY    Initial Presentation (Admitted 25):    Dahlia Zhang is a 74 y.o. female with hx of HTN, HLD, COPD, A-fib, prior left occipital ischemic infarction and SAH 2/2 ruptured basilar tip aneurysm (s/p WEB embolization 10/11/23 c/b residual neck s/p LVIS placement 2024 status post Second LVIS embolization on 1/15/2025) who was admitted to [facility] on 2025 with altered mentation, increased lethargy with dysarthria, and left facial droop.  CT head with no acute intracranial abnormalities.  Remote infarcts in left occipital lobe, right basal ganglia and corona radiata.  CTA head and neck showed severe narrowing within the distal basilar stent.  Emergent DSA revealed stents are patent and well apposed with a filling defect at the basilar tip representing partial thrombus versus metal artifact.  She was treated with cannagrelor drip with improvement in neurologic status.    On , she developed rectal bleeding with the CTA indicating extravasation from sigmoid colon.,  Colonoscopy revealing bleeding diverticular disease.  Aspirin and Brilinta were held; GI evaluated and recommended embolization or surgical eval if bleeding recurs.  On , GI signed off and no evidence of recurrent bleeding.  Patient's hospital course complicated by ESBL UTI s/p 7 day course of meropenem through . Patient was gradually restarted on aspirin monotherapy, and then transitioned to Eliquis monotherapy. Patient has had fluctuating episodes of lethargy and slurred speech since being  airway, saturating well on 2L NC but not able to tolerate PO due to mental status. Palliative care onboard for guiding family through this new normal.     PLAN/MEDICAL DECISION MAKING:    NEUROLOGIC:  Acute right pontine, cerebellar, thalamus, occipital lobe infarcts 2/2 to thrombosed basilar tip s/p WEB, LVIS x2,  MRI with multiple infarcts in the posterior circulation involving brainstem and bilateral thalami   S/p basilar Y-stent placement into b/l PCA and submaximal balloon angioplasty  - Neuro checks per protocol  - STRICT -150  - Cardene infusion as needed   - Repeat CT head now, post-operatively showed no acute process              -CTH morning of 2/8 at stable. Overnight repeat CTH given depressed mental status stable.               -CTH morning of 2/9 stable   -Previously on ASA and then Eliquis              - Per NEV loaded with Brilinta on 2/9 and cangrelor stopped 1 hour post load.    - Now on brilinta, asa, heparin low dose for anticoagulation/antiplatelet therapy, will hold for PEG or other procedures.     - Intermittently follows commands, probable new baseline with waxing and waning wakefulness due to thalamic infarcts    -- LTME for seizures     CARDIOVASCULAR:  Hypertension   -Goal -150 per NEV  - Continue telemetry  - Cozaar 100 mg daily  - Lopressor 50 mg twice daily  - Cardizem 30 mg every 6 hours  - Home hydralazine 100mg prn   - Crestor 20mg nightly   - Amiodarone 200mg bid  - Hydralazine 10mg prn and labetalol 10mg prn    PULMONARY:  - Keep O2 sats > 92%  COPD  - Currently on RA, saturating well  - CT PE protocol 1/25 negative for PE. Showed mild colonic diverticulosis and cardiomegaly.   - CXR 2/8 shwoing cardiomegaly with bibasilar infiltrates.     RENAL/FLUID/ELECTROLYTE:  Recent Labs     02/11/25  0408   BUN 6*   CREATININE 0.5*     2/10, 2/11: K 2.9, replaced per protocol IV, daily 40 EfferK       Intake/Output Summary (Last 24 hours) at 2/11/2025 1610  Last data filed at

## 2025-02-11 NOTE — PLAN OF CARE
Problem: Chronic Conditions and Co-morbidities  Goal: Patient's chronic conditions and co-morbidity symptoms are monitored and maintained or improved  2/11/2025 1530 by Aye Anders RN  Outcome: Progressing     Problem: Discharge Planning  Goal: Discharge to home or other facility with appropriate resources  2/11/2025 1530 by Aye Anders RN  Outcome: Progressing     Problem: Pain  Goal: Verbalizes/displays adequate comfort level or baseline comfort level  2/11/2025 1530 by Aye Anders RN  Outcome: Progressing     Problem: Safety - Adult  Goal: Free from fall injury  2/11/2025 1530 by Aye Anders RN  Outcome: Progressing     Problem: ABCDS Injury Assessment  Goal: Absence of physical injury  2/11/2025 1530 by Aye Anders RN  Outcome: Progressing     Problem: Respiratory - Adult  Goal: Achieves optimal ventilation and oxygenation  2/11/2025 1530 by Aye Anders RN  Outcome: Progressing     Problem: Neurosensory - Adult  Goal: Achieves stable or improved neurological status  2/11/2025 1530 by Aye Anders RN  Outcome: Progressing     Problem: Neurosensory - Adult  Goal: Absence of seizures  2/11/2025 1530 by Aye Anders RN  Outcome: Progressing     Problem: Neurosensory - Adult  Goal: Remains free of injury related to seizures activity  2/11/2025 1530 by Aye Anders RN  Outcome: Progressing     Problem: Neurosensory - Adult  Goal: Achieves maximal functionality and self care  2/11/2025 1530 by Aye Anders RN  Outcome: Progressing     Problem: Cardiovascular - Adult  Goal: Maintains optimal cardiac output and hemodynamic stability  2/11/2025 1530 by Aye Anders RN  Outcome: Progressing     Problem: Cardiovascular - Adult  Goal: Absence of cardiac dysrhythmias or at baseline  2/11/2025 1530 by Aye Anders RN  Outcome: Progressing     Problem: Gastrointestinal - Adult  Goal: Maintains or returns to baseline bowel function  2/11/2025 1530 by  Aye Anders RN  Outcome: Progressing     Problem: Infection - Adult  Goal: Absence of infection at discharge  2/11/2025 1530 by Aye Anders RN  Outcome: Progressing     Problem: Skin/Tissue Integrity - Adult  Goal: Skin integrity remains intact  Description: 1.  Monitor for areas of redness and/or skin breakdown  2.  Assess vascular access sites hourly  3.  Every 4-6 hours minimum:  Change oxygen saturation probe site  4.  Every 4-6 hours:  If on nasal continuous positive airway pressure, respiratory therapy assess nares and determine need for appliance change or resting period  2/11/2025 1530 by Aye Anders RN  Outcome: Progressing     Problem: Musculoskeletal - Adult  Goal: Return mobility to safest level of function  2/11/2025 1530 by Aye Anders RN  Outcome: Progressing     Problem: Genitourinary - Adult  Goal: Absence of urinary retention  2/11/2025 1530 by Aye Anders RN  Outcome: Progressing     Problem: Metabolic/Fluid and Electrolytes - Adult  Goal: Electrolytes maintained within normal limits  2/11/2025 1530 by Aye Anders RN  Outcome: Progressing     Problem: Hematologic - Adult  Goal: Maintains hematologic stability  2/11/2025 1530 by Aye Anders RN  Outcome: Progressing     Problem: Nutrition Deficit:  Goal: Optimize nutritional status  2/11/2025 1530 by Aye Anders RN  Outcome: Progressing     Problem: Skin/Tissue Integrity  Goal: Skin integrity remains intact  Description: 1.  Monitor for areas of redness and/or skin breakdown  2.  Assess vascular access sites hourly  3.  Every 4-6 hours minimum:  Change oxygen saturation probe site  4.  Every 4-6 hours:  If on nasal continuous positive airway pressure, respiratory therapy assess nares and determine need for appliance change or resting period  2/11/2025 1530 by Aye Anders RN  Outcome: Progressing     Problem: Confusion  Goal: Confusion, delirium, dementia, or psychosis is improved or at  baseline  Description: INTERVENTIONS:  1. Assess for possible contributors to thought disturbance, including medications, impaired vision or hearing, underlying metabolic abnormalities, dehydration, psychiatric diagnoses, and notify attending LIP  2. Howe high risk fall precautions, as indicated  3. Provide frequent short contacts to provide reality reorientation, refocusing and direction  4. Decrease environmental stimuli, including noise as appropriate  5. Monitor and intervene to maintain adequate nutrition, hydration, elimination, sleep and activity  6. If unable to ensure safety without constant attention obtain sitter and review sitter guidelines with assigned personnel  7. Initiate Psychosocial CNS and Spiritual Care consult, as indicated  Outcome: Not Progressing     Problem: Confusion  Goal: Confusion, delirium, dementia, or psychosis is improved or at baseline  Description: INTERVENTIONS:  1. Assess for possible contributors to thought disturbance, including medications, impaired vision or hearing, underlying metabolic abnormalities, dehydration, psychiatric diagnoses, and notify attending LIP  2. Howe high risk fall precautions, as indicated  3. Provide frequent short contacts to provide reality reorientation, refocusing and direction  4. Decrease environmental stimuli, including noise as appropriate  5. Monitor and intervene to maintain adequate nutrition, hydration, elimination, sleep and activity  6. If unable to ensure safety without constant attention obtain sitter and review sitter guidelines with assigned personnel  7. Initiate Psychosocial CNS and Spiritual Care consult, as indicated  Outcome: Not Progressing

## 2025-02-11 NOTE — FLOWSHEET NOTE
RN notified Neuro CC that patient has increasing diaphragmatic breathing. Resident stated he will be in shortly to assess.     Electronically signed by Aye Anders RN on 2/11/2025 at 3:35 PM

## 2025-02-11 NOTE — PROGRESS NOTES
[facility]   Gastroenterology reconsult note    Dahlia Zhang is a 74 y.o. female patient.  Hospitalization Day:19      Chief consult reason:   2025-severe abdominal pain with large bloody bowel movement  2025 reconsult for PEG tube    Subjective:  Patient seen and examined.  GI was reconsulted for PEG tube placement  Patient is currently on aspirin and Brilinta along with heparin drip bridging to Eliquis  2025 patient had MRI that showed new infarct in bilateral thalamus, left subsegmental lobes, parietal and cerebral hemisphere.  On 2025.  Patient had DSA with stent and balloon angioplasty  Patient's  at bedside and states that patient appears more awake and interactive today he would like to hold off on PEG tube at this time to give wife more time to recover    VITALS:  BP (!) 144/60   Pulse 73   Temp 98.8 °F (37.1 °C)   Resp 18   Ht 1.702 m (5' 7\")   Wt 88.5 kg (195 lb 1.7 oz)   SpO2 97%   BMI 30.56 kg/m²   TEMPERATURE:  Current - Temp: 98.8 °F (37.1 °C); Max - Temp  Av.4 °F (36.9 °C)  Min: 97.7 °F (36.5 °C)  Max: 99.1 °F (37.3 °C)    Physical Assessment:  General appearance: Sleeping  Mental Status: JESSICA  Lungs:  clear to auscultation bilaterally, normal effort  Heart:  regular rate and rhythm, no murmur  Abdomen:  soft, nontender, nondistended, normal bowel sounds, no masses, hepatomegaly, splenomegaly  Extremities:  no edema, redness, tenderness in the calves  Skin:  no gross lesions, rashes, induration    Data Review:    Labs and Imaging:       CBC:  Recent Labs     25  0551 25  0938 02/10/25  0522 25  0408 25  1347   WBC 12.8* 13.8* 12.6* 13.8*  --    HGB 7.9* 9.0* 8.4* 8.7* 8.8*   MCV 86.4 88.5 88.1 85.8  --    RDW 15.0* 15.0* 15.3* 15.2*  --     457* 460* 413  --        ANEMIA STUDIES:  No results for input(s): \"TIBC\", \"FERRITIN\", \"XBPVSWNK65\", \"FOLATE\", \"OCCULTBLD\" in the last 72 hours.    Invalid input(s):  \"LABIRON\"    BMP:  Recent Labs     02/09/25  0938 02/10/25  0522 02/11/25  0408    145 141   K 3.6* 2.9* 2.9*   * 111* 109*   CO2 18* 19* 20   BUN 9 7* 6*   CREATININE 0.5* 0.5* 0.5*   GLUCOSE 126* 124* 139*   CALCIUM 8.7 8.3* 8.3*   MG  --  1.8 1.7       LFTS:  No results for input(s): \"ALKPHOS\", \"ALT\", \"AST\", \"BILITOT\", \"BILIDIR\", \"LABALBU\" in the last 72 hours.    Other pertinent labs:      Gastroenterology impression:    Oral dysphagia secondary to acute right pontine, cerebellar, thalamus, occipital infarcts secondary to thrombosed basilar tip  Hypertension  Diverticular bleed-resolved  ESBL E. coli UTI-resolved    Plan:    At this time we will respect 's wishes to allow patient to have more time to recover and improve.  Once appropriate recommend MBSS per SLP.   Continue tube feeds   Continue strict aspiration precautions  Daily labs  Will follow    This plan was formulated in collaboration with Dr.Daboul STEPHENS    Thank you for allowing me to participate in the care of your patient.  Please feel free to contact me with any questions or concerns.     [facility]   Juno Gage, APRN - CNP   [phone]  2/11/2025  5:06 PM    Estimated time of  mins reviewing chart, assessing patient and formulating plan of care    This note was created with the assistance of a speech-recognition program.  Although the intention is to generate a document that actually reflects the content of the visit, no guarantees can be provided that every mistake has been identified and corrected by editing.       Attending Physician Statement  I have discussed the care of Dahlia Zhang and I have examined the patient myselft and taken ros and hpi , including pertinent history and exam findings,  with the author of this note . I have reviewed the key elements of all parts of the encounter with the nurse practitioner/resident.  I agree with the assessment, plan and orders as

## 2025-02-11 NOTE — FLOWSHEET NOTE
RN notified Neuro CC that patient's SBP >150 despite Hydralazine PRN given. Waiting for orders.     Electronically signed by Aye Anders RN on 2/11/2025 at 3:42 PM

## 2025-02-11 NOTE — PROGRESS NOTES
DATE: 2025  NAME: Dahlia Zhang  MRN: [id number]   : 1950    Discharge Recommendations: Continue to Assess (pending progress)   Palliative care has been consulted to discuss goals of care with pt's family    Subjective: pt unable to verbalize at this time  Pain: grimacing with end ROM B shoulder elevation--slow, gentle stretch, decreased reps  Patient follows: Some Commands--able to squeeze/release L hand, resist elbow flexion, hold L shoulder antigravity, but very lethargic and easily fatigued/stopped following commands  Is patient on ventilator: No  Is patient on sedation: No  Precautions: continues with low potassium; systolic BP < 150     Therapeutic exercises:  UE/LE(s)  Active-assist range of motion ANSON/LEs all planes x 10 reps except shoulder elevation, 5 reps  PROM RU/LEs all planes x 10 reps except shoulder elevation, 5 reps  bilateral gastrocnemius stretching 10 reps x 51988 seconds    I didn't mobilize this pt d/t systolic BP above approved parameters (RN medicating while PT session in progress), pt still hypokalemic in spite of resuscitative measures (2.9), and labored breathing (23 bpm)    Goals  Short Term Goals  Prevent contractures x 4   Facilitate active movement x 4   Mobilize pt when medically appropriate and set goals     Plan: Progress functional mobility as medically appropriate.   Time In: 811  Time Out: 830  Time Coded Minutes (treatment minutes): 8  Rehab Potential: guarded  Treatments/week: 2-3    Cisco Bowers, PT

## 2025-02-12 ENCOUNTER — APPOINTMENT (OUTPATIENT)
Dept: GENERAL RADIOLOGY | Age: 75
DRG: 023 | End: 2025-02-12
Payer: MEDICARE

## 2025-02-12 PROBLEM — R13.12 OROPHARYNGEAL DYSPHAGIA: Status: ACTIVE | Noted: 2025-02-12

## 2025-02-12 LAB
ANION GAP SERPL CALCULATED.3IONS-SCNC: 10 MMOL/L (ref 9–16)
ANTI-XA UNFRAC HEPARIN: 0.36 IU/L
BODY TEMPERATURE: 37
BUN SERPL-MCNC: 5 MG/DL (ref 8–23)
CALCIUM SERPL-MCNC: 8.4 MG/DL (ref 8.6–10.4)
CHLORIDE SERPL-SCNC: 105 MMOL/L (ref 98–107)
CO2 SERPL-SCNC: 25 MMOL/L (ref 20–31)
COHGB MFR BLD: 0.3 % (ref 0–5)
CREAT SERPL-MCNC: 0.5 MG/DL (ref 0.6–0.9)
ERYTHROCYTE [DISTWIDTH] IN BLOOD BY AUTOMATED COUNT: 15 % (ref 11.8–14.4)
FIO2 ON VENT: ABNORMAL %
GFR, ESTIMATED: >90 ML/MIN/1.73M2
GLUCOSE BLD-MCNC: 147 MG/DL (ref 65–105)
GLUCOSE BLD-MCNC: 150 MG/DL (ref 65–105)
GLUCOSE BLD-MCNC: 154 MG/DL (ref 65–105)
GLUCOSE BLD-MCNC: 168 MG/DL (ref 65–105)
GLUCOSE BLD-MCNC: 176 MG/DL (ref 65–105)
GLUCOSE BLD-MCNC: 178 MG/DL (ref 65–105)
GLUCOSE SERPL-MCNC: 178 MG/DL (ref 74–99)
HCO3 VENOUS: 29.5 MMOL/L (ref 24–30)
HCT VFR BLD AUTO: 27.1 % (ref 36.3–47.1)
HCT VFR BLD AUTO: 28.5 % (ref 36.3–47.1)
HGB BLD-MCNC: 8.5 G/DL (ref 11.9–15.1)
HGB BLD-MCNC: 9 G/DL (ref 11.9–15.1)
MAGNESIUM SERPL-MCNC: 2.1 MG/DL (ref 1.6–2.4)
MCH RBC QN AUTO: 26.9 PG (ref 25.2–33.5)
MCHC RBC AUTO-ENTMCNC: 31.6 G/DL (ref 28.4–34.8)
MCV RBC AUTO: 85.1 FL (ref 82.6–102.9)
NRBC BLD-RTO: 0.1 PER 100 WBC
O2 SAT, VEN: 84.4 % (ref 60–85)
PCO2 VENOUS: 40.6 MM HG (ref 39–55)
PH VENOUS: 7.48 (ref 7.32–7.42)
PLATELET # BLD AUTO: 423 K/UL (ref 138–453)
PMV BLD AUTO: 11.1 FL (ref 8.1–13.5)
PO2 VENOUS: 45.3 MM HG (ref 30–50)
POSITIVE BASE EXCESS, VEN: 5.5 MMOL/L (ref 0–2)
POTASSIUM SERPL-SCNC: 3.5 MMOL/L (ref 3.7–5.3)
RBC # BLD AUTO: 3.35 M/UL (ref 3.95–5.11)
SODIUM SERPL-SCNC: 140 MMOL/L (ref 136–145)
WBC OTHER # BLD: 13.3 K/UL (ref 3.5–11.3)

## 2025-02-12 PROCEDURE — 80048 BASIC METABOLIC PNL TOTAL CA: CPT

## 2025-02-12 PROCEDURE — 71045 X-RAY EXAM CHEST 1 VIEW: CPT

## 2025-02-12 PROCEDURE — 6370000000 HC RX 637 (ALT 250 FOR IP): Performed by: STUDENT IN AN ORGANIZED HEALTH CARE EDUCATION/TRAINING PROGRAM

## 2025-02-12 PROCEDURE — 97110 THERAPEUTIC EXERCISES: CPT

## 2025-02-12 PROCEDURE — 6360000002 HC RX W HCPCS

## 2025-02-12 PROCEDURE — 6370000000 HC RX 637 (ALT 250 FOR IP): Performed by: NURSE PRACTITIONER

## 2025-02-12 PROCEDURE — 2580000003 HC RX 258

## 2025-02-12 PROCEDURE — 2500000003 HC RX 250 WO HCPCS: Performed by: STUDENT IN AN ORGANIZED HEALTH CARE EDUCATION/TRAINING PROGRAM

## 2025-02-12 PROCEDURE — 6370000000 HC RX 637 (ALT 250 FOR IP)

## 2025-02-12 PROCEDURE — 85027 COMPLETE CBC AUTOMATED: CPT

## 2025-02-12 PROCEDURE — 99222 1ST HOSP IP/OBS MODERATE 55: CPT | Performed by: STUDENT IN AN ORGANIZED HEALTH CARE EDUCATION/TRAINING PROGRAM

## 2025-02-12 PROCEDURE — 97530 THERAPEUTIC ACTIVITIES: CPT

## 2025-02-12 PROCEDURE — 94761 N-INVAS EAR/PLS OXIMETRY MLT: CPT

## 2025-02-12 PROCEDURE — 36415 COLL VENOUS BLD VENIPUNCTURE: CPT

## 2025-02-12 PROCEDURE — 97535 SELF CARE MNGMENT TRAINING: CPT

## 2025-02-12 PROCEDURE — 83735 ASSAY OF MAGNESIUM: CPT

## 2025-02-12 PROCEDURE — 6370000000 HC RX 637 (ALT 250 FOR IP): Performed by: INTERNAL MEDICINE

## 2025-02-12 PROCEDURE — 85520 HEPARIN ASSAY: CPT

## 2025-02-12 PROCEDURE — 6370000000 HC RX 637 (ALT 250 FOR IP): Performed by: PSYCHIATRY & NEUROLOGY

## 2025-02-12 PROCEDURE — 95714 VEEG EA 12-26 HR UNMNTR: CPT

## 2025-02-12 PROCEDURE — 82947 ASSAY GLUCOSE BLOOD QUANT: CPT

## 2025-02-12 PROCEDURE — 85018 HEMOGLOBIN: CPT

## 2025-02-12 PROCEDURE — 95720 EEG PHY/QHP EA INCR W/VEEG: CPT | Performed by: PSYCHIATRY & NEUROLOGY

## 2025-02-12 PROCEDURE — 97112 NEUROMUSCULAR REEDUCATION: CPT

## 2025-02-12 PROCEDURE — 94640 AIRWAY INHALATION TREATMENT: CPT

## 2025-02-12 PROCEDURE — 82805 BLOOD GASES W/O2 SATURATION: CPT

## 2025-02-12 PROCEDURE — 85014 HEMATOCRIT: CPT

## 2025-02-12 PROCEDURE — 99232 SBSQ HOSP IP/OBS MODERATE 35: CPT | Performed by: PSYCHIATRY & NEUROLOGY

## 2025-02-12 PROCEDURE — 99223 1ST HOSP IP/OBS HIGH 75: CPT | Performed by: INTERNAL MEDICINE

## 2025-02-12 PROCEDURE — 2700000000 HC OXYGEN THERAPY PER DAY

## 2025-02-12 PROCEDURE — 99291 CRITICAL CARE FIRST HOUR: CPT | Performed by: STUDENT IN AN ORGANIZED HEALTH CARE EDUCATION/TRAINING PROGRAM

## 2025-02-12 PROCEDURE — 2060000000 HC ICU INTERMEDIATE R&B

## 2025-02-12 RX ORDER — HYDROCHLOROTHIAZIDE 25 MG/1
25 TABLET ORAL DAILY
Status: DISCONTINUED | OUTPATIENT
Start: 2025-02-12 | End: 2025-02-15 | Stop reason: HOSPADM

## 2025-02-12 RX ADMIN — AMIODARONE HYDROCHLORIDE 200 MG: 200 TABLET ORAL at 08:35

## 2025-02-12 RX ADMIN — ESCITALOPRAM OXALATE 10 MG: 10 TABLET ORAL at 08:35

## 2025-02-12 RX ADMIN — HYDRALAZINE HYDROCHLORIDE 10 MG: 20 INJECTION INTRAMUSCULAR; INTRAVENOUS at 16:02

## 2025-02-12 RX ADMIN — DILTIAZEM HYDROCHLORIDE 30 MG: 60 TABLET, FILM COATED ORAL at 17:34

## 2025-02-12 RX ADMIN — PIPERACILLIN AND TAZOBACTAM 3375 MG: 3; .375 INJECTION, POWDER, LYOPHILIZED, FOR SOLUTION INTRAVENOUS at 17:38

## 2025-02-12 RX ADMIN — IPRATROPIUM BROMIDE AND ALBUTEROL SULFATE 1 DOSE: .5; 2.5 SOLUTION RESPIRATORY (INHALATION) at 00:50

## 2025-02-12 RX ADMIN — DILTIAZEM HYDROCHLORIDE 30 MG: 60 TABLET, FILM COATED ORAL at 05:48

## 2025-02-12 RX ADMIN — HYDROCHLOROTHIAZIDE 25 MG: 25 TABLET ORAL at 14:28

## 2025-02-12 RX ADMIN — AMIODARONE HYDROCHLORIDE 200 MG: 200 TABLET ORAL at 20:22

## 2025-02-12 RX ADMIN — HYDRALAZINE HYDROCHLORIDE 100 MG: 50 TABLET ORAL at 20:22

## 2025-02-12 RX ADMIN — POTASSIUM BICARBONATE 40 MEQ: 782 TABLET, EFFERVESCENT ORAL at 08:35

## 2025-02-12 RX ADMIN — LOSARTAN POTASSIUM 100 MG: 50 TABLET, FILM COATED ORAL at 08:35

## 2025-02-12 RX ADMIN — DILTIAZEM HYDROCHLORIDE 30 MG: 60 TABLET, FILM COATED ORAL at 23:12

## 2025-02-12 RX ADMIN — TICAGRELOR 90 MG: 90 TABLET ORAL at 20:22

## 2025-02-12 RX ADMIN — TICAGRELOR 90 MG: 90 TABLET ORAL at 08:40

## 2025-02-12 RX ADMIN — HEPARIN SODIUM 12 UNITS/KG/HR: 10000 INJECTION, SOLUTION INTRAVENOUS at 08:43

## 2025-02-12 RX ADMIN — HYDRALAZINE HYDROCHLORIDE 10 MG: 20 INJECTION INTRAMUSCULAR; INTRAVENOUS at 13:03

## 2025-02-12 RX ADMIN — HYDRALAZINE HYDROCHLORIDE 10 MG: 20 INJECTION INTRAMUSCULAR; INTRAVENOUS at 04:21

## 2025-02-12 RX ADMIN — HYDRALAZINE HYDROCHLORIDE 100 MG: 50 TABLET ORAL at 08:35

## 2025-02-12 RX ADMIN — ASPIRIN 81 MG 81 MG: 81 TABLET ORAL at 08:40

## 2025-02-12 RX ADMIN — DILTIAZEM HYDROCHLORIDE 30 MG: 60 TABLET, FILM COATED ORAL at 00:12

## 2025-02-12 RX ADMIN — HYDRALAZINE HYDROCHLORIDE 10 MG: 20 INJECTION INTRAMUSCULAR; INTRAVENOUS at 23:12

## 2025-02-12 RX ADMIN — IPRATROPIUM BROMIDE AND ALBUTEROL SULFATE 1 DOSE: .5; 2.5 SOLUTION RESPIRATORY (INHALATION) at 14:40

## 2025-02-12 RX ADMIN — METOPROLOL TARTRATE 50 MG: 50 TABLET, FILM COATED ORAL at 08:35

## 2025-02-12 RX ADMIN — ROSUVASTATIN CALCIUM 20 MG: 20 TABLET, FILM COATED ORAL at 20:22

## 2025-02-12 RX ADMIN — SODIUM CHLORIDE, PRESERVATIVE FREE 10 ML: 5 INJECTION INTRAVENOUS at 20:22

## 2025-02-12 RX ADMIN — IPRATROPIUM BROMIDE AND ALBUTEROL SULFATE 1 DOSE: .5; 2.5 SOLUTION RESPIRATORY (INHALATION) at 07:30

## 2025-02-12 RX ADMIN — PANTOPRAZOLE SODIUM 40 MG: 40 TABLET, DELAYED RELEASE ORAL at 05:51

## 2025-02-12 RX ADMIN — METOPROLOL TARTRATE 50 MG: 50 TABLET, FILM COATED ORAL at 20:22

## 2025-02-12 RX ADMIN — SODIUM CHLORIDE, PRESERVATIVE FREE 10 ML: 5 INJECTION INTRAVENOUS at 08:35

## 2025-02-12 ASSESSMENT — PAIN SCALES - GENERAL: PAINLEVEL_OUTOF10: 0

## 2025-02-12 NOTE — PROGRESS NOTES
Occupational Therapy  Occupational Therapy Daily Treatment Note  Facility/Department: [address] NEURO ICU   Patient Name: Dahlia Zhang        MRN: [id number]    : 1950    Date of Service: 2025    Chief Complaint   Patient presents with    Extremity Weakness     Past Medical History:  has a past medical history of Anticoagulated, Anxiety and depression, Atrial fibrillation ([facility]), Cerebral aneurysm, Cerebral artery occlusion with cerebral infarction ([facility]), Colon polyps, COPD (chronic obstructive pulmonary disease) ([facility]), COVID-19, COVID-19 vaccine administered, DM II (diabetes mellitus, type II), controlled ([facility]), GERD (gastroesophageal reflux disease), HTN (hypertension), Hyperlipidemia, Kidney stone, On home O2, Osteoarthritis, Sleep apnea, Under care of team, Under care of team, Under care of team, Under care of team, Wears glasses, Wears partial dentures, and Wellness examination.  Past Surgical History:  has a past surgical history that includes Thyroid surgery; Breast reduction surgery (Bilateral, ); Hysterectomy; hernia repair; Cerebral angiogram (2024); Cerebral angiogram (10/11/2023); Cerebral angiogram (2024); Cholecystectomy; Cerebral angiogram (2024); Breast biopsy (Right); Cataract extraction w/ intraocular lens implant (Bilateral, ); Colonoscopy w/ polypectomy (2023); other surgical history (01/15/2025); Colonoscopy (N/A, 2025); and Colonoscopy (N/A, 2025).    Discharge Recommendations  Discharge Recommendations: Patient would benefit from continued therapy after discharge  Assessment  Performance deficits / Impairments: Decreased functional mobility ;Decreased ADL status;Decreased safe awareness;Decreased endurance;Decreased balance;Decreased strength;Decreased fine motor control;Decreased ROM;Decreased posture;Decreased cognition;Decreased coordination  Prognosis: Guarded  Activity Tolerance  Activity Tolerance: Treatment limited secondary to  goals.    Electronically signed by NEGRO Pereira on 2/12/25 at 3:17 PM EST

## 2025-02-12 NOTE — FLOWSHEET NOTE
RN notified Neuro CC team that patient is having new hematuria. Ordered to continue to monitor for now.     Electronically signed by Aye Anders RN on 2/12/2025 at 8:30 AM     no concerns

## 2025-02-12 NOTE — PLAN OF CARE
Problem: Chronic Conditions and Co-morbidities  Goal: Patient's chronic conditions and co-morbidity symptoms are monitored and maintained or improved  2/12/2025 0140 by Alondra Laboy RN  Outcome: Progressing  2/12/2025 0140 by Alondra Laboy RN  Outcome: Progressing  Flowsheets (Taken 2/11/2025 2000)  Care Plan - Patient's Chronic Conditions and Co-Morbidity Symptoms are Monitored and Maintained or Improved:   Monitor and assess patient's chronic conditions and comorbid symptoms for stability, deterioration, or improvement   Collaborate with multidisciplinary team to address chronic and comorbid conditions and prevent exacerbation or deterioration   Update acute care plan with appropriate goals if chronic or comorbid symptoms are exacerbated and prevent overall improvement and discharge  2/11/2025 1530 by Aye Anders RN  Outcome: Progressing     Problem: Discharge Planning  Goal: Discharge to home or other facility with appropriate resources  2/12/2025 0140 by Alondra Laboy RN  Outcome: Progressing  2/12/2025 0140 by Alondra Laboy RN  Outcome: Progressing  Flowsheets (Taken 2/11/2025 2000)  Discharge to home or other facility with appropriate resources:   Identify barriers to discharge with patient and caregiver   Identify discharge learning needs (meds, wound care, etc)  2/11/2025 1530 by Aye Anders RN  Outcome: Progressing     Problem: Pain  Goal: Verbalizes/displays adequate comfort level or baseline comfort level  2/12/2025 0140 by Alondra Laboy RN  Outcome: Progressing  2/12/2025 0140 by Alondra Laboy RN  Outcome: Progressing  Flowsheets (Taken 2/11/2025 2000)  Verbalizes/displays adequate comfort level or baseline comfort level:   Encourage patient to monitor pain and request assistance   Assess pain using appropriate pain scale   Administer analgesics based on type and severity of pain and evaluate response   Implement non-pharmacological measures as appropriate  osmolarity and serum sodium as indicated or ordered   Monitor response to interventions for patient's volume status, including labs, urine output, blood pressure (other measures as available)  Goal: Glucose maintained within prescribed range  2/12/2025 0140 by Alondra Laboy RN  Outcome: Progressing  2/12/2025 0140 by Alondra Laboy RN  Outcome: Progressing  Flowsheets (Taken 2/11/2025 2000)  Glucose maintained within prescribed range:   Monitor blood glucose as ordered   Assess for signs and symptoms of hyperglycemia and hypoglycemia   Administer ordered medications to maintain glucose within target range     Problem: Hematologic - Adult  Goal: Maintains hematologic stability  2/12/2025 0140 by Alondra Laboy RN  Outcome: Progressing  2/12/2025 0140 by Alondra Laboy RN  Outcome: Progressing  Flowsheets (Taken 2/11/2025 2000)  Maintains hematologic stability:   Assess for signs and symptoms of bleeding or hemorrhage   Monitor labs for bleeding or clotting disorders  2/11/2025 1530 by Aye Anders RN  Outcome: Progressing     Problem: Nutrition Deficit:  Goal: Optimize nutritional status  2/12/2025 0140 by Alondra Laboy RN  Outcome: Progressing  2/12/2025 0140 by Alondra Laboy RN  Outcome: Progressing  2/11/2025 1530 by Aye Anders RN  Outcome: Progressing     Problem: Skin/Tissue Integrity  Goal: Skin integrity remains intact  Description: 1.  Monitor for areas of redness and/or skin breakdown  2.  Assess vascular access sites hourly  3.  Every 4-6 hours minimum:  Change oxygen saturation probe site  4.  Every 4-6 hours:  If on nasal continuous positive airway pressure, respiratory therapy assess nares and determine need for appliance change or resting period  2/12/2025 0140 by Alondra Laboy RN  Outcome: Progressing  2/12/2025 0140 by Alondra Laboy RN  Outcome: Progressing  Flowsheets (Taken 2/11/2025 2000)  Skin Integrity Remains Intact:   Monitor for areas of  redness and/or skin breakdown   Assess vascular access sites hourly   Every 4-6 hours minimum: Change oxygen saturation probe site  2/11/2025 1530 by Aye Anders, RN  Outcome: Progressing     Problem: Confusion  Goal: Confusion, delirium, dementia, or psychosis is improved or at baseline  Description: INTERVENTIONS:  1. Assess for possible contributors to thought disturbance, including medications, impaired vision or hearing, underlying metabolic abnormalities, dehydration, psychiatric diagnoses, and notify attending LIP  2. [place] high risk fall precautions, as indicated  3. Provide frequent short contacts to provide reality reorientation, refocusing and direction  4. Decrease environmental stimuli, including noise as appropriate  5. Monitor and intervene to maintain adequate nutrition, hydration, elimination, sleep and activity  6. If unable to ensure safety without constant attention obtain sitter and review sitter guidelines with assigned personnel  7. Initiate Psychosocial CNS and Spiritual Care consult, as indicated  2/12/2025 0140 by Alondra Laboy RN  Outcome: Progressing  2/12/2025 0140 by Alondra Laboy RN  Outcome: Progressing  Flowsheets (Taken 2/11/2025 2000)  Effect of thought disturbance (confusion, delirium, dementia, or psychosis) are managed with adequate functional status:   Assess for contributors to thought disturbance, including medications, impaired vision or hearing, underlying metabolic abnormalities, dehydration, psychiatric diagnoses, notify LIP   [place] high risk fall precautions, as indicated   Provide frequent short contacts to provide reality reorientation, refocusing and direction   Decrease environmental stimuli, including noise as appropriate   Monitor and intervene to maintain adequate nutrition, hydration, elimination, sleep and activity  2/11/2025 1530 by Aye Anders, RN  Outcome: Not Progressing

## 2025-02-12 NOTE — PROGRESS NOTES
Daily Progress Note  Neuro Critical Care    Patient Name: Dahlia Zhang  Patient : 1950  Room/Bed: 0117/0117-01  Code Status: Full Code  Allergies:   Allergies   Allergen Reactions    Lisinopril Angioedema    Codeine Nausea And Vomiting    Norco [Hydrocodone-Acetaminophen] Nausea And Vomiting    Percocet [Oxycodone-Acetaminophen] Nausea And Vomiting       CHIEF COMPLAINT:        Extremity weakness     INTERVAL HISTORY    Initial Presentation (Admitted 25):    Dahlia Zhang is a 74 y.o. 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  CT head with no acute intracranial abnormalities.  Remote infarcts in left occipital lobe, right basal ganglia and corona radiata.  CTA head and neck showed severe narrowing within the distal basilar stent.  Emergent DSA revealed stents are patent and well apposed with a filling defect at the basilar tip representing partial thrombus versus metal artifact.  She was treated with cannagrelor drip with improvement in neurologic status.    On , she developed rectal bleeding with the CTA indicating extravasation from sigmoid colon.,  Colonoscopy revealing bleeding diverticular disease.  Aspirin and Brilinta were held; GI evaluated and recommended embolization or surgical eval if bleeding recurs.  On , GI signed off and no evidence of recurrent bleeding.  Patient's hospital course complicated by ESBL UTI s/p 7 day course of meropenem through . Patient was gradually restarted on aspirin monotherapy, and then transitioned to Eliquis monotherapy. Patient has had fluctuating episodes of lethargy and slurred speech since being  therapy, will hold for PEG or other procedures.     - Intermittently follows commands, probable new baseline with waxing and waning wakefulness due to thalamic infarcts    -- LTME showing encephalopathy    CARDIOVASCULAR:  Hypertension   -Goal -150 per NEV  - Continue telemetry  - Cozaar 100 mg daily, hctz 25 daily  - Lopressor 50 mg twice daily  - Cardizem 30 mg every 6 hours  - Home hydralazine 100mg   - Crestor 20mg nightly   - Amiodarone 200mg bid  - Hydralazine 10mg prn and labetalol 10mg prn    PULMONARY:  - Keep O2 sats > 92%  COPD  - Currently on RA, saturating well  - CT PE protocol  negative for PE. Showed mild colonic diverticulosis and cardiomegaly.   - CXR  shwoing cardiomegaly with bibasilar infiltrates.     RENAL/FLUID/ELECTROLYTE:  Recent Labs     25  0333   BUN 5*   CREATININE 0.5*     K continues to be low, replacing as needed. Added 40 mEq efferk  daily.      Intake/Output Summary (Last 24 hours) at 2025 1423  Last data filed at 2025 1400  Gross per 24 hour   Intake 2680.14 ml   Output 2805 ml   Net -124.86 ml     - Replace electrolytes PRN  - Daily BMP    GI/NUTRITION:  NUTRITION:  Diet NPO  ADULT TUBE FEEDING; Nasogastric; Standard with Fiber; Continuous; 10; Yes; 10; Q 6 hours; 50; 30; Q 6 hours  - Bowel regimen: GlycoLax prn  - GI prophylaxis: Protonix 40 qam  - SLP swallow eval when appropriate    Acute lower GI bleed (resolved)  - s/p colonoscopy on  w/ Dr. Porter. Found to have diverticular disease. Maintain Hb >7 per GI  - Continue Senokot-S daily  - Protonix 40mg qam.     ID:  Temp (24hrs), Av.8 °F (37.1 °C), Min:98.4 °F (36.9 °C), Max:99.4 °F (37.4 °C)    Recent Labs     25  0333   WBC 13.3*   ESBL E.Coli UTI (resolved)  - Meropenem course completed for E.Coli UTI  - Infectious disease consulted per protocol  - Continue to monitor for fevers  - Daily CBC    HEME:   Recent Labs     25  0333   HGB 9.0*   HCT 28.5*          -

## 2025-02-12 NOTE — PLAN OF CARE
I was called to room by nursing to examine a left upper extremity swelling the patient had developed over the course today.    Upon exam, the left arm is swollen on the anterior aspect, and there is some notable erythema as well as warmth to the touch.    Bedside ultrasound was used, and images were obtained with a clear, well-defined noncompressible area around the antecubital space where a previous IV was placed.  Color-flow also demonstrated no significant flow through the vein, however there is a small area on the superior lateral side of that vein where there is flow noted.    The ultrasound was then used to examine the proximal and distal venous structures around the noncompressible area suspicious for VTE, there is no significant noncompressibility noted.  Ultrasound was then used to evaluate the radial artery and its flow, there is a weak color flow in that area with minimal pulsatility visualized.    Pulse is intact however weak with Doppler, patient does have some motor function of that arm, and sensory exam is nonspecific due to patient's baseline altered mental status at this time due to bilateral thalamic strokes.    Vascular surgery consulted.  Starting Zosyn for antibiotic coverage for presumed cellulitis given erythematous appearance of the right forearm.  Of note, there was also cobblestoning changes of the more superficial dermal structures of the skin underlying the erythematous area.    Vascular surgery recommending arterial and venous duplex ultrasounds of the left arm.  Have ordered, will continue to follow.

## 2025-02-12 NOTE — CARE COORDINATION
Patient remains with NG for feeds.  Awaiting PEG decision  Patient remains lethargic and generally weak with left facial droop.  Family would like to wait to see on the patient progresses to determine code status change.    GI consulted for PEG.  Awaiting decision on PEG, May meet LTACH  Criteria.    Will discuss with  once  discussion has been completed.

## 2025-02-12 NOTE — CONSULTS
[facility] Urology  Adi King MD FACS    Urology Consultation    Patient:  Dahlia Zhang  MRN: [id number]  YOB: 1950    CHIEF COMPLAINT:  Hematuria    HISTORY OF PRESENT ILLNESS:   The patient is a 74 y.o. female who has significant neurovascular history presented 1/23 with lethargy, concern for acute strokes possibly due to thrombosed basilar tip LVIS, started on IV cangrelor and underwent emergent diagnostic cerebral angiogram. She then was transitioned to Brilinta and apsirin. She developed acute rectal bleeding with clots due to bleeding diverticula and then rectal bleeding was controlled. She also experienced new onset A fib with RVR which converted. She is typically on Eliquis, however currently on Heparin gtt (along with asa and brilinta) and hematuria was noted this morning. Urine was pinkish and small clots passed via ward tubing per report. Currently urine is yellow in tubing, light orange/andre in collection bag and no visible clots seen.   Patient sleeping and unable to give history, but her  is at bedside. He reports no  history, no hematuria, no history of clots. She was recently treated for ESBL UTI by ID team. She has history of smoking with 70 pack year history.   She did have CTA A/P revealing no visible kidney/ureteral stones, no hydronephrosis and bladder appears normal.    Patient's old records, notes and chart reviewed and summarized above.    Past Medical History:    Past Medical History:   Diagnosis Date    Anticoagulated     ELIQUIS    Anxiety and depression     Atrial fibrillation (HCC) 10/15/2023    new onset while in Hosp    Cerebral aneurysm 10/11/2023    ruptured , SAH ,  hosp x 2 weeks  : had acute HA    Cerebral artery occlusion with cerebral infarction (HCC) 08/08/2024    HA , blurry vision , left leg weakness , slurred speech    Colon polyps     COPD (chronic obstructive pulmonary disease) ([facility])     COVID-19     3-4 times    COVID-19  configuration the ventricles.  No extra-axial fluid collections.  No acute intracranial hemorrhage.  Streak artifact associated with the posterior circulation flow diverting stent with embolization coils. ORBITS: The visualized portion of the orbits demonstrate no acute abnormality. SINUSES: The visualized paranasal sinuses and mastoid air cells demonstrate no acute abnormality. SOFT TISSUES/SKULL:  No acute abnormality of the visualized skull or soft tissues.  Right frontal preethi hole defect.     1. Evolving lacunar infarct within the right paramedian von and right thalamus. Tiny infarcts in the cerebellum difficult to appreciate within the constraints of CT acquisition, as identified on the recent MRI brain. 2. No acute intracranial hemorrhage or significant mass effect. 3. Remote left PCA territory cortical infarct involving the occipital pole. 4. Moderate chronic small vessel ischemic changes.     XR CHEST PORTABLE    Result Date: 1/24/2025  EXAMINATION: ONE XRAY VIEW OF THE CHEST 1/24/2025 12:15 am COMPARISON: None. HISTORY: ORDERING SYSTEM PROVIDED HISTORY: SOB and wheezing - rule out infection vs fluid overload TECHNOLOGIST PROVIDED HISTORY: SOB and wheezing - rule out infection vs fluid overload Reason for Exam: Portable/Supine FINDINGS: The mediastinum is unremarkable. The cardiac silhouette is normal size. The lungs are clear.  There are calcified nodules in the left lung base suggesting granulomas.     1. No acute cardiopulmonary process. 2. Calcified nodules in the left lung base suggesting granulomas.     XR CHEST PORTABLE    Result Date: 1/23/2025  EXAMINATION: ONE XRAY VIEW OF THE CHEST 1/23/2025 2:19 pm COMPARISON: None. HISTORY: ORDERING SYSTEM PROVIDED HISTORY: shortness of breath, lethargy TECHNOLOGIST PROVIDED HISTORY: shortness of breath, lethargy FINDINGS: The lungs appear clear.  There is elevation of the right hemidiaphragm.  The heart and mediastinal structures are unremarkable. Bony thorax  develop  -Recent ESBL UTI treated, last urine culture no growth 1/23  -Monitor Cr   -Will need cysto outpatient for hematuria workup discussed with  at bedside      Fatuma Schaffer PA-C  Urology Service   2/12/2025 1:07 PM

## 2025-02-12 NOTE — CONSULTS
Division of Vascular Surgery        New Consult        Reason for Consult:   Left upper extremity swelling    Chief Complaint:     Left upper extremity swelling    History of Present Illness:      Dahlia Zhang is a 74 y.o. 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  Patient is admitted to hospital for stroke with finding of early ischemic changes in von and thalamus, patient was found to have filling defect at the basilar tips in DSA, patient was treated with Canngrelor drip and showed neuro improvement.  Patient had multiple strokes in the past.  Vascular surgery is consulted due to concerning of left upper extremity swelling.  Per nursing staff, patient had 1 IV pulled out yesterday with prolonged bleeding.  Pressure was held to stop the bleeding.  Nursing staff this morning noticed of swelling at the left wrist, vascular surgery is consulted.  Patient has altered mental status, limited history.    Medical History:     Past Medical History:   Diagnosis Date    Anticoagulated     ELIQUIS    Anxiety and depression     Atrial fibrillation ([facility]) 10/15/2023    new onset while in Hosp    Cerebral aneurysm 10/11/2023    ruptured , SAH ,  hosp x 2 weeks  : had acute HA    Cerebral artery occlusion with cerebral infarction ([facility]) 08/08/2024    HA , blurry vision , left leg weakness , slurred speech    Colon polyps     COPD (chronic obstructive pulmonary disease) ([facility])     COVID-19     3-4 times    COVID-19 vaccine administered     DM II (diabetes mellitus, type II), controlled ([facility])     GERD (gastroesophageal reflux disease)     HTN (hypertension)     Hyperlipidemia     Kidney stone     once but did not require surgery    On home O2     3-4L / nightly    Osteoarthritis     Sleep apnea     Cpap    Under care of team   hydroCHLOROthiazide (HYDRODIURIL) tablet 25 mg  25 mg Oral Daily Peter Fagan MD   25 mg at 02/12/25 1428    piperacillin-tazobactam (ZOSYN) 3,375 mg in sodium chloride 0.9 % 50 mL IVPB (mini-bag)  3,375 mg IntraVENous q8h Peter Fagan MD        hydrALAZINE (APRESOLINE) injection 10 mg  10 mg IntraVENous Q1H PRN Cj Whitmore APRN - CNP   10 mg at 02/12/25 1602    labetalol (NORMODYNE;TRANDATE) injection 10 mg  10 mg IntraVENous Q1H PRN Cj Whitmore APRN - CNP        potassium bicarb-citric acid (EFFER-K) effervescent tablet 40 mEq  40 mEq Oral Daily Peter Fagan MD   40 mEq at 02/12/25 0835    racepinephrine HCl (VAPONEFPRIN) 2.25 % nebulizer solution NEBU 0.5 mL  0.5 mL Nebulization Q4H PRN Cj Whitmore APRN - CNP        sodium chloride nebulizer 0.9 % solution 3 mL  3 mL Nebulization Q4H PRN Cj Whitmore APRN - CNP        hydrALAZINE (APRESOLINE) tablet 100 mg  100 mg Per NG tube 2 times per day Cj Whitmore APRN - CNP   100 mg at 02/12/25 0835    ticagrelor (BRILINTA) tablet 90 mg  90 mg Per NG tube BID Cj Whitmore APRN - CNP   90 mg at 02/12/25 0840    heparin 25,000 units in dextrose 5% 250 mL (premix) infusion  5-30 Units/kg/hr IntraVENous Continuous Cj Whitmore APRN - CNP 10.8 mL/hr at 02/12/25 1506 12 Units/kg/hr at 02/12/25 1506    aspirin chewable tablet 81 mg  81 mg Oral Daily Da Maurice MD   81 mg at 02/12/25 0840    sodium chloride flush 0.9 % injection 5-40 mL  5-40 mL IntraVENous 2 times per day Da Maurice MD   10 mL at 02/12/25 0835    sodium chloride flush 0.9 % injection 5-40 mL  5-40 mL IntraVENous PRN Da Maurice MD        0.9 % sodium chloride infusion   IntraVENous PRN Da Maurice MD   Stopped at 02/12/25 0010    dilTIAZem (CARDIZEM) tablet 30 mg  30 mg Per NG tube 4 times per day Terry Deluna MD   30 mg at 02/12/25 0548    metoprolol tartrate (LOPRESSOR) tablet 50 mg  50 mg Per NG tube BID Terry Deluna MD   50 mg at 02/12/25 0818     PORTABLE    Result Date: 2/12/2025  1. Cardiomegaly with mild pulmonary vascular congestion. 2. Increased density involving the left lung base, likely atelectasis.        Assessment and Plan:     74 years old female presented hospital with altered mental status, multiple stroke.  Vascular surgery is consulted for left upper extremity swelling    -No acute surgical intervention is indicated at this point  -Recommend to obtain venous duplex ultrasound for DVT, and also brachial index for left wrist and left upper extremity.  -Her symptoms more likely due to cellulitis, recommend to start antibiotics per primary choice  -Vascular will follow    Electronically signed by Samm Zapata DO on 2/12/25 at 5:11 PM [facility] [place]

## 2025-02-12 NOTE — PROCEDURES
LONG-TERM EEG-VIDEO MONITORING   CLINICAL NEUROPHYSIOLOGY LABORATORY  DEPARTMENT OF NEUROLOGY  [facility]    Patient: Dahlia Zhang  Age: 74 y.o.  MRN: [id number]    Referring Physician: No ref. provider found  History: The patient is a 74 y.o. female who presented breakthrough seizure/encephalopathy. This long-term video-EEG monitoring study was performed to determine the nature of the patient's clinical events. The patient is on neuroactive medications.   Dahlia Zhang   Current Facility-Administered Medications   Medication Dose Route Frequency Provider Last Rate Last Admin    hydrALAZINE (APRESOLINE) injection 10 mg  10 mg IntraVENous Q1H PRN Cj Whitmore APRN - CNP   10 mg at 02/12/25 0421    labetalol (NORMODYNE;TRANDATE) injection 10 mg  10 mg IntraVENous Q1H PRN Cj Whitmore APRN - CNP        potassium bicarb-citric acid (EFFER-K) effervescent tablet 40 mEq  40 mEq Oral Daily Peter Fagan MD   40 mEq at 02/12/25 0835    racepinephrine HCl (VAPONEFPRIN) 2.25 % nebulizer solution NEBU 0.5 mL  0.5 mL Nebulization Q4H PRN Cj Whitmore APRN - CNP        sodium chloride nebulizer 0.9 % solution 3 mL  3 mL Nebulization Q4H PRN Cj Whitmore APRN - CNP        hydrALAZINE (APRESOLINE) tablet 100 mg  100 mg Per NG tube 2 times per day Cj Whitmore APRN - CNP   100 mg at 02/12/25 0835    ticagrelor (BRILINTA) tablet 90 mg  90 mg Per NG tube BID Cj Whitmore APRN - CNP   90 mg at 02/12/25 0840    heparin 25,000 units in dextrose 5% 250 mL (premix) infusion  5-30 Units/kg/hr IntraVENous Continuous Cj Whitmore APRN - CNP 10.8 mL/hr at 02/12/25 0843 12 Units/kg/hr at 02/12/25 0843    aspirin chewable tablet 81 mg  81 mg Oral Daily Da Maurice MD   81 mg at 02/12/25 0840    sodium chloride flush 0.9 % injection 5-40 mL  5-40 mL IntraVENous 2 times per day Da Maurice MD   10 mL at 02/12/25 0835    sodium chloride flush 0.9 % injection 5-40 mL  5-40 mL IntraVENous  yesterday.    Ictal EEG Recording / Patient Events: During this period the patient had no events or seizures.    Summary: During this day of recording no events were recorded. The interictal EEG was abnormal due to diffuse polymorphic delta and theta slowing suggesting moderate encephalopathy.  No abnormal epileptiform activity was seen. Monitoring was continued in order to record the patient's typical events. The EKG channel revealed no abnormalities.    BRET WALLS MD  Diplomate, American Board of Psychiatry and Neurology  Diplomate, American Board of Clinical Neurophysiology  Diplomate, American Board of Epilepsy       Please note this is a preliminary report and updated daily. The final report will have a summary of behavior and electrographic findings with clinical correlation.

## 2025-02-12 NOTE — FLOWSHEET NOTE
RN notified Neuro CC that patient has an area of redness, warmth and swelling in the left lower arm.    Electronically signed by Aye Anders RN on 2/12/2025 at 3:05 PM

## 2025-02-12 NOTE — PROGRESS NOTES
Medical History   has a past medical history of Anticoagulated, Anxiety and depression, Atrial fibrillation (HCC), Cerebral aneurysm, Cerebral artery occlusion with cerebral infarction (HCC), Colon polyps, COPD (chronic obstructive pulmonary disease) (HCC), COVID-19, COVID-19 vaccine administered, DM II (diabetes mellitus, type II), controlled (HCC), GERD (gastroesophageal reflux disease), HTN (hypertension), Hyperlipidemia, Kidney stone, On home O2, Osteoarthritis, Sleep apnea, Under care of team, Under care of team, Under care of team, Under care of team, Wears glasses, Wears partial dentures, and Wellness examination.  Past Surgical History   has a past surgical history that includes Thyroid surgery; Breast reduction surgery (Bilateral, 1989); Hysterectomy; hernia repair; Cerebral angiogram (04/04/2024); Cerebral angiogram (10/11/2023); Cerebral angiogram (06/19/2024); Cholecystectomy; Cerebral angiogram (12/12/2024); Breast biopsy (Right); Cataract extraction w/ intraocular lens implant (Bilateral, 2020); Colonoscopy w/ polypectomy (01/19/2023); other surgical history (01/15/2025); Colonoscopy (N/A, 01/26/2025); and Colonoscopy (N/A, 1/26/2025).  Social History   reports that she has been smoking cigarettes. She started smoking about 58 years ago. She has a 71.6 pack-year smoking history. She has never been exposed to tobacco smoke. She has never used smokeless tobacco.   reports current alcohol use of about 1.0 standard drink of alcohol per week.   reports no history of drug use.  Family History  family history includes Dementia in her mother; Heart Disease in her father; Hypertension in her father and mother; No Known Problems in her sister; Stroke in her father and mother.    Review of Systems:  NA given current mentation.     Review of systems otherwise negative.      OBJECTIVE:   Vitals: /62   Pulse 69   Temp 98.9 °F (37.2 °C)   Resp 26   Ht 1.702 m (5' 7\")   Wt 88.5 kg (195 lb 1.7 oz)   SpO2  access and straighten the loop up to the V3 segment before tracking the catheter   Cangrelor bolus 15 mcg/kg over 2 minutes followed by maintenance dose of 2 mcg/kg was given.                MRI brain 2/4/2025 2/7/25 L PCA occlusion s/p thrombectomy and angioplasty    The basilar artery tip aneurysm, previously treated with a WEB intrasaccular device (10/11/2023) and a second-stage LVIS 4.5 x 23mm stent (6/2024) and a third-stage  LVIS 4.5 x 23mm stent spanning from the left P2 segment to the mid-basilar artery (1/15/2025) is completely obliterated.  There is a filling defect of the basilar tip that extends to the left PCA stent.  The above lesion was treated with 6F short sheath, neuron 070, AXS Vecta 46 125cm, Headway 21, Synchro/Traxcess and Tigertreiver 21. Followed by Exchange length Synchro 300cm and NC emerge balloon 2x8mm.   Stentoplasty of the L PCA using Tigertreiver 21.   Submaximal angioplasty using NC Emerge 2x8 (x3 times, distal to proximal 10ATM)  Followed by cangrelor drip (15 mics per kilogram bolus followed by 2 mics per kilogram per minute).  Final angiographic run demonstrated patent bilateral PCA achieving TICI 2C               2/9/25 CT        IMPRESSIONS:     Dahlia Zhang, a 74-year-old female with a history of a ruptured basilar tip aneurysm, hypertension, hyperlipidemia, diabetes, GERD, and atrial fibrillation, presented with altered mental status on 1/23/2025. She exhibited lethargy, generalized weakness, and left facial droop, with CT head showing no acute findings but MRI suggesting early ischemic changes in von and thalamus (See above).  Emergent DSA revealed: The stents are patent and well apposed with a filling defect at the basilar tip that could represent partial thrombus versus metal artifact. She was treated with a Canngrelor drip and showed neuro improvement. On 1/26, she developed rectal bleeding, with CTA indicating extravasation from the sigmoid colon  Colonoscopy: bleeding diverticular disease. Aspirin and Brilinta were held, and GI recommends embolization or surgical evaluation if rebleeding occurs. 1/27/25 GI signed off and stated no evidence of recurrent bleeding.    Endovascular history: Ruptured basilar artery tip aneurysm, previously treated with a WEB intrasaccular device (10/11/2023) and a second-stage LVIS 4.5 x 23mm stent (6/2024) and a third-stage  LVIS 4.5 x 23mm stent spanning from the left P2 segment to the mid-basilar artery (1/15/2025) is completely obliterated. Pt had potential GI bleeding from diverticular disease that required stopping of her Eliquis. During this period, pt had increased lethargy and MRI showing increased posterior circulation strokes. 2/7/25 underwent L PCA thrombectomy and balloon angioplasty. 2/9/25 CT stable, restarted on Aspirin and Brilinta and hep gtt.    PLANS:     -- -150  -- Continue Aspirin 81 daily  -- Continue Brilinta 90 BID  -- Continue hep gtt for now (holding Eliquis)  -- Continuous EEG per primary team  -- Afib RVR management with Cardiology on board  -- PT/OT/ST on board  -- Multiple conversations with family ongoing  -- Rest of care per primary team        Discussed with Dr. Carl    Please arrange follow-up with Dr. Maurice clinic in 6 weeks, and with  in 3-4 months.    Serafin Conte MD, Pager [phone]  Electronically signed 02/12/25 at 7:00 AM  Stroke, Neurocritical Care & Neurointervention  [facility] Stroke [facility]

## 2025-02-12 NOTE — PLAN OF CARE
Problem: Respiratory - Adult  Goal: Achieves optimal ventilation and oxygenation  2/12/2025 0736 by Iqra Stephens, RCP  Outcome: Progressing  Flowsheets (Taken 2/12/2025 0730)  Achieves optimal ventilation and oxygenation:   Assess for changes in respiratory status   Oxygen supplementation based on oxygen saturation or arterial blood gases   Assess the need for suctioning and aspirate as needed   Respiratory therapy support as indicated

## 2025-02-12 NOTE — PROGRESS NOTES
SPEECH LANGUAGE PATHOLOGY  Speech Language Pathology  [facility] NEURO ICU    Date: 2/12/2025  Patient Name: Dahlia Zhang  YOB: 1950   AGE: 74 y.o.  MRN: [id number]        Patient Not Available for Speech Therapy     Due to:  [] Testing  [] Hemodialysis  [x] Cancelled by RN: PM attempt - per RN Aye, Pt not sufficiently alert for PO trials/bedside swallow eval  [] Surgery   [] Intubation/Sedation/Pain Medication  [] Medical instability  [] Refusal  [x] Other: AM attempt - physician rounding    Next scheduled treatment: as appropriate  Completed by: Stevie Aguilera M.S., CCC-SLP

## 2025-02-12 NOTE — PLAN OF CARE
Problem: Chronic Conditions and Co-morbidities  Goal: Patient's chronic conditions and co-morbidity symptoms are monitored and maintained or improved  2/12/2025 0750 by Aye Anders RN  Outcome: Progressing     Problem: Discharge Planning  Goal: Discharge to home or other facility with appropriate resources  2/12/2025 0750 by Aye Anders RN  Outcome: Progressing     Problem: Pain  Goal: Verbalizes/displays adequate comfort level or baseline comfort level  2/12/2025 0750 by Aye Anders RN  Outcome: Progressing     Problem: Safety - Adult  Goal: Free from fall injury  2/12/2025 0750 by Aye Anders RN  Outcome: Progressing     Problem: ABCDS Injury Assessment  Goal: Absence of physical injury  2/12/2025 0750 by Aye Anders RN  Outcome: Progressing     Problem: Respiratory - Adult  Goal: Achieves optimal ventilation and oxygenation  2/12/2025 0750 by Aye Anders RN  Outcome: Progressing     Problem: Neurosensory - Adult  Goal: Achieves stable or improved neurological status  2/12/2025 0750 by Aye Anders RN  Outcome: Progressing     Problem: Skin/Tissue Integrity - Adult  Goal: Skin integrity remains intact  Description: 1.  Monitor for areas of redness and/or skin breakdown  2.  Assess vascular access sites hourly  3.  Every 4-6 hours minimum:  Change oxygen saturation probe site  4.  Every 4-6 hours:  If on nasal continuous positive airway pressure, respiratory therapy assess nares and determine need for appliance change or resting period  2/12/2025 0750 by Aye Anders RN  Outcome: Progressing     Problem: Nutrition Deficit:  Goal: Optimize nutritional status  2/12/2025 0750 by Aye Anders RN  Outcome: Progressing     Problem: Skin/Tissue Integrity  Goal: Skin integrity remains intact  Description: 1.  Monitor for areas of redness and/or skin breakdown  2.  Assess vascular access sites hourly  3.  Every 4-6 hours minimum:  Change oxygen saturation probe  site  4.  Every 4-6 hours:  If on nasal continuous positive airway pressure, respiratory therapy assess nares and determine need for appliance change or resting period  2/12/2025 0750 by Aye Anders RN  Outcome: Progressing     Problem: Confusion  Goal: Confusion, delirium, dementia, or psychosis is improved or at baseline  Description: INTERVENTIONS:  1. Assess for possible contributors to thought disturbance, including medications, impaired vision or hearing, underlying metabolic abnormalities, dehydration, psychiatric diagnoses, and notify attending LIP  2. [place] high risk fall precautions, as indicated  3. Provide frequent short contacts to provide reality reorientation, refocusing and direction  4. Decrease environmental stimuli, including noise as appropriate  5. Monitor and intervene to maintain adequate nutrition, hydration, elimination, sleep and activity  6. If unable to ensure safety without constant attention obtain sitter and review sitter guidelines with assigned personnel  7. Initiate Psychosocial CNS and Spiritual Care consult, as indicated  2/12/2025 0750 by Aye Anders RN  Outcome: Not Progressing     Problem: Confusion  Goal: Confusion, delirium, dementia, or psychosis is improved or at baseline  Description: INTERVENTIONS:  1. Assess for possible contributors to thought disturbance, including medications, impaired vision or hearing, underlying metabolic abnormalities, dehydration, psychiatric diagnoses, and notify attending LIP  2. [place] high risk fall precautions, as indicated  3. Provide frequent short contacts to provide reality reorientation, refocusing and direction  4. Decrease environmental stimuli, including noise as appropriate  5. Monitor and intervene to maintain adequate nutrition, hydration, elimination, sleep and activity  6. If unable to ensure safety without constant attention obtain sitter and review sitter guidelines with assigned personnel  7. Initiate  Psychosocial CNS and Spiritual Care consult, as indicated  2/12/2025 0750 by Aye Anders, RN  Outcome: Not Progressing  2/12/2025 0140 by Alondra Laboy, RN  Outcome: Progressing  2/12/2025 0140 by Alondra Laboy, RN  Outcome: Progressing  Flowsheets (Taken 2/11/2025 2000)  Effect of thought disturbance (confusion, delirium, dementia, or psychosis) are managed with adequate functional status:   Assess for contributors to thought disturbance, including medications, impaired vision or hearing, underlying metabolic abnormalities, dehydration, psychiatric diagnoses, notify [facility]   [place] high risk fall precautions, as indicated   Provide frequent short contacts to provide reality reorientation, refocusing and direction   Decrease environmental stimuli, including noise as appropriate   Monitor and intervene to maintain adequate nutrition, hydration, elimination, sleep and activity

## 2025-02-12 NOTE — PROGRESS NOTES
Piperacillin-Tazobactam Extended Interval Interchange    The following ordered dose of Piperacillin-Tazobactam has been changed to optimize its pharmacodynamic profile as approved by the Patient Care Review Committee.    Ordered Dose    _x_ 3.375 gm IV q6 or 8hrs (30 minute infusion)      __ 4.5gm IV q6 or 8hrs  (30 minute infusion)      __ 2.25gm IV q6 or 8hrs (30 minute infusion)      New Dose    CrCl  >= 20ml/min    _x_ 3.375gm IV q8hrs  (4-hour infusion)      CrCl < 20ml/min     __ 3.375gm IV q12hrs  (4-hour infusion)      Pharmacists should be contacted for issues concerning drug compatibility with multiple IV medications.  All doses will be prepared using 50ml NS to be infused over 4-hours at a rate of 12.5ml/hr.    Thank You,    Sehrif Johns, PharmD, [facility]  2/12/2025 4:39 PM

## 2025-02-12 NOTE — PROGRESS NOTES
Physical Therapy  Facility/Department: [address] NEURO ICU   Physical Therapy Daily Treatment Note    Patient Name: Dahlia Zhang        MRN: [id number]    : 1950    Date of Service: 2025    Chief Complaint   Patient presents with    Extremity Weakness     Past Medical History:  has a past medical history of Anticoagulated, Anxiety and depression, Atrial fibrillation ([facility]), Cerebral aneurysm, Cerebral artery occlusion with cerebral infarction ([facility]), Colon polyps, COPD (chronic obstructive pulmonary disease) ([facility]), COVID-19, COVID-19 vaccine administered, DM II (diabetes mellitus, type II), controlled (HCC), GERD (gastroesophageal reflux disease), HTN (hypertension), Hyperlipidemia, Kidney stone, On home O2, Osteoarthritis, Sleep apnea, Under care of team, Under care of team, Under care of team, Under care of team, Wears glasses, Wears partial dentures, and Wellness examination.  Past Surgical History:  has a past surgical history that includes Thyroid surgery; Breast reduction surgery (Bilateral, ); Hysterectomy; hernia repair; Cerebral angiogram (2024); Cerebral angiogram (10/11/2023); Cerebral angiogram (2024); Cholecystectomy; Cerebral angiogram (2024); Breast biopsy (Right); Cataract extraction w/ intraocular lens implant (Bilateral, ); Colonoscopy w/ polypectomy (2023); other surgical history (01/15/2025); Colonoscopy (N/A, 2025); and Colonoscopy (N/A, 2025).    Discharge Recommendations  Discharge Recommendations: Patient would benefit from continued therapy after discharge  PT Equipment Recommendations  Equipment Needed: No  Mobility Devices: Walker  Walker: Rolling  Other: Pt currently unsafe to attempt any skilled mobility without skilled assistance.    Assessment  Body Structures, Functions, Activity Limitations Requiring Skilled Therapeutic Intervention: Decreased functional mobility ;Decreased strength;Decreased safe awareness;Decreased  assistance  Scooting: Substantial/Maximal assistance;2 Person assistance  Bed Mobility Comments: HOB elevated for supine to sit transfer, bed flat for return to supine. Pt required assistance with trunk and LE progression throughout. Pt performed rolling at end of session to adjust linens and for breif change/bottom care.    Transfers  Sit to Stand: Unable to assess  Stand to Sit: Unable to assess  Bed to Chair: Dependent/Total (At end of session pt danny lifted to chair to promote seated upright position.)  Comment: Unsafe to attempt transfers this date d/t lethargy and decreased AROM in LE's. Pt was danny lifted to chair at end of session to promote seated upright position end to get pt out of bed.    Ambulation  Comments: Unsafe/unable to attempt ambulation this date.  More Ambulation?: No    Stairs/Curb  Stairs?: No      Balance   Balance  Posture: Poor  Sitting - Static: Poor  Sitting - Dynamic: Poor  Comments: Pt tolerated sitting EOB ~15 minutes requiring maxA to maintain sitting balance d/t R lateral and posterior lean.    Exercise  PT Exercises  Dynamic Sitting Balance Exercises:   Anterior/posterior weight shifts x5 reps, maxA required to perform.  WB through RUE for improved proprioception.  Seated LE exercises: LAQ, BLE x5 reps, minimal AROM noted.       Patient Education  Patient Education  Education Given To: Patient  Education Provided: Role of Therapy;Plan of Care  Education Provided Comments: importance of mobility.  Education Method: Verbal  Barriers to Learning: Cognition  Education Outcome: Continued education needed    Plan  Physical Therapy Plan  General Plan:  (5-6x/week)  Current Treatment Recommendations: Strengthening, Balance training, Functional mobility training, Transfer training, Endurance training, Stair training, Gait training, Therapeutic activities, Safety education & training, Patient/Caregiver education & training, Neuromuscular re-education    Goals  Patient Goals   Patient Goals :  pt unable to verbalize goals  Short Term Goals  Time Frame for Short Term Goals: 14 visits  Short Term Goal 1: prevent contractures  4  Short Term Goal 2: facilitate active movement x4  Short Term Goal 3: progress mobility when medically appropriate and set goals  Short Term Goal 4: Pt demonstrate 4 stairs with 1 rail and SBA without loss of balance for cumminity assess  Short Term Goal 5: Pt indep with high level balance strategies to decrease risk of falls and promote return to function    Minutes  PT Individual Minutes  Time In: 1058  Time Out: 1139  Minutes: 41  Time Code Minutes  Timed Code Treatment Minutes: 38 Minutes    Electronically signed by Angelina Lowry PTA on 2/12/25 at 3:37 PM EST    Co- treatment with OT warranted secondary to decreased patient safety and independence with functional mobility requiring skilled physical assistance of two professionals to simultaneously address individualized discipline goals. PT is addressing sitting balance, bed mobility, weight shifting, while OT is addressing their individualized functional mobility/self-care task.

## 2025-02-12 NOTE — FLOWSHEET NOTE
RN notified Neuro CC of SBP >150 despite Hydralazine PRN given. See new orders.     Electronically signed by Aye Anders RN on 2/12/2025 at 2:21 PM

## 2025-02-13 ENCOUNTER — APPOINTMENT (OUTPATIENT)
Dept: VASCULAR LAB | Age: 75
DRG: 023 | End: 2025-02-13
Payer: MEDICARE

## 2025-02-13 LAB
ANION GAP SERPL CALCULATED.3IONS-SCNC: 11 MMOL/L (ref 9–16)
ANTI-XA UNFRAC HEPARIN: 0.24 IU/L
ANTI-XA UNFRAC HEPARIN: 0.33 IU/L
ANTI-XA UNFRAC HEPARIN: 0.36 IU/L
BASOPHILS # BLD: 0.11 K/UL (ref 0–0.2)
BASOPHILS NFR BLD: 1 % (ref 0–2)
BUN SERPL-MCNC: 7 MG/DL (ref 8–23)
CALCIUM SERPL-MCNC: 8.7 MG/DL (ref 8.6–10.4)
CHLORIDE SERPL-SCNC: 103 MMOL/L (ref 98–107)
CO2 SERPL-SCNC: 26 MMOL/L (ref 20–31)
CREAT SERPL-MCNC: 0.6 MG/DL (ref 0.6–0.9)
ECHO BSA: 2.07 M2
ECHO BSA: 2.07 M2
EOSINOPHIL # BLD: 0.7 K/UL (ref 0–0.44)
EOSINOPHILS RELATIVE PERCENT: 5 % (ref 1–4)
ERYTHROCYTE [DISTWIDTH] IN BLOOD BY AUTOMATED COUNT: 14.9 % (ref 11.8–14.4)
GFR, ESTIMATED: >90 ML/MIN/1.73M2
GLUCOSE BLD-MCNC: 162 MG/DL (ref 65–105)
GLUCOSE BLD-MCNC: 164 MG/DL (ref 65–105)
GLUCOSE BLD-MCNC: 176 MG/DL (ref 65–105)
GLUCOSE BLD-MCNC: 195 MG/DL (ref 65–105)
GLUCOSE BLD-MCNC: 202 MG/DL (ref 65–105)
GLUCOSE SERPL-MCNC: 185 MG/DL (ref 74–99)
HCT VFR BLD AUTO: 27.5 % (ref 36.3–47.1)
HGB BLD-MCNC: 8.4 G/DL (ref 11.9–15.1)
IMM GRANULOCYTES # BLD AUTO: 0.13 K/UL (ref 0–0.3)
IMM GRANULOCYTES NFR BLD: 1 %
LYMPHOCYTES NFR BLD: 2.74 K/UL (ref 1.1–3.7)
LYMPHOCYTES RELATIVE PERCENT: 20 % (ref 24–43)
MCH RBC QN AUTO: 26.6 PG (ref 25.2–33.5)
MCHC RBC AUTO-ENTMCNC: 30.5 G/DL (ref 28.4–34.8)
MCV RBC AUTO: 87 FL (ref 82.6–102.9)
MONOCYTES NFR BLD: 1.27 K/UL (ref 0.1–1.2)
MONOCYTES NFR BLD: 9 % (ref 3–12)
NEUTROPHILS NFR BLD: 64 % (ref 36–65)
NEUTS SEG NFR BLD: 8.74 K/UL (ref 1.5–8.1)
NRBC BLD-RTO: 0.2 PER 100 WBC
PHOSPHATE SERPL-MCNC: 2 MG/DL (ref 2.5–4.5)
PLATELET # BLD AUTO: 426 K/UL (ref 138–453)
PMV BLD AUTO: 11.3 FL (ref 8.1–13.5)
POTASSIUM SERPL-SCNC: 3.4 MMOL/L (ref 3.7–5.3)
RBC # BLD AUTO: 3.16 M/UL (ref 3.95–5.11)
RBC # BLD: ABNORMAL 10*6/UL
SODIUM SERPL-SCNC: 140 MMOL/L (ref 136–145)
VAS LEFT AX A MID PSV: 66.6 CM/S
VAS LEFT BRACHIAL A DIST PSV: 101 CM/S
VAS LEFT BRACHIAL A PROX PSV: 111 CM/S
VAS LEFT RADIAL A DIST PSV: 63.1 CM/S
VAS LEFT RADIAL A MID PSV: 89.7 CM/S
VAS LEFT RADIAL A PROX PSV: 90.4 CM/S
VAS LEFT SUBCLAVIAN PROX PSV: 54.3 CM/S
VAS LEFT ULNAR A DIST PSV: 82.6 CM/S
VAS LEFT ULNAR A MID PSV: 107 CM/S
VAS LEFT ULNAR A PROX PSV: 104 CM/S
WBC OTHER # BLD: 13.7 K/UL (ref 3.5–11.3)

## 2025-02-13 PROCEDURE — 6370000000 HC RX 637 (ALT 250 FOR IP): Performed by: STUDENT IN AN ORGANIZED HEALTH CARE EDUCATION/TRAINING PROGRAM

## 2025-02-13 PROCEDURE — 2580000003 HC RX 258

## 2025-02-13 PROCEDURE — 93971 EXTREMITY STUDY: CPT | Performed by: SURGERY

## 2025-02-13 PROCEDURE — 6370000000 HC RX 637 (ALT 250 FOR IP)

## 2025-02-13 PROCEDURE — 36415 COLL VENOUS BLD VENIPUNCTURE: CPT

## 2025-02-13 PROCEDURE — 93931 UPPER EXTREMITY STUDY: CPT

## 2025-02-13 PROCEDURE — 82947 ASSAY GLUCOSE BLOOD QUANT: CPT

## 2025-02-13 PROCEDURE — 6370000000 HC RX 637 (ALT 250 FOR IP): Performed by: INTERNAL MEDICINE

## 2025-02-13 PROCEDURE — 6370000000 HC RX 637 (ALT 250 FOR IP): Performed by: NURSE PRACTITIONER

## 2025-02-13 PROCEDURE — 94761 N-INVAS EAR/PLS OXIMETRY MLT: CPT

## 2025-02-13 PROCEDURE — 85520 HEPARIN ASSAY: CPT

## 2025-02-13 PROCEDURE — 93931 UPPER EXTREMITY STUDY: CPT | Performed by: SURGERY

## 2025-02-13 PROCEDURE — 99231 SBSQ HOSP IP/OBS SF/LOW 25: CPT | Performed by: GENERAL PRACTICE

## 2025-02-13 PROCEDURE — 85025 COMPLETE CBC W/AUTO DIFF WBC: CPT

## 2025-02-13 PROCEDURE — 2500000003 HC RX 250 WO HCPCS

## 2025-02-13 PROCEDURE — 93971 EXTREMITY STUDY: CPT

## 2025-02-13 PROCEDURE — 84100 ASSAY OF PHOSPHORUS: CPT

## 2025-02-13 PROCEDURE — 6360000002 HC RX W HCPCS

## 2025-02-13 PROCEDURE — 6370000000 HC RX 637 (ALT 250 FOR IP): Performed by: PSYCHIATRY & NEUROLOGY

## 2025-02-13 PROCEDURE — 99231 SBSQ HOSP IP/OBS SF/LOW 25: CPT | Performed by: NURSE PRACTITIONER

## 2025-02-13 PROCEDURE — 99232 SBSQ HOSP IP/OBS MODERATE 35: CPT | Performed by: STUDENT IN AN ORGANIZED HEALTH CARE EDUCATION/TRAINING PROGRAM

## 2025-02-13 PROCEDURE — 94640 AIRWAY INHALATION TREATMENT: CPT

## 2025-02-13 PROCEDURE — 99232 SBSQ HOSP IP/OBS MODERATE 35: CPT

## 2025-02-13 PROCEDURE — 2060000000 HC ICU INTERMEDIATE R&B

## 2025-02-13 PROCEDURE — 6370000000 HC RX 637 (ALT 250 FOR IP): Performed by: REGISTERED NURSE

## 2025-02-13 PROCEDURE — 99232 SBSQ HOSP IP/OBS MODERATE 35: CPT | Performed by: PSYCHIATRY & NEUROLOGY

## 2025-02-13 PROCEDURE — 80048 BASIC METABOLIC PNL TOTAL CA: CPT

## 2025-02-13 PROCEDURE — 2500000003 HC RX 250 WO HCPCS: Performed by: STUDENT IN AN ORGANIZED HEALTH CARE EDUCATION/TRAINING PROGRAM

## 2025-02-13 RX ORDER — IPRATROPIUM BROMIDE AND ALBUTEROL SULFATE 2.5; .5 MG/3ML; MG/3ML
1 SOLUTION RESPIRATORY (INHALATION)
Status: DISCONTINUED | OUTPATIENT
Start: 2025-02-13 | End: 2025-02-15

## 2025-02-13 RX ORDER — ALBUTEROL SULFATE 90 UG/1
2 INHALANT RESPIRATORY (INHALATION) EVERY 4 HOURS PRN
Status: DISCONTINUED | OUTPATIENT
Start: 2025-02-13 | End: 2025-02-15 | Stop reason: HOSPADM

## 2025-02-13 RX ADMIN — PIPERACILLIN AND TAZOBACTAM 3375 MG: 3; .375 INJECTION, POWDER, LYOPHILIZED, FOR SOLUTION INTRAVENOUS at 02:05

## 2025-02-13 RX ADMIN — TICAGRELOR 90 MG: 90 TABLET ORAL at 21:14

## 2025-02-13 RX ADMIN — ASPIRIN 81 MG 81 MG: 81 TABLET ORAL at 08:01

## 2025-02-13 RX ADMIN — LOSARTAN POTASSIUM 100 MG: 50 TABLET, FILM COATED ORAL at 08:01

## 2025-02-13 RX ADMIN — ROSUVASTATIN CALCIUM 20 MG: 20 TABLET, FILM COATED ORAL at 21:14

## 2025-02-13 RX ADMIN — AMIODARONE HYDROCHLORIDE 200 MG: 200 TABLET ORAL at 08:01

## 2025-02-13 RX ADMIN — HYDRALAZINE HYDROCHLORIDE 100 MG: 50 TABLET ORAL at 21:13

## 2025-02-13 RX ADMIN — AMIODARONE HYDROCHLORIDE 200 MG: 200 TABLET ORAL at 21:14

## 2025-02-13 RX ADMIN — SODIUM CHLORIDE, PRESERVATIVE FREE 10 ML: 5 INJECTION INTRAVENOUS at 21:14

## 2025-02-13 RX ADMIN — ACETAMINOPHEN 650 MG: 325 TABLET ORAL at 21:13

## 2025-02-13 RX ADMIN — HYDRALAZINE HYDROCHLORIDE 10 MG: 20 INJECTION INTRAMUSCULAR; INTRAVENOUS at 17:22

## 2025-02-13 RX ADMIN — Medication 10 MG: at 08:16

## 2025-02-13 RX ADMIN — METOPROLOL TARTRATE 50 MG: 50 TABLET, FILM COATED ORAL at 21:13

## 2025-02-13 RX ADMIN — DILTIAZEM HYDROCHLORIDE 30 MG: 60 TABLET, FILM COATED ORAL at 06:19

## 2025-02-13 RX ADMIN — SENNOSIDES AND DOCUSATE SODIUM 2 TABLET: 50; 8.6 TABLET ORAL at 08:01

## 2025-02-13 RX ADMIN — HYDROCHLOROTHIAZIDE 25 MG: 25 TABLET ORAL at 08:01

## 2025-02-13 RX ADMIN — IPRATROPIUM BROMIDE AND ALBUTEROL SULFATE 1 DOSE: .5; 2.5 SOLUTION RESPIRATORY (INHALATION) at 19:51

## 2025-02-13 RX ADMIN — DILTIAZEM HYDROCHLORIDE 30 MG: 60 TABLET, FILM COATED ORAL at 12:47

## 2025-02-13 RX ADMIN — IPRATROPIUM BROMIDE AND ALBUTEROL SULFATE 1 DOSE: .5; 2.5 SOLUTION RESPIRATORY (INHALATION) at 07:46

## 2025-02-13 RX ADMIN — HYDRALAZINE HYDROCHLORIDE 10 MG: 20 INJECTION INTRAMUSCULAR; INTRAVENOUS at 10:24

## 2025-02-13 RX ADMIN — HYDRALAZINE HYDROCHLORIDE 100 MG: 50 TABLET ORAL at 08:01

## 2025-02-13 RX ADMIN — INSULIN LISPRO 1 UNITS: 100 INJECTION, SOLUTION INTRAVENOUS; SUBCUTANEOUS at 21:41

## 2025-02-13 RX ADMIN — DILTIAZEM HYDROCHLORIDE 30 MG: 60 TABLET, FILM COATED ORAL at 18:22

## 2025-02-13 RX ADMIN — SODIUM PHOSPHATE, MONOBASIC, MONOHYDRATE AND SODIUM PHOSPHATE, DIBASIC, ANHYDROUS 15 MMOL: 276; 142 INJECTION, SOLUTION INTRAVENOUS at 13:33

## 2025-02-13 RX ADMIN — METOPROLOL TARTRATE 50 MG: 50 TABLET, FILM COATED ORAL at 08:01

## 2025-02-13 RX ADMIN — IPRATROPIUM BROMIDE AND ALBUTEROL SULFATE 1 DOSE: .5; 2.5 SOLUTION RESPIRATORY (INHALATION) at 05:01

## 2025-02-13 RX ADMIN — PANTOPRAZOLE SODIUM 40 MG: 40 TABLET, DELAYED RELEASE ORAL at 06:20

## 2025-02-13 RX ADMIN — TICAGRELOR 90 MG: 90 TABLET ORAL at 08:01

## 2025-02-13 RX ADMIN — POTASSIUM BICARBONATE 40 MEQ: 782 TABLET, EFFERVESCENT ORAL at 08:00

## 2025-02-13 RX ADMIN — IPRATROPIUM BROMIDE AND ALBUTEROL SULFATE 1 DOSE: .5; 2.5 SOLUTION RESPIRATORY (INHALATION) at 11:25

## 2025-02-13 RX ADMIN — IPRATROPIUM BROMIDE AND ALBUTEROL SULFATE 1 DOSE: .5; 2.5 SOLUTION RESPIRATORY (INHALATION) at 15:39

## 2025-02-13 RX ADMIN — PIPERACILLIN AND TAZOBACTAM 3375 MG: 3; .375 INJECTION, POWDER, LYOPHILIZED, FOR SOLUTION INTRAVENOUS at 08:10

## 2025-02-13 RX ADMIN — ESCITALOPRAM OXALATE 10 MG: 10 TABLET ORAL at 08:01

## 2025-02-13 RX ADMIN — INSULIN LISPRO 1 UNITS: 100 INJECTION, SOLUTION INTRAVENOUS; SUBCUTANEOUS at 12:47

## 2025-02-13 RX ADMIN — PIPERACILLIN AND TAZOBACTAM 3375 MG: 3; .375 INJECTION, POWDER, LYOPHILIZED, FOR SOLUTION INTRAVENOUS at 17:45

## 2025-02-13 NOTE — PROGRESS NOTES
Physical Medicine & Rehabilitation  Progress Note        Admitting Physician: Katelynn Marie MD    Primary Care Provider: Thania Rahman MD     Chief Complaint: AMS    Brief History:    This is a follow up to the initial consult on Ms. Dahlia Zhang is a 74 y.o. right handed female who was admitted to [facility] on 1/23/2025 with Extremity Weakness      Patient admitted with increased lethargy and weakness with L facial droop. CT head was negative for acute changes. 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 There is some question of partial thrombus vs metal artifact on scans.      Neuro endovascular: Dr. Michaud performed diagnostic cerebral angiogram on 1/23/25.      Gastroenterology: consulted for abdominal pain and large bloody bowel movement. History of severe diverticulosis on last colonoscopy January 2023. She was transfused 1 unit PRBCs 1/26. Dr. Porter performed colonoscopy 1/26/25 with severe pandiverticulosis and multiple polyps. Blood was noted in colon with no active bleeding.     Cardiology: consulted for A Fib with RVR. Recommending Watchman procedure after outpatient evauation. Rate control with amiodarone IV, Cardizem and Lopressor. Anticoag/antiplatelets on hold for GI bleed.     ID: consulted for ESBL E Coli UTI. Currently treating with IV Meropenem planned through 1/31/25.     Subjective:  The patient is resting comfortably. The patient's mobility is diminished.     On 2/07 patient developed intermittent slurred speech and lethargy. MRI showed new infarct in bilateral thalami, left occipital parietal cerebellar. Repeat head CT stable. Patient placed on LTME - no seizure activity, consistent with encephalopathic picture.     GI consulted for PEG - patient's  wanted more time to allow improvement before PEG. Remains on TF. Tentatively plans for MBSS when appropriate.     Palliative  (SENOKOT-S) 8.6-50 MG tablet 2 tablet, 2 tablet, Oral, Daily  amiodarone (CORDARONE) tablet 200 mg, 200 mg, Oral, BID  [Held by provider] zolpidem (AMBIEN) tablet 5 mg, 5 mg, Oral, Nightly PRN  0.9 % sodium chloride infusion, , IntraVENous, PRN  lidocaine 4 % external patch 1 patch, 1 patch, TransDERmal, Daily  glucose chewable tablet 16 g, 4 tablet, Oral, PRN  dextrose bolus 10% 125 mL, 125 mL, IntraVENous, PRN **OR** dextrose bolus 10% 250 mL, 250 mL, IntraVENous, PRN  glucagon injection 1 mg, 1 mg, SubCUTAneous, PRN  dextrose 10 % infusion, , IntraVENous, Continuous PRN  acetaminophen (TYLENOL) tablet 650 mg, 650 mg, Oral, Q4H PRN  sodium chloride flush 0.9 % injection 5-40 mL, 5-40 mL, IntraVENous, PRN  0.9 % sodium chloride infusion, , IntraVENous, PRN  polyethylene glycol (GLYCOLAX) packet 17 g, 17 g, Oral, Daily PRN  escitalopram (LEXAPRO) tablet 10 mg, 10 mg, Oral, Daily  tiotropium (SPIRIVA RESPIMAT) 2.5 MCG/ACT inhaler 2 puff, 2 puff, Inhalation, Daily RT  sodium chloride flush 0.9 % injection 5-40 mL, 5-40 mL, IntraVENous, 2 times per day  sodium chloride flush 0.9 % injection 5-40 mL, 5-40 mL, IntraVENous, PRN  0.9 % sodium chloride infusion, , IntraVENous, PRN  magnesium sulfate 2000 mg in 50 mL IVPB premix, 2,000 mg, IntraVENous, PRN  ondansetron (ZOFRAN-ODT) disintegrating tablet 4 mg, 4 mg, Oral, Q8H PRN **OR** ondansetron (ZOFRAN) injection 4 mg, 4 mg, IntraVENous, Q6H PRN  rosuvastatin (CRESTOR) tablet 20 mg, 20 mg, Oral, Nightly  sodium chloride flush 0.9 % injection 5-40 mL, 5-40 mL, IntraVENous, PRN  0.9 % sodium chloride infusion, , IntraVENous, PRN      Diagnostics:     CBC:   Recent Labs     02/11/25  0408 02/11/25  1347 02/12/25  0333 02/12/25  1423 02/13/25  0340   WBC 13.8*  --  13.3*  --  13.7*   RBC 3.18*  --  3.35*  --  3.16*   HGB 8.7*   < > 9.0* 8.5* 8.4*   HCT 27.3*   < > 28.5* 27.1* 27.5*   MCV 85.8  --  85.1  --  87.0   RDW 15.2*  --  15.0*  --  14.9*     --  423  --  426

## 2025-02-13 NOTE — PROGRESS NOTES
Endovascular Neurosurgery Progress Notes    Pt Name: Dahlia Zhang  MRN: [id number]  YOB: 1950  Date of evaluation: 2/13/2025  Primary Care Physician: Thania Rahman MD  Reason for evaluation: Concern for pontine stroke in the presence of previously treated basilar anneurysm with WEB device and LVIS stent    SUBJECTIVE:     NAEO from EVN perspective. Continues on Aspirin and Brilinta. No major improvements in neurological examination. Vascular surgery consulted for LUE swelling, obtaining ultrasounds and considering antibiotics for possible cellulitis.     History of Chief Complaint:    Dahlia Zhang is a 74 y.o. 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 for altered mental status.     On the morning of 1/23/2025, patient was noted to have increased lethargy and generalized weakness along with left facial droop.  These were concerning as patient was at her baseline and doing better yesterday.  She does not recall the time when she went to sleep, thus last known well is unknown.  Potentially she went to sleep between 6-8 PM on 1/22.      Patient was seen and examined at bedside upon arrival to [facility] ED. She was increasingly lethargic and short of breath, however she was able to cross midline without gaze preference, had intact fluency and repetition but did have mild dysarthria. She had generalized weakness.         CT head w/o contrast shows no acute finding.      CTA head and neck show flow diverting stent along basilar artery and proximal PCA with embolization coils at basilar tip. Severe narrowing within the distal basilar stent. Small amount of residual aneurysmal filling at the neck, approximately 2 mm.        Allergies  is allergic to lisinopril, codeine, norco [hydrocodone-acetaminophen], and percocet  Neurointervention  [facility] Stroke [facility] Stroke [place]

## 2025-02-13 NOTE — PROGRESS NOTES
Nutrition Assessment     Type and Reason for Visit: Reassess    Nutrition Recommendations/Plan:   Continue current TF regimen  Continue replacing electrolytes as necessary  Monitor POC, nutrition status, TF tolerance and adequacy, wt, BM, labs     Malnutrition Assessment:  Malnutrition Status: Moderate malnutrition    Nutrition Assessment:  Tolerating NG TF @ goal of 50 mL/hr. Awaiting GI evaluation for PEG. LBM 2/12. +1 generalized, +2 pitting BUE, +1 pitting BLE edema. Labs: K 3.4, P 2.0. Meds: Sodium phosphate, humalog, Effer K, zosyn, protonix, senokot.    Estimated Daily Nutrient Needs:  Energy (kcal):  8146-6669 kcal/d Weight Used for Energy Requirements: Ideal     Protein (g):   gm/d Weight Used for Protein Requirements: Ideal        Fluid (ml/day):  or per physician Method Used for Fluid Requirements: 1 ml/kcal    Nutrition Related Findings:   Meds/labs reviewed Wound Type: Surgical Incision    Current Nutrition Therapies:    Diet NPO  ADULT TUBE FEEDING; Nasogastric; Standard with Fiber; Continuous; 10; Yes; 10; Q 6 hours; 50; 30; Q 6 hours    Anthropometric Measures:  Height: 170.2 cm (5' 7\")  Current Body Wt: 88.5 kg (195 lb)   BMI: 30.5    Nutrition Diagnosis:   Inadequate oral intake related to swallowing difficulty as evidenced by NPO or clear liquid status due to medical condition, nutrition support - enteral nutrition  Altered nutrition-related lab values related to inadequate protein-energy intake as evidenced by lab values    Nutrition Interventions:   Food and/or Nutrient Delivery: Continue Current Tube Feeding, IV Fluid Delivery, Continue NPO  Nutrition Education/Counseling: No recommendation at this time  Coordination of Nutrition Care: Continue to monitor while inpatient    Goals:  Goals: Meet at least 75% of estimated needs, prior to discharge  Type of Goal: Continue current goal  Previous Goal Met: Progressing toward Goal(s)    Nutrition Monitoring and Evaluation:    Behavioral-Environmental Outcomes: None Identified  Food/Nutrient Intake Outcomes: Enteral Nutrition Intake/Tolerance  Physical Signs/Symptoms Outcomes: Biochemical Data, Weight, Fluid Status or Edema    Discharge Planning:    Enteral Nutrition     Meghan Eagle MS, RDN, LDN  Contact: [phone]

## 2025-02-13 NOTE — PROGRESS NOTES
Palliative Care Inpatient Follow Up    NAME:  Dahlia Zhang  MEDICAL RECORD NUMBER:  [id number]  AGE: 74 y.o.   GENDER: female  : 1950  TODAY'S DATE:  2025    Reasons for Consultation:    Symptom and/or pain management  Provision of information regarding PC and/or hospice philosophies  Complex, time-intensive communication and interdisciplinary psychosocial support  Clarification of goals of care and/or assistance with difficult decision-making  Guidance in regards to resources and transition(s)    Members of PC team contributing to this consultation are: BEN Celestin    Plan      Palliative Interaction:    Patient's  at the bedside this morning.  He reports the last several days she has been improving.  They are awaiting evaluation by GI for PEG.    He was able to provide DURABLE POWER OF  for healthcare documents.  Copies are reportedly in paper chart.  Will plan to review these.    He does state that she previously had discussions regarding chest compressions.  I did ask him what their decision was regarding this.  Patient will remain full code at this time per their wishes.    Support provided.  Palliative to follow.      Additional Assessments:     Symptom management/ pain control   We feel the patient symptoms are being controlled. her current regimen is reviewed by myself and discussed with the staff.   We recommend adjusting her medications as follows  None    Goals of care evaluation   The patient goals of care are improve or maintain function/quality of life, provide comfort care/support/palliation/relieve suffering, and support for family/caregiver   Goals of care discussed with:    [] Patient independently    [] Patient and Family    [x] Family or Healthcare DPOA independently    [] Unable to discuss with patient, family/DPOA not present    Code Status  Full Code    Other recommendations   - We will continue to provide comfort and support to the patient and  the family      Palliative Care will continue to follow Ms. Zhang's care as needed.   Thank you for allowing Palliative Care to participate in the care of Ms. Zhang .      History of Present Illness     The patient is a 74 y.o. 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 Patient was admitted on 1/15/25 due to residual aneurysm shown on angiograms. Patient had a stroke in August 2024 with residual visual deficits. Patient was additionally treated for UTI and discharged home on 1/16/2025.     Patient returned to the emergency department on 1/23/2025 with altered mentation, left facial drooping, dysarthria, and lethargy. CTA head and neck revealed severe narrowing in distal basilar stent. Patient was taken for emergent DSA and was treated with cannagrelor with improvement. Patient developed rectal bleeding on 1/26/2025 and colonoscopy was completed. Patient was treated for UTI with IV antibiotics. Patient had intermittent episodes of lethargy. MRI on 2/5/2025 showed new infarct in bilateral thalami, left occipital lobes, parietal, and cerebellar hemispheres. Patient taken for DSA on 2/7 with stent and balloon angioplasty.    Palliative care consulted to assist with goals of care.    Referred to Palliative Care by   [x] Physician   [] Nursing  [] Family Request   [] Other:       Active Hospital Problems    Diagnosis Date Noted    Oropharyngeal dysphagia [R13.12] 02/12/2025    Hypokalemia [E87.6] 02/11/2025    Encounter for palliative care [Z51.5] 02/11/2025    Goals of care, counseling/discussion [Z71.89] 02/11/2025    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 [K92.2] 01/26/2025    Acute blood loss anemia [D62] 01/26/2025    Abnormal CT of the abdomen [R93.5] 01/26/2025    Shock  [R57.9] 01/26/2025    Diverticulosis [K57.90] 01/26/2025    Facial droop [R29.810] 01/25/2025    CRP elevated [R79.82] 01/25/2025    Bandemia [D72.825] 01/25/2025    Pyelonephritis [N12] 01/25/2025    Lactic acidosis [E87.20] 01/25/2025    Complicated UTI (urinary tract infection) [N39.0] 01/24/2025    Infection due to ESBL-producing Escherichia coli [A49.8, Z16.12] 01/24/2025    Encephalopathy [G93.40] 01/23/2025    Left-sided weakness [R53.1] 01/23/2025    Thalamic stroke (HCC) [I63.81] 01/23/2025       PAST MEDICAL HISTORY      Diagnosis Date    Anticoagulated     ELIQUIS    Anxiety and depression     Atrial fibrillation (HCC) 10/15/2023    new onset while in Hosp    Cerebral aneurysm 10/11/2023    ruptured , SAH ,  hosp x 2 weeks  : had acute HA    Cerebral artery occlusion with cerebral infarction (HCC) 08/08/2024    HA , blurry vision , left leg weakness , slurred speech    Colon polyps     COPD (chronic obstructive pulmonary disease) (HCC)     COVID-19     3-4 times    COVID-19 vaccine administered     DM II (diabetes mellitus, type II), controlled (HCC)     GERD (gastroesophageal reflux disease)     HTN (hypertension)     Hyperlipidemia     Kidney stone     once but did not require surgery    On home O2     3-4L / nightly    Osteoarthritis     Sleep apnea     Cpap    Under care of team     Cardiology ,  SHANNA,  last seen 6/26/2024    Under care of team     Andrea De Anda, last seen 7/3/2024    Under care of team     NeuroEndovascular , Dr. Carl ,  last seen 12/26/24    Under care of team     Neurology , [facility] , last seen 10/25/2024    Wears glasses     Wears partial dentures     upper    Wellness examination     PCP Andrea, last sen 11/15/2024       PAST SURGICAL HISTORY  Past Surgical History:   Procedure Laterality Date    BREAST BIOPSY Right     since 1989 ; benign    BREAST REDUCTION SURGERY Bilateral 1989    CATARACT EXTRACTION W/ INTRAOCULAR LENS IMPLANT Bilateral 2020    CEREBRAL ANGIOGRAM

## 2025-02-13 NOTE — PROGRESS NOTES
Division of Vascular Surgery             Progress Note      Name: Dahlia Zhang  MRN: [id number]         Overnight Events:      No acute events overnight      Subjective:     Patient seen and examined in room. Patient is sleeping and unable to answer questions at this time. Difficult to arouse.     Physical Exam:     Vitals:  BP (!) 152/91   Pulse 71   Temp 99.2 °F (37.3 °C) (Bladder)   Resp 19   Ht 1.702 m (5' 7\")   Wt 88.5 kg (195 lb 1.7 oz)   SpO2 95%   BMI 30.56 kg/m²     Physical Exam  Constitutional:       Comments: Confused   HENT:      Head: Normocephalic.      Right Ear: External ear normal.      Left Ear: External ear normal.      Nose: Nose normal.      Mouth/Throat:      Mouth: Mucous membranes are moist.   Eyes:      Pupils: Pupils are equal, round, and reactive to light.   Cardiovascular:      Rate and Rhythm: Normal rate and regular rhythm.   Pulmonary:      Effort: No respiratory distress.   Abdominal:      General: There is no distension.      Tenderness: There is no abdominal tenderness. There is no guarding or rebound.   Skin:     General: Skin is warm.      Comments: Left wrist swollen, erythematous to touch.     Neurological:      Motor: No weakness.      Vascular:  Patient unable to follow commands due to altered mental status.   Doppler signal to radial and ulnar and arch signals on the left hand.  There is erythematous changes at the wrist of her left hand, soft to touch, mild swelling     Imaging:   XR CHEST PORTABLE     Result Date: 2/12/2025  1. Cardiomegaly with mild pulmonary vascular congestion. 2. Increased density involving the left lung base, likely atelectasis.        Assessment:     74 years old female presented hospital with altered mental status, multiple stroke.  Vascular surgery is consulted for left upper extremity swelling      Plan:     No acute surgical intervention is indicated at this point  Recommend warm compress, elevation and NSAID for the superficial  thrombophlebitis.  No arterial disease on the duplex scan  Antibiotics started per primary d/t concern of cellulites   Vascular surgery service will sign off at this time.  Thank you for the consult.  Please call/page us if you have any questions/concerns.  We appreciate being involved in the care of your patient.          [facility] [place]

## 2025-02-13 NOTE — PLAN OF CARE
Problem: Respiratory - Adult  Goal: Achieves optimal ventilation and oxygenation  2/13/2025 0752 by Iqra Stephens, RCP  Outcome: Progressing  Flowsheets (Taken 2/13/2025 6046)  Achieves optimal ventilation and oxygenation:   Assess for changes in respiratory status   Oxygen supplementation based on oxygen saturation or arterial blood gases   Assess the need for suctioning and aspirate as needed   Respiratory therapy support as indicated

## 2025-02-13 NOTE — PLAN OF CARE
Problem: Chronic Conditions and Co-morbidities  Goal: Patient's chronic conditions and co-morbidity symptoms are monitored and maintained or improved  Outcome: Progressing     Problem: Discharge Planning  Goal: Discharge to home or other facility with appropriate resources  Outcome: Progressing     Problem: Pain  Goal: Verbalizes/displays adequate comfort level or baseline comfort level  Outcome: Progressing     Problem: Safety - Adult  Goal: Free from fall injury  Outcome: Progressing     Problem: ABCDS Injury Assessment  Goal: Absence of physical injury  Outcome: Progressing     Problem: Neurosensory - Adult  Goal: Achieves stable or improved neurological status  Outcome: Progressing  Goal: Absence of seizures  Outcome: Progressing  Goal: Remains free of injury related to seizures activity  Outcome: Progressing  Goal: Achieves maximal functionality and self care  Outcome: Progressing     Problem: Cardiovascular - Adult  Goal: Maintains optimal cardiac output and hemodynamic stability  Outcome: Progressing  Flowsheets (Taken 2/13/2025 0731)  Maintains optimal cardiac output and hemodynamic stability:   Monitor blood pressure and heart rate   Monitor urine output and notify Licensed Independent Practitioner for values outside of normal range  Goal: Absence of cardiac dysrhythmias or at baseline  Outcome: Progressing  Flowsheets (Taken 2/13/2025 0731)  Absence of cardiac dysrhythmias or at baseline: Monitor cardiac rate and rhythm     Problem: Skin/Tissue Integrity - Adult  Goal: Skin integrity remains intact  Description: 1.  Monitor for areas of redness and/or skin breakdown  2.  Assess vascular access sites hourly  3.  Every 4-6 hours minimum:  Change oxygen saturation probe site  4.  Every 4-6 hours:  If on nasal continuous positive airway pressure, respiratory therapy assess nares and determine need for appliance change or resting period  Outcome: Progressing  Flowsheets (Taken 2/13/2025 0731)  Skin Integrity

## 2025-02-13 NOTE — PROGRESS NOTES
Daily Progress Note  Neuro Critical Care    Patient Name: Dahlia Zhang  Patient : 1950  Room/Bed: 0117/0117-01  Code Status: FULL   Allergies:   Allergies   Allergen Reactions    Lisinopril Angioedema    Codeine Nausea And Vomiting    Norco [Hydrocodone-Acetaminophen] Nausea And Vomiting    Percocet [Oxycodone-Acetaminophen] Nausea And Vomiting       CHIEF COMPLAINT:       AMS, left facial droop      INTERVAL HISTORY    Initial Presentation (Admitted ):    Dahlia Zhang is a 74 y.o. 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  CT head with no acute intracranial abnormalities.  Remote infarcts in left occipital lobe, right basal ganglia and corona radiata.  CTA head and neck showed severe narrowing within the distal basilar stent.  Emergent DSA revealed stents are patent and well apposed with a filling defect at the basilar tip representing partial thrombus versus metal artifact.  She was treated with cannagrelor drip with improvement in neurologic status.     On , she developed rectal bleeding with the CTA indicating extravasation from sigmoid colon.,  Colonoscopy revealing bleeding diverticular disease.  Aspirin and Brilinta were held; GI evaluated and recommended embolization or surgical eval if bleeding recurs.  On , GI signed off and no evidence of recurrent bleeding.  Patient's hospital course complicated by ESBL UTI s/p 7 day course of meropenem through . Patient was gradually restarted on aspirin monotherapy, and then transitioned to Eliquis monotherapy. Patient has had fluctuating episodes of lethargy and slurred speech since being transferred to  2/4/2025  Potential degree of perfusion mismatch in the cerebellum most pronounced in the right cerebellar hemisphere.     CT HEAD WO CONTRAST    Result Date: 2/3/2025  No evident acute intracranial abnormality with evaluation partially limited by chronic changes as above.     XR CHEST PORTABLE    Result Date: 2/2/2025  No acute process.  No pulmonary edema.     CTA HEAD NECK W CONTRAST    Result Date: 2/1/2025  1. Limited evaluation of the distal basilar artery and proximal posterior cerebral arteries due to artifact from prior stenting.  There appears to be a similar severe stenosis of the distal basilar artery. 2. Otherwise, no large vessel occlusion, significant stenosis or cerebral aneurysm identified within the brain. 3. Short segment 60% stenosis at the origin of the left internal carotid artery based on NASCET criteria.     XR CHEST PORTABLE    Result Date: 2/1/2025  1. Elevated right diaphragm with plethora central markings/probable mild basilar atelectasis; no consolidation or large effusion. 2. Mildly enlarged but stable cardiac silhouette. 3. High density overlying the right axilla, of unclear origin but possibly extrinsic.     CT HEAD WO CONTRAST    Result Date: 2/1/2025  1. No acute intracranial abnormality. 2. Stable wedge-shaped area of low attenuation in the left occipital lobe representing an acute infarct. 3. Old lacune infarcts involving the right basal ganglia. 4. Global parenchymal volume loss with chronic microvascular ischemic changes.     CT HEAD WO CONTRAST    Result Date: 1/30/2025  Acute left PCA territory infarct involving the parasagittal left occipital lobe. Stable appearance of right para median von and thalamus infarcts. No acute intracranial hemorrhage. Critical results were communicated to Dr. Khna via voicemail at 2:03 PM on 1/30/2025 after multiple attempts to reach provider.     FL MODIFIED BARIUM SWALLOW W VIDEO    Result Date: 1/28/2025  Swallowing mechanism grossly within  normal limits without evidence of aspiration. Please see separate speech pathology report for full discussion of findings and recommendations.     XR CHEST PORTABLE    Result Date: 1/27/2025  No acute process. Bibasilar hypoaeration     XR ABDOMEN FOR NG/OG/NE TUBE PLACEMENT    Result Date: 1/26/2025  Gastric tube with the tip in the proximal aspect of the stomach.     US RENAL COMPLETE    Result Date: 1/25/2025  Unremarkable ultrasound of the kidneys and urinary bladder.     CTA ABDOMEN PELVIS W WO CONTRAST    Result Date: 1/25/2025  1. Extravasation of contrast at the proximal sigmoid colon consistent with active intestinal bleeding. 2. Small amount of air in the bladder which could be iatrogenic.  Air can also be seen in the bladder in the setting of infection with a gas-forming organism. 3. No acute findings elsewhere in the abdomen or pelvis. 4. Colonic diverticulosis. 5. Small hiatal hernia. 6. Moderate atherosclerosis with normal patency of the branches of the aorta.     CT CHEST PULMONARY EMBOLISM W CONTRAST    Result Date: 1/25/2025  1. No obvious filling defect in the central pulmonary arterial vasculature. Evaluation of the lobar pulmonary arterial vasculature and beyond is limited as above. 2. Cardiomegaly/four-chamber cardiac enlargement.  Coronary artery disease. Atherosclerotic calcification, tortuosity and atheromatous plaque of the aorta. 3. Moderate to severe elevation of the right hemidiaphragm. 4. Mild dependent atelectasis and respiratory motion.  Moderate emphysema. 5. Moderate hiatal hernia. 6. Fatty liver.  Prior cholecystectomy. 7. Moderate stool burden. 8. Mild colonic diverticulosis.     XR CHEST (SINGLE VIEW FRONTAL)    Result Date: 1/25/2025  1. Worsening bibasilar atelectasis.     MRI BRAIN WO CONTRAST    Result Date: 1/24/2025  Acute posterior circulation infarcts including a right pontine infarct.     CT HEAD WO CONTRAST    Result Date: 1/24/2025  1. Evolving lacunar infarct within

## 2025-02-13 NOTE — PROGRESS NOTES
Endovascular Neurosurgery Progress Notes    Pt Name: Dahlia Zhang  MRN: [id number]  YOB: 1950  Date of evaluation: 1/31/2025  Primary Care Physician: Thania Rahman MD  Reason for evaluation: Concern for pontine stroke in the presence of previously treated basilar anneurysm with WEB device and LVIS stent    SUBJECTIVE:     NAEO from EVN perspective. Feeling much better today. Eating breakfast, no new complaints or focal neuro deficits.       History of Chief Complaint:    Dahlia Zhang is a 74 y.o. right handed female with a history of ruptured basilar tip aneurysm in October 2023 s/p stenting, hypertension, hyperlipidemia, type 2 diabetes, GERD,  atrial fibrillation not on AC presents to [facility] for altered mental status.     On the morning of 1/23/2025, patient was noted to have increased lethargy and generalized weakness along with left facial droop.  These were concerning as patient was at her baseline and doing better yesterday.  She does not recall the time when she went to sleep, thus last known well is unknown.  Potentially she went to sleep between 6-8 PM on 1/22.      Patient was seen and examined at bedside upon arrival to [facility] ED. She was increasingly lethargic and short of breath, however she was able to cross midline without gaze preference, had intact fluency and repetition but did have mild dysarthria. She had generalized weakness.         CT head w/o contrast shows no acute finding.      CTA head and neck show flow diverting stent along basilar artery and proximal PCA with embolization coils at basilar tip. Severe narrowing within the distal basilar stent. Small amount of residual aneurysmal filling at the neck, approximately 2 mm.        Allergies  is allergic to lisinopril, codeine, norco [hydrocodone-acetaminophen], and percocet [oxycodone-acetaminophen].  Medications  Prior to Admission medications    Medication Sig Start Date End Date Taking?  lb 4.7 oz)   SpO2 93%   BMI 31.21 kg/m²       Gen: No acute distress  MS: Awake, Oriented x3, No obvious aphasia  CN: Pupils reactive, no gaze deviation, mild left facial droop, tongue midline, no dysarthria  Motor: Moves all extremities antigravity but overall 4+/5 in RUE+RLE and 4/5 in LUE+LLE  Sens: Responds to LT in all extremities  Gait: Deferred      LABS:     Recent Labs     01/29/25  0409 01/29/25  1041 01/30/25  0610 01/31/25  0348   WBC  --  17.6* 14.7* 11.5*   HGB  --  8.7* 7.8* 8.5*   HCT  --  27.9* 24.4* 27.1*   PLT  --  293 325 316     --  140 142   K 3.6*  --  3.5* 3.6*     --  103 105   CO2 28  --  29 29   BUN 5*  --  4* 5*   CREATININE 0.5*  --  0.5* 0.5*   CALCIUM 8.0*  --  8.4* 8.4*     No results for input(s): \"ALKPHOS\", \"ALT\", \"AST\", \"BILITOT\", \"BILIDIR\", \"LABALBU\", \"AMYLASE\", \"LIPASE\" in the last 72 hours.    RADIOLOGY:   Images were personally reviewed including:  CT brain without contrast: Remote left occipital lobe and lacunar infarcts in the right basal ganglia and corona radiata  CTA/CTP imaging:       DSA 1/23/2025  Please see dictated Radiology note for further details  The basilar artery tip aneurysm, previously treated with a WEB intrasaccular device (10/11/2023) and a second-stage LVIS 4.5 x 23mm stent (6/2024) and a third-stage  LVIS 4.5 x 23mm stent spanning from the left P2 segment to the mid-basilar artery (1/15/2025) is completely obliterated.  The stents are patent and well apposed with a filling defect at the basilar tip that could represent partial thrombus versus metal artifact.   There is a 360 vascular loop at the origin of the left vertebral artery requiring the catheter to be in the arch while navigating the wire to access and straighten the loop up to the V3 segment before tracking the catheter   Cangrelor bolus 15 mcg/kg over 2 minutes followed by maintenance dose of 2 mcg/kg was given.                        IMPRESSIONS:   Dahlia Zhang, a 74-year-old  show

## 2025-02-13 NOTE — CARE COORDINATION
Spoke to Terry. Patient's  regarding placement . He tells me he will be here tomorrow and asks if I could discuss this with him when he arrives.

## 2025-02-13 NOTE — RT PROTOCOL NOTE
RT Inhaler-Nebulizer Bronchodilator Protocol Note    There is a bronchodilator order in the chart from a provider indicating to follow the RT Bronchodilator Protocol and there is an “Initiate RT Inhaler-Nebulizer Bronchodilator Protocol” order as well (see protocol at bottom of note).    CXR Findings:  XR CHEST PORTABLE    Result Date: 2/12/2025  1. Cardiomegaly with mild pulmonary vascular congestion. 2. Increased density involving the left lung base, likely atelectasis.       The findings from the last RT Protocol Assessment were as follows:   History Pulmonary Disease: Chronic pulmonary disease  Respiratory Pattern: Mild dyspnea at rest, irregular pattern, or RR 21-25 bpm  Breath Sounds: Inspiratory and expiratory or bilateral wheezing and/or rhonchi  Cough: Strong, spontaneous, non-productive  Indication for Bronchodilator Therapy: Wheezing associated with pulm disorder, Decreased or absent breath sounds  Bronchodilator Assessment Score: 12    Aerosolized bronchodilator medication orders have been revised according to the RT Inhaler-Nebulizer Bronchodilator Protocol below.    Respiratory Therapist to perform RT Therapy Protocol Assessment initially then follow the protocol.  Repeat RT Therapy Protocol Assessment PRN for score 0-3 or on second treatment, BID, and PRN for scores above 3.    No Indications - adjust the frequency to every 6 hours PRN wheezing or bronchospasm, if no treatments needed after 48 hours then discontinue using Per Protocol order mode.     If indication present, adjust the RT bronchodilator orders based on the Bronchodilator Assessment Score as indicated below.  Use Inhaler orders unless patient has one or more of the following: on home nebulizer, not able to hold breath for 10 seconds, is not alert and oriented, cannot activate and use MDI correctly, or respiratory rate 25 breaths per minute or more, then use the equivalent nebulizer order(s) with same Frequency and PRN reasons based on  the score.  If a patient is on this medication at home then do not decrease Frequency below that used at home.    0-3 - enter or revise RT bronchodilator order(s) to equivalent RT Bronchodilator order with Frequency of every 4 hours PRN for wheezing or increased work of breathing using Per Protocol order mode.        4-6 - enter or revise RT Bronchodilator order(s) to two equivalent RT bronchodilator orders with one order with BID Frequency and one order with Frequency of every 4 hours PRN wheezing or increased work of breathing using Per Protocol order mode.        7-10 - enter or revise RT Bronchodilator order(s) to two equivalent RT bronchodilator orders with one order with TID Frequency and one order with Frequency of every 4 hours PRN wheezing or increased work of breathing using Per Protocol order mode.       11-13 - enter or revise RT Bronchodilator order(s) to one equivalent RT bronchodilator order with QID Frequency and an Albuterol order with Frequency of every 4 hours PRN wheezing or increased work of breathing using Per Protocol order mode.      Greater than 13 - enter or revise RT Bronchodilator order(s) to one equivalent RT bronchodilator order with every 4 hours Frequency and an Albuterol order with Frequency of every 2 hours PRN wheezing or increased work of breathing using Per Protocol order mode.     RT to enter RT Home Evaluation for COPD & MDI Assessment order using Per Protocol order mode.    Electronically signed by Angy Reese RCP on 2/13/2025 at 5:15 AM

## 2025-02-14 ENCOUNTER — APPOINTMENT (OUTPATIENT)
Dept: GENERAL RADIOLOGY | Age: 75
DRG: 023 | End: 2025-02-14
Payer: MEDICARE

## 2025-02-14 LAB
ANION GAP SERPL CALCULATED.3IONS-SCNC: 11 MMOL/L (ref 9–16)
ANTI-XA UNFRAC HEPARIN: 0.33 IU/L
BASOPHILS # BLD: 0.13 K/UL (ref 0–0.2)
BASOPHILS NFR BLD: 1 % (ref 0–2)
BUN SERPL-MCNC: 9 MG/DL (ref 8–23)
CALCIUM SERPL-MCNC: 8.5 MG/DL (ref 8.6–10.4)
CHLORIDE SERPL-SCNC: 103 MMOL/L (ref 98–107)
CO2 SERPL-SCNC: 27 MMOL/L (ref 20–31)
CREAT SERPL-MCNC: 0.6 MG/DL (ref 0.6–0.9)
EOSINOPHIL # BLD: 1.01 K/UL (ref 0–0.44)
EOSINOPHILS RELATIVE PERCENT: 8 % (ref 1–4)
ERYTHROCYTE [DISTWIDTH] IN BLOOD BY AUTOMATED COUNT: 15 % (ref 11.8–14.4)
GFR, ESTIMATED: >90 ML/MIN/1.73M2
GLUCOSE BLD-MCNC: 163 MG/DL (ref 65–105)
GLUCOSE BLD-MCNC: 167 MG/DL (ref 65–105)
GLUCOSE BLD-MCNC: 175 MG/DL (ref 65–105)
GLUCOSE BLD-MCNC: 188 MG/DL (ref 65–105)
GLUCOSE BLD-MCNC: 189 MG/DL (ref 65–105)
GLUCOSE SERPL-MCNC: 172 MG/DL (ref 74–99)
HCT VFR BLD AUTO: 26.2 % (ref 36.3–47.1)
HGB BLD-MCNC: 8.2 G/DL (ref 11.9–15.1)
IMM GRANULOCYTES # BLD AUTO: 0.24 K/UL (ref 0–0.3)
IMM GRANULOCYTES NFR BLD: 2 %
LYMPHOCYTES NFR BLD: 2.85 K/UL (ref 1.1–3.7)
LYMPHOCYTES RELATIVE PERCENT: 24 % (ref 24–43)
MCH RBC QN AUTO: 26.9 PG (ref 25.2–33.5)
MCHC RBC AUTO-ENTMCNC: 31.3 G/DL (ref 28.4–34.8)
MCV RBC AUTO: 85.9 FL (ref 82.6–102.9)
MONOCYTES NFR BLD: 1.07 K/UL (ref 0.1–1.2)
MONOCYTES NFR BLD: 9 % (ref 3–12)
NEUTROPHILS NFR BLD: 56 % (ref 36–65)
NEUTS SEG NFR BLD: 6.79 K/UL (ref 1.5–8.1)
NRBC BLD-RTO: 0.6 PER 100 WBC
PLATELET # BLD AUTO: 363 K/UL (ref 138–453)
PMV BLD AUTO: 10.9 FL (ref 8.1–13.5)
POTASSIUM SERPL-SCNC: 3.6 MMOL/L (ref 3.7–5.3)
RBC # BLD AUTO: 3.05 M/UL (ref 3.95–5.11)
RBC # BLD: ABNORMAL 10*6/UL
SODIUM SERPL-SCNC: 141 MMOL/L (ref 136–145)
WBC OTHER # BLD: 12.1 K/UL (ref 3.5–11.3)

## 2025-02-14 PROCEDURE — 6360000002 HC RX W HCPCS

## 2025-02-14 PROCEDURE — 6370000000 HC RX 637 (ALT 250 FOR IP): Performed by: PSYCHIATRY & NEUROLOGY

## 2025-02-14 PROCEDURE — 2700000000 HC OXYGEN THERAPY PER DAY

## 2025-02-14 PROCEDURE — 6370000000 HC RX 637 (ALT 250 FOR IP): Performed by: INTERNAL MEDICINE

## 2025-02-14 PROCEDURE — 85025 COMPLETE CBC W/AUTO DIFF WBC: CPT

## 2025-02-14 PROCEDURE — 6370000000 HC RX 637 (ALT 250 FOR IP): Performed by: STUDENT IN AN ORGANIZED HEALTH CARE EDUCATION/TRAINING PROGRAM

## 2025-02-14 PROCEDURE — 6370000000 HC RX 637 (ALT 250 FOR IP): Performed by: NURSE PRACTITIONER

## 2025-02-14 PROCEDURE — 94640 AIRWAY INHALATION TREATMENT: CPT

## 2025-02-14 PROCEDURE — 6370000000 HC RX 637 (ALT 250 FOR IP)

## 2025-02-14 PROCEDURE — 2060000000 HC ICU INTERMEDIATE R&B

## 2025-02-14 PROCEDURE — 36415 COLL VENOUS BLD VENIPUNCTURE: CPT

## 2025-02-14 PROCEDURE — 97110 THERAPEUTIC EXERCISES: CPT

## 2025-02-14 PROCEDURE — 97530 THERAPEUTIC ACTIVITIES: CPT

## 2025-02-14 PROCEDURE — 6370000000 HC RX 637 (ALT 250 FOR IP): Performed by: REGISTERED NURSE

## 2025-02-14 PROCEDURE — 94761 N-INVAS EAR/PLS OXIMETRY MLT: CPT

## 2025-02-14 PROCEDURE — 2580000003 HC RX 258

## 2025-02-14 PROCEDURE — 80048 BASIC METABOLIC PNL TOTAL CA: CPT

## 2025-02-14 PROCEDURE — 99231 SBSQ HOSP IP/OBS SF/LOW 25: CPT | Performed by: INTERNAL MEDICINE

## 2025-02-14 PROCEDURE — 2500000003 HC RX 250 WO HCPCS: Performed by: STUDENT IN AN ORGANIZED HEALTH CARE EDUCATION/TRAINING PROGRAM

## 2025-02-14 PROCEDURE — 2500000003 HC RX 250 WO HCPCS: Performed by: INTERNAL MEDICINE

## 2025-02-14 PROCEDURE — 97168 OT RE-EVAL EST PLAN CARE: CPT

## 2025-02-14 PROCEDURE — 85520 HEPARIN ASSAY: CPT

## 2025-02-14 PROCEDURE — 97535 SELF CARE MNGMENT TRAINING: CPT

## 2025-02-14 PROCEDURE — 71045 X-RAY EXAM CHEST 1 VIEW: CPT

## 2025-02-14 PROCEDURE — 82947 ASSAY GLUCOSE BLOOD QUANT: CPT

## 2025-02-14 PROCEDURE — 99233 SBSQ HOSP IP/OBS HIGH 50: CPT | Performed by: PSYCHIATRY & NEUROLOGY

## 2025-02-14 RX ADMIN — SODIUM CHLORIDE, PRESERVATIVE FREE 10 ML: 5 INJECTION INTRAVENOUS at 08:06

## 2025-02-14 RX ADMIN — DILTIAZEM HYDROCHLORIDE 30 MG: 60 TABLET, FILM COATED ORAL at 06:30

## 2025-02-14 RX ADMIN — TICAGRELOR 90 MG: 90 TABLET ORAL at 20:46

## 2025-02-14 RX ADMIN — AMIODARONE HYDROCHLORIDE 200 MG: 200 TABLET ORAL at 20:44

## 2025-02-14 RX ADMIN — IPRATROPIUM BROMIDE AND ALBUTEROL SULFATE 1 DOSE: .5; 2.5 SOLUTION RESPIRATORY (INHALATION) at 22:24

## 2025-02-14 RX ADMIN — INSULIN LISPRO 1 UNITS: 100 INJECTION, SOLUTION INTRAVENOUS; SUBCUTANEOUS at 00:28

## 2025-02-14 RX ADMIN — AMIODARONE HYDROCHLORIDE 200 MG: 200 TABLET ORAL at 08:05

## 2025-02-14 RX ADMIN — SODIUM CHLORIDE, PRESERVATIVE FREE 10 ML: 5 INJECTION INTRAVENOUS at 21:20

## 2025-02-14 RX ADMIN — DILTIAZEM HYDROCHLORIDE 30 MG: 60 TABLET, FILM COATED ORAL at 00:23

## 2025-02-14 RX ADMIN — HEPARIN SODIUM 14 UNITS/KG/HR: 10000 INJECTION, SOLUTION INTRAVENOUS at 20:44

## 2025-02-14 RX ADMIN — TICAGRELOR 90 MG: 90 TABLET ORAL at 08:05

## 2025-02-14 RX ADMIN — HEPARIN SODIUM 14 UNITS/KG/HR: 10000 INJECTION, SOLUTION INTRAVENOUS at 01:32

## 2025-02-14 RX ADMIN — IPRATROPIUM BROMIDE AND ALBUTEROL SULFATE 1 DOSE: .5; 2.5 SOLUTION RESPIRATORY (INHALATION) at 11:49

## 2025-02-14 RX ADMIN — HYDRALAZINE HYDROCHLORIDE 10 MG: 20 INJECTION INTRAMUSCULAR; INTRAVENOUS at 12:05

## 2025-02-14 RX ADMIN — PANTOPRAZOLE SODIUM 40 MG: 40 TABLET, DELAYED RELEASE ORAL at 06:30

## 2025-02-14 RX ADMIN — PIPERACILLIN AND TAZOBACTAM 3375 MG: 3; .375 INJECTION, POWDER, LYOPHILIZED, FOR SOLUTION INTRAVENOUS at 16:19

## 2025-02-14 RX ADMIN — DILTIAZEM HYDROCHLORIDE 30 MG: 60 TABLET, FILM COATED ORAL at 12:05

## 2025-02-14 RX ADMIN — HYDROCHLOROTHIAZIDE 25 MG: 25 TABLET ORAL at 08:05

## 2025-02-14 RX ADMIN — PIPERACILLIN AND TAZOBACTAM 3375 MG: 3; .375 INJECTION, POWDER, LYOPHILIZED, FOR SOLUTION INTRAVENOUS at 10:44

## 2025-02-14 RX ADMIN — METOPROLOL TARTRATE 50 MG: 50 TABLET, FILM COATED ORAL at 20:45

## 2025-02-14 RX ADMIN — POTASSIUM BICARBONATE 40 MEQ: 782 TABLET, EFFERVESCENT ORAL at 08:07

## 2025-02-14 RX ADMIN — ESCITALOPRAM OXALATE 10 MG: 10 TABLET ORAL at 08:05

## 2025-02-14 RX ADMIN — ROSUVASTATIN CALCIUM 20 MG: 20 TABLET, FILM COATED ORAL at 20:45

## 2025-02-14 RX ADMIN — ASPIRIN 81 MG 81 MG: 81 TABLET ORAL at 08:06

## 2025-02-14 RX ADMIN — IPRATROPIUM BROMIDE AND ALBUTEROL SULFATE 1 DOSE: .5; 2.5 SOLUTION RESPIRATORY (INHALATION) at 09:39

## 2025-02-14 RX ADMIN — INSULIN LISPRO 1 UNITS: 100 INJECTION, SOLUTION INTRAVENOUS; SUBCUTANEOUS at 12:05

## 2025-02-14 RX ADMIN — TIOTROPIUM BROMIDE INHALATION SPRAY 2 PUFF: 3.12 SPRAY, METERED RESPIRATORY (INHALATION) at 09:39

## 2025-02-14 RX ADMIN — ACETAMINOPHEN 650 MG: 325 TABLET ORAL at 20:46

## 2025-02-14 RX ADMIN — SENNOSIDES AND DOCUSATE SODIUM 2 TABLET: 50; 8.6 TABLET ORAL at 08:05

## 2025-02-14 RX ADMIN — LOSARTAN POTASSIUM 100 MG: 50 TABLET, FILM COATED ORAL at 08:05

## 2025-02-14 RX ADMIN — HYDRALAZINE HYDROCHLORIDE 100 MG: 50 TABLET ORAL at 20:45

## 2025-02-14 RX ADMIN — METOPROLOL TARTRATE 50 MG: 50 TABLET, FILM COATED ORAL at 08:05

## 2025-02-14 RX ADMIN — IPRATROPIUM BROMIDE AND ALBUTEROL SULFATE 1 DOSE: .5; 2.5 SOLUTION RESPIRATORY (INHALATION) at 16:25

## 2025-02-14 RX ADMIN — DILTIAZEM HYDROCHLORIDE 30 MG: 60 TABLET, FILM COATED ORAL at 17:57

## 2025-02-14 RX ADMIN — PIPERACILLIN AND TAZOBACTAM 3375 MG: 3; .375 INJECTION, POWDER, LYOPHILIZED, FOR SOLUTION INTRAVENOUS at 00:31

## 2025-02-14 RX ADMIN — HYDRALAZINE HYDROCHLORIDE 100 MG: 50 TABLET ORAL at 08:05

## 2025-02-14 ASSESSMENT — PAIN SCALES - GENERAL
PAINLEVEL_OUTOF10: 0

## 2025-02-14 ASSESSMENT — PAIN SCALES - WONG BAKER
WONGBAKER_NUMERICALRESPONSE: NO HURT
WONGBAKER_NUMERICALRESPONSE: NO HURT

## 2025-02-14 ASSESSMENT — PAIN - FUNCTIONAL ASSESSMENT: PAIN_FUNCTIONAL_ASSESSMENT: ACTIVITIES ARE NOT PREVENTED

## 2025-02-14 NOTE — PROGRESS NOTES
Daily Progress Note  Neuro Critical Care    Patient Name: Dahlia Zhang  Patient : 1950  Room/Bed: 0133/0133-01  Code Status: FULL   Allergies:   Allergies   Allergen Reactions    Lisinopril Angioedema    Codeine Nausea And Vomiting    Norco [Hydrocodone-Acetaminophen] Nausea And Vomiting    Percocet [Oxycodone-Acetaminophen] Nausea And Vomiting       CHIEF COMPLAINT:       AMS, left facial droop      INTERVAL HISTORY    Initial Presentation (Admitted ):    Dahlia Zhang is a 74 y.o. 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  CT head with no acute intracranial abnormalities.  Remote infarcts in left occipital lobe, right basal ganglia and corona radiata.  CTA head and neck showed severe narrowing within the distal basilar stent.  Emergent DSA revealed stents are patent and well apposed with a filling defect at the basilar tip representing partial thrombus versus metal artifact.  She was treated with cannagrelor drip with improvement in neurologic status.     On , she developed rectal bleeding with the CTA indicating extravasation from sigmoid colon.,  Colonoscopy revealing bleeding diverticular disease.  Aspirin and Brilinta were held; GI evaluated and recommended embolization or surgical eval if bleeding recurs.  On , GI signed off and no evidence of recurrent bleeding.  Patient's hospital course complicated by ESBL UTI s/p 7 day course of meropenem through . Patient was gradually restarted on aspirin monotherapy, and then transitioned to Eliquis monotherapy. Patient has had fluctuating episodes of lethargy and slurred speech since being transferred to   Showed mild colonic diverticulosis and cardiomegaly.  Chest x-ray on 2/8 showed cardiomegaly with bibasilar infiltrates.    RENAL/FLUID/ELECTROLYTE:  - BUN / Creatinine: 9/0.6  - IVF:   - Replace electrolytes PRN  - Daily BMP    GI/NUTRITION:  NUTRITION:  Diet NPO  ADULT TUBE FEEDING; Nasogastric; Standard with Fiber; Continuous; 10; Yes; 10; Q 6 hours; 50; 30; Q 6 hours  - Bowel regimen: GlycoLax as needed  - GI prophylaxis: Protonix 40 mg daily    Acute lower GI bleed (resolved)  S/p colonoscopy on 1/26.  Found diverticular disease.  Maintain hemoglobin greater than 7 per GI.  Continue Senokot  Protonix 40 mg    ID:    ESBL E. coli UTI  S/p meropenem course completed on 1/31/2025 for E. coli.  ID consult  Tmax 98.7 °F, WBC 12.1 (trending down)    HEME:     Acute lower GI bleeding (resolved)  Monitor H&H  Monitor with daily labs.       ENDOCRINE:  - Continue to monitor blood glucose, goal <180      OTHER:  - PT/OT/ST   - Code Status: FULL     PROPHYLAXIS:  Stress ulcer: PPI    DVT PROPHYLAXIS:  - Heparin Drip     DISPOSITION:  [x] To remain ICU:   [] OK for out of ICU from Neuro Critical Care standpoint    We will continue to follow along.     For any changes in exam or patient status please contact Neuro Critical Care.      Carmelo Montalvo DO  Neuro Critical Care  2/14/2025     1:26 PM

## 2025-02-14 NOTE — CARE COORDINATION
Dx: Encephalopathy [G93.40]  Left-sided weakness [R53.1]  Thalamic stroke (HCC) [I63.81]    Admit date: 1/23/2025  GMLOS :  LOS :    [facility] RISK OF UNPLANNED READMISSION 2.0             19.8 Total Score        ______________________________________      Patients goal/ Transitional Planning : Spoke to patient's  regarding placement options. Discussed options for SNF vs. LTAC and provided him a list of both facilities. Terry stated that the patient is planning on getting a PEG tube placed soon. He wishes to take the lists home and speak to his kids on possible placment options. He stated he should have choices by Monday.     1620 Spoke to patient's family including her  and daughter and she requested a referral be sent to [facility] for possible LTAC placement. Referral sent, informed Mi of referral.       PT/OT recommendations : SNF        Barriers to discharge : Need accepting facility. Family to provide choices on Monday.                                Post Acute Facility/Agency List     Provided spouse with the following list, the list includes the overall star ratings obtained from CMS per the Medicare Web site (www.Medicare.gov):     [x] Long Term Acute Care Facilities  [] Acute Inpatient Rehabilitation Facilities  [x] Skilled Nursing Facilities  [] Hospice Facilities  [] Home Care    Provided verbal instructions on how to utilize the QR Code to obtain additional detailed star ratings from www.Medicare.gov     offered to print and provide the detailed list:    []Accepted   [x]Declined

## 2025-02-14 NOTE — PLAN OF CARE
Problem: Respiratory - Adult  Goal: Achieves optimal ventilation and oxygenation  2/13/2025 1953 by Sherrill Cotton RCP  Outcome: Progressing

## 2025-02-14 NOTE — PLAN OF CARE
Problem: Chronic Conditions and Co-morbidities  Goal: Patient's chronic conditions and co-morbidity symptoms are monitored and maintained or improved  Outcome: Progressing  Flowsheets (Taken 2/13/2025 1205 by Lidia Birmingham RN)  Care Plan - Patient's Chronic Conditions and Co-Morbidity Symptoms are Monitored and Maintained or Improved: Monitor and assess patient's chronic conditions and comorbid symptoms for stability, deterioration, or improvement     Problem: Discharge Planning  Goal: Discharge to home or other facility with appropriate resources  Outcome: Progressing  Flowsheets (Taken 2/13/2025 1205 by Lidia Birmingham RN)  Discharge to home or other facility with appropriate resources: Identify barriers to discharge with patient and caregiver     Problem: Pain  Goal: Verbalizes/displays adequate comfort level or baseline comfort level  Outcome: Progressing     Problem: Safety - Adult  Goal: Free from fall injury  Outcome: Progressing     Problem: Respiratory - Adult  Goal: Achieves optimal ventilation and oxygenation  2/13/2025 2202 by Alicia Louis RN  Outcome: Progressing  2/13/2025 1953 by Sherrill Cotton RCP  Outcome: Progressing  Flowsheets (Taken 2/13/2025 1205 by Lidia Birmingham RN)  Achieves optimal ventilation and oxygenation: Assess for changes in respiratory status     Problem: Neurosensory - Adult  Goal: Achieves stable or improved neurological status  Outcome: Progressing  Flowsheets (Taken 2/13/2025 1205 by Lidia Birmingham RN)  Achieves stable or improved neurological status: Assess for and report changes in neurological status  Goal: Absence of seizures  Outcome: Progressing  Flowsheets (Taken 2/13/2025 1205 by Lidia Birmingham RN)  Absence of seizures: Monitor for seizure activity.  If seizure occurs, document type and location of movements and any associated apnea  Goal: Remains free of injury related to seizures activity  Outcome: Progressing  Flowsheets (Taken  Progressing  Goal: Return ADL status to a safe level of function  Outcome: Progressing  Flowsheets (Taken 2/13/2025 1205 by Lidia Birmingham, RN)  Return ADL Status to a Safe Level of Function: Obtain physical therapy/occupational therapy consults as needed     Problem: Genitourinary - Adult  Goal: Absence of urinary retention  Outcome: Progressing  Flowsheets (Taken 2/13/2025 1205 by Lidia Birmingham, RN)  Absence of urinary retention: Assess patient’s ability to void and empty bladder     Problem: Metabolic/Fluid and Electrolytes - Adult  Goal: Electrolytes maintained within normal limits  Outcome: Progressing  Flowsheets (Taken 2/13/2025 1205 by Lidia Birmingham, RN)  Electrolytes maintained within normal limits: Monitor labs and assess patient for signs and symptoms of electrolyte imbalances  Goal: Hemodynamic stability and optimal renal function maintained  Outcome: Progressing  Flowsheets (Taken 2/13/2025 1205 by Lidia Birmingham RN)  Hemodynamic stability and optimal renal function maintained:   Monitor labs and assess for signs and symptoms of volume excess or deficit   Monitor intake, output and patient weight  Goal: Glucose maintained within prescribed range  Outcome: Progressing  Flowsheets (Taken 2/13/2025 1205 by Lidia Birmingham RN)  Glucose maintained within prescribed range: Monitor blood glucose as ordered     Problem: Hematologic - Adult  Goal: Maintains hematologic stability  Outcome: Progressing  Flowsheets (Taken 2/13/2025 1205 by Lidia Birmingham, RN)  Maintains hematologic stability: Assess for signs and symptoms of bleeding or hemorrhage     Problem: Nutrition Deficit:  Goal: Optimize nutritional status  Outcome: Progressing     Problem: Skin/Tissue Integrity  Goal: Skin integrity remains intact  Description: 1.  Monitor for areas of redness and/or skin breakdown  2.  Assess vascular access sites hourly  3.  Every 4-6 hours minimum:  Change oxygen saturation probe site  4.   Every 4-6 hours:  If on nasal continuous positive airway pressure, respiratory therapy assess nares and determine need for appliance change or resting period  Outcome: Progressing  Flowsheets (Taken 2/13/2025 1205 by Lidia Birmingham, RN)  Skin Integrity Remains Intact: Monitor for areas of redness and/or skin breakdown     Problem: Confusion  Goal: Confusion, delirium, dementia, or psychosis is improved or at baseline  Description: INTERVENTIONS:  1. Assess for possible contributors to thought disturbance, including medications, impaired vision or hearing, underlying metabolic abnormalities, dehydration, psychiatric diagnoses, and notify attending LIP  2. [place] high risk fall precautions, as indicated  3. Provide frequent short contacts to provide reality reorientation, refocusing and direction  4. Decrease environmental stimuli, including noise as appropriate  5. Monitor and intervene to maintain adequate nutrition, hydration, elimination, sleep and activity  6. If unable to ensure safety without constant attention obtain sitter and review sitter guidelines with assigned personnel  7. Initiate Psychosocial CNS and Spiritual Care consult, as indicated  Outcome: Progressing  Flowsheets (Taken 2/13/2025 1205 by Lidia Birmingham, RN)  Effect of thought disturbance (confusion, delirium, dementia, or psychosis) are managed with adequate functional status: Assess for contributors to thought disturbance, including medications, impaired vision or hearing, underlying metabolic abnormalities, dehydration, psychiatric diagnoses, notify LIP

## 2025-02-14 NOTE — PROGRESS NOTES
[facility]   Gastroenterology progress note    Dahlia Zhang is a 74 y.o. female patient.  Hospitalization Day:      Chief consult reason:   2025-severe abdominal pain with large bloody bowel movement  2025 reconsult for PEG tube    Subjective:  Patient seen and examined.  No change in patient overnight  Patient is tolerating tube feed via NG tube unable to work with SLP at this time due to mental status  Awaiting family decision regarding PEG  VITALS:  BP (!) 167/72   Pulse 66   Temp 99.7 °F (37.6 °C) (Axillary)   Resp 23   Ht 1.702 m (5' 7\")   Wt 87.8 kg (193 lb 8 oz)   SpO2 100%   BMI 30.31 kg/m²   TEMPERATURE:  Current - Temp: 99.7 °F (37.6 °C); Max - Temp  Av.1 °F (37.3 °C)  Min: 98.6 °F (37 °C)  Max: 99.7 °F (37.6 °C)    Physical Assessment:  General appearance: Sleeping  Mental Status: JESSICA  Lungs:  clear to auscultation bilaterally, normal effort  Heart:  regular rate and rhythm, no murmur  Abdomen:  soft, nontender, nondistended, normal bowel sounds, no masses, hepatomegaly, splenomegaly  Extremities:  no edema, redness, tenderness in the calves  Skin:  no gross lesions, rashes, induration    Data Review:    Labs and Imaging:       CBC:  Recent Labs     25  1347 25  0333 25  1423 25  0340 25  0304   WBC  --  13.3*  --  13.7* 12.1*   HGB 8.8* 9.0* 8.5* 8.4* 8.2*   MCV  --  85.1  --  87.0 85.9   RDW  --  15.0*  --  14.9* 15.0*   PLT  --  423  --  426 363       ANEMIA STUDIES:  No results for input(s): \"TIBC\", \"FERRITIN\", \"[id number]\", \"FOLATE\", \"OCCULTBLD\" in the last 72 hours.    Invalid input(s): \"LABIRON\"    BMP:  Recent Labs     25  0333 25  0340 25  0304    140 141   K 3.5* 3.4* 3.6*    103 103   CO2 25 26 27   BUN 5* 7* 9   CREATININE 0.5* 0.6 0.6   GLUCOSE 178* 185* 172*   CALCIUM 8.4* 8.7 8.5*   MG 2.1  --   --        LFTS:  No results for input(s): \"ALKPHOS\", \"ALT\", \"AST\", \"BILITOT\", \"BILIDIR\",  his tube feeding with no residual per NG tube  Mental status slightly improved but still significantly impaired  Patient seems to recognize people but not able to communicate  Sister said she and his children will have a meeting about the long-term management for him with the primary care team  I told her we will watch over the weekend and monitor progress  Speech is also following  Decision on the PEG right now is premature until we figure out this patient's long-term prognosis and swallow improvement versus not    Electronically signed by Jesus Iqbal MD

## 2025-02-14 NOTE — PROGRESS NOTES
[facility]  Speech Language Pathology    Date: 2/14/2025  Patient Name: Dahlia Zhang  YOB: 1950   AGE: 74 y.o.  MRN: [id number]        Patient Not Available for Speech Therapy     Due to:  [] Testing  [] Hemodialysis  [] Cancelled by RN  [] Surgery   [] Intubation/Sedation/Pain Medication  [] Medical instability  [x] Other:  Not maintaining awake and alert for BSSE or skilled ST at this time.    Next scheduled treatment: as appropriate  Completed by: Raquel Mooney, SLP, M.S. CCC-SLP

## 2025-02-14 NOTE — PROGRESS NOTES
02/14/25 0939   Care Plan - Respiratory Goals   Achieves optimal ventilation and oxygenation Assess for changes in respiratory status;Assess for changes in mentation and behavior;Position to facilitate oxygenation and minimize respiratory effort;Respiratory therapy support as indicated;Assess and instruct to report shortness of breath or any respiratory difficulty;Assess the need for suctioning and aspirate as needed;Oxygen supplementation based on oxygen saturation or arterial blood gases

## 2025-02-14 NOTE — PROGRESS NOTES
Occupational Therapy Re-Evaluation  Facility/Department: [facility] 1C STEPDOWN   Patient Name: Dahlia Zhang        MRN: [id number]    : 1950    Date of Service: 2025    Chief Complaint   Patient presents with    Extremity Weakness     Past Medical History:  has a past medical history of Anticoagulated, Anxiety and depression, Atrial fibrillation ([facility]), Cerebral aneurysm, Cerebral artery occlusion with cerebral infarction ([facility]), Colon polyps, COPD (chronic obstructive pulmonary disease) ([facility]), COVID-19, COVID-19 vaccine administered, DM II (diabetes mellitus, type II), controlled ([facility]), GERD (gastroesophageal reflux disease), HTN (hypertension), Hyperlipidemia, Kidney stone, On home O2, Osteoarthritis, Sleep apnea, Under care of team, Under care of team, Under care of team, Under care of team, Wears glasses, Wears partial dentures, and Wellness examination.  Past Surgical History:  has a past surgical history that includes Thyroid surgery; Breast reduction surgery (Bilateral, ); Hysterectomy; hernia repair; Cerebral angiogram (2024); Cerebral angiogram (10/11/2023); Cerebral angiogram (2024); Cholecystectomy; Cerebral angiogram (2024); Breast biopsy (Right); Cataract extraction w/ intraocular lens implant (Bilateral, ); Colonoscopy w/ polypectomy (2023); other surgical history (01/15/2025); Colonoscopy (N/A, 2025); and Colonoscopy (N/A, 2025).    Discharge Recommendations  Discharge Recommendations: Patient would benefit from continued therapy after discharge. Continue to assess pending progress  OT Equipment Recommendations  Equipment Needed: No    Assessment  Performance deficits / Impairments: Decreased functional mobility ;Decreased cognition;Decreased high-level IADLs;Decreased ADL status;Decreased endurance;Decreased ROM;Decreased fine motor control;Decreased coordination;Decreased strength;Decreased balance;Decreased posture;Decreased safe

## 2025-02-14 NOTE — PLAN OF CARE
Problem: Chronic Conditions and Co-morbidities  Goal: Patient's chronic conditions and co-morbidity symptoms are monitored and maintained or improved  2/14/2025 1726 by Judy Watkins RN  Outcome: Progressing  2/14/2025 0554 by Jackie Pagan RN  Outcome: Progressing     Problem: Discharge Planning  Goal: Discharge to home or other facility with appropriate resources  2/14/2025 1726 by Judy Watkins RN  Outcome: Progressing  2/14/2025 0554 by Jackie Pagan RN  Outcome: Progressing     Problem: Pain  Goal: Verbalizes/displays adequate comfort level or baseline comfort level  2/14/2025 1726 by Judy Watkins RN  Outcome: Progressing  2/14/2025 0554 by Jackie Pagan RN  Outcome: Progressing     Problem: Safety - Adult  Goal: Free from fall injury  2/14/2025 1726 by Judy Watkins RN  Outcome: Progressing  2/14/2025 0554 by Jackie Pagan RN  Outcome: Progressing     Problem: ABCDS Injury Assessment  Goal: Absence of physical injury  2/14/2025 1726 by Judy Watkins RN  Outcome: Progressing  2/14/2025 0554 by Jackie Pagan RN  Outcome: Progressing     Problem: Respiratory - Adult  Goal: Achieves optimal ventilation and oxygenation  2/14/2025 1726 by Judy Watkins RN  Outcome: Progressing  Flowsheets (Taken 2/14/2025 0939 by Gricelda Gupta, [facility])  Achieves optimal ventilation and oxygenation:   Assess for changes in respiratory status   Assess for changes in mentation and behavior   Position to facilitate oxygenation and minimize respiratory effort   Respiratory therapy support as indicated   Assess and instruct to report shortness of breath or any respiratory difficulty   Assess the need for suctioning and aspirate as needed   Oxygen supplementation based on oxygen saturation or arterial blood gases  2/14/2025 0554 by Jackie Pagan RN  Outcome: Progressing     Problem: Neurosensory - Adult  Goal: Achieves stable or improved neurological status  2/14/2025 1726 by Judy Watkins RN  Outcome:  If unable to ensure safety without constant attention obtain sitter and review sitter guidelines with assigned personnel  7. Initiate Psychosocial CNS and Spiritual Care consult, as indicated  2/14/2025 1726 by Judy Watkins, RN  Outcome: Progressing  2/14/2025 0554 by Jackie Pagan RN  Outcome: Progressing

## 2025-02-14 NOTE — PROGRESS NOTES
Physical Therapy  Facility/Department: [address] STEPDOWN   Physical Therapy Daily Treatment Note    Patient Name: Dahlia Zhang        MRN: [id number]    : 1950    Date of Service: 2025    Chief Complaint   Patient presents with    Extremity Weakness     Past Medical History:  has a past medical history of Anticoagulated, Anxiety and depression, Atrial fibrillation (HCC), Cerebral aneurysm, Cerebral artery occlusion with cerebral infarction (HCC), Colon polyps, COPD (chronic obstructive pulmonary disease) ([facility]), COVID-19, COVID-19 vaccine administered, DM II (diabetes mellitus, type II), controlled (HCC), GERD (gastroesophageal reflux disease), HTN (hypertension), Hyperlipidemia, Kidney stone, On home O2, Osteoarthritis, Sleep apnea, Under care of team, Under care of team, Under care of team, Under care of team, Wears glasses, Wears partial dentures, and Wellness examination.  Past Surgical History:  has a past surgical history that includes Thyroid surgery; Breast reduction surgery (Bilateral, ); Hysterectomy; hernia repair; Cerebral angiogram (2024); Cerebral angiogram (10/11/2023); Cerebral angiogram (2024); Cholecystectomy; Cerebral angiogram (2024); Breast biopsy (Right); Cataract extraction w/ intraocular lens implant (Bilateral, ); Colonoscopy w/ polypectomy (2023); other surgical history (01/15/2025); Colonoscopy (N/A, 2025); and Colonoscopy (N/A, 2025).    Discharge Recommendations  Discharge Recommendations: Patient would benefit from continued therapy after discharge  PT Equipment Recommendations  Equipment Needed: No  Other: Pt currently requires extensive assistance to mobilize.    Assessment  Body Structures, Functions, Activity Limitations Requiring Skilled Therapeutic Intervention: Decreased functional mobility ;Decreased strength;Decreased safe awareness;Decreased endurance;Decreased balance  Assessment: Pt required dependent x 2 to perform

## 2025-02-14 NOTE — PLAN OF CARE
Problem: Chronic Conditions and Co-morbidities  Goal: Patient's chronic conditions and co-morbidity symptoms are monitored and maintained or improved  2/14/2025 0554 by Jackie Pagan RN  Outcome: Progressing  2/13/2025 2202 by Alicia Louis RN  Outcome: Progressing  Flowsheets (Taken 2/13/2025 1205 by Lidia Birmingham, RN)  Care Plan - Patient's Chronic Conditions and Co-Morbidity Symptoms are Monitored and Maintained or Improved: Monitor and assess patient's chronic conditions and comorbid symptoms for stability, deterioration, or improvement     Problem: Discharge Planning  Goal: Discharge to home or other facility with appropriate resources  2/14/2025 0554 by Jackie Pagan RN  Outcome: Progressing  2/13/2025 2202 by Alicia Louis RN  Outcome: Progressing  Flowsheets (Taken 2/13/2025 1205 by Lidia Birmingham, RN)  Discharge to home or other facility with appropriate resources: Identify barriers to discharge with patient and caregiver     Problem: Pain  Goal: Verbalizes/displays adequate comfort level or baseline comfort level  2/14/2025 0554 by Jackie Pagan RN  Outcome: Progressing  2/13/2025 2202 by Alicia Louis RN  Outcome: Progressing     Problem: Safety - Adult  Goal: Free from fall injury  2/14/2025 0554 by Jackie Pagan RN  Outcome: Progressing  2/13/2025 2202 by Alicia Louis RN  Outcome: Progressing     Problem: ABCDS Injury Assessment  Goal: Absence of physical injury  2/14/2025 0554 by Jackie Pagan RN  Outcome: Progressing  2/13/2025 2202 by Alicia Louis RN  Outcome: Progressing     Problem: Respiratory - Adult  Goal: Achieves optimal ventilation and oxygenation  2/14/2025 0554 by Jackie Pagan RN  Outcome: Progressing  2/13/2025 2202 by Alicia Louis RN  Outcome: Progressing  2/13/2025 1953 by Sherrill Cotton RCP  Outcome: Progressing  Flowsheets (Taken 2/13/2025 1205 by Lidia Birmingham, RN)  Achieves optimal ventilation and oxygenation: Assess for changes in respiratory  Progressing  2/13/2025 2202 by Alicia Louis RN  Outcome: Progressing  Flowsheets (Taken 2/13/2025 1205 by Lidia Birmingham RN)  Absence of cardiac dysrhythmias or at baseline: Monitor cardiac rate and rhythm     Problem: Gastrointestinal - Adult  Goal: Maintains or returns to baseline bowel function  2/14/2025 0554 by Jackie Pagan RN  Outcome: Progressing  2/13/2025 2202 by Alicia Louis RN  Outcome: Progressing  Flowsheets (Taken 2/13/2025 1205 by Lidia Birmingham RN)  Maintains or returns to baseline bowel function: Assess bowel function  Goal: Maintains adequate nutritional intake  2/14/2025 0554 by Jackie Pagan RN  Outcome: Progressing  2/13/2025 2202 by Alicia Louis RN  Outcome: Progressing  Flowsheets (Taken 2/13/2025 1205 by Lidia Birmingham RN)  Maintains adequate nutritional intake: Monitor intake and output, weight and lab values  Goal: Minimal or absence of nausea and vomiting  2/14/2025 0554 by Jackie Pagan RN  Outcome: Progressing  2/13/2025 2202 by Alicia Louis RN  Outcome: Progressing  Flowsheets (Taken 2/13/2025 1205 by Lidia Birmingham RN)  Minimal or absence of nausea and vomiting: Administer IV fluids as ordered to ensure adequate hydration     Problem: Infection - Adult  Goal: Absence of infection at discharge  2/14/2025 0554 by Jackie Pagan RN  Outcome: Progressing  2/13/2025 2202 by Alicia Louis RN  Outcome: Progressing  Flowsheets (Taken 2/13/2025 1205 by Lidia Birmingham RN)  Absence of infection at discharge:   Assess and monitor for signs and symptoms of infection   Monitor lab/diagnostic results  Goal: Absence of infection during hospitalization  2/14/2025 0554 by Jackie Pagan RN  Outcome: Progressing  2/13/2025 2202 by Alicia Louis RN  Outcome: Progressing  Flowsheets (Taken 2/13/2025 1205 by Lidia Birmingham RN)  Absence of infection during hospitalization:   Assess and monitor for signs and symptoms of infection   Monitor lab/diagnostic  (Taken 2/13/2025 1205 by Lidia Birmingham RN)  Return Mobility to Safest Level of Function: Obtain physical therapy/occupational therapy consults as needed  Goal: Maintain proper alignment of affected body part  2/14/2025 0554 by Jackie Pagan RN  Outcome: Progressing  2/13/2025 2202 by Alicia Louis RN  Outcome: Progressing  Goal: Return ADL status to a safe level of function  2/14/2025 0554 by Jackie Pagan RN  Outcome: Progressing  2/13/2025 2202 by Alicia Louis RN  Outcome: Progressing  Flowsheets (Taken 2/13/2025 1205 by Lidia Birmingham RN)  Return ADL Status to a Safe Level of Function: Obtain physical therapy/occupational therapy consults as needed     Problem: Genitourinary - Adult  Goal: Absence of urinary retention  2/14/2025 0554 by Jackie Pagan RN  Outcome: Progressing  2/13/2025 2202 by Alicai Louis RN  Outcome: Progressing  Flowsheets (Taken 2/13/2025 1205 by Lidia Birmingham RN)  Absence of urinary retention: Assess patient’s ability to void and empty bladder     Problem: Metabolic/Fluid and Electrolytes - Adult  Goal: Electrolytes maintained within normal limits  2/14/2025 0554 by Jackie Pagan RN  Outcome: Progressing  2/13/2025 2202 by Alicia Louis RN  Outcome: Progressing  Flowsheets (Taken 2/13/2025 1205 by Lidia Birmingham RN)  Electrolytes maintained within normal limits: Monitor labs and assess patient for signs and symptoms of electrolyte imbalances  Goal: Hemodynamic stability and optimal renal function maintained  2/14/2025 0554 by Jackie Pagan RN  Outcome: Progressing  2/13/2025 2202 by Alicia Louis RN  Outcome: Progressing  Flowsheets (Taken 2/13/2025 1205 by Lidia Birmingham RN)  Hemodynamic stability and optimal renal function maintained:   Monitor labs and assess for signs and symptoms of volume excess or deficit   Monitor intake, output and patient weight  Goal: Glucose maintained within prescribed range  2/14/2025 0554 by Jackie Pagan

## 2025-02-15 ENCOUNTER — HOSPITAL ENCOUNTER (OUTPATIENT)
Dept: MEDSURG UNIT | Age: 75
Discharge: HOME | End: 2025-02-15
Attending: INTERNAL MEDICINE | Admitting: INTERNAL MEDICINE

## 2025-02-15 VITALS
OXYGEN SATURATION: 94 % | WEIGHT: 193.5 LBS | HEART RATE: 82 BPM | RESPIRATION RATE: 24 BRPM | SYSTOLIC BLOOD PRESSURE: 133 MMHG | BODY MASS INDEX: 30.37 KG/M2 | DIASTOLIC BLOOD PRESSURE: 62 MMHG | TEMPERATURE: 97.8 F | HEIGHT: 67 IN

## 2025-02-15 LAB
ANION GAP SERPL CALCULATED.3IONS-SCNC: 11 MMOL/L (ref 9–16)
ANTI-XA UNFRAC HEPARIN: 0.4 IU/L
BASOPHILS # BLD: 0.14 K/UL (ref 0–0.2)
BASOPHILS NFR BLD: 1 % (ref 0–2)
BUN SERPL-MCNC: 12 MG/DL (ref 8–23)
CALCIUM SERPL-MCNC: 9.2 MG/DL (ref 8.6–10.4)
CHLORIDE SERPL-SCNC: 101 MMOL/L (ref 98–107)
CO2 SERPL-SCNC: 28 MMOL/L (ref 20–31)
CREAT SERPL-MCNC: 0.7 MG/DL (ref 0.6–0.9)
EOSINOPHIL # BLD: 1.32 K/UL (ref 0–0.44)
EOSINOPHILS RELATIVE PERCENT: 8 % (ref 1–4)
ERYTHROCYTE [DISTWIDTH] IN BLOOD BY AUTOMATED COUNT: 15.1 % (ref 11.8–14.4)
GFR, ESTIMATED: >90 ML/MIN/1.73M2
GLUCOSE BLD-MCNC: 163 MG/DL (ref 65–105)
GLUCOSE BLD-MCNC: 183 MG/DL (ref 65–105)
GLUCOSE BLD-MCNC: 184 MG/DL (ref 65–105)
GLUCOSE BLD-MCNC: 193 MG/DL (ref 65–105)
GLUCOSE SERPL-MCNC: 187 MG/DL (ref 74–99)
HCT VFR BLD AUTO: 27.6 % (ref 36.3–47.1)
HGB BLD-MCNC: 8.6 G/DL (ref 11.9–15.1)
IMM GRANULOCYTES # BLD AUTO: 0.24 K/UL (ref 0–0.3)
IMM GRANULOCYTES NFR BLD: 2 %
LYMPHOCYTES NFR BLD: 3.28 K/UL (ref 1.1–3.7)
LYMPHOCYTES RELATIVE PERCENT: 20 % (ref 24–43)
MCH RBC QN AUTO: 26.9 PG (ref 25.2–33.5)
MCHC RBC AUTO-ENTMCNC: 31.2 G/DL (ref 28.4–34.8)
MCV RBC AUTO: 86.3 FL (ref 82.6–102.9)
MONOCYTES NFR BLD: 1.48 K/UL (ref 0.1–1.2)
MONOCYTES NFR BLD: 9 % (ref 3–12)
NEUTROPHILS NFR BLD: 60 % (ref 36–65)
NEUTS SEG NFR BLD: 9.84 K/UL (ref 1.5–8.1)
NRBC BLD-RTO: 0.4 PER 100 WBC
PLATELET # BLD AUTO: 383 K/UL (ref 138–453)
PMV BLD AUTO: 11.3 FL (ref 8.1–13.5)
POTASSIUM SERPL-SCNC: 3.9 MMOL/L (ref 3.7–5.3)
RBC # BLD AUTO: 3.2 M/UL (ref 3.95–5.11)
RBC # BLD: ABNORMAL 10*6/UL
SODIUM SERPL-SCNC: 140 MMOL/L (ref 136–145)
WBC OTHER # BLD: 16.3 K/UL (ref 3.5–11.3)

## 2025-02-15 PROCEDURE — 82947 ASSAY GLUCOSE BLOOD QUANT: CPT

## 2025-02-15 PROCEDURE — 6370000000 HC RX 637 (ALT 250 FOR IP)

## 2025-02-15 PROCEDURE — 94761 N-INVAS EAR/PLS OXIMETRY MLT: CPT

## 2025-02-15 PROCEDURE — 99233 SBSQ HOSP IP/OBS HIGH 50: CPT | Performed by: PSYCHIATRY & NEUROLOGY

## 2025-02-15 PROCEDURE — 85520 HEPARIN ASSAY: CPT

## 2025-02-15 PROCEDURE — 99231 SBSQ HOSP IP/OBS SF/LOW 25: CPT | Performed by: INTERNAL MEDICINE

## 2025-02-15 PROCEDURE — 6370000000 HC RX 637 (ALT 250 FOR IP): Performed by: REGISTERED NURSE

## 2025-02-15 PROCEDURE — 6370000000 HC RX 637 (ALT 250 FOR IP): Performed by: NURSE PRACTITIONER

## 2025-02-15 PROCEDURE — 2500000003 HC RX 250 WO HCPCS: Performed by: STUDENT IN AN ORGANIZED HEALTH CARE EDUCATION/TRAINING PROGRAM

## 2025-02-15 PROCEDURE — 80048 BASIC METABOLIC PNL TOTAL CA: CPT

## 2025-02-15 PROCEDURE — 36415 COLL VENOUS BLD VENIPUNCTURE: CPT

## 2025-02-15 PROCEDURE — 6370000000 HC RX 637 (ALT 250 FOR IP): Performed by: INTERNAL MEDICINE

## 2025-02-15 PROCEDURE — 2580000003 HC RX 258

## 2025-02-15 PROCEDURE — 6360000002 HC RX W HCPCS

## 2025-02-15 PROCEDURE — 6370000000 HC RX 637 (ALT 250 FOR IP): Performed by: PSYCHIATRY & NEUROLOGY

## 2025-02-15 PROCEDURE — 92507 TX SP LANG VOICE COMM INDIV: CPT

## 2025-02-15 PROCEDURE — 6370000000 HC RX 637 (ALT 250 FOR IP): Performed by: STUDENT IN AN ORGANIZED HEALTH CARE EDUCATION/TRAINING PROGRAM

## 2025-02-15 PROCEDURE — 94640 AIRWAY INHALATION TREATMENT: CPT

## 2025-02-15 PROCEDURE — 85025 COMPLETE CBC W/AUTO DIFF WBC: CPT

## 2025-02-15 RX ORDER — CLOPIDOGREL BISULFATE 75 MG/1
75 TABLET ORAL DAILY
DISCHARGE
Start: 2025-02-16

## 2025-02-15 RX ORDER — ONDANSETRON 2 MG/ML
4 INJECTION INTRAMUSCULAR; INTRAVENOUS EVERY 6 HOURS PRN
DISCHARGE
Start: 2025-02-15

## 2025-02-15 RX ORDER — ONDANSETRON 4 MG/1
4 TABLET, ORALLY DISINTEGRATING ORAL EVERY 8 HOURS PRN
DISCHARGE
Start: 2025-02-15

## 2025-02-15 RX ORDER — HYDROCHLOROTHIAZIDE 25 MG/1
25 TABLET ORAL DAILY
DISCHARGE
Start: 2025-02-16

## 2025-02-15 RX ORDER — DILTIAZEM HYDROCHLORIDE 30 MG/1
30 TABLET, FILM COATED ORAL 4 TIMES DAILY
DISCHARGE
Start: 2025-02-15

## 2025-02-15 RX ORDER — AMIODARONE HYDROCHLORIDE 200 MG/1
200 TABLET ORAL 2 TIMES DAILY
DISCHARGE
Start: 2025-02-15

## 2025-02-15 RX ORDER — ROSUVASTATIN CALCIUM 20 MG/1
20 TABLET, COATED ORAL NIGHTLY
DISCHARGE
Start: 2025-02-15

## 2025-02-15 RX ORDER — METOPROLOL TARTRATE 50 MG
50 TABLET ORAL 2 TIMES DAILY
DISCHARGE
Start: 2025-02-15

## 2025-02-15 RX ORDER — LOSARTAN POTASSIUM 100 MG/1
100 TABLET ORAL DAILY
DISCHARGE
Start: 2025-02-16

## 2025-02-15 RX ORDER — IPRATROPIUM BROMIDE AND ALBUTEROL SULFATE 2.5; .5 MG/3ML; MG/3ML
3 SOLUTION RESPIRATORY (INHALATION)
DISCHARGE
Start: 2025-02-15

## 2025-02-15 RX ORDER — CLOPIDOGREL BISULFATE 75 MG/1
75 TABLET ORAL DAILY
Status: DISCONTINUED | OUTPATIENT
Start: 2025-02-16 | End: 2025-02-15

## 2025-02-15 RX ORDER — PANTOPRAZOLE SODIUM 40 MG/1
40 TABLET, DELAYED RELEASE ORAL
DISCHARGE
Start: 2025-02-16

## 2025-02-15 RX ORDER — HYDRALAZINE HYDROCHLORIDE 100 MG/1
100 TABLET, FILM COATED ORAL EVERY 12 HOURS SCHEDULED
DISCHARGE
Start: 2025-02-15

## 2025-02-15 RX ORDER — CLOPIDOGREL BISULFATE 75 MG/1
75 TABLET ORAL DAILY
Status: DISCONTINUED | OUTPATIENT
Start: 2025-02-15 | End: 2025-02-15 | Stop reason: HOSPADM

## 2025-02-15 RX ORDER — IPRATROPIUM BROMIDE AND ALBUTEROL SULFATE 2.5; .5 MG/3ML; MG/3ML
1 SOLUTION RESPIRATORY (INHALATION)
Status: DISCONTINUED | OUTPATIENT
Start: 2025-02-15 | End: 2025-02-15 | Stop reason: HOSPADM

## 2025-02-15 RX ORDER — INSULIN LISPRO 100 [IU]/ML
0-4 INJECTION, SOLUTION INTRAVENOUS; SUBCUTANEOUS EVERY 4 HOURS
DISCHARGE
Start: 2025-02-15

## 2025-02-15 RX ADMIN — TIOTROPIUM BROMIDE INHALATION SPRAY 2 PUFF: 3.12 SPRAY, METERED RESPIRATORY (INHALATION) at 08:55

## 2025-02-15 RX ADMIN — APIXABAN 5 MG: 5 TABLET, FILM COATED ORAL at 16:52

## 2025-02-15 RX ADMIN — PANTOPRAZOLE SODIUM 40 MG: 40 TABLET, DELAYED RELEASE ORAL at 06:10

## 2025-02-15 RX ADMIN — AMIODARONE HYDROCHLORIDE 200 MG: 200 TABLET ORAL at 08:38

## 2025-02-15 RX ADMIN — INSULIN LISPRO 1 UNITS: 100 INJECTION, SOLUTION INTRAVENOUS; SUBCUTANEOUS at 03:32

## 2025-02-15 RX ADMIN — HYDRALAZINE HYDROCHLORIDE 100 MG: 50 TABLET ORAL at 20:05

## 2025-02-15 RX ADMIN — PIPERACILLIN AND TAZOBACTAM 3375 MG: 3; .375 INJECTION, POWDER, LYOPHILIZED, FOR SOLUTION INTRAVENOUS at 00:02

## 2025-02-15 RX ADMIN — CLOPIDOGREL BISULFATE 75 MG: 75 TABLET ORAL at 16:52

## 2025-02-15 RX ADMIN — AMIODARONE HYDROCHLORIDE 200 MG: 200 TABLET ORAL at 20:05

## 2025-02-15 RX ADMIN — METOPROLOL TARTRATE 50 MG: 50 TABLET, FILM COATED ORAL at 20:05

## 2025-02-15 RX ADMIN — ROSUVASTATIN CALCIUM 20 MG: 20 TABLET, FILM COATED ORAL at 20:05

## 2025-02-15 RX ADMIN — INSULIN LISPRO 1 UNITS: 100 INJECTION, SOLUTION INTRAVENOUS; SUBCUTANEOUS at 16:52

## 2025-02-15 RX ADMIN — METOPROLOL TARTRATE 50 MG: 50 TABLET, FILM COATED ORAL at 08:39

## 2025-02-15 RX ADMIN — ESCITALOPRAM OXALATE 10 MG: 10 TABLET ORAL at 08:39

## 2025-02-15 RX ADMIN — SODIUM CHLORIDE, PRESERVATIVE FREE 10 ML: 5 INJECTION INTRAVENOUS at 08:49

## 2025-02-15 RX ADMIN — DILTIAZEM HYDROCHLORIDE 30 MG: 60 TABLET, FILM COATED ORAL at 00:03

## 2025-02-15 RX ADMIN — HYDROCHLOROTHIAZIDE 25 MG: 25 TABLET ORAL at 08:38

## 2025-02-15 RX ADMIN — IPRATROPIUM BROMIDE AND ALBUTEROL SULFATE 1 DOSE: .5; 2.5 SOLUTION RESPIRATORY (INHALATION) at 08:55

## 2025-02-15 RX ADMIN — INSULIN LISPRO 1 UNITS: 100 INJECTION, SOLUTION INTRAVENOUS; SUBCUTANEOUS at 12:08

## 2025-02-15 RX ADMIN — PIPERACILLIN AND TAZOBACTAM 3375 MG: 3; .375 INJECTION, POWDER, LYOPHILIZED, FOR SOLUTION INTRAVENOUS at 08:52

## 2025-02-15 RX ADMIN — INSULIN LISPRO 1 UNITS: 100 INJECTION, SOLUTION INTRAVENOUS; SUBCUTANEOUS at 08:38

## 2025-02-15 RX ADMIN — LOSARTAN POTASSIUM 100 MG: 50 TABLET, FILM COATED ORAL at 08:38

## 2025-02-15 RX ADMIN — TICAGRELOR 90 MG: 90 TABLET ORAL at 08:38

## 2025-02-15 RX ADMIN — INSULIN LISPRO 1 UNITS: 100 INJECTION, SOLUTION INTRAVENOUS; SUBCUTANEOUS at 00:14

## 2025-02-15 RX ADMIN — DILTIAZEM HYDROCHLORIDE 30 MG: 60 TABLET, FILM COATED ORAL at 16:53

## 2025-02-15 RX ADMIN — HYDRALAZINE HYDROCHLORIDE 10 MG: 20 INJECTION INTRAMUSCULAR; INTRAVENOUS at 12:08

## 2025-02-15 RX ADMIN — SODIUM CHLORIDE, PRESERVATIVE FREE 10 ML: 5 INJECTION INTRAVENOUS at 20:05

## 2025-02-15 RX ADMIN — IPRATROPIUM BROMIDE AND ALBUTEROL SULFATE 1 DOSE: .5; 2.5 SOLUTION RESPIRATORY (INHALATION) at 14:50

## 2025-02-15 RX ADMIN — HYDRALAZINE HYDROCHLORIDE 100 MG: 50 TABLET ORAL at 08:38

## 2025-02-15 RX ADMIN — DILTIAZEM HYDROCHLORIDE 30 MG: 60 TABLET, FILM COATED ORAL at 06:10

## 2025-02-15 RX ADMIN — APIXABAN 5 MG: 5 TABLET, FILM COATED ORAL at 20:05

## 2025-02-15 RX ADMIN — DILTIAZEM HYDROCHLORIDE 30 MG: 60 TABLET, FILM COATED ORAL at 12:08

## 2025-02-15 ASSESSMENT — PAIN SCALES - GENERAL: PAINLEVEL_OUTOF10: 0

## 2025-02-15 ASSESSMENT — PAIN SCALES - WONG BAKER: WONGBAKER_NUMERICALRESPONSE: NO HURT

## 2025-02-15 NOTE — RT PROTOCOL NOTE
RT Inhaler-Nebulizer Bronchodilator Protocol Note    There is a bronchodilator order in the chart from a provider indicating to follow the RT Bronchodilator Protocol and there is an “Initiate RT Inhaler-Nebulizer Bronchodilator Protocol” order as well (see protocol at bottom of note).    CXR Findings:  XR CHEST PORTABLE    Result Date: 2/14/2025  Cardiomegaly with no acute cardiopulmonary process.       The findings from the last RT Protocol Assessment were as follows:   History Pulmonary Disease: Chronic pulmonary disease  Respiratory Pattern: Dyspnea on exertion or RR 21-25 bpm  Breath Sounds: Intermittent or unilateral wheezes  Cough: Weak, productive  Indication for Bronchodilator Therapy: Decreased or absent breath sounds, Wheezing associated with pulm disorder  Bronchodilator Assessment Score: 10    Aerosolized bronchodilator medication orders have been revised according to the RT Inhaler-Nebulizer Bronchodilator Protocol below.    Respiratory Therapist to perform RT Therapy Protocol Assessment initially then follow the protocol.  Repeat RT Therapy Protocol Assessment PRN for score 0-3 or on second treatment, BID, and PRN for scores above 3.    No Indications - adjust the frequency to every 6 hours PRN wheezing or bronchospasm, if no treatments needed after 48 hours then discontinue using Per Protocol order mode.     If indication present, adjust the RT bronchodilator orders based on the Bronchodilator Assessment Score as indicated below.  Use Inhaler orders unless patient has one or more of the following: on home nebulizer, not able to hold breath for 10 seconds, is not alert and oriented, cannot activate and use MDI correctly, or respiratory rate 25 breaths per minute or more, then use the equivalent nebulizer order(s) with same Frequency and PRN reasons based on the score.  If a patient is on this medication at home then do not decrease Frequency below that used at home.    0-3 - enter or revise RT  bronchodilator order(s) to equivalent RT Bronchodilator order with Frequency of every 4 hours PRN for wheezing or increased work of breathing using Per Protocol order mode.        4-6 - enter or revise RT Bronchodilator order(s) to two equivalent RT bronchodilator orders with one order with BID Frequency and one order with Frequency of every 4 hours PRN wheezing or increased work of breathing using Per Protocol order mode.        7-10 - enter or revise RT Bronchodilator order(s) to two equivalent RT bronchodilator orders with one order with TID Frequency and one order with Frequency of every 4 hours PRN wheezing or increased work of breathing using Per Protocol order mode.       11-13 - enter or revise RT Bronchodilator order(s) to one equivalent RT bronchodilator order with QID Frequency and an Albuterol order with Frequency of every 4 hours PRN wheezing or increased work of breathing using Per Protocol order mode.      Greater than 13 - enter or revise RT Bronchodilator order(s) to one equivalent RT bronchodilator order with every 4 hours Frequency and an Albuterol order with Frequency of every 2 hours PRN wheezing or increased work of breathing using Per Protocol order mode.     RT to enter RT Home Evaluation for COPD & MDI Assessment order using Per Protocol order mode.    Electronically signed by Ana Viramontes RCP on 2/15/2025 at 9:44 AM

## 2025-02-15 NOTE — DISCHARGE SUMMARY
TECHNOLOGIST PROVIDED HISTORY: r/o any bleed FINDINGS: BRAIN/VENTRICLES: There is no acute hemorrhage, mass effect, or midline shift.  There is mild chronic microvascular disease.  There is remote lacunar infarct in the periventricular white matter of the right frontal lobe.  There is remote left occipital lobe infarct.  The ventricles are symmetric.  The infratentorial structures are unremarkable.  There is artifact from basilar stent extending into the posterior cerebral arteries bilaterally. ORBITS: The visualized portion of the orbits demonstrate no acute abnormality. SINUSES: Paranasal sinuses are normally aerated.  There is free fluid in the right mastoid air cells. SOFT TISSUES/SKULL:  No acute abnormality of the visualized skull or soft tissues.     Mild chronic microvascular disease without acute intracranial abnormality. Remote left occipital lobe infarct.     XR ABDOMEN FOR NG/OG/NE TUBE PLACEMENT    Result Date: 2/6/2025  EXAMINATION: ONE SUPINE XRAY VIEW(S) OF THE ABDOMEN 2/6/2025 9:15 am COMPARISON: 02/05/2025 HISTORY: ORDERING SYSTEM PROVIDED HISTORY: Confirmation of course of NG/OG/NE tube and location of tip of tube TECHNOLOGIST PROVIDED HISTORY: Confirmation of course of NG/OG/NE tube and location of tip of tube Portable?->Yes FINDINGS: Lines and tubes: Nasogastric tube with tip overlying gastric body. Bowel gas pattern: Much of the pelvis and portion of the abdomen not included field of view.No dilated bowel loops seen.Given limitations of supine imaging, no evidence of free air, pneumatosis or portal venous gas. Abnormal calcifications: None Bones: Within normal limits for age Other: None     Nasogastric tube with tip overlying gastric body.     XR CHEST PORTABLE    Result Date: 2/6/2025  EXAMINATION: ONE XRAY VIEW OF THE CHEST 2/6/2025 9:15 am COMPARISON: 02/02/2025 HISTORY: ORDERING SYSTEM PROVIDED HISTORY: SOB TECHNOLOGIST PROVIDED HISTORY: SOB FINDINGS: Lines and tubes: Nasogastric tube with  focal fluid collection is identified. Soft tissue/bones: No acute findings. CTA PELVIS: Vascular: Multifocal atheromatous plaque involves the iliac and visualized femoral arteries.  These are normal in caliber with no hemodynamically significant stenosis or dissection. Nonvascular: There is contrast and air in the bladder.  Bladder appears otherwise normal.  There has been a hysterectomy.  There is no free fluid or focal fluid collection. Soft tissue/bones: No fracture or destructive bone lesion is identified.     1. Extravasation of contrast at the proximal sigmoid colon consistent with active intestinal bleeding. 2. Small amount of air in the bladder which could be iatrogenic.  Air can also be seen in the bladder in the setting of infection with a gas-forming organism. 3. No acute findings elsewhere in the abdomen or pelvis. 4. Colonic diverticulosis. 5. Small hiatal hernia. 6. Moderate atherosclerosis with normal patency of the branches of the aorta.     CT CHEST PULMONARY EMBOLISM W CONTRAST    Result Date: 1/25/2025  EXAMINATION: CTA OF THE CHEST 1/25/2025 1:54 pm TECHNIQUE: CTA of the chest was performed after the administration of intravenous contrast.  Multiplanar reformatted images are provided for review.  MIP images are provided for review. Automated exposure control, iterative reconstruction, and/or weight based adjustment of the mA/kV was utilized to reduce the radiation dose to as low as reasonably achievable. COMPARISON: Portable chest from 1/25/2025 HISTORY: ORDERING SYSTEM PROVIDED HISTORY: tachycardia, shortness of breath TECHNOLOGIST PROVIDED HISTORY: tachycardia, shortness of breath Additional Contrast?->1 74-year-old female with tachycardia and shortness of breath FINDINGS: Pulmonary Arteries: No obvious filling defect in the central main, right main, and left main pulmonary arteries.  Evaluation of the remainder of the pulmonary arterial vasculature is somewhat limited due to suboptimal bolus  on: February 16, 2025     insulin lispro 100 UNIT/ML Soln injection vial  Commonly known as: HUMALOG,ADMELOG  Inject 0-4 Units into the skin every 4 hours     ipratropium 0.5 mg-albuterol 2.5 mg 0.5-2.5 (3) MG/3ML Soln nebulizer solution  Commonly known as: DUONEB  Inhale 3 mLs into the lungs three times daily     losartan 100 MG tablet  Commonly known as: COZAAR  1 tablet by Per NG tube route daily  Start taking on: February 16, 2025     ondansetron 4 MG disintegrating tablet  Commonly known as: ZOFRAN-ODT  Take 1 tablet by mouth every 8 hours as needed for Nausea or Vomiting     ondansetron 4 MG/2ML injection  Commonly known as: ZOFRAN  Infuse 2 mLs intravenously every 6 hours as needed for Nausea or Vomiting     pantoprazole 40 MG tablet  Commonly known as: PROTONIX  Take 1 tablet by mouth every morning (before breakfast)  Start taking on: February 16, 2025     potassium bicarb-citric acid 20 MEQ Tbef effervescent tablet  Commonly known as: EFFER-K  2 tablets by Per NG tube route daily  Start taking on: February 16, 2025     rosuvastatin 20 MG tablet  Commonly known as: CRESTOR  Take 1 tablet by mouth nightly            CHANGE how you take these medications      clopidogrel 75 MG tablet  Commonly known as: PLAVIX  1 tablet by Per NG tube route daily  Start taking on: February 16, 2025  What changed: how to take this     escitalopram 10 MG tablet  Commonly known as: LEXAPRO  Take 1 tablet by mouth nightly  What changed: when to take this     hydrALAZINE 100 MG tablet  Commonly known as: APRESOLINE  1 tablet by Per NG tube route every 12 hours  What changed:   medication strength  how to take this  when to take this  Another medication with the same name was removed. Continue taking this medication, and follow the directions you see here.     metoprolol tartrate 50 MG tablet  Commonly known as: LOPRESSOR  1 tablet by Per NG tube route 2 times daily  What changed: how to take this            CONTINUE taking these

## 2025-02-15 NOTE — PROGRESS NOTES
Daily Progress Note  Neuro Critical Care    Patient Name: Dahlia Zhang  Patient : 1950  Room/Bed: 0133/0133-01  Code Status: FULL   Allergies:   Allergies   Allergen Reactions    Lisinopril Angioedema    Codeine Nausea And Vomiting    Norco [Hydrocodone-Acetaminophen] Nausea And Vomiting    Percocet [Oxycodone-Acetaminophen] Nausea And Vomiting       CHIEF COMPLAINT:       AMS, left facial droop      INTERVAL HISTORY    Initial Presentation (Admitted ):    Dahlia Zhang is a 74 y.o. 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  CT head with no acute intracranial abnormalities.  Remote infarcts in left occipital lobe, right basal ganglia and corona radiata.  CTA head and neck showed severe narrowing within the distal basilar stent.  Emergent DSA revealed stents are patent and well apposed with a filling defect at the basilar tip representing partial thrombus versus metal artifact.  She was treated with cannagrelor drip with improvement in neurologic status.     On , she developed rectal bleeding with the CTA indicating extravasation from sigmoid colon.,  Colonoscopy revealing bleeding diverticular disease.  Aspirin and Brilinta were held; GI evaluated and recommended embolization or surgical eval if bleeding recurs.  On , GI signed off and no evidence of recurrent bleeding.  Patient's hospital course complicated by ESBL UTI s/p 7 day course of meropenem through . Patient was gradually restarted on aspirin monotherapy, and then transitioned to Eliquis monotherapy. Patient has had fluctuating episodes of lethargy and slurred speech since being transferred to  neuro stepdown. MRI brain was repeated on 2/5/25 which revealed new infarct in the bilateral thalami, left occipital lobes, parietal lobes, and cerebellar hemispheres. Patient taken back for DSA on 2/7 with additional basilar stent placement and balloon angioplasty. Transferred back to neuro-icu post-operatively on cangrelor infusion and femoral sheath in place.     Hospital Course:   1/23 : DSA showed possible basilar tip thrombosis.  Started on Cangrelor.  1/24: CTH stable.  Loaded with Brilinta. Cangrelor stopped.  Goal -180.  Weaned off [place].  Sheath removed.  NIHSS 0.    1/25: worsening tachycardia, tachypnea, chest pain. EKG no changes x2. Trop stable x2. PE study negative.   1/26: lower GIB on CT. 1u PRBC. Amio gtt for afib. AC/AP held. Scope w/ GI, extensive diverticular disease, no active bleeding.   1/28: ASA, DVT ppx restarted.  Resumed half home Losartan 50mg QD.  MBSS: Regular, thins.  PO Amio started, infusion stopped.   2/7: had been on step-down, developed intermittent slurred speech/lethargy. MRI w/ new infarct in bilateral thalami, left occipital, parietal, cerebellar. DSA w/ stent placement and angioplasty. On cangrelor infusion  2/8: cardizem changed to 30 IR (can't crush ER). Continue to hold TF. Hay out. Stable CTH  2/9: Repeat CTH stable. Loaded with Brilinta, cangrelor held. Sheath pulled? Continue to hold TF until patient is sitting up to avoid aspiration. Hay remains, discussion with NEV to resume eliquis  2/10: restart brilinta, heparin, aspirin triple therapy to be held for PEG tube or other procedures. LTME.   2/11: Breathing continues to be ataxic, patient more alert and able to follow commands and answer yes or no questions, however continues to be sleepy. GI for PEG. Daily 40mg EfferK supplementation scheduled. LTME started.  2/12: Palliative care discussion with family, would like to see how she progresses, and if she does not continue to progress in several weeks/months      Acute lower GI bleeding (resolved)  Recent Labs     02/15/25  0429   HGB 8.6*   HCT 27.6*          Monitor with daily labs.       ENDOCRINE:  - Continue to monitor blood glucose, goal <180      OTHER:  - PT/OT/ST   - Code Status: FULL     PROPHYLAXIS:  Stress ulcer: PPI    DVT PROPHYLAXIS:  - Heparin Drip     DISPOSITION:  [] To remain ICU:   [x] OK for out of ICU from Neuro Critical Care standpoint once medications have been changed over, and patient is placed at LTAC.    We will continue to follow along.     For any changes in exam or patient status please contact Neuro Critical Care.      Peter Fagan MD  Neuro Critical Care  2/15/2025     1:23 PM

## 2025-02-15 NOTE — PROGRESS NOTES
Speech Language Pathology  [facility]    Speech Language Treatment Note    Date: 2/15/2025  Patient’s Name: Dahlia Zhang  MRN: [id number]  Diagnosis:   Patient Active Problem List   Diagnosis Code    Atrial fibrillation (HCC) I48.91    Subarachnoid hemorrhage from basilar artery aneurysm (HCC) I60.4    MRI-safe endovascular aneurysm coil present Z95.828    Basilar artery aneurysm ([facility]) I72.5    Aneurysm, cerebral I67.1    Ischemic stroke ([facility]) I63.9    Hypertension I10    Mixed hyperlipidemia E78.2    Current every day smoker F17.200    Visual disturbance H53.9    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    Encephalopathy G93.40    Left-sided weakness R53.1    Thalamic stroke ([facility]) I63.81    Complicated UTI (urinary tract infection) N39.0    Infection due to ESBL-producing Escherichia coli A49.8, Z16.12    Facial droop R29.810    CRP elevated R79.82    Bandemia D72.825    Pyelonephritis N12    Lactic acidosis E87.20    Gastrointestinal hemorrhage K92.2    Acute blood loss anemia D62    Abnormal CT of the abdomen R93.5    Shock R57.9    Diverticulosis K57.90    Polyp of colon K63.5    Cerebral multi-infarct state I69.30    Hypokalemia E87.6    Encounter for palliative care Z51.5    Goals of care, counseling/discussion Z71.89    Oropharyngeal dysphagia R13.12       Pain: 0/10    Speech and Language Treatment  Treatment time: [id number]    Subjective: [] Alert [x] Cooperative     [] Confused     [] Agitated    [x] Lethargic      Objective/Assessment:  Auditory Comprehension: basic yes/no questions: 3/6 not increased with max verbal cues    1 step  commands: 2/5 increased to 3/5 with mod-max verbal cues    Verbal Expression: Stating bio/orientation info: 0/3 not increased with max verbal, phonemic cues and models    Automatic speech- countin/5 not increased with max verbal/phonemic cues and models    Pt with verbal approximation x1 \"what\" to  when he asked her a question.    Dysphagia: Pt NPO with

## 2025-02-15 NOTE — PLAN OF CARE
Problem: Chronic Conditions and Co-morbidities  Goal: Patient's chronic conditions and co-morbidity symptoms are monitored and maintained or improved  2/15/2025 0628 by Jackie Pagan RN  Outcome: Progressing  2/14/2025 1726 by Judy Watkins RN  Outcome: Progressing     Problem: Discharge Planning  Goal: Discharge to home or other facility with appropriate resources  2/15/2025 0628 by Jackie Pagan RN  Outcome: Progressing  2/14/2025 1726 by Judy Watkins RN  Outcome: Progressing     Problem: Pain  Goal: Verbalizes/displays adequate comfort level or baseline comfort level  2/15/2025 0628 by Jackie Pagan RN  Outcome: Progressing  2/14/2025 1726 by Judy Watkins RN  Outcome: Progressing     Problem: Safety - Adult  Goal: Free from fall injury  2/15/2025 0628 by Jackie Pagan RN  Outcome: Progressing  2/14/2025 1726 by Judy Watkins RN  Outcome: Progressing     Problem: ABCDS Injury Assessment  Goal: Absence of physical injury  2/15/2025 0628 by Jackie Pagan RN  Outcome: Progressing  2/14/2025 1726 by Judy Watkins RN  Outcome: Progressing     Problem: Respiratory - Adult  Goal: Achieves optimal ventilation and oxygenation  2/15/2025 0628 by Jackie Pagan RN  Outcome: Progressing  2/14/2025 1726 by Judy Watkins RN  Outcome: Progressing  Flowsheets (Taken 2/14/2025 0939 by Gricelda Gupta RCP)  Achieves optimal ventilation and oxygenation:   Assess for changes in respiratory status   Assess for changes in mentation and behavior   Position to facilitate oxygenation and minimize respiratory effort   Respiratory therapy support as indicated   Assess and instruct to report shortness of breath or any respiratory difficulty   Assess the need for suctioning and aspirate as needed   Oxygen supplementation based on oxygen saturation or arterial blood gases     Problem: Neurosensory - Adult  Goal: Achieves stable or improved neurological status  2/15/2025 0628 by Jackie Pagan RN  Outcome:  If unable to ensure safety without constant attention obtain sitter and review sitter guidelines with assigned personnel  7. Initiate Psychosocial CNS and Spiritual Care consult, as indicated  2/15/2025 0628 by Jackie Pagan, RN  Outcome: Progressing  2/14/2025 1726 by Juyd Watkins, RN  Outcome: Progressing

## 2025-02-15 NOTE — PROGRESS NOTES
[facility] Gastroenterology Progress Note    Dahlia Zhang is a 74 y.o. female patient.  Hospitalization Day:23      Chief consult reason:   2025-severe abdominal pain with large bloody bowel movement  2025 reconsult for PEG tube    Subjective: Patient seen and examined.  Currently tolerating tube feedings.   is at bedside and states he is concerned about stroke if antiplatelets anticoagulants held for PEG tube.  States he was informed NG tube can stay for 30 days      Objective:   VITALS:  BP (!) 184/69   Pulse 69   Temp 99 °F (37.2 °C) (Axillary)   Resp 25   Ht 1.702 m (5' 7\")   Wt 87.8 kg (193 lb 8 oz)   SpO2 97%   BMI 30.31 kg/m²   TEMPERATURE:  Current - Temp: 99 °F (37.2 °C); Max - Temp  Av °F (37.2 °C)  Min: 98.7 °F (37.1 °C)  Max: 99.8 °F (37.7 °C)    Physical Assessment:  General appearance:  Sleeping  Mental Status:  JESSICA  Lungs:  clear to auscultation bilaterally, normal effort  Heart:  regular rate and rhythm, no murmur  Abdomen:  soft, nondistended,+ bowel sounds, N/G tube right narea  Extremities:  no edema, no redness, No clubbing  Skin:  warm, dry, no gross lesions or rashes    CURRENT MEDICATIONS:  Scheduled Meds:   ipratropium 0.5 mg-albuterol 2.5 mg  1 Dose Inhalation TID RT    [START ON 2025] clopidogrel  75 mg Per NG tube Daily    hydroCHLOROthiazide  25 mg Oral Daily    piperacillin-tazobactam  3,375 mg IntraVENous q8h    potassium bicarb-citric acid  40 mEq Oral Daily    hydrALAZINE  100 mg Per NG tube 2 times per day    ticagrelor  90 mg Per NG tube BID    sodium chloride flush  5-40 mL IntraVENous 2 times per day    dilTIAZem  30 mg Per NG tube 4 times per day    metoprolol tartrate  50 mg Per NG tube BID    losartan  100 mg Per NG tube Daily    sodium chloride flush  5-40 mL IntraVENous 2 times per day    insulin lispro  0-4 Units SubCUTAneous Q4H    [Held by provider] traZODone  50 mg Oral Nightly    [Held by provider] apixaban  5 mg Oral BID  counseling/discussion    Oropharyngeal dysphagia  Resolved Problems:    * No resolved hospital problems. *       GI Assessment:    Oropharyngeal dysphagia secondary to thalamic stroke  Hypertension  Diverticular bleed-resolved  ESBL E. coli UTI-resolved  Atrial fibrillation RVR      Recommendations:  We will hold off on PEG tube at this time per 's request to allow patient more time to recover.  Patient's  also concerned about restroke with holding AP/AC therapy  GI will sign off.  Please recall if  agreeable for PEG tube      Time spent reviewing chart, seeing patient, documentation and coordination of care for the above conditions, and discussing with attending: Around 35 minutes    This plan was formulated in collaboration with Dr. Iqbal  Please feel free to contact me with any questions or concerns.   Thank you for allowing me to participate in the care of your patient.      Viry Escobedo, VIKTORIA - CNP on 2/15/2025 at 1:13 PM   [facility]    Please note that this note was generated using a voice recognition dictation software.  Although every effort was made to ensure the accuracy of this automated transcription, some errors in transcription may have occurred.      Attending Physician Statement  I have discussed the care of Dahlia Zhang and I have examined the patient myselft and taken ros and hpi , including pertinent history and exam findings,  with the author of this note . I have reviewed the key elements of all parts of the encounter with the nurse practitioner/resident.  I agree with the assessment, plan and orders as documented by the above health care provider     More than 50% of the time was spent taking care of this patient in addition to the nurse practitioner time.  That also included history taking follow-up physical examination and review of system.         Electronically signed by Jesus Iqbal MD

## 2025-02-15 NOTE — PROGRESS NOTES
Endovascular Neurosurgery Progress Notes    Pt Name: Dahlia Zhang  MRN: [id number]  YOB: 1950  Date of evaluation: 2/14/2025  Primary Care Physician: Thania Rahman MD  Reason for evaluation: Concern for pontine stroke in the presence of previously treated basilar anneurysm with WEB device and LVIS stent    SUBJECTIVE:     NAEO from EVN perspective. Continues on Aspirin and Brilenta and hep gtt. No significant changes in examination.     History of Chief Complaint:    Dahlia Zhang is a 74 y.o. 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     On the morning of 1/23/2025, patient was noted to have increased lethargy and generalized weakness along with left facial droop.  These were concerning as patient was at her baseline and doing better yesterday.  She does not recall the time when she went to sleep, thus last known well is unknown.  Potentially she went to sleep between 6-8 PM on 1/22.      Patient was seen and examined at bedside upon arrival to [facility] ED. She was increasingly lethargic and short of breath, however she was able to cross midline without gaze preference, had intact fluency and repetition but did have mild dysarthria. She had generalized weakness.         CT head w/o contrast shows no acute finding.      CTA head and neck show flow diverting stent along basilar artery and proximal PCA with embolization coils at basilar tip. Severe narrowing within the distal basilar stent. Small amount of residual aneurysmal filling at the neck, approximately 2 mm.        Allergies  is allergic to lisinopril, codeine, norco [hydrocodone-acetaminophen], and percocet [oxycodone-acetaminophen].  Medications  Prior to Admission medications    Medication Sig Start Date End Date Taking?  or surgical evaluation if rebleeding occurs. 1/27/25 GI signed off and stated no evidence of recurrent bleeding.    Endovascular history: Ruptured basilar artery tip aneurysm, previously treated with a WEB intrasaccular device (10/11/2023) and a second-stage LVIS 4.5 x 23mm stent (6/2024) and a third-stage  LVIS 4.5 x 23mm stent spanning from the left P2 segment to the mid-basilar artery (1/15/2025) is completely obliterated. Pt had potential GI bleeding from diverticular disease that required stopping of her Eliquis. During this period, pt had increased lethargy and MRI showing increased posterior circulation strokes. 2/7/25 underwent L PCA thrombectomy and balloon angioplasty. 2/9/25 CT stable, restarted on Aspirin and Brilinta and hep gtt.    PLANS:     -- -150  -- Stop Aspirin  -- Continue Brilinta 90 BID  -- Continue hep gtt for now, transition to DOAC when able  -- Plan to discharge when appropriate on Brilinta + DOAC dual therapy  -- Afib RVR management with Cardiology on board  -- Vascular surgery on board for LUE swelling  -- PT/OT/ST on board  -- Multiple conversations with family ongoing  -- Rest of care per primary team        Discussed with Dr. Carl    Please arrange follow-up with Dr. Maurice clinic in 6 weeks, and with  in 3-4 months.    Serafin Conte MD, Pager [phone]  Electronically signed 02/14/25 at 8:48 PM  Stroke, Neurocritical Care & Neurointervention  [facility]  [facility]

## 2025-02-15 NOTE — PROGRESS NOTES
Endovascular Neurosurgery Progress Notes    Pt Name: Dahlia Zhang  MRN: [id number]  YOB: 1950  Date of evaluation: 2/15/2025  Primary Care Physician: Thania Rahman MD  Reason for evaluation: Concern for pontine stroke in the presence of previously treated basilar anneurysm with WEB device and LVIS stent    SUBJECTIVE:     NAEO from EVN perspective. Continues on Aspirin and Brilenta and hep gtt. Intermittently open eyes and still has ocular apraxia. Continued discussion about PEG tube placement.     History of Chief Complaint:    Dahlia Zhang is a 74 y.o. 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     On the morning of 1/23/2025, patient was noted to have increased lethargy and generalized weakness along with left facial droop.  These were concerning as patient was at her baseline and doing better yesterday.  She does not recall the time when she went to sleep, thus last known well is unknown.  Potentially she went to sleep between 6-8 PM on 1/22.      Patient was seen and examined at bedside upon arrival to [facility] ED. She was increasingly lethargic and short of breath, however she was able to cross midline without gaze preference, had intact fluency and repetition but did have mild dysarthria. She had generalized weakness.         CT head w/o contrast shows no acute finding.      CTA head and neck show flow diverting stent along basilar artery and proximal PCA with embolization coils at basilar tip. Severe narrowing within the distal basilar stent. Small amount of residual aneurysmal filling at the neck, approximately 2 mm.        Allergies  is allergic to lisinopril, codeine, norco [hydrocodone-acetaminophen], and percocet [oxycodone-acetaminophen].  Medications  Prior to Admission

## 2025-02-15 NOTE — PLAN OF CARE
Problem: Chronic Conditions and Co-morbidities  Goal: Patient's chronic conditions and co-morbidity symptoms are monitored and maintained or improved  2/15/2025 1849 by Judy Watkins RN  Outcome: Progressing  2/15/2025 0628 by Jackie Pagan RN  Outcome: Progressing     Problem: Discharge Planning  Goal: Discharge to home or other facility with appropriate resources  2/15/2025 1849 by Judy Watkins RN  Outcome: Progressing  2/15/2025 0628 by Jackie Pagan RN  Outcome: Progressing     Problem: Pain  Goal: Verbalizes/displays adequate comfort level or baseline comfort level  2/15/2025 1849 by Judy Watkins RN  Outcome: Progressing  2/15/2025 0628 by Jackie Pagan RN  Outcome: Progressing     Problem: Safety - Adult  Goal: Free from fall injury  2/15/2025 1849 by Judy Watkins RN  Outcome: Progressing  2/15/2025 0628 by Jackie Pagan RN  Outcome: Progressing     Problem: ABCDS Injury Assessment  Goal: Absence of physical injury  2/15/2025 1849 by Judy Watkins RN  Outcome: Progressing  2/15/2025 0628 by Jackie Pagan RN  Outcome: Progressing     Problem: Respiratory - Adult  Goal: Achieves optimal ventilation and oxygenation  2/15/2025 1849 by Judy Watkins RN  Outcome: Progressing  2/15/2025 0628 by Jackie Pagan RN  Outcome: Progressing     Problem: Neurosensory - Adult  Goal: Achieves stable or improved neurological status  2/15/2025 1849 by Judy Watkins RN  Outcome: Progressing  2/15/2025 0628 by Jackie Pagan RN  Outcome: Progressing  Goal: Absence of seizures  2/15/2025 1849 by Judy Watkins RN  Outcome: Progressing  2/15/2025 0628 by Jackie Pagan RN  Outcome: Progressing  Goal: Remains free of injury related to seizures activity  2/15/2025 1849 by Judy Watkins RN  Outcome: Progressing  2/15/2025 0628 by Jackie Pagan RN  Outcome: Progressing  Goal: Achieves maximal functionality and self care  2/15/2025 1849 by Judy Watkins RN  Outcome: Progressing  2/15/2025 0628 by Latasha  0628 by Cousino, April, RN  Outcome: Progressing     Problem: Nutrition Deficit:  Goal: Optimize nutritional status  2/15/2025 1849 by Judy Watkins RN  Outcome: Progressing  2/15/2025 0628 by Jackie Pagan RN  Outcome: Progressing     Problem: Skin/Tissue Integrity  Goal: Skin integrity remains intact  Description: 1.  Monitor for areas of redness and/or skin breakdown  2.  Assess vascular access sites hourly  3.  Every 4-6 hours minimum:  Change oxygen saturation probe site  4.  Every 4-6 hours:  If on nasal continuous positive airway pressure, respiratory therapy assess nares and determine need for appliance change or resting period  2/15/2025 1849 by Judy Watkins, RN  Outcome: Progressing  2/15/2025 0628 by Jackie Pagan RN  Outcome: Progressing     Problem: Confusion  Goal: Confusion, delirium, dementia, or psychosis is improved or at baseline  Description: INTERVENTIONS:  1. Assess for possible contributors to thought disturbance, including medications, impaired vision or hearing, underlying metabolic abnormalities, dehydration, psychiatric diagnoses, and notify attending LIP  2. [place] high risk fall precautions, as indicated  3. Provide frequent short contacts to provide reality reorientation, refocusing and direction  4. Decrease environmental stimuli, including noise as appropriate  5. Monitor and intervene to maintain adequate nutrition, hydration, elimination, sleep and activity  6. If unable to ensure safety without constant attention obtain sitter and review sitter guidelines with assigned personnel  7. Initiate Psychosocial CNS and Spiritual Care consult, as indicated  2/15/2025 1849 by Judy Watkins, RN  Outcome: Progressing  2/15/2025 0628 by Jackie Pagan RN  Outcome: Progressing

## 2025-02-16 LAB
25(OH)D3 SERPL-MCNC: 27.2 NG/ML (ref 30–100)
ALBUMIN SERPL-MCNC: 3.1 G/DL (ref 3.5–5.2)
ALBUMIN/GLOB SERPL: 1 {RATIO} (ref 1–2.5)
ALP SERPL-CCNC: 84 U/L (ref 35–104)
ALT SERPL-CCNC: 15 U/L (ref 10–35)
ANION GAP SERPL CALCULATED.3IONS-SCNC: 12 MMOL/L (ref 9–16)
AST SERPL-CCNC: 24 U/L (ref 10–35)
BILIRUB SERPL-MCNC: 0.3 MG/DL (ref 0–1.2)
BUN SERPL-MCNC: 14 MG/DL (ref 8–23)
CALCIUM SERPL-MCNC: 9.3 MG/DL (ref 8.8–10.2)
CHLORIDE SERPL-SCNC: 98 MMOL/L (ref 98–107)
CO2 SERPL-SCNC: 27 MMOL/L (ref 20–31)
CREAT SERPL-MCNC: 0.7 MG/DL (ref 0.5–0.9)
ERYTHROCYTE [DISTWIDTH] IN BLOOD BY AUTOMATED COUNT: 15.1 % (ref 11.8–14.4)
EST. AVERAGE GLUCOSE BLD GHB EST-MCNC: 140 MG/DL
GFR, ESTIMATED: >90 ML/MIN/1.73M2
GLUCOSE SERPL-MCNC: 169 MG/DL (ref 82–115)
HBA1C MFR BLD: 6.5 % (ref 4–6)
HCT VFR BLD AUTO: 28.6 % (ref 36.3–47.1)
HGB BLD-MCNC: 9.1 G/DL (ref 11.9–15.1)
MAGNESIUM SERPL-MCNC: 2 MG/DL (ref 1.6–2.4)
MCH RBC QN AUTO: 27.8 PG (ref 25.2–33.5)
MCHC RBC AUTO-ENTMCNC: 31.8 G/DL (ref 28.4–34.8)
MCV RBC AUTO: 87.5 FL (ref 82.6–102.9)
NRBC BLD-RTO: 0.4 PER 100 WBC
PARTIAL THROMBOPLASTIN TIME: 31.6 SEC (ref 23.9–33.8)
PHOSPHATE SERPL-MCNC: 4.1 MG/DL (ref 2.5–4.5)
PLATELET # BLD AUTO: 382 K/UL (ref 138–453)
PMV BLD AUTO: 11.5 FL (ref 8.1–13.5)
POTASSIUM SERPL-SCNC: 4 MMOL/L (ref 3.7–5.3)
PROT SERPL-MCNC: 6.4 G/DL (ref 6.6–8.7)
RBC # BLD AUTO: 3.27 M/UL (ref 3.95–5.11)
SODIUM SERPL-SCNC: 136 MMOL/L (ref 136–145)
TSH SERPL DL<=0.05 MIU/L-ACNC: 1.27 UIU/ML (ref 0.27–4.2)
WBC OTHER # BLD: 16.2 K/UL (ref 3.5–11.3)

## 2025-02-16 PROCEDURE — 84443 ASSAY THYROID STIM HORMONE: CPT

## 2025-02-16 PROCEDURE — 83036 HEMOGLOBIN GLYCOSYLATED A1C: CPT

## 2025-02-16 PROCEDURE — 82306 VITAMIN D 25 HYDROXY: CPT

## 2025-02-16 PROCEDURE — 83735 ASSAY OF MAGNESIUM: CPT

## 2025-02-16 PROCEDURE — 84100 ASSAY OF PHOSPHORUS: CPT

## 2025-02-16 PROCEDURE — 80053 COMPREHEN METABOLIC PANEL: CPT

## 2025-02-16 PROCEDURE — 85027 COMPLETE CBC AUTOMATED: CPT

## 2025-02-16 PROCEDURE — 85730 THROMBOPLASTIN TIME PARTIAL: CPT

## 2025-02-16 NOTE — PLAN OF CARE
Problem: Chronic Conditions and Co-morbidities  Goal: Patient's chronic conditions and co-morbidity symptoms are monitored and maintained or improved  2/15/2025 2106 by Kaylynn Greenberg RN  Outcome: Completed  2/15/2025 1849 by Judy Watkins RN  Outcome: Progressing     Problem: Discharge Planning  Goal: Discharge to home or other facility with appropriate resources  2/15/2025 2106 by Kaylynn Greenberg RN  Outcome: Completed  2/15/2025 1849 by Judy Watkins RN  Outcome: Progressing     Problem: Pain  Goal: Verbalizes/displays adequate comfort level or baseline comfort level  2/15/2025 2106 by Kaylynn Greenberg RN  Outcome: Completed  2/15/2025 1849 by Judy Watkins RN  Outcome: Progressing     Problem: Safety - Adult  Goal: Free from fall injury  2/15/2025 2106 by Kaylynn Greenberg RN  Outcome: Completed  2/15/2025 1849 by Judy Watkins RN  Outcome: Progressing     Problem: ABCDS Injury Assessment  Goal: Absence of physical injury  2/15/2025 2106 by Kaylynn Greenberg RN  Outcome: Completed  2/15/2025 1849 by Judy Watkins RN  Outcome: Progressing     Problem: Respiratory - Adult  Goal: Achieves optimal ventilation and oxygenation  2/15/2025 2106 by Kaylynn Greenberg RN  Outcome: Completed  2/15/2025 1849 by Judy Watkins RN  Outcome: Progressing     Problem: Neurosensory - Adult  Goal: Achieves stable or improved neurological status  2/15/2025 2106 by Kaylynn Greenberg RN  Outcome: Completed  2/15/2025 1849 by Judy Watkins RN  Outcome: Progressing  Goal: Absence of seizures  2/15/2025 2106 by Kaylynn Greenberg RN  Outcome: Completed  2/15/2025 1849 by Judy Watkins RN  Outcome: Progressing  Goal: Remains free of injury related to seizures activity  2/15/2025 2106 by Kaylynn Greenberg RN  Outcome: Completed  2/15/2025 1849 by Judy Watkins RN  Outcome: Progressing  Goal: Achieves maximal functionality and self care  2/15/2025 2106 by Kaylynn Greenberg RN  Outcome: Completed  2/15/2025 1849 by Judy Watkins RN  Outcome: Progressing

## 2025-02-17 LAB
ALBUMIN SERPL-MCNC: 3.4 G/DL (ref 3.5–5.2)
ALP SERPL-CCNC: 88 U/L (ref 35–104)
ALT SERPL-CCNC: 16 U/L (ref 10–35)
AMMONIA PLAS-SCNC: 17 UMOL/L (ref 11–51)
ANION GAP SERPL CALCULATED.3IONS-SCNC: 13 MMOL/L (ref 9–16)
AST SERPL-CCNC: 22 U/L (ref 10–35)
BILIRUB SERPL-MCNC: 0.3 MG/DL (ref 0–1.2)
BUN SERPL-MCNC: 19 MG/DL (ref 8–23)
CALCIUM SERPL-MCNC: 9.5 MG/DL (ref 8.8–10.2)
CHLORIDE SERPL-SCNC: 96 MMOL/L (ref 98–107)
CO2 SERPL-SCNC: 25 MMOL/L (ref 20–31)
CREAT SERPL-MCNC: 0.7 MG/DL (ref 0.5–0.9)
GFR, ESTIMATED: >90 ML/MIN/1.73M2
GLUCOSE SERPL-MCNC: 250 MG/DL (ref 82–115)
MAGNESIUM SERPL-MCNC: 2.1 MG/DL (ref 1.6–2.4)
PHOSPHATE SERPL-MCNC: 3.7 MG/DL (ref 2.5–4.5)
POTASSIUM SERPL-SCNC: 5 MMOL/L (ref 3.7–5.3)
PROT SERPL-MCNC: 7 G/DL (ref 6.6–8.7)
SODIUM SERPL-SCNC: 134 MMOL/L (ref 136–145)

## 2025-02-19 LAB
ANION GAP SERPL CALCULATED.3IONS-SCNC: 11 MMOL/L (ref 9–16)
BUN SERPL-MCNC: 24 MG/DL (ref 8–23)
CALCIUM SERPL-MCNC: 9.2 MG/DL (ref 8.8–10.2)
CHLORIDE SERPL-SCNC: 98 MMOL/L (ref 98–107)
CO2 SERPL-SCNC: 28 MMOL/L (ref 20–31)
CREAT SERPL-MCNC: 0.7 MG/DL (ref 0.5–0.9)
GFR, ESTIMATED: 84 ML/MIN/1.73M2
GLUCOSE SERPL-MCNC: 271 MG/DL (ref 82–115)
POTASSIUM SERPL-SCNC: 4.9 MMOL/L (ref 3.7–5.3)
SODIUM SERPL-SCNC: 136 MMOL/L (ref 136–145)

## 2025-02-22 LAB — POTASSIUM SERPL-SCNC: 4.3 MMOL/L (ref 3.7–5.3)

## 2025-02-24 LAB
ALBUMIN SERPL-MCNC: 3.3 G/DL (ref 3.5–5.2)
ALBUMIN SERPL-MCNC: 3.7 G/DL (ref 3.5–5.2)
ALBUMIN/GLOB SERPL: 1.1 {RATIO} (ref 1–2.5)
ALBUMIN/GLOB SERPL: 1.1 {RATIO} (ref 1–2.5)
ALP SERPL-CCNC: 83 U/L (ref 35–104)
ALP SERPL-CCNC: 96 U/L (ref 35–104)
ALT SERPL-CCNC: 15 U/L (ref 10–35)
ALT SERPL-CCNC: 19 U/L (ref 10–35)
ANION GAP SERPL CALCULATED.3IONS-SCNC: 11 MMOL/L (ref 9–16)
ANION GAP SERPL CALCULATED.3IONS-SCNC: 13 MMOL/L (ref 9–16)
AST SERPL-CCNC: 17 U/L (ref 10–35)
AST SERPL-CCNC: 20 U/L (ref 10–35)
BILIRUB SERPL-MCNC: 0.3 MG/DL (ref 0–1.2)
BILIRUB SERPL-MCNC: <0.2 MG/DL (ref 0–1.2)
BUN SERPL-MCNC: 46 MG/DL (ref 8–23)
BUN SERPL-MCNC: 48 MG/DL (ref 8–23)
CALCIUM SERPL-MCNC: 9.2 MG/DL (ref 8.8–10.2)
CALCIUM SERPL-MCNC: 9.4 MG/DL (ref 8.8–10.2)
CHLORIDE SERPL-SCNC: 103 MMOL/L (ref 98–107)
CHLORIDE SERPL-SCNC: 99 MMOL/L (ref 98–107)
CO2 SERPL-SCNC: 29 MMOL/L (ref 20–31)
CO2 SERPL-SCNC: 29 MMOL/L (ref 20–31)
CREAT SERPL-MCNC: 0.8 MG/DL (ref 0.5–0.9)
CREAT SERPL-MCNC: 0.8 MG/DL (ref 0.5–0.9)
ERYTHROCYTE [DISTWIDTH] IN BLOOD BY AUTOMATED COUNT: 15.5 % (ref 11.8–14.4)
GFR, ESTIMATED: 76 ML/MIN/1.73M2
GFR, ESTIMATED: 82 ML/MIN/1.73M2
GLUCOSE SERPL-MCNC: 139 MG/DL (ref 82–115)
GLUCOSE SERPL-MCNC: 222 MG/DL (ref 82–115)
HCT VFR BLD AUTO: 31.6 % (ref 36.3–47.1)
HGB BLD-MCNC: 9.9 G/DL (ref 11.9–15.1)
MAGNESIUM SERPL-MCNC: 2.2 MG/DL (ref 1.6–2.4)
MAGNESIUM SERPL-MCNC: 2.4 MG/DL (ref 1.6–2.4)
MCH RBC QN AUTO: 27.8 PG (ref 25.2–33.5)
MCHC RBC AUTO-ENTMCNC: 31.3 G/DL (ref 28.4–34.8)
MCV RBC AUTO: 88.8 FL (ref 82.6–102.9)
NRBC BLD-RTO: 0 PER 100 WBC
PHOSPHATE SERPL-MCNC: 4.4 MG/DL (ref 2.5–4.5)
PLATELET # BLD AUTO: 353 K/UL (ref 138–453)
PMV BLD AUTO: 11.6 FL (ref 8.1–13.5)
POTASSIUM SERPL-SCNC: 3.8 MMOL/L (ref 3.7–5.3)
POTASSIUM SERPL-SCNC: 3.8 MMOL/L (ref 3.7–5.3)
PROT SERPL-MCNC: 6.3 G/DL (ref 6.6–8.7)
PROT SERPL-MCNC: 7.1 G/DL (ref 6.6–8.7)
RBC # BLD AUTO: 3.56 M/UL (ref 3.95–5.11)
SODIUM SERPL-SCNC: 141 MMOL/L (ref 136–145)
SODIUM SERPL-SCNC: 143 MMOL/L (ref 136–145)
WBC OTHER # BLD: 33.9 K/UL (ref 3.5–11.3)

## 2025-02-25 LAB
ERYTHROCYTE [DISTWIDTH] IN BLOOD BY AUTOMATED COUNT: 15.8 % (ref 11.8–14.4)
HCT VFR BLD AUTO: 31 % (ref 36.3–47.1)
HGB BLD-MCNC: 9.1 G/DL (ref 11.9–15.1)
MCH RBC QN AUTO: 26.5 PG (ref 25.2–33.5)
MCHC RBC AUTO-ENTMCNC: 29.4 G/DL (ref 28.4–34.8)
MCV RBC AUTO: 90.4 FL (ref 82.6–102.9)
NRBC BLD-RTO: 0 PER 100 WBC
PLATELET # BLD AUTO: 331 K/UL (ref 138–453)
PMV BLD AUTO: 12.2 FL (ref 8.1–13.5)
RBC # BLD AUTO: 3.43 M/UL (ref 3.95–5.11)
WBC OTHER # BLD: 23.4 K/UL (ref 3.5–11.3)

## 2025-02-26 LAB
ALBUMIN SERPL-MCNC: 3.1 G/DL (ref 3.5–5.2)
ALBUMIN/GLOB SERPL: 0.9 {RATIO} (ref 1–2.5)
ALP SERPL-CCNC: 78 U/L (ref 35–104)
ALT SERPL-CCNC: 15 U/L (ref 10–35)
ANION GAP SERPL CALCULATED.3IONS-SCNC: 10 MMOL/L (ref 9–16)
AST SERPL-CCNC: 22 U/L (ref 10–35)
BILIRUB SERPL-MCNC: <0.2 MG/DL (ref 0–1.2)
BUN SERPL-MCNC: 38 MG/DL (ref 8–23)
CALCIUM SERPL-MCNC: 9 MG/DL (ref 8.8–10.2)
CHLORIDE SERPL-SCNC: 103 MMOL/L (ref 98–107)
CO2 SERPL-SCNC: 30 MMOL/L (ref 20–31)
CREAT SERPL-MCNC: 0.7 MG/DL (ref 0.5–0.9)
ERYTHROCYTE [DISTWIDTH] IN BLOOD BY AUTOMATED COUNT: 15.5 % (ref 11.8–14.4)
GFR, ESTIMATED: 84 ML/MIN/1.73M2
GLUCOSE SERPL-MCNC: 198 MG/DL (ref 82–115)
HCT VFR BLD AUTO: 29.5 % (ref 36.3–47.1)
HGB BLD-MCNC: 8.9 G/DL (ref 11.9–15.1)
MCH RBC QN AUTO: 27.1 PG (ref 25.2–33.5)
MCHC RBC AUTO-ENTMCNC: 30.2 G/DL (ref 28.4–34.8)
MCV RBC AUTO: 89.7 FL (ref 82.6–102.9)
NRBC BLD-RTO: 0 PER 100 WBC
PLATELET # BLD AUTO: 305 K/UL (ref 138–453)
PMV BLD AUTO: 12.2 FL (ref 8.1–13.5)
POTASSIUM SERPL-SCNC: 3.8 MMOL/L (ref 3.7–5.3)
PROT SERPL-MCNC: 6.6 G/DL (ref 6.6–8.7)
RBC # BLD AUTO: 3.29 M/UL (ref 3.95–5.11)
SODIUM SERPL-SCNC: 144 MMOL/L (ref 136–145)
WBC OTHER # BLD: 13.6 K/UL (ref 3.5–11.3)

## 2025-02-27 LAB
MICROORGANISM SPEC CULT: ABNORMAL
SPECIMEN DESCRIPTION: ABNORMAL

## 2025-02-28 LAB
ANION GAP SERPL CALCULATED.3IONS-SCNC: 11 MMOL/L (ref 9–16)
BUN SERPL-MCNC: 38 MG/DL (ref 8–23)
CALCIUM SERPL-MCNC: 9.4 MG/DL (ref 8.8–10.2)
CHLORIDE SERPL-SCNC: 107 MMOL/L (ref 98–107)
CO2 SERPL-SCNC: 30 MMOL/L (ref 20–31)
CREAT SERPL-MCNC: 0.7 MG/DL (ref 0.5–0.9)
ERYTHROCYTE [DISTWIDTH] IN BLOOD BY AUTOMATED COUNT: 15.4 % (ref 11.8–14.4)
GFR, ESTIMATED: 90 ML/MIN/1.73M2
GLUCOSE SERPL-MCNC: 237 MG/DL (ref 82–115)
HCT VFR BLD AUTO: 29 % (ref 36.3–47.1)
HGB BLD-MCNC: 8.7 G/DL (ref 11.9–15.1)
MCH RBC QN AUTO: 27.1 PG (ref 25.2–33.5)
MCHC RBC AUTO-ENTMCNC: 30 G/DL (ref 28.4–34.8)
MCV RBC AUTO: 90.3 FL (ref 82.6–102.9)
NRBC BLD-RTO: 0 PER 100 WBC
PLATELET # BLD AUTO: 305 K/UL (ref 138–453)
PMV BLD AUTO: 12.3 FL (ref 8.1–13.5)
POTASSIUM SERPL-SCNC: 3.8 MMOL/L (ref 3.7–5.3)
RBC # BLD AUTO: 3.21 M/UL (ref 3.95–5.11)
SODIUM SERPL-SCNC: 148 MMOL/L (ref 136–145)
WBC OTHER # BLD: 12.8 K/UL (ref 3.5–11.3)

## 2025-03-01 LAB
MICROORGANISM SPEC CULT: NORMAL
MICROORGANISM SPEC CULT: NORMAL
SERVICE CMNT-IMP: NORMAL
SERVICE CMNT-IMP: NORMAL
SPECIMEN DESCRIPTION: NORMAL
SPECIMEN DESCRIPTION: NORMAL

## 2025-03-03 LAB
ALBUMIN SERPL-MCNC: 3.3 G/DL (ref 3.5–5.2)
ALBUMIN/GLOB SERPL: 1 {RATIO} (ref 1–2.5)
ALP SERPL-CCNC: 98 U/L (ref 35–104)
ALT SERPL-CCNC: 26 U/L (ref 10–35)
ANION GAP SERPL CALCULATED.3IONS-SCNC: 12 MMOL/L (ref 9–16)
AST SERPL-CCNC: 25 U/L (ref 10–35)
BILIRUB SERPL-MCNC: 0.3 MG/DL (ref 0–1.2)
BUN SERPL-MCNC: 29 MG/DL (ref 8–23)
CALCIUM SERPL-MCNC: 9.2 MG/DL (ref 8.8–10.2)
CHLORIDE SERPL-SCNC: 102 MMOL/L (ref 98–107)
CO2 SERPL-SCNC: 27 MMOL/L (ref 20–31)
CREAT SERPL-MCNC: 0.7 MG/DL (ref 0.5–0.9)
ERYTHROCYTE [DISTWIDTH] IN BLOOD BY AUTOMATED COUNT: 15.1 % (ref 11.8–14.4)
GFR, ESTIMATED: >90 ML/MIN/1.73M2
GLUCOSE SERPL-MCNC: 109 MG/DL (ref 82–115)
HCT VFR BLD AUTO: 29.5 % (ref 36.3–47.1)
HGB BLD-MCNC: 9.1 G/DL (ref 11.9–15.1)
MAGNESIUM SERPL-MCNC: 2.1 MG/DL (ref 1.6–2.4)
MCH RBC QN AUTO: 27.2 PG (ref 25.2–33.5)
MCHC RBC AUTO-ENTMCNC: 30.8 G/DL (ref 28.4–34.8)
MCV RBC AUTO: 88.1 FL (ref 82.6–102.9)
NRBC BLD-RTO: 0 PER 100 WBC
PHOSPHATE SERPL-MCNC: 3.3 MG/DL (ref 2.5–4.5)
PLATELET # BLD AUTO: 347 K/UL (ref 138–453)
PMV BLD AUTO: 12 FL (ref 8.1–13.5)
POTASSIUM SERPL-SCNC: 3.2 MMOL/L (ref 3.7–5.3)
PROT SERPL-MCNC: 6.6 G/DL (ref 6.6–8.7)
RBC # BLD AUTO: 3.35 M/UL (ref 3.95–5.11)
SODIUM SERPL-SCNC: 141 MMOL/L (ref 136–145)
WBC OTHER # BLD: 11.6 K/UL (ref 3.5–11.3)

## 2025-03-04 LAB — POTASSIUM SERPL-SCNC: 3.4 MMOL/L (ref 3.7–5.3)

## 2025-03-05 LAB — POTASSIUM SERPL-SCNC: 3.8 MMOL/L (ref 3.7–5.3)

## 2025-03-09 PROBLEM — M79.89 LEFT ARM SWELLING: Status: ACTIVE | Noted: 2025-03-09

## 2025-03-10 LAB
ALBUMIN SERPL-MCNC: 3.3 G/DL (ref 3.5–5.2)
ALBUMIN/GLOB SERPL: 1 {RATIO} (ref 1–2.5)
ALP SERPL-CCNC: 106 U/L (ref 35–104)
ALT SERPL-CCNC: 31 U/L (ref 10–35)
ANION GAP SERPL CALCULATED.3IONS-SCNC: 11 MMOL/L (ref 9–16)
AST SERPL-CCNC: 24 U/L (ref 10–35)
BILIRUB SERPL-MCNC: <0.2 MG/DL (ref 0–1.2)
BUN SERPL-MCNC: 28 MG/DL (ref 8–23)
CALCIUM SERPL-MCNC: 9.3 MG/DL (ref 8.8–10.2)
CHLORIDE SERPL-SCNC: 103 MMOL/L (ref 98–107)
CO2 SERPL-SCNC: 31 MMOL/L (ref 20–31)
CREAT SERPL-MCNC: 0.7 MG/DL (ref 0.5–0.9)
ERYTHROCYTE [DISTWIDTH] IN BLOOD BY AUTOMATED COUNT: 15.8 % (ref 11.8–14.4)
GFR, ESTIMATED: >90 ML/MIN/1.73M2
GLUCOSE SERPL-MCNC: 179 MG/DL (ref 82–115)
HCT VFR BLD AUTO: 30.9 % (ref 36.3–47.1)
HGB BLD-MCNC: 9.3 G/DL (ref 11.9–15.1)
MAGNESIUM SERPL-MCNC: 2.3 MG/DL (ref 1.6–2.4)
MCH RBC QN AUTO: 27 PG (ref 25.2–33.5)
MCHC RBC AUTO-ENTMCNC: 30.1 G/DL (ref 28.4–34.8)
MCV RBC AUTO: 89.6 FL (ref 82.6–102.9)
NRBC BLD-RTO: 0 PER 100 WBC
PHOSPHATE SERPL-MCNC: 3.9 MG/DL (ref 2.5–4.5)
PLATELET # BLD AUTO: 308 K/UL (ref 138–453)
PMV BLD AUTO: 12 FL (ref 8.1–13.5)
POTASSIUM SERPL-SCNC: 3.8 MMOL/L (ref 3.7–5.3)
PROT SERPL-MCNC: 6.5 G/DL (ref 6.6–8.7)
RBC # BLD AUTO: 3.45 M/UL (ref 3.95–5.11)
SODIUM SERPL-SCNC: 145 MMOL/L (ref 136–145)
WBC OTHER # BLD: 12.4 K/UL (ref 3.5–11.3)

## 2025-03-14 ENCOUNTER — TELEPHONE (OUTPATIENT)
Dept: NEUROLOGY | Age: 75
End: 2025-03-14

## 2025-03-14 NOTE — TELEPHONE ENCOUNTER
We received a request for surgical clearance on patient who is to have EGD w/ PEG placement. They would like written statement along with any medication instructions, and if she can safely stop Eliquis 2 days prior to surgery. Please advise. Thank you

## 2025-03-17 LAB
ALBUMIN SERPL-MCNC: 3.5 G/DL (ref 3.5–5.2)
ALBUMIN/GLOB SERPL: 1 {RATIO} (ref 1–2.5)
ALP SERPL-CCNC: 130 U/L (ref 35–104)
ALT SERPL-CCNC: 40 U/L (ref 10–35)
ANION GAP SERPL CALCULATED.3IONS-SCNC: 13 MMOL/L (ref 9–16)
AST SERPL-CCNC: 23 U/L (ref 10–35)
BILIRUB SERPL-MCNC: <0.2 MG/DL (ref 0–1.2)
BUN SERPL-MCNC: 36 MG/DL (ref 8–23)
CALCIUM SERPL-MCNC: 9.4 MG/DL (ref 8.8–10.2)
CHLORIDE SERPL-SCNC: 101 MMOL/L (ref 98–107)
CO2 SERPL-SCNC: 30 MMOL/L (ref 20–31)
CREAT SERPL-MCNC: 0.6 MG/DL (ref 0.5–0.9)
ERYTHROCYTE [DISTWIDTH] IN BLOOD BY AUTOMATED COUNT: 16.6 % (ref 11.8–14.4)
GFR, ESTIMATED: >90 ML/MIN/1.73M2
GLUCOSE SERPL-MCNC: 213 MG/DL (ref 82–115)
HCT VFR BLD AUTO: 32.8 % (ref 36.3–47.1)
HGB BLD-MCNC: 9.9 G/DL (ref 11.9–15.1)
MAGNESIUM SERPL-MCNC: 2.3 MG/DL (ref 1.6–2.4)
MCH RBC QN AUTO: 27 PG (ref 25.2–33.5)
MCHC RBC AUTO-ENTMCNC: 30.2 G/DL (ref 28.4–34.8)
MCV RBC AUTO: 89.4 FL (ref 82.6–102.9)
NRBC BLD-RTO: 0 PER 100 WBC
PHOSPHATE SERPL-MCNC: 3.8 MG/DL (ref 2.5–4.5)
PLATELET # BLD AUTO: 228 K/UL (ref 138–453)
PMV BLD AUTO: 13.5 FL (ref 8.1–13.5)
POTASSIUM SERPL-SCNC: 3.6 MMOL/L (ref 3.7–5.3)
PROT SERPL-MCNC: 6.8 G/DL (ref 6.6–8.7)
RBC # BLD AUTO: 3.67 M/UL (ref 3.95–5.11)
SODIUM SERPL-SCNC: 144 MMOL/L (ref 136–145)
WBC OTHER # BLD: 14.4 K/UL (ref 3.5–11.3)

## 2025-03-18 LAB — POTASSIUM SERPL-SCNC: 3.8 MMOL/L (ref 3.7–5.3)

## 2025-03-27 ENCOUNTER — HOSPITAL ENCOUNTER (OUTPATIENT)
Age: 75
Setting detail: SPECIMEN
Discharge: HOME | End: 2025-03-27

## 2025-03-27 LAB
ANION GAP SERPL CALCULATED.3IONS-SCNC: 12 MMOL/L (ref 9–16)
BUN SERPL-MCNC: 17 MG/DL (ref 8–23)
CALCIUM SERPL-MCNC: 9.3 MG/DL (ref 8.8–10.2)
CHLORIDE SERPL-SCNC: 101 MMOL/L (ref 98–107)
CO2 SERPL-SCNC: 30 MMOL/L (ref 20–31)
CREAT SERPL-MCNC: 0.6 MG/DL (ref 0.5–0.9)
ERYTHROCYTE [DISTWIDTH] IN BLOOD BY AUTOMATED COUNT: 17.5 % (ref 11.8–14.4)
GFR, ESTIMATED: >90 ML/MIN/1.73M2
GLUCOSE SERPL-MCNC: 194 MG/DL (ref 82–115)
HCT VFR BLD AUTO: 33.5 % (ref 36.3–47.1)
HGB BLD-MCNC: 10.1 G/DL (ref 11.9–15.1)
MCH RBC QN AUTO: 26.9 PG (ref 25.2–33.5)
MCHC RBC AUTO-ENTMCNC: 30.1 G/DL (ref 28.4–34.8)
MCV RBC AUTO: 89.1 FL (ref 82.6–102.9)
NRBC BLD-RTO: 0 PER 100 WBC
PLATELET # BLD AUTO: 298 K/UL (ref 138–453)
PMV BLD AUTO: 12.4 FL (ref 8.1–13.5)
POTASSIUM SERPL-SCNC: 3.1 MMOL/L (ref 3.7–5.3)
RBC # BLD AUTO: 3.76 M/UL (ref 3.95–5.11)
SODIUM SERPL-SCNC: 143 MMOL/L (ref 136–145)
WBC OTHER # BLD: 12.6 K/UL (ref 3.5–11.3)

## 2025-03-27 PROCEDURE — 36415 COLL VENOUS BLD VENIPUNCTURE: CPT

## 2025-03-27 PROCEDURE — 80048 BASIC METABOLIC PNL TOTAL CA: CPT

## 2025-03-27 PROCEDURE — 85027 COMPLETE CBC AUTOMATED: CPT

## 2025-03-31 ENCOUNTER — HOSPITAL ENCOUNTER (OUTPATIENT)
Age: 75
Setting detail: SPECIMEN
Discharge: HOME | End: 2025-03-31

## 2025-03-31 LAB
ANION GAP SERPL CALCULATED.3IONS-SCNC: 14 MMOL/L (ref 9–16)
BUN SERPL-MCNC: 23 MG/DL (ref 8–23)
CALCIUM SERPL-MCNC: 9.1 MG/DL (ref 8.8–10.2)
CHLORIDE SERPL-SCNC: 99 MMOL/L (ref 98–107)
CO2 SERPL-SCNC: 28 MMOL/L (ref 20–31)
CREAT SERPL-MCNC: 0.6 MG/DL (ref 0.5–0.9)
GFR, ESTIMATED: >90 ML/MIN/1.73M2
GLUCOSE SERPL-MCNC: 157 MG/DL (ref 82–115)
POTASSIUM SERPL-SCNC: 3.6 MMOL/L (ref 3.7–5.3)
SODIUM SERPL-SCNC: 141 MMOL/L (ref 136–145)

## 2025-03-31 PROCEDURE — 80048 BASIC METABOLIC PNL TOTAL CA: CPT

## 2025-03-31 PROCEDURE — 36415 COLL VENOUS BLD VENIPUNCTURE: CPT

## 2025-04-03 ENCOUNTER — HOSPITAL ENCOUNTER (OUTPATIENT)
Age: 75
Setting detail: SPECIMEN
Discharge: HOME | End: 2025-04-03

## 2025-04-03 LAB
ANION GAP SERPL CALCULATED.3IONS-SCNC: 11 MMOL/L (ref 9–16)
BUN SERPL-MCNC: 24 MG/DL (ref 8–23)
CALCIUM SERPL-MCNC: 9.1 MG/DL (ref 8.8–10.2)
CHLORIDE SERPL-SCNC: 100 MMOL/L (ref 98–107)
CO2 SERPL-SCNC: 29 MMOL/L (ref 20–31)
CREAT SERPL-MCNC: 0.7 MG/DL (ref 0.5–0.9)
ERYTHROCYTE [DISTWIDTH] IN BLOOD BY AUTOMATED COUNT: 17.2 % (ref 11.8–14.4)
GFR, ESTIMATED: >90 ML/MIN/1.73M2
GLUCOSE SERPL-MCNC: 220 MG/DL (ref 82–115)
HCT VFR BLD AUTO: 32.5 % (ref 36.3–47.1)
HGB BLD-MCNC: 9.7 G/DL (ref 11.9–15.1)
MCH RBC QN AUTO: 26.8 PG (ref 25.2–33.5)
MCHC RBC AUTO-ENTMCNC: 29.8 G/DL (ref 28.4–34.8)
MCV RBC AUTO: 89.8 FL (ref 82.6–102.9)
NRBC BLD-RTO: 0 PER 100 WBC
PLATELET # BLD AUTO: 281 K/UL (ref 138–453)
PMV BLD AUTO: 12 FL (ref 8.1–13.5)
POTASSIUM SERPL-SCNC: 3.7 MMOL/L (ref 3.7–5.3)
RBC # BLD AUTO: 3.62 M/UL (ref 3.95–5.11)
SODIUM SERPL-SCNC: 140 MMOL/L (ref 136–145)
WBC OTHER # BLD: 11.7 K/UL (ref 3.5–11.3)

## 2025-04-03 PROCEDURE — 80048 BASIC METABOLIC PNL TOTAL CA: CPT

## 2025-04-03 PROCEDURE — 36415 COLL VENOUS BLD VENIPUNCTURE: CPT

## 2025-04-03 PROCEDURE — 85027 COMPLETE CBC AUTOMATED: CPT

## 2025-04-07 PROCEDURE — 81001 URINALYSIS AUTO W/SCOPE: CPT

## 2025-04-07 PROCEDURE — 87086 URINE CULTURE/COLONY COUNT: CPT

## 2025-04-07 NOTE — PROGRESS NOTES
Physician Progress Note      PATIENT:               SANTO DAWSON  CSN #:                  [id number]  :                       1950  ADMIT DATE:       2025 1:08 PM  DISCH DATE:        2/15/2025 9:07 PM  RESPONDING  PROVIDER #:        CHELSEA MILLARD          QUERY TEXT:    The dietician documents in progress note moderate malnutrition on 2/10. Please   clarify the patients nutritional status:    The clinical indicators include:  Dietician evidence 2/10 and   75% or less estimated energy requirements for 1 month or longer  Mild body fat loss Orbital  Mild muscle mass loss Hand (interosseous)  Pt has had poor intakes and appetite since admission. NPO x6 days.  recommendation: Tube feed with goal of 50ml/hr  Options provided:  -- Protein calorie malnutrition mild  -- Protein calorie malnutrition moderate  -- Protein calorie malnutrition severe  -- Underweight with BMI ***  -- Cachexia  -- Abnormal weight loss  -- Other - I will add my own diagnosis  -- Disagree - Not applicable / Not valid  -- Disagree - Clinically unable to determine / Unknown  -- Refer to Clinical Documentation Reviewer    PROVIDER RESPONSE TEXT:    This patient has moderate protein calorie malnutrition.    Query created by: Allison Guillaume on 2025 6:48 PM      Electronically signed by:  CHELSEA MILLARD 2025 2:07 AM

## 2025-04-08 ENCOUNTER — HOSPITAL ENCOUNTER (OUTPATIENT)
Age: 75
Setting detail: SPECIMEN
Discharge: HOME | End: 2025-04-08

## 2025-04-08 LAB
ANION GAP SERPL CALCULATED.3IONS-SCNC: 12 MMOL/L (ref 9–16)
BACTERIA URNS QL MICRO: ABNORMAL
BUN SERPL-MCNC: 22 MG/DL (ref 8–23)
CALCIUM SERPL-MCNC: 9.4 MG/DL (ref 8.8–10.2)
CHLORIDE SERPL-SCNC: 98 MMOL/L (ref 98–107)
CO2 SERPL-SCNC: 30 MMOL/L (ref 20–31)
CREAT SERPL-MCNC: 0.6 MG/DL (ref 0.5–0.9)
EPI CELLS #/AREA URNS HPF: ABNORMAL /HPF (ref 0–5)
ERYTHROCYTE [DISTWIDTH] IN BLOOD BY AUTOMATED COUNT: 17.2 % (ref 11.8–14.4)
GFR, ESTIMATED: >90 ML/MIN/1.73M2
GLUCOSE SERPL-MCNC: 191 MG/DL (ref 82–115)
HCT VFR BLD AUTO: 34.2 % (ref 36.3–47.1)
HGB BLD-MCNC: 10.3 G/DL (ref 11.9–15.1)
MCH RBC QN AUTO: 26.7 PG (ref 25.2–33.5)
MCHC RBC AUTO-ENTMCNC: 30.1 G/DL (ref 28.4–34.8)
MCV RBC AUTO: 88.6 FL (ref 82.6–102.9)
NRBC BLD-RTO: 0 PER 100 WBC
PLATELET # BLD AUTO: 276 K/UL (ref 138–453)
PMV BLD AUTO: 12.4 FL (ref 8.1–13.5)
POTASSIUM SERPL-SCNC: 3.4 MMOL/L (ref 3.7–5.3)
RBC # BLD AUTO: 3.86 M/UL (ref 3.95–5.11)
RBC #/AREA URNS HPF: ABNORMAL /HPF (ref 0–2)
SODIUM SERPL-SCNC: 140 MMOL/L (ref 136–145)
WBC #/AREA URNS HPF: ABNORMAL /HPF (ref 0–5)
WBC OTHER # BLD: 11.9 K/UL (ref 3.5–11.3)

## 2025-04-08 PROCEDURE — 80048 BASIC METABOLIC PNL TOTAL CA: CPT

## 2025-04-08 PROCEDURE — 85027 COMPLETE CBC AUTOMATED: CPT

## 2025-04-08 PROCEDURE — 36415 COLL VENOUS BLD VENIPUNCTURE: CPT

## 2025-04-09 LAB
BILIRUB UR QL STRIP: NEGATIVE
CLARITY UR: ABNORMAL
COLOR UR: YELLOW
GLUCOSE UR STRIP-MCNC: NEGATIVE MG/DL
HGB UR QL STRIP.AUTO: NEGATIVE
KETONES UR STRIP-MCNC: NEGATIVE MG/DL
LEUKOCYTE ESTERASE UR QL STRIP: NEGATIVE
NITRITE UR QL STRIP: POSITIVE
PH UR STRIP: 6.5 [PH] (ref 5–8)
PROT UR STRIP-MCNC: NEGATIVE MG/DL
SP GR UR STRIP: 1.02 (ref 1–1.03)
UROBILINOGEN UR STRIP-ACNC: NORMAL EU/DL (ref 0–1)

## 2025-04-10 ENCOUNTER — HOSPITAL ENCOUNTER (OUTPATIENT)
Age: 75
Setting detail: SPECIMEN
Discharge: HOME | End: 2025-04-10

## 2025-04-10 LAB
MICROORGANISM SPEC CULT: NORMAL
SPECIMEN DESCRIPTION: NORMAL

## 2025-04-10 PROCEDURE — 81001 URINALYSIS AUTO W/SCOPE: CPT

## 2025-04-10 PROCEDURE — 87086 URINE CULTURE/COLONY COUNT: CPT

## 2025-04-11 LAB
BACTERIA URNS QL MICRO: ABNORMAL
BILIRUB UR QL STRIP: NEGATIVE
CLARITY UR: ABNORMAL
COLOR UR: YELLOW
EPI CELLS #/AREA URNS HPF: ABNORMAL /HPF (ref 0–5)
GLUCOSE UR STRIP-MCNC: NEGATIVE MG/DL
HGB UR QL STRIP.AUTO: NEGATIVE
KETONES UR STRIP-MCNC: NEGATIVE MG/DL
LEUKOCYTE ESTERASE UR QL STRIP: NEGATIVE
NITRITE UR QL STRIP: POSITIVE
PH UR STRIP: 7 [PH] (ref 5–8)
PROT UR STRIP-MCNC: NEGATIVE MG/DL
RBC #/AREA URNS HPF: ABNORMAL /HPF (ref 0–2)
SP GR UR STRIP: 1.01 (ref 1–1.03)
UROBILINOGEN UR STRIP-ACNC: NORMAL EU/DL (ref 0–1)
WBC #/AREA URNS HPF: ABNORMAL /HPF (ref 0–5)

## 2025-04-13 ENCOUNTER — HOSPITAL ENCOUNTER (OUTPATIENT)
Age: 75
Setting detail: SPECIMEN
Discharge: HOME | End: 2025-04-13

## 2025-04-13 LAB
BACTERIA URNS QL MICRO: ABNORMAL
BILIRUB UR QL STRIP: NEGATIVE
CLARITY UR: ABNORMAL
COLOR UR: YELLOW
EPI CELLS #/AREA URNS HPF: ABNORMAL /HPF (ref 0–5)
GLUCOSE UR STRIP-MCNC: NEGATIVE MG/DL
HGB UR QL STRIP.AUTO: NEGATIVE
KETONES UR STRIP-MCNC: NEGATIVE MG/DL
LEUKOCYTE ESTERASE UR QL STRIP: NEGATIVE
MICROORGANISM SPEC CULT: NORMAL
MICROORGANISM SPEC CULT: NORMAL
NITRITE UR QL STRIP: POSITIVE
PH UR STRIP: 8 [PH] (ref 5–8)
PROT UR STRIP-MCNC: NEGATIVE MG/DL
RBC #/AREA URNS HPF: ABNORMAL /HPF (ref 0–2)
SERVICE CMNT-IMP: NORMAL
SP GR UR STRIP: 1.01 (ref 1–1.03)
SPECIMEN DESCRIPTION: NORMAL
UROBILINOGEN UR STRIP-ACNC: NORMAL EU/DL (ref 0–1)
WBC #/AREA URNS HPF: ABNORMAL /HPF (ref 0–5)

## 2025-04-13 PROCEDURE — 87077 CULTURE AEROBIC IDENTIFY: CPT

## 2025-04-13 PROCEDURE — 87086 URINE CULTURE/COLONY COUNT: CPT

## 2025-04-13 PROCEDURE — 87186 SC STD MICRODIL/AGAR DIL: CPT

## 2025-04-13 PROCEDURE — 81001 URINALYSIS AUTO W/SCOPE: CPT

## 2025-04-15 ENCOUNTER — HOSPITAL ENCOUNTER (OUTPATIENT)
Age: 75
Setting detail: SPECIMEN
Discharge: HOME | End: 2025-04-15

## 2025-04-15 LAB
ANION GAP SERPL CALCULATED.3IONS-SCNC: 12 MMOL/L (ref 9–16)
BUN SERPL-MCNC: 21 MG/DL (ref 8–23)
CALCIUM SERPL-MCNC: 9.3 MG/DL (ref 8.8–10.2)
CHLORIDE SERPL-SCNC: 97 MMOL/L (ref 98–107)
CO2 SERPL-SCNC: 30 MMOL/L (ref 20–31)
CREAT SERPL-MCNC: 0.7 MG/DL (ref 0.5–0.9)
ERYTHROCYTE [DISTWIDTH] IN BLOOD BY AUTOMATED COUNT: 17.3 % (ref 11.8–14.4)
GFR, ESTIMATED: >90 ML/MIN/1.73M2
GLUCOSE SERPL-MCNC: 168 MG/DL (ref 82–115)
HCT VFR BLD AUTO: 33.2 % (ref 36.3–47.1)
HGB BLD-MCNC: 10.2 G/DL (ref 11.9–15.1)
MCH RBC QN AUTO: 26.7 PG (ref 25.2–33.5)
MCHC RBC AUTO-ENTMCNC: 30.7 G/DL (ref 28.4–34.8)
MCV RBC AUTO: 86.9 FL (ref 82.6–102.9)
MICROORGANISM SPEC CULT: ABNORMAL
NRBC BLD-RTO: 0 PER 100 WBC
PLATELET # BLD AUTO: 254 K/UL (ref 138–453)
PMV BLD AUTO: 12.9 FL (ref 8.1–13.5)
POTASSIUM SERPL-SCNC: 3.5 MMOL/L (ref 3.7–5.3)
RBC # BLD AUTO: 3.82 M/UL (ref 3.95–5.11)
SERVICE CMNT-IMP: ABNORMAL
SODIUM SERPL-SCNC: 138 MMOL/L (ref 136–145)
SPECIMEN DESCRIPTION: ABNORMAL
WBC OTHER # BLD: 11.4 K/UL (ref 3.5–11.3)

## 2025-04-15 PROCEDURE — 36415 COLL VENOUS BLD VENIPUNCTURE: CPT

## 2025-04-15 PROCEDURE — 80048 BASIC METABOLIC PNL TOTAL CA: CPT

## 2025-04-15 PROCEDURE — 85027 COMPLETE CBC AUTOMATED: CPT

## 2025-04-16 LAB
MICROORGANISM SPEC CULT: ABNORMAL
SERVICE CMNT-IMP: ABNORMAL
SPECIMEN DESCRIPTION: ABNORMAL

## 2025-04-17 ENCOUNTER — HOSPITAL ENCOUNTER (OUTPATIENT)
Age: 75
Setting detail: SPECIMEN
Discharge: HOME | End: 2025-04-17

## 2025-04-17 LAB
ANION GAP SERPL CALCULATED.3IONS-SCNC: 13 MMOL/L (ref 9–16)
BUN SERPL-MCNC: 47 MG/DL (ref 8–23)
CALCIUM SERPL-MCNC: 9 MG/DL (ref 8.8–10.2)
CHLORIDE SERPL-SCNC: 93 MMOL/L (ref 98–107)
CO2 SERPL-SCNC: 27 MMOL/L (ref 20–31)
CREAT SERPL-MCNC: 1 MG/DL (ref 0.5–0.9)
ERYTHROCYTE [DISTWIDTH] IN BLOOD BY AUTOMATED COUNT: 17.5 % (ref 11.8–14.4)
GFR, ESTIMATED: 61 ML/MIN/1.73M2
GLUCOSE SERPL-MCNC: 181 MG/DL (ref 82–115)
HCT VFR BLD AUTO: 31.9 % (ref 36.3–47.1)
HGB BLD-MCNC: 9.9 G/DL (ref 11.9–15.1)
MCH RBC QN AUTO: 26.8 PG (ref 25.2–33.5)
MCHC RBC AUTO-ENTMCNC: 31 G/DL (ref 28.4–34.8)
MCV RBC AUTO: 86.2 FL (ref 82.6–102.9)
NRBC BLD-RTO: 0 PER 100 WBC
PLATELET # BLD AUTO: 203 K/UL (ref 138–453)
PMV BLD AUTO: 13.3 FL (ref 8.1–13.5)
POTASSIUM SERPL-SCNC: 4.1 MMOL/L (ref 3.7–5.3)
RBC # BLD AUTO: 3.7 M/UL (ref 3.95–5.11)
SODIUM SERPL-SCNC: 133 MMOL/L (ref 136–145)
WBC OTHER # BLD: 17.2 K/UL (ref 3.5–11.3)

## 2025-04-17 PROCEDURE — 36415 COLL VENOUS BLD VENIPUNCTURE: CPT

## 2025-04-17 PROCEDURE — 80048 BASIC METABOLIC PNL TOTAL CA: CPT

## 2025-04-17 PROCEDURE — 85027 COMPLETE CBC AUTOMATED: CPT
